# Patient Record
Sex: FEMALE | Race: WHITE | NOT HISPANIC OR LATINO | Employment: OTHER | URBAN - METROPOLITAN AREA
[De-identification: names, ages, dates, MRNs, and addresses within clinical notes are randomized per-mention and may not be internally consistent; named-entity substitution may affect disease eponyms.]

---

## 2017-05-09 ENCOUNTER — APPOINTMENT (OUTPATIENT)
Dept: LAB | Facility: CLINIC | Age: 62
End: 2017-05-09
Payer: MEDICARE

## 2017-05-09 ENCOUNTER — TRANSCRIBE ORDERS (OUTPATIENT)
Dept: LAB | Facility: CLINIC | Age: 62
End: 2017-05-09

## 2017-05-09 DIAGNOSIS — Z51.81 ENCOUNTER FOR THERAPEUTIC DRUG MONITORING: ICD-10-CM

## 2017-05-09 DIAGNOSIS — G35 MULTIPLE SCLEROSIS (HCC): ICD-10-CM

## 2017-05-09 DIAGNOSIS — G35 MULTIPLE SCLEROSIS (HCC): Primary | ICD-10-CM

## 2017-05-09 LAB
ALBUMIN SERPL BCP-MCNC: 4 G/DL (ref 3.5–5)
ALP SERPL-CCNC: 111 U/L (ref 46–116)
ALT SERPL W P-5'-P-CCNC: 29 U/L (ref 12–78)
ANION GAP SERPL CALCULATED.3IONS-SCNC: 7 MMOL/L (ref 4–13)
AST SERPL W P-5'-P-CCNC: 14 U/L (ref 5–45)
BASOPHILS # BLD AUTO: 0.02 THOUSANDS/ΜL (ref 0–0.1)
BASOPHILS NFR BLD AUTO: 1 % (ref 0–1)
BILIRUB SERPL-MCNC: 0.56 MG/DL (ref 0.2–1)
BUN SERPL-MCNC: 21 MG/DL (ref 5–25)
CALCIUM SERPL-MCNC: 9.1 MG/DL (ref 8.3–10.1)
CHLORIDE SERPL-SCNC: 107 MMOL/L (ref 100–108)
CO2 SERPL-SCNC: 29 MMOL/L (ref 21–32)
CREAT SERPL-MCNC: 0.62 MG/DL (ref 0.6–1.3)
EOSINOPHIL # BLD AUTO: 0.06 THOUSAND/ΜL (ref 0–0.61)
EOSINOPHIL NFR BLD AUTO: 2 % (ref 0–6)
ERYTHROCYTE [DISTWIDTH] IN BLOOD BY AUTOMATED COUNT: 14.6 % (ref 11.6–15.1)
GFR SERPL CREATININE-BSD FRML MDRD: >60 ML/MIN/1.73SQ M
GLUCOSE P FAST SERPL-MCNC: 82 MG/DL (ref 65–99)
HCT VFR BLD AUTO: 40.9 % (ref 34.8–46.1)
HGB BLD-MCNC: 13.4 G/DL (ref 11.5–15.4)
LYMPHOCYTES # BLD AUTO: 0.36 THOUSANDS/ΜL (ref 0.6–4.47)
LYMPHOCYTES NFR BLD AUTO: 11 % (ref 14–44)
MCH RBC QN AUTO: 31.5 PG (ref 26.8–34.3)
MCHC RBC AUTO-ENTMCNC: 32.8 G/DL (ref 31.4–37.4)
MCV RBC AUTO: 96 FL (ref 82–98)
MONOCYTES # BLD AUTO: 0.41 THOUSAND/ΜL (ref 0.17–1.22)
MONOCYTES NFR BLD AUTO: 12 % (ref 4–12)
NEUTROPHILS # BLD AUTO: 2.46 THOUSANDS/ΜL (ref 1.85–7.62)
NEUTS SEG NFR BLD AUTO: 74 % (ref 43–75)
NRBC BLD AUTO-RTO: 0 /100 WBCS
PLATELET # BLD AUTO: 332 THOUSANDS/UL (ref 149–390)
PMV BLD AUTO: 10.8 FL (ref 8.9–12.7)
POTASSIUM SERPL-SCNC: 4.7 MMOL/L (ref 3.5–5.3)
PROT SERPL-MCNC: 7.4 G/DL (ref 6.4–8.2)
RBC # BLD AUTO: 4.26 MILLION/UL (ref 3.81–5.12)
SODIUM SERPL-SCNC: 143 MMOL/L (ref 136–145)
WBC # BLD AUTO: 3.32 THOUSAND/UL (ref 4.31–10.16)

## 2017-05-09 PROCEDURE — 85025 COMPLETE CBC W/AUTO DIFF WBC: CPT

## 2017-05-09 PROCEDURE — 36415 COLL VENOUS BLD VENIPUNCTURE: CPT

## 2017-05-09 PROCEDURE — 80053 COMPREHEN METABOLIC PANEL: CPT

## 2017-07-05 ENCOUNTER — APPOINTMENT (OUTPATIENT)
Dept: LAB | Facility: CLINIC | Age: 62
End: 2017-07-05
Payer: MEDICARE

## 2017-07-05 ENCOUNTER — TRANSCRIBE ORDERS (OUTPATIENT)
Dept: LAB | Facility: CLINIC | Age: 62
End: 2017-07-05

## 2017-07-05 DIAGNOSIS — G35 MULTIPLE SCLEROSIS (HCC): ICD-10-CM

## 2017-07-05 DIAGNOSIS — Z79.899 ENCOUNTER FOR LONG-TERM (CURRENT) USE OF OTHER MEDICATIONS: ICD-10-CM

## 2017-07-05 DIAGNOSIS — Z79.899 ENCOUNTER FOR LONG-TERM (CURRENT) USE OF OTHER MEDICATIONS: Primary | ICD-10-CM

## 2017-07-05 LAB
ALBUMIN SERPL BCP-MCNC: 3.7 G/DL (ref 3.5–5)
ALP SERPL-CCNC: 115 U/L (ref 46–116)
ALT SERPL W P-5'-P-CCNC: 47 U/L (ref 12–78)
ANION GAP SERPL CALCULATED.3IONS-SCNC: 5 MMOL/L (ref 4–13)
AST SERPL W P-5'-P-CCNC: 25 U/L (ref 5–45)
BASOPHILS # BLD AUTO: 0.02 THOUSANDS/ΜL (ref 0–0.1)
BASOPHILS NFR BLD AUTO: 1 % (ref 0–1)
BILIRUB SERPL-MCNC: 0.42 MG/DL (ref 0.2–1)
BUN SERPL-MCNC: 26 MG/DL (ref 5–25)
CALCIUM SERPL-MCNC: 9 MG/DL (ref 8.3–10.1)
CHLORIDE SERPL-SCNC: 107 MMOL/L (ref 100–108)
CO2 SERPL-SCNC: 30 MMOL/L (ref 21–32)
CREAT SERPL-MCNC: 0.63 MG/DL (ref 0.6–1.3)
EOSINOPHIL # BLD AUTO: 0.07 THOUSAND/ΜL (ref 0–0.61)
EOSINOPHIL NFR BLD AUTO: 3 % (ref 0–6)
ERYTHROCYTE [DISTWIDTH] IN BLOOD BY AUTOMATED COUNT: 14.4 % (ref 11.6–15.1)
GFR SERPL CREATININE-BSD FRML MDRD: >60 ML/MIN/1.73SQ M
GLUCOSE P FAST SERPL-MCNC: 79 MG/DL (ref 65–99)
HCT VFR BLD AUTO: 40.7 % (ref 34.8–46.1)
HGB BLD-MCNC: 13.1 G/DL (ref 11.5–15.4)
LYMPHOCYTES # BLD AUTO: 0.25 THOUSANDS/ΜL (ref 0.6–4.47)
LYMPHOCYTES NFR BLD AUTO: 9 % (ref 14–44)
MCH RBC QN AUTO: 31.3 PG (ref 26.8–34.3)
MCHC RBC AUTO-ENTMCNC: 32.2 G/DL (ref 31.4–37.4)
MCV RBC AUTO: 97 FL (ref 82–98)
MONOCYTES # BLD AUTO: 0.46 THOUSAND/ΜL (ref 0.17–1.22)
MONOCYTES NFR BLD AUTO: 17 % (ref 4–12)
NEUTROPHILS # BLD AUTO: 1.96 THOUSANDS/ΜL (ref 1.85–7.62)
NEUTS SEG NFR BLD AUTO: 70 % (ref 43–75)
NRBC BLD AUTO-RTO: 0 /100 WBCS
PLATELET # BLD AUTO: 359 THOUSANDS/UL (ref 149–390)
PMV BLD AUTO: 10.6 FL (ref 8.9–12.7)
POTASSIUM SERPL-SCNC: 4.8 MMOL/L (ref 3.5–5.3)
PROT SERPL-MCNC: 7.3 G/DL (ref 6.4–8.2)
RBC # BLD AUTO: 4.18 MILLION/UL (ref 3.81–5.12)
SODIUM SERPL-SCNC: 142 MMOL/L (ref 136–145)
WBC # BLD AUTO: 2.76 THOUSAND/UL (ref 4.31–10.16)

## 2017-07-05 PROCEDURE — 36415 COLL VENOUS BLD VENIPUNCTURE: CPT

## 2017-07-05 PROCEDURE — 80053 COMPREHEN METABOLIC PANEL: CPT

## 2017-07-05 PROCEDURE — 85025 COMPLETE CBC W/AUTO DIFF WBC: CPT

## 2018-01-05 ENCOUNTER — TRANSCRIBE ORDERS (OUTPATIENT)
Dept: LAB | Facility: CLINIC | Age: 63
End: 2018-01-05

## 2018-01-05 ENCOUNTER — APPOINTMENT (OUTPATIENT)
Dept: LAB | Facility: CLINIC | Age: 63
End: 2018-01-05
Payer: MEDICARE

## 2018-01-05 DIAGNOSIS — T83.511A URINARY TRACT INFECTION ASSOCIATED WITH CATHETERIZATION OF URINARY TRACT, UNSPECIFIED INDWELLING URINARY CATHETER TYPE, INITIAL ENCOUNTER (HCC): Primary | ICD-10-CM

## 2018-01-05 DIAGNOSIS — N39.0 URINARY TRACT INFECTION ASSOCIATED WITH CATHETERIZATION OF URINARY TRACT, UNSPECIFIED INDWELLING URINARY CATHETER TYPE, INITIAL ENCOUNTER (HCC): Primary | ICD-10-CM

## 2018-01-05 LAB
BACTERIA UR QL AUTO: ABNORMAL /HPF
BILIRUB UR QL STRIP: NEGATIVE
CLARITY UR: ABNORMAL
COLOR UR: YELLOW
GLUCOSE UR STRIP-MCNC: NEGATIVE MG/DL
HGB UR QL STRIP.AUTO: NEGATIVE
HYALINE CASTS #/AREA URNS LPF: ABNORMAL /LPF
KETONES UR STRIP-MCNC: NEGATIVE MG/DL
LEUKOCYTE ESTERASE UR QL STRIP: ABNORMAL
NITRITE UR QL STRIP: NEGATIVE
NON-SQ EPI CELLS URNS QL MICRO: ABNORMAL /HPF
PH UR STRIP.AUTO: 6 [PH] (ref 4.5–8)
PROT UR STRIP-MCNC: NEGATIVE MG/DL
RBC #/AREA URNS AUTO: ABNORMAL /HPF
SP GR UR STRIP.AUTO: 1.02 (ref 1–1.03)
UROBILINOGEN UR QL STRIP.AUTO: 0.2 E.U./DL
WBC #/AREA URNS AUTO: ABNORMAL /HPF

## 2018-01-05 PROCEDURE — 87086 URINE CULTURE/COLONY COUNT: CPT

## 2018-01-05 PROCEDURE — 81001 URINALYSIS AUTO W/SCOPE: CPT

## 2018-01-06 LAB — BACTERIA UR CULT: NORMAL

## 2018-01-19 ENCOUNTER — LAB (OUTPATIENT)
Dept: LAB | Facility: CLINIC | Age: 63
End: 2018-01-19
Payer: MEDICARE

## 2018-01-19 ENCOUNTER — TRANSCRIBE ORDERS (OUTPATIENT)
Dept: LAB | Facility: CLINIC | Age: 63
End: 2018-01-19

## 2018-01-19 DIAGNOSIS — G35 MULTIPLE SCLEROSIS (HCC): ICD-10-CM

## 2018-01-19 DIAGNOSIS — E78.00 PURE HYPERCHOLESTEROLEMIA: ICD-10-CM

## 2018-01-19 DIAGNOSIS — Z79.899 NEED FOR PROPHYLACTIC CHEMOTHERAPY: ICD-10-CM

## 2018-01-19 DIAGNOSIS — G35 MULTIPLE SCLEROSIS (HCC): Primary | ICD-10-CM

## 2018-01-19 DIAGNOSIS — I10 ESSENTIAL HYPERTENSION, MALIGNANT: ICD-10-CM

## 2018-01-19 LAB
ALBUMIN SERPL BCP-MCNC: 3.8 G/DL (ref 3.5–5)
ALP SERPL-CCNC: 150 U/L (ref 46–116)
ALT SERPL W P-5'-P-CCNC: 31 U/L (ref 12–78)
ANION GAP SERPL CALCULATED.3IONS-SCNC: 5 MMOL/L (ref 4–13)
AST SERPL W P-5'-P-CCNC: 18 U/L (ref 5–45)
BACTERIA UR QL AUTO: ABNORMAL /HPF
BASOPHILS # BLD AUTO: 0.04 THOUSANDS/ΜL (ref 0–0.1)
BASOPHILS NFR BLD AUTO: 1 % (ref 0–1)
BILIRUB SERPL-MCNC: 0.49 MG/DL (ref 0.2–1)
BILIRUB UR QL STRIP: NEGATIVE
BUN SERPL-MCNC: 18 MG/DL (ref 5–25)
CALCIUM SERPL-MCNC: 8.9 MG/DL (ref 8.3–10.1)
CHLORIDE SERPL-SCNC: 102 MMOL/L (ref 100–108)
CHOLEST SERPL-MCNC: 225 MG/DL (ref 50–200)
CLARITY UR: ABNORMAL
CO2 SERPL-SCNC: 32 MMOL/L (ref 21–32)
COLOR UR: YELLOW
CREAT SERPL-MCNC: 0.63 MG/DL (ref 0.6–1.3)
EOSINOPHIL # BLD AUTO: 0.13 THOUSAND/ΜL (ref 0–0.61)
EOSINOPHIL NFR BLD AUTO: 3 % (ref 0–6)
ERYTHROCYTE [DISTWIDTH] IN BLOOD BY AUTOMATED COUNT: 13.5 % (ref 11.6–15.1)
ERYTHROCYTE [SEDIMENTATION RATE] IN BLOOD: 16 MM/HOUR (ref 0–20)
GFR SERPL CREATININE-BSD FRML MDRD: 97 ML/MIN/1.73SQ M
GLUCOSE P FAST SERPL-MCNC: 85 MG/DL (ref 65–99)
GLUCOSE UR STRIP-MCNC: NEGATIVE MG/DL
HCT VFR BLD AUTO: 42.2 % (ref 34.8–46.1)
HDLC SERPL-MCNC: 77 MG/DL (ref 40–60)
HGB BLD-MCNC: 13.8 G/DL (ref 11.5–15.4)
HGB UR QL STRIP.AUTO: NEGATIVE
HYALINE CASTS #/AREA URNS LPF: ABNORMAL /LPF
IRON SERPL-MCNC: 91 UG/DL (ref 50–170)
KETONES UR STRIP-MCNC: NEGATIVE MG/DL
LDH SERPL-CCNC: 246 U/L (ref 81–234)
LDLC SERPL CALC-MCNC: 123 MG/DL (ref 0–100)
LEUKOCYTE ESTERASE UR QL STRIP: ABNORMAL
LYMPHOCYTES # BLD AUTO: 0.27 THOUSANDS/ΜL (ref 0.6–4.47)
LYMPHOCYTES NFR BLD AUTO: 7 % (ref 14–44)
MCH RBC QN AUTO: 30.7 PG (ref 26.8–34.3)
MCHC RBC AUTO-ENTMCNC: 32.7 G/DL (ref 31.4–37.4)
MCV RBC AUTO: 94 FL (ref 82–98)
MONOCYTES # BLD AUTO: 0.34 THOUSAND/ΜL (ref 0.17–1.22)
MONOCYTES NFR BLD AUTO: 9 % (ref 4–12)
NEUTROPHILS # BLD AUTO: 3.02 THOUSANDS/ΜL (ref 1.85–7.62)
NEUTS SEG NFR BLD AUTO: 80 % (ref 43–75)
NITRITE UR QL STRIP: POSITIVE
NON-SQ EPI CELLS URNS QL MICRO: ABNORMAL /HPF
NRBC BLD AUTO-RTO: 0 /100 WBCS
PH UR STRIP.AUTO: 7 [PH] (ref 4.5–8)
PLATELET # BLD AUTO: 359 THOUSANDS/UL (ref 149–390)
PMV BLD AUTO: 10.4 FL (ref 8.9–12.7)
POTASSIUM SERPL-SCNC: 4 MMOL/L (ref 3.5–5.3)
PROT SERPL-MCNC: 7.7 G/DL (ref 6.4–8.2)
PROT UR STRIP-MCNC: NEGATIVE MG/DL
RBC # BLD AUTO: 4.5 MILLION/UL (ref 3.81–5.12)
RBC #/AREA URNS AUTO: ABNORMAL /HPF
SODIUM SERPL-SCNC: 139 MMOL/L (ref 136–145)
SP GR UR STRIP.AUTO: 1.02 (ref 1–1.03)
TRIGL SERPL-MCNC: 124 MG/DL
TSH SERPL DL<=0.05 MIU/L-ACNC: 2.65 UIU/ML (ref 0.36–3.74)
UROBILINOGEN UR QL STRIP.AUTO: 0.2 E.U./DL
WBC # BLD AUTO: 3.81 THOUSAND/UL (ref 4.31–10.16)
WBC #/AREA URNS AUTO: ABNORMAL /HPF

## 2018-01-19 PROCEDURE — 84443 ASSAY THYROID STIM HORMONE: CPT

## 2018-01-19 PROCEDURE — 81001 URINALYSIS AUTO W/SCOPE: CPT

## 2018-01-19 PROCEDURE — 80061 LIPID PANEL: CPT

## 2018-01-19 PROCEDURE — 85025 COMPLETE CBC W/AUTO DIFF WBC: CPT

## 2018-01-19 PROCEDURE — 85652 RBC SED RATE AUTOMATED: CPT

## 2018-01-19 PROCEDURE — 83615 LACTATE (LD) (LDH) ENZYME: CPT

## 2018-01-19 PROCEDURE — 83540 ASSAY OF IRON: CPT

## 2018-01-19 PROCEDURE — 80053 COMPREHEN METABOLIC PANEL: CPT

## 2018-01-26 ENCOUNTER — TRANSCRIBE ORDERS (OUTPATIENT)
Dept: LAB | Facility: CLINIC | Age: 63
End: 2018-01-26

## 2018-01-26 ENCOUNTER — APPOINTMENT (OUTPATIENT)
Dept: LAB | Facility: CLINIC | Age: 63
End: 2018-01-26
Payer: MEDICARE

## 2018-01-26 DIAGNOSIS — K59.00 CONSTIPATION, UNSPECIFIED CONSTIPATION TYPE: ICD-10-CM

## 2018-01-26 DIAGNOSIS — G82.50 QUADRIPARESIS (HCC): ICD-10-CM

## 2018-01-26 DIAGNOSIS — R25.2 SPASTICITY: ICD-10-CM

## 2018-01-26 DIAGNOSIS — N31.9 NEUROGENIC BLADDER: ICD-10-CM

## 2018-01-26 DIAGNOSIS — M54.17 RADICULOPATHY OF LUMBOSACRAL REGION: ICD-10-CM

## 2018-01-26 DIAGNOSIS — M54.5 LOW BACK PAIN, UNSPECIFIED BACK PAIN LATERALITY, UNSPECIFIED CHRONICITY, WITH SCIATICA PRESENCE UNSPECIFIED: ICD-10-CM

## 2018-01-26 DIAGNOSIS — G35 MULTIPLE SCLEROSIS (HCC): Primary | ICD-10-CM

## 2018-01-26 LAB
BACTERIA UR QL AUTO: ABNORMAL /HPF
BILIRUB UR QL STRIP: NEGATIVE
CLARITY UR: ABNORMAL
COLOR UR: ABNORMAL
GLUCOSE UR STRIP-MCNC: NEGATIVE MG/DL
HGB UR QL STRIP.AUTO: NEGATIVE
HYALINE CASTS #/AREA URNS LPF: ABNORMAL /LPF
KETONES UR STRIP-MCNC: NEGATIVE MG/DL
LEUKOCYTE ESTERASE UR QL STRIP: ABNORMAL
NITRITE UR QL STRIP: POSITIVE
NON-SQ EPI CELLS URNS QL MICRO: ABNORMAL /HPF
PH UR STRIP.AUTO: 6 [PH] (ref 4.5–8)
PROT UR STRIP-MCNC: NEGATIVE MG/DL
RBC #/AREA URNS AUTO: ABNORMAL /HPF
SP GR UR STRIP.AUTO: 1.03 (ref 1–1.03)
UROBILINOGEN UR QL STRIP.AUTO: 0.2 E.U./DL
WBC #/AREA URNS AUTO: ABNORMAL /HPF

## 2018-01-26 PROCEDURE — 87086 URINE CULTURE/COLONY COUNT: CPT

## 2018-01-26 PROCEDURE — 87077 CULTURE AEROBIC IDENTIFY: CPT

## 2018-01-26 PROCEDURE — 87186 SC STD MICRODIL/AGAR DIL: CPT

## 2018-01-26 PROCEDURE — 87147 CULTURE TYPE IMMUNOLOGIC: CPT

## 2018-01-26 PROCEDURE — 81001 URINALYSIS AUTO W/SCOPE: CPT

## 2018-01-31 LAB
BACTERIA UR CULT: ABNORMAL
BACTERIA UR CULT: ABNORMAL

## 2018-04-27 ENCOUNTER — APPOINTMENT (OUTPATIENT)
Dept: LAB | Facility: CLINIC | Age: 63
End: 2018-04-27
Payer: MEDICARE

## 2018-04-27 ENCOUNTER — TRANSCRIBE ORDERS (OUTPATIENT)
Dept: LAB | Facility: CLINIC | Age: 63
End: 2018-04-27

## 2018-04-27 DIAGNOSIS — M54.17 LUMBOSACRAL NEURITIS: ICD-10-CM

## 2018-04-27 DIAGNOSIS — K59.00 CONSTIPATION, UNSPECIFIED CONSTIPATION TYPE: ICD-10-CM

## 2018-04-27 DIAGNOSIS — M54.5 LOW BACK PAIN, UNSPECIFIED BACK PAIN LATERALITY, UNSPECIFIED CHRONICITY, WITH SCIATICA PRESENCE UNSPECIFIED: ICD-10-CM

## 2018-04-27 DIAGNOSIS — G82.50 QUADRIPLEGIA, UNSPECIFIED (HCC): ICD-10-CM

## 2018-04-27 DIAGNOSIS — G35 MULTIPLE SCLEROSIS (HCC): Primary | ICD-10-CM

## 2018-04-27 DIAGNOSIS — G35 MULTIPLE SCLEROSIS (HCC): ICD-10-CM

## 2018-04-27 DIAGNOSIS — R25.2 SPASTICITY: ICD-10-CM

## 2018-04-27 DIAGNOSIS — N31.9 BLADDER DYSFUNCTION: ICD-10-CM

## 2018-04-27 LAB
25(OH)D3 SERPL-MCNC: 38.4 NG/ML (ref 30–100)
ALBUMIN SERPL BCP-MCNC: 3.7 G/DL (ref 3.5–5)
ALP SERPL-CCNC: 127 U/L (ref 46–116)
ALT SERPL W P-5'-P-CCNC: 49 U/L (ref 12–78)
ANION GAP SERPL CALCULATED.3IONS-SCNC: 7 MMOL/L (ref 4–13)
APTT PPP: 27 SECONDS (ref 23–35)
AST SERPL W P-5'-P-CCNC: 26 U/L (ref 5–45)
BASOPHILS # BLD AUTO: 0.01 THOUSANDS/ΜL (ref 0–0.1)
BASOPHILS NFR BLD AUTO: 1 % (ref 0–1)
BILIRUB SERPL-MCNC: 0.4 MG/DL (ref 0.2–1)
BUN SERPL-MCNC: 10 MG/DL (ref 5–25)
CALCIUM SERPL-MCNC: 8.9 MG/DL (ref 8.3–10.1)
CHLORIDE SERPL-SCNC: 105 MMOL/L (ref 100–108)
CO2 SERPL-SCNC: 32 MMOL/L (ref 21–32)
CREAT SERPL-MCNC: 0.69 MG/DL (ref 0.6–1.3)
CRP SERPL QL: 7.1 MG/L
EOSINOPHIL # BLD AUTO: 0.03 THOUSAND/ΜL (ref 0–0.61)
EOSINOPHIL NFR BLD AUTO: 1 % (ref 0–6)
ERYTHROCYTE [DISTWIDTH] IN BLOOD BY AUTOMATED COUNT: 14.5 % (ref 11.6–15.1)
ERYTHROCYTE [SEDIMENTATION RATE] IN BLOOD: 9 MM/HOUR (ref 0–20)
FOLATE SERPL-MCNC: >20 NG/ML (ref 3.1–17.5)
GFR SERPL CREATININE-BSD FRML MDRD: 94 ML/MIN/1.73SQ M
GLUCOSE P FAST SERPL-MCNC: 82 MG/DL (ref 65–99)
HAV IGM SER QL: NORMAL
HBV CORE IGM SER QL: NORMAL
HBV SURFACE AB SER-ACNC: <3.1 MIU/ML
HBV SURFACE AG SER QL: NORMAL
HCT VFR BLD AUTO: 42.8 % (ref 34.8–46.1)
HCV AB SER QL: NORMAL
HGB BLD-MCNC: 14.1 G/DL (ref 11.5–15.4)
INR PPP: 0.9 (ref 0.86–1.16)
LYMPHOCYTES # BLD AUTO: 0.27 THOUSANDS/ΜL (ref 0.6–4.47)
LYMPHOCYTES NFR BLD AUTO: 13 % (ref 14–44)
MCH RBC QN AUTO: 30.3 PG (ref 26.8–34.3)
MCHC RBC AUTO-ENTMCNC: 32.9 G/DL (ref 31.4–37.4)
MCV RBC AUTO: 92 FL (ref 82–98)
MONOCYTES # BLD AUTO: 0.2 THOUSAND/ΜL (ref 0.17–1.22)
MONOCYTES NFR BLD AUTO: 10 % (ref 4–12)
NEUTROPHILS # BLD AUTO: 1.59 THOUSANDS/ΜL (ref 1.85–7.62)
NEUTS SEG NFR BLD AUTO: 75 % (ref 43–75)
PLATELET # BLD AUTO: 307 THOUSANDS/UL (ref 149–390)
PMV BLD AUTO: 10.2 FL (ref 8.9–12.7)
POTASSIUM SERPL-SCNC: 4.1 MMOL/L (ref 3.5–5.3)
PROT SERPL-MCNC: 7.4 G/DL (ref 6.4–8.2)
PROTHROMBIN TIME: 12.4 SECONDS (ref 12.1–14.4)
RBC # BLD AUTO: 4.65 MILLION/UL (ref 3.81–5.12)
SODIUM SERPL-SCNC: 144 MMOL/L (ref 136–145)
T4 FREE SERPL-MCNC: 1.45 NG/DL (ref 0.76–1.46)
TSH SERPL DL<=0.05 MIU/L-ACNC: 3.52 UIU/ML (ref 0.36–3.74)
VIT B12 SERPL-MCNC: 1708 PG/ML (ref 100–900)
WBC # BLD AUTO: 2.1 THOUSAND/UL (ref 4.31–10.16)

## 2018-04-27 PROCEDURE — 85730 THROMBOPLASTIN TIME PARTIAL: CPT

## 2018-04-27 PROCEDURE — 86355 B CELLS TOTAL COUNT: CPT

## 2018-04-27 PROCEDURE — 82306 VITAMIN D 25 HYDROXY: CPT

## 2018-04-27 PROCEDURE — 80053 COMPREHEN METABOLIC PANEL: CPT

## 2018-04-27 PROCEDURE — 86359 T CELLS TOTAL COUNT: CPT

## 2018-04-27 PROCEDURE — 80074 ACUTE HEPATITIS PANEL: CPT

## 2018-04-27 PROCEDURE — 86360 T CELL ABSOLUTE COUNT/RATIO: CPT

## 2018-04-27 PROCEDURE — 85610 PROTHROMBIN TIME: CPT

## 2018-04-27 PROCEDURE — 85652 RBC SED RATE AUTOMATED: CPT

## 2018-04-27 PROCEDURE — 84443 ASSAY THYROID STIM HORMONE: CPT

## 2018-04-27 PROCEDURE — 86706 HEP B SURFACE ANTIBODY: CPT

## 2018-04-27 PROCEDURE — 85025 COMPLETE CBC W/AUTO DIFF WBC: CPT

## 2018-04-27 PROCEDURE — 36415 COLL VENOUS BLD VENIPUNCTURE: CPT

## 2018-04-27 PROCEDURE — 82746 ASSAY OF FOLIC ACID SERUM: CPT

## 2018-04-27 PROCEDURE — 82607 VITAMIN B-12: CPT

## 2018-04-27 PROCEDURE — 84439 ASSAY OF FREE THYROXINE: CPT

## 2018-04-27 PROCEDURE — 86140 C-REACTIVE PROTEIN: CPT

## 2018-04-30 ENCOUNTER — EVALUATION (OUTPATIENT)
Dept: PHYSICAL THERAPY | Facility: CLINIC | Age: 63
End: 2018-04-30
Payer: MEDICARE

## 2018-04-30 DIAGNOSIS — M54.5 LOW BACK PAIN, UNSPECIFIED BACK PAIN LATERALITY, UNSPECIFIED CHRONICITY, WITH SCIATICA PRESENCE UNSPECIFIED: ICD-10-CM

## 2018-04-30 DIAGNOSIS — G35 GENERALIZED MULTIPLE SCLEROSIS (HCC): Primary | ICD-10-CM

## 2018-04-30 DIAGNOSIS — R25.2 SPASTICITY: ICD-10-CM

## 2018-04-30 DIAGNOSIS — G82.50 QUADRIPARESIS (HCC): ICD-10-CM

## 2018-04-30 DIAGNOSIS — M54.17 LUMBOSACRAL RADICULOPATHY: ICD-10-CM

## 2018-04-30 PROCEDURE — G8979 MOBILITY GOAL STATUS: HCPCS | Performed by: PHYSICAL THERAPIST

## 2018-04-30 PROCEDURE — 97162 PT EVAL MOD COMPLEX 30 MIN: CPT | Performed by: PHYSICAL THERAPIST

## 2018-04-30 PROCEDURE — G8978 MOBILITY CURRENT STATUS: HCPCS | Performed by: PHYSICAL THERAPIST

## 2018-04-30 PROCEDURE — 97110 THERAPEUTIC EXERCISES: CPT | Performed by: PHYSICAL THERAPIST

## 2018-04-30 RX ORDER — VITAMIN E 268 MG
400 CAPSULE ORAL DAILY
Status: ON HOLD | COMMUNITY
End: 2022-05-25 | Stop reason: CLARIF

## 2018-04-30 RX ORDER — MELATONIN
2000 DAILY
Status: ON HOLD | COMMUNITY
End: 2022-05-25 | Stop reason: CLARIF

## 2018-04-30 RX ORDER — BACLOFEN 20 MG/1
20 TABLET ORAL 3 TIMES DAILY
Status: ON HOLD | COMMUNITY
End: 2022-05-25 | Stop reason: CLARIF

## 2018-04-30 RX ORDER — MULTIVIT WITH MINERALS/LUTEIN
1000 TABLET ORAL DAILY
Status: ON HOLD | COMMUNITY
End: 2022-05-25 | Stop reason: CLARIF

## 2018-04-30 NOTE — LETTER
2018    Fahad Ni MD  [facility]  [address] [facility]    Patient: Danae Rees   YOB: 1955   Date of Visit: 2018     Encounter Diagnosis     ICD-10-CM    1  Generalized multiple sclerosis ([address] ) G35    2  Low back pain, unspecified back pain laterality, unspecified chronicity, with sciatica presence unspecified M54 5    3  Quadriparesis ([address] ) G82 50    4  Lumbosacral radiculopathy M54 17    5  Spasticity R25 2        Dear Dr Bellamy Filter:    Please review the attached Plan of Care from Rima Mora's recent visit  Please verify that you agree therapy should continue by signing the attached document and sending it back to our office  If you have any questions or concerns, please don't hesitate to call  Sincerely,    Lionel Gracia, PT      Referring Provider:      I certify that I have read the below Plan of Care and certify the need for these services furnished under this plan of treatment while under my care  Fahad Ni MD  [address] [place] [address] [place] [address]: [phone]          PT Evaluation     Today's date: 2018  Patient name: Danae Rees  : 1955  MRN: [id number]  Referring provider: Vesna Hale MD  Dx:   Encounter Diagnosis     ICD-10-CM    1  Generalized multiple sclerosis ([address] ) G35    2  Low back pain, unspecified back pain laterality, unspecified chronicity, with sciatica presence unspecified M54 5    3  Quadriparesis ([address] ) G82 50    4  Lumbosacral radiculopathy M54 17    5  Spasticity R25 2                   Assessment    Assessment details: Rima Larson is a pleasant 65 y o  presenting to physical therapy with MD referral for Generalized multiple sclerosis ([address] )  (primary encounter diagnosis), Low back pain, unspecified back pain laterality, unspecified chronicity, with sciatica presence unspecified, Quadriparesis ([address] ), Lumbosacral radiculopathy, and Spasticity      Problem list:  Limited lumbar AROM, decreased hip/core strength, limited lower extremity flexibility, abnormal gait, and increased neutral tension  Treatment to include: Manual therapy techniques, lower extremity/core strengthening, neuromuscular control exercises, balance/proprioception training, gait training, instruction in a comprehensive HEP, and modalities as needed  This pt would benefit from skilled PT services to address their impairments and functional limitations to maximize functional outcome  Understanding of Dx/Px/POC: good   Prognosis: good    Goals  ST  Pt will improve hamstring flexibility to no more than mild restriction in 3 weeks  2  Pt will improve lumbar SB ROM to 20 degrees in 3 weeks  LT  Pt will be able to walk > 15 minutes with no more than mild pain in 6 weeks  2  Pt will be independent in a comprehensive HEP in 6 weeks  Plan  Patient would benefit from: skilled PT  Frequency: 2x week  Treatment plan discussed with: patient        Subjective Evaluation    History of Present Illness  Mechanism of injury: Patient was diagnosed with MS at age 37 which is progressive in nature  Pt reports MS has most effected her ability to walk, bladder and colon control, and perform intricate tasks with hands  Pt reports onset of lower back pain in   Patient reports over the past ten years the pain has been increasing  Pt state she has had home PT to treat her lower back with some relief 2 years ago  Patient onset of numbness and tingling into right leg since onset of pain in   Pt underwent lumbar fusion in 2016 and afterward reports numbness and tingling into bilateral lower extremities  Pt reports she has recently began taking medical marijuana (less than one month) and has noticed a decrease in the tingling sensation bilaterally  Initially pt was taking pill form for 20 days and was then switched to oil which she feels is less effective       Premorbid status:  - ADLs: Indepenent with mild difficulty  - Work: Full time, Full duty- bank  - Recreation: none    Current status:   - ADLs/Functional activities:    - Stairs Step to pattern with Pain Levels: moderate pain (premorbid no pain)   - Sit to stand with mild pain   - Walking 5 minutes with moderate pain (premorbid 30 minutes)   - Standing 5 minutes with moderate pain (premorbid >30 minutes)   - Sitting minimal issues    - Sleeping with 0 nightly sleep disturbances due to pain   - Bending forward to don/doff socks and shoes with no pain   - Lifting no issues  - Work: Not a working individual  - Recreation: none  Pain  Current pain ratin  At best pain ratin  At worst pain ratin  Location: Right lumbar spine  Quality: sharp, dull ache and tight  Aggravating factors: walking and standing    Patient Goals  Patient goals for therapy: decreased pain and increased strength  Patient goal: Walk and stand longer with less pain        Objective     Neurological Testing     Sensation     Lumbar   Left   Intact: light touch    Right   Diminished: light touch    Comments   Right light touch: lateral aspect of right foot    Reflexes   Left   Patellar (L4): brisk (3+)  Achilles (S1): brisk (3+)  Clonus sign: negative    Right   Patellar (L4): brisk (3+)  Achilles (S1): brisk (3+)  Clonus sign: negative    Additional Neurological Details  Negative for spasticity (taking medication)    Active Range of Motion     Lumbar   Flexion: 70 degrees   Extension: 12 degrees   Left lateral flexion: 15 degrees   Right lateral flexion: 15 degrees     Strength/Myotome Testing     Left Hip   Planes of Motion   Flexion: 3+  External rotation: 4    Right Hip   Planes of Motion   External rotation: 4    Left Knee   Flexion: 4+  Extension: 4+    Right Knee   Flexion: 4  Extension: 4    Left Ankle/Foot   Dorsiflexion: 4+  Plantar flexion: 4+  Great toe flexion: 4+    Right Ankle/Foot   Dorsiflexion: 4  Plantar flexion: 4  Great toe flexion: 4+    Additional Strength Details  Flexibility:  HS: Mod restriction on R, mild on L  IR: Mod restriction on R, mild on L    Ambulation: Pt ambulates over level surface with rolator with increased toe out on RLE and decreased toe clearance bilaterally  Balance: Unable to perform SLS on either leg  Tests     Lumbar     Left   Positive passive SLR  Negative crossed SLR  Right   Positive passive SLR  Negative crossed SLR  Additional Tests Details  Pt unable to get into a prone position for further testing          Precautions: MS, incontinence, spasticity, max A to transfer supine to sit, Mod A of legs sit to supine    Daily Treatment Diary     Manual  4-30 (IE)                                                                                 Exercise Diary  4 -30 (IE)            Recumbent bike 5 mins NV            Gastroc SB str 4 x 30" NV                         Seated:             - HS str 4 x 30" ea NV            - pball QL str 4 x 30" ea NV                         Standing:             - hip abd 10 x ea NV            - hip ext 10 x ea NV            - HS curls 10 x ea NV            - mini squat 10 x NV                         Supine:             - sciatic n glides 10 x ea            - TA  10 x 10" NV            - TA with ball squeeze 10 x 6" hold NV            - TA with clams 2 x 10 Red NV                                          Modalities  4-30 (IE)            Cryo as needed                                         * On initial evaluation, educated pt on anatomy, pathology, and exercise rationale  Provided pt with basic HEP and ensured proper exercise performance  Access Code: [email or web address]   URL: [email or web address]   Date: 04/30/2018   Prepared by: Eleuterio Friday      Exercises  0 Supine Posterior Pelvic Tilt - 10 reps - 10 hold - 2x daily  0 Supine Lower Trunk Rotation - 10 reps - 2 hold - 2x daily  0 Supine Single Knee to Chest - 10 reps - 10 hold - 2x daily

## 2018-04-30 NOTE — PROGRESS NOTES
PT Evaluation     Today's date: 2018  Patient name: Kim Osorio  : 1955  MRN: [id number]  Referring provider: Michael Augustin MD  Dx:   Encounter Diagnosis     ICD-10-CM    1  Generalized multiple sclerosis ([facility] ) G35    2  Low back pain, unspecified back pain laterality, unspecified chronicity, with sciatica presence unspecified M54 5    3  Quadriparesis ([facility] [address] ) G82 50    4  Lumbosacral radiculopathy M54 17    5  Spasticity R25 2                   Assessment    Assessment details: Rima Larson is a pleasant 65 y o  presenting to physical therapy with MD referral for Generalized multiple sclerosis ([facility] )  (primary encounter diagnosis), Low back pain, unspecified back pain laterality, unspecified chronicity, with sciatica presence unspecified, Quadriparesis ([facility] ), Lumbosacral radiculopathy, and Spasticity  Problem list:  Limited lumbar AROM, decreased hip/core strength, limited lower extremity flexibility, abnormal gait, and increased neutral tension  Treatment to include: Manual therapy techniques, lower extremity/core strengthening, neuromuscular control exercises, balance/proprioception training, gait training, instruction in a comprehensive HEP, and modalities as needed  This pt would benefit from skilled PT services to address their impairments and functional limitations to maximize functional outcome  Understanding of Dx/Px/POC: good   Prognosis: good    Goals  ST  Pt will improve hamstring flexibility to no more than mild restriction in 3 weeks  2  Pt will improve lumbar SB ROM to 20 degrees in 3 weeks  LT  Pt will be able to walk > 15 minutes with no more than mild pain in 6 weeks  2  Pt will be independent in a comprehensive HEP in 6 weeks      Plan  Patient would benefit from: skilled PT  Frequency: 2x week  Treatment plan discussed with: patient        Subjective Evaluation    History of Present Illness  Mechanism of injury: Patient was diagnosed with MS at age 37 which is progressive in nature  Pt reports MS has most effected her ability to walk, bladder and colon control, and perform intricate tasks with hands  Pt reports onset of lower back pain in   Patient reports over the past ten years the pain has been increasing  Pt state she has had home PT to treat her lower back with some relief 2 years ago  Patient onset of numbness and tingling into right leg since onset of pain in   Pt underwent lumbar fusion in 2016 and afterward reports numbness and tingling into bilateral lower extremities  Pt reports she has recently began taking medical marijuana (less than one month) and has noticed a decrease in the tingling sensation bilaterally  Initially pt was taking pill form for 20 days and was then switched to oil which she feels is less effective       Premorbid status:  - ADLs: Indepenent with mild difficulty  - Work: Full time, Full duty- bank  - Recreation: none    Current status:   - ADLs/Functional activities:    - Stairs Step to pattern with Pain Levels: moderate pain (premorbid no pain)   - Sit to stand with mild pain   - Walking 5 minutes with moderate pain (premorbid 30 minutes)   - Standing 5 minutes with moderate pain (premorbid >30 minutes)   - Sitting minimal issues    - Sleeping with 0 nightly sleep disturbances due to pain   - Bending forward to don/doff socks and shoes with no pain   - Lifting no issues  - Work: Not a working individual  - Recreation: none  Pain  Current pain ratin  At best pain ratin  At worst pain ratin  Location: Right lumbar spine  Quality: sharp, dull ache and tight  Aggravating factors: walking and standing    Patient Goals  Patient goals for therapy: decreased pain and increased strength  Patient goal: Walk and stand longer with less pain        Objective     Neurological Testing     Sensation     Lumbar   Left   Intact: light touch    Right   Diminished: light touch    Comments   Right light touch: lateral aspect of right foot    Reflexes   Left   Patellar (L4): brisk (3+)  Achilles (S1): brisk (3+)  Clonus sign: negative    Right   Patellar (L4): brisk (3+)  Achilles (S1): brisk (3+)  Clonus sign: negative    Additional Neurological Details  Negative for spasticity (taking medication)    Active Range of Motion     Lumbar   Flexion: 70 degrees   Extension: 12 degrees   Left lateral flexion: 15 degrees   Right lateral flexion: 15 degrees     Strength/Myotome Testing     Left Hip   Planes of Motion   Flexion: 3+  External rotation: 4    Right Hip   Planes of Motion   External rotation: 4    Left Knee   Flexion: 4+  Extension: 4+    Right Knee   Flexion: 4  Extension: 4    Left Ankle/Foot   Dorsiflexion: 4+  Plantar flexion: 4+  Great toe flexion: 4+    Right Ankle/Foot   Dorsiflexion: 4  Plantar flexion: 4  Great toe flexion: 4+    Additional Strength Details  Flexibility:  HS: Mod restriction on R, mild on L  IR: Mod restriction on R, mild on L    Ambulation: Pt ambulates over level surface with rolator with increased toe out on RLE and decreased toe clearance bilaterally  Balance: Unable to perform SLS on either leg  Tests     Lumbar     Left   Positive passive SLR  Negative crossed SLR  Right   Positive passive SLR  Negative crossed SLR       Additional Tests Details  Pt unable to get into a prone position for further testing          Precautions: MS, incontinence, spasticity, max A to transfer supine to sit, Mod A of legs sit to supine    Daily Treatment Diary     Manual  4-30 (IE)                                                                                 Exercise Diary  4 -30 (IE)            Recumbent bike 5 mins NV            Gastroc SB str 4 x 30" NV                         Seated:             - HS str 4 x 30" ea NV            - pball QL str 4 x 30" ea NV                         Standing:             - hip abd 10 x ea NV            - hip ext 10 x ea NV            - HS curls 10 x ea NV            - mini squat 10 x NV                         Supine:             - sciatic n glides 10 x ea            - TA  10 x 10" NV            - TA with ball squeeze 10 x 6" hold NV            - TA with clams 2 x 10 Red NV                                          Modalities  4-30 (IE)            Cryo as needed                                         * On initial evaluation, educated pt on anatomy, pathology, and exercise rationale  Provided pt with basic HEP and ensured proper exercise performance  Access Code: [email or web address]   URL: [email or web address]   Date: 04/30/2018   Prepared by: Tarsha Chilel      Exercises  0 Supine Posterior Pelvic Tilt - 10 reps - 10 hold - 2x daily  0 Supine Lower Trunk Rotation - 10 reps - 2 hold - 2x daily  0 Supine Single Knee to Chest - 10 reps - 10 hold - 2x daily

## 2018-05-01 LAB — SCAN RESULT: NORMAL

## 2018-05-03 ENCOUNTER — APPOINTMENT (OUTPATIENT)
Dept: PHYSICAL THERAPY | Facility: CLINIC | Age: 63
End: 2018-05-03
Payer: MEDICARE

## 2018-05-04 ENCOUNTER — OFFICE VISIT (OUTPATIENT)
Dept: PHYSICAL THERAPY | Facility: CLINIC | Age: 63
End: 2018-05-04
Payer: MEDICARE

## 2018-05-04 DIAGNOSIS — M54.17 LUMBOSACRAL RADICULOPATHY: ICD-10-CM

## 2018-05-04 DIAGNOSIS — M54.5 LOW BACK PAIN, UNSPECIFIED BACK PAIN LATERALITY, UNSPECIFIED CHRONICITY, WITH SCIATICA PRESENCE UNSPECIFIED: ICD-10-CM

## 2018-05-04 DIAGNOSIS — G82.50 QUADRIPARESIS (HCC): ICD-10-CM

## 2018-05-04 DIAGNOSIS — R25.2 SPASTICITY: ICD-10-CM

## 2018-05-04 DIAGNOSIS — G35 GENERALIZED MULTIPLE SCLEROSIS (HCC): Primary | ICD-10-CM

## 2018-05-04 PROCEDURE — 97110 THERAPEUTIC EXERCISES: CPT | Performed by: PHYSICAL THERAPIST

## 2018-05-04 RX ORDER — NITROFURANTOIN MACROCRYSTALS 100 MG/1
100 CAPSULE ORAL DAILY
COMMUNITY
End: 2021-06-20

## 2018-05-04 RX ORDER — ASPIRIN 81 MG/1
81 TABLET ORAL DAILY
Status: ON HOLD | COMMUNITY
End: 2022-05-25 | Stop reason: CLARIF

## 2018-05-04 RX ORDER — DIPHENOXYLATE HYDROCHLORIDE AND ATROPINE SULFATE 2.5; .025 MG/1; MG/1
1 TABLET ORAL DAILY
Status: ON HOLD | COMMUNITY
End: 2022-05-25 | Stop reason: CLARIF

## 2018-05-04 RX ORDER — NABUMETONE 500 MG/1
1000 TABLET, FILM COATED ORAL 2 TIMES DAILY
Status: ON HOLD | COMMUNITY
End: 2022-05-25 | Stop reason: CLARIF

## 2018-05-04 RX ORDER — ALFALFA 650 MG
1300 TABLET ORAL 3 TIMES DAILY
COMMUNITY
End: 2021-06-20

## 2018-05-04 NOTE — PROGRESS NOTES
Daily Note     Today's date: 2018  Patient name: Kermit Sue  : 1955  MRN: [id number]  Referring provider: iY Encinas MD  Dx:   Encounter Diagnosis     ICD-10-CM    1  Generalized multiple sclerosis ([facility] ) G35    2  Low back pain, unspecified back pain laterality, unspecified chronicity, with sciatica presence unspecified M54 5    3  Quadriparesis ([facility] ) G82 50    4  Lumbosacral radiculopathy M54 17    5  Spasticity R25 2                   Subjective: Patient reports over the past two days her pain has significant increased in her lower back  Objective: See treatment diary below      Assessment: Tolerated treatment fair  Patient demonstrated fatigue post treatment and would benefit from continued PT  Pt required min A to transfer sit to stand from clinic chair, but was able to transfer from rollator without assistance  Pt demonstrated significant difficulty with all standing exercises this date  Pt required seated rest breaks between exercises this session  Plan: Progress treatment as tolerated        Daily Treatment Diary      Manual  4-30 (IE)  5-4                    STM in sitting to L QL   NV                                                                                                                         Exercise Diary  4 -30 (IE)  5-4                   Recumbent bike 5 mins NV  2 mins                   Gastroc SB str 4 x 30" NV  4 x 30" ea                                           Seated:                       - HS str 4 x 30" ea NV  4 x 30" ea                   - pball QL str 4 x 30" ea NV  4 x 30" ea                                           Standing:                       - hip abd 10 x ea NV  5 x ea                   - hip ext 10 x ea NV  5 x ea                   - HS curls 10 x ea NV  unable                   - mini squat 10 x NV  NP                                           Supine:                       - sciatic n glides 10 x ea  NV                   - TA  10 x 10" NV  NV                   - TA with ball squeeze 10 x 6" hold NV  NV                   - TA with clams 2 x 10 Red NV  NV                    - LTR                                                     Modalities  4-30 (IE)  5-4                   Cryo as needed

## 2018-05-07 ENCOUNTER — OFFICE VISIT (OUTPATIENT)
Dept: PHYSICAL THERAPY | Facility: CLINIC | Age: 63
End: 2018-05-07
Payer: MEDICARE

## 2018-05-07 ENCOUNTER — TRANSCRIBE ORDERS (OUTPATIENT)
Dept: ADMINISTRATIVE | Facility: HOSPITAL | Age: 63
End: 2018-05-07

## 2018-05-07 DIAGNOSIS — G82.50 QUADRIPARESIS (HCC): ICD-10-CM

## 2018-05-07 DIAGNOSIS — M54.17 LUMBOSACRAL NEURITIS: Primary | ICD-10-CM

## 2018-05-07 DIAGNOSIS — G35 GENERALIZED MULTIPLE SCLEROSIS (HCC): Primary | ICD-10-CM

## 2018-05-07 DIAGNOSIS — M54.5 LOW BACK PAIN, UNSPECIFIED BACK PAIN LATERALITY, UNSPECIFIED CHRONICITY, WITH SCIATICA PRESENCE UNSPECIFIED: ICD-10-CM

## 2018-05-07 DIAGNOSIS — R25.2 SPASTICITY: ICD-10-CM

## 2018-05-07 DIAGNOSIS — M54.17 LUMBOSACRAL RADICULOPATHY: ICD-10-CM

## 2018-05-07 PROCEDURE — 97112 NEUROMUSCULAR REEDUCATION: CPT | Performed by: PHYSICAL THERAPIST

## 2018-05-07 PROCEDURE — 97110 THERAPEUTIC EXERCISES: CPT | Performed by: PHYSICAL THERAPIST

## 2018-05-07 NOTE — PROGRESS NOTES
Daily Note     Today's date: 2018  Patient name: Maicol Saucedo  : 1955  MRN: [id number]  Referring provider: Tacho Fagan MD  Dx:   Encounter Diagnosis     ICD-10-CM    1  Generalized multiple sclerosis ([facility] ) G35    2  Low back pain, unspecified back pain laterality, unspecified chronicity, with sciatica presence unspecified M54 5    3  Quadriparesis ([facility] [address] ) G82 50    4  Lumbosacral radiculopathy M54 17    5  Spasticity R25 2                   Subjective: Patient reports improvement in symptoms over the weekend  Objective: See treatment diary below      Assessment: Tolerated treatment well  Patient would benefit from continued PT  Pt presents to session with pitting edema in LLE  Per pt, this is typical since her lumbar surgery in          Plan: Progress treatment as tolerated            Daily Treatment Diary      Manual  4-30 (IE)  5-4  5-7                  STM in sitting to L QL   NV                                                                                                                         Exercise Diary  4 -30 (IE)  5-4  5-7                 Recumbent bike 5 mins NV  2 mins  Held                 Gastroc SB str 4 x 30" NV  4 x 30" ea  Held                                         Seated:                       - HS str 4 x 30" ea NV  4 x 30" ea  4 x 30" ea                 - pball QL str 4 x 30" ea NV  4 x 30" ea  4 x 30" ea                  - LAQ     2 x 10 ea 2#                 Standing:                       - hip abd 10 x ea NV  5 x ea  Held                 - hip ext 10 x ea NV  5 x ea  Held                 - HS curls 10 x ea NV  unable  Held                 - mini squat 10 x NV  NP  Held                                         Supine:                       - sciatic n glides 10 x ea  NV  10 x ea                 - TA  10 x 10" NV  NV  NV                 - TA with ball squeeze 10 x 6" hold NV  NV  10 x 6" hold                 - TA with clams 2 x 10 Red NV  NV  2 x 10 Red                  - LTR      10 x                  - TA with abd belt     10 x 6" hold                       Modalities  4-30 (IE)  5-4  5-7                 Cryo as needed                        Estim as needed

## 2018-05-10 ENCOUNTER — OFFICE VISIT (OUTPATIENT)
Dept: PHYSICAL THERAPY | Facility: CLINIC | Age: 63
End: 2018-05-10
Payer: MEDICARE

## 2018-05-10 ENCOUNTER — TRANSCRIBE ORDERS (OUTPATIENT)
Dept: LAB | Facility: CLINIC | Age: 63
End: 2018-05-10

## 2018-05-10 ENCOUNTER — APPOINTMENT (OUTPATIENT)
Dept: LAB | Facility: CLINIC | Age: 63
End: 2018-05-10
Payer: MEDICARE

## 2018-05-10 DIAGNOSIS — G82.50 QUADRIPARESIS (HCC): ICD-10-CM

## 2018-05-10 DIAGNOSIS — M54.5 LOW BACK PAIN, UNSPECIFIED BACK PAIN LATERALITY, UNSPECIFIED CHRONICITY, WITH SCIATICA PRESENCE UNSPECIFIED: Primary | ICD-10-CM

## 2018-05-10 DIAGNOSIS — R25.2 SPASTICITY: ICD-10-CM

## 2018-05-10 DIAGNOSIS — G35 MULTIPLE SCLEROSIS (HCC): Primary | ICD-10-CM

## 2018-05-10 DIAGNOSIS — G35 GENERALIZED MULTIPLE SCLEROSIS (HCC): ICD-10-CM

## 2018-05-10 DIAGNOSIS — M54.17 LUMBOSACRAL RADICULOPATHY: ICD-10-CM

## 2018-05-10 LAB
BASOPHILS # BLD AUTO: 0.03 THOUSANDS/ΜL (ref 0–0.1)
BASOPHILS NFR BLD AUTO: 1 % (ref 0–1)
EOSINOPHIL # BLD AUTO: 0.08 THOUSAND/ΜL (ref 0–0.61)
EOSINOPHIL NFR BLD AUTO: 2 % (ref 0–6)
ERYTHROCYTE [DISTWIDTH] IN BLOOD BY AUTOMATED COUNT: 14.5 % (ref 11.6–15.1)
HCT VFR BLD AUTO: 41.8 % (ref 34.8–46.1)
HGB BLD-MCNC: 13.6 G/DL (ref 11.5–15.4)
LYMPHOCYTES # BLD AUTO: 0.23 THOUSANDS/ΜL (ref 0.6–4.47)
LYMPHOCYTES NFR BLD AUTO: 7 % (ref 14–44)
MCH RBC QN AUTO: 30.2 PG (ref 26.8–34.3)
MCHC RBC AUTO-ENTMCNC: 32.5 G/DL (ref 31.4–37.4)
MCV RBC AUTO: 93 FL (ref 82–98)
MONOCYTES # BLD AUTO: 0.41 THOUSAND/ΜL (ref 0.17–1.22)
MONOCYTES NFR BLD AUTO: 12 % (ref 4–12)
NEUTROPHILS # BLD AUTO: 2.7 THOUSANDS/ΜL (ref 1.85–7.62)
NEUTS SEG NFR BLD AUTO: 78 % (ref 43–75)
NRBC BLD AUTO-RTO: 0 /100 WBCS
PLATELET # BLD AUTO: 327 THOUSANDS/UL (ref 149–390)
PMV BLD AUTO: 10.6 FL (ref 8.9–12.7)
RBC # BLD AUTO: 4.5 MILLION/UL (ref 3.81–5.12)
WBC # BLD AUTO: 3.45 THOUSAND/UL (ref 4.31–10.16)

## 2018-05-10 PROCEDURE — 97110 THERAPEUTIC EXERCISES: CPT | Performed by: PHYSICAL THERAPIST

## 2018-05-10 PROCEDURE — 36415 COLL VENOUS BLD VENIPUNCTURE: CPT

## 2018-05-10 PROCEDURE — 85025 COMPLETE CBC W/AUTO DIFF WBC: CPT

## 2018-05-10 NOTE — PROGRESS NOTES
Daily Note     Today's date: 5/10/2018  Patient name: Daron Briscoe  : 1955  MRN: [id number]  Referring provider: Christine Reyes MD  Dx:   Encounter Diagnosis     ICD-10-CM    1  Low back pain, unspecified back pain laterality, unspecified chronicity, with sciatica presence unspecified M54 5    2  Quadriparesis ([facility] [address] ) G82 50    3  Lumbosacral radiculopathy M54 17    4  Spasticity R25 2    5  Generalized multiple sclerosis ([facility] [address] ) G35                   Subjective: Patient reports no increase in pain following last session  Objective: See treatment diary below      Assessment: Progressed exercise this session as noted to focus on improving lower extremity strength  Tolerated treatment well  Patient demonstrated fatigue post treatment, exhibited good technique with therapeutic exercises and would benefit from continued PT      Plan: Progress treatment as tolerated          Daily Treatment Diary      Manual  4-30 (IE)  5-4  5-7  5-10                STM in sitting to L QL   NV                                                                                                                         Exercise Diary  4 -30 (IE)  5-4  5-7  5-10               Recumbent bike 5 mins NV  2 mins  Held  Trial NV               Gastroc SB str 4 x 30" NV  4 x 30" ea  Held  Held                                        Seated:                       - HS str 4 x 30" ea NV  4 x 30" ea  4 x 30" ea  4 x 30" ea               - pball QL str 4 x 30" ea NV  4 x 30" ea  4 x 30" ea  4 x 30" ea                - LAQ     2 x 10 ea 2#  3 x 10 ea 2#               - HS curls    2 x 10 ea Red TB                      - alt marches             Standing:                       - hip abd 10 x ea NV  5 x ea  Held  Held               - hip ext 10 x ea NV  5 x ea  Held [facility]               - HS curls 10 x ea NV  unable  Held  Held               - mini squat 10 x NV  NP  Held  Held                -                       Supine:                        - sciatic n glides 10 x ea  NV  10 x ea   10 x ea (bolster under knees)                - TA  10 x 10" NV  NV  NV NP            - TA with ball squeeze 10 x 6" hold NV  NV  10 x 6" hold  20 x 6" hold (Seated)                - TA with clams 2 x 10 Red NV  NV  2 x 10 Red  2 x 10 Red                - LTR      10 x  10 x                - TA with abd belt     10 x 6" hold  20 x 6" hold               - supine hip abduction    10 x ea         - quad sets    10 x 10"                            Modalities  4-30 (IE)  5-4  5-7  5-10               Cryo as needed                        Estim as needed

## 2018-05-14 ENCOUNTER — OFFICE VISIT (OUTPATIENT)
Dept: PHYSICAL THERAPY | Facility: CLINIC | Age: 63
End: 2018-05-14
Payer: MEDICARE

## 2018-05-14 ENCOUNTER — HOSPITAL ENCOUNTER (OUTPATIENT)
Dept: RADIOLOGY | Facility: CLINIC | Age: 63
Discharge: HOME | End: 2018-05-14
Payer: MEDICARE

## 2018-05-14 DIAGNOSIS — G35 GENERALIZED MULTIPLE SCLEROSIS (HCC): ICD-10-CM

## 2018-05-14 DIAGNOSIS — M54.5 LOW BACK PAIN, UNSPECIFIED BACK PAIN LATERALITY, UNSPECIFIED CHRONICITY, WITH SCIATICA PRESENCE UNSPECIFIED: ICD-10-CM

## 2018-05-14 DIAGNOSIS — G82.50 QUADRIPARESIS (HCC): ICD-10-CM

## 2018-05-14 DIAGNOSIS — M54.17 LUMBOSACRAL RADICULOPATHY: ICD-10-CM

## 2018-05-14 DIAGNOSIS — M54.5 LOW BACK PAIN, UNSPECIFIED BACK PAIN LATERALITY, UNSPECIFIED CHRONICITY, WITH SCIATICA PRESENCE UNSPECIFIED: Primary | ICD-10-CM

## 2018-05-14 DIAGNOSIS — M54.17 LUMBOSACRAL NEURITIS: ICD-10-CM

## 2018-05-14 DIAGNOSIS — R25.2 SPASTICITY: ICD-10-CM

## 2018-05-14 PROCEDURE — 97110 THERAPEUTIC EXERCISES: CPT

## 2018-05-14 PROCEDURE — 95909 NRV CNDJ TST 5-6 STUDIES: CPT | Performed by: PHYSICAL MEDICINE & REHABILITATION

## 2018-05-14 PROCEDURE — 95886 MUSC TEST DONE W/N TEST COMP: CPT | Performed by: PHYSICAL MEDICINE & REHABILITATION

## 2018-05-14 PROCEDURE — 95886 MUSC TEST DONE W/N TEST COMP: CPT

## 2018-05-14 PROCEDURE — 97112 NEUROMUSCULAR REEDUCATION: CPT

## 2018-05-14 NOTE — PROGRESS NOTES
Daily Note     Today's date: 2018  Patient name: Garrett Jason  : 1955  MRN: [id number]  Referring provider: Ana Maria Rust MD  Dx:   Encounter Diagnosis     ICD-10-CM    1  Low back pain, unspecified back pain laterality, unspecified chronicity, with sciatica presence unspecified M54 5    2  Quadriparesis ([facility] [address] ) G82 50    3  Lumbosacral radiculopathy M54 17    4  Spasticity R25 2    5  Generalized multiple sclerosis ([facility] [address] ) G35                   Subjective: Pt states she notices more pain in her back w/ colder weather         Objective: See treatment diary below  Daily Treatment Diary      Manual  4-30 (IE)  5-4  5-7  5-10  14              STM in sitting to L QL   NV                                                                                                                         Exercise Diary  4 -30 (IE)  5-4  5-7  5-10  /14             Recumbent bike 5 mins NV  2 mins  Held  Trial NV  NV?             Gastroc SB str 4 x 30" NV  4 x 30" ea  Held  Held   held                                     Seated:                       - HS str 4 x 30" ea NV  4 x 30" ea  4 x 30" ea  4 x 30" ea  4x30"             - pball QL str 4 x 30" ea NV  4 x 30" ea  4 x 30" ea  4 x 30" ea  4x30" ea              - LAQ     2 x 10 ea 2#  3 x 10 ea 2#  2# 3x10 ea             - HS curls       2 x 10 ea Red TB 15 pn R , 20 L                                     - alt marches                       Standing:                       - hip abd 10 x ea NV  5 x ea  Held  Held  held             - hip ext 10 x ea NV  5 x ea  Held  Held  held             - HS curls 10 x ea NV  unable  Held  Held  held             - mini squat 10 x NV  NP  Held  Held  held              -                       Supine:                        - sciatic n glides 10 x ea  NV  10 x ea   10 x ea (bolster under knees)  2x10 ea              - TA  10 x 10" NV  NV  NV NP 10x10"              - TA with ball squeeze 10 x 6" hold NV  NV  10 x 6" hold  20 x 6" hold (Seated)  20x6" seated              - TA with clams 2 x 10 Red NV  NV  2 x 10 Red  2 x 10 Red  red 2x10              - LTR      10 x  10 x  10x              - TA with abd belt     10 x 6" hold  20 x 6" hold  20x6"             - supine hip abduction       10 x ea 2x10 ea             - quad sets       10 x 10" 10x10"                                           Modalities  4-30 (IE)  5-4  5-7  5-10               Cryo as needed                        Estim as needed                                                     Assessment: Tolerated treatment well  Patient would benefit from continued PT  Able to increase reps w/ supine hip abduction and sciatic nerve glides w/ min difficulty  Note increased difficulty w/ R LAQ and seated HS curls  Pt notes decreased sx following tx session  Plan: Progress treatment as tolerated

## 2018-05-14 NOTE — PROCEDURES
Procedures      Electromyogram and Nerve Conduction Velocity Procedure Note    HX:  41-year-old female with known chronic multiple sclerosis referred by her neurologist for electrodiagnostic study of the lower extremities  She has a history of spondylo listhesis and underwent fusion surgery by Dr Mely Cabello of [facility] in 2015  Preoperatively she was having back pain and sensory disturbance  She continues with back pain is not complaining of intermittent swelling of the whole left leg with numbness of the entire left leg and some weakness as well  She has chronic bowel bladder control issues but no change since the surgery  She has not had any postoperative spinal imaging and she has not had a follow-up visit regarding her current status with her treating spine surgeon  PMH:   Multiple sclerosis, she has no known history of diabetes cancer or thyroid disorder she is on multiple medications  Exam:   She has spasticity in both lower extremities but no maci clonus at the ankles  She is independent in ambulation and transfers to and from the examining table and using her rolling walker with a seat  She required assistance for rolling dura ring the examination on the exam table  On inspection she does have some peroneal atrophy bilaterally trace pretibial edema left greater than right she has heel cord tightening bilaterally but no focal weakness and no root tension signs  Procedure:  Verbal informed consent was obtained as with all electrodiagnostic medicine patients  As with all patients this patient was informed that they may terminate the  examine at any time  Patient tolerated the procedure well with no adverse effects reported or observed  Findings:  Please see the [place] data printout, as needed for details  The bilateral tibial H reflexes were symmetric easily obtained within the normal range      Bilateral sural sensory responses were of normal amplitude  The bilateral peroneal motor responses were reduced amplitude at all levels of stimulation with preserved nerve conduction velocities  Electromyography:  Monopolar needle exploration failed to reveal any abnormal spontaneous potentials the following muscles bilaterally:  Gluteus medius, gluteus robert, vastus lateralis, biceps femoris-long head, tibialis anterior, and gastrocnemii  Motor units were normal for amplitude duration and configuration and recruitment was normal at minimal contraction and in all muscles of maximal effort was reduced likely due to supra segmental influence  Conclusion:   1  There is no electrophysiologic data in the nerves and muscles tested today to indicate or suggest a lumbosacral radiculopathy or plexopathy  2   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

## 2018-05-17 ENCOUNTER — OFFICE VISIT (OUTPATIENT)
Dept: PHYSICAL THERAPY | Facility: CLINIC | Age: 63
End: 2018-05-17
Payer: MEDICARE

## 2018-05-17 DIAGNOSIS — G35 GENERALIZED MULTIPLE SCLEROSIS (HCC): ICD-10-CM

## 2018-05-17 DIAGNOSIS — M54.17 LUMBOSACRAL RADICULOPATHY: ICD-10-CM

## 2018-05-17 DIAGNOSIS — M54.5 LOW BACK PAIN, UNSPECIFIED BACK PAIN LATERALITY, UNSPECIFIED CHRONICITY, WITH SCIATICA PRESENCE UNSPECIFIED: Primary | ICD-10-CM

## 2018-05-17 DIAGNOSIS — R25.2 SPASTICITY: ICD-10-CM

## 2018-05-17 DIAGNOSIS — G82.50 QUADRIPARESIS (HCC): ICD-10-CM

## 2018-05-17 PROCEDURE — G8979 MOBILITY GOAL STATUS: HCPCS

## 2018-05-17 PROCEDURE — 97110 THERAPEUTIC EXERCISES: CPT

## 2018-05-17 PROCEDURE — G8978 MOBILITY CURRENT STATUS: HCPCS

## 2018-05-17 PROCEDURE — 97112 NEUROMUSCULAR REEDUCATION: CPT

## 2018-05-17 NOTE — PROGRESS NOTES
Daily Note     Today's date: 2018  Patient name: Betzaida Perez  : 1955  MRN: [id number]  Referring provider: Starla Segundo MD  Dx:   Encounter Diagnosis     ICD-10-CM    1  Low back pain, unspecified back pain laterality, unspecified chronicity, with sciatica presence unspecified M54 5                   Subjective: Pt states she was "sore" after last visit; however, soreness "did not last long"/      Objective: See treatment diary below  Daily Treatment Diary      Manual  -30 (IE)  5-4  5-7  5-10              STM in sitting to L QL   NV                                                                                                                         Exercise Diary   -30 (IE)  5-4  5-7  5-10             Recumbent bike 5 mins NV  2 mins  Held  Trial NV  NV?  ?           Gastroc SB str 4 x 30" NV  4 x 30" ea  Held  Held   held  held                                   Seated:                       - HS str 4 x 30" ea NV  4 x 30" ea  4 x 30" ea  4 x 30" ea  4x30"  4x30" ea           - pball QL str 4 x 30" ea NV  4 x 30" ea  4 x 30" ea  4 x 30" ea  4x30" ea  4x30" ea            - LAQ     2 x 10 ea 2#  3 x 10 ea 2#  2# 3x10 ea 2# 3x10 ea           - HS curls       2 x 10 ea Red TB 15 pn R , 20 L  np, resume NV                                   - alt march                       Standing:                       - hip abd 10 x ea NV  5 x ea  Held  Held  held  held           - hip ext 10 x ea NV  5 x ea  Held  Held  held  held           - HS curls 10 x ea NV  unable  Held  Held  held  held           - mini squat 10 x NV  NP  Held  Held  held  held            -                       Supine:                        - sciatic n glides 10 x ea  NV  10 x ea   10 x ea (bolster under knees)  2x10 ea  2x10 ea            - TA  10 x 10" NV  NV  NV NP 10x10"  10x10"            - TA with ball squeeze 10 x 6" hold NV  NV  10 x 6" hold  20 x 6" hold (Seated)  20x6" seated  20x6" seated          - TA with clams 2 x 10 Red NV  NV  2 x 10 Red  2 x 10 Red  red 2x10  red 3x10            - LTR      10 x  10 x  10x  10x            - TA with abd belt     10 x 6" hold  20 x 6" hold  20x6"  20x6"           - supine hip abduction       10 x ea 2x10 ea  2x10 ea           - quad sets       10 x 10" 10x10" 10x10"                                         Modalities  4-30 (IE)  5-4  5-7  5-10               Cryo as needed                        Estim as needed                                                    Assessment: Tolerated treatment well  Patient demonstrated fatigue post treatment  Pt needs cues w/ TE for proper technique  Group setting       Plan: Progress treatment as tolerated

## 2018-05-21 ENCOUNTER — OFFICE VISIT (OUTPATIENT)
Dept: PHYSICAL THERAPY | Facility: CLINIC | Age: 63
End: 2018-05-21
Payer: MEDICARE

## 2018-05-21 DIAGNOSIS — M54.5 LOW BACK PAIN, UNSPECIFIED BACK PAIN LATERALITY, UNSPECIFIED CHRONICITY, WITH SCIATICA PRESENCE UNSPECIFIED: Primary | ICD-10-CM

## 2018-05-21 DIAGNOSIS — G82.50 QUADRIPARESIS (HCC): ICD-10-CM

## 2018-05-21 DIAGNOSIS — R25.2 SPASTICITY: ICD-10-CM

## 2018-05-21 DIAGNOSIS — G35 GENERALIZED MULTIPLE SCLEROSIS (HCC): ICD-10-CM

## 2018-05-21 DIAGNOSIS — M54.17 LUMBOSACRAL RADICULOPATHY: ICD-10-CM

## 2018-05-21 PROCEDURE — 97110 THERAPEUTIC EXERCISES: CPT | Performed by: PHYSICAL THERAPIST

## 2018-05-21 NOTE — PROGRESS NOTES
Daily Note     Today's date: 2018  Patient name: Betzaida Perez  : 1955  MRN: [id number]  Referring provider: Starla Segundo MD  Dx:   Encounter Diagnosis     ICD-10-CM    1  Low back pain, unspecified back pain laterality, unspecified chronicity, with sciatica presence unspecified M54 5    2  Quadriparesis ([facility] [address] ) G82 50    3  Lumbosacral radiculopathy M54 17    4  Generalized multiple sclerosis ([facility] [address] ) G35    5  Spasticity R25 2                   Subjective: Patient reports overall, she is feeling pretty good  Pt states her daughter is getting  next week and she would like to be able to walk down the aisle with a straight cane and her son's arm  Objective: See treatment diary below      Assessment: PT educated pt that it would be more safe for her to utilize the rollator to walk down the aisle  Also suggested pt invest in a small battery powered fan for during the ceremony due to her MS being sensitive to heat  Progressed exercises this session as noted  Pt was able to tolerate with fatigue, but no reports of increase in pain  Plan: Progress treatment as tolerated        Daily Treatment Diary      Manual  4-30 (IE)  5-4  5-7  5-10    5-21          STM in sitting to L QL   NV                                                                                                                         Exercise Diary  4 -30 (IE)  5-4  5-7  5-10    5-21         Recumbent bike 5 mins NV  2 mins  Held  Trial NV  NV?  ? Try NV         Gastroc SB str 4 x 30" NV  4 x 30" ea  Held  Held   held  held  held                                 Seated:                       - HS str 4 x 30" ea NV  4 x 30" ea  4 x 30" ea  4 x 30" ea  4x30"  4x30" ea   4x30" ea         - pball QL str 4 x 30" ea NV  4 x 30" ea  4 x 30" ea  4 x 30" ea  4x30" ea  4x30" ea   4x30" ea          - LAQ     2 x 10 ea 2#  3 x 10 ea 2#  2# 3x10 ea 2# 3x10 ea  3# 3x10 ea         - HS curls       2 x 10 ea Red TB 15 pn R , 20 L  np, resume NV  NV         - Marches       NV                                           - alt marches                       Standing:                       - hip abd 10 x ea NV  5 x ea  Held  Held  held  held  held         - hip ext 10 x ea NV  5 x ea  Held  Held  held  held  held         - HS curls 10 x ea NV  unable  Held  Held  held  held  held         - mini squat 10 x NV  NP  Held  Held  held  held  held          -                       Supine:                        - sciatic n glides 10 x ea  NV  10 x ea   10 x ea (bolster under knees)  2x10 ea  2x10 ea    10 x ea (bolster under knees)          - TA  10 x 10" NV  NV  NV NP 10x10"  10x10" with alt marches 10 x ea          - TA with ball squeeze 10 x 6" hold NV  NV  10 x 6" hold  20 x 6" hold (Seated)  20x6" seated  20x6" seated  20x6" seated          - TA with clams 2 x 10 Red NV  NV  2 x 10 Red  2 x 10 Red  red 2x10  red 3x10  red 3x10          - LTR      10 x  10 x  10x  10x  20x          - TA with abd belt     10 x 6" hold  20 x 6" hold  20x6"  20x6"  20 x 6"         - supine hip abduction       10 x ea 2x10 ea  2x10 ea  3 x 10 ea         - quad sets       10 x 10" 10x10" 10x10"  10x10"                                       Modalities  4-30 (IE)  5-4  5-7  5-10               Cryo as needed

## 2018-05-24 ENCOUNTER — OFFICE VISIT (OUTPATIENT)
Dept: PHYSICAL THERAPY | Facility: CLINIC | Age: 63
End: 2018-05-24
Payer: MEDICARE

## 2018-05-24 DIAGNOSIS — R25.2 SPASTICITY: ICD-10-CM

## 2018-05-24 DIAGNOSIS — M54.17 LUMBOSACRAL RADICULOPATHY: ICD-10-CM

## 2018-05-24 DIAGNOSIS — G35 GENERALIZED MULTIPLE SCLEROSIS (HCC): ICD-10-CM

## 2018-05-24 DIAGNOSIS — G82.50 QUADRIPARESIS (HCC): ICD-10-CM

## 2018-05-24 DIAGNOSIS — M54.5 LOW BACK PAIN, UNSPECIFIED BACK PAIN LATERALITY, UNSPECIFIED CHRONICITY, WITH SCIATICA PRESENCE UNSPECIFIED: Primary | ICD-10-CM

## 2018-05-24 PROCEDURE — 97112 NEUROMUSCULAR REEDUCATION: CPT

## 2018-05-24 PROCEDURE — 97110 THERAPEUTIC EXERCISES: CPT

## 2018-05-24 NOTE — PROGRESS NOTES
Daily Note     Today's date: 2018  Patient name: Robert Kahn  : 1955  MRN: [id number]  Referring provider: Bunny Ganser, MD  Dx:   Encounter Diagnosis     ICD-10-CM    1  Low back pain, unspecified back pain laterality, unspecified chronicity, with sciatica presence unspecified M54 5    2  Quadriparesis ([facility] [address] ) G82 50    3  Lumbosacral radiculopathy M54 17    4  Generalized multiple sclerosis ([facility] [address] ) G35    5  Spasticity R25 2                   Subjective: Pt states no new complaints        Objective: See treatment diary below  Daily Treatment Diary      Manual  -30 (IE)  5-4  5-7  5-10    5-21  5        STM in sitting to L QL   NV                                                                                                                         Exercise Diary   -30 (IE)  5-4  5-7  5-10    5-  524       Recumbent bike 5 mins NV  2 mins  Held  Trial NV  NV?  ? Try NV  NV       Gastroc SB str 4 x 30" NV  4 x 30" ea  Held  Held   held  held  held  held                               Seated:                       - HS str 4 x 30" ea NV  4 x 30" ea  4 x 30" ea  4 x 30" ea  4x30"  4x30" ea   4x30" ea  4x30" ea       - pball QL str 4 x 30" ea NV  4 x 30" ea  4 x 30" ea  4 x 30" ea  4x30" ea  4x30" ea   4x30" ea  4x30"ea        - LAQ     2 x 10 ea 2#  3 x 10 ea 2#  2# 3x10 ea 2# 3x10 ea  3# 3x10 ea  3# 3x10 ea       - HS curls       2 x 10 ea Red TB 15 pn R , 20 L  np, resume NV  NV Red 2x10 ea       - Marches             NV 10x ea                                                       - alt marches                       Standing:                       - hip abd 10 x ea NV  5 x ea  Held  Held  held  held  held  held       - hip ext 10 x ea NV  5 x ea  Held  Held  held  held  held  held       - HS curls 10 x ea NV  unable  Held  Held  held  held  held  held       - mini squat 10 x NV  NP  Held  Held  held  held  held  held        -                       Supine:                      - sciatic n glides 10 x ea  NV  10 x ea   10 x ea (bolster under knees)  2x10 ea  2x10 ea    10 x ea (bolster under knees)  2x10 ea        - TA  10 x 10" NV  NV  NV NP 10x10"  10x10" with alt marches 10 x ea  2x5 ea march        - TA with ball squeeze 10 x 6" hold NV  NV  10 x 6" hold  20 x 6" hold (Seated)  20x6" seated  20x6" seated  20x6" seated  20x6"        - TA with clams 2 x 10 Red NV  NV  2 x 10 Red  2 x 10 Red  red 2x10  red 3x10  red 3x10  red 2x20        - LTR      10 x  10 x  10x  10x  20x  10x ea        - TA with abd belt     10 x 6" hold  20 x 6" hold  20x6"  20x6"  20 x 6"  20x6"       - supine hip abduction       10 x ea 2x10 ea  2x10 ea  3 x 10 ea  2x10 ea       - quad sets       10 x 10" 10x10" 10x10"  10x10" 10x10"                                     Modalities  4-30 (IE)  5-4  5-7  5-10               Cryo as needed                            Assessment: Tolerated treatment well  Patient would benefit from continued PT  Pt challenged by addition of seated marches; also challenged by supine march  Cueing needed throughout TE program for proper technique  Plan: Progress treatment as tolerated

## 2018-05-29 ENCOUNTER — APPOINTMENT (OUTPATIENT)
Dept: PHYSICAL THERAPY | Facility: CLINIC | Age: 63
End: 2018-05-29
Payer: MEDICARE

## 2018-05-31 ENCOUNTER — TRANSCRIBE ORDERS (OUTPATIENT)
Dept: PHYSICAL THERAPY | Facility: CLINIC | Age: 63
End: 2018-05-31

## 2018-05-31 ENCOUNTER — EVALUATION (OUTPATIENT)
Dept: PHYSICAL THERAPY | Facility: CLINIC | Age: 63
End: 2018-05-31
Payer: MEDICARE

## 2018-05-31 DIAGNOSIS — M54.50 PAIN, LUMBAR REGION: ICD-10-CM

## 2018-05-31 DIAGNOSIS — M54.17 LUMBOSACRAL RADICULOPATHY: ICD-10-CM

## 2018-05-31 DIAGNOSIS — G35 GENERALIZED MULTIPLE SCLEROSIS (HCC): ICD-10-CM

## 2018-05-31 DIAGNOSIS — R25.2 SPASTICITY: ICD-10-CM

## 2018-05-31 DIAGNOSIS — R25.2 CRAMP OF LIMB: ICD-10-CM

## 2018-05-31 DIAGNOSIS — M54.5 LOW BACK PAIN, UNSPECIFIED BACK PAIN LATERALITY, UNSPECIFIED CHRONICITY, WITH SCIATICA PRESENCE UNSPECIFIED: ICD-10-CM

## 2018-05-31 DIAGNOSIS — G82.50 QUADRIPLEGIA, UNSPECIFIED (HCC): Primary | ICD-10-CM

## 2018-05-31 DIAGNOSIS — G82.50 QUADRIPARESIS (HCC): Primary | ICD-10-CM

## 2018-05-31 PROCEDURE — G8978 MOBILITY CURRENT STATUS: HCPCS | Performed by: PHYSICAL THERAPIST

## 2018-05-31 PROCEDURE — 97112 NEUROMUSCULAR REEDUCATION: CPT | Performed by: PHYSICAL THERAPIST

## 2018-05-31 PROCEDURE — 97110 THERAPEUTIC EXERCISES: CPT | Performed by: PHYSICAL THERAPIST

## 2018-05-31 PROCEDURE — G8979 MOBILITY GOAL STATUS: HCPCS | Performed by: PHYSICAL THERAPIST

## 2018-05-31 NOTE — LETTER
May 31, 2018    Lisbet Painting MD  [place] [id number] [facility] [id number]    Patient: Isaak Galvan   YOB: 1955   Date of Visit: 2018     Encounter Diagnosis     ICD-10-CM    1  Quadriparesis ([address] ) G82 50    2  Lumbosacral radiculopathy M54 17    3  Generalized multiple sclerosis ([address] ) G35    4  Spasticity R25 2    5  Low back pain, unspecified back pain laterality, unspecified chronicity, with sciatica presence unspecified M54 5        Dear Dr Israel Portillo:    Please review the attached Plan of Care from Rima Mora's recent visit  Please verify that you agree therapy should continue by signing the attached document and sending it back to our office  If you have any questions or concerns, please don't hesitate to call  Sincerely,    Ronald Moser PT      Referring Provider:      I certify that I have read the below Plan of Care and certify the need for these services furnished under this plan of treatment while under my care  Lisbet Painting MD  [address] [place] [address] [place] [address]: [phone]          PT RE-EVALUATION    Today's date: 2018  Patient name: Isaak Galvan  : 1955  MRN: [id number]  Referring provider: Wilbert Nash MD  Dx:   Encounter Diagnosis     ICD-10-CM    1  Quadriparesis ([address] ) G82 50    2  Lumbosacral radiculopathy M54 17    3  Generalized multiple sclerosis ([address] ) G35    4  Spasticity R25 2    5  Low back pain, unspecified back pain laterality, unspecified chronicity, with sciatica presence unspecified M54 5                   Assessment    Assessment details: Rima Nix is a pleasant 65 y o  presenting to physical therapy with MD referral for Generalized multiple sclerosis ([address] )  (primary encounter diagnosis), Low back pain, unspecified back pain laterality, unspecified chronicity, with sciatica presence unspecified, Quadriparesis ([address] ), Lumbosacral radiculopathy, and Spasticity   Since time of initial evaluation, pt has made some improvements in lower extremity strength and good improvements in flexibility  Although improvements have been made, this pt would continue to benefit from skilled PT services to maximize functional outcomes  Problem list:  Limited lumbar AROM, decreased hip/core strength, limited lower extremity flexibility, abnormal gait, and increased neural tension  Treatment to include: Manual therapy techniques, lower extremity/core strengthening, neuromuscular control exercises, balance/proprioception training, gait training, instruction in a comprehensive HEP, and modalities as needed  Understanding of Dx/Px/POC: good   Prognosis: good    Goals  ST  Pt will improve hamstring flexibility to no more than mild restriction in 3 weeks  MET  2  Pt will improve lumbar SB ROM to 20 degrees in 2 weeks  NOT MET  3  Pt will improve hip ER strength to at least 4/5 in 2 weeks  LT  Pt will be able to walk > 15 minutes with no more than mild pain in 4 weeks  PARTIALLY MET  2  Pt will be independent in a comprehensive HEP in 4 weeks   PARTIALLY MET    Plan  Patient would benefit from: skilled PT  Frequency: 2x week  Duration in weeks: 4  Treatment plan discussed with: patient        Subjective Evaluation    History of Present Illness  Mechanism of injury: Current status:   - ADLs/Functional activities:    - Stairs Step to pattern with Pain Levels: mild pain- improved (premorbid no pain)   - Sit to stand with mild pain (same)   - Walking 10 minutes with mild pain (premorbid 30 minutes)- occasional moderate pain, typically mild pain   - Standing 10 minutes with mild pain (premorbid >30 minutes)-occasional moderate pain, typically mild pain   - Sitting minimal issues    - Sleeping with 0 nightly sleep disturbances due to pain   - Bending forward to don/doff socks and shoes with no pain   - Lifting no issues  - Work: Not a working individual  - Recreation: none    Since time of initial evaluation, functionally, pt reports she now experiences less pain negotiating stairs, and can now walk and stand for longer periods of time with less pain  Pt states overall, numbness and tingling is less consistent; however, over the past two days her right leg has felt more numb  Although improvements have been made, this pt would continue to benefit from skilled PT services to maximize functional outcome   Pt reports she is continuing to Harmeet Brothmartha the marijuana oil 2 x per day orally and also supplements with a marijuana cream   Pain  Current pain ratin  At best pain ratin  At worst pain ratin  Location: Right lumbar spine  Quality: sharp and tight  Aggravating factors: walking and standing    Patient Goals  Patient goals for therapy: decreased pain and increased strength  Patient goal: Walk and stand longer with less pain        Objective     Neurological Testing     Sensation     Lumbar   Left   Intact: light touch    Right   Diminished: light touch    Comments   Right light touch: lateral aspect of right foot    Reflexes   Left   Patellar (L4): brisk (3+)  Achilles (S1): brisk (3+)  Clonus sign: negative    Right   Patellar (L4): brisk (3+)  Achilles (S1): brisk (3+)  Clonus sign: negative    Additional Neurological Details  Negative for spasticity (taking medication)    Active Range of Motion     Lumbar   Flexion: 70 degrees   Extension: 12 degrees   Left lateral flexion: 15 degrees   Right lateral flexion: 15 degrees     Strength/Myotome Testing     Left Hip   Planes of Motion   Flexion: 3  External rotation: 4    Right Hip   Planes of Motion   Flexion: 3+  External rotation: 4    Left Knee   Flexion: 4+  Extension: 4+    Right Knee   Flexion: 4+  Extension: 4+    Left Ankle/Foot   Dorsiflexion: 4+  Plantar flexion: 4+  Great toe flexion: 4+    Right Ankle/Foot   Dorsiflexion: 4+  Plantar flexion: 4+  Great toe flexion: 4+    Additional Strength Details  Flexibility:  HS: Mild restriction on R, mild on L  IR: Mild restriction on R, mild on L    Ambulation: Pt ambulates over level surface with rolator with increased toe out on RLE and decreased toe clearance bilaterally  Balance: Unable to perform SLS on either leg  Tests     Lumbar     Left   Positive passive SLR  Negative crossed SLR  Right   Positive passive SLR  Negative crossed SLR       Additional Tests Details  Pt unable to get into a prone position for further testing          Precautions: MS, incontinence, spasticity, max A to transfer supine to sit, Mod A of legs sit to supine    Daily Treatment Diary      Manual  4-30 (IE)  5-4  5-7  5-10  5/14 5/17 5-21 5/24  5-31 (RE)      STM in sitting to L QL   NV                                                                                                                         Exercise Diary  4 -30 (IE)  5-4  5-7  5-10  5/14 5/17 5-21 5/24 5-31 (RE)     Recumbent bike 5 mins NV  2 mins  Held  Trial NV  NV?  ? Try NV  NV  NV     Gastroc SB str 4 x 30" NV  4 x 30" ea  Held  Held   held  held  held  held  held                             Seated:                       - HS str 4 x 30" ea NV  4 x 30" ea  4 x 30" ea  4 x 30" ea  4x30"  4x30" ea   4x30" ea  4x30" ea  4 x30" ea     - pball QL str 4 x 30" ea NV  4 x 30" ea  4 x 30" ea  4 x 30" ea  4x30" ea  4x30" ea   4x30" ea  4x30"ea  4x30" ea      - LAQ     2 x 10 ea 2#  3 x 10 ea 2#  2# 3x10 ea 2# 3x10 ea  3# 3x10 ea  3# 3x10 ea  4# 3x10 ea     - HS curls (PT holds band)       2 x 10 ea Red TB 15 pn R , 20 L  np, resume NV  NV Red 2x10 ea  2 x 15 ea Red TB     - Marches             NV 10x ea  10 x ea      - Hip ER (PT holds band)                 2 x 10 ea Yellow TB      - ankle PF                  2 x 10 ea Green TB     - ankle DF         2 x 10 ea Green TB    - alt marches                       Standing:                       - hip abd 10 x ea NV  5 x ea  Held  Held  held  held  held  held  held     - hip ext 10 x ea NV  5 x ea  Held  Held  held  held  held  held  held     - HS curls 10 x ea NV  unable  Held  Held  held  held  held  held  held     - mini squat 10 x NV  NP  Held  Held  held  held  held  held  held      -                       Supine:                        - sciatic n glides 10 x ea  NV  10 x ea   10 x ea (bolster under knees)  2x10 ea  2x10 ea    10 x ea (bolster under knees)  2x10 ea   20 x ea (bolster under knees)      - TA  10 x 10" NV  NV  NV NP 10x10"  10x10" with alt marches 10 x ea  2x5 ea march   2x5 ea march      - TA with ball squeeze 10 x 6" hold NV  NV  10 x 6" hold  20 x 6" hold (Seated)  20x6" seated  20x6" seated  20x6" seated  20x6"  20x6"      - TA with clams 2 x 10 Red NV  NV  2 x 10 Red  2 x 10 Red  red 2x10  red 3x10  red 3x10  red 2x20  NV      - LTR      10 x  10 x  10x  10x  20x  10x ea  10x ea      - TA with abd belt     10 x 6" hold  20 x 6" hold  20x6"  20x6"  20 x 6"  20x6"  20x6"     - supine hip abduction       10 x ea 2x10 ea  2x10 ea  3 x 10 ea  2x10 ea  3 x 10 ea     - quad sets       10 x 10" 10x10" 10x10"  10x10" 10x10"  10 x 10"                                     * Provided pt with updated HEP this date  Pt was very fatigued with exercise progressions this date causing her to be unable to complete some exercises due to breakdown in form

## 2018-05-31 NOTE — PROGRESS NOTES
PT RE-EVALUATION    Today's date: 2018  Patient name: Argenis Chen  : 1955  MRN: [id number]  Referring provider: Candi Bullock MD  Dx:   Encounter Diagnosis     ICD-10-CM    1  Quadriparesis ([facility] ) G82 50    2  Lumbosacral radiculopathy M54 17    3  Generalized multiple sclerosis ([facility] [facility] ) G35    4  Spasticity R25 2    5  Low back pain, unspecified back pain laterality, unspecified chronicity, with sciatica presence unspecified M54 5                   Assessment    Assessment details: Melody Severo Redbird is a pleasant 65 y o  presenting to physical therapy with MD referral for Generalized multiple sclerosis ([facility] [facility] )  (primary encounter diagnosis), Low back pain, unspecified back pain laterality, unspecified chronicity, with sciatica presence unspecified, Quadriparesis ([facility] [address] [facility] ), Lumbosacral radiculopathy, and Spasticity  Since time of initial evaluation, pt has made some improvements in lower extremity strength and good improvements in flexibility  Although improvements have been made, this pt would continue to benefit from skilled PT services to maximize functional outcomes  Problem list:  Limited lumbar AROM, decreased hip/core strength, limited lower extremity flexibility, abnormal gait, and increased neural tension  Treatment to include: Manual therapy techniques, lower extremity/core strengthening, neuromuscular control exercises, balance/proprioception training, gait training, instruction in a comprehensive HEP, and modalities as needed  Understanding of Dx/Px/POC: good   Prognosis: good    Goals  ST  Pt will improve hamstring flexibility to no more than mild restriction in 3 weeks  MET  2  Pt will improve lumbar SB ROM to 20 degrees in 2 weeks  NOT MET  3  Pt will improve hip ER strength to at least 4/5 in 2 weeks  LT  Pt will be able to walk > 15 minutes with no more than mild pain in 4 weeks  PARTIALLY MET  2  Pt will be independent in a comprehensive HEP in 4 weeks   PARTIALLY MET    Plan  Patient would benefit from: skilled PT  Frequency: 2x week  Duration in weeks: 4  Treatment plan discussed with: patient        Subjective Evaluation    History of Present Illness  Mechanism of injury: Current status:   - ADLs/Functional activities:    - Stairs Step to pattern with Pain Levels: mild pain- improved (premorbid no pain)   - Sit to stand with mild pain (same)   - Walking 10 minutes with mild pain (premorbid 30 minutes)- occasional moderate pain, typically mild pain   - Standing 10 minutes with mild pain (premorbid >30 minutes)-occasional moderate pain, typically mild pain   - Sitting minimal issues    - Sleeping with 0 nightly sleep disturbances due to pain   - Bending forward to don/doff socks and shoes with no pain   - Lifting no issues  - Work: Not a working individual  - Recreation: none    Since time of initial evaluation, functionally, pt reports she now experiences less pain negotiating stairs, and can now walk and stand for longer periods of time with less pain  Pt states overall, numbness and tingling is less consistent; however, over the past two days her right leg has felt more numb  Although improvements have been made, this pt would continue to benefit from skilled PT services to maximize functional outcome   Pt reports she is continuing to Harmeet Pierre the marijuana oil 2 x per day orally and also supplements with a marijuana cream   Pain  Current pain ratin  At best pain ratin  At worst pain ratin  Location: Right lumbar spine  Quality: sharp and tight  Aggravating factors: walking and standing    Patient Goals  Patient goals for therapy: decreased pain and increased strength  Patient goal: Walk and stand longer with less pain        Objective     Neurological Testing     Sensation     Lumbar   Left   Intact: light touch    Right   Diminished: light touch    Comments   Right light touch: lateral aspect of right foot    Reflexes   Left   Patellar (L4): brisk (3+)  Achilles (S1): brisk (3+)  Clonus sign: negative    Right   Patellar (L4): brisk (3+)  Achilles (S1): brisk (3+)  Clonus sign: negative    Additional Neurological Details  Negative for spasticity (taking medication)    Active Range of Motion     Lumbar   Flexion: 70 degrees   Extension: 12 degrees   Left lateral flexion: 15 degrees   Right lateral flexion: 15 degrees     Strength/Myotome Testing     Left Hip   Planes of Motion   Flexion: 3  External rotation: 4    Right Hip   Planes of Motion   Flexion: 3+  External rotation: 4    Left Knee   Flexion: 4+  Extension: 4+    Right Knee   Flexion: 4+  Extension: 4+    Left Ankle/Foot   Dorsiflexion: 4+  Plantar flexion: 4+  Great toe flexion: 4+    Right Ankle/Foot   Dorsiflexion: 4+  Plantar flexion: 4+  Great toe flexion: 4+    Additional Strength Details  Flexibility:  HS: Mild restriction on R, mild on L  IR: Mild restriction on R, mild on L    Ambulation: Pt ambulates over level surface with rolator with increased toe out on RLE and decreased toe clearance bilaterally  Balance: Unable to perform SLS on either leg  Tests     Lumbar     Left   Positive passive SLR  Negative crossed SLR  Right   Positive passive SLR  Negative crossed SLR       Additional Tests Details  Pt unable to get into a prone position for further testing          Precautions: MS, incontinence, spasticity, max A to transfer supine to sit, Mod A of legs sit to supine    Daily Treatment Diary      Manual  4-30 (IE)  5-4  5-7  5-10  5/14 5/17  5-21 5/24  5-31 (RE)      STM in sitting to L QL   NV                                                                                                                         Exercise Diary  4 -30 (IE)  5-4  5-7  5-10  5/14 5/17  5-21 5/24  5-31 (RE)     Recumbent bike 5 mins NV  2 mins  Held  Trial NV  NV?  ? Try NV  NV  NV     Gastroc SB str 4 x 30" NV  4 x 30" ea  Held  Held   held  held  held  held  held                           Seated:                       - HS str 4 x 30" ea NV  4 x 30" ea  4 x 30" ea  4 x 30" ea  4x30"  4x30" ea   4x30" ea  4x30" ea  4 x30" ea     - pball QL str 4 x 30" ea NV  4 x 30" ea  4 x 30" ea  4 x 30" ea  4x30" ea  4x30" ea   4x30" ea  4x30"ea  4x30" ea      - LAQ     2 x 10 ea 2#  3 x 10 ea 2#  2# 3x10 ea 2# 3x10 ea  3# 3x10 ea  3# 3x10 ea  4# 3x10 ea     - HS curls (PT holds band)       2 x 10 ea Red TB 15 pn R , 20 L  np, resume NV  NV Red 2x10 ea  2 x 15 ea Red TB     - Marches             NV 10x ea  10 x ea      - Hip ER (PT holds band)                 2 x 10 ea Yellow TB      - ankle PF                  2 x 10 ea Green TB     - ankle DF         2 x 10 ea Green TB    - alt marches                       Standing:                       - hip abd 10 x ea NV  5 x ea  Held  Held  held  held  held  held  held     - hip ext 10 x ea NV  5 x ea  Held  Held  held  held  held  held  held     - HS curls 10 x ea NV  unable  Held  Held  held  held  held  held  held     - mini squat 10 x NV  NP  Held  Held  held  held  held  held  held      -                       Supine:                        - sciatic n glides 10 x ea  NV  10 x ea   10 x ea (bolster under knees)  2x10 ea  2x10 ea    10 x ea (bolster under knees)  2x10 ea   20 x ea (bolster under knees)      - TA  10 x 10" NV  NV  NV NP 10x10"  10x10" with alt marches 10 x ea  2x5 ea march   2x5 ea march      - TA with ball squeeze 10 x 6" hold NV  NV  10 x 6" hold  20 x 6" hold (Seated)  20x6" seated  20x6" seated  20x6" seated  20x6"  20x6"      - TA with clams 2 x 10 Red NV  NV  2 x 10 Red  2 x 10 Red  red 2x10  red 3x10  red 3x10  red 2x20  NV      - LTR      10 x  10 x  10x  10x  20x  10x ea  10x ea      - TA with abd belt     10 x 6" hold  20 x 6" hold  20x6"  20x6"  20 x 6"  20x6"  20x6"     - supine hip abduction       10 x ea 2x10 ea  2x10 ea  3 x 10 ea  2x10 ea  3 x 10 ea     - quad sets       10 x 10" 10x10" 10x10"  10x10" 10x10"  10 x 10"                                     * Provided pt with updated HEP this date  Pt was very fatigued with exercise progressions this date causing her to be unable to complete some exercises due to breakdown in form

## 2018-06-04 ENCOUNTER — OFFICE VISIT (OUTPATIENT)
Dept: PHYSICAL THERAPY | Facility: CLINIC | Age: 63
End: 2018-06-04
Payer: MEDICARE

## 2018-06-04 DIAGNOSIS — R25.2 SPASTICITY: ICD-10-CM

## 2018-06-04 DIAGNOSIS — G35 GENERALIZED MULTIPLE SCLEROSIS (HCC): ICD-10-CM

## 2018-06-04 DIAGNOSIS — M54.17 LUMBOSACRAL RADICULOPATHY: ICD-10-CM

## 2018-06-04 DIAGNOSIS — G82.50 QUADRIPARESIS (HCC): ICD-10-CM

## 2018-06-04 DIAGNOSIS — M54.5 LOW BACK PAIN, UNSPECIFIED BACK PAIN LATERALITY, UNSPECIFIED CHRONICITY, WITH SCIATICA PRESENCE UNSPECIFIED: Primary | ICD-10-CM

## 2018-06-04 PROCEDURE — 97112 NEUROMUSCULAR REEDUCATION: CPT

## 2018-06-04 PROCEDURE — 97110 THERAPEUTIC EXERCISES: CPT

## 2018-06-04 PROCEDURE — G8979 MOBILITY GOAL STATUS: HCPCS

## 2018-06-04 PROCEDURE — G8978 MOBILITY CURRENT STATUS: HCPCS

## 2018-06-04 NOTE — PROGRESS NOTES
Daily Note     Today's date: 2018  Patient name: Sindhu Hernandez  : 1955  MRN: [id number]  Referring provider: Idalmis Kirkland MD  Dx:   Encounter Diagnosis     ICD-10-CM    1  Low back pain, unspecified back pain laterality, unspecified chronicity, with sciatica presence unspecified M54 5    2  Lumbosacral radiculopathy M54 17    3  Spasticity R25 2    4  Generalized multiple sclerosis ([address] ) G35    5  Quadriparesis ([address] ) G82 50                   Subjective: Pt states she "doesn't do so well early in the morning" (regarding lumbar spine)         Objective: See treatment diary below  Precautions: MS, incontinence, spasticity, max A to transfer supine to sit, Mod A of legs sit to supine     Daily Treatment Diary      Manual  4-30 (IE)  5-4  5-7  5-10  5/14    5-21  5  5-31 (RE)  6/4    STM in sitting to L QL   NV                                                                                                                         Exercise Diary   -30 (IE)  5-4  5-7  5-10  5/14  /17  5-21  5  5-31 (RE)  6/4   Recumbent bike 5 mins NV  2 mins  Held  Trial NV  NV?  ? Try NV  NV  NV     Gastroc SB str 4 x 30" NV  4 x 30" ea  Held  Held   held  held  held  held  held  held                           Seated:                       - HS str 4 x 30" ea NV  4 x 30" ea  4 x 30" ea  4 x 30" ea  4x30"  4x30" ea   4x30" ea  4x30" ea  4 x30" ea  4x30" ea   - pball QL str 4 x 30" ea NV  4 x 30" ea  4 x 30" ea  4 x 30" ea  4x30" ea  4x30" ea   4x30" ea  4x30"ea  4x30" ea  4x30" ea    - LAQ     2 x 10 ea 2#  3 x 10 ea 2#  2# 3x10 ea 2# 3x10 ea  3# 3x10 ea  3# 3x10 ea  4# 3x10 ea  4# 3x10 ea   - HS curls (PT holds band)       2 x 10 ea Red TB 15 pn R , 20 L  np, resume NV  NV Red 2x10 ea  2 x 15 ea Red TB Red TB 20 ea   - Marches             NV 10x ea  10 x ea  25 ea    - Hip ER (PT holds band)                  2 x 10 ea Yellow TB  YTB 20 ea    - ankle PF                  2 x 10 ea Green TB  green 20 ea   - ankle DF                 2 x 10 ea Green TB  green 20 ea   - alt marches                       Standing:                       - hip abd 10 x ea NV  5 x ea  Held  Held  held  held  held  held  held  held   - hip ext 10 x ea NV  5 x ea  Held  Held  held  held  held  held  held  held   - HS curls 10 x ea NV  unable  Held  Held  held  held  held  held  held  held   - mini squat 10 x NV  NP  Held  Held  held  held  held  held  held  held    -                       Supine:                        - sciatic n glides 10 x ea  NV  10 x ea   10 x ea (bolster under knees)  2x10 ea  2x10 ea    10 x ea (bolster under knees)  2x10 ea   20 x ea (bolster under knees)  20x ea    - TA  10 x 10" NV  NV  NV NP 10x10"  10x10" with alt marches 10 x ea  2x5 ea march   2x5 ea march  np    - TA with ball squeeze 10 x 6" hold NV  NV  10 x 6" hold  20 x 6" hold (Seated)  20x6" seated  20x6" seated  20x6" seated  20x6"  20x6"  20x6"    - TA with clams 2 x 10 Red NV  NV  2 x 10 Red  2 x 10 Red  red 2x10  red 3x10  red 3x10  red 2x20  NV  red 3x10    - LTR      10 x  10 x  10x  10x  20x  10x ea  10x ea  10x ea    - TA with abd belt     10 x 6" hold  20 x 6" hold  20x6"  20x6"  20 x 6"  20x6"  20x6"  20x6"   - supine hip abduction       10 x ea 2x10 ea  2x10 ea  3 x 10 ea  2x10 ea  3 x 10 ea  3x10 ea   - quad sets       10 x 10" 10x10" 10x10"  10x10" 10x10"  10 x 10"  10x10"                                  Assessment: Tolerated treatment well  Patient would benefit from continued PT  Pt most challenged by seated hip xena DAN  Plan: Progress treatment as tolerated

## 2018-06-07 ENCOUNTER — OFFICE VISIT (OUTPATIENT)
Dept: PHYSICAL THERAPY | Facility: CLINIC | Age: 63
End: 2018-06-07
Payer: MEDICARE

## 2018-06-07 DIAGNOSIS — M54.17 LUMBOSACRAL RADICULOPATHY: ICD-10-CM

## 2018-06-07 DIAGNOSIS — G35 GENERALIZED MULTIPLE SCLEROSIS (HCC): ICD-10-CM

## 2018-06-07 DIAGNOSIS — R25.2 SPASTICITY: ICD-10-CM

## 2018-06-07 DIAGNOSIS — M54.5 LOW BACK PAIN, UNSPECIFIED BACK PAIN LATERALITY, UNSPECIFIED CHRONICITY, WITH SCIATICA PRESENCE UNSPECIFIED: Primary | ICD-10-CM

## 2018-06-07 DIAGNOSIS — G82.50 QUADRIPARESIS (HCC): ICD-10-CM

## 2018-06-07 PROCEDURE — 97112 NEUROMUSCULAR REEDUCATION: CPT

## 2018-06-07 PROCEDURE — 97110 THERAPEUTIC EXERCISES: CPT

## 2018-06-07 NOTE — PROGRESS NOTES
Daily Note     Today's date: 2018  Patient name: Danae Rees  : 1955  MRN: [id number]  Referring provider: Vesna Hale MD  Dx:   Encounter Diagnosis     ICD-10-CM    1  Low back pain, unspecified back pain laterality, unspecified chronicity, with sciatica presence unspecified M54 5    2  Lumbosacral radiculopathy M54 17    3  Spasticity R25 2    4  Generalized multiple sclerosis ([address] ) G35    5  Quadriparesis ([address] ) G82 50                   Subjective: Pt complains of increased back discomfort yesterday possibly due to constipation (per pt)  Objective: See treatment diary below  Precautions: MS, incontinence, spasticity, max A to transfer supine to sit, Mod A of legs sit to supine     Daily Treatment Diary      Manual               STM in sitting to L QL  np                                                                                                                          Exercise Diary              Recumbent bike np                                                 Seated:                       - HS str 4 x 30" ea             - pball QL str 4 x 30" ea              - LAQ 4# 3x10 ea             - HS curls (PT holds band) Red 2x10 ea              - alt Marches  2x10 ea                  - Hip ER (PT holds band)  YTB 2x10 ea                    - ankle PF Green x20 ea                   - ankle DF Green x15 ea                   Supine:                        - sciatic n glides 20 x ea(bolster under knees)             - TA  w/ march-np             - TA with ball squeeze Seated 20x6"             - TA with clams Red 2x10             - LTR  10x ea              - TA with abd belt  20x6"             - supine hip abduction  3x10 ea             - quad sets  10x10"                                           Assessment: Tolerated treatment well  Patient exhibited good technique with therapeutic exercises  Pt most challenged by TB ankle DF, hip ER and seated marches    Pt notes LE fatigue following session  Plan: Continue per plan of care  Pt transferring to [facility] location due to closer to home

## 2018-06-11 ENCOUNTER — EVALUATION (OUTPATIENT)
Dept: PHYSICAL THERAPY | Facility: CLINIC | Age: 63
End: 2018-06-11
Payer: MEDICARE

## 2018-06-11 DIAGNOSIS — G35 GENERALIZED MULTIPLE SCLEROSIS (HCC): ICD-10-CM

## 2018-06-11 DIAGNOSIS — M54.17 LUMBOSACRAL RADICULOPATHY: ICD-10-CM

## 2018-06-11 DIAGNOSIS — R25.2 SPASTICITY: ICD-10-CM

## 2018-06-11 DIAGNOSIS — G82.50 QUADRIPARESIS (HCC): ICD-10-CM

## 2018-06-11 DIAGNOSIS — M54.5 LOW BACK PAIN, UNSPECIFIED BACK PAIN LATERALITY, UNSPECIFIED CHRONICITY, WITH SCIATICA PRESENCE UNSPECIFIED: Primary | ICD-10-CM

## 2018-06-11 PROCEDURE — G8979 MOBILITY GOAL STATUS: HCPCS | Performed by: PHYSICAL THERAPIST

## 2018-06-11 PROCEDURE — G8978 MOBILITY CURRENT STATUS: HCPCS | Performed by: PHYSICAL THERAPIST

## 2018-06-11 PROCEDURE — 97112 NEUROMUSCULAR REEDUCATION: CPT | Performed by: PHYSICAL THERAPIST

## 2018-06-11 NOTE — LETTER
2018    Taran Cristobal MD  [place] [id number] [place] [id number]    Patient: Reid Crsepo   YOB: 1955   Date of Visit: 2018     Encounter Diagnosis     ICD-10-CM    1  Low back pain, unspecified back pain laterality, unspecified chronicity, with sciatica presence unspecified M54 5    2  Lumbosacral radiculopathy M54 17    3  Spasticity R25 2    4  Generalized multiple sclerosis ([facility] [address] ) G35    5  Quadriparesis [facility]) G82 50        Dear Dr Jessika Blandon:    Please review the attached Plan of Care from Rima Mora's recent visit  Please verify that you agree therapy should continue by signing the attached document and sending it back to our office  If you have any questions or concerns, please don't hesitate to call  Sincerely,    Debbie Atkinson PT      Referring Provider:      I certify that I have read the below Plan of Care and certify the need for these services furnished under this plan of treatment while under my care  Taran Cristobal MD  [address] [place] [address] [place] [address]: [phone]          PT RE-EVALUATION    Today's date: 2018  Patient name: Reid Crespo  : 1955  MRN: [id number]  Referring provider: Carolyn Hunter MD  Dx:   Encounter Diagnosis     ICD-10-CM    1  Low back pain, unspecified back pain laterality, unspecified chronicity, with sciatica presence unspecified M54 5    2  Lumbosacral radiculopathy M54 17    3  Spasticity R25 2    4  Generalized multiple sclerosis ([facility] [address] ) G35    5   Quadriparesis ([facility] [address] ) G82 50        Start Time: 1410  Stop Time: 1500  Total time in clinic (min): 50 minutes    Assessment  Impairments: abnormal gait, activity intolerance, impaired balance, impaired physical strength and poor posture     Assessment details: Patient is a 59 y o  female transferred to [address] [place] [address] PT at [facility] closer to home, after beginning treatment for generalized weakness and MS at a different therapy clinic  Rima presents with pain with movement of lumbar spine, decreased function, decreased range of motion, decreased strength and decreased activity tolerance  She has a PMH significant for MS and multiple falls at home, and would benefit from generalized strengthening and balance training, as well as flexion based exercises and neuromuscular re-education to address muscular impairments  Patient would benefit from skilled PT services to address these impairments, achieve goals, and to maximize function  Understanding of Dx/Px/POC: good   Prognosis: good    Goals  ST  Patient will improve strength in bilateral LE to 4/5, evidenced by the ability to transfer independently with rollator walker with minimal compensations in 2 weeks  2  Pt will improve painfree AROM of lumbar spine by 25% in 2 weeks  3  Pt will demonstrate improved TA activation during functional mobility without compensations or cues to stabilize the spine and decrease pain in 2 weeks  LT  Pt will be able to walk > 15 minutes with <3/10 pain in 4 weeks to return to evening walks  2  Pt will be independent in a comprehensive HEP in 4 weeks to maintain strength and function       Plan  Patient would benefit from: skilled PT  Planned modality interventions: cryotherapy, high voltage pulsed current: pain management, high voltage pulsed current: spasm management, H-Wave, TENS, ultrasound and unattended electrical stimulation  Planned therapy interventions: therapeutic exercise, therapeutic activities, transfer training, neuromuscular re-education, manual therapy, joint mobilization, balance, gait training, home exercise program, abdominal trunk stabilization, balance/weight bearing training, body mechanics training, breathing training, flexibility, functional ROM exercises, graded exercise, massage, motor coordination training, muscle pump exercises, patient education, postural training, strengthening, stretching and therapeutic training  Frequency: 2x week  Duration in weeks: 8  Treatment plan discussed with: patient  Plan details: Patient to be seen 2 times a week for 8 weeks to address functional impairments, improve activity tolerance, and return to PLOF in order to maximize quality of life  Subjective Evaluation    Pain  No pain reported  Current pain ratin  At best pain ratin  At worst pain ratin  Location: Right lumbar spine  Quality: dull ache  Relieving factors: change in position and medications  Aggravating factors: standing and walking  Progression: improved    Social Support  Steps to enter house: no  Stairs in house: yes   Lives in: multiple-level home  Lives with: spouse    Employment status: not working  Treatments  Previous treatment: physical therapy  Current treatment: physical therapy  Patient Goals  Patient goals for therapy: decreased pain, increased strength, independence with ADLs/IADLs, decreased edema and increased motion  Patient goal: Walk and stand longer with less pain    WORK:Patient does not work; out on disability  HOME LIFE: Lives with  in 1 story home, ramp to enter, first floor set-up  HOBBIES/EXERCISE: Patient enjoys going on walks in cool weather in the evening  PLOF: HISTORY OF CURRENT INJURY:   Patient had a lumbar fusion 3 years ago to address spondylolisthesis, and her pain has been significantly increased since the surgery  She has complaints of LLE edema and decreased function since then  PAIN LOCATION/DESCRIPTORS: Pain is on R side of lower back, but she also complains of LLE edema    AGGRAVATING FACTORS:  Prolonged standing, walking, upright activities  EASES: Mariajuana medications, sitting, leaning forward  DAY PATTERN: Better in the evening, with most pain in the mornings "from laying in one spot all night"  IMAGING: MRI for low back - no new findings, MS lesions on spinal cord and brain  SPECIAL QUESTIONS:  Patient reports issues of incontinence of bowel and bladder  PATIENT GOALS: Patient would like to get back to using a cane to ambulate, and increase BLE strength  Objective     Neurological Testing     Sensation     Lumbar   Left   Intact: light touch    Right   Diminished: light touch    Comments   Right light touch: lateral aspect of right foot    Reflexes   Left   Patellar (L4): brisk (3+)  Achilles (S1): brisk (3+)  Clonus sign: negative    Right   Patellar (L4): brisk (3+)  Achilles (S1): brisk (3+)  Clonus sign: negative    Additional Neurological Details  Negative for spasticity (taking medication)    Active Range of Motion     Additional Active Range of Motion Details  LS AROM:   Flexion: 75% pain relieved  Extension: 25% pain in R lower back increased, motion coming from hinging in lower thoracic spine with minimal extension at lumbar spine due to fusion  SideBending right: 50% pain in R lower back increased  SideBending left: 50%  Rotation right: 50%  Rotation left: 50%      Strength/Myotome Testing     Left Hip   Planes of Motion   Flexion: 3-  Abduction: 3+  Adduction: 3+  External rotation: 4    Right Hip   Planes of Motion   Flexion: 3-  Abduction: 3+  Adduction: 3+  External rotation: 4    Left Knee   Flexion: 4  Extension: 4    Right Knee   Flexion: 4-  Extension: 4    Left Ankle/Foot   Dorsiflexion: 4  Plantar flexion: 4    Right Ankle/Foot   Dorsiflexion: 4-  Plantar flexion: 4    Additional Strength Details  Ambulation: Pt ambulates over level surface with rollator with increased toe out on RLE and decreased toe clearance bilaterally  Slow gait pattern noted  Muscle Activation   Patient unable to activate left transverse abdominals and right transverse abdominals  Additional Muscle Activation Details  Patient unable to activate TA without diaphragm compensations    Tests     Lumbar     Left   Negative crossed SLR and passive SLR  Right   Negative crossed SLR and passive SLR       Additional Tests Details  Pt unable to get into a prone position for testing    (-) slump test bilaterally  Palpation: tenderness on R lower lumbar spine, pelvic landmarks equal in sitting, bilateral incisions along lumbar spine healed well and not sensitive to touch  Posture: bilateral rounded shoulders, increased thoracic curvature, minimal mobility of lumbar spine during movement testing            Precautions: MS, incontinence, spasticity, max A to transfer supine to sit, Mod A of legs sit to supine    Daily Treatment Diary      Manual  4-30 (IE)  5-4  5-7  5-10  5/14 5/17 5-21 5/24  5-31 (RE)  6/11    STM in sitting to L QL   NV                NP due to re-eval                                                                                                         Exercise Diary  4 -30 (IE)  5-4  5-7  5-10  5/14 5/17 5-21 5/24  5-31 (RE)  6/11   Recumbent bike 5 mins NV  2 mins  Held  Trial NV  NV?  ? Try NV  NV  NV     Gastroc SB str 4 x 30" NV  4 x 30" ea  Held  Held   held  held  held  held  held                             Seated:                       - HS str 4 x 30" ea NV  4 x 30" ea  4 x 30" ea  4 x 30" ea  4x30"  4x30" ea   4x30" ea  4x30" ea  4 x30" ea     - pball QL str 4 x 30" ea NV  4 x 30" ea  4 x 30" ea  4 x 30" ea  4x30" ea  4x30" ea   4x30" ea  4x30"ea  4x30" ea      - LAQ     2 x 10 ea 2#  3 x 10 ea 2#  2# 3x10 ea 2# 3x10 ea  3# 3x10 ea  3# 3x10 ea  4# 3x10 ea     - HS curls (PT holds band)       2 x 10 ea Red TB 15 pn R , 20 L  np, resume NV  NV Red 2x10 ea  2 x 15 ea Red TB     - Marches             NV 10x ea  10 x ea      - Hip ER (PT holds band)                 2 x 10 ea Yellow TB      - ankle PF                  2 x 10 ea Green TB     - ankle DF         2 x 10 ea Green TB    - alt marches                       Standing:                       - hip abd 10 x ea NV  5 x ea  Held  Held  held  held  held  held  held     - hip ext 10 x ea NV  5 x ea  Held  Held  held  held  held  held  held     - HS curls 10 x ea NV  unable  Held  Held  held  held  held  held  held     - mini squat 10 x NV  NP  Held  Held  held  held  held  held  held      -                       Supine:                        - sciatic n glides 10 x ea  NV  10 x ea   10 x ea (bolster under knees)  2x10 ea  2x10 ea    10 x ea (bolster under knees)  2x10 ea   20 x ea (bolster under knees)      - TA  10 x 10" NV  NV  NV NP 10x10"  10x10" with alt marches 10 x ea  2x5 ea march   2x5 ea march  5 sec holds with deep breathing x 10    - TA with ball squeeze 10 x 6" hold NV  NV  10 x 6" hold  20 x 6" hold (Seated)  20x6" seated  20x6" seated  20x6" seated  20x6"  20x6"      - TA with clams 2 x 10 Red NV  NV  2 x 10 Red  2 x 10 Red  red 2x10  red 3x10  red 3x10  red 2x20  NV      - LTR      10 x  10 x  10x  10x  20x  10x ea  10x ea      - TA with abd belt     10 x 6" hold  20 x 6" hold  20x6"  20x6"  20 x 6"  20x6"  20x6"     - supine hip abduction       10 x ea 2x10 ea  2x10 ea  3 x 10 ea  2x10 ea  3 x 10 ea     - quad sets       10 x 10" 10x10" 10x10"  10x10" 10x10"  10 x 10"

## 2018-06-11 NOTE — PROGRESS NOTES
PT RE-EVALUATION    Today's date: 2018  Patient name: Rudy Pelaez  : 1955  MRN: [id number]  Referring provider: Lynne Arellano MD  Dx:   Encounter Diagnosis     ICD-10-CM    1  Low back pain, unspecified back pain laterality, unspecified chronicity, with sciatica presence unspecified M54 5    2  Lumbosacral radiculopathy M54 17    3  Spasticity R25 2    4  Generalized multiple sclerosis ([facility] [address] ) G35    5  Quadriparesis ([facility] [address] ) G82 50        Start Time: 1410  Stop Time: 1500  Total time in clinic (min): 50 minutes    Assessment  Impairments: abnormal gait, activity intolerance, impaired balance, impaired physical strength and poor posture     Assessment details: Patient is a 59 y o  female transferred to [facility] PT at [place] closer to home, after beginning treatment for generalized weakness and MS at a different therapy clinic  Rima presents with pain with movement of lumbar spine, decreased function, decreased range of motion, decreased strength and decreased activity tolerance  She has a PMH significant for MS and multiple falls at home, and would benefit from generalized strengthening and balance training, as well as flexion based exercises and neuromuscular re-education to address muscular impairments  Patient would benefit from skilled PT services to address these impairments, achieve goals, and to maximize function  Understanding of Dx/Px/POC: good   Prognosis: good    Goals  ST  Patient will improve strength in bilateral LE to 4/5, evidenced by the ability to transfer independently with rollator walker with minimal compensations in 2 weeks  2  Pt will improve painfree AROM of lumbar spine by 25% in 2 weeks  3  Pt will demonstrate improved TA activation during functional mobility without compensations or cues to stabilize the spine and decrease pain in 2 weeks  LT  Pt will be able to walk > 15 minutes with <3/10 pain in 4 weeks to return to evening walks    2  Pt will be independent in a comprehensive HEP in 4 weeks to maintain strength and function  Plan  Patient would benefit from: skilled PT  Planned modality interventions: cryotherapy, high voltage pulsed current: pain management, high voltage pulsed current: spasm management, H-Wave, TENS, ultrasound and unattended electrical stimulation  Planned therapy interventions: therapeutic exercise, therapeutic activities, transfer training, neuromuscular re-education, manual therapy, joint mobilization, balance, gait training, home exercise program, abdominal trunk stabilization, balance/weight bearing training, body mechanics training, breathing training, flexibility, functional ROM exercises, graded exercise, massage, motor coordination training, muscle pump exercises, patient education, postural training, strengthening, stretching and therapeutic training  Frequency: 2x week  Duration in weeks: 8  Treatment plan discussed with: patient  Plan details: Patient to be seen 2 times a week for 8 weeks to address functional impairments, improve activity tolerance, and return to PLOF in order to maximize quality of life          Subjective Evaluation    Pain  No pain reported  Current pain ratin  At best pain ratin  At worst pain ratin  Location: Right lumbar spine  Quality: dull ache  Relieving factors: change in position and medications  Aggravating factors: standing and walking  Progression: improved    Social Support  Steps to enter house: no  Stairs in house: yes   Lives in: multiple-level home  Lives with: spouse    Employment status: not working  Treatments  Previous treatment: physical therapy  Current treatment: physical therapy  Patient Goals  Patient goals for therapy: decreased pain, increased strength, independence with ADLs/IADLs, decreased edema and increased motion  Patient goal: Walk and stand longer with less pain    WORK:Patient does not work; out on disability  HOME LIFE: Lives with  in 1 story home, ramp to enter, first floor set-up  HOBBIES/EXERCISE: Patient enjoys going on walks in cool weather in the evening  PLOF: HISTORY OF CURRENT INJURY:   Patient had a lumbar fusion 3 years ago to address spondylolisthesis, and her pain has been significantly increased since the surgery  She has complaints of LLE edema and decreased function since then  PAIN LOCATION/DESCRIPTORS: Pain is on R side of lower back, but she also complains of LLE edema  AGGRAVATING FACTORS:  Prolonged standing, walking, upright activities  EASES: Mariajuana medications, sitting, leaning forward  DAY PATTERN: Better in the evening, with most pain in the mornings "from laying in one spot all night"  IMAGING: MRI for low back - no new findings, MS lesions on spinal cord and brain  SPECIAL QUESTIONS:  Patient reports issues of incontinence of bowel and bladder  PATIENT GOALS: Patient would like to get back to using a cane to ambulate, and increase BLE strength  Objective     Neurological Testing     Sensation     Lumbar   Left   Intact: light touch    Right   Diminished: light touch    Comments   Right light touch: lateral aspect of right foot    Reflexes   Left   Patellar (L4): brisk (3+)  Achilles (S1): brisk (3+)  Clonus sign: negative    Right   Patellar (L4): brisk (3+)  Achilles (S1): brisk (3+)  Clonus sign: negative    Additional Neurological Details  Negative for spasticity (taking medication)    Active Range of Motion     Additional Active Range of Motion Details  LS AROM:   Flexion: 75% pain relieved  Extension: 25% pain in R lower back increased, motion coming from hinging in lower thoracic spine with minimal extension at lumbar spine due to fusion    SideBending right: 50% pain in R lower back increased  SideBending left: 50%  Rotation right: 50%  Rotation left: 50%      Strength/Myotome Testing     Left Hip   Planes of Motion   Flexion: 3-  Abduction: 3+  Adduction: 3+  External rotation: 4    Right Hip   Planes of Motion   Flexion: 3-  Abduction: 3+  Adduction: 3+  External rotation: 4    Left Knee   Flexion: 4  Extension: 4    Right Knee   Flexion: 4-  Extension: 4    Left Ankle/Foot   Dorsiflexion: 4  Plantar flexion: 4    Right Ankle/Foot   Dorsiflexion: 4-  Plantar flexion: 4    Additional Strength Details  Ambulation: Pt ambulates over level surface with rollator with increased toe out on RLE and decreased toe clearance bilaterally  Slow gait pattern noted  Muscle Activation   Patient unable to activate left transverse abdominals and right transverse abdominals  Additional Muscle Activation Details  Patient unable to activate TA without diaphragm compensations    Tests     Lumbar     Left   Negative crossed SLR and passive SLR  Right   Negative crossed SLR and passive SLR  Additional Tests Details  Pt unable to get into a prone position for testing    (-) slump test bilaterally  Palpation: tenderness on R lower lumbar spine, pelvic landmarks equal in sitting, bilateral incisions along lumbar spine healed well and not sensitive to touch  Posture: bilateral rounded shoulders, increased thoracic curvature, minimal mobility of lumbar spine during movement testing            Precautions: MS, incontinence, spasticity, max A to transfer supine to sit, Mod A of legs sit to supine    Daily Treatment Diary      Manual  4-30 (IE)  5-4  5-7  5-10  5/14 5/17 5-21 5/24  5-31 (RE)  6/11    STM in sitting to L QL   NV                NP due to re-eval                                                                                                         Exercise Diary  4 -30 (IE)  5-4  5-7  5-10  5/14 5/17  5-21 5/24  5-31 (RE)  6/11   Recumbent bike 5 mins NV  2 mins  Held  Trial NV  NV?  ? Try NV  NV  NV     Gastroc SB str 4 x 30" NV  4 x 30" ea  Held  Held   held  held  held  held  held                             Seated:                       - HS str 4 x 30" ea NV  4 x 30" ea  4 x 30" ea  4 x 30" ea  4x30"  4x30" ea   4x30" ea  4x30" ea  4 x30" ea     - pball QL str 4 x 30" ea NV  4 x 30" ea  4 x 30" ea  4 x 30" ea  4x30" ea  4x30" ea   4x30" ea  4x30"ea  4x30" ea      - LAQ     2 x 10 ea 2#  3 x 10 ea 2#  2# 3x10 ea 2# 3x10 ea  3# 3x10 ea  3# 3x10 ea  4# 3x10 ea     - HS curls (PT holds band)       2 x 10 ea Red TB 15 pn R , 20 L  np, resume NV  NV Red 2x10 ea  2 x 15 ea Red TB     - Marches             NV 10x ea  10 x ea      - Hip ER (PT holds band)                 2 x 10 ea Yellow TB      - ankle PF                  2 x 10 ea Green TB     - ankle DF         2 x 10 ea Green TB    - alt marches                       Standing:                       - hip abd 10 x ea NV  5 x ea  Held  Held  held  held  held  held  held     - hip ext 10 x ea NV  5 x ea  Held  Held  held  held  held  held  held     - HS curls 10 x ea NV  unable  Held  Held  held  held  held  held  held     - mini squat 10 x NV  NP  Held  Held  held  held  held  held  held      -                       Supine:                        - sciatic n glides 10 x ea  NV  10 x ea   10 x ea (bolster under knees)  2x10 ea  2x10 ea    10 x ea (bolster under knees)  2x10 ea   20 x ea (bolster under knees)      - TA  10 x 10" NV  NV  NV NP 10x10"  10x10" with alt marches 10 x ea  2x5 ea march   2x5 ea march  5 sec holds with deep breathing x 10    - TA with ball squeeze 10 x 6" hold NV  NV  10 x 6" hold  20 x 6" hold (Seated)  20x6" seated  20x6" seated  20x6" seated  20x6"  20x6"      - TA with clams 2 x 10 Red NV  NV  2 x 10 Red  2 x 10 Red  red 2x10  red 3x10  red 3x10  red 2x20  NV      - LTR      10 x  10 x  10x  10x  20x  10x ea  10x ea      - TA with abd belt     10 x 6" hold  20 x 6" hold  20x6"  20x6"  20 x 6"  20x6"  20x6"     - supine hip abduction       10 x ea 2x10 ea  2x10 ea  3 x 10 ea  2x10 ea  3 x 10 ea     - quad sets       10 x 10" 10x10" 10x10"  10x10" 10x10"  10 x 10"

## 2018-06-13 ENCOUNTER — TRANSCRIBE ORDERS (OUTPATIENT)
Dept: PHYSICAL THERAPY | Facility: CLINIC | Age: 63
End: 2018-06-13

## 2018-06-13 DIAGNOSIS — M54.5 LOW BACK PAIN, UNSPECIFIED BACK PAIN LATERALITY, UNSPECIFIED CHRONICITY, WITH SCIATICA PRESENCE UNSPECIFIED: Primary | ICD-10-CM

## 2018-06-14 ENCOUNTER — OFFICE VISIT (OUTPATIENT)
Dept: PHYSICAL THERAPY | Facility: CLINIC | Age: 63
End: 2018-06-14
Payer: MEDICARE

## 2018-06-14 DIAGNOSIS — M54.17 LUMBOSACRAL RADICULOPATHY: ICD-10-CM

## 2018-06-14 DIAGNOSIS — M54.5 LOW BACK PAIN, UNSPECIFIED BACK PAIN LATERALITY, UNSPECIFIED CHRONICITY, WITH SCIATICA PRESENCE UNSPECIFIED: Primary | ICD-10-CM

## 2018-06-14 DIAGNOSIS — G35 GENERALIZED MULTIPLE SCLEROSIS (HCC): ICD-10-CM

## 2018-06-14 DIAGNOSIS — R25.2 SPASTICITY: ICD-10-CM

## 2018-06-14 DIAGNOSIS — G82.50 QUADRIPARESIS (HCC): ICD-10-CM

## 2018-06-14 PROCEDURE — 97140 MANUAL THERAPY 1/> REGIONS: CPT | Performed by: PHYSICAL THERAPIST

## 2018-06-14 PROCEDURE — 97110 THERAPEUTIC EXERCISES: CPT | Performed by: PHYSICAL THERAPIST

## 2018-06-14 PROCEDURE — 97112 NEUROMUSCULAR REEDUCATION: CPT | Performed by: PHYSICAL THERAPIST

## 2018-06-14 NOTE — PROGRESS NOTES
Daily Note     Today's date: 2018  Patient name: Reid Crespo  : 1955  MRN: [id number]  Referring provider: Carolyn Hunter MD  Dx:   Encounter Diagnosis     ICD-10-CM    1  Low back pain, unspecified back pain laterality, unspecified chronicity, with sciatica presence unspecified M54 5    2  Lumbosacral radiculopathy M54 17    3  Spasticity R25 2    4  Generalized multiple sclerosis ([facility] [address] ) G35    5  Quadriparesis ([facility] [address] ) G82 50        Start Time: 1100  Stop Time: 1150  Total time in clinic (min): 50 minutes    Subjective: Patient notes she is able to walk more, and has "walked a lot these past few days", and noted the pain in her lower back is "not bad" today  Objective: See treatment diary below  Diagnosis: LBP, flexion preference   Precautions: MS, mod assist for sit <> supine, incontinence   Manual Therapy       Hamstring stretch, single knee to chest NP 8 min      STM R lower back NP 2 min                              Exercise Diary         Supine TA activation 5 sec holds x 15 with cues for breathing 5 sec holds x 15 with cues for breathing      Supine TA with ball press NV X15, cues for breathing and core activation      Supine TA with belt abd NV 2x10      Supine TA with Stabilizer NV NV      TA with marches NV NV      LTR NV 10x ea      Supine hip abd with band NV x20 ea green TB      Supine calf stretch NV 3x30 ea      Sitting 3 way ball roll  2 min      LAQ  3# 3x10      HS curls  Green band 2x10                                                                                      Modalities                                    Assessment: Tolerated treatment well  She had limited amounts of pain prior to session, and had 0/10 in low back after knee to chest exercises  Patient exhibited good technique with therapeutic exercises  with frequent tactile and verbal cuing  Plan: Continue per plan of care    Patient displays flexion preference, with pain decreased to 0 during performance of flexion based activities  Plan to assess segmental mobility of lumbar spine and soft tissue

## 2018-06-18 ENCOUNTER — OFFICE VISIT (OUTPATIENT)
Dept: PHYSICAL THERAPY | Facility: CLINIC | Age: 63
End: 2018-06-18
Payer: MEDICARE

## 2018-06-18 DIAGNOSIS — G35 GENERALIZED MULTIPLE SCLEROSIS (HCC): ICD-10-CM

## 2018-06-18 DIAGNOSIS — M54.17 LUMBOSACRAL RADICULOPATHY: ICD-10-CM

## 2018-06-18 DIAGNOSIS — G82.50 QUADRIPARESIS (HCC): ICD-10-CM

## 2018-06-18 DIAGNOSIS — R25.2 SPASTICITY: ICD-10-CM

## 2018-06-18 DIAGNOSIS — M54.5 LOW BACK PAIN, UNSPECIFIED BACK PAIN LATERALITY, UNSPECIFIED CHRONICITY, WITH SCIATICA PRESENCE UNSPECIFIED: Primary | ICD-10-CM

## 2018-06-18 PROCEDURE — 97140 MANUAL THERAPY 1/> REGIONS: CPT | Performed by: PHYSICAL THERAPIST

## 2018-06-18 PROCEDURE — 97112 NEUROMUSCULAR REEDUCATION: CPT | Performed by: PHYSICAL THERAPIST

## 2018-06-18 PROCEDURE — 97110 THERAPEUTIC EXERCISES: CPT | Performed by: PHYSICAL THERAPIST

## 2018-06-18 NOTE — PROGRESS NOTES
Daily Note     Today's date: 2018  Patient name: Yamel Fuller  : 1955  MRN: [id number]  Referring provider: Seven Lyon MD  Dx:   Encounter Diagnosis     ICD-10-CM    1  Low back pain, unspecified back pain laterality, unspecified chronicity, with sciatica presence unspecified M54 5    2  Lumbosacral radiculopathy M54 17    3  Spasticity R25 2    4  Quadriparesis ([facility] [address] ) G82 50    5  Generalized multiple sclerosis ([facility] [address] ) G35        Start Time: 1040  Stop Time: 1120  Total time in clinic (min): 40 minutes    Subjective: Patient notes she felt good after last session, and was even able to go on a walk after therapy  She continues to have some low back pain but notes it is not as frequent  Objective: See treatment diary below  Diagnosis: LBP, flexion preference   Precautions: MS, mod assist for sit <> supine, incontinence   Manual Therapy      Hamstring stretch, single knee to chest NP 8 min      STM R lower back NP 2 min 15 min piriformis R, and manual piriformis stretch, PAs lumbar spine                             Exercise Diary         Supine TA activation 5 sec holds x 15 with cues for breathing 5 sec holds x 15 with cues for breathing With stabilizer 5 sec holds with breathing x20     Supine TA with ball press NV X15, cues for breathing and core activation      Supine TA with belt abd NV 2x10      Supine TA with Stabilizer NV NV      TA with marches NV NV      LTR NV 10x ea 20x ea side     Supine hip abd with band NV x20 ea green TB      Supine calf stretch NV 3x30 ea      Sitting 3 way ball roll  2 min 3 min     LAQ  3# 3x10 4# 3x10     HS curls  Green band 2x10 Green TB 3x10     Standing marches   2x10 at rollator     Piriformis stretch supine   2x 10 seconds                                                                     Modalities                                  Assessment: Tolerated treatment well   Low back pain continues to respond well to flexion, with some improvements in activation of TA  Patient would benefit from continued PT  Plan: Progress treatment as tolerated  Plan to include STM to piriformis and piriformis stretch during sessions, as palpation of this muscle recreated symptoms down her R leg

## 2018-06-21 ENCOUNTER — OFFICE VISIT (OUTPATIENT)
Dept: PHYSICAL THERAPY | Facility: CLINIC | Age: 63
End: 2018-06-21
Payer: MEDICARE

## 2018-06-21 DIAGNOSIS — G35 GENERALIZED MULTIPLE SCLEROSIS (HCC): ICD-10-CM

## 2018-06-21 DIAGNOSIS — G82.50 QUADRIPARESIS (HCC): ICD-10-CM

## 2018-06-21 DIAGNOSIS — M54.5 LOW BACK PAIN, UNSPECIFIED BACK PAIN LATERALITY, UNSPECIFIED CHRONICITY, WITH SCIATICA PRESENCE UNSPECIFIED: Primary | ICD-10-CM

## 2018-06-21 DIAGNOSIS — R25.2 SPASTICITY: ICD-10-CM

## 2018-06-21 DIAGNOSIS — M54.17 LUMBOSACRAL RADICULOPATHY: ICD-10-CM

## 2018-06-21 PROCEDURE — 97110 THERAPEUTIC EXERCISES: CPT | Performed by: PHYSICAL THERAPIST

## 2018-06-21 PROCEDURE — 97112 NEUROMUSCULAR REEDUCATION: CPT | Performed by: PHYSICAL THERAPIST

## 2018-06-21 NOTE — PROGRESS NOTES
Daily Note     Today's date: 2018  Patient name: Danae Rees  : 1955  MRN: [id number]  Referring provider: Vesna Hale MD  Dx:   Encounter Diagnosis     ICD-10-CM    1  Low back pain, unspecified back pain laterality, unspecified chronicity, with sciatica presence unspecified M54 5    2  Lumbosacral radiculopathy M54 17    3  Spasticity R25 2    4  Quadriparesis ([facility] [address] ) G82 50    5  Generalized multiple sclerosis ([facility] [address] ) G35        Start Time: 1100  Stop Time: 1145  Total time in clinic (min): 45 minutes    Subjective: Patient notes "my back is starting to feel better!" She reports her exercises are going well, and she agrees with a progression of HEP        Objective: See treatment diary below  Diagnosis: LBP, flexion preference   Precautions: MS, mod assist for sit <> supine, incontinence   Manual Therapy     Hamstring stretch, single knee to chest NP 8 min      STM R lower back NP 2 min 15 min piriformis R, and manual piriformis stretch, PAs lumbar spine                             Exercise Diary         Supine TA activation 5 sec holds x 15 with cues for breathing 5 sec holds x 15 with cues for breathing With stabilizer 5 sec holds with breathing x20 With stabilizer 5 sec holds with breathing x20    Supine TA with ball press NV X15, cues for breathing and core activation      Supine TA with belt abd NV 2x10  X 10 with PT stabilizing legs            TA with marches NV NV      LTR NV 10x ea 20x ea side 20 x ea side    Supine hip abd with band NV x20 ea green TB      Supine calf stretch NV 3x30 ea  2x30 ea    Sitting 3 way ball roll  2 min 3 min 4 min     LAQ to 30 degrees knee flexion  3# 3x10 4# 3x10 5# 4x10 - decrease NV for improved form    HS curls  Green band 2x10 Green TB 3x10 Green TB 3x10    Standing marches   2x10 at rollator 2x10 at rollator    Piriformis stretch supine   2x 10 seconds 3x30 sec    Glut sets supine    X 20 3 sec hold Modalities                                    Assessment: Tolerated treatment well  Patient demonstrated fatigue post treatment, exhibited good technique with therapeutic exercises and would benefit from continued PT  Patient demonstrates improved TA activation with use of stabilizer  Added piriformis stretch to HEP  She required cuing for breathing throughout exercise performance  Patient fatigues with therapy but tolerated the session well  Plan: Continue per plan of care  Progress treatment as tolerated

## 2018-06-25 ENCOUNTER — APPOINTMENT (OUTPATIENT)
Dept: PHYSICAL THERAPY | Facility: CLINIC | Age: 63
End: 2018-06-25
Payer: MEDICARE

## 2018-06-26 ENCOUNTER — APPOINTMENT (OUTPATIENT)
Dept: PHYSICAL THERAPY | Facility: CLINIC | Age: 63
End: 2018-06-26
Payer: MEDICARE

## 2018-06-28 ENCOUNTER — OFFICE VISIT (OUTPATIENT)
Dept: PHYSICAL THERAPY | Facility: CLINIC | Age: 63
End: 2018-06-28
Payer: MEDICARE

## 2018-06-28 DIAGNOSIS — M54.17 LUMBOSACRAL RADICULOPATHY: ICD-10-CM

## 2018-06-28 DIAGNOSIS — G35 GENERALIZED MULTIPLE SCLEROSIS (HCC): ICD-10-CM

## 2018-06-28 DIAGNOSIS — M54.5 LOW BACK PAIN, UNSPECIFIED BACK PAIN LATERALITY, UNSPECIFIED CHRONICITY, WITH SCIATICA PRESENCE UNSPECIFIED: Primary | ICD-10-CM

## 2018-06-28 DIAGNOSIS — G82.50 QUADRIPARESIS (HCC): ICD-10-CM

## 2018-06-28 DIAGNOSIS — R25.2 SPASTICITY: ICD-10-CM

## 2018-06-28 PROCEDURE — 97112 NEUROMUSCULAR REEDUCATION: CPT | Performed by: PHYSICAL THERAPIST

## 2018-06-28 PROCEDURE — G8978 MOBILITY CURRENT STATUS: HCPCS | Performed by: PHYSICAL THERAPIST

## 2018-06-28 PROCEDURE — 97110 THERAPEUTIC EXERCISES: CPT | Performed by: PHYSICAL THERAPIST

## 2018-06-28 PROCEDURE — G8979 MOBILITY GOAL STATUS: HCPCS | Performed by: PHYSICAL THERAPIST

## 2018-06-28 NOTE — PROGRESS NOTES
Daily Note     Today's date: 2018  Patient name: Carolyn Farrell  : 1955  MRN: [id number]  Referring provider: Cinthia Lee MD  Dx:   Encounter Diagnosis     ICD-10-CM    1  Low back pain, unspecified back pain laterality, unspecified chronicity, with sciatica presence unspecified M54 5    2  Lumbosacral radiculopathy M54 17    3  Spasticity R25 2    4  Quadriparesis ([facility] [address] ) G82 50    5  Generalized multiple sclerosis ([facility] [address] ) G35        Start Time: 1100  Stop Time: 1150  Total time in clinic (min): 50 minutes    Subjective: Patient states she feels worse today because it is so hot outside, and due to her MS she is very intolerant to heat and feels weak and achy in the warmer weather  When questioned about her back, patient indicates it is feeling a bit better with skilled PT        Objective: See treatment diary below  Diagnosis: LBP, flexion preference   Precautions: MS, mod assist for sit <> supine, incontinence   Manual Therapy    Hamstring stretch, single knee to chest NP 8 min      STM R lower back NP 2 min 15 min piriformis R, and manual piriformis stretch, PAs lumbar spine                             Exercise Diary         Supine TA activation 5 sec holds x 15 with cues for breathing 5 sec holds x 15 with cues for breathing With stabilizer 5 sec holds with breathing x20 With stabilizer 5 sec holds with breathing x20 With stabilizer 5 sec holds with breathing x25   Supine TA with ball press NV X15, cues for breathing and core activation      Supine TA with belt abd NV 2x10  X 10 with PT stabilizing legs x30 2 sec holds           TA with marches NV NV      LTR NV 10x ea 20x ea side 20 x ea side 20 ea side   Supine hip abd with band NV x20 ea green TB      Supine calf stretch NV 3x30 ea  2x30 ea    Sitting 3 way ball roll  2 min 3 min 4 min  4 min   LAQ to 30 degrees knee flexion  3# 3x10 4# 3x10 5# 4x10 - decrease NV for improved form 4# R, 5# L 3x10   HS curls  Green band 2x10 Green TB 3x10 Green TB 3x10    Standing marches   2x10 at rollator 2x10 at rollator    Piriformis stretch supine   2x 10 seconds 3x30 sec 3x30 sec ea   Glut sets supine    X 20 3 sec hold 2 sec holds x 30                                                           Modalities                                      Assessment: Tolerated treatment well  Patient demonstrated fatigue post treatment and would benefit from continued PT  Due to increased fatigue and soreness from her MS today, patient required increased time to rest and increased assistance to transfer on and off of the table  FOTO reassessed today, with an improvement to 52  Plan: Continue per plan of care  Progress treatment as tolerated

## 2018-07-02 ENCOUNTER — OFFICE VISIT (OUTPATIENT)
Dept: PHYSICAL THERAPY | Facility: CLINIC | Age: 63
End: 2018-07-02
Payer: MEDICARE

## 2018-07-02 DIAGNOSIS — G35 GENERALIZED MULTIPLE SCLEROSIS (HCC): ICD-10-CM

## 2018-07-02 DIAGNOSIS — R25.2 SPASTICITY: ICD-10-CM

## 2018-07-02 DIAGNOSIS — M54.5 LOW BACK PAIN, UNSPECIFIED BACK PAIN LATERALITY, UNSPECIFIED CHRONICITY, WITH SCIATICA PRESENCE UNSPECIFIED: Primary | ICD-10-CM

## 2018-07-02 DIAGNOSIS — G82.50 QUADRIPARESIS (HCC): ICD-10-CM

## 2018-07-02 DIAGNOSIS — M54.17 LUMBOSACRAL RADICULOPATHY: ICD-10-CM

## 2018-07-02 PROCEDURE — 97110 THERAPEUTIC EXERCISES: CPT | Performed by: PHYSICAL THERAPIST

## 2018-07-02 PROCEDURE — 97112 NEUROMUSCULAR REEDUCATION: CPT | Performed by: PHYSICAL THERAPIST

## 2018-07-02 PROCEDURE — 97140 MANUAL THERAPY 1/> REGIONS: CPT | Performed by: PHYSICAL THERAPIST

## 2018-07-02 NOTE — PROGRESS NOTES
Daily Note     Today's date: 2018  Patient name: Eric Glass  : 1955  MRN: [id number]  Referring provider: Mariela Nails MD  Dx:   Encounter Diagnosis     ICD-10-CM    1  Low back pain, unspecified back pain laterality, unspecified chronicity, with sciatica presence unspecified M54 5    2  Lumbosacral radiculopathy M54 17    3  Spasticity R25 2    4  Quadriparesis ([facility] [address] ) G82 50    5  Generalized multiple sclerosis ([facility] [address] ) G35        Start Time: 316  Stop Time: 104  Total time in clinic (min): 53 minutes    Subjective: Patient states her lower back is sore today, as she was baking yesterday and was up and down often to complete the baking        Objective: See treatment diary below  Diagnosis: LBP, flexion preference   Precautions: MS, mod assist for sit <> supine, incontinence   Manual Therapy    Hamstring stretch, single knee to chest  8 min      STM R lower back 15 min R paraspinals lumbar and R piriformis 2 min 15 min piriformis R, and manual piriformis stretch, PAs lumbar spine                             Exercise Diary         Supine TA activation With stabilizer 5 sec holds with breathing x25 5 sec holds x 15 with cues for breathing With stabilizer 5 sec holds with breathing x20 With stabilizer 5 sec holds with breathing x20 With stabilizer 5 sec holds with breathing x25   Supine TA with ball press  X15, cues for breathing and core activation      Supine TA with belt abd X 30 2 sec holds  2x10  X 10 with PT stabilizing legs x30 2 sec holds           TA with marches  NV      LTR 20 ea side  10x ea 20x ea side 20 x ea side 20 ea side   Supine hip abd with band X 30 red TB x20 ea green TB      Supine calf stretch 3x30 sec ea 3x30 ea  2x30 ea    Sitting 3 way ball roll 4 min 2 min 3 min 4 min  4 min   LAQ to 30 degrees knee flexion  3# 3x10 4# 3x10 5# 4x10 - decrease NV for improved form 4# R, 5# L 3x10   HS curls  Green band 2x10 Green TB 3x10 Green TB 3x10    Standing marches 2x10  2x10 at rollator 2x10 at rollator    Piriformis stretch supine 3x 30 sec ea  2x 10 seconds 3x30 sec 3x30 sec ea   Glut sets supine 5 sec holds x 20   X 20 3 sec hold 2 sec holds x 30                                                           Modalities                                    Assessment: Tolerated treatment well  Patient demonstrated fatigue post treatment, would benefit from continued PT and demonstrates relief of symptoms with manual massage of paraspinals and R piriformis  Patient demonstrates improved TA activation with use of stabilizer, but continues to hold her breath on occasion and requires cues to correct  Of note, patient uses mariajuana oil for pain relief and gloves should be worn for all manual techniques  Plan: Continue per plan of care  Progress treatment as tolerated

## 2018-07-05 ENCOUNTER — OFFICE VISIT (OUTPATIENT)
Dept: PHYSICAL THERAPY | Facility: CLINIC | Age: 63
End: 2018-07-05
Payer: MEDICARE

## 2018-07-05 DIAGNOSIS — R25.2 SPASTICITY: ICD-10-CM

## 2018-07-05 DIAGNOSIS — G35 GENERALIZED MULTIPLE SCLEROSIS (HCC): ICD-10-CM

## 2018-07-05 DIAGNOSIS — M54.17 LUMBOSACRAL RADICULOPATHY: ICD-10-CM

## 2018-07-05 DIAGNOSIS — M54.5 LOW BACK PAIN, UNSPECIFIED BACK PAIN LATERALITY, UNSPECIFIED CHRONICITY, WITH SCIATICA PRESENCE UNSPECIFIED: Primary | ICD-10-CM

## 2018-07-05 DIAGNOSIS — G82.50 QUADRIPARESIS (HCC): ICD-10-CM

## 2018-07-05 PROCEDURE — 97112 NEUROMUSCULAR REEDUCATION: CPT | Performed by: PHYSICAL THERAPIST

## 2018-07-05 PROCEDURE — 97110 THERAPEUTIC EXERCISES: CPT | Performed by: PHYSICAL THERAPIST

## 2018-07-05 PROCEDURE — 97140 MANUAL THERAPY 1/> REGIONS: CPT | Performed by: PHYSICAL THERAPIST

## 2018-07-05 NOTE — PROGRESS NOTES
Daily Note     Today's date: 2018  Patient name: Alma Jang  : 1955  MRN: [id number]  Referring provider: Debbi Monae MD  Dx:   Encounter Diagnosis     ICD-10-CM    1  Low back pain, unspecified back pain laterality, unspecified chronicity, with sciatica presence unspecified M54 5    2  Lumbosacral radiculopathy M54 17    3  Spasticity R25 2    4  Quadriparesis ([facility] [address] ) G82 50    5  Generalized multiple sclerosis ([facility] [address] ) G35        Start Time: 1100  Stop Time: 1145  Total time in clinic (min): 45 minutes    Subjective: Patient was outside most of the day on the , sitting in her rollator  She feels her back is sore today, on the R side where she usually gets the pain  Patient rated pain in back 5/10 prior to session, 0/10 during STM of paraspinals, and 3/10 when leaving the clinic        Objective: See treatment diary below    Diagnosis: LBP, flexion preference   Precautions: MS, mod assist for sit <> supine, incontinence   Manual Therapy    Hamstring stretch, single knee to chest        STM R lower back 15 min R paraspinals lumbar and R piriformis 10 min piriformis R, paraspinals, PAs lumbar spine 15 min R paraspinals lumbar and R piriformis                             Exercise Diary         Supine TA activation With stabilizer 5 sec holds with breathing x25 With stabilizer 5 sec holds with breathing x30 With stabilizer 5 sec holds with breathing x20 With stabilizer 5 sec holds with breathing x20 With stabilizer 5 sec holds with breathing x25   Supine TA with ball press        Supine TA with belt abd X 30 2 sec holds  2 min 2 sec holds with deep breathing  X 10 with PT stabilizing legs x30 2 sec holds           TA with marches        LTR 20 ea side  20 ea side 20x ea side 20 x ea side 20 ea side   Supine hip abd with band X 30 red TB X 30 green TB      Supine calf stretch 3x30 sec ea   2x30 ea    Sitting 3 way ball roll 4 min 4 min 3 min 4 min  4 min   LAQ to 30 degrees knee flexion   4# 3x10 5# 4x10 - decrease NV for improved form 4# R, 5# L 3x10   HS curls   Green TB 3x10 Green TB 3x10    Standing marches 2x10  2x10 at rollator 2x10 at rollator    Piriformis stretch supine 3x 30 sec ea 3x 30 sec ea 2x 10 seconds 3x30 sec 3x30 sec ea   Glut sets supine 5 sec holds x 20 HEP  X 20 3 sec hold 2 sec holds x 30   Sit to stands  1 x 5, 1 x 3      Gait   1 lap 30 feet                                              Modalities                                    Assessment: Tolerated treatment well  Patient demonstrated fatigue post treatment, exhibited good technique with therapeutic exercises and would benefit from continued PT  Patient continues to require tactile cuing for TA activation as she tends to hold her breath  Patient's low back soreness improves with STM of paraspinals and piriformis  She continues to display hip and knee weakness, but tolerated sit to stand exercise well with rest       Plan: Continue per plan of care

## 2018-07-09 ENCOUNTER — TRANSCRIBE ORDERS (OUTPATIENT)
Dept: PHYSICAL THERAPY | Facility: CLINIC | Age: 63
End: 2018-07-09

## 2018-07-09 ENCOUNTER — EVALUATION (OUTPATIENT)
Dept: PHYSICAL THERAPY | Facility: CLINIC | Age: 63
End: 2018-07-09
Payer: MEDICARE

## 2018-07-09 DIAGNOSIS — M54.42 ACUTE BILATERAL LOW BACK PAIN WITH BILATERAL SCIATICA: Primary | ICD-10-CM

## 2018-07-09 DIAGNOSIS — M54.5 LOW BACK PAIN, UNSPECIFIED BACK PAIN LATERALITY, UNSPECIFIED CHRONICITY, WITH SCIATICA PRESENCE UNSPECIFIED: Primary | ICD-10-CM

## 2018-07-09 DIAGNOSIS — G35 GENERALIZED MULTIPLE SCLEROSIS (HCC): ICD-10-CM

## 2018-07-09 DIAGNOSIS — G82.50 QUADRIPARESIS (HCC): ICD-10-CM

## 2018-07-09 DIAGNOSIS — M54.41 ACUTE BILATERAL LOW BACK PAIN WITH BILATERAL SCIATICA: Primary | ICD-10-CM

## 2018-07-09 DIAGNOSIS — M54.17 LUMBOSACRAL RADICULOPATHY: ICD-10-CM

## 2018-07-09 DIAGNOSIS — R25.2 SPASTICITY: ICD-10-CM

## 2018-07-09 PROCEDURE — 97110 THERAPEUTIC EXERCISES: CPT | Performed by: PHYSICAL THERAPIST

## 2018-07-09 PROCEDURE — 97112 NEUROMUSCULAR REEDUCATION: CPT | Performed by: PHYSICAL THERAPIST

## 2018-07-09 PROCEDURE — G8981 BODY POS CURRENT STATUS: HCPCS | Performed by: PHYSICAL THERAPIST

## 2018-07-09 PROCEDURE — G8982 BODY POS GOAL STATUS: HCPCS | Performed by: PHYSICAL THERAPIST

## 2018-07-09 NOTE — PROGRESS NOTES
PT RE-EVALUATION    Today's date: 2018  Patient name: Maciel Su  : 1955  MRN: [id number]  Referring provider: Maria E Briggs MD  Dx:   Encounter Diagnosis     ICD-10-CM    1  Low back pain, unspecified back pain laterality, unspecified chronicity, with sciatica presence unspecified M54 5    2  Lumbosacral radiculopathy M54 17    3  Spasticity R25 2    4  Quadriparesis ([facility] [address] ) G82 50    5  Generalized multiple sclerosis ([facility] [address] ) G35        Start Time: 300  Stop Time: 345  Total time in clinic (min): 45 minutes    Assessment  Impairments: abnormal gait, activity intolerance, impaired balance, impaired physical strength and poor posture     Assessment details: Patient is a 59 y o  Female seen for a re-evaluation for lumbar pain  Patient is making improvements towards her functional goals, including standing longer, walking further, and improving her hip strength  She continues to have pain with extension of lumbar spine, though this pain is rated as less than at initial evaluation  She continues to have decreased strength and decreased activity tolerance, though MS is also contributing to these impairments  Patient would benefit from continued skilled PT services 2 times a week for 4 more weeks to address remaining impairments, achieve goals, and to maximize function  Understanding of Dx/Px/POC: good   Prognosis: good    Goals  ST  Patient will improve strength in bilateral LE to 4/5, evidenced by the ability to transfer independently with rollator walker with minimal compensations in 2 weeks  - partially met  2  Pt will improve painfree AROM of lumbar spine by 25% in 2 weeks  - partially met  3  Pt will demonstrate improved TA activation during functional mobility without compensations or cues to stabilize the spine and decrease pain in 2 weeks  - met    LT  Pt will be able to walk > 10 minutes with <3/10 pain in 4 weeks to return to evening walks  - partially met  2   Pt will be independent in a comprehensive HEP in 4 weeks to maintain strength and function  - partially met    Plan  Patient would benefit from: skilled PT  Planned modality interventions: cryotherapy, high voltage pulsed current: pain management, high voltage pulsed current: spasm management, H-Wave, TENS, ultrasound and unattended electrical stimulation  Planned therapy interventions: therapeutic exercise, therapeutic activities, transfer training, neuromuscular re-education, manual therapy, joint mobilization, balance, gait training, home exercise program, abdominal trunk stabilization, balance/weight bearing training, body mechanics training, breathing training, flexibility, functional ROM exercises, graded exercise, massage, motor coordination training, muscle pump exercises, patient education, postural training, strengthening, stretching and therapeutic training  Frequency: 2x week  Duration in weeks: 4  Treatment plan discussed with: patient  Plan details: Patient to be seen 2 times a week for 4 more weeks to address remaining functional impairments, improve activity tolerance, and return to PLOF in order to maximize quality of life          Subjective Evaluation    Pain  No pain reported  Current pain ratin  At best pain ratin  At worst pain ratin  Location: Right lumbar spine  Quality: dull ache and tight  Relieving factors: change in position and medications  Aggravating factors: standing and walking  Progression: improved    Social Support  Steps to enter house: no  Stairs in house: yes   Lives in: multiple-level home  Lives with: spouse    Employment status: not working  Treatments  Previous treatment: physical therapy  Current treatment: physical therapy  Patient Goals  Patient goals for therapy: decreased pain, increased strength, independence with ADLs/IADLs, decreased edema and increased motion  Patient goal: Walk and stand longer with less pain    WORK:Patient does not work; out on [address]: Lives with  in 1 story home, ramp to enter, first floor set-up  HOBBIES/EXERCISE: Patient enjoys going on walks in cool weather in the evening  Update:  HISTORY OF CURRENT INJURY: Patient reports that lately her back has been stiff, which may be related to the heat and humidity  Since the beginning of therapy, patient reports "overall it has been better " Patient feels she is about 50% better, and feels the therapy is helping but she needs more to address the pain and limitation  Patient notes she can now stand 15-20 minutes, where at first she could only stand 5 minutes  She can also walk further as well  PAIN LOCATION/DESCRIPTORS: Pain is still on R side of lower back  AGGRAVATING FACTORS: No specific aggrivating factors, just good and bad days  EASES: Mariajuana medications, sitting, leaning forward  DAY PATTERN: None  IMAGING: MRI for low back - no new findings, MS lesions on spinal cord and brain  SPECIAL QUESTIONS:  Patient reports issues of incontinence of bowel and bladder  PATIENT GOALS: Patient would like to get back to using a cane to ambulate, and increase BLE strength  Objective     Palpation     Additional Palpation Details  No remaining tenderness to R piriformis, continues with trigger points and pain in R erector spinae and quadratus lumborum that respond well to manual therapy      Neurological Testing     Sensation     Lumbar   Left   Intact: light touch    Right   Diminished: light touch    Comments   Right light touch: lateral aspect of right foot    Reflexes   Left   Patellar (L4): brisk (3+)  Achilles (S1): brisk (3+)  Clonus sign: negative    Right   Patellar (L4): brisk (3+)  Achilles (S1): brisk (3+)  Clonus sign: negative    Additional Neurological Details  Negative for spasticity (taking medication)    Active Range of Motion     Additional Active Range of Motion Details  LS AROM:   Flexion: 90% pain relieved  Extension: 25% pain in R lower back increased  SideBending right: 50% pain in R lower back increased  SideBending left: 50%  Rotation right: 50%  Rotation left: 50%      Strength/Myotome Testing     Left Hip   Planes of Motion   Flexion: 3-  Abduction: 4-  Adduction: 4-  External rotation: 4    Right Hip   Planes of Motion   Flexion: 3-  Abduction: 4-  Adduction: 4-  External rotation: 4    Left Knee   Flexion: 4  Extension: 4    Right Knee   Flexion: 4-  Extension: 4    Left Ankle/Foot   Dorsiflexion: 4  Plantar flexion: 4    Right Ankle/Foot   Dorsiflexion: 4  Plantar flexion: 4    Additional Strength Details  Ambulation: Pt ambulates over level surface with rollator with increased toe out on RLE and decreased toe clearance bilaterally  Slow gait pattern  Improved upright posture during ambulation with rollator  Muscle Activation   Patient unable to activate left transverse abdominals and right transverse abdominals  Additional Muscle Activation Details  Patient demonstrates improved TA activation with the ability to breathe deeply and maintain contraction    Tests     Lumbar     Left   Negative crossed SLR and passive SLR  Right   Negative crossed SLR and passive SLR  Additional Tests Details  (-) slump test bilaterally  Palpation: tenderness on R lower lumbar spine, pelvic landmarks equal in sitting, bilateral incisions along lumbar spine healed well and not sensitive to touch    Posture: bilateral rounded shoulders, increased thoracic curvature,    Diagnosis: LBP, flexion preference   Precautions: MS, mod assist for sit <> supine, incontinence   Manual Therapy 7/2 7/5 7/9 6/21 6/28   Hamstring stretch, single knee to chest        STM R lower back 15 min R paraspinals lumbar and R piriformis 10 min piriformis R, paraspinals, PAs lumbar spine 10 min piriformis R, paraspinals, PAs lumbar spine                             Exercise Diary         Supine TA activation With stabilizer 5 sec holds with breathing x25 With stabilizer 5 sec holds with breathing x30 With deep breathing x 30 With stabilizer 5 sec holds with breathing x20 With stabilizer 5 sec holds with breathing x25   Supine TA with ball press        Supine TA with belt abd X 30 2 sec holds  2 min 2 sec holds with deep breathing  X 10 with PT stabilizing legs x30 2 sec holds           TA with marches        LTR 20 ea side  20 ea side 30 ea side 20 x ea side 20 ea side   Supine hip abd with band X 30 red TB X 30 green TB      Supine calf stretch 3x30 sec ea   2x30 ea    Sitting 3 way ball roll 4 min 4 min  4 min  4 min   LAQ to 30 degrees knee flexion    5# 4x10 - decrease NV for improved form 4# R, 5# L 3x10   HS curls    Green TB 3x10    Standing marches 2x10   2x10 at rollator    Piriformis stretch supine 3x 30 sec ea 3x 30 sec ea 3 x 30 sec 3x30 sec 3x30 sec ea   Glut sets supine 5 sec holds x 20 HEP  X 20 3 sec hold 2 sec holds x 30   Sit to stands  1 x 5, 1 x 3 2 x 5     Gait   1 lap 30 feet                                              Modalities

## 2018-07-09 NOTE — LETTER
2018    Taran Cristobal MD  [place] [id number] [facility] [id number]    Patient: Reid Crespo   YOB: 1955   Date of Visit: 2018     Encounter Diagnosis     ICD-10-CM    1  Low back pain, unspecified back pain laterality, unspecified chronicity, with sciatica presence unspecified M54 5    2  Lumbosacral radiculopathy M54 17    3  Spasticity R25 2    4  Quadriparesis ([facility] [address] ) G82 50    5  Generalized multiple sclerosis [facility]) G35        Dear Dr Jessika Blandon:    Please review the attached Plan of Care from Rima Mora's recent visit  Please verify that you agree therapy should continue by signing the attached document and sending it back to our office  If you have any questions or concerns, please don't hesitate to call  Sincerely,    Debbie Atkinson, PT      Referring Provider:      I certify that I have read the below Plan of Care and certify the need for these services furnished under this plan of treatment while under my care  Taran Cristobal MD  [address] [place] [address] [place] [address]: [phone]          PT RE-EVALUATION    Today's date: 2018  Patient name: Reid Crespo  : 1955  MRN: [id number]  Referring provider: Carolyn Hunter MD  Dx:   Encounter Diagnosis     ICD-10-CM    1  Low back pain, unspecified back pain laterality, unspecified chronicity, with sciatica presence unspecified M54 5    2  Lumbosacral radiculopathy M54 17    3  Spasticity R25 2    4  Quadriparesis ([facility] [address] ) G82 50    5  Generalized multiple sclerosis ([facility] [address] ) G35        Start Time: 0300  Stop Time: 0345  Total time in clinic (min): 45 minutes    Assessment  Impairments: abnormal gait, activity intolerance, impaired balance, impaired physical strength and poor posture     Assessment details: Patient is a 59 y o  Female seen for a re-evaluation for lumbar pain   Patient is making improvements towards her functional goals, including standing longer, walking further, and improving her hip strength  She continues to have pain with extension of lumbar spine, though this pain is rated as less than at initial evaluation  She continues to have decreased strength and decreased activity tolerance, though MS is also contributing to these impairments  Patient would benefit from continued skilled PT services 2 times a week for 4 more weeks to address remaining impairments, achieve goals, and to maximize function  Understanding of Dx/Px/POC: good   Prognosis: good    Goals  ST  Patient will improve strength in bilateral LE to 4/5, evidenced by the ability to transfer independently with rollator walker with minimal compensations in 2 weeks  - partially met  2  Pt will improve painfree AROM of lumbar spine by 25% in 2 weeks  - partially met  3  Pt will demonstrate improved TA activation during functional mobility without compensations or cues to stabilize the spine and decrease pain in 2 weeks  - met    LT  Pt will be able to walk > 10 minutes with <3/10 pain in 4 weeks to return to evening walks  - partially met  2  Pt will be independent in a comprehensive HEP in 4 weeks to maintain strength and function   - partially met    Plan  Patient would benefit from: skilled PT  Planned modality interventions: cryotherapy, high voltage pulsed current: pain management, high voltage pulsed current: spasm management, H-Wave, TENS, ultrasound and unattended electrical stimulation  Planned therapy interventions: therapeutic exercise, therapeutic activities, transfer training, neuromuscular re-education, manual therapy, joint mobilization, balance, gait training, home exercise program, abdominal trunk stabilization, balance/weight bearing training, body mechanics training, breathing training, flexibility, functional ROM exercises, graded exercise, massage, motor coordination training, muscle pump exercises, patient education, postural training, strengthening, stretching and therapeutic training  Frequency: 2x week  Duration in weeks: 4  Treatment plan discussed with: patient  Plan details: Patient to be seen 2 times a week for 4 more weeks to address remaining functional impairments, improve activity tolerance, and return to PLOF in order to maximize quality of life  Subjective Evaluation    Pain  No pain reported  Current pain ratin  At best pain ratin  At worst pain ratin  Location: Right lumbar spine  Quality: dull ache and tight  Relieving factors: change in position and medications  Aggravating factors: standing and walking  Progression: improved    Social Support  Steps to enter house: no  Stairs in house: yes   Lives in: multiple-level home  Lives with: spouse    Employment status: not working  Treatments  Previous treatment: physical therapy  Current treatment: physical therapy  Patient Goals  Patient goals for therapy: decreased pain, increased strength, independence with ADLs/IADLs, decreased edema and increased motion  Patient goal: Walk and stand longer with less pain    WORK:Patient does not work; out on disability  HOME LIFE: Lives with  in 1 story home, ramp to enter, first floor set-up  HOBBIES/EXERCISE: Patient enjoys going on walks in cool weather in the evening  Update:  HISTORY OF CURRENT INJURY: Patient reports that lately her back has been stiff, which may be related to the heat and humidity  Since the beginning of therapy, patient reports "overall it has been better " Patient feels she is about 50% better, and feels the therapy is helping but she needs more to address the pain and limitation  Patient notes she can now stand 15-20 minutes, where at first she could only stand 5 minutes  She can also walk further as well  PAIN LOCATION/DESCRIPTORS: Pain is still on R side of lower back    AGGRAVATING FACTORS: No specific aggrivating factors, just good and bad days  EASES: Mariajuana medications, sitting, leaning forward  DAY PATTERN: None  IMAGING: MRI for low back - no new findings, MS lesions on spinal cord and brain  SPECIAL QUESTIONS:  Patient reports issues of incontinence of bowel and bladder  PATIENT GOALS: Patient would like to get back to using a cane to ambulate, and increase BLE strength  Objective     Palpation     Additional Palpation Details  No remaining tenderness to R piriformis, continues with trigger points and pain in R erector spinae and quadratus lumborum that respond well to manual therapy  Neurological Testing     Sensation     Lumbar   Left   Intact: light touch    Right   Diminished: light touch    Comments   Right light touch: lateral aspect of right foot    Reflexes   Left   Patellar (L4): brisk (3+)  Achilles (S1): brisk (3+)  Clonus sign: negative    Right   Patellar (L4): brisk (3+)  Achilles (S1): brisk (3+)  Clonus sign: negative    Additional Neurological Details  Negative for spasticity (taking medication)    Active Range of Motion     Additional Active Range of Motion Details  LS AROM:   Flexion: 90% pain relieved  Extension: 25% pain in R lower back increased  SideBending right: 50% pain in R lower back increased  SideBending left: 50%  Rotation right: 50%  Rotation left: 50%      Strength/Myotome Testing     Left Hip   Planes of Motion   Flexion: 3-  Abduction: 4-  Adduction: 4-  External rotation: 4    Right Hip   Planes of Motion   Flexion: 3-  Abduction: 4-  Adduction: 4-  External rotation: 4    Left Knee   Flexion: 4  Extension: 4    Right Knee   Flexion: 4-  Extension: 4    Left Ankle/Foot   Dorsiflexion: 4  Plantar flexion: 4    Right Ankle/Foot   Dorsiflexion: 4  Plantar flexion: 4    Additional Strength Details  Ambulation: Pt ambulates over level surface with rollator with increased toe out on RLE and decreased toe clearance bilaterally  Slow gait pattern  Improved upright posture during ambulation with rollator      Muscle Activation   Patient unable to activate left transverse abdominals and right transverse abdominals  Additional Muscle Activation Details  Patient demonstrates improved TA activation with the ability to breathe deeply and maintain contraction    Tests     Lumbar     Left   Negative crossed SLR and passive SLR  Right   Negative crossed SLR and passive SLR  Additional Tests Details  (-) slump test bilaterally  Palpation: tenderness on R lower lumbar spine, pelvic landmarks equal in sitting, bilateral incisions along lumbar spine healed well and not sensitive to touch    Posture: bilateral rounded shoulders, increased thoracic curvature,    Diagnosis: LBP, flexion preference   Precautions: MS, mod assist for sit <> supine, incontinence   Manual Therapy 7/2 7/5 7/9 6/21 6/28   Hamstring stretch, single knee to chest        STM R lower back 15 min R paraspinals lumbar and R piriformis 10 min piriformis R, paraspinals, PAs lumbar spine 10 min piriformis R, paraspinals, PAs lumbar spine                             Exercise Diary         Supine TA activation With stabilizer 5 sec holds with breathing x25 With stabilizer 5 sec holds with breathing x30 With deep breathing x 30 With stabilizer 5 sec holds with breathing x20 With stabilizer 5 sec holds with breathing x25   Supine TA with ball press        Supine TA with belt abd X 30 2 sec holds  2 min 2 sec holds with deep breathing  X 10 with PT stabilizing legs x30 2 sec holds           TA with marches        LTR 20 ea side  20 ea side 30 ea side 20 x ea side 20 ea side   Supine hip abd with band X 30 red TB X 30 green TB      Supine calf stretch 3x30 sec ea   2x30 ea    Sitting 3 way ball roll 4 min 4 min  4 min  4 min   LAQ to 30 degrees knee flexion    5# 4x10 - decrease NV for improved form 4# R, 5# L 3x10   HS curls    Green TB 3x10    Standing marches 2x10   2x10 at rollator    Piriformis stretch supine 3x 30 sec ea 3x 30 sec ea 3 x 30 sec 3x30 sec 3x30 sec ea   Glut sets supine 5 sec holds x 20 HEP  X 20 3 sec hold 2 sec holds x 30 Sit to stands  1 x 5, 1 x 3 2 x 5     Gait   1 lap 30 feet                                              Modalities

## 2018-07-12 ENCOUNTER — OFFICE VISIT (OUTPATIENT)
Dept: PHYSICAL THERAPY | Facility: CLINIC | Age: 63
End: 2018-07-12
Payer: MEDICARE

## 2018-07-12 DIAGNOSIS — M54.17 LUMBOSACRAL RADICULOPATHY: ICD-10-CM

## 2018-07-12 DIAGNOSIS — R25.2 SPASTICITY: ICD-10-CM

## 2018-07-12 DIAGNOSIS — G35 GENERALIZED MULTIPLE SCLEROSIS (HCC): ICD-10-CM

## 2018-07-12 DIAGNOSIS — M54.5 LOW BACK PAIN, UNSPECIFIED BACK PAIN LATERALITY, UNSPECIFIED CHRONICITY, WITH SCIATICA PRESENCE UNSPECIFIED: Primary | ICD-10-CM

## 2018-07-12 DIAGNOSIS — G82.50 QUADRIPARESIS (HCC): ICD-10-CM

## 2018-07-12 PROCEDURE — 97112 NEUROMUSCULAR REEDUCATION: CPT | Performed by: PHYSICAL THERAPIST

## 2018-07-12 PROCEDURE — 97140 MANUAL THERAPY 1/> REGIONS: CPT | Performed by: PHYSICAL THERAPIST

## 2018-07-12 PROCEDURE — 97110 THERAPEUTIC EXERCISES: CPT | Performed by: PHYSICAL THERAPIST

## 2018-07-12 NOTE — PROGRESS NOTES
Daily Note     Today's date: 2018  Patient name: Camelia Bustillos  : 1955  MRN: [id number]  Referring provider: Nati Miller MD  Dx:   Encounter Diagnosis     ICD-10-CM    1  Low back pain, unspecified back pain laterality, unspecified chronicity, with sciatica presence unspecified M54 5    2  Lumbosacral radiculopathy M54 17    3  Spasticity R25 2    4  Quadriparesis ([facility] [address] ) G82 50    5  Generalized multiple sclerosis ([facility] [address] ) G35        Start Time: 1100  Stop Time: 1155  Total time in clinic (min): 55 minutes    Subjective: Patient reports her back has "not been too bad" but she slept on her L shoulder wrong and it hurts today         Objective: See treatment diary below    Diagnosis: LBP, flexion preference   Precautions: MS, mod assist for sit <> supine, incontinence   Manual Therapy    Hamstring stretch, single knee to chest        STM R lower back 15 min R paraspinals lumbar and R piriformis 10 min piriformis R, paraspinals, PAs lumbar spine 10 min piriformis R, paraspinals, PAs lumbar spine 10 min piriformis R, paraspinals, PAs lumbar spine                            Exercise Diary         Supine TA activation With stabilizer 5 sec holds with breathing x25 With stabilizer 5 sec holds with breathing x30 With deep breathing x 30 X 30 with manual cues With stabilizer 5 sec holds with breathing x25   Supine TA with ball press        Supine TA with belt abd X 30 2 sec holds  2 min 2 sec holds with deep breathing  X 30 2 sec holds x30 2 sec holds                   LTR 20 ea side  20 ea side  20 ea side 20 ea side   Supine hip abd with band X 30 red TB X 30 green TB  X 30 blue TB    Supine calf stretch 3x30 sec ea       Sitting 3 way ball roll 4 min 4 min  4 min 4 min   LAQ to 30 degrees knee flexion     4# R, 5# L 3x10   HS curls        Standing marches 2x10       Piriformis stretch supine 3x 30 sec ea 3x 30 sec ea 3 x 30 sec 3x30 sec ea 3x30 sec ea   Glut sets supine 5 sec holds x 20 HEP   2 sec holds x 30   Sit to stands  1 x 5, 1 x 3 2 x 5 2x5 - knee buckling    Gait   1 lap 30 feet  2 laps 50 feet                                            Modalities        Ice    10 min                        Assessment: Tolerated treatment well  Patient demonstrated fatigue post treatment, exhibited good technique with therapeutic exercises and would benefit from continued PT  Patient's low back pain is improving with therapy, and she tolerates STM to erector spinae and paraspinals and piriformis well  She continues to require manual cues for TA activation  Trailed ice after session with improved pain levels  TUG = 34 seconds    Plan: Continue per plan of care

## 2018-07-16 ENCOUNTER — OFFICE VISIT (OUTPATIENT)
Dept: PHYSICAL THERAPY | Facility: CLINIC | Age: 63
End: 2018-07-16
Payer: MEDICARE

## 2018-07-16 DIAGNOSIS — G35 GENERALIZED MULTIPLE SCLEROSIS (HCC): ICD-10-CM

## 2018-07-16 DIAGNOSIS — R25.2 SPASTICITY: ICD-10-CM

## 2018-07-16 DIAGNOSIS — M54.17 LUMBOSACRAL RADICULOPATHY: ICD-10-CM

## 2018-07-16 DIAGNOSIS — M54.5 LOW BACK PAIN, UNSPECIFIED BACK PAIN LATERALITY, UNSPECIFIED CHRONICITY, WITH SCIATICA PRESENCE UNSPECIFIED: Primary | ICD-10-CM

## 2018-07-16 DIAGNOSIS — G82.50 QUADRIPARESIS (HCC): ICD-10-CM

## 2018-07-16 PROCEDURE — 97112 NEUROMUSCULAR REEDUCATION: CPT | Performed by: PHYSICAL THERAPIST

## 2018-07-16 PROCEDURE — 97110 THERAPEUTIC EXERCISES: CPT | Performed by: PHYSICAL THERAPIST

## 2018-07-16 NOTE — PROGRESS NOTES
Daily Note     Today's date: 2018  Patient name: Reina Sahni  : 1955  MRN: [id number]  Referring provider: Riley Crane MD  Dx:   Encounter Diagnosis     ICD-10-CM    1  Low back pain, unspecified back pain laterality, unspecified chronicity, with sciatica presence unspecified M54 5    2  Lumbosacral radiculopathy M54 17    3  Spasticity R25 2    4  Quadriparesis ([facility] [address] ) G82 50    5  Generalized multiple sclerosis ([facility] [address] ) G35        Start Time: 1010  Stop Time: 1110  Total time in clinic (min): 60 minutes    Subjective: Patient states her back as "not been too bad!" And she does the stomach muscle exercises very often, and feels that her bladder control is also improving   Patient enjoys her exercises at home      Objective: See treatment diary below    Diagnosis: LBP, flexion preference   Precautions: MS, mod assist for sit <> supine, incontinence   Manual Therapy    Hamstring stretch, single knee to chest        STM R lower back 15 min R paraspinals lumbar and R piriformis 10 min piriformis R, paraspinals, PAs lumbar spine 10 min piriformis R, paraspinals, PAs lumbar spine 10 min piriformis R, paraspinals, PAs lumbar spine                            Exercise Diary         Supine TA activation With stabilizer 5 sec holds with breathing x25 With stabilizer 5 sec holds with breathing x30 With deep breathing x 30 X 30 with manual cues    Supine TA with ball press        Supine TA with belt abd X 30 2 sec holds  2 min 2 sec holds with deep breathing  X 30 2 sec holds                    LTR 20 ea side  20 ea side  20 ea side 20 ea side   Supine hip abd with band X 30 red TB X 30 green TB  X 30 blue TB X 30 blue TB   Supine calf stretch 3x30 sec ea       Sitting 3 way ball roll 4 min 4 min  4 min 4 min   LAQ to 30 degrees knee flexion     4# 2x30 ea   HS curls        Standing marches 2x10       Piriformis stretch supine 3x 30 sec ea 3x 30 sec ea 3 x 30 sec 3x30 sec ea    Glut sets supine 5 sec holds x 20 HEP      Sit to stands  1 x 5, 1 x 3 2 x 5 2x5 - knee buckling 2x4 - R knee buckle   Gait   1 lap 30 feet  2 laps 50 feet 1 lap 50 feet, cuing for foot clearance                           TUG    34 sec            Modalities        Ice    10 min 10 min                       Assessment: Tolerated treatment fair  Patient exhibited good technique with therapeutic exercises and would benefit from continued PT  PT reviewed insurance with patient, as [facility] covers only 20 visits and today is her 20th visit  Provided a calculation of cost per visit with Medicare covering 80%, and patient requested to drop down to once a week due to cost  PT agreeable, with education about the importance of HEP to maintain progress  Plan: Continue per plan of care  Patient's secondary insurance is stopping after today, and due to cost of care patient wishes to drop down to once a week for a few weeks

## 2018-07-19 ENCOUNTER — APPOINTMENT (OUTPATIENT)
Dept: PHYSICAL THERAPY | Facility: CLINIC | Age: 63
End: 2018-07-19
Payer: MEDICARE

## 2018-07-23 ENCOUNTER — APPOINTMENT (OUTPATIENT)
Dept: PHYSICAL THERAPY | Facility: CLINIC | Age: 63
End: 2018-07-23
Payer: MEDICARE

## 2018-07-25 ENCOUNTER — APPOINTMENT (OUTPATIENT)
Dept: PHYSICAL THERAPY | Facility: CLINIC | Age: 63
End: 2018-07-25
Payer: MEDICARE

## 2018-07-25 ENCOUNTER — OFFICE VISIT (OUTPATIENT)
Dept: PHYSICAL THERAPY | Facility: CLINIC | Age: 63
End: 2018-07-25
Payer: MEDICARE

## 2018-07-25 DIAGNOSIS — G35 GENERALIZED MULTIPLE SCLEROSIS (HCC): ICD-10-CM

## 2018-07-25 DIAGNOSIS — M54.17 LUMBOSACRAL RADICULOPATHY: ICD-10-CM

## 2018-07-25 DIAGNOSIS — M54.5 LOW BACK PAIN, UNSPECIFIED BACK PAIN LATERALITY, UNSPECIFIED CHRONICITY, WITH SCIATICA PRESENCE UNSPECIFIED: Primary | ICD-10-CM

## 2018-07-25 DIAGNOSIS — G82.50 QUADRIPARESIS (HCC): ICD-10-CM

## 2018-07-25 DIAGNOSIS — R25.2 SPASTICITY: ICD-10-CM

## 2018-07-25 PROCEDURE — 97140 MANUAL THERAPY 1/> REGIONS: CPT | Performed by: PHYSICAL THERAPIST

## 2018-07-25 PROCEDURE — 97110 THERAPEUTIC EXERCISES: CPT | Performed by: PHYSICAL THERAPIST

## 2018-07-25 PROCEDURE — 97112 NEUROMUSCULAR REEDUCATION: CPT | Performed by: PHYSICAL THERAPIST

## 2018-07-25 NOTE — PROGRESS NOTES
Daily Note     Today's date: 2018  Patient name: Argenis Chen  : 1955  MRN: [id number]  Referring provider: Candi Bullock MD  Dx:   Encounter Diagnosis     ICD-10-CM    1  Low back pain, unspecified back pain laterality, unspecified chronicity, with sciatica presence unspecified M54 5    2  Lumbosacral radiculopathy M54 17    3  Spasticity R25 2    4  Quadriparesis ([facility] [address] ) G82 50    5  Generalized multiple sclerosis ([facility] [address] ) G35        Start Time: 09  Stop Time: 3414  Total time in clinic (min): 55 minutes    Subjective: Patient is very sore today, and said "mornings are not good for me " Patient states she has had no change in the last week or so, with the same soreness         Objective: See treatment diary below  Diagnosis: LBP, flexion preference   Precautions: MS, mod assist for sit <> supine, incontinence   Manual Therapy    Hamstring stretch, single knee to chest        STM R lower back 15 min piriformis R, paraspinals, PAs lumbar spine 10 min piriformis R, paraspinals, PAs lumbar spine 10 min piriformis R, paraspinals, PAs lumbar spine 10 min piriformis R, paraspinals, PAs lumbar spine                            Exercise Diary         Supine TA activation  With stabilizer 5 sec holds with breathing x30 With deep breathing x 30 X 30 with manual cues    Supine TA with ball press        Supine TA with belt abd  2 min 2 sec holds with deep breathing  X 30 2 sec holds    Brdiges X 20       Seated marches X 20 AROM       LTR 20 ea side 20 ea side  20 ea side 20 ea side   Supine hip abd with band  X 30 green TB  X 30 blue TB X 30 blue TB   Supine calf stretch        Sitting 3 way ball roll 4 min 4 min  4 min 4 min   LAQ to 30 degrees knee flexion     4# 2x30 ea   HS curls        Standing marches        Piriformis stretch supine 3x30 sec 3x 30 sec ea 3 x 30 sec 3x30 sec ea    Glut sets supine  HEP      Sit to stands 2x5 with manual facilitation of quad 1 x 5, 1 x 3 2 x 5 2x5 - knee buckling 2x4 - R knee buckle   Gait  1 lap 50 feet cuing for R foot clearance 1 lap 30 feet  2 laps 50 feet 1 lap 50 feet, cuing for foot clearance                           TUG    34 sec            Modalities        Ice 10 min   10 min 10 min                       Assessment: Tolerated treatment fair and continues to require mod assist for transfers from sit to supine and supine to sit    Patient demonstrated fatigue post treatment and would benefit from continued PT  Patient has two treatment sessions left, and has not noted much improvement thus far with therapy once a week as she is not as diligent performing the HEP alone at home  Plan: Continue per plan of care  If progress is not made in two weeks at re-evaluation, plan to DC with HEP

## 2018-07-26 ENCOUNTER — APPOINTMENT (OUTPATIENT)
Dept: PHYSICAL THERAPY | Facility: CLINIC | Age: 63
End: 2018-07-26
Payer: MEDICARE

## 2018-07-30 ENCOUNTER — APPOINTMENT (OUTPATIENT)
Dept: PHYSICAL THERAPY | Facility: CLINIC | Age: 63
End: 2018-07-30
Payer: MEDICARE

## 2018-07-31 ENCOUNTER — OFFICE VISIT (OUTPATIENT)
Dept: PHYSICAL THERAPY | Facility: CLINIC | Age: 63
End: 2018-07-31
Payer: MEDICARE

## 2018-07-31 DIAGNOSIS — M54.17 LUMBOSACRAL RADICULOPATHY: ICD-10-CM

## 2018-07-31 DIAGNOSIS — R25.2 SPASTICITY: ICD-10-CM

## 2018-07-31 DIAGNOSIS — G35 GENERALIZED MULTIPLE SCLEROSIS (HCC): ICD-10-CM

## 2018-07-31 DIAGNOSIS — M54.5 LOW BACK PAIN, UNSPECIFIED BACK PAIN LATERALITY, UNSPECIFIED CHRONICITY, WITH SCIATICA PRESENCE UNSPECIFIED: Primary | ICD-10-CM

## 2018-07-31 DIAGNOSIS — G82.50 QUADRIPARESIS (HCC): ICD-10-CM

## 2018-07-31 PROCEDURE — 97112 NEUROMUSCULAR REEDUCATION: CPT | Performed by: PHYSICAL THERAPIST

## 2018-07-31 PROCEDURE — 97140 MANUAL THERAPY 1/> REGIONS: CPT | Performed by: PHYSICAL THERAPIST

## 2018-07-31 PROCEDURE — 97110 THERAPEUTIC EXERCISES: CPT | Performed by: PHYSICAL THERAPIST

## 2018-07-31 NOTE — PROGRESS NOTES
Daily Note     Today's date: 2018  Patient name: Yamel Fuller  : 1955  MRN: [id number]  Referring provider: Seven Lyon MD  Dx:   Encounter Diagnosis     ICD-10-CM    1  Low back pain, unspecified back pain laterality, unspecified chronicity, with sciatica presence unspecified M54 5    2  Lumbosacral radiculopathy M54 17    3  Spasticity R25 2    4  Quadriparesis ([facility] [address] ) G82 50    5  Generalized multiple sclerosis ([facility] [address] ) G35        Start Time: 1530  Stop Time: 161  Total time in clinic (min): 45 minutes    Subjective: Patient's low back pain has moved more caudal, as patient points to the mid R side as the location of discomfort today  She reports exercises are going well at home  Pain rated 6/10 when she stands, 0/10 in sitting  Pain at end of session 4/10         Objective: See treatment diary below    Diagnosis: LBP, flexion preference   Precautions: MS, mod assist for sit <> supine, incontinence   Manual Therapy    Hamstring stretch, single knee to chest        STM R lower back 15 min piriformis R, paraspinals, PAs lumbar spine 10 min STM mid and lower R paraspinals and erector spinae 10 min piriformis R, paraspinals, PAs lumbar spine 10 min piriformis R, paraspinals, PAs lumbar spine                            Exercise Diary         Supine TA activation  X 30 with deep breathing With deep breathing x 30 X 30 with manual cues    Supine TA with ball press        Supine TA with belt abd    X 30 2 sec holds    Brdiges X 20 X 20       Seated marches X 20 AROM X 20 AROM      LTR 20 ea side 20 ea side  20 ea side 20 ea side   Supine hip abd with band  X 30 blue  X 30 blue TB X 30 blue TB   Supine calf stretch        Sitting 3 way ball roll 4 min 4 min  4 min 4 min   LAQ to 30 degrees knee flexion     4# 2x30 ea   HS curls  Green TB 3x10 ea      Standing marches        Piriformis stretch supine 3x30 sec 3x30 sec 3 x 30 sec 3x30 sec ea    Glut sets supine        Sit to stands 2x5 with manual facilitation of quad X 10  2 x 5 2x5 - knee buckling 2x4 - R knee buckle   Gait  1 lap 50 feet cuing for R foot clearance 1 lap 50 feet improved foot clearance  2 laps 50 feet 1 lap 50 feet, cuing for foot clearance                           TUG    34 sec            Modalities        Ice 10 min   10 min 10 min                       Assessment: Tolerated treatment well  Patient exhibited good technique with therapeutic exercises, would benefit from continued PT and responded well to use of flexion exercises and STM of musculature in lower back  She continues to require assistance on and off the table, and has significant weakness in hip flexors most likely due to MS  She tolerated session well with decreased pain after session  Plan: Continue per plan of care  Progress note during next visit  Potential discharge next visit

## 2018-08-07 ENCOUNTER — EVALUATION (OUTPATIENT)
Dept: PHYSICAL THERAPY | Facility: CLINIC | Age: 63
End: 2018-08-07
Payer: MEDICARE

## 2018-08-07 DIAGNOSIS — R25.2 SPASTICITY: ICD-10-CM

## 2018-08-07 DIAGNOSIS — G82.50 QUADRIPARESIS (HCC): ICD-10-CM

## 2018-08-07 DIAGNOSIS — M54.5 LOW BACK PAIN, UNSPECIFIED BACK PAIN LATERALITY, UNSPECIFIED CHRONICITY, WITH SCIATICA PRESENCE UNSPECIFIED: Primary | ICD-10-CM

## 2018-08-07 DIAGNOSIS — M54.17 LUMBOSACRAL RADICULOPATHY: ICD-10-CM

## 2018-08-07 DIAGNOSIS — G35 GENERALIZED MULTIPLE SCLEROSIS (HCC): ICD-10-CM

## 2018-08-07 DIAGNOSIS — G62.9 ACQUIRED POLYNEUROPATHY: ICD-10-CM

## 2018-08-07 PROCEDURE — G8991 OTHER PT/OT GOAL STATUS: HCPCS | Performed by: PHYSICAL THERAPIST

## 2018-08-07 PROCEDURE — 97110 THERAPEUTIC EXERCISES: CPT | Performed by: PHYSICAL THERAPIST

## 2018-08-07 PROCEDURE — G8990 OTHER PT/OT CURRENT STATUS: HCPCS | Performed by: PHYSICAL THERAPIST

## 2018-08-07 PROCEDURE — G8992 OTHER PT/OT  D/C STATUS: HCPCS | Performed by: PHYSICAL THERAPIST

## 2018-08-07 PROCEDURE — 97112 NEUROMUSCULAR REEDUCATION: CPT | Performed by: PHYSICAL THERAPIST

## 2018-08-07 NOTE — PROGRESS NOTES
PT Discharge    Today's date: 2018  Patient name: Teresa Tellez  : 1955  MRN: [id number]  Referring provider: Rodrigo Mullen MD  Dx:   Encounter Diagnosis     ICD-10-CM    1  Low back pain, unspecified back pain laterality, unspecified chronicity, with sciatica presence unspecified M54 5    2  Lumbosacral radiculopathy M54 17    3  Spasticity R25 2    4  Quadriparesis ([facility] [address] ) G82 50    5  Generalized multiple sclerosis ([facility] [address] ) G35    6  Acquired polyneuropathy G62 9        Start Time:   Stop Time: 1130  Total time in clinic (min): 45 minutes    Assessment  Impairments: abnormal gait, activity intolerance, impaired balance, impaired physical strength and poor posture     Assessment details: Rima Norwood has been compliant with attending physical therapy and completing home exercise program since initial evaluation  Rima  has made improvements in objective data since initial evalulation and has made progress towards her goals  Patient has reached a functional plateau, with remainder of progress limited by MS and patient's poor response to the weather in the summer months  Patient provided with updated Home Exercise Program, all questions answered, and verbalized understanding, agreeing to plan of care  Thus it was mutually decided to discontinue this episode of care and transition to Home Exercise Program      Understanding of Dx/Px/POC: good   Prognosis: good    Goals  ST  Patient will improve strength in bilateral LE to 4/5, evidenced by the ability to transfer independently with rollator walker with minimal compensations in 2 weeks  - partially met  2  Pt will improve painfree AROM of lumbar spine by 25% in 2 weeks  - partially met  3  Pt will demonstrate improved TA activation during functional mobility without compensations or cues to stabilize the spine and decrease pain in 2 weeks  - met    LT   Pt will be able to walk > 10 minutes with <3/10 pain in 4 weeks to return to evening walks  - partially met  2  Pt will be independent in a comprehensive HEP in 4 weeks to maintain strength and function  - met    Plan  Planned therapy interventions: home exercise program  Treatment plan discussed with: patient        Subjective Evaluation    Pain  No pain reported  Current pain ratin  At best pain ratin  At worst pain ratin  Location: Right lumbar spine  Quality: dull ache and tight  Relieving factors: change in position and medications  Aggravating factors: standing and walking  Progression: improved    Social Support  Steps to enter house: no  Stairs in house: yes   Lives in: multiple-level home  Lives with: spouse    Employment status: not working  Treatments  Previous treatment: physical therapy  Current treatment: physical therapy  Patient Goals  Patient goals for therapy: decreased pain, increased strength, independence with ADLs/IADLs, decreased edema and increased motion  Patient goal: Walk and stand longer with less pain    WORK:Patient does not work; out on disability  HOME LIFE: Lives with  in 1 story home, ramp to enter, first floor set-up  HOBBIES/EXERCISE: Patient enjoys going on walks in cool weather in the evening    Update:  HISTORY OF CURRENT INJURY: Patient reports that overall her low back improved since beginning of therapy  She continues to have some pain and stiffness that is related to the weather  Patient reports that lately her back has been stiff, which may be related to the heat and humidity  Since the beginning of therapy, patient reports "overall it has been better " Patient feels she is about 50% better since evaluation, and feels the therapy has helped but the last 50% will not be achieved during the summer months  PAIN LOCATION/DESCRIPTORS: Pain is still on R side of lower back    AGGRAVATING FACTORS: No specific aggrivating factors, just good and bad days, and the weather  EASES: Mariajuana medications, sitting, leaning forward  DAY PATTERN: None  IMAGING: MRI for low back - no new findings, MS lesions on spinal cord and brain  SPECIAL QUESTIONS:  Patient reports issues of incontinence of bowel and bladder  PATIENT GOALS: Patient would like to get back to using a cane to ambulate, and increase BLE strength  - 50% improved  Objective     Palpation     Additional Palpation Details  No remaining tenderness to R piriformis, continues with trigger points and pain in R erector spinae and quadratus lumborum that respond well to manual therapy  Neurological Testing     Sensation     Lumbar   Left   Intact: light touch    Right   Diminished: light touch    Comments   Right light touch: lateral aspect of right foot    Reflexes   Left   Patellar (L4): brisk (3+)  Achilles (S1): brisk (3+)  Clonus sign: negative    Right   Patellar (L4): brisk (3+)  Achilles (S1): brisk (3+)  Clonus sign: negative    Additional Neurological Details  Negative for spasticity (taking medication)    Active Range of Motion     Additional Active Range of Motion Details  LS AROM:   Flexion: 90% pain relieved  Extension: 25% pain in R lower back increased  SideBending right: 50%  SideBending left: 50%  Rotation right: 50%  Rotation left: 50%      Strength/Myotome Testing     Left Hip   Planes of Motion   Flexion: 3-  Abduction: 4  Adduction: 4  External rotation: 4  Internal rotation: 4    Right Hip   Planes of Motion   Flexion: 3-  Abduction: 4  Adduction: 4  External rotation: 3+  Internal rotation: 3+    Left Knee   Flexion: 4  Extension: 4    Right Knee   Flexion: 4-  Extension: 4-    Left Ankle/Foot   Dorsiflexion: 4  Plantar flexion: 4    Right Ankle/Foot   Dorsiflexion: 4  Plantar flexion: 4    Additional Strength Details  Ambulation: Pt ambulates over level surface with rollator with increased toe out on RLE and decreased toe clearance bilaterally  Slow gait pattern   Improved upright posture during ambulation with rollator and improved toe clearance on R foot since evaluation  Muscle Activation   Patient unable to activate left transverse abdominals and right transverse abdominals  Additional Muscle Activation Details  Patient demonstrates improved TA activation with the ability to breathe deeply and maintain contraction    Tests     Lumbar     Left   Negative crossed SLR and passive SLR  Right   Negative crossed SLR and passive SLR  Additional Tests Details  (-) slump test bilaterally  Palpation: tenderness on R lower lumbar spine, pelvic landmarks equal in sitting, bilateral incisions along lumbar spine healed well and not sensitive to touch      Posture: bilateral rounded shoulders, increased thoracic curvature    Diagnosis: LBP, flexion preference   Precautions: MS, mod assist for sit <> supine, incontinence   Manual Therapy 7/25 7/31 8/7 7/12 7/16   Hamstring stretch, single knee to chest        STM R lower back 15 min piriformis R, paraspinals, PAs lumbar spine 10 min STM mid and lower R paraspinals and erector spinae  10 min piriformis R, paraspinals, PAs lumbar spine                            Exercise Diary         Supine TA activation  X 30 with deep breathing  X 30 with manual cues    Supine TA with ball press        Supine TA with belt abd    X 30 2 sec holds    Brdiges X 20 X 20       Seated marches X 20 AROM X 20 AROM      LTR 20 ea side 20 ea side  20 ea side 20 ea side   Supine hip abd with band  X 30 blue  X 30 blue TB X 30 blue TB   Supine calf stretch        Sitting 3 way ball roll 4 min 4 min 4 min 4 min 4 min   LAQ to 30 degrees knee flexion     4# 2x30 ea   HS curls  Green TB 3x10 ea      Standing marches        Piriformis stretch supine 3x30 sec 3x30 sec  3x30 sec ea    Glut sets supine        Sit to stands 2x5 with manual facilitation of quad X 10   2x5 - knee buckling 2x4 - R knee buckle   Gait  1 lap 50 feet cuing for R foot clearance 1 lap 50 feet improved foot clearance  2 laps 50 feet 1 lap 50 feet, cuing for foot clearance Reassessment, discharge, HEP   Perofrmed     TUG   Unable to get out of chair independently 34 sec            Modalities        Ice 10 min   10 min 10 min

## 2018-08-10 ENCOUNTER — APPOINTMENT (OUTPATIENT)
Dept: PHYSICAL THERAPY | Facility: CLINIC | Age: 63
End: 2018-08-10
Payer: MEDICARE

## 2018-08-29 ENCOUNTER — TRANSCRIBE ORDERS (OUTPATIENT)
Dept: PHYSICAL THERAPY | Facility: CLINIC | Age: 63
End: 2018-08-29

## 2018-12-24 ENCOUNTER — APPOINTMENT (OUTPATIENT)
Dept: LAB | Facility: CLINIC | Age: 63
End: 2018-12-24
Payer: MEDICARE

## 2018-12-24 ENCOUNTER — TRANSCRIBE ORDERS (OUTPATIENT)
Dept: LAB | Facility: CLINIC | Age: 63
End: 2018-12-24

## 2018-12-24 DIAGNOSIS — E55.9 AVITAMINOSIS D: ICD-10-CM

## 2018-12-24 DIAGNOSIS — E53.8 BIOTIN-(PROPIONYL-COA-CARBOXYLASE) LIGASE DEFICIENCY: ICD-10-CM

## 2018-12-24 DIAGNOSIS — Z15.89 DEFICIENCY OF DNA REPAIR: ICD-10-CM

## 2018-12-24 DIAGNOSIS — Z79.899 NEED FOR PROPHYLACTIC CHEMOTHERAPY: ICD-10-CM

## 2018-12-24 DIAGNOSIS — Z15.89 DEFICIENCY OF DNA REPAIR: Primary | ICD-10-CM

## 2018-12-24 LAB
25(OH)D3 SERPL-MCNC: 42.3 NG/ML (ref 30–100)
ALBUMIN SERPL BCP-MCNC: 3.8 G/DL (ref 3.5–5)
ALP SERPL-CCNC: 123 U/L (ref 46–116)
ALT SERPL W P-5'-P-CCNC: 27 U/L (ref 12–78)
ANION GAP SERPL CALCULATED.3IONS-SCNC: 4 MMOL/L (ref 4–13)
APTT PPP: 27 SECONDS (ref 26–38)
AST SERPL W P-5'-P-CCNC: 18 U/L (ref 5–45)
BILIRUB SERPL-MCNC: 0.41 MG/DL (ref 0.2–1)
BUN SERPL-MCNC: 15 MG/DL (ref 5–25)
CALCIUM SERPL-MCNC: 9.1 MG/DL (ref 8.3–10.1)
CHLORIDE SERPL-SCNC: 104 MMOL/L (ref 100–108)
CO2 SERPL-SCNC: 31 MMOL/L (ref 21–32)
CREAT SERPL-MCNC: 0.71 MG/DL (ref 0.6–1.3)
CRP SERPL QL: 5.3 MG/L
ERYTHROCYTE [SEDIMENTATION RATE] IN BLOOD: 17 MM/HOUR (ref 0–20)
GFR SERPL CREATININE-BSD FRML MDRD: 92 ML/MIN/1.73SQ M
GLUCOSE P FAST SERPL-MCNC: 65 MG/DL (ref 65–99)
INR PPP: 0.95 (ref 0.86–1.17)
POTASSIUM SERPL-SCNC: 4 MMOL/L (ref 3.5–5.3)
PROT SERPL-MCNC: 7.5 G/DL (ref 6.4–8.2)
PROTHROMBIN TIME: 12.8 SECONDS (ref 11.8–14.2)
SODIUM SERPL-SCNC: 139 MMOL/L (ref 136–145)
VIT B12 SERPL-MCNC: 1606 PG/ML (ref 100–900)

## 2018-12-24 PROCEDURE — 82607 VITAMIN B-12: CPT

## 2018-12-24 PROCEDURE — 86480 TB TEST CELL IMMUN MEASURE: CPT

## 2018-12-24 PROCEDURE — 80074 ACUTE HEPATITIS PANEL: CPT

## 2018-12-24 PROCEDURE — 86706 HEP B SURFACE ANTIBODY: CPT

## 2018-12-24 PROCEDURE — 85610 PROTHROMBIN TIME: CPT

## 2018-12-24 PROCEDURE — 82306 VITAMIN D 25 HYDROXY: CPT

## 2018-12-24 PROCEDURE — 86355 B CELLS TOTAL COUNT: CPT

## 2018-12-24 PROCEDURE — 80053 COMPREHEN METABOLIC PANEL: CPT

## 2018-12-24 PROCEDURE — 86359 T CELLS TOTAL COUNT: CPT

## 2018-12-24 PROCEDURE — 85652 RBC SED RATE AUTOMATED: CPT

## 2018-12-24 PROCEDURE — 86140 C-REACTIVE PROTEIN: CPT

## 2018-12-24 PROCEDURE — 36415 COLL VENOUS BLD VENIPUNCTURE: CPT

## 2018-12-24 PROCEDURE — 86360 T CELL ABSOLUTE COUNT/RATIO: CPT

## 2018-12-24 PROCEDURE — 85730 THROMBOPLASTIN TIME PARTIAL: CPT

## 2018-12-25 LAB
HAV IGM SER QL: NORMAL
HBV CORE IGM SER QL: NORMAL
HBV SURFACE AB SER-ACNC: <3.1 MIU/ML
HBV SURFACE AG SER QL: NORMAL
HCV AB SER QL: NORMAL

## 2018-12-26 LAB
GAMMA INTERFERON BACKGROUND BLD IA-ACNC: 0.03 IU/ML
M TB IFN-G BLD-IMP: NEGATIVE
M TB IFN-G CD4+ BCKGRND COR BLD-ACNC: -0.01 IU/ML
M TB IFN-G CD4+ BCKGRND COR BLD-ACNC: -0.01 IU/ML
MITOGEN IGNF BCKGRD COR BLD-ACNC: 4.96 IU/ML

## 2018-12-27 LAB
BASOPHILS # BLD AUTO: 0 X10E3/UL (ref 0–0.2)
BASOPHILS NFR BLD AUTO: 1 %
CD19 CELLS # BLD: 136 /UL (ref 12–645)
CD19 CELLS NFR BLD: 22.7 % (ref 3.3–25.4)
CD3 CELLS # BLD: 378 /UL (ref 622–2402)
CD3 CELLS NFR BLD: 63 % (ref 57.5–86.2)
CD3+CD4+ CELLS # BLD: 244 /UL (ref 359–1519)
CD3+CD4+ CELLS NFR BLD: 40.7 % (ref 30.8–58.5)
CD3+CD4+ CELLS/CD3+CD8+ CLL BLD: 1.36 % (ref 0.92–3.72)
CD3+CD8+ CELLS # BLD: 180 /UL (ref 109–897)
CD3+CD8+ CELLS NFR BLD: 30 % (ref 12–35.5)
EOSINOPHIL # BLD AUTO: 0.1 X10E3/UL (ref 0–0.4)
EOSINOPHIL NFR BLD AUTO: 2 %
ERYTHROCYTE [DISTWIDTH] IN BLOOD BY AUTOMATED COUNT: 13.4 % (ref 12.3–15.4)
HCT VFR BLD AUTO: 43.8 % (ref 34–46.6)
HGB BLD-MCNC: 14 G/DL (ref 11.1–15.9)
IMM GRANULOCYTES # BLD: 0 X10E3/UL (ref 0–0.1)
IMM GRANULOCYTES NFR BLD: 1 %
LYMPHOCYTES # BLD AUTO: 0.6 X10E3/UL (ref 0.7–3.1)
LYMPHOCYTES NFR BLD AUTO: 18 %
MCH RBC QN AUTO: 30.7 PG (ref 26.6–33)
MCHC RBC AUTO-ENTMCNC: 32 G/DL (ref 31.5–35.7)
MCV RBC AUTO: 96 FL (ref 79–97)
MONOCYTES # BLD AUTO: 0.6 X10E3/UL (ref 0.1–0.9)
MONOCYTES NFR BLD AUTO: 16 %
NEUTROPHILS # BLD AUTO: 2.3 X10E3/UL (ref 1.4–7)
NEUTROPHILS NFR BLD AUTO: 62 %
PLATELET # BLD AUTO: 420 X10E3/UL (ref 150–379)
RBC # BLD AUTO: 4.56 X10E6/UL (ref 3.77–5.28)
WBC # BLD AUTO: 3.6 X10E3/UL (ref 3.4–10.8)

## 2019-07-24 ENCOUNTER — TRANSCRIBE ORDERS (OUTPATIENT)
Dept: LAB | Facility: CLINIC | Age: 64
End: 2019-07-24

## 2019-07-24 ENCOUNTER — APPOINTMENT (OUTPATIENT)
Dept: LAB | Facility: CLINIC | Age: 64
End: 2019-07-24
Payer: MEDICARE

## 2019-07-24 DIAGNOSIS — E07.9 DISEASE OF THYROID GLAND: ICD-10-CM

## 2019-07-24 DIAGNOSIS — E53.8 BIOTIN-(PROPIONYL-COA-CARBOXYLASE) LIGASE DEFICIENCY: ICD-10-CM

## 2019-07-24 DIAGNOSIS — D68.9 BLOOD CLOTTING DISORDER (HCC): ICD-10-CM

## 2019-07-24 DIAGNOSIS — E55.9 AVITAMINOSIS D: ICD-10-CM

## 2019-07-24 DIAGNOSIS — Z15.89 DEFICIENCY OF DNA REPAIR: Primary | ICD-10-CM

## 2019-07-24 DIAGNOSIS — Z15.89 DEFICIENCY OF DNA REPAIR: ICD-10-CM

## 2019-07-24 LAB
25(OH)D3 SERPL-MCNC: 38 NG/ML (ref 30–100)
ALBUMIN SERPL BCP-MCNC: 3.9 G/DL (ref 3.5–5)
ALP SERPL-CCNC: 136 U/L (ref 46–116)
ALT SERPL W P-5'-P-CCNC: 26 U/L (ref 12–78)
ANION GAP SERPL CALCULATED.3IONS-SCNC: 6 MMOL/L (ref 4–13)
APTT PPP: 26 SECONDS (ref 23–37)
AST SERPL W P-5'-P-CCNC: 18 U/L (ref 5–45)
BASOPHILS # BLD AUTO: 0.05 THOUSANDS/ΜL (ref 0–0.1)
BASOPHILS NFR BLD AUTO: 1 % (ref 0–1)
BILIRUB SERPL-MCNC: 0.4 MG/DL (ref 0.2–1)
BUN SERPL-MCNC: 17 MG/DL (ref 5–25)
CALCIUM SERPL-MCNC: 8.8 MG/DL (ref 8.3–10.1)
CHLORIDE SERPL-SCNC: 108 MMOL/L (ref 100–108)
CO2 SERPL-SCNC: 28 MMOL/L (ref 21–32)
CREAT SERPL-MCNC: 0.69 MG/DL (ref 0.6–1.3)
CRP SERPL QL: <3 MG/L
EOSINOPHIL # BLD AUTO: 0.07 THOUSAND/ΜL (ref 0–0.61)
EOSINOPHIL NFR BLD AUTO: 2 % (ref 0–6)
ERYTHROCYTE [DISTWIDTH] IN BLOOD BY AUTOMATED COUNT: 13.2 % (ref 11.6–15.1)
ERYTHROCYTE [SEDIMENTATION RATE] IN BLOOD: 22 MM/HOUR (ref 0–20)
GFR SERPL CREATININE-BSD FRML MDRD: 93 ML/MIN/1.73SQ M
GLUCOSE SERPL-MCNC: 162 MG/DL (ref 65–140)
HCT VFR BLD AUTO: 40.4 % (ref 34.8–46.1)
HGB BLD-MCNC: 13.3 G/DL (ref 11.5–15.4)
IMM GRANULOCYTES # BLD AUTO: 0.01 THOUSAND/UL (ref 0–0.2)
IMM GRANULOCYTES NFR BLD AUTO: 0 % (ref 0–2)
INR PPP: 0.97 (ref 0.84–1.19)
LYMPHOCYTES # BLD AUTO: 0.8 THOUSANDS/ΜL (ref 0.6–4.47)
LYMPHOCYTES NFR BLD AUTO: 17 % (ref 14–44)
MCH RBC QN AUTO: 31.4 PG (ref 26.8–34.3)
MCHC RBC AUTO-ENTMCNC: 32.9 G/DL (ref 31.4–37.4)
MCV RBC AUTO: 96 FL (ref 82–98)
MONOCYTES # BLD AUTO: 0.28 THOUSAND/ΜL (ref 0.17–1.22)
MONOCYTES NFR BLD AUTO: 6 % (ref 4–12)
NEUTROPHILS # BLD AUTO: 3.44 THOUSANDS/ΜL (ref 1.85–7.62)
NEUTS SEG NFR BLD AUTO: 74 % (ref 43–75)
NRBC BLD AUTO-RTO: 0 /100 WBCS
PLATELET # BLD AUTO: 339 THOUSANDS/UL (ref 149–390)
PMV BLD AUTO: 10.8 FL (ref 8.9–12.7)
POTASSIUM SERPL-SCNC: 3.8 MMOL/L (ref 3.5–5.3)
PROT SERPL-MCNC: 7.4 G/DL (ref 6.4–8.2)
PROTHROMBIN TIME: 12.5 SECONDS (ref 11.6–14.5)
RBC # BLD AUTO: 4.23 MILLION/UL (ref 3.81–5.12)
SODIUM SERPL-SCNC: 142 MMOL/L (ref 136–145)
TSH SERPL DL<=0.05 MIU/L-ACNC: 2.16 UIU/ML (ref 0.36–3.74)
VIT B12 SERPL-MCNC: 1117 PG/ML (ref 100–900)
WBC # BLD AUTO: 4.65 THOUSAND/UL (ref 4.31–10.16)

## 2019-07-24 PROCEDURE — 85025 COMPLETE CBC W/AUTO DIFF WBC: CPT

## 2019-07-24 PROCEDURE — 36415 COLL VENOUS BLD VENIPUNCTURE: CPT

## 2019-07-24 PROCEDURE — 85652 RBC SED RATE AUTOMATED: CPT

## 2019-07-24 PROCEDURE — 85610 PROTHROMBIN TIME: CPT

## 2019-07-24 PROCEDURE — 82607 VITAMIN B-12: CPT

## 2019-07-24 PROCEDURE — 86140 C-REACTIVE PROTEIN: CPT

## 2019-07-24 PROCEDURE — 84443 ASSAY THYROID STIM HORMONE: CPT

## 2019-07-24 PROCEDURE — 85730 THROMBOPLASTIN TIME PARTIAL: CPT

## 2019-07-24 PROCEDURE — 83516 IMMUNOASSAY NONANTIBODY: CPT

## 2019-07-24 PROCEDURE — 82306 VITAMIN D 25 HYDROXY: CPT

## 2019-07-24 PROCEDURE — 80053 COMPREHEN METABOLIC PANEL: CPT

## 2019-07-25 LAB — PCA AB SER-ACNC: 1.4 UNITS (ref 0–20)

## 2019-10-05 ENCOUNTER — OFFICE VISIT (OUTPATIENT)
Dept: URGENT CARE | Facility: CLINIC | Age: 64
End: 2019-10-05
Payer: MEDICARE

## 2019-10-05 ENCOUNTER — APPOINTMENT (OUTPATIENT)
Dept: RADIOLOGY | Facility: CLINIC | Age: 64
End: 2019-10-05
Payer: MEDICARE

## 2019-10-05 VITALS
HEART RATE: 80 BPM | DIASTOLIC BLOOD PRESSURE: 79 MMHG | BODY MASS INDEX: 37.03 KG/M2 | WEIGHT: 160 LBS | HEIGHT: 55 IN | TEMPERATURE: 97.7 F | SYSTOLIC BLOOD PRESSURE: 135 MMHG | OXYGEN SATURATION: 99 % | RESPIRATION RATE: 20 BRPM

## 2019-10-05 DIAGNOSIS — L03.116 CELLULITIS OF LEFT LOWER EXTREMITY: ICD-10-CM

## 2019-10-05 DIAGNOSIS — M25.572 ACUTE LEFT ANKLE PAIN: ICD-10-CM

## 2019-10-05 DIAGNOSIS — M25.572 ACUTE LEFT ANKLE PAIN: Primary | ICD-10-CM

## 2019-10-05 PROCEDURE — 73610 X-RAY EXAM OF ANKLE: CPT

## 2019-10-05 PROCEDURE — 99213 OFFICE O/P EST LOW 20 MIN: CPT | Performed by: NURSE PRACTITIONER

## 2019-10-05 PROCEDURE — G0463 HOSPITAL OUTPT CLINIC VISIT: HCPCS | Performed by: NURSE PRACTITIONER

## 2019-10-05 RX ORDER — SULFAMETHOXAZOLE AND TRIMETHOPRIM 800; 160 MG/1; MG/1
1 TABLET ORAL EVERY 12 HOURS SCHEDULED
Qty: 20 TABLET | Refills: 0 | Status: SHIPPED | OUTPATIENT
Start: 2019-10-05 | End: 2019-10-15

## 2019-10-05 RX ORDER — NABUMETONE 500 MG/1
500 TABLET, FILM COATED ORAL 2 TIMES DAILY
COMMUNITY
End: 2021-06-20

## 2019-10-05 NOTE — PATIENT INSTRUCTIONS
Bactrim twice a day for 10 days   Take a probiotic or eat yogurt while taking antibiotics  Keep area clean and dry with soap and water  Cover with band aid   Keep foot/leg elevated  If symptoms including swelling, redness, or fever occur go to the ER for further treatment   Follow up with your PCP in 2-3 days     Cellulitis   WHAT YOU NEED TO KNOW:   Cellulitis is a skin infection caused by bacteria  Cellulitis may go away on its own or you may need treatment  Your healthcare provider may draw a [place] around the outside edges of your cellulitis  If your cellulitis spreads, your healthcare provider will see it outside of the [place]  DISCHARGE INSTRUCTIONS:   Call 911 if:   · You have sudden trouble breathing or chest pain  Return to the emergency department if:   · Your wound gets larger and more painful  · You feel a crackling under your skin when you touch it  · You have purple dots or bumps on your skin, or you see bleeding under your skin  · You have new swelling and pain in your legs  · The red, warm, swollen area gets larger  · You see red streaks coming from the infected area  Contact your healthcare provider if:   · You have a fever  · Your fever or pain does not go away or gets worse  · The area does not get smaller after 2 days of antibiotics  · Your skin is flaking or peeling off  · You have questions or concerns about your condition or care  Medicines:   · Antibiotics  help treat the bacterial infection  · NSAIDs , such as ibuprofen, help decrease swelling, pain, and fever  NSAIDs can cause stomach bleeding or kidney problems in certain people  If you take blood thinner medicine, always ask if NSAIDs are safe for you  Always read the medicine label and follow directions  Do not give these medicines to children under 10months of age without direction from your child's healthcare provider  · Acetaminophen  decreases pain and fever   It is available without a doctor's order  Ask how much to take and how often to take it  Follow directions  Read the labels of all other medicines you are using to see if they also contain acetaminophen, or ask your doctor or pharmacist  Acetaminophen can cause liver damage if not taken correctly  Do not use more than 4 grams (4,000 milligrams) total of acetaminophen in one day  · Take your medicine as directed  Contact your healthcare provider if you think your medicine is not helping or if you have side effects  Tell him or her if you are allergic to any medicine  Keep a list of the medicines, vitamins, and herbs you take  Include the amounts, and when and why you take them  Bring the list or the pill bottles to follow-up visits  Carry your medicine list with you in case of an emergency  Self-care:   · Elevate the area above the level of your heart  as often as you can  This will help decrease swelling and pain  Prop the area on pillows or blankets to keep it elevated comfortably  · Clean the area daily until the wound scabs over  Gently wash the area with soap and water  Pat dry  Use dressings as directed  · Place cool or warm, wet cloths on the area as directed  Use clean cloths and clean water  Leave it on the area until the cloth is room temperature  Pat the area dry with a clean, dry cloth  The cloths may help decrease pain  Prevent cellulitis:   · Do not scratch bug bites or areas of injury  You increase your risk for cellulitis by scratching these areas  · Do not share personal items, such as towels, clothing, and razors  · Clean exercise equipment  with germ-killing  before and after you use it  · Wash your hands often  Use soap and water  Wash your hands after you use the bathroom, change a child's diapers, or sneeze  Wash your hands before you prepare or eat food  Use lotion to prevent dry, cracked skin  · Wear pressure stockings as directed    You may be told to wear the stockings if you have peripheral edema  The stockings improve blood flow and decrease swelling  · Treat athlete's foot  This can help prevent the spread of a bacterial skin infection  Follow up with your healthcare provider within 3 days, or as directed: Your healthcare provider will check if your cellulitis is getting better  You may need different medicine  Write down your questions so you remember to ask them during your visits  © 2017 [facility] Information is for End User's use only and may not be sold, redistributed or otherwise used for commercial purposes  All illustrations and images included in CareNotes® are the copyrighted property of [employer]  or [employer]ang  The above information is an  only  It is not intended as medical advice for individual conditions or treatments  Talk to your doctor, nurse or pharmacist before following any medical regimen to see if it is safe and effective for you

## 2019-10-06 NOTE — PROGRESS NOTES
[facility]        NAME: Tessie Cooper is a 62 y o  female  : 1955    MRN: [id number]  DATE: 2019  TIME: 9:25 AM    Assessment and Plan   Acute left ankle pain [M25 572]  1  Acute left ankle pain  XR ankle 3+ vw left   2  Cellulitis of left lower extremity  sulfamethoxazole-trimethoprim (BACTRIM DS) 800-160 mg per tablet     I called and spoke with the pharmacist at [facility]   He confirms that patient had Keflex 500 mg q i d  X 10 days filled on  and doxycycline 100 mg b i d  Times 10 days filled on   Patient Instructions   Bactrim twice a day for 10 days   Take a probiotic or eat yogurt while taking antibiotics  Keep area clean and dry with soap and water  Cover with band aid   Keep foot/leg elevated  If symptoms including swelling, redness, or fever occur go to the ER for further treatment   Follow up with your PCP in 2-3 days       Follow up with PCP in 3-5 days  Proceed to  ER if symptoms worsen  Chief Complaint     Chief Complaint   Patient presents with    Wound Infection     left ankle x 3 weeks          History of Present Illness       Patient is a 61-year-old female with a significant past medical history of MS  Approximately 5 weeks ago she fell causing an abrasion and wound to her left lateral ankle  She states she has been on 2 different antibiotics but is unsure of what they are  She completed both courses of antibiotics  The wound overall improved but worsened within the last several days  She complains of swelling, erythema, and pain to the ankle  Denies fever or chills  She has been applying Neosporin and a Band-Aid  She does receive methotrexate injections every other month  Review of Systems   Review of Systems   Constitutional: Negative for activity change, chills and fever  Respiratory: Negative for cough  Musculoskeletal: Positive for gait problem and joint swelling  Skin: Positive for color change and wound     Neurological: Negative for numbness           Current Medications       Current Outpatient Medications:     Alfalfa 650 MG TABS, Take 1,300 mg by mouth 3 (three) times a day, Disp: , Rfl:     ALOE PO, Take 500 mg by mouth, Disp: , Rfl:     ascorbic acid (VITAMIN C) 250 mg tablet, Take 1,000 mg by mouth daily  , Disp: , Rfl:     aspirin (ECOTRIN LOW STRENGTH) 81 mg EC tablet, Take 81 mg by mouth daily, Disp: , Rfl:     BACLOFEN PO, Take 30 mg by mouth 3 (three) times a day, Disp: , Rfl:     cholecalciferol (VITAMIN D3) 1,000 units tablet, Take 2,000 Units by mouth daily  , Disp: , Rfl:     GOLDENSEAL PO, Take 325 mg by mouth daily, Disp: , Rfl:     multivitamin (THERAGRAN) TABS, Take 1 tablet by mouth daily, Disp: , Rfl:     nabumetone (RELAFEN) 500 mg tablet, Take 1,000 mg by mouth 2 (two) times a day, Disp: , Rfl:     nitrofurantoin (MACRODANTIN) 100 mg capsule, Take 100 mg by mouth daily, Disp: , Rfl:     Omega-3 Fatty Acids (FISH OIL PO), Take 300 mg by mouth daily, Disp: , Rfl:     TAMSULOSIN HCL PO, Take 0 8 mg by mouth daily  , Disp: , Rfl:     vitamin B-12 (CYANOCOBALAMIN) 250 MCG tablet, Take 250 mcg by mouth daily, Disp: , Rfl:     vitamin E, tocopherol, 1,000 units capsule, Take 400 Units by mouth daily  , Disp: , Rfl:     AMLODIPINE BESYLATE PO, Take 2 5 mg by mouth 2 (two) times a day, Disp: , Rfl:     fingolimod (GILENYA) 0 5 MG capsule, Take 0 5 mg by mouth daily, Disp: , Rfl:     nabumetone (RELAFEN) 500 mg tablet, Take 500 mg by mouth 2 (two) times a day, Disp: , Rfl:     sertraline (ZOLOFT) 50 mg tablet, Take 50 mg by mouth daily, Disp: , Rfl:     sulfamethoxazole-trimethoprim (BACTRIM DS) 800-160 mg per tablet, Take 1 tablet by mouth every 12 (twelve) hours for 10 days, Disp: 20 tablet, Rfl: 0    Current Allergies     Allergies as of 10/05/2019    (No Known Allergies)            The following portions of the patient's history were reviewed and updated as appropriate: allergies, current medications, past family history, past medical history, past social history, past surgical history and problem list      Past Medical History:   Diagnosis Date    Back pain 2016    Hypertension     Incontinence     MS (multiple sclerosis) ([address] )     Age 37    Neurogenic bladder     Spasticity        Past Surgical History:   Procedure Laterality Date    BACK SURGERY  2016    Lumbar fusion    ELBOW SURGERY Right 2008    ORIF       Family History   Problem Relation Age of Onset    Cancer Mother     Heart disease Father     Cancer Sister          Medications have been verified  Objective   /79   Pulse 80   Temp 97 7 °F (36 5 °C)   Resp 20   Ht 4' 7" (1 397 m)   Wt 72 6 kg (160 lb)   SpO2 99%   BMI 37 19 kg/m²      Left ankle xray: There is no acute fracture or dislocation    No significant degenerative changes    No radiographic evidence of osteomyelitis    Soft tissue swelling with ulceration seen overlying the lateral malleolus  Physical Exam     Physical Exam   Constitutional: She is oriented to person, place, and time  Vital signs are normal  She appears well-developed and well-nourished  She is active  No distress  Cardiovascular: Normal rate, regular rhythm, S1 normal, S2 normal and normal heart sounds  Pulses:       Dorsalis pedis pulses are 2+ on the left side  Posterior tibial pulses are 2+ on the left side  Pulmonary/Chest: Effort normal  No respiratory distress  Musculoskeletal:        Left ankle: She exhibits swelling  She exhibits normal range of motion  Feet:    Feet:   Left Foot:   Skin Integrity: Positive for ulcer (left lateral malleolus), erythema and warmth  Neurological: She is alert and oriented to person, place, and time  She is not disoriented  Skin: Skin is warm and dry

## 2020-06-08 ENCOUNTER — TRANSCRIBE ORDERS (OUTPATIENT)
Dept: LAB | Facility: CLINIC | Age: 65
End: 2020-06-08

## 2020-06-08 ENCOUNTER — APPOINTMENT (OUTPATIENT)
Dept: LAB | Facility: CLINIC | Age: 65
End: 2020-06-08
Payer: MEDICARE

## 2020-06-08 DIAGNOSIS — E53.8 BIOTIN-(PROPIONYL-COA-CARBOXYLASE) LIGASE DEFICIENCY: ICD-10-CM

## 2020-06-08 DIAGNOSIS — E55.9 AVITAMINOSIS D: ICD-10-CM

## 2020-06-08 DIAGNOSIS — D89.9 DISEASE OF IMMUNE SYSTEM (HCC): ICD-10-CM

## 2020-06-08 DIAGNOSIS — E55.9 AVITAMINOSIS D: Primary | ICD-10-CM

## 2020-06-08 LAB
25(OH)D3 SERPL-MCNC: 45.2 NG/ML (ref 30–100)
ALBUMIN SERPL BCP-MCNC: 3.9 G/DL (ref 3.5–5)
ALP SERPL-CCNC: 158 U/L (ref 46–116)
ALT SERPL W P-5'-P-CCNC: 33 U/L (ref 12–78)
ANION GAP SERPL CALCULATED.3IONS-SCNC: 6 MMOL/L (ref 4–13)
APTT PPP: 27 SECONDS (ref 23–37)
AST SERPL W P-5'-P-CCNC: 17 U/L (ref 5–45)
BASOPHILS # BLD AUTO: 0.06 THOUSANDS/ΜL (ref 0–0.1)
BASOPHILS NFR BLD AUTO: 1 % (ref 0–1)
BILIRUB SERPL-MCNC: 0.48 MG/DL (ref 0.2–1)
BUN SERPL-MCNC: 18 MG/DL (ref 5–25)
CALCIUM SERPL-MCNC: 9.1 MG/DL (ref 8.3–10.1)
CHLORIDE SERPL-SCNC: 105 MMOL/L (ref 100–108)
CO2 SERPL-SCNC: 31 MMOL/L (ref 21–32)
CREAT SERPL-MCNC: 0.64 MG/DL (ref 0.6–1.3)
EOSINOPHIL # BLD AUTO: 0.09 THOUSAND/ΜL (ref 0–0.61)
EOSINOPHIL NFR BLD AUTO: 2 % (ref 0–6)
ERYTHROCYTE [DISTWIDTH] IN BLOOD BY AUTOMATED COUNT: 14.1 % (ref 11.6–15.1)
GFR SERPL CREATININE-BSD FRML MDRD: 95 ML/MIN/1.73SQ M
GLUCOSE P FAST SERPL-MCNC: 89 MG/DL (ref 65–99)
HCT VFR BLD AUTO: 45.4 % (ref 34.8–46.1)
HGB BLD-MCNC: 14.2 G/DL (ref 11.5–15.4)
IMM GRANULOCYTES # BLD AUTO: 0.01 THOUSAND/UL (ref 0–0.2)
IMM GRANULOCYTES NFR BLD AUTO: 0 % (ref 0–2)
INR PPP: 0.93 (ref 0.84–1.19)
LYMPHOCYTES # BLD AUTO: 0.98 THOUSANDS/ΜL (ref 0.6–4.47)
LYMPHOCYTES NFR BLD AUTO: 22 % (ref 14–44)
MCH RBC QN AUTO: 29.8 PG (ref 26.8–34.3)
MCHC RBC AUTO-ENTMCNC: 31.3 G/DL (ref 31.4–37.4)
MCV RBC AUTO: 95 FL (ref 82–98)
MONOCYTES # BLD AUTO: 0.33 THOUSAND/ΜL (ref 0.17–1.22)
MONOCYTES NFR BLD AUTO: 7 % (ref 4–12)
NEUTROPHILS # BLD AUTO: 3.05 THOUSANDS/ΜL (ref 1.85–7.62)
NEUTS SEG NFR BLD AUTO: 68 % (ref 43–75)
NRBC BLD AUTO-RTO: 0 /100 WBCS
PLATELET # BLD AUTO: 311 THOUSANDS/UL (ref 149–390)
PMV BLD AUTO: 11.6 FL (ref 8.9–12.7)
POTASSIUM SERPL-SCNC: 3.9 MMOL/L (ref 3.5–5.3)
PROT SERPL-MCNC: 7.9 G/DL (ref 6.4–8.2)
PROTHROMBIN TIME: 12.1 SECONDS (ref 11.6–14.5)
RBC # BLD AUTO: 4.77 MILLION/UL (ref 3.81–5.12)
SODIUM SERPL-SCNC: 142 MMOL/L (ref 136–145)
VIT B12 SERPL-MCNC: 1653 PG/ML (ref 100–900)
WBC # BLD AUTO: 4.52 THOUSAND/UL (ref 4.31–10.16)

## 2020-06-08 PROCEDURE — 85730 THROMBOPLASTIN TIME PARTIAL: CPT

## 2020-06-08 PROCEDURE — 82607 VITAMIN B-12: CPT

## 2020-06-08 PROCEDURE — 80053 COMPREHEN METABOLIC PANEL: CPT

## 2020-06-08 PROCEDURE — 85025 COMPLETE CBC W/AUTO DIFF WBC: CPT

## 2020-06-08 PROCEDURE — 36415 COLL VENOUS BLD VENIPUNCTURE: CPT

## 2020-06-08 PROCEDURE — 82306 VITAMIN D 25 HYDROXY: CPT

## 2020-06-08 PROCEDURE — 85610 PROTHROMBIN TIME: CPT

## 2020-10-06 ENCOUNTER — TRANSCRIBE ORDERS (OUTPATIENT)
Dept: ADMINISTRATIVE | Facility: HOSPITAL | Age: 65
End: 2020-10-06

## 2020-10-06 DIAGNOSIS — D68.9 COAGULOPATHY (HCC): ICD-10-CM

## 2020-10-06 DIAGNOSIS — R00.0 TACHYCARDIA, UNSPECIFIED: Primary | ICD-10-CM

## 2020-10-06 DIAGNOSIS — R60.0 LOCALIZED EDEMA: ICD-10-CM

## 2020-10-06 DIAGNOSIS — E55.9 VITAMIN D DEFICIENCY: ICD-10-CM

## 2020-10-06 DIAGNOSIS — D89.9 DISEASE OF IMMUNE SYSTEM (HCC): ICD-10-CM

## 2020-10-06 DIAGNOSIS — E53.8 VITAMIN B12 DEFICIENCY: ICD-10-CM

## 2020-10-06 DIAGNOSIS — E78.2 MIXED HYPERLIPIDEMIA: ICD-10-CM

## 2020-10-07 ENCOUNTER — HOSPITAL ENCOUNTER (OUTPATIENT)
Dept: NON INVASIVE DIAGNOSTICS | Facility: CLINIC | Age: 65
Discharge: HOME | End: 2020-10-07
Payer: MEDICARE

## 2020-10-07 DIAGNOSIS — R60.0 LOCALIZED EDEMA: ICD-10-CM

## 2020-10-07 PROCEDURE — 93306 TTE W/DOPPLER COMPLETE: CPT

## 2020-10-07 PROCEDURE — 93306 TTE W/DOPPLER COMPLETE: CPT | Performed by: INTERNAL MEDICINE

## 2020-10-09 ENCOUNTER — LAB (OUTPATIENT)
Dept: LAB | Facility: CLINIC | Age: 65
End: 2020-10-09
Payer: MEDICARE

## 2020-10-09 DIAGNOSIS — D68.9 COAGULOPATHY (HCC): ICD-10-CM

## 2020-10-09 DIAGNOSIS — D89.9 DISEASE OF IMMUNE SYSTEM (HCC): ICD-10-CM

## 2020-10-09 DIAGNOSIS — E55.9 VITAMIN D DEFICIENCY: ICD-10-CM

## 2020-10-09 DIAGNOSIS — E53.8 VITAMIN B12 DEFICIENCY: ICD-10-CM

## 2020-10-09 DIAGNOSIS — R00.0 TACHYCARDIA, UNSPECIFIED: ICD-10-CM

## 2020-10-09 LAB
25(OH)D3 SERPL-MCNC: 39.3 NG/ML (ref 30–100)
ALBUMIN SERPL BCP-MCNC: 3.8 G/DL (ref 3.5–5)
ALP SERPL-CCNC: 152 U/L (ref 46–116)
ALT SERPL W P-5'-P-CCNC: 25 U/L (ref 12–78)
ANION GAP SERPL CALCULATED.3IONS-SCNC: 2 MMOL/L (ref 4–13)
APTT PPP: 29 SECONDS (ref 23–37)
AST SERPL W P-5'-P-CCNC: 12 U/L (ref 5–45)
BACTERIA UR QL AUTO: ABNORMAL /HPF
BASOPHILS # BLD AUTO: 0.05 THOUSANDS/ΜL (ref 0–0.1)
BASOPHILS NFR BLD AUTO: 1 % (ref 0–1)
BILIRUB SERPL-MCNC: 0.59 MG/DL (ref 0.2–1)
BILIRUB UR QL STRIP: NEGATIVE
BUN SERPL-MCNC: 18 MG/DL (ref 5–25)
CALCIUM SERPL-MCNC: 9.2 MG/DL (ref 8.3–10.1)
CHLORIDE SERPL-SCNC: 107 MMOL/L (ref 100–108)
CLARITY UR: ABNORMAL
CO2 SERPL-SCNC: 32 MMOL/L (ref 21–32)
COLOR UR: YELLOW
CREAT SERPL-MCNC: 0.61 MG/DL (ref 0.6–1.3)
D DIMER PPP FEU-MCNC: 0.34 UG/ML FEU
EOSINOPHIL # BLD AUTO: 0.04 THOUSAND/ΜL (ref 0–0.61)
EOSINOPHIL NFR BLD AUTO: 1 % (ref 0–6)
ERYTHROCYTE [DISTWIDTH] IN BLOOD BY AUTOMATED COUNT: 14 % (ref 11.6–15.1)
GFR SERPL CREATININE-BSD FRML MDRD: 96 ML/MIN/1.73SQ M
GLUCOSE SERPL-MCNC: 79 MG/DL (ref 65–140)
GLUCOSE UR STRIP-MCNC: NEGATIVE MG/DL
HCT VFR BLD AUTO: 40.9 % (ref 34.8–46.1)
HGB BLD-MCNC: 13 G/DL (ref 11.5–15.4)
HGB UR QL STRIP.AUTO: NEGATIVE
HYALINE CASTS #/AREA URNS LPF: ABNORMAL /LPF
IMM GRANULOCYTES # BLD AUTO: 0.01 THOUSAND/UL (ref 0–0.2)
IMM GRANULOCYTES NFR BLD AUTO: 0 % (ref 0–2)
INR PPP: 0.95 (ref 0.84–1.19)
KETONES UR STRIP-MCNC: ABNORMAL MG/DL
LEUKOCYTE ESTERASE UR QL STRIP: ABNORMAL
LYMPHOCYTES # BLD AUTO: 0.64 THOUSANDS/ΜL (ref 0.6–4.47)
LYMPHOCYTES NFR BLD AUTO: 13 % (ref 14–44)
MCH RBC QN AUTO: 30.7 PG (ref 26.8–34.3)
MCHC RBC AUTO-ENTMCNC: 31.8 G/DL (ref 31.4–37.4)
MCV RBC AUTO: 97 FL (ref 82–98)
MONOCYTES # BLD AUTO: 0.34 THOUSAND/ΜL (ref 0.17–1.22)
MONOCYTES NFR BLD AUTO: 7 % (ref 4–12)
NEUTROPHILS # BLD AUTO: 3.82 THOUSANDS/ΜL (ref 1.85–7.62)
NEUTS SEG NFR BLD AUTO: 78 % (ref 43–75)
NITRITE UR QL STRIP: POSITIVE
NON-SQ EPI CELLS URNS QL MICRO: ABNORMAL /HPF
NRBC BLD AUTO-RTO: 0 /100 WBCS
NT-PROBNP SERPL-MCNC: 113 PG/ML
PH UR STRIP.AUTO: 6.5 [PH]
PLATELET # BLD AUTO: 331 THOUSANDS/UL (ref 149–390)
PMV BLD AUTO: 11.2 FL (ref 8.9–12.7)
POTASSIUM SERPL-SCNC: 4.1 MMOL/L (ref 3.5–5.3)
PROT SERPL-MCNC: 7.6 G/DL (ref 6.4–8.2)
PROT UR STRIP-MCNC: NEGATIVE MG/DL
PROTHROMBIN TIME: 12.7 SECONDS (ref 11.6–14.5)
RBC # BLD AUTO: 4.23 MILLION/UL (ref 3.81–5.12)
RBC #/AREA URNS AUTO: ABNORMAL /HPF
SODIUM SERPL-SCNC: 141 MMOL/L (ref 136–145)
SP GR UR STRIP.AUTO: 1.02 (ref 1–1.03)
TSH SERPL DL<=0.05 MIU/L-ACNC: 1.96 UIU/ML (ref 0.36–3.74)
UROBILINOGEN UR QL STRIP.AUTO: 0.2 E.U./DL
VIT B12 SERPL-MCNC: 1285 PG/ML (ref 100–900)
WBC # BLD AUTO: 4.9 THOUSAND/UL (ref 4.31–10.16)
WBC #/AREA URNS AUTO: ABNORMAL /HPF

## 2020-10-09 PROCEDURE — 81001 URINALYSIS AUTO W/SCOPE: CPT | Performed by: INTERNAL MEDICINE

## 2020-10-09 PROCEDURE — 36415 COLL VENOUS BLD VENIPUNCTURE: CPT

## 2020-10-09 PROCEDURE — 85610 PROTHROMBIN TIME: CPT

## 2020-10-09 PROCEDURE — 85379 FIBRIN DEGRADATION QUANT: CPT

## 2020-10-09 PROCEDURE — 80053 COMPREHEN METABOLIC PANEL: CPT

## 2020-10-09 PROCEDURE — 82607 VITAMIN B-12: CPT

## 2020-10-09 PROCEDURE — 85025 COMPLETE CBC W/AUTO DIFF WBC: CPT

## 2020-10-09 PROCEDURE — 84443 ASSAY THYROID STIM HORMONE: CPT

## 2020-10-09 PROCEDURE — 83880 ASSAY OF NATRIURETIC PEPTIDE: CPT

## 2020-10-09 PROCEDURE — 82306 VITAMIN D 25 HYDROXY: CPT

## 2020-10-09 PROCEDURE — 85730 THROMBOPLASTIN TIME PARTIAL: CPT

## 2020-10-22 ENCOUNTER — LAB (OUTPATIENT)
Dept: LAB | Facility: CLINIC | Age: 65
End: 2020-10-22
Payer: MEDICARE

## 2020-10-22 DIAGNOSIS — E78.2 MIXED HYPERLIPIDEMIA: ICD-10-CM

## 2020-10-22 LAB
CHOLEST SERPL-MCNC: 231 MG/DL (ref 50–200)
CK MB SERPL-MCNC: 2.4 % (ref 0–2.5)
CK MB SERPL-MCNC: 3.6 NG/ML (ref 0–5)
CK SERPL-CCNC: 153 U/L (ref 26–192)
HDLC SERPL-MCNC: 73 MG/DL
LDLC SERPL CALC-MCNC: 116 MG/DL (ref 0–100)
NONHDLC SERPL-MCNC: 158 MG/DL
TRIGL SERPL-MCNC: 209 MG/DL

## 2020-10-22 PROCEDURE — 82553 CREATINE MB FRACTION: CPT

## 2020-10-22 PROCEDURE — 82550 ASSAY OF CK (CPK): CPT

## 2020-10-22 PROCEDURE — 36415 COLL VENOUS BLD VENIPUNCTURE: CPT

## 2020-10-22 PROCEDURE — 80061 LIPID PANEL: CPT

## 2020-12-20 ENCOUNTER — NURSE TRIAGE (OUTPATIENT)
Dept: OTHER | Facility: OTHER | Age: 65
End: 2020-12-20

## 2020-12-20 DIAGNOSIS — Z11.59 SPECIAL SCREENING EXAMINATION FOR UNSPECIFIED VIRAL DISEASE: Primary | ICD-10-CM

## 2020-12-20 DIAGNOSIS — Z11.59 SPECIAL SCREENING EXAMINATION FOR UNSPECIFIED VIRAL DISEASE: ICD-10-CM

## 2020-12-20 PROCEDURE — U0003 INFECTIOUS AGENT DETECTION BY NUCLEIC ACID (DNA OR RNA); SEVERE ACUTE RESPIRATORY SYNDROME CORONAVIRUS 2 (SARS-COV-2) (CORONAVIRUS DISEASE [COVID-19]), AMPLIFIED PROBE TECHNIQUE, MAKING USE OF HIGH THROUGHPUT TECHNOLOGIES AS DESCRIBED BY CMS-2020-01-R: HCPCS | Performed by: FAMILY MEDICINE

## 2020-12-22 LAB — SARS-COV-2 RNA SPEC QL NAA+PROBE: NOT DETECTED

## 2021-02-09 ENCOUNTER — TRANSCRIBE ORDERS (OUTPATIENT)
Dept: LAB | Facility: CLINIC | Age: 66
End: 2021-02-09

## 2021-02-09 ENCOUNTER — LAB (OUTPATIENT)
Dept: LAB | Facility: CLINIC | Age: 66
End: 2021-02-09
Payer: MEDICARE

## 2021-02-09 DIAGNOSIS — D84.9 IMMUNOSUPPRESSION (HCC): ICD-10-CM

## 2021-02-09 DIAGNOSIS — R60.0 LEG EDEMA: ICD-10-CM

## 2021-02-09 DIAGNOSIS — I10 ESSENTIAL HYPERTENSION: ICD-10-CM

## 2021-02-09 DIAGNOSIS — D68.9 COAGULOPATHY (HCC): ICD-10-CM

## 2021-02-09 DIAGNOSIS — G35 MS (MULTIPLE SCLEROSIS) (HCC): Primary | ICD-10-CM

## 2021-02-09 DIAGNOSIS — G35 MS (MULTIPLE SCLEROSIS) (HCC): ICD-10-CM

## 2021-02-09 LAB
ALBUMIN SERPL BCP-MCNC: 3.8 G/DL (ref 3.5–5)
ALP SERPL-CCNC: 169 U/L (ref 46–116)
ALT SERPL W P-5'-P-CCNC: 27 U/L (ref 12–78)
ANION GAP SERPL CALCULATED.3IONS-SCNC: 2 MMOL/L (ref 4–13)
APTT PPP: 30 SECONDS (ref 23–37)
AST SERPL W P-5'-P-CCNC: 12 U/L (ref 5–45)
BASOPHILS # BLD AUTO: 0.06 THOUSANDS/ΜL (ref 0–0.1)
BASOPHILS NFR BLD AUTO: 2 % (ref 0–1)
BILIRUB SERPL-MCNC: 0.56 MG/DL (ref 0.2–1)
BUN SERPL-MCNC: 26 MG/DL (ref 5–25)
CALCIUM SERPL-MCNC: 9.5 MG/DL (ref 8.3–10.1)
CHLORIDE SERPL-SCNC: 108 MMOL/L (ref 100–108)
CO2 SERPL-SCNC: 34 MMOL/L (ref 21–32)
CREAT SERPL-MCNC: 0.75 MG/DL (ref 0.6–1.3)
EOSINOPHIL # BLD AUTO: 0.09 THOUSAND/ΜL (ref 0–0.61)
EOSINOPHIL NFR BLD AUTO: 2 % (ref 0–6)
ERYTHROCYTE [DISTWIDTH] IN BLOOD BY AUTOMATED COUNT: 13.5 % (ref 11.6–15.1)
GFR SERPL CREATININE-BSD FRML MDRD: 85 ML/MIN/1.73SQ M
GLUCOSE P FAST SERPL-MCNC: 102 MG/DL (ref 65–99)
HCT VFR BLD AUTO: 43.1 % (ref 34.8–46.1)
HGB BLD-MCNC: 13.6 G/DL (ref 11.5–15.4)
IMM GRANULOCYTES # BLD AUTO: 0.01 THOUSAND/UL (ref 0–0.2)
IMM GRANULOCYTES NFR BLD AUTO: 0 % (ref 0–2)
INR PPP: 0.97 (ref 0.84–1.19)
LYMPHOCYTES # BLD AUTO: 0.73 THOUSANDS/ΜL (ref 0.6–4.47)
LYMPHOCYTES NFR BLD AUTO: 18 % (ref 14–44)
MAGNESIUM SERPL-MCNC: 2 MG/DL (ref 1.6–2.6)
MCH RBC QN AUTO: 31.4 PG (ref 26.8–34.3)
MCHC RBC AUTO-ENTMCNC: 31.6 G/DL (ref 31.4–37.4)
MCV RBC AUTO: 100 FL (ref 82–98)
MONOCYTES # BLD AUTO: 0.29 THOUSAND/ΜL (ref 0.17–1.22)
MONOCYTES NFR BLD AUTO: 7 % (ref 4–12)
NEUTROPHILS # BLD AUTO: 2.92 THOUSANDS/ΜL (ref 1.85–7.62)
NEUTS SEG NFR BLD AUTO: 71 % (ref 43–75)
NRBC BLD AUTO-RTO: 0 /100 WBCS
PLATELET # BLD AUTO: 284 THOUSANDS/UL (ref 149–390)
PMV BLD AUTO: 11 FL (ref 8.9–12.7)
POTASSIUM SERPL-SCNC: 4 MMOL/L (ref 3.5–5.3)
PROT SERPL-MCNC: 7.7 G/DL (ref 6.4–8.2)
PROTHROMBIN TIME: 12.9 SECONDS (ref 11.6–14.5)
RBC # BLD AUTO: 4.33 MILLION/UL (ref 3.81–5.12)
SODIUM SERPL-SCNC: 144 MMOL/L (ref 136–145)
WBC # BLD AUTO: 4.1 THOUSAND/UL (ref 4.31–10.16)

## 2021-02-09 PROCEDURE — 80053 COMPREHEN METABOLIC PANEL: CPT

## 2021-02-09 PROCEDURE — 85610 PROTHROMBIN TIME: CPT

## 2021-02-09 PROCEDURE — 36415 COLL VENOUS BLD VENIPUNCTURE: CPT

## 2021-02-09 PROCEDURE — 85730 THROMBOPLASTIN TIME PARTIAL: CPT

## 2021-02-09 PROCEDURE — 83735 ASSAY OF MAGNESIUM: CPT

## 2021-02-09 PROCEDURE — 85025 COMPLETE CBC W/AUTO DIFF WBC: CPT

## 2021-03-10 DIAGNOSIS — Z23 ENCOUNTER FOR IMMUNIZATION: ICD-10-CM

## 2021-03-26 ENCOUNTER — APPOINTMENT (OUTPATIENT)
Dept: LAB | Facility: CLINIC | Age: 66
End: 2021-03-26
Payer: MEDICARE

## 2021-03-26 ENCOUNTER — TRANSCRIBE ORDERS (OUTPATIENT)
Dept: LAB | Facility: CLINIC | Age: 66
End: 2021-03-26

## 2021-03-26 DIAGNOSIS — R39.9 UTI SYMPTOMS: ICD-10-CM

## 2021-03-26 DIAGNOSIS — Z79.899 ENCOUNTER FOR LONG-TERM (CURRENT) USE OF HIGH-RISK MEDICATION: Primary | ICD-10-CM

## 2021-03-26 DIAGNOSIS — R26.89 IMBALANCE: ICD-10-CM

## 2021-03-26 DIAGNOSIS — N31.9 NEUROGENIC BLADDER: ICD-10-CM

## 2021-03-26 DIAGNOSIS — Z79.899 ENCOUNTER FOR LONG-TERM (CURRENT) USE OF HIGH-RISK MEDICATION: ICD-10-CM

## 2021-03-26 LAB
BACTERIA UR QL AUTO: ABNORMAL /HPF
BILIRUB UR QL STRIP: NEGATIVE
CLARITY UR: ABNORMAL
COLOR UR: YELLOW
GLUCOSE UR STRIP-MCNC: NEGATIVE MG/DL
HGB UR QL STRIP.AUTO: ABNORMAL
HYALINE CASTS #/AREA URNS LPF: ABNORMAL /LPF
KETONES UR STRIP-MCNC: NEGATIVE MG/DL
LEUKOCYTE ESTERASE UR QL STRIP: ABNORMAL
NITRITE UR QL STRIP: POSITIVE
NON-SQ EPI CELLS URNS QL MICRO: ABNORMAL /HPF
PH UR STRIP.AUTO: 6 [PH]
PROT UR STRIP-MCNC: ABNORMAL MG/DL
RBC #/AREA URNS AUTO: ABNORMAL /HPF
SP GR UR STRIP.AUTO: 1.02 (ref 1–1.03)
UROBILINOGEN UR QL STRIP.AUTO: 0.2 E.U./DL
WBC #/AREA URNS AUTO: ABNORMAL /HPF

## 2021-03-26 PROCEDURE — 87086 URINE CULTURE/COLONY COUNT: CPT

## 2021-03-26 PROCEDURE — 87186 SC STD MICRODIL/AGAR DIL: CPT

## 2021-03-26 PROCEDURE — 87077 CULTURE AEROBIC IDENTIFY: CPT

## 2021-03-26 PROCEDURE — 81001 URINALYSIS AUTO W/SCOPE: CPT

## 2021-03-28 LAB
BACTERIA UR CULT: ABNORMAL
BACTERIA UR CULT: ABNORMAL

## 2021-05-05 ENCOUNTER — TRANSCRIBE ORDERS (OUTPATIENT)
Dept: LAB | Facility: CLINIC | Age: 66
End: 2021-05-05

## 2021-05-05 DIAGNOSIS — N39.0 URINARY TRACT INFECTION WITHOUT HEMATURIA, SITE UNSPECIFIED: Primary | ICD-10-CM

## 2021-05-05 DIAGNOSIS — N31.9 NEUROGENIC BLADDER: ICD-10-CM

## 2021-05-05 DIAGNOSIS — Z79.899 HIGH RISK MEDICATIONS (NOT ANTICOAGULANTS) LONG-TERM USE: ICD-10-CM

## 2021-05-05 DIAGNOSIS — R26.9 GAIT DIFFICULTY: ICD-10-CM

## 2021-05-05 DIAGNOSIS — R53.1 WEAKNESS: ICD-10-CM

## 2021-05-05 DIAGNOSIS — R26.89 IMBALANCE: ICD-10-CM

## 2021-05-05 DIAGNOSIS — D84.9 IMMUNOSUPPRESSION (HCC): ICD-10-CM

## 2021-05-05 DIAGNOSIS — G35 MS (MULTIPLE SCLEROSIS) (HCC): ICD-10-CM

## 2021-05-06 ENCOUNTER — APPOINTMENT (OUTPATIENT)
Dept: LAB | Facility: CLINIC | Age: 66
End: 2021-05-06
Payer: MEDICARE

## 2021-05-06 DIAGNOSIS — N39.0 URINARY TRACT INFECTION WITHOUT HEMATURIA, SITE UNSPECIFIED: ICD-10-CM

## 2021-05-06 LAB
BACTERIA UR QL AUTO: ABNORMAL /HPF
BILIRUB UR QL STRIP: NEGATIVE
CLARITY UR: ABNORMAL
COLOR UR: ABNORMAL
GLUCOSE UR STRIP-MCNC: NEGATIVE MG/DL
HGB UR QL STRIP.AUTO: NEGATIVE
KETONES UR STRIP-MCNC: NEGATIVE MG/DL
LEUKOCYTE ESTERASE UR QL STRIP: ABNORMAL
NITRITE UR QL STRIP: POSITIVE
NON-SQ EPI CELLS URNS QL MICRO: ABNORMAL /HPF
PH UR STRIP.AUTO: 7 [PH]
PROT UR STRIP-MCNC: ABNORMAL MG/DL
RBC #/AREA URNS AUTO: ABNORMAL /HPF
SP GR UR STRIP.AUTO: 1.02 (ref 1–1.03)
UROBILINOGEN UR QL STRIP.AUTO: 0.2 E.U./DL
WBC #/AREA URNS AUTO: ABNORMAL /HPF

## 2021-05-06 PROCEDURE — 87186 SC STD MICRODIL/AGAR DIL: CPT

## 2021-05-06 PROCEDURE — 81001 URINALYSIS AUTO W/SCOPE: CPT

## 2021-05-06 PROCEDURE — 87086 URINE CULTURE/COLONY COUNT: CPT

## 2021-05-06 PROCEDURE — 87077 CULTURE AEROBIC IDENTIFY: CPT

## 2021-05-08 LAB — BACTERIA UR CULT: ABNORMAL

## 2021-05-25 ENCOUNTER — APPOINTMENT (OUTPATIENT)
Dept: LAB | Facility: CLINIC | Age: 66
End: 2021-05-25
Payer: MEDICARE

## 2021-05-25 DIAGNOSIS — G35 MS (MULTIPLE SCLEROSIS) (HCC): ICD-10-CM

## 2021-05-25 DIAGNOSIS — N31.9 NEUROGENIC BLADDER: ICD-10-CM

## 2021-05-25 DIAGNOSIS — R26.9 GAIT DIFFICULTY: ICD-10-CM

## 2021-05-25 DIAGNOSIS — D84.9 IMMUNOSUPPRESSION (HCC): ICD-10-CM

## 2021-05-25 DIAGNOSIS — Z79.899 HIGH RISK MEDICATIONS (NOT ANTICOAGULANTS) LONG-TERM USE: ICD-10-CM

## 2021-05-25 DIAGNOSIS — R53.1 WEAKNESS: ICD-10-CM

## 2021-05-25 DIAGNOSIS — R26.89 IMBALANCE: ICD-10-CM

## 2021-05-25 LAB
BACTERIA UR QL AUTO: ABNORMAL /HPF
BILIRUB UR QL STRIP: NEGATIVE
CLARITY UR: ABNORMAL
COLOR UR: YELLOW
GLUCOSE UR STRIP-MCNC: NEGATIVE MG/DL
HGB UR QL STRIP.AUTO: ABNORMAL
KETONES UR STRIP-MCNC: NEGATIVE MG/DL
LEUKOCYTE ESTERASE UR QL STRIP: ABNORMAL
NITRITE UR QL STRIP: POSITIVE
NON-SQ EPI CELLS URNS QL MICRO: ABNORMAL /HPF
PH UR STRIP.AUTO: 6 [PH]
PROT UR STRIP-MCNC: ABNORMAL MG/DL
RBC #/AREA URNS AUTO: ABNORMAL /HPF
SP GR UR STRIP.AUTO: 1.02 (ref 1–1.03)
UROBILINOGEN UR QL STRIP.AUTO: 0.2 E.U./DL
WBC #/AREA URNS AUTO: ABNORMAL /HPF

## 2021-05-25 PROCEDURE — 87186 SC STD MICRODIL/AGAR DIL: CPT

## 2021-05-25 PROCEDURE — 87077 CULTURE AEROBIC IDENTIFY: CPT

## 2021-05-25 PROCEDURE — 81001 URINALYSIS AUTO W/SCOPE: CPT

## 2021-05-25 PROCEDURE — 87086 URINE CULTURE/COLONY COUNT: CPT

## 2021-05-27 LAB
BACTERIA UR CULT: ABNORMAL
BACTERIA UR CULT: ABNORMAL

## 2021-06-20 ENCOUNTER — APPOINTMENT (EMERGENCY)
Dept: CT IMAGING | Facility: HOSPITAL | Age: 66
DRG: 690 | End: 2021-06-20
Payer: MEDICARE

## 2021-06-20 ENCOUNTER — HOSPITAL ENCOUNTER (INPATIENT)
Facility: HOSPITAL | Age: 66
LOS: 6 days | Discharge: NURSING, REHAB OR LONG-TERM CARE | DRG: 690 | End: 2021-06-26
Attending: EMERGENCY MEDICINE | Admitting: INTERNAL MEDICINE
Payer: MEDICARE

## 2021-06-20 ENCOUNTER — APPOINTMENT (EMERGENCY)
Dept: RADIOLOGY | Facility: HOSPITAL | Age: 66
DRG: 690 | End: 2021-06-20
Payer: MEDICARE

## 2021-06-20 DIAGNOSIS — G62.9 ACQUIRED POLYNEUROPATHY: ICD-10-CM

## 2021-06-20 DIAGNOSIS — R53.1 WEAKNESS: ICD-10-CM

## 2021-06-20 DIAGNOSIS — N39.0 UTI (URINARY TRACT INFECTION): Primary | ICD-10-CM

## 2021-06-20 DIAGNOSIS — G35 MS (MULTIPLE SCLEROSIS) (HCC): ICD-10-CM

## 2021-06-20 DIAGNOSIS — G35 MULTIPLE SCLEROSIS (HCC): ICD-10-CM

## 2021-06-20 PROBLEM — M79.89 LEG SWELLING: Status: ACTIVE | Noted: 2021-06-20

## 2021-06-20 PROBLEM — N31.9 NEUROGENIC BLADDER: Status: ACTIVE | Noted: 2021-06-20

## 2021-06-20 PROBLEM — N30.00 ACUTE CYSTITIS WITHOUT HEMATURIA: Status: ACTIVE | Noted: 2021-06-20

## 2021-06-20 PROBLEM — I10 HYPERTENSION: Status: ACTIVE | Noted: 2021-06-20

## 2021-06-20 PROBLEM — R26.2 AMBULATORY DYSFUNCTION: Status: ACTIVE | Noted: 2021-06-20

## 2021-06-20 LAB
ALBUMIN SERPL BCP-MCNC: 3.7 G/DL (ref 3.5–5)
ALP SERPL-CCNC: 160 U/L (ref 46–116)
ALT SERPL W P-5'-P-CCNC: 29 U/L (ref 12–78)
ANION GAP SERPL CALCULATED.3IONS-SCNC: 8 MMOL/L (ref 4–13)
AST SERPL W P-5'-P-CCNC: 13 U/L (ref 5–45)
BACTERIA UR QL AUTO: ABNORMAL /HPF
BASOPHILS # BLD AUTO: 0.05 THOUSANDS/ΜL (ref 0–0.1)
BASOPHILS NFR BLD AUTO: 1 % (ref 0–1)
BILIRUB SERPL-MCNC: 0.45 MG/DL (ref 0.2–1)
BILIRUB UR QL STRIP: NEGATIVE
BUN SERPL-MCNC: 21 MG/DL (ref 5–25)
CALCIUM SERPL-MCNC: 8.8 MG/DL (ref 8.3–10.1)
CHLORIDE SERPL-SCNC: 106 MMOL/L (ref 100–108)
CLARITY UR: CLEAR
CO2 SERPL-SCNC: 31 MMOL/L (ref 21–32)
COLOR UR: YELLOW
CREAT SERPL-MCNC: 0.66 MG/DL (ref 0.6–1.3)
EOSINOPHIL # BLD AUTO: 0.06 THOUSAND/ΜL (ref 0–0.61)
EOSINOPHIL NFR BLD AUTO: 1 % (ref 0–6)
ERYTHROCYTE [DISTWIDTH] IN BLOOD BY AUTOMATED COUNT: 14.5 % (ref 11.6–15.1)
GFR SERPL CREATININE-BSD FRML MDRD: 93 ML/MIN/1.73SQ M
GLUCOSE SERPL-MCNC: 89 MG/DL (ref 65–140)
GLUCOSE UR STRIP-MCNC: NEGATIVE MG/DL
HCT VFR BLD AUTO: 39.7 % (ref 34.8–46.1)
HGB BLD-MCNC: 13 G/DL (ref 11.5–15.4)
HGB UR QL STRIP.AUTO: ABNORMAL
IMM GRANULOCYTES # BLD AUTO: 0.01 THOUSAND/UL (ref 0–0.2)
IMM GRANULOCYTES NFR BLD AUTO: 0 % (ref 0–2)
KETONES UR STRIP-MCNC: NEGATIVE MG/DL
LEUKOCYTE ESTERASE UR QL STRIP: ABNORMAL
LYMPHOCYTES # BLD AUTO: 0.95 THOUSANDS/ΜL (ref 0.6–4.47)
LYMPHOCYTES NFR BLD AUTO: 20 % (ref 14–44)
MCH RBC QN AUTO: 31.6 PG (ref 26.8–34.3)
MCHC RBC AUTO-ENTMCNC: 32.7 G/DL (ref 31.4–37.4)
MCV RBC AUTO: 97 FL (ref 82–98)
MONOCYTES # BLD AUTO: 0.35 THOUSAND/ΜL (ref 0.17–1.22)
MONOCYTES NFR BLD AUTO: 7 % (ref 4–12)
NEUTROPHILS # BLD AUTO: 3.34 THOUSANDS/ΜL (ref 1.85–7.62)
NEUTS SEG NFR BLD AUTO: 71 % (ref 43–75)
NITRITE UR QL STRIP: NEGATIVE
NON-SQ EPI CELLS URNS QL MICRO: ABNORMAL /HPF
NRBC BLD AUTO-RTO: 0 /100 WBCS
NT-PROBNP SERPL-MCNC: 153 PG/ML
PH UR STRIP.AUTO: 7 [PH] (ref 4.5–8)
PLATELET # BLD AUTO: 300 THOUSANDS/UL (ref 149–390)
PMV BLD AUTO: 10.2 FL (ref 8.9–12.7)
POTASSIUM SERPL-SCNC: 4.4 MMOL/L (ref 3.5–5.3)
PROT SERPL-MCNC: 7.5 G/DL (ref 6.4–8.2)
PROT UR STRIP-MCNC: NEGATIVE MG/DL
RBC # BLD AUTO: 4.11 MILLION/UL (ref 3.81–5.12)
RBC #/AREA URNS AUTO: ABNORMAL /HPF
SODIUM SERPL-SCNC: 145 MMOL/L (ref 136–145)
SP GR UR STRIP.AUTO: 1.02 (ref 1–1.03)
TROPONIN I SERPL-MCNC: <0.02 NG/ML
UROBILINOGEN UR QL STRIP.AUTO: 0.2 E.U./DL
WBC # BLD AUTO: 4.76 THOUSAND/UL (ref 4.31–10.16)
WBC #/AREA URNS AUTO: ABNORMAL /HPF

## 2021-06-20 PROCEDURE — 74177 CT ABD & PELVIS W/CONTRAST: CPT

## 2021-06-20 PROCEDURE — 99223 1ST HOSP IP/OBS HIGH 75: CPT | Performed by: HOSPITALIST

## 2021-06-20 PROCEDURE — 99285 EMERGENCY DEPT VISIT HI MDM: CPT | Performed by: EMERGENCY MEDICINE

## 2021-06-20 PROCEDURE — 87077 CULTURE AEROBIC IDENTIFY: CPT

## 2021-06-20 PROCEDURE — 84484 ASSAY OF TROPONIN QUANT: CPT | Performed by: EMERGENCY MEDICINE

## 2021-06-20 PROCEDURE — 87186 SC STD MICRODIL/AGAR DIL: CPT

## 2021-06-20 PROCEDURE — G1004 CDSM NDSC: HCPCS

## 2021-06-20 PROCEDURE — 83880 ASSAY OF NATRIURETIC PEPTIDE: CPT | Performed by: EMERGENCY MEDICINE

## 2021-06-20 PROCEDURE — 87040 BLOOD CULTURE FOR BACTERIA: CPT | Performed by: EMERGENCY MEDICINE

## 2021-06-20 PROCEDURE — 87086 URINE CULTURE/COLONY COUNT: CPT

## 2021-06-20 PROCEDURE — 96374 THER/PROPH/DIAG INJ IV PUSH: CPT

## 2021-06-20 PROCEDURE — 36415 COLL VENOUS BLD VENIPUNCTURE: CPT | Performed by: EMERGENCY MEDICINE

## 2021-06-20 PROCEDURE — 70450 CT HEAD/BRAIN W/O DYE: CPT

## 2021-06-20 PROCEDURE — 99285 EMERGENCY DEPT VISIT HI MDM: CPT

## 2021-06-20 PROCEDURE — 93005 ELECTROCARDIOGRAM TRACING: CPT

## 2021-06-20 PROCEDURE — 81001 URINALYSIS AUTO W/SCOPE: CPT

## 2021-06-20 PROCEDURE — 71260 CT THORAX DX C+: CPT

## 2021-06-20 PROCEDURE — 80053 COMPREHEN METABOLIC PANEL: CPT | Performed by: EMERGENCY MEDICINE

## 2021-06-20 PROCEDURE — 1124F ACP DISCUSS-NO DSCNMKR DOCD: CPT | Performed by: EMERGENCY MEDICINE

## 2021-06-20 PROCEDURE — 72125 CT NECK SPINE W/O DYE: CPT

## 2021-06-20 PROCEDURE — 85025 COMPLETE CBC W/AUTO DIFF WBC: CPT | Performed by: EMERGENCY MEDICINE

## 2021-06-20 RX ORDER — DALFAMPRIDINE 10 MG/1
TABLET, FILM COATED, EXTENDED RELEASE ORAL
COMMUNITY
End: 2021-06-28

## 2021-06-20 RX ORDER — ASPIRIN 81 MG/1
81 TABLET ORAL DAILY
Status: DISCONTINUED | OUTPATIENT
Start: 2021-06-21 | End: 2021-06-26 | Stop reason: HOSPADM

## 2021-06-20 RX ORDER — AMLODIPINE BESYLATE 2.5 MG/1
2.5 TABLET ORAL 2 TIMES DAILY
Status: DISCONTINUED | OUTPATIENT
Start: 2021-06-21 | End: 2021-06-26 | Stop reason: HOSPADM

## 2021-06-20 RX ORDER — FUROSEMIDE 10 MG/ML
40 INJECTION INTRAMUSCULAR; INTRAVENOUS ONCE
Status: COMPLETED | OUTPATIENT
Start: 2021-06-20 | End: 2021-06-20

## 2021-06-20 RX ORDER — MELATONIN
2000 DAILY
Status: DISCONTINUED | OUTPATIENT
Start: 2021-06-21 | End: 2021-06-26 | Stop reason: HOSPADM

## 2021-06-20 RX ORDER — BACLOFEN 10 MG/1
30 TABLET ORAL 3 TIMES DAILY
Status: DISCONTINUED | OUTPATIENT
Start: 2021-06-20 | End: 2021-06-26 | Stop reason: HOSPADM

## 2021-06-20 RX ORDER — LIDOCAINE 50 MG/G
1 PATCH TOPICAL DAILY
Status: DISCONTINUED | OUTPATIENT
Start: 2021-06-21 | End: 2021-06-26 | Stop reason: HOSPADM

## 2021-06-20 RX ORDER — DALFAMPRIDINE 10 MG/1
10 TABLET, FILM COATED, EXTENDED RELEASE ORAL 2 TIMES DAILY
Status: DISCONTINUED | OUTPATIENT
Start: 2021-06-20 | End: 2021-06-21 | Stop reason: RX

## 2021-06-20 RX ADMIN — IOHEXOL 100 ML: 350 INJECTION, SOLUTION INTRAVENOUS at 20:42

## 2021-06-20 RX ADMIN — CEFTRIAXONE SODIUM 1000 MG: 10 INJECTION, POWDER, FOR SOLUTION INTRAVENOUS at 21:33

## 2021-06-20 RX ADMIN — BACLOFEN 30 MG: 10 TABLET ORAL at 23:54

## 2021-06-20 RX ADMIN — FUROSEMIDE 40 MG: 10 INJECTION, SOLUTION INTRAMUSCULAR; INTRAVENOUS at 23:54

## 2021-06-21 LAB
ATRIAL RATE: 86 BPM
P AXIS: 75 DEGREES
PR INTERVAL: 192 MS
QRS AXIS: 7 DEGREES
QRSD INTERVAL: 60 MS
QT INTERVAL: 344 MS
QTC INTERVAL: 405 MS
T WAVE AXIS: 18 DEGREES
VENTRICULAR RATE: 83 BPM

## 2021-06-21 PROCEDURE — 99223 1ST HOSP IP/OBS HIGH 75: CPT | Performed by: PSYCHIATRY & NEUROLOGY

## 2021-06-21 PROCEDURE — 93010 ELECTROCARDIOGRAM REPORT: CPT | Performed by: INTERNAL MEDICINE

## 2021-06-21 PROCEDURE — 97163 PT EVAL HIGH COMPLEX 45 MIN: CPT

## 2021-06-21 RX ORDER — TAMSULOSIN HYDROCHLORIDE 0.4 MG/1
0.8 CAPSULE ORAL DAILY
Status: DISCONTINUED | OUTPATIENT
Start: 2021-06-21 | End: 2021-06-26 | Stop reason: HOSPADM

## 2021-06-21 RX ORDER — LORAZEPAM 1 MG/1
1 TABLET ORAL ONCE
Status: COMPLETED | OUTPATIENT
Start: 2021-06-21 | End: 2021-06-21

## 2021-06-21 RX ORDER — ACETAMINOPHEN 325 MG/1
650 TABLET ORAL EVERY 6 HOURS PRN
Status: DISCONTINUED | OUTPATIENT
Start: 2021-06-21 | End: 2021-06-26 | Stop reason: HOSPADM

## 2021-06-21 RX ADMIN — TAMSULOSIN HYDROCHLORIDE 0.8 MG: 0.4 CAPSULE ORAL at 09:13

## 2021-06-21 RX ADMIN — AMLODIPINE BESYLATE 2.5 MG: 2.5 TABLET ORAL at 09:14

## 2021-06-21 RX ADMIN — LORAZEPAM 1 MG: 1 TABLET ORAL at 11:42

## 2021-06-21 RX ADMIN — BACLOFEN 30 MG: 10 TABLET ORAL at 09:13

## 2021-06-21 RX ADMIN — LIDOCAINE 5% 1 PATCH: 700 PATCH TOPICAL at 09:14

## 2021-06-21 RX ADMIN — BACLOFEN 30 MG: 10 TABLET ORAL at 16:13

## 2021-06-21 RX ADMIN — BACLOFEN 30 MG: 10 TABLET ORAL at 21:28

## 2021-06-21 RX ADMIN — AMLODIPINE BESYLATE 2.5 MG: 2.5 TABLET ORAL at 16:14

## 2021-06-21 RX ADMIN — Medication 2000 UNITS: at 09:13

## 2021-06-21 RX ADMIN — CEFEPIME HYDROCHLORIDE 2000 MG: 2 INJECTION, POWDER, FOR SOLUTION INTRAVENOUS at 09:24

## 2021-06-21 RX ADMIN — ENOXAPARIN SODIUM 40 MG: 40 INJECTION SUBCUTANEOUS at 09:14

## 2021-06-21 RX ADMIN — ASPIRIN 81 MG: 81 TABLET, COATED ORAL at 09:12

## 2021-06-21 NOTE — PLAN OF CARE
Problem: Potential for Falls  Goal: Patient will remain free of falls  Description: INTERVENTIONS:  - Educate patient/family on patient safety including physical limitations  - Instruct patient to call for assistance with activity   - Consult OT/PT to assist with strengthening/mobility   - Keep Call bell within reach  - Keep bed low and locked with side rails adjusted as appropriate  - Keep care items and personal belongings within reach  - Initiate and maintain comfort rounds  - Make Fall Risk Sign visible to staff    Problem: MOBILITY - ADULT  Goal: Maintain or return to baseline ADL function  Description: INTERVENTIONS:  -  Assess patient's ability to carry out ADLs; assess patient's baseline for ADL function and identify physical deficits which impact ability to perform ADLs (bathing, care of mouth/teeth, toileting, grooming, dressing, etc )  - Assess/evaluate cause of self-care deficits   - Assess range of motion  - Assess patient's mobility; develop plan if impaired  - Assess patient's need for assistive devices and provide as appropriate  - Encourage maximum independence but intervene and supervise when necessary  - Involve family in performance of ADLs  - Assess for home care needs following discharge   - Consider OT consult to assist with ADL evaluation and planning for discharge  - Provide patient education as appropriate  Outcome: Progressing  Goal: Maintains/Returns to pre admission functional level  Description: INTERVENTIONS:  - Perform BMAT or MOVE assessment daily    - Set and communicate daily mobility goal to care team and patient/family/caregiver     - Collaborate with rehabilitation services on mobility goals if consulted       Problem: Prexisting or High Potential for Compromised Skin Integrity  Goal: Skin integrity is maintained or improved  Description: INTERVENTIONS:  - Identify patients at risk for skin breakdown  - Assess and monitor skin integrity  - Assess and monitor nutrition and hydration status  - Monitor labs   - Assess for incontinence   - Turn and reposition patient  - Assist with mobility/ambulation  - Relieve pressure over bony prominences  - Avoid friction and shearing  - Provide appropriate hygiene as needed including keeping skin clean and dry  - Evaluate need for skin moisturizer/barrier cream  - Collaborate with interdisciplinary team   - Patient/family teaching  - Consider wound care consult   Outcome: Progressing     Problem: PAIN - ADULT  Goal: Verbalizes/displays adequate comfort level or baseline comfort level  Description: Interventions:  - Encourage patient to monitor pain and request assistance  - Assess pain using appropriate pain scale  - Administer analgesics based on type and severity of pain and evaluate response  - Implement non-pharmacological measures as appropriate and evaluate response  - Consider cultural and social influences on pain and pain management  - Notify physician/advanced practitioner if interventions unsuccessful or patient reports new pain  Outcome: Progressing     Problem: INFECTION - ADULT  Goal: Absence or prevention of progression during hospitalization  Description: INTERVENTIONS:  - Assess and monitor for signs and symptoms of infection  - Monitor lab/diagnostic results  - Monitor all insertion sites, i e  indwelling lines, tubes, and drains  - Monitor endotracheal if appropriate and nasal secretions for changes in amount and color  - [place] appropriate cooling/warming therapies per order  - Administer medications as ordered  - Instruct and encourage patient and family to use good hand hygiene technique  - Identify and instruct in appropriate isolation precautions for identified infection/condition  Outcome: Progressing     Problem: SAFETY ADULT  Goal: Patient will remain free of falls  Description: INTERVENTIONS:  - Educate patient/family on patient safety including physical limitations  - Instruct patient to call for assistance with activity   - Consult OT/PT to assist with strengthening/mobility   - Keep Call bell within reach  - Keep bed low and locked with side rails adjusted as appropriate  - Keep care items and personal belongings within reach  - Initiate and maintain comfort rounds  - Make Fall Risk Sign visible to staff    Goal: Maintain or return to baseline ADL function  Description: INTERVENTIONS:  -  Assess patient's ability to carry out ADLs; assess patient's baseline for ADL function and identify physical deficits which impact ability to perform ADLs (bathing, care of mouth/teeth, toileting, grooming, dressing, etc )  - Assess/evaluate cause of self-care deficits   - Assess range of motion  - Assess patient's mobility; develop plan if impaired  - Assess patient's need for assistive devices and provide as appropriate  - Encourage maximum independence but intervene and supervise when necessary  - Involve family in performance of ADLs  - Assess for home care needs following discharge   - Consider OT consult to assist with ADL evaluation and planning for discharge  - Provide patient education as appropriate  Outcome: Progressing  Goal: Maintains/Returns to pre admission functional level  Description: INTERVENTIONS:  - Perform BMAT or MOVE assessment daily    - Set and communicate daily mobility goal to care team and patient/family/caregiver     - Collaborate with rehabilitation services on mobility goals if consulted       Problem: DISCHARGE PLANNING  Goal: Discharge to home or other facility with appropriate resources  Description: INTERVENTIONS:  - Identify barriers to discharge w/patient and caregiver  - Arrange for needed discharge resources and transportation as appropriate  - Identify discharge learning needs (meds, wound care, etc )  - Arrange for interpretive services to assist at discharge as needed  - Refer to Case Management Department for coordinating discharge planning if the patient needs post-hospital services based on physician/advanced practitioner order or complex needs related to functional status, cognitive ability, or social support system  Outcome: Progressing     Problem: Knowledge Deficit  Goal: Patient/family/caregiver demonstrates understanding of disease process, treatment plan, medications, and discharge instructions  Description: Complete learning assessment and assess knowledge base    Interventions:  - Provide teaching at level of understanding  - Provide teaching via preferred learning methods  Outcome: Progressing     Problem: GENITOURINARY - ADULT  Goal: Maintains or returns to baseline urinary function  Description: INTERVENTIONS:  - Assess urinary function  - Encourage oral fluids to ensure adequate hydration if ordered  - Administer IV fluids as ordered to ensure adequate hydration  - Administer ordered medications as needed  - Offer frequent toileting  - Follow urinary retention protocol if ordered  Outcome: Progressing  Goal: Absence of urinary retention  Description: INTERVENTIONS:  - Assess patients ability to void and empty bladder  - Monitor I/O  - Bladder scan as needed  - Discuss with physician/AP medications to alleviate retention as needed  - Discuss catheterization for long term situations as appropriate  Outcome: Progressing

## 2021-06-21 NOTE — PLAN OF CARE
Problem: PHYSICAL THERAPY ADULT  Goal: Performs mobility at highest level of function for planned discharge setting  See evaluation for individualized goals  Description: Treatment/Interventions: Functional transfer training, LE strengthening/ROM, Therapeutic exercise, Endurance training, Patient/family training, Equipment eval/education, Bed mobility (PT to see for ambulation when appropriate)  Equipment Recommended:  (to be determined w/ OOB mobility)       See flowsheet documentation for full assessment, interventions and recommendations  Note: Prognosis: Guarded  Problem List: Decreased strength, Decreased range of motion, Decreased endurance, Impaired balance, Decreased mobility, Decreased coordination, Impaired tone, Obesity  Assessment: Briana Lassiter is a 73 y o  Female who [facility] on [facility]/20/2021 from home w/ c/o weakness, back pain, and s/p fall and diagnosis of ambulatory dysfunction  Orders for PT eval and treat received, w/ activity orders of up w/ A and fall risk precautions  Pt presents w/ comorbidities of HTN, incontinence, MS, neurogenic bladder, spasticity and personal factors including: mobilizing w/ assistive device, inability to navigate community distances, inability to navigate level surfaces w/o external assistance, positive fall history and inability to perform IADLs  At baseline, pt mobilizes independently w/ rollator walker, and reports 3 falls in the last 6 months  Upon evaluation, pt presents w/ the following deficits: weakness, decreased ROM, altered sensation, impaired coordination, impaired balance, inability to ambulate, and decreased endurance  Pt requires Max Ax1-2 for bed mobility, and was unable to clear bed x3 w/ Max Ax2  Gait not assessed at this time due to pt's fatigue, weakness, and inability to perform STS transfer   Pt's clinical presentation is unstable/unpredictable due to need for input for mobility technique/safety, recent drastic decline in mobility compared to baseline, ongoing medical monitoring/mangement, and recent history of falls  Pt is at an increased risk of falls due to impaired balance, decreased LE strength, impaired coordination, impaired sensation, and requirement of assistance w/ all functional mobility at this time  Given the above findings, discharge recommendation is for post acute rehab  During this admission, pt would benefit from skilled acute inpatient PT in order to address the abovementioned deficits to maximize function and mobility before DC from acute care  PT Discharge Recommendation: Post acute rehabilitation services          See flowsheet documentation for full assessment

## 2021-06-21 NOTE — ASSESSMENT & PLAN NOTE
· Secondary to Multiple Sclerosis   · Patient reports regularly ambulating with a rolling walker at home  · History of recurrent falls- most recently patient states her legs gave out and she fell from walker  · CT cervical spine shows no cervical spine fracture of traumatic malalignment  CT head shows no acute intracranial abnormality  CT chest abdomen pelvis shows no acute pathology     · Fall precautions in place   · PT/OT for further evaluation

## 2021-06-21 NOTE — ASSESSMENT & PLAN NOTE
· Patient on Lasix 40mg on Mondays and Fridays- will continue   · Reports worsening of leg swelling over the past 2 weeks  · Venous duplex of BLE on 6/16/21 showed no evidence of DVT   · Echo done on 10/7/2020 showed hyperdynamic systolic function with an EF of 80% and no regional wall motion abnormalities  There was moderate asymmetric hypertrophy with an increased relative contribution of atrial contraction to ventricular filling     · S/p x1 dose of IV lasix 40mg

## 2021-06-21 NOTE — ED PROVIDER NOTES
Emergency Department Trauma Note  Rima Mora 73 y o  female MRN: [id number]  Unit/Bed#: ED 05/ED 05 Encounter: [id number]      Trauma Alert:    Model of Arrival:   via    Trauma Team: Current Providers  Attending Provider: Joseph Hastings MD  Attending Provider: Irvin Zarate MD  Nurse Practitioner: COLIN Schulz  Resident: Miki Andrade MD  Consultants:       History of Present Illness     Chief Complaint:   Chief Complaint   Patient presents with    Fall     Pt comes to ED via EMS for fall this am  C/o back pain  States she got weak and fell and this am and hit he face on floor  -LOC  +ASA     HPI:  Abdirahman Smith is a 73 y o  female who presents with back pain, abdominal distension and weakness in legs  States she was weak and fell earlier today  Hit head  On aspirin  No headache  Has neck back pain  Abdominal distention  No fevers or chills  States she was antibiotics for UTI  Is concerned she has UTI again  She history of MS  No other anticoagulants except for aspirin  Mechanism:             History provided by:  Patient   used: No      Review of Systems   Constitutional: Negative for chills, diaphoresis and fever  HENT: Negative for congestion and sore throat  Respiratory: Negative for cough, shortness of breath, wheezing and stridor  Cardiovascular: Negative for chest pain, palpitations and leg swelling  Gastrointestinal: Negative for abdominal pain, blood in stool, diarrhea, nausea and vomiting  Genitourinary: Negative for dysuria, frequency and urgency  Musculoskeletal: Positive for back pain and neck pain  Negative for neck stiffness  Skin: Negative for pallor and rash  Neurological: Negative for dizziness, syncope, weakness, light-headedness and headaches  All other systems reviewed and are negative  Historical Information     Immunizations: There is no immunization history on file for this patient      Past Medical History:   Diagnosis Date    Back pain 2016    Hypertension     Incontinence     MS [facility] sclerosis) ([facility] [address] )     Age 37    Neurogenic bladder     Spasticity        Family History   Problem Relation Age of Onset    Cancer Mother     Heart disease Father     Cancer Sister      Past Surgical History:   Procedure Laterality Date    BACK SURGERY  2016    Lumbar fusion    ELBOW SURGERY Right 2008    ORIF     Social History     Tobacco Use    Smoking status: Never Smoker    Smokeless tobacco: Never Used   Substance Use Topics    Alcohol use: Not Currently    Drug use: Yes     Types: Marijuana     E-Cigarette/Vaping     E-Cigarette/Vaping Substances       Family History: non-contributory    Meds/Allergies   Prior to Admission Medications   Prescriptions Last Dose Informant Patient Reported? Taking?    ALOE PO Past Week at Unknown time  Yes Yes   Sig: Take 500 mg by mouth   AMLODIPINE BESYLATE PO 6/20/2021 at Unknown time  Yes Yes   Sig: Take 2 5 mg by mouth 2 (two) times a day   BACLOFEN PO 6/20/2021 at Unknown time  Yes Yes   Sig: Take 30 mg by mouth 3 (three) times a day   Dalfampridine ER 10 MG TB12 6/20/2021 at Unknown time  Yes Yes   Sig: Take by mouth   GOLDENSEAL PO Unknown at Unknown time  Yes No   Sig: Take 325 mg by mouth daily   TAMSULOSIN HCL PO 6/20/2021 at Unknown time  Yes Yes   Sig: Take 0 8 mg by mouth daily     ascorbic acid (VITAMIN C) 250 mg tablet 6/20/2021 at Unknown time  Yes Yes   Sig: Take 1,000 mg by mouth daily     aspirin (ECOTRIN LOW STRENGTH) 81 mg EC tablet 6/20/2021 at Unknown time  Yes Yes   Sig: Take 81 mg by mouth daily   cholecalciferol (VITAMIN D3) 1,000 units tablet 6/20/2021 at Unknown time  Yes Yes   Sig: Take 2,000 Units by mouth daily     multivitamin (THERAGRAN) TABS 6/20/2021 at Unknown time  Yes Yes   Sig: Take 1 tablet by mouth daily   nabumetone (RELAFEN) 500 mg tablet 6/20/2021 at Unknown time  Yes Yes   Sig: Take 1,000 mg by mouth 2 (two) times a day   vitamin B-12 (CYANOCOBALAMIN) 250 MCG tablet 6/20/2021 at Unknown time  Yes Yes   Sig: Take 250 mcg by mouth daily   vitamin E, tocopherol, 1,000 units capsule 6/20/2021 at Unknown time  Yes Yes   Sig: Take 400 Units by mouth daily        Facility-Administered Medications: None       No Known Allergies    PHYSICAL EXAM        Objective   Vitals:   First set: Temperature: 98 2 °F (36 8 °C) (06/20/21 2013)  Pulse: 98 (06/20/21 2005)  Respirations: 18 (06/20/21 2005)  Blood Pressure: 159/72 (06/20/21 2005)  SpO2: 97 % (06/20/21 2005)    Primary Survey:   (A) Airway:  Intact  (B) Breathing:  Equal bilaterally  (C) Circulation: Pulses:   normal  (D) Disabliity:  GCS Total:  15  (E) Expose:  Completed    Secondary Survey: (Click on Physical Exam tab above)  Physical Exam  Vitals reviewed  Constitutional:       Appearance: Normal appearance  She is well-developed  HENT:      Head: Normocephalic and atraumatic  Eyes:      Extraocular Movements: Extraocular movements intact  Pupils: Pupils are equal, round, and reactive to light  Cardiovascular:      Rate and Rhythm: Normal rate and regular rhythm  Heart sounds: Normal heart sounds  Pulmonary:      Effort: Pulmonary effort is normal  No respiratory distress  Breath sounds: Normal breath sounds  Abdominal:      General: Bowel sounds are normal  There is distension  Palpations: Abdomen is soft  Tenderness: There is no abdominal tenderness  Musculoskeletal:         General: No tenderness, deformity or signs of injury  Cervical back: Neck supple  Right lower leg: Edema present  Left lower leg: Edema present  Comments: C and T-spine tenderness  Skin:     General: Skin is warm and dry  Capillary Refill: Capillary refill takes less than 2 seconds  Neurological:      General: No focal deficit present  Mental Status: She is alert and oriented to person, place, and time  Cervical spine cleared by clinical criteria?  No (imaging required)      Invasive Devices     Peripheral Intravenous Line            Peripheral IV 06/20/21 Right Antecubital <1 day                Lab Results:   Results Reviewed     Procedure Component Value Units Date/Time    Blood culture #1 [[id number]] Collected: 06/20/21 2133    Lab Status: Preliminary result Specimen: Blood from Arm, Right Updated: 06/21/21 0001     Blood Culture Received in Microbiology Lab  Culture in Progress  Blood culture #2 [[id number]] Collected: 06/20/21 2133    Lab Status: Preliminary result Specimen: Blood from Hand, Right Updated: 06/21/21 0001     Blood Culture Received in Microbiology Lab  Culture in Progress  Urine Microscopic [[id number]]  (Abnormal) Collected: 06/20/21 2055    Lab Status: Final result Specimen: Urine, Clean Catch Updated: 06/20/21 2113     RBC, UA 2-4 /hpf      WBC, UA 20-30 /hpf      Epithelial Cells None Seen /hpf      Bacteria, UA Occasional /hpf     Urine culture [[id number]] Collected: 06/20/21 2055    Lab Status:  In process Specimen: Urine, Clean Catch Updated: 06/20/21 2113    NT-BNP PRO [[id number]]  (Abnormal) Collected: 06/20/21 2035    Lab Status: Final result Specimen: Blood from Arm, Right Updated: 06/20/21 2106     NT-proBNP 153 pg/mL     Troponin I [[id number]]  (Normal) Collected: 06/20/21 2035    Lab Status: Final result Specimen: Blood from Arm, Right Updated: 06/20/21 2101     Troponin I <0 02 ng/mL     Comprehensive metabolic panel [[id number]]  (Abnormal) Collected: 06/20/21 2035    Lab Status: Final result Specimen: Blood from Arm, Right Updated: 06/20/21 2058     Sodium 145 mmol/L      Potassium 4 4 mmol/L      Chloride 106 mmol/L      CO2 31 mmol/L      ANION GAP 8 mmol/L      BUN 21 mg/dL      Creatinine 0 66 mg/dL      Glucose 89 mg/dL      Calcium 8 8 mg/dL      AST 13 U/L      ALT 29 U/L      Alkaline Phosphatase 160 U/L      Total Protein 7 5 g/dL      Albumin 3 7 g/dL      Total Bilirubin 0 45 mg/dL      eGFR 93 ml/min/1 73sq m Narrative:      National Kidney Disease Foundation guidelines for Chronic Kidney Disease (CKD):     Stage 1 with normal or high GFR (GFR > 90 mL/min/1 73 square meters)    Stage 2 Mild CKD (GFR = 60-89 mL/min/1 73 square meters)    Stage 3A Moderate CKD (GFR = 45-59 mL/min/1 73 square meters)    Stage 3B Moderate CKD (GFR = 30-44 mL/min/1 73 square meters)    Stage 4 Severe CKD (GFR = 15-29 mL/min/1 73 square meters)    Stage 5 End Stage CKD (GFR <15 mL/min/1 73 square meters)  Note: GFR calculation is accurate only with a steady state creatinine    Urine Macroscopic, POC [[id number]]  (Abnormal) Collected: 06/20/21 2055    Lab Status: Final result Specimen: Urine Updated: 06/20/21 2057     Color, UA Yellow     Clarity, UA Clear     pH, UA 7 0     Leukocytes, UA Moderate     Nitrite, UA Negative     Protein, UA Negative mg/dl      Glucose, UA Negative mg/dl      Ketones, UA Negative mg/dl      Urobilinogen, UA 0 2 E U /dl      Bilirubin, UA Negative     Blood, UA Moderate     Specific [place], UA 1 020    Narrative:      CLINITEK RESULT    CBC and differential [[id number]] Collected: 06/20/21 2035    Lab Status: Final result Specimen: Blood from Arm, Right Updated: 06/20/21 2042     WBC 4 76 Thousand/uL      RBC 4 11 Million/uL      Hemoglobin 13 0 g/dL      Hematocrit 39 7 %      MCV 97 fL      MCH 31 6 pg      MCHC 32 7 g/dL      RDW 14 5 %      MPV 10 2 fL      Platelets 890 Thousands/uL      nRBC 0 /100 WBCs      Neutrophils Relative 71 %      Immat GRANS % 0 %      Lymphocytes Relative 20 %      Monocytes Relative 7 %      Eosinophils Relative 1 %      Basophils Relative 1 %      Neutrophils Absolute 3 34 Thousands/µL      Immature Grans Absolute 0 01 Thousand/uL      Lymphocytes Absolute 0 95 Thousands/µL      Monocytes Absolute 0 35 Thousand/µL      Eosinophils Absolute 0 06 Thousand/µL      Basophils Absolute 0 05 Thousands/µL                  Imaging Studies:   Direct to CT: No  CT head without contrast Final Result by Claire Lomeli MD (06/20 2106)      No acute intracranial abnormality  The study was marked in [facility] for immediate notification as per trauma communication protocol  Workstation performed: [id number]         CT cervical spine without contrast   Final Result by Claire Lomeli MD (06/20 2108)      No cervical spine fracture or traumatic malalignment  The study was marked in [facility] for immediate notification as per trauma communication protocol  Workstation performed: [id number]         CT chest abdomen pelvis w contrast   Final Result by Claire Lomeli MD (06/20 2105)      No acute pathology  The study was marked in [facility] for immediate notification as per trauma communication protocol  Workstation performed: [id number]               Procedures  Procedures         ED Course  ED Course as of Jun 21 0011   Sun Jun 20, 2021 2019 ECG shows rate of 83, sinus, normal axis, normal QRS, no significant ST or T-wave changes, normal intervals, independently interpreted me              MDM    Will do trauma scans, cardiac evaluation, check urine for infection    Weakness likely due to UTI  Start IV antibiotics  Will admit to medicine team   No traumatic findings noted      Disposition  [facility] Transfer: No  Final diagnoses:   UTI (urinary tract infection)   Weakness     Time reflects when diagnosis was documented in both MDM as applicable and the Disposition within this note     Time User Action Codes Description Comment    6/20/2021  9:35 PM Kimberly Mohr Add [N39 0] UTI (urinary tract infection)     6/20/2021  9:35 PM Kimberly Mohr Add [R53 1] Weakness     6/20/2021 10:51 PM Shagufta Ricks Add [G35] MS (multiple sclerosis) [facility])       ED Disposition     ED Disposition Condition Date/Time Comment    Admit St[facility] [place] Jun 20, 2021  9:35 PM Case was discussed with medicine team and the patient's admission status was agreed to be Admission Status: inpatient status to the service of Dr Lofton Blind   Follow-up Information    None       Patient's Medications   Discharge Prescriptions    No medications on file     No discharge procedures on file      PDMP Review     None          ED Provider  Electronically Signed by         Wang Pereira MD  06/21/21 0011

## 2021-06-21 NOTE — ASSESSMENT & PLAN NOTE
· Patient states she finished Macrobid treatment for UTI almost 2 weeks ago and that her symptom of increased urinary frequency and suprapubic pain  · UA today- nitrite negative, moderate leukocytes  · Urine culture pending   · Patient has a history of MDRO   · Switched from ceftriaxone to cefepime based on previous urine cultures as above

## 2021-06-21 NOTE — CASE MANAGEMENT
LOS 1  Patient is not a bundle or a readmission  CM spoke with patient and  at the bedside  CM introduced self and role  Patient lives in OS with her  in a two story home  Patient's living arrangement is on one floor  There is a ramped entrance  Patient has a wheelchair, rollator, and hoyerlift at home  Patient's bathroom is currently being remodeled  Patient needs assistance with ADLS  Patient's  is primary caretaker  Patient has a history of VNA for Home PT with [facility]  Patient has an inpatient rehab history at [facility] [address] [facility] 8[facility]10 years ago  Patient uses [facility] in [place]  Patient has prescription insurance and is able to afford her medications  Patient has a living will  Patient's  is her Jodi Host  Patient is on disability   provides transportation to appointments  Patient's PCP is Dr Lilly Smith  CM discussed with patient and  at the bedside re:WILLI  Patient and  updated per PT, the recommendation at discharge is inpatient rehab  CM discussed [place] of Choice  CM discussed difference between acute and subacute inpatient rehab  CM answered all husbands questions about Medicare and insurance  CM provided SNF list and Acute rehab list to  at bedside to review  CM contact information provided to   CM will f/u with patient and  re:YOP  CM reviewed discharge planning process including the following: identifying caregivers at home, preference for d/c planning needs,   availability of [facility] program, availability of treatment team to discuss questions or concerns patient and/or family may have regarding diagnosis, plan of care, old or new medications and discharge planning   CM will continue to follow for care coordination and update assessment as appropriate

## 2021-06-21 NOTE — ASSESSMENT & PLAN NOTE
· Secondary to Multiple Sclerosis   · Patient reports self-catheterization twice a day at home   · Urinary retention protocol   · Continue Flomax

## 2021-06-21 NOTE — ASSESSMENT & PLAN NOTE
· Patient on Lasix 40mg on Mondays and Fridays- will continue   · Reports worsening of leg swelling over the past 2 weeks  · Venous duplex of BLE on 6/16/21 showed no evidence of DVT   · Echo done on 10/7/2020 showed hyperdynamic systolic function with an EF of 80% and no regional wall motion abnormalities  There was moderate asymmetric hypertrophy with an increased relative contribution of atrial contraction to ventricular filling     · On exam has 2+ pitting edema of B/L- will give one time dose of IV Lasix (40mg)

## 2021-06-21 NOTE — ASSESSMENT & PLAN NOTE
· Secondary to Multiple Sclerosis   · Patient reports regularly ambulating with a rolling walker at home  · History of recurrent falls- most recent was this morning when patient states her legs gave out and she fell from walker onto the tile floor, hitting the left side of her face on the floor- did not lose consciousness at this time  Minor bruise on eft cheek, but patient denies headache or facial pain  CT cervical spine shows no cervical spine fracture of traumatic malalignment  CT head shows no acute intracranial abnormality  CT chest abdomen pelvis shows no acute pathology     · Fall precautions in place   · PT/OT

## 2021-06-21 NOTE — PLAN OF CARE
Problem: Potential for Falls  Goal: Patient will remain free of falls  Description: INTERVENTIONS:  - Educate patient/family on patient safety including physical limitations  - Instruct patient to call for assistance with activity   - Consult OT/PT to assist with strengthening/mobility   - Keep Call bell within reach  - Keep bed low and locked with side rails adjusted as appropriate  - Keep care items and personal belongings within reach  - Initiate and maintain comfort rounds  - Make Fall Risk Sign visible to staff  - Offer Toileting every 2 Hours, in advance of need  - Initiate/Maintain bed alarm  - Obtain necessary fall risk management equipment: alarms  - Apply yellow socks and bracelet for high fall risk patients  - Consider moving patient to room near nurses station  Outcome: Progressing     Problem: MOBILITY - ADULT  Goal: Maintain or return to baseline ADL function  Description: INTERVENTIONS:  -  Assess patient's ability to carry out ADLs; assess patient's baseline for ADL function and identify physical deficits which impact ability to perform ADLs (bathing, care of mouth/teeth, toileting, grooming, dressing, etc )  - Assess/evaluate cause of self-care deficits   - Assess range of motion  - Assess patient's mobility; develop plan if impaired  - Assess patient's need for assistive devices and provide as appropriate  - Encourage maximum independence but intervene and supervise when necessary  - Involve family in performance of ADLs  - Assess for home care needs following discharge   - Consider OT consult to assist with ADL evaluation and planning for discharge  - Provide patient education as appropriate  Outcome: Progressing  Goal: Maintains/Returns to pre admission functional level  Description: INTERVENTIONS:  - Perform BMAT or MOVE assessment daily    - Set and communicate daily mobility goal to care team and patient/family/caregiver     - Collaborate with rehabilitation services on mobility goals if consulted  - Perform Range of Motion 4 times a day  - Reposition patient every 2 hours    - Dangle patient 4 times a day  - Stand patient 3 times a day  - Ambulate patient 4 times a day  - Out of bed to chair 2 times a day   - Out of bed for meals 3 times a day  - Out of bed for toileting  - Record patient progress and toleration of activity level   Outcome: Progressing     Problem: Prexisting or High Potential for Compromised Skin Integrity  Goal: Skin integrity is maintained or improved  Description: INTERVENTIONS:  - Identify patients at risk for skin breakdown  - Assess and monitor skin integrity  - Assess and monitor nutrition and hydration status  - Monitor labs   - Assess for incontinence   - Turn and reposition patient  - Assist with mobility/ambulation  - Relieve pressure over bony prominences  - Avoid friction and shearing  - Provide appropriate hygiene as needed including keeping skin clean and dry  - Evaluate need for skin moisturizer/barrier cream  - Collaborate with interdisciplinary team   - Patient/family teaching  - Consider wound care consult   Outcome: Progressing     Problem: PAIN - ADULT  Goal: Verbalizes/displays adequate comfort level or baseline comfort level  Description: Interventions:  - Encourage patient to monitor pain and request assistance  - Assess pain using appropriate pain scale  - Administer analgesics based on type and severity of pain and evaluate response  - Implement non-pharmacological measures as appropriate and evaluate response  - Consider cultural and social influences on pain and pain management  - Notify physician/advanced practitioner if interventions unsuccessful or patient reports new pain  Outcome: Progressing     Problem: INFECTION - ADULT  Goal: Absence or prevention of progression during hospitalization  Description: INTERVENTIONS:  - Assess and monitor for signs and symptoms of infection  - Monitor lab/diagnostic results  - Monitor all insertion sites, i e  indwelling lines, tubes, and drains  - Monitor endotracheal if appropriate and nasal secretions for changes in amount and color  - [place] appropriate cooling/warming therapies per order  - Administer medications as ordered  - Instruct and encourage patient and family to use good hand hygiene technique  - Identify and instruct in appropriate isolation precautions for identified infection/condition  Outcome: Progressing     Problem: SAFETY ADULT  Goal: Patient will remain free of falls  Description: INTERVENTIONS:  - Educate patient/family on patient safety including physical limitations  - Instruct patient to call for assistance with activity   - Consult OT/PT to assist with strengthening/mobility   - Keep Call bell within reach  - Keep bed low and locked with side rails adjusted as appropriate  - Keep care items and personal belongings within reach  - Initiate and maintain comfort rounds  - Make Fall Risk Sign visible to staff  - Offer Toileting every 2 Hours, in advance of need  - Initiate/Maintain bed alarm  - Obtain necessary fall risk management equipment: bed  - Apply yellow socks and bracelet for high fall risk patients  - Consider moving patient to room near nurses station  Outcome: Progressing  Goal: Maintain or return to baseline ADL function  Description: INTERVENTIONS:  -  Assess patient's ability to carry out ADLs; assess patient's baseline for ADL function and identify physical deficits which impact ability to perform ADLs (bathing, care of mouth/teeth, toileting, grooming, dressing, etc )  - Assess/evaluate cause of self-care deficits   - Assess range of motion  - Assess patient's mobility; develop plan if impaired  - Assess patient's need for assistive devices and provide as appropriate  - Encourage maximum independence but intervene and supervise when necessary  - Involve family in performance of ADLs  - Assess for home care needs following discharge   - Consider OT consult to assist with ADL evaluation and planning for discharge  - Provide patient education as appropriate  Outcome: Progressing     Problem: DISCHARGE PLANNING  Goal: Discharge to home or other facility with appropriate resources  Description: INTERVENTIONS:  - Identify barriers to discharge w/patient and caregiver  - Arrange for needed discharge resources and transportation as appropriate  - Identify discharge learning needs (meds, wound care, etc )  - Arrange for interpretive services to assist at discharge as needed  - Refer to Case Management Department for coordinating discharge planning if the patient needs post-hospital services based on physician/advanced practitioner order or complex needs related to functional status, cognitive ability, or social support system  Outcome: Progressing     Problem: Knowledge Deficit  Goal: Patient/family/caregiver demonstrates understanding of disease process, treatment plan, medications, and discharge instructions  Description: Complete learning assessment and assess knowledge base    Interventions:  - Provide teaching at level of understanding  - Provide teaching via preferred learning methods  Outcome: Progressing     Problem: GENITOURINARY - ADULT  Goal: Maintains or returns to baseline urinary function  Description: INTERVENTIONS:  - Assess urinary function  - Encourage oral fluids to ensure adequate hydration if ordered  - Administer IV fluids as ordered to ensure adequate hydration  - Administer ordered medications as needed  - Offer frequent toileting  - Follow urinary retention protocol if ordered  Outcome: Progressing  Goal: Absence of urinary retention  Description: INTERVENTIONS:  - Assess patients ability to void and empty bladder  - Monitor I/O  - Bladder scan as needed  - Discuss with physician/AP medications to alleviate retention as needed  - Discuss catheterization for long term situations as appropriate  Outcome: Progressing

## 2021-06-21 NOTE — ASSESSMENT & PLAN NOTE
· Patient follows with Neurologist in [place], [place] every 2 months   · She has Primary Progressive Multiple Sclerosis and therefore does not symptomatic flare ups   · Will hold off on steroids as role not clear at this time  · Immunosuppression with Methotrexate (injection every 2 months- last injection 2 weeks ago per patient)  · Patient on Dalfampridine (to improve muscle weakness) at home which is not on hospital formulary- family may bring in   · MRI Brain from June 16th, 2021 shows "1  No change     2  Too numerous to count lesions in cerebrum, cerebellum, consistent with patient's known diagnosis of demyelinating disease"   · Continue Baclofen for muscle spacticity   · Neurology consult placed- input appreciated

## 2021-06-21 NOTE — ASSESSMENT & PLAN NOTE
· Patient follows with Neurologist in [facility] every 2 months   · She has Primary Progressive Multiple Sclerosis and therefore does not symptomatic flare ups   · Will hold off on steroids as role not clear at this time  · Immunosuppression with Methotrexate (injection every 2 months- last injection 2 weeks ago per patient)  · Patient on Dalfampridine (to improve muscle weakness) at home which is not on hospital formulary- family may bring in   · MRI Brain from June 16th, 2021 shows "1  No change     2  Too numerous to count lesions in cerebrum, cerebellum, consistent with patient's known diagnosis of demyelinating disease"   · Continue Baclofen for muscle spacticity   · Neurology consult placed- input appreciated

## 2021-06-21 NOTE — CONSULTS
Consultation - Neurology   Rima Mora 73 y o  female MRN: [id number]  Unit/Bed#: W -91 Encounter: [id number]      Physician Requesting Consult: Colleen Gonsales MD        Inpatient consult to Neurology     Performed by  Eddie Cuellar DO     Authorized by David Stern MD          Reason for Consult / Principal Problem: primary progressive multiple sclerosis     Hx and PE limited by: N/A    Assessment:  1  Primary progressive multiple sclerosis  · Diagnosed around age 37  · Maintained on intrathecal methotrexate q 8 weeks for the past year and dalfampridine  · Presenting to the ED s/p fall at home  · MRI brain (6/16/21): Too numerous to count lesions in cerebrum, cerebellum, consistent with   patient's known diagnosis of demyelinating disease  · CT head w/o contrast (6/20): No acute intracranial abnormality  · CT cervical spine w/o contrast (6/20): No cervical spine fracture or traumatic malalignment  · CT Chest/Abdomen/Pelvis (6/20): No acute pathology  2  Ambulatory dysfunction  · Presents to the ED s/p fall  · PT/OT evaluation  3  Neurogenic bladder  · Maintained on tamsulosin  · Straight cath BID at home   · Recently treated for UTI with 7 days antibiotics     Plan:  · Consider repeat MRI for new lesions although patient recently had MRI brain without contrast on 06/16/2021 did not show acute changes  · Hold on steroids for now  · Treatment of UTI per primary team, possibly colonization in the setting of chronic urinary retention        HPI: Namrata Madden is a 73 y o  female with a past medical history of primary progressive multiple sclerosis, neurogenic bladder, and HTN who presents with fall  Patient was diagnosed multiple sclerosis when she was 37years old  She currently follows with [facility] in a neurologist in [place]  She is maintained on intrathecal methotrexate acute 8 weeks for the past year    She states she had previously been on [place] but developed adverse reactions including warts over her fingers  At baseline she ambulates with a rolling walker  She has neurogenic bladder requiring straight catheterization twice a day  She states she was recently treated with antibiotics for 7 days for UTI but does not remember which antibiotics  She presents to the ED with several days of lower extremity weakness and a fall at home in which she hit her face against the floor but did not lose consciousness  She notes lower back pain which she says is chronic for her  She notes numbness and tingling in her legs and fingers which is also chronic  Otherwise, no new fever/chills/chest pain/SOB/dysuria  ROS:  Review of Systems   Constitutional: Negative for chills and fever  Respiratory: Negative for cough and shortness of breath  Cardiovascular: Negative for chest pain  Gastrointestinal: Negative for abdominal pain  Genitourinary: Positive for difficulty urinating  Musculoskeletal: Positive for back pain  Negative for arthralgias  Skin: Negative for rash  Neurological: Negative for syncope  Psychiatric/Behavioral: Negative for agitation  Review of previous medical records via Chart Review completed       Historical Information   Past Medical History:   Diagnosis Date    Back pain 2016    Hypertension     Incontinence     MS [facility]multiple sclerosis) ([facility] [address] )     Age 37    Neurogenic bladder     Spasticity      Past Surgical History:   Procedure Laterality Date    BACK SURGERY  2016    Lumbar fusion    ELBOW SURGERY Right 2008    ORIF     Social History   Social History     Tobacco Use   Smoking Status Never Smoker   Smokeless Tobacco Never Used     Social History     Substance and Sexual Activity   Alcohol Use Not Currently     Social History     Substance and Sexual Activity   Drug Use Yes    Types: Marijuana       Family History: non-contributory      Meds/Allergies     No Known Allergies    all current active meds have been reviewed    Objective Vitals:Blood pressure 124/71, pulse 68, temperature 98 °F (36 7 °C), resp  rate 18, height 4' 7" (1 397 m), weight 74 8 kg (165 lb), SpO2 95 %  ,Body mass index is 38 35 kg/m²  Intake/Output Summary (Last 24 hours) at 6/21/2021 0843  Last data filed at 6/21/2021 0200  Gross per 24 hour   Intake --   Output 825 ml   Net -825 ml           Physical Exam:     Physical Exam    Physical Exam General appearance: alert, appears stated age and cooperative  Head: Normocephalic, without obvious abnormality, atraumatic  Throat: lips, mucosa, and tongue normal; teeth and gums normal  Lungs: clear to auscultation bilaterally  Heart: regular rate and rhythm, S1, S2 normal, no murmur, click, rub or gallop  Abdomen: normal findings:    Extremities: extremities normal, atraumatic, no cyanosis or edema    Neurologic:  Cognitive:  Mental status: Alert, oriented, thought content appropriate  CN: CNII-XII normal   Motor: 2/5 lower extremity strength, 4/5 upper extremity strength   Sensory: intact  X 4 limbs  Cerebellar: Fine motor wnl, Finger taps WNL   No past pointing  DTR's: WNL Plantars: downgoing   Eyes: horizontal nystagmus bilaterally      Lab Results:   CBC:   Results from last 7 days   Lab Units 06/20/21 2035   WBC Thousand/uL 4 76   RBC Million/uL 4 11   HEMOGLOBIN g/dL 13 0   HEMATOCRIT % 39 7   MCV fL 97   PLATELETS Thousands/uL 300   , BMP/CMP:   Results from last 7 days   Lab Units 06/20/21 2035   SODIUM mmol/L 145   POTASSIUM mmol/L 4 4   CHLORIDE mmol/L 106   CO2 mmol/L 31   BUN mg/dL 21   CREATININE mg/dL 0 66   CALCIUM mg/dL 8 8   AST U/L 13   ALT U/L 29   ALK PHOS U/L 160*   EGFR ml/min/1 73sq m 93   , Urinalysis:   Results from last 7 days   Lab Units 06/20/21 2055   COLOR UA  Yellow   CLARITY UA  Clear   SPEC GRAV UA  1 020   PH UA  7 0   LEUKOCYTES UA  Moderate*   NITRITE UA  Negative   GLUCOSE UA mg/dl Negative   KETONES UA mg/dl Negative   BILIRUBIN UA  Negative   BLOOD UA  Moderate*       Imaging Studies: I have personally reviewed pertinent reports  EKG, Pathology, and Other Studies: I have personally reviewed pertinent reports  Dictation voice to text software has been used in the creation of this document  Please consider this in light of any contextual or grammatical errors

## 2021-06-21 NOTE — PROGRESS NOTES
[facility]  Progress Note - Rima Antunez 1955, 73 y o  female MRN: [id number]  Unit/Bed#: W -01 Encounter: [id number]  Primary Care Provider: Ahmet Kwong MD   Date and time admitted to hospital: 6/20/2021  7:58 PM    * Ambulatory dysfunction  Assessment & Plan  · Secondary to Multiple Sclerosis   · Patient reports regularly ambulating with a rolling walker at home  · History of recurrent falls- most recently patient states her legs gave out and she fell from walker  · CT cervical spine shows no cervical spine fracture of traumatic malalignment  CT head shows no acute intracranial abnormality  CT chest abdomen pelvis shows no acute pathology  · Fall precautions in place   · PT/OT for further evaluation     Multiple sclerosis ([facility] [address] )  Assessment & Plan  · Patient follows with Neurologist in [place], [place] every 2 months   · She has Primary Progressive Multiple Sclerosis and therefore does not symptomatic flare ups   · Will hold off on steroids as role not clear at this time  · Immunosuppression with Methotrexate (injection every 2 months- last injection 2 weeks ago per patient)  · Patient on Dalfampridine (to improve muscle weakness) at home which is not on hospital formulary- family may bring in   · MRI Brain from June 16th, 2021 shows "1  No change  2  Too numerous to count lesions in cerebrum, cerebellum, consistent with patient's known diagnosis of demyelinating disease"   · Continue Baclofen for muscle spacticity   · Neurology consult placed- input appreciated     Acute cystitis without hematuria  Assessment & Plan  · Patient states she finished Macrobid treatment for UTI almost 2 weeks ago and that her symptom of increased urinary frequency and suprapubic pain  · UA today- nitrite negative, moderate leukocytes  · Urine culture pending   · Patient has a history of MDRO   · Switched from ceftriaxone to cefepime based on previous urine cultures as above  Hypertension  Assessment & Plan  · Blood Pressure: 124/71 acceptable- will continue to monitor   · Continue on Norvasc 2 5mg BID     Neurogenic bladder  Assessment & Plan  · Secondary to Multiple Sclerosis   · Patient reports self-catheterization twice a day at home   · Urinary retention protocol   · Continue Flomax     Leg swelling  Assessment & Plan  · Patient on Lasix 40mg on  and - will continue   · Reports worsening of leg swelling over the past 2 weeks  · Venous duplex of BLE on 21 showed no evidence of DVT   · Echo done on 10/7/2020 showed hyperdynamic systolic function with an EF of 80% and no regional wall motion abnormalities  There was moderate asymmetric hypertrophy with an increased relative contribution of atrial contraction to ventricular filling  · S/p x1 dose of IV lasix 40mg         VTE Pharmacologic Prophylaxis:     High Risk (Score >/= 5) - Pharmacological DVT Prophylaxis Ordered: Enoxaparin (Lovenox)  Sequential Compression Devices Ordered  Mechanical VTE Prophylaxis in Place: Yes    Patient Centered Rounds: I have performed bedside rounds with nursing staff today  Discussions with Specialists or Other Care Team Provider: Neurology + PT/OT    Education and Discussions with Family / Patient: Updated  () via phone  Current Length of Stay: 1 day(s)    Current Patient Status: Inpatient     Discharge Plan / Estimated Discharge Date: Anticipate discharge in 48 hrs to unknown    Code Status: Level 1 - Full Code      Subjective:   No new complaints  Objective:     Vitals:   Temp (24hrs), Av 1 °F (36 7 °C), Min:98 °F (36 7 °C), Max:98 2 °F (36 8 °C)    Temp:  [98 °F (36 7 °C)-98 2 °F (36 8 °C)] 98 °F (36 7 °C)  HR:  [68-98] 68  Resp:  [16-18] 18  BP: (113-189)/(55-86) 124/71  SpO2:  [95 %-97 %] 95 %  Body mass index is 38 35 kg/m²  Input and Output Summary (last 24 hours):        Intake/Output Summary (Last 24 hours) at 2021 [address] [place] [address] filed at 6/21/2021 0954  Gross per 24 hour   Intake 50 ml   Output 825 ml   Net -775 ml       Physical Exam:     Physical Exam  Constitutional:       General: She is not in acute distress  HENT:      Head: Normocephalic and atraumatic  Mouth/Throat:      Mouth: Mucous membranes are moist       Pharynx: Oropharynx is clear  Eyes:      Extraocular Movements: Extraocular movements intact  Pupils: Pupils are equal, round, and reactive to light  Cardiovascular:      Rate and Rhythm: Normal rate and regular rhythm  Heart sounds: No murmur heard  Pulmonary:      Effort: Pulmonary effort is normal       Breath sounds: No wheezing  Abdominal:      General: Abdomen is flat  Bowel sounds are normal       Palpations: Abdomen is soft  Musculoskeletal:      Cervical back: Normal range of motion and neck supple  Right lower leg: No edema  Left lower leg: Edema present  Comments: 3/5 strength in the right side compared to 4/5 strength testing on the left side for the upper extremity  Lower extremity 1/5 BL  Skin:     General: Skin is warm and dry  Neurological:      General: No focal deficit present  Mental Status: She is alert and oriented to person, place, and time  Mental status is at baseline     Psychiatric:         Mood and Affect: Mood normal           Additional Data:     Labs:  Results from last 7 days   Lab Units 06/20/21 2035   WBC Thousand/uL 4 76   HEMOGLOBIN g/dL 13 0   HEMATOCRIT % 39 7   PLATELETS Thousands/uL 300   NEUTROS PCT % 71   LYMPHS PCT % 20   MONOS PCT % 7   EOS PCT % 1     Results from last 7 days   Lab Units 06/20/21 2035   SODIUM mmol/L 145   POTASSIUM mmol/L 4 4   CHLORIDE mmol/L 106   CO2 mmol/L 31   BUN mg/dL 21   CREATININE mg/dL 0 66   ANION GAP mmol/L 8   CALCIUM mg/dL 8 8   ALBUMIN g/dL 3 7   TOTAL BILIRUBIN mg/dL 0 45   ALK PHOS U/L 160*   ALT U/L 29   AST U/L 13   GLUCOSE RANDOM mg/dL 89                       Imaging: Reviewed radiology reports from this admission including: CT head    Recent Cultures (last 7 days):     Results from last 7 days   Lab Units 06/20/21 2133   BLOOD CULTURE  Received in Microbiology Lab  Culture in Progress  Received in Microbiology Lab  Culture in Progress  Lines/Drains:  Invasive Devices     Peripheral Intravenous Line            Peripheral IV 06/20/21 Right Antecubital <1 day                Telemetry:        Last 24 Hours Medication List:   Current Facility-Administered Medications   Medication Dose Route Frequency Provider Last Rate    acetaminophen  650 mg Oral Q6H PRN Janeth Stevens MD      amLODIPine  2 5 mg Oral BID Janeth Stevens MD      aspirin  81 mg Oral Daily Janeth Stevens MD      baclofen  30 mg Oral TID Janeth Stevens MD      cholecalciferol  2,000 Units Oral Daily Janeth Stevens MD      Dalfampridine ER  10 mg Oral BID Janeth Stevens MD      enoxaparin  40 mg Subcutaneous Daily Janeth Stevens MD      lidocaine  1 patch Topical Daily Janeth Stevens MD      tamsulosin  0 8 mg Oral Daily Janeth Stevens MD          Today, Patient Was Seen By: Hallie Gomes DO    ** Please Note: This note has been constructed using a voice recognition system   **

## 2021-06-21 NOTE — H&P
[facility]  H&P- [address] 1955, 73 y o  female MRN: [id number]  Unit/Bed#: ED 05 Encounter: [id number]  Primary Care Provider: Nadeen Browning MD   Date and time admitted to hospital: 6/20/2021  7:58 PM    Ambulatory dysfunction  Assessment & Plan  · Secondary to Multiple Sclerosis   · Patient reports regularly ambulating with a rolling walker at home  · History of recurrent falls- most recent was this morning when patient states her legs gave out and she fell from walker onto the tile floor, hitting the left side of her face on the floor- did not lose consciousness at this time  Minor bruise on eft cheek, but patient denies headache or facial pain  CT cervical spine shows no cervical spine fracture of traumatic malalignment  CT head shows no acute intracranial abnormality  CT chest abdomen pelvis shows no acute pathology  · Fall precautions in place   · PT/OT     Multiple sclerosis ([facility] [address] )  Assessment & Plan  · [place]   · She has Primary Progressive Multiple Sclerosis and therefore does not symptomatic flare ups   · Will hold off on steroids as role not clear at this time  · Immunosuppression with Methotrexate (injection every 2 months- last injection 2 weeks ago per patient)  · Patient on Dalfampridine (to improve muscle weakness) at home which is not on hospital formulary- family may bring in   · MRI Brain from June 16th, 2021 shows "1  No change     2  Too numerous to count lesions in cerebrum, cerebellum, consistent with patient's known diagnosis of demyelinating disease"   · Continue Baclofen for muscle spacticity   · Neurology consult placed- input appreciated     Acute cystitis without hematuria  Assessment & Plan  · Patient states she finished Macrobid treatment for UTI almost 2 weeks ago and that her symptom of increased urinary frequency resolved- currently asymptomatic   · UA today- nitrite negative, moderate leukocytes  · Urine culture pending   · Patient has a history of MDRO   · Received 1 dose of Ceftriaxone in ED- will continue for now       Leg swelling  Assessment & Plan  · Patient on Lasix 40mg on Mondays and Fridays- will continue   · Reports worsening of leg swelling over the past 2 weeks  · Venous duplex of BLE on 6/16/21 showed no evidence of DVT   · Echo done on 10/7/2020 showed hyperdynamic systolic function with an EF of 80% and no regional wall motion abnormalities  There was moderate asymmetric hypertrophy with an increased relative contribution of atrial contraction to ventricular filling  · On exam has 2+ pitting edema of B/L- will give one time dose of IV Lasix (40mg)    Neurogenic bladder  Assessment & Plan  · Secondary to Multiple Sclerosis   · Patient reports self-catheterization twice a day at home   · Urinary retention protocol   · Continue Flomax     Hypertension  Assessment & Plan  · BP adequate at 113/55- will continue to monitor   · Continue on Norvasc 2 5mg BID     VTE Prophylaxis: Enoxaparin (Lovenox)  / sequential compression device   Code Status: Level 1- full code     Anticipated Length of Stay:  Patient will be admitted on an Inpatient basis with an anticipated length of stay of  > 2 midnights  Justification for Hospital Stay: weakness     Chief Complaint:   Fall    History of Present Illness:    Rima Estrada is a 73 y o  female with past medical history of primary progressive multiple sclerosis, neurogenic bladder, hypertension, chronic lower back pain and ambulatory dysfunction who reports fall this morning  Patient states that when she was ambulating with her rolling walker this morning, her legs gave out and she felt onto her side  She hit the left side of her face on tile floor  Patient denies loss of consciousness, facial pain or headache     She is presenting to the ED this evening after almost falling a second time-  was able to catch her before she fell again  Patient denies worsening of neuropathy in her hands and feet  She states she has been having more brain fog over the past few days  Reports she was recently treated for a UTI  Denies dysuria, hematuria, fever, chills, abdominal pain, nausea and vomiting  She does endorse some worsening of her chronic lower back pain  Review of Systems:    Review of Systems   Constitutional: Positive for fatigue  Negative for appetite change, chills and fever  Eyes: Negative for visual disturbance  Respiratory: Negative for cough, chest tightness and shortness of breath  Cardiovascular: Positive for leg swelling  Negative for chest pain and palpitations  Gastrointestinal: Negative for abdominal pain, diarrhea, nausea and vomiting  Genitourinary: Negative for decreased urine volume, dysuria and hematuria  Musculoskeletal: Positive for back pain and gait problem  Neurological: Positive for weakness  Negative for dizziness and headaches  Past Medical and Surgical History:     Past Medical History:   Diagnosis Date    Back pain 2016    Hypertension     Incontinence     MS (multiple sclerosis) ([facility] [address] )     Age 37    Neurogenic bladder     Spasticity        Past Surgical History:   Procedure Laterality Date    BACK SURGERY  2016    Lumbar fusion    ELBOW SURGERY Right 2008    ORIF       Meds/Allergies:    Prior to Admission medications    Medication Sig Start Date End Date Taking?  Authorizing Provider   ALOE PO Take 500 mg by mouth   Yes Historical Provider, MD   AMLODIPINE BESYLATE PO Take 2 5 mg by mouth 2 (two) times a day   Yes Historical Provider, MD   ascorbic acid (VITAMIN C) 250 mg tablet Take 1,000 mg by mouth daily     Yes Historical Provider, MD   aspirin (ECOTRIN LOW STRENGTH) 81 mg EC tablet Take 81 mg by mouth daily   Yes Historical Provider, MD   BACLOFEN PO Take 30 mg by mouth 3 (three) times a day   Yes Historical Provider, MD   cholecalciferol (VITAMIN D3) 1,000 units tablet Take 2,000 Units by mouth daily     Yes Historical Provider, MD   Dalfampridine ER 10 MG TB12 Take by mouth   Yes Historical Provider, MD   multivitamin (THERAGRAN) TABS Take 1 tablet by mouth daily   Yes Historical Provider, MD   nabumetone (RELAFEN) 500 mg tablet Take 1,000 mg by mouth 2 (two) times a day   Yes Historical Provider, MD   TAMSULOSIN HCL PO Take 0 8 mg by mouth daily     Yes Historical Provider, MD   vitamin B-12 (CYANOCOBALAMIN) 250 MCG tablet Take 250 mcg by mouth daily   Yes Historical Provider, MD   vitamin E, tocopherol, 1,000 units capsule Take 400 Units by mouth daily     Yes Historical Provider, MD   Preston 650 MG TABS Take 1,300 mg by mouth 3 (three) times a day  Patient not taking: Reported on 6/20/2021    Historical Provider, MD   GOLDENSEAL PO Take 325 mg by mouth daily    Historical Provider, MD   fingolimod (GILENYA) 0 5 MG capsule Take 0 5 mg by mouth daily  6/20/21  Historical Provider, MD   nabumetone (RELAFEN) 500 mg tablet Take 500 mg by mouth 2 (two) times a day  6/20/21  Historical Provider, MD   nitrofurantoin (MACRODANTIN) 100 mg capsule Take 100 mg by mouth daily  6/20/21  Historical Provider, MD   Omega-3 Fatty Acids (FISH OIL PO) Take 300 mg by mouth daily  6/20/21  Historical Provider, MD   sertraline (ZOLOFT) 50 mg tablet Take 50 mg by mouth daily  6/20/21  Historical Provider, MD     I have reviewed home medications with patient personally      Allergies: No Known Allergies    Social History:     Marital Status: /Civil Union   Patient Pre-hospital Living Situation: Lives at home with    Patient Pre-hospital Level of Mobility: Ambulates with rolling walker   Patient Pre-hospital Diet Restrictions: none  Substance Use History:   Social History     Substance and Sexual Activity   Alcohol Use Not Currently     Social History     Tobacco Use   Smoking Status Never Smoker   Smokeless Tobacco Never Used     Social History     Substance and Sexual Activity   Drug Use Yes  Types: Marijuana       Family History:    non-contributory    Physical Exam:     Vitals:   Blood Pressure: 116/56 (06/20/21 2300)  Pulse: 72 (06/20/21 2300)  Temperature: 98 2 °F (36 8 °C) (06/20/21 2013)  Temp Source: Oral (06/20/21 2013)  Respirations: 16 (06/20/21 2300)  Weight - Scale: 74 8 kg (165 lb) (06/20/21 2005)  SpO2: 96 % (06/20/21 2300)    Physical Exam  Vitals reviewed  Constitutional:       General: She is not in acute distress  Appearance: Normal appearance  She is not ill-appearing, toxic-appearing or diaphoretic  HENT:      Head: Normocephalic and atraumatic  Right Ear: External ear normal       Left Ear: External ear normal       Nose: Nose normal       Mouth/Throat:      Mouth: Mucous membranes are moist       Pharynx: Oropharynx is clear  No oropharyngeal exudate or posterior oropharyngeal erythema  Eyes:      General: No scleral icterus  Right eye: No discharge  Left eye: No discharge  Extraocular Movements: Extraocular movements intact  Conjunctiva/sclera: Conjunctivae normal    Cardiovascular:      Rate and Rhythm: Normal rate and regular rhythm  Pulses: Normal pulses  Heart sounds: Normal heart sounds  Pulmonary:      Effort: Pulmonary effort is normal       Breath sounds: Normal breath sounds  Abdominal:      General: Bowel sounds are normal  There is no distension  Palpations: Abdomen is soft  Tenderness: There is abdominal tenderness (suprapubic - mild )  Skin:     General: Skin is warm and dry  Capillary Refill: Capillary refill takes less than 2 seconds  Findings: Bruising (  left cheek ) present  Neurological:      Mental Status: She is alert and oriented to person, place, and time  Sensory: No sensory deficit  Motor: Weakness (4/5 strength in BL LE and in RUE  5/5 motor strength in LUE ) present     Psychiatric:         Mood and Affect: Mood normal          Behavior: Behavior normal  Additional Data:     Lab Results: I have personally reviewed pertinent reports  Results from last 7 days   Lab Units 06/20/21 2035   WBC Thousand/uL 4 76   HEMOGLOBIN g/dL 13 0   HEMATOCRIT % 39 7   PLATELETS Thousands/uL 300   NEUTROS PCT % 71   LYMPHS PCT % 20   MONOS PCT % 7   EOS PCT % 1     Results from last 7 days   Lab Units 06/20/21 2035   POTASSIUM mmol/L 4 4   CHLORIDE mmol/L 106   CO2 mmol/L 31   BUN mg/dL 21   CREATININE mg/dL 0 66   CALCIUM mg/dL 8 8   ALK PHOS U/L 160*   ALT U/L 29   AST U/L 13           Imaging: I have personally reviewed pertinent reports  CT head without contrast    Result Date: 6/20/2021  Narrative: CT BRAIN - WITHOUT CONTRAST INDICATION:   Head trauma, minor (Age => 66y) trauma, head injury, aspirin  COMPARISON:  None  TECHNIQUE:  CT examination of the brain was performed  In addition to axial images, sagittal and coronal 2D reformatted images were created and submitted for interpretation  Radiation dose length product (DLP) for this visit:  1050 mGy-cm   This examination, like all CT scans performed in the [facility], was performed utilizing techniques to minimize radiation dose exposure, including the use of iterative reconstruction and automated exposure control  IMAGE QUALITY:  Diagnostic  FINDINGS: PARENCHYMA: Decreased attenuation is noted in periventricular and subcortical white matter demonstrating an appearance that is statistically most likely to represent mild microangiopathic change  Old lacunar infarct versus dilated perivascular space in the left basal ganglia  No CT signs of acute infarction  No intracranial mass, mass effect or midline shift  No acute parenchymal hemorrhage  VENTRICLES AND EXTRA-AXIAL SPACES:  Normal for the patient's age  VISUALIZED ORBITS AND PARANASAL SINUSES:  Unremarkable  CALVARIUM AND EXTRACRANIAL SOFT TISSUES:  Normal      Impression: No acute intracranial abnormality   The study was marked in [facility] for immediate notification as per trauma communication protocol  Workstation performed: [id number]     CT cervical spine without contrast    Result Date: 6/20/2021  Narrative: CT CERVICAL SPINE - WITHOUT CONTRAST INDICATION:   Neck pain, recent trauma trauma, neck pain  COMPARISON:  None  TECHNIQUE:  CT examination of the cervical spine was performed without intravenous contrast   Contiguous axial images were obtained  Sagittal and coronal reconstructions were performed  Radiation dose length product (DLP) for this visit:  450 mGy-cm   This examination, like all CT scans performed in the [facility], was performed utilizing techniques to minimize radiation dose exposure, including the use of iterative reconstruction and automated exposure control  IMAGE QUALITY:  Diagnostic  FINDINGS: ALIGNMENT:  Normal alignment of the cervical spine  No subluxation  VERTEBRAL BODIES:  No fracture  DEGENERATIVE CHANGES:  Mild multilevel cervical degenerative changes are noted without critical central canal stenosis  PREVERTEBRAL AND PARASPINAL SOFT TISSUES:  Unremarkable  THORACIC INLET:  Normal      Impression: No cervical spine fracture or traumatic malalignment  The study was marked in [facility] for immediate notification as per trauma communication protocol  Workstation performed: [id number]     CT chest abdomen pelvis w contrast    Result Date: 6/20/2021  Narrative: CT CHEST, ABDOMEN AND PELVIS WITH IV CONTRAST INDICATION:   Chest-abdomen-pelvis trauma, blunt trauma, back pain, abd distension/pain  COMPARISON:  None  TECHNIQUE: CT examination of the chest, abdomen and pelvis was performed  Axial, sagittal, and coronal 2D reformatted images were created from the source data and submitted for interpretation  Radiation dose length product (DLP) for this visit:  1215 mGy-cm     This examination, like all CT scans performed in the [facility], was performed utilizing techniques to minimize radiation dose exposure, including the use of iterative reconstruction and automated exposure control  IV Contrast:  100 mL of iohexol (OMNIPAQUE) Enteric Contrast: Enteric contrast was not administered  FINDINGS: CHEST LUNGS:  Mild bibasilar dependent atelectasis  There is no tracheal or endobronchial lesion  PLEURA:  Unremarkable  HEART/GREAT VESSELS:  Unremarkable for patient's age  MEDIASTINUM AND YADIRA:  Unremarkable  CHEST WALL AND LOWER NECK:   Unremarkable  ABDOMEN LIVER/BILIARY TREE:  Unremarkable  GALLBLADDER:  No calcified gallstones  No pericholecystic inflammatory change  SPLEEN:  Unremarkable  PANCREAS:  Unremarkable  ADRENAL GLANDS:  Unremarkable  KIDNEYS/URETERS:  Unremarkable  No hydronephrosis  STOMACH AND BOWEL:  Unremarkable  APPENDIX:  No findings to suggest appendicitis  ABDOMINOPELVIC CAVITY:  No ascites  No pneumoperitoneum  No lymphadenopathy  VESSELS:  Unremarkable for patient's age  PELVIS REPRODUCTIVE ORGANS:  Unremarkable for patient's age  URINARY BLADDER:  Unremarkable  ABDOMINAL WALL/INGUINAL REGIONS:  Unremarkable  OSSEOUS STRUCTURES:  No acute fracture or destructive osseous lesion  Status post L4-5 posterior fusion  Impression: No acute pathology  The study was marked in [facility] for immediate notification as per trauma communication protocol  Workstation performed: [id number]       EKG, Pathology, and Other Studies Reviewed on Admission:   · EKG: N/A    Epic / Care Everywhere Records Reviewed: Yes     ** Please Note: This note has been constructed using a voice recognition system   **

## 2021-06-21 NOTE — ASSESSMENT & PLAN NOTE
· Patient states she finished Macrobid treatment for UTI almost 2 weeks ago and that her symptom of increased urinary frequency resolved- currently asymptomatic   · UA today- nitrite negative, moderate leukocytes  · Urine culture pending   · Patient has a history of MDRO   · Received 1 dose of Ceftriaxone in ED- will continue for now

## 2021-06-21 NOTE — PHYSICAL THERAPY NOTE
PHYSICAL THERAPY EVALUATION NOTE          Patient Name: Amber Clay  QNZJC'X Date: 2021     AGE:   73 y o  Mrn:   [id number]  ADMIT DX:  Back pain [M54 9]  UTI (urinary tract infection) [N39 0]  Weakness [R53 1]  MS (multiple sclerosis) ([facility]) [G35]    Past Medical History:   Diagnosis Date    Back pain     Hypertension     Incontinence     MS (multiple sclerosis) ([facility] [address] )     Age 37    Neurogenic bladder     Spasticity      Length Of Stay: 1  PHYSICAL THERAPY EVALUATION :   Patient's identity confirmed via 2 patient identifiers (full name and ) at start of session       21 1153   PT Last Visit   PT Visit Date 21   Note Type   Note type Evaluation   Pain Assessment   Pain Assessment Tool Pain Assessment not indicated - pt denies pain   Pain Score No Pain   Home Living   Type of [address] [place] [address] One level;Performs ADLs on one level; Able to live on main level with bedroom/bathroom; Ramped entrance   [facility] Grab bars in shower; Shower chair;Grab bars around toilet   [facility]  (rollator walker)   Prior Function   Level of [place] Needs assistance with IADLs  (Ind  w/ ambulation w/ rollator walker)   Lives With Spouse   Receives Help From Family   ADL Assistance Independent   IADLs Needs assistance   Falls in the last 6 months 1 to 4  (3; fall reason for current admission)   Vocational Retired   Comments Pt reports requiring assistance for bed mobility, but is able to ambulate independently w/ rollator walker for short distances at baseline; pt reports recently feeling weaker than her usual and reports multiple falls due to LE weakness/giving out  ( provides transportation)   Restrictions/Precautions   Weight Bearing Precautions Per Order No   Braces or Orthoses Other (Comment)  (none) Other Precautions Chair Alarm; Bed Alarm;Contact/isolation;Multiple lines; Fall Risk   General   Additional Pertinent History CT head: no acute intracranial abnormality; CT cervical spine: no cervical spine fx or traumatic malalignment; CT chest: no acute fx or destructive osseous lesion, no acute pathology   Family/Caregiver Present No   Cognition   Overall Cognitive Status WFL   Arousal/Participation Cooperative   Orientation Level Oriented X4   Memory Within functional limits   Following Commands Follows all commands and directions without difficulty   Comments pt ID via name and ; pt agreeable to PT eval   RLE Assessment   RLE Assessment X  (PROM limited due to spasticity)   Strength RLE   R Hip Flexion 2-/5   R Knee Extension 2-/5   R Ankle Dorsiflexion 2-/5   R Ankle Plantar Flexion 3-/5   LLE Assessment   LLE Assessment X  (PROM limited due to spasticity)   Strength LLE   L Hip Flexion 2-/5   L Knee Extension 2-/5   L Ankle Dorsiflexion 3-/5   L Ankle Plantar Flexion 3-/5   Coordination   Movements are Fluid and Coordinated 0   Coordination and Movement Description pt mvmts are limited due to wekaness/spacticity    Sensation X   Light Touch   RLE Light Touch Impaired   RLE Light Touch Comments Pt reports chronic, occasional numbness/tingling   LLE Light Touch Impaired   LLE Light Touch Comments Pt reports chronic, occasional numbness/tingling   Bed Mobility   Supine to Sit 2  Maximal assistance   Additional items Assist x 1;HOB elevated; Increased time required;Verbal cues;LE management   Sit to Supine 2  Maximal assistance   Additional items Assist x 2;HOB elevated; Increased time required;Verbal cues;LE management   Additional Comments Pt tolerated sitting at EOB for about 5 min fluctuating between Min Ax1/CGA and supervision to perform sit<>stand transfer trials   Transfers   Sit to Stand   (unable to clear bed x3 w/ Max Ax2 )   Stand to Sit   (unable to clear bed x3 w/ Max Ax2 )   Additional Comments Pt unable to clear bed x3 w/ Max Ax2, RW, and VC; pt bilateral LE/feet repositioned for proper alignment before each transfer trial   Ambulation/Elevation   Gait pattern Not appropriate; Not tested   Balance   Static Sitting Poor +   Dynamic Sitting Poor +   Static Standing   (unable to assess)   Ambulatory   (unable to assess)   Endurance Deficit   Endurance Deficit Yes   Endurance Deficit Description pt limited in functional mobility due to weakness/fatigue   Activity Tolerance   Activity Tolerance Patient limited by fatigue   Medical Staff Made Aware CM Gearold [facility], PCA assisted w/ standing trials and sit>supine mobility   Nurse Made Aware JOSÉ Montaño confirmed pt appropriate for PT eval; post session pt supine in bed w/ HOB elevated, bed alarm on, SCDs applied, in no apparent distress, all needs w/in reach; RN updated post   Assessment   Prognosis Guarded   Problem List Decreased strength;Decreased range of motion;Decreased endurance; Impaired balance;Decreased mobility; Decreased coordination; Impaired tone;Obesity   Assessment Rima Casanova is a 73 y o  Female who [facility] on [facility]20[facility]2021 from home w/ c/o weakness, back pain, and s/p fall and diagnosis of ambulatory dysfunction  Orders for PT eval and treat received, w/ activity orders of up w/ A and fall risk precautions  Pt presents w/ comorbidities of HTN, incontinence, MS, neurogenic bladder, spasticity and personal factors including: mobilizing w/ assistive device, inability to navigate community distances, inability to navigate level surfaces w/o external assistance, positive fall history and inability to perform IADLs  At baseline, pt mobilizes independently w/ rollator walker, and reports 3 falls in the last 6 months  Upon evaluation, pt presents w/ the following deficits: weakness, decreased ROM, altered sensation, impaired coordination, impaired balance, inability to ambulate, and decreased endurance   Pt requires Max Ax1-2 for bed mobility, and was unable to clear bed x3 w/ Max Ax2  Gait not assessed at this time due to pt's fatigue, weakness, and inability to perform STS transfer  Pt's clinical presentation is unstable/unpredictable due to need for input for mobility technique/safety, recent drastic decline in mobility compared to baseline, ongoing medical monitoring/mangement, and recent history of falls  Pt is at an increased risk of falls due to impaired balance, decreased LE strength, impaired coordination, impaired sensation, and requirement of assistance w/ all functional mobility at this time  Given the above findings, discharge recommendation is for post acute rehab  During this admission, pt would benefit from skilled acute inpatient PT in order to address the abovementioned deficits to maximize function and mobility before DC from acute care  Goals   Patient Goals "I want to be able to walk again"   STG Expiration Date 07/01/21   Short Term Goal #1 Pt will: perform bed mobility to EOB w/ consistent Min Ax1 to decrease caregiver burden; tolerate sitting at EOB for at least 10 min w/ supervision to increase pt's upright sitting tolerance; perform STS transfer trial w/ Min Ax1 to facilitate increased OOB mobility; increase LE strength by 1 grade to increase pt's tolerance to physical activity; increase all balance ratings by 1 grade to decrease pt's risk of falls; PT to see to assess and set goals for ambulation when appropriate  PT Treatment Day 0   Plan   Treatment/Interventions Functional transfer training;LE strengthening/ROM; Therapeutic exercise; Endurance training;Patient/family training;Equipment eval/education; Bed mobility  (PT to see for ambulation when appropriate)   PT Frequency Other (Comment)  (3-5x/wk)   Recommendation   PT Discharge Recommendation Post acute rehabilitation services   Equipment Recommended   (to be determined w/ OOB mobility)   AM-PAC Basic Mobility Inpatient   Turning in Bed Without Bedrails 2   Lying on Back to Sitting on Edge of Flat Bed 2   Moving Bed to Chair 1   Standing Up From Chair 1   Walk in Room 1   Climb 3-5 Stairs 1   Basic Mobility Inpatient Raw Score 8   Turning Head Towards Sound 3   Follow Simple Instructions 4   Low Function Basic Mobility Raw Score 15   Low Function Basic Mobility Standardized Score 23 9     The patient's AM-PAC Basic Mobility Inpatient Short Form Low Function Raw Score 15 , Standardized Score is 23 9  A standardized score less 42 9 suggests the patient may benefit from discharge to post-acute rehab services  Please also refer to the recommendation of the Physical Therapist for safe discharge planning      DC rec: post acute rehab      Pt would benefit from skilled inpatient PT during this admission in order to facilitate progress towards goals to maximize functional independence DPT      Karla Saenz, PT, DPT  06/21/21

## 2021-06-22 PROBLEM — E87.0 HYPERNATREMIA: Status: ACTIVE | Noted: 2021-06-22

## 2021-06-22 LAB
ANION GAP SERPL CALCULATED.3IONS-SCNC: 7 MMOL/L (ref 4–13)
BUN SERPL-MCNC: 25 MG/DL (ref 5–25)
CALCIUM SERPL-MCNC: 8.8 MG/DL (ref 8.3–10.1)
CHLORIDE SERPL-SCNC: 108 MMOL/L (ref 100–108)
CO2 SERPL-SCNC: 31 MMOL/L (ref 21–32)
CREAT SERPL-MCNC: 0.6 MG/DL (ref 0.6–1.3)
ERYTHROCYTE [DISTWIDTH] IN BLOOD BY AUTOMATED COUNT: 14.9 % (ref 11.6–15.1)
GFR SERPL CREATININE-BSD FRML MDRD: 96 ML/MIN/1.73SQ M
GLUCOSE SERPL-MCNC: 97 MG/DL (ref 65–140)
HCT VFR BLD AUTO: 38.2 % (ref 34.8–46.1)
HGB BLD-MCNC: 12.4 G/DL (ref 11.5–15.4)
MCH RBC QN AUTO: 31.5 PG (ref 26.8–34.3)
MCHC RBC AUTO-ENTMCNC: 32.5 G/DL (ref 31.4–37.4)
MCV RBC AUTO: 97 FL (ref 82–98)
PLATELET # BLD AUTO: 244 THOUSANDS/UL (ref 149–390)
PMV BLD AUTO: 10.4 FL (ref 8.9–12.7)
POTASSIUM SERPL-SCNC: 3.7 MMOL/L (ref 3.5–5.3)
RBC # BLD AUTO: 3.94 MILLION/UL (ref 3.81–5.12)
SODIUM SERPL-SCNC: 146 MMOL/L (ref 136–145)
WBC # BLD AUTO: 3.78 THOUSAND/UL (ref 4.31–10.16)

## 2021-06-22 PROCEDURE — 97530 THERAPEUTIC ACTIVITIES: CPT

## 2021-06-22 PROCEDURE — 97116 GAIT TRAINING THERAPY: CPT

## 2021-06-22 PROCEDURE — 99231 SBSQ HOSP IP/OBS SF/LOW 25: CPT | Performed by: PSYCHIATRY & NEUROLOGY

## 2021-06-22 PROCEDURE — C9113 INJ PANTOPRAZOLE SODIUM, VIA: HCPCS | Performed by: INTERNAL MEDICINE

## 2021-06-22 PROCEDURE — 85027 COMPLETE CBC AUTOMATED: CPT | Performed by: STUDENT IN AN ORGANIZED HEALTH CARE EDUCATION/TRAINING PROGRAM

## 2021-06-22 PROCEDURE — 80048 BASIC METABOLIC PNL TOTAL CA: CPT | Performed by: STUDENT IN AN ORGANIZED HEALTH CARE EDUCATION/TRAINING PROGRAM

## 2021-06-22 PROCEDURE — 99232 SBSQ HOSP IP/OBS MODERATE 35: CPT | Performed by: INTERNAL MEDICINE

## 2021-06-22 RX ORDER — PANTOPRAZOLE SODIUM 40 MG/1
40 INJECTION, POWDER, FOR SOLUTION INTRAVENOUS
Status: DISCONTINUED | OUTPATIENT
Start: 2021-06-22 | End: 2021-06-23

## 2021-06-22 RX ADMIN — LIDOCAINE 5% 1 PATCH: 700 PATCH TOPICAL at 08:46

## 2021-06-22 RX ADMIN — AMLODIPINE BESYLATE 2.5 MG: 2.5 TABLET ORAL at 08:47

## 2021-06-22 RX ADMIN — BACLOFEN 30 MG: 10 TABLET ORAL at 21:16

## 2021-06-22 RX ADMIN — ENOXAPARIN SODIUM 40 MG: 40 INJECTION SUBCUTANEOUS at 08:47

## 2021-06-22 RX ADMIN — PANTOPRAZOLE SODIUM 40 MG: 40 INJECTION, POWDER, FOR SOLUTION INTRAVENOUS at 14:59

## 2021-06-22 RX ADMIN — ASPIRIN 81 MG: 81 TABLET, COATED ORAL at 08:47

## 2021-06-22 RX ADMIN — AMLODIPINE BESYLATE 2.5 MG: 2.5 TABLET ORAL at 17:17

## 2021-06-22 RX ADMIN — TAMSULOSIN HYDROCHLORIDE 0.8 MG: 0.4 CAPSULE ORAL at 08:47

## 2021-06-22 RX ADMIN — Medication 2000 UNITS: at 08:47

## 2021-06-22 RX ADMIN — BACLOFEN 30 MG: 10 TABLET ORAL at 17:17

## 2021-06-22 RX ADMIN — SODIUM CHLORIDE 1000 MG: 0.9 INJECTION, SOLUTION INTRAVENOUS at 14:57

## 2021-06-22 RX ADMIN — BACLOFEN 30 MG: 10 TABLET ORAL at 08:47

## 2021-06-22 RX ADMIN — CEFEPIME HYDROCHLORIDE 2000 MG: 2 INJECTION, POWDER, FOR SOLUTION INTRAVENOUS at 17:17

## 2021-06-22 NOTE — ASSESSMENT & PLAN NOTE
Patient self-catheterizes due to neurogenic bladder from MS  Frequent UTI's vs possible colonization  Recently finished a course of Macrobid approximately 2 weeks ago for UTI  Since then symptoms have worsened including frequency and suprapubic pain  UA completed during this admission with moderate leukocytes, nitrite negative  Urine culture has grown 10,000 - 19,000 cfu of Enterococcus species  Full species not yet available and sensitivity still pending  Prior urine cultures have been positive for citrobacter freundii and E Coli which were susceptible to Cefepime  To note, the citrobacter species had significant resistance to multiple drugs      Plan:  · Continue IV cefepime - will deescalate once sensitivity results are available

## 2021-06-22 NOTE — PHYSICAL THERAPY NOTE
PHYSICAL THERAPY TREATMENT NOTE          Patient Name: Karissa Grullon  GBIJC'Y Date: 2021     Time   Total time: 24 min         21 1303   PT Last Visit   PT Visit Date 21   Note Type   Note Type Treatment   Pain Assessment   Pain Assessment Tool 0-10   Pain Score 5   Pain Location/Orientation Orientation: Upper; Location: Back   Pain Onset/Description Descriptor: Aching; Onset: Ongoing   Hospital Pain Intervention(s) Repositioned; Ambulation/increased activity; Emotional support  (RN applied lidocane patch)   Restrictions/Precautions   Weight Bearing Precautions Per Order No   Other Precautions Contact/isolation; Chair Alarm; Bed Alarm; Fall Risk;Pain   General   Chart Reviewed Yes   Response to Previous Treatment Patient with no complaints from previous session  Family/Caregiver Present No   Cognition   Overall Cognitive Status WFL   Arousal/Participation Cooperative   Attention Within functional limits   Orientation Level Oriented X4   Memory Within functional limits   Following Commands Follows all commands and directions without difficulty   Comments pt ID via name and ; pt agreeable to PT tx   Bed Mobility   Supine to Sit Unable to assess   Sit to Supine Unable to assess   Additional Comments pt OOB in recliner chair upon arrival to room   Transfers   Sit to Stand 2  Maximal assistance   Additional items Assist x 2;Armrests; Increased time required;Verbal cues   Stand to Sit 2  Maximal assistance   Additional items Assist x 2;Armrests; Increased time required;Verbal cues   Additional Comments Pt able to clear chair w/ LE repositioning and ambulate x1; pt unable to complete further transfer trials from chair x2 attempts due to LE weakness   Ambulation/Elevation   Gait pattern Improper Weight shift;L Foot drag;Decreased foot clearance;Shuffling; Short stride; Excessively slow;L Knee Jalen; Wide RAMON;R Foot drag   Gait Assistance 3 Moderate assist   Additional items Assist x 1  (w/ chair follow)   Assistive Device   (pt's rollator walker; pt declined RW trial)   Distance 8'   Curbs Pt continues to report increased LE weakness compared to baseline   Balance   Static Sitting Poor +   Dynamic Sitting Poor +   Static Standing Poor  (w/ walker)   Dynamic Standing Poor  (w/ walker)   Ambulatory Poor  (w/ walker)   Endurance Deficit   Endurance Deficit Yes   Endurance Deficit Description pt limited in functional mobility due to weakness/fatigue   Activity Tolerance   Activity Tolerance Patient limited by fatigue;Patient limited by pain   Medical Staff Made Aware TAMI Valero, [address] [place] [address] assisted w/ functional mobility   Nurse Made Aware JOSÉ Westbrook confirmed pt appropriate for PT tx; post session pt OOB in recliner chair w/ chair alarm on, LE elevated, all needs w/in reach, in no apparent distress; pt educated on use of call bell; RN updated post   Exercises   Balance training  pt declined further physical activity due to pain and fatigue   Assessment   Prognosis Guarded   Problem List Decreased strength;Decreased range of motion;Decreased endurance; Impaired balance;Decreased mobility; Decreased coordination; Impaired tone;Pain   Assessment Pt seen today for PT intervention w/ pt agreeable to participate  Pt w/ improvements in functional mobility compared to previous session w/ pt demonstrating ability to perform STS transfer and ambualte  Pt able to complete STS transfer in/out of chair w/ Max Ax2 for 1 attempt  Pt able to ambulate 8' w/ rollator walker and Mod Ax1 w/ chair follow  Pt willing to ambulate for 2nd trial  Further STS trials attempted w/ Max Ax2 w/ pt unable to clear chair despite LE positioning and assistance  Pt stated she continues to c/o increased LE weakness compared to her baseline levels  Pt remains limited in functional mobility tolerance/endurance due to LE weakness, fatigue, and pain levels   Pt remains at risk of falls due to decreased LE strength, impaired coordination, impaired balance, and requirement of assistance w/ all functional mobility  Pt would continue to benefit from skilled PT intervention to increase pt's independence w/ functional mobility, decrease pt's risk of falls, and to further promote progress towards set goals and pt's PLOF  DC rec: post acute rehab  Goals   Patient Goals to be able to walk more   STG Expiration Date 07/01/21   Short Term Goal #1 Pt will: perform bed mobility to EOB w/ consistent Min Ax1 to decrease caregiver burden; tolerate sitting at EOB for at least 10 min w/ supervision to increase pt's upright sitting tolerance; perform STS transfer trial w/ Min Ax1 to facilitate increased OOB mobility; ambulate 100' w/ least restrictive device and Min Ax1 to increase pt's ambulation endurance; increase LE strength by 1 grade to increase pt's tolerance to physical activity; increase all balance ratings by 1 grade to decrease pt's risk of falls  (have been updated)   PT Treatment Day 1   Plan   Treatment/Interventions Functional transfer training;LE strengthening/ROM; Therapeutic exercise; Endurance training;Patient/family training;Equipment eval/education; Bed mobility;Gait training   Progress Progressing toward goals   PT Frequency Other (Comment)  (3-5x/wk)   Recommendation   PT Discharge Recommendation Post acute rehabilitation services   Equipment Recommended [facility] Recommended Wheeled walker   Change/add to [employer]?  No   AM-PAC Basic Mobility Inpatient   Turning in Bed Without Bedrails 2   Lying on Back to Sitting on Edge of Flat Bed 2   Moving Bed to Chair 1   Standing Up From Chair 1   Walk in Room 2   Climb 3-5 Stairs 1   Basic Mobility Inpatient Raw Score 9   Turning Head Towards Sound 3   Follow Simple Instructions 4   Low Function Basic Mobility Raw Score 16   Low Function Basic Mobility Standardized Score 25 72       The patient's AM-PAC Basic Mobility Inpatient Short Form Low Function Raw Score 16 , Standardized Score is 25 72  A standardized score less 42 9 suggests the patient may benefit from discharge to post-acute rehab services  Please also refer to the recommendation of the Physical Therapist for safe discharge planning        Gagan Gusman, PT, DPT  06/22/21

## 2021-06-22 NOTE — PLAN OF CARE
Problem: Potential for Falls  Goal: Patient will remain free of falls  Description: INTERVENTIONS:  - Educate patient/family on patient safety including physical limitations  - Instruct patient to call for assistance with activity   - Consult OT/PT to assist with strengthening/mobility   - Keep Call bell within reach  - Keep bed low and locked with side rails adjusted as appropriate  - Keep care items and personal belongings within reach  - Initiate and maintain comfort rounds  - Make Fall Risk Sign visible to staff    Problem: MOBILITY - ADULT  Goal: Maintain or return to baseline ADL function  Description: INTERVENTIONS:  -  Assess patient's ability to carry out ADLs; assess patient's baseline for ADL function and identify physical deficits which impact ability to perform ADLs (bathing, care of mouth/teeth, toileting, grooming, dressing, etc )  - Assess/evaluate cause of self-care deficits   - Assess range of motion  - Assess patient's mobility; develop plan if impaired  - Assess patient's need for assistive devices and provide as appropriate  - Encourage maximum independence but intervene and supervise when necessary  - Involve family in performance of ADLs  - Assess for home care needs following discharge   - Consider OT consult to assist with ADL evaluation and planning for discharge  - Provide patient education as appropriate  Outcome: Progressing  Goal: Maintains/Returns to pre admission functional level  Description: INTERVENTIONS:  - Perform BMAT or MOVE assessment daily    - Set and communicate daily mobility goal to care team and patient/family/caregiver     - Collaborate with rehabilitation services on mobility goals if consulted       Problem: Prexisting or High Potential for Compromised Skin Integrity  Goal: Skin integrity is maintained or improved  Description: INTERVENTIONS:  - Identify patients at risk for skin breakdown  - Assess and monitor skin integrity  - Assess and monitor nutrition and hydration status  - Monitor labs   - Assess for incontinence   - Turn and reposition patient  - Assist with mobility/ambulation  - Relieve pressure over bony prominences  - Avoid friction and shearing  - Provide appropriate hygiene as needed including keeping skin clean and dry  - Evaluate need for skin moisturizer/barrier cream  - Collaborate with interdisciplinary team   - Patient/family teaching  - Consider wound care consult   Outcome: Progressing     Problem: PAIN - ADULT  Goal: Verbalizes/displays adequate comfort level or baseline comfort level  Description: Interventions:  - Encourage patient to monitor pain and request assistance  - Assess pain using appropriate pain scale  - Administer analgesics based on type and severity of pain and evaluate response  - Implement non-pharmacological measures as appropriate and evaluate response  - Consider cultural and social influences on pain and pain management  - Notify physician/advanced practitioner if interventions unsuccessful or patient reports new pain  Outcome: Progressing     Problem: INFECTION - ADULT  Goal: Absence or prevention of progression during hospitalization  Description: INTERVENTIONS:  - Assess and monitor for signs and symptoms of infection  - Monitor lab/diagnostic results  - Monitor all insertion sites, i e  indwelling lines, tubes, and drains  - Monitor endotracheal if appropriate and nasal secretions for changes in amount and color  - [place] appropriate cooling/warming therapies per order  - Administer medications as ordered  - Instruct and encourage patient and family to use good hand hygiene technique  - Identify and instruct in appropriate isolation precautions for identified infection/condition  Outcome: Progressing     Problem: SAFETY ADULT  Goal: Patient will remain free of falls  Description: INTERVENTIONS:  - Educate patient/family on patient safety including physical limitations  - Instruct patient to call for assistance with activity   - Consult OT/PT to assist with strengthening/mobility   - Keep Call bell within reach  - Keep bed low and locked with side rails adjusted as appropriate  - Keep care items and personal belongings within reach  - Initiate and maintain comfort rounds  - Make Fall Risk Sign visible to staff    Goal: Maintain or return to baseline ADL function  Description: INTERVENTIONS:  -  Assess patient's ability to carry out ADLs; assess patient's baseline for ADL function and identify physical deficits which impact ability to perform ADLs (bathing, care of mouth/teeth, toileting, grooming, dressing, etc )  - Assess/evaluate cause of self-care deficits   - Assess range of motion  - Assess patient's mobility; develop plan if impaired  - Assess patient's need for assistive devices and provide as appropriate  - Encourage maximum independence but intervene and supervise when necessary  - Involve family in performance of ADLs  - Assess for home care needs following discharge   - Consider OT consult to assist with ADL evaluation and planning for discharge  - Provide patient education as appropriate  Outcome: Progressing  Goal: Maintains/Returns to pre admission functional level  Description: INTERVENTIONS:  - Perform BMAT or MOVE assessment daily    - Set and communicate daily mobility goal to care team and patient/family/caregiver     - Collaborate with rehabilitation services on mobility goals if consulted    Problem: DISCHARGE PLANNING  Goal: Discharge to home or other facility with appropriate resources  Description: INTERVENTIONS:  - Identify barriers to discharge w/patient and caregiver  - Arrange for needed discharge resources and transportation as appropriate  - Identify discharge learning needs (meds, wound care, etc )  - Arrange for interpretive services to assist at discharge as needed  - Refer to Case Management Department for coordinating discharge planning if the patient needs post-hospital services based on physician/advanced practitioner order or complex needs related to functional status, cognitive ability, or social support system  Outcome: Progressing     Problem: Knowledge Deficit  Goal: Patient/family/caregiver demonstrates understanding of disease process, treatment plan, medications, and discharge instructions  Description: Complete learning assessment and assess knowledge base    Interventions:  - Provide teaching at level of understanding  - Provide teaching via preferred learning methods  Outcome: Progressing     Problem: GENITOURINARY - ADULT  Goal: Maintains or returns to baseline urinary function  Description: INTERVENTIONS:  - Assess urinary function  - Encourage oral fluids to ensure adequate hydration if ordered  - Administer IV fluids as ordered to ensure adequate hydration  - Administer ordered medications as needed  - Offer frequent toileting  - Follow urinary retention protocol if ordered  Outcome: Progressing  Goal: Absence of urinary retention  Description: INTERVENTIONS:  - Assess patients ability to void and empty bladder  - Monitor I/O  - Bladder scan as needed  - Discuss with physician/AP medications to alleviate retention as needed  - Discuss catheterization for long term situations as appropriate  Outcome: Progressing

## 2021-06-22 NOTE — ASSESSMENT & PLAN NOTE
History of Primary Progressive MS, follows with Neurology in [place], [place] every 2 months  Current treatment regimen include intrathecal MTX every 2 months (most recent injection approximately 2 weeks ago), Dalfampridine for muscle weakness), and Baclofen (due to spasticity)  Most recent imaging completed on 6/16/21 revealed innumerable lesions in cerebrum and cerebellum with no significant change from prior study  Suspect patient is having acute exacerbation vs pseudo-exacerbation in setting of recent UTI along with progressing weakness and debility  Plan:  · Neurology following  Appreciate recommendations  · Neurology starting IV steroids, 1 g Solumedrol daily for 3 days  · PT/OT following  Recommending post-acute rehab  · Home Dalfampridine is not on hospital formulary  Family may possibly bring this up to the hospital  If this occurs, will enter non-formulary orders for pharmacy to profile medication    · Continue medical management of UTI

## 2021-06-22 NOTE — PROGRESS NOTES
Neurology - Progress Note  Rima Mora 73 y o  female MRN: [id number]  Unit/Bed#: W -01 Encounter: [id number]    Assessment:  1  Primary progressive multiple sclerosis   ? Diagnosed around age 37  ? Maintained on intrathecal methotrexate q 8 weeks for the past year and dalfampridine  ? Presenting to the ED s/p fall at home  ? MRI brain (6/16/21): Too numerous to count lesions in cerebrum, cerebellum, consistent with   patient's known diagnosis of demyelinating disease  ? CT head w/o contrast (6/20): No acute intracranial abnormality  ? CT cervical spine w/o contrast (6/20): No cervical spine fracture or traumatic malalignment  ? CT Chest/Abdomen/Pelvis (6/20): No acute pathology  2  Ambulatory dysfunction  ? Presents to the ED s/p fall  ? PT/OT evaluation  3  Neurogenic bladder  ? Maintained on tamsulosin  ? Straight cath BID at home   ? Recently treated for UTI approx  2 weeks ago with 7 days antibiotics as an outpatient  ? Ceftriaxone x 1 (6/19)  ? Cefepime Day 2 per primary team  Plan:  · Continue antibiotic treatment for UTI per primary team  · Patient's reports condition is similar to yesterday  No current pain or discomfort  Will discuss possible steroid administration with attending     Chief Complaint:   Generalized weakness and ambulatory dysfunction    Subjective:   Patient states she worked with PT yesterday and that she still felt generally weak  However she denies any pain or discomfort today  ROS:  General ROS: positive for  - fatigue  Denies lightheadedness, dizziness, headache, new numbness or tingling (baseline neuropathy)     Vitals: Blood pressure 134/65, pulse 72, temperature (!) 97 4 °F (36 3 °C), resp  rate 18, height 4' 7" (1 397 m), weight 74 8 kg (165 lb), SpO2 96 %  ,Body mass index is 38 35 kg/m²      Physical Exam:  Physical Exam    General appearance: alert, appears stated age and cooperative  Head: Normocephalic, without obvious abnormality, atraumatic  Lungs: clear to auscultation bilaterally  Heart: regular rate and rhythm, S1, S2 normal, no murmur, click, rub or gallop  Abdomen: normal findings:    Extremities: extremities normal, atraumatic, no cyanosis or edema  Neurologic: Mental status: Alert, oriented, thought content appropriate  EOM intact with horizontal nystagmus present  Motor: Right upper extremity  strength > left (chronic), 2/5 lower extremity strength   Sensory: intact  X 4 limbs 4 mod inc vib and prop, not PP               Current Facility-Administered Medications   Medication Dose Route Frequency Provider Last Rate    acetaminophen  650 mg Oral Q6H PRN Tigist Alysha, MD      amLODIPine  2 5 mg Oral BID Tigist Alysha, MD      aspirin  81 mg Oral Daily Tigist Alysha, MD      baclofen  30 mg Oral TID Tigist Alysha, MD      cholecalciferol  2,000 Units Oral Daily Tigist Alysha, MD      enoxaparin  40 mg Subcutaneous Daily Tigist Alysha, MD      lidocaine  1 patch Topical Daily Tigist Alysha, MD      tamsulosin  0 8 mg Oral Daily Tigist Alysha, MD         Lab, Imaging and other studies:   CBC:   Results from last 7 days   Lab Units 06/22/21  0603 06/20/21 2035   WBC Thousand/uL 3 78* 4 76   RBC Million/uL 3 94 4 11   HEMOGLOBIN g/dL 12 4 13 0   HEMATOCRIT % 38 2 39 7   MCV fL 97 97   PLATELETS Thousands/uL 244 300   , BMP/CMP:   Results from last 7 days   Lab Units 06/22/21  0603 06/20/21 2035   SODIUM mmol/L 146* 145   POTASSIUM mmol/L 3 7 4 4   CHLORIDE mmol/L 108 106   CO2 mmol/L 31 31   BUN mg/dL 25 21   CREATININE mg/dL 0 60 0 66   CALCIUM mg/dL 8 8 8 8   AST U/L  --  13   ALT U/L  --  29   ALK PHOS U/L  --  160*   EGFR ml/min/1 73sq m 96 93

## 2021-06-22 NOTE — PLAN OF CARE
Problem: PHYSICAL THERAPY ADULT  Goal: Performs mobility at highest level of function for planned discharge setting  See evaluation for individualized goals  Description: Treatment/Interventions: Functional transfer training, LE strengthening/ROM, Therapeutic exercise, Endurance training, Patient/family training, Equipment eval/education, Bed mobility, Gait training  Equipment Recommended: July Nunez       See flowsheet documentation for full assessment, interventions and recommendations  Outcome: Progressing  Note: Prognosis: Guarded  Problem List: Decreased strength, Decreased range of motion, Decreased endurance, Impaired balance, Decreased mobility, Decreased coordination, Impaired tone, Pain  Assessment: Pt seen today for PT intervention w/ pt agreeable to participate  Pt w/ improvements in functional mobility compared to previous session w/ pt demonstrating ability to perform STS transfer and ambualte  Pt able to complete STS transfer in/out of chair w/ Max Ax2 for 1 attempt  Pt able to ambulate 8' w/ rollator walker and Mod Ax1 w/ chair follow  Pt willing to ambulate for 2nd trial  Further STS trials attempted w/ Max Ax2 w/ pt unable to clear chair despite LE positioning and assistance  Pt stated she continues to c/o increased LE weakness compared to her baseline levels  Pt remains limited in functional mobility tolerance/endurance due to LE weakness, fatigue, and pain levels  Pt remains at risk of falls due to decreased LE strength, impaired coordination, impaired balance, and requirement of assistance w/ all functional mobility  Pt would continue to benefit from skilled PT intervention to increase pt's independence w/ functional mobility, decrease pt's risk of falls, and to further promote progress towards set goals and pt's PLOF  DC rec: post acute rehab  PT Discharge Recommendation: Post acute rehabilitation services          See flowsheet documentation for full assessment

## 2021-06-22 NOTE — ASSESSMENT & PLAN NOTE
Recurrent falls secondary to MS  Currently ambulates with assistance of a rolling walker  Most recent fall secondary to legs giving out  CTH, C-spine, and CAP without acute pathology      Plan:  · IV steroids initiated by Neurology as symptoms are suspected to be related to MS flare  · Continue fall precautions - ambulate with assistance and using walker  · PT recommending post-acute rehab

## 2021-06-22 NOTE — PROGRESS NOTES
[facility]  Progress Note - Rima Fontenot 1955, 73 y o  female MRN: [id number]  Unit/Bed#: W -01 Encounter: [id number]  Primary Care Provider: Andrea Quintero MD   Date and time admitted to hospital: 6/20/2021  7:58 PM    Ambulatory dysfunction  Assessment & Plan  Recurrent falls secondary to MS  Currently ambulates with assistance of a rolling walker  Most recent fall secondary to legs giving out  CTH, C-spine, and CAP without acute pathology  Plan:  · IV steroids initiated by Neurology as symptoms are suspected to be related to MS flare  · Continue fall precautions - ambulate with assistance and using walker  · PT recommending post-acute rehab    * Multiple sclerosis [facility])  Assessment & Plan  History of Primary Progressive MS, follows with Neurology in [facility] every 2 months  Current treatment regimen include intrathecal MTX every 2 months (most recent injection approximately 2 weeks ago), Dalfampridine for muscle weakness), and Baclofen (due to spasticity)  Most recent imaging completed on 6/16/21 revealed innumerable lesions in cerebrum and cerebellum with no significant change from prior study  Suspect patient is having acute exacerbation vs pseudo-exacerbation in setting of recent UTI along with progressing weakness and debility  Plan:  · Neurology following  Appreciate recommendations  · Neurology starting IV steroids, 1 g Solumedrol daily for 3 days  · PT/OT following  Recommending post-acute rehab  · Home Dalfampridine is not on hospital formulary  Family may possibly bring this up to the hospital  If this occurs, will enter non-formulary orders for pharmacy to profile medication  · Continue medical management of UTI    Acute cystitis without hematuria  Assessment & Plan  Patient self-catheterizes due to neurogenic bladder from MS  Frequent UTI's vs possible colonization  Recently finished a course of Macrobid approximately 2 weeks ago for UTI   Since then symptoms have worsened including frequency and suprapubic pain  UA completed during this admission with moderate leukocytes, nitrite negative  Urine culture has grown 10,000 - 19,000 cfu of Enterococcus species  Full species not yet available and sensitivity still pending  Prior urine cultures have been positive for citrobacter freundii and E Coli which were susceptible to Cefepime  To note, the citrobacter species had significant resistance to multiple drugs  Plan:  · Continue IV cefepime - will deescalate once sensitivity results are available      Neurogenic bladder  Assessment & Plan  · Secondary to Multiple Sclerosis   · Patient reports self-catheterization twice a day at home   · Urinary retention protocol   · Continue Flomax     Hypernatremia  Assessment & Plan  Results from last 7 days   Lab Units 06/22/21  0603 06/20/21  2035   SODIUM mmol/L 146* 145     Slight hypernatremia noted today  Will continue to monitor  Leg swelling  Assessment & Plan  · Patient on Lasix 40mg on Mondays and Fridays- will continue   · Reports worsening of leg swelling over the past 2 weeks  · Venous duplex of BLE on 6/16/21 showed no evidence of DVT   · Echo done on 10/7/2020 showed hyperdynamic systolic function with an EF of 80% and no regional wall motion abnormalities  There was moderate asymmetric hypertrophy with an increased relative contribution of atrial contraction to ventricular filling  · S/p x1 dose of IV lasix 40mg     Hypertension  Assessment & Plan  · Blood Pressure: 137/61 acceptable- will continue to monitor   · Continue on Norvasc 2 5mg BID       VTE Pharmacologic Prophylaxis:   VTE Score: 4 Moderate Risk (Score 3-4) - Pharmacological DVT Prophylaxis Ordered: Enoxaparin (Lovenox)  Mechanical VTE Prophylaxis in Place: Yes    Patient Centered Rounds: I have performed bedside rounds with nursing staff today      Discussions with Specialists or Other Care Team Provider: Neurology    Education and Discussions with Family / Patient: Updated  () via phone  Current Length of Stay: 2 day(s)    Current Patient Status: Inpatient     Discharge Plan / Estimated Discharge Date: Anticipate discharge tomorrow to rehab facility  Code Status: Level 1 - Full Code      Subjective:   Patient was seen and examined at bedside  No acute events overnight  No significant changes to ambulation compared to yesterday  Continues to have generalized weakness and fatigue  Objective:     Vitals:   Temp (24hrs), Av 8 °F (36 6 °C), Min:97 4 °F (36 3 °C), Max:98 °F (36 7 °C)    Temp:  [97 4 °F (36 3 °C)-98 °F (36 7 °C)] 97 4 °F (36 3 °C)  HR:  [72-93] 72  Resp:  [16-18] 18  BP: (130-135)/(64-65) 134/65  SpO2:  [93 %-96 %] 96 %  Body mass index is 38 35 kg/m²  Input and Output Summary (last 24 hours): Intake/Output Summary (Last 24 hours) at 2021 1452  Last data filed at 2021 1300  Gross per 24 hour   Intake 840 ml   Output 775 ml   Net 65 ml       Physical Exam:     Physical Exam  Vitals and nursing note reviewed  Constitutional:       Appearance: Normal appearance  She is obese  HENT:      Head: Normocephalic and atraumatic  Right Ear: External ear normal       Left Ear: External ear normal       Nose: Nose normal       Mouth/Throat:      Mouth: Mucous membranes are moist       Pharynx: Oropharynx is clear  Eyes:      General: No scleral icterus  Extraocular Movements: Extraocular movements intact  Right eye: Nystagmus present  Left eye: Nystagmus present  Conjunctiva/sclera: Conjunctivae normal       Pupils: Pupils are equal, round, and reactive to light  Cardiovascular:      Rate and Rhythm: Normal rate and regular rhythm  Pulses: Normal pulses  Heart sounds: Normal heart sounds  Pulmonary:      Effort: Pulmonary effort is normal  No respiratory distress  Breath sounds: Normal breath sounds     Abdominal:      General: Bowel sounds are normal  There is no distension  Palpations: Abdomen is soft  Tenderness: There is no abdominal tenderness  Musculoskeletal:         General: No deformity  Cervical back: Normal range of motion and neck supple  Right lower leg: No edema  Left lower leg: Edema present  Skin:     General: Skin is warm and dry  Capillary Refill: Capillary refill takes less than 2 seconds  Neurological:      Mental Status: She is alert and oriented to person, place, and time  Mental status is at baseline  Cranial Nerves: No cranial nerve deficit  Motor: Weakness present  Gait: Gait abnormal       Comments: Strength 4/5 in upper extremities though  greater on right compared to left  Lower extremities strength 2/5   Psychiatric:         Mood and Affect: Mood normal          Behavior: Behavior normal          Additional Data:     Labs:  Results from last 7 days   Lab Units 06/22/21  0603 06/20/21 2035   WBC Thousand/uL 3 78* 4 76   HEMOGLOBIN g/dL 12 4 13 0   HEMATOCRIT % 38 2 39 7   PLATELETS Thousands/uL 244 300   NEUTROS PCT %  --  71   LYMPHS PCT %  --  20   MONOS PCT %  --  7   EOS PCT %  --  1     Results from last 7 days   Lab Units 06/22/21 0603 06/20/21 2035   SODIUM mmol/L 146* 145   POTASSIUM mmol/L 3 7 4 4   CHLORIDE mmol/L 108 106   CO2 mmol/L 31 31   BUN mg/dL 25 21   CREATININE mg/dL 0 60 0 66   ANION GAP mmol/L 7 8   CALCIUM mg/dL 8 8 8 8   ALBUMIN g/dL  --  3 7   TOTAL BILIRUBIN mg/dL  --  0 45   ALK PHOS U/L  --  160*   ALT U/L  --  29   AST U/L  --  13   GLUCOSE RANDOM mg/dL 97 89                       Imaging: Reviewed radiology reports from this admission including: abdominal/pelvic CT scan, chest CT scan and CT head    CT head without contrast  Result Date: 6/20/2021  Impression: No acute intracranial abnormality  The study was marked in [facility] for immediate notification as per trauma communication protocol   Workstation performed: [id number]     CT cervical spine without contrast  Result Date: 6/20/2021  Impression: No cervical spine fracture or traumatic malalignment  The study was marked in [facility] for immediate notification as per trauma communication protocol  Workstation performed: [id number]     CT chest abdomen pelvis w contrast  Result Date: 6/20/2021  Impression: No acute pathology  The study was marked in [facility] for immediate notification as per trauma communication protocol  Workstation performed: [id number]       Recent Cultures (last 7 days):     Results from last 7 days   Lab Units 06/20/21 2133 06/20/21 2055   BLOOD CULTURE  No Growth at 24 hrs  No Growth at 24 hrs   --    URINE CULTURE   --  10,000-19,000 cfu/ml Enterococcus species*       Lines/Drains:  Invasive Devices     Peripheral Intravenous Line            Peripheral IV 06/20/21 Right Antecubital 1 day                Telemetry:        Last 24 Hours Medication List:   Current Facility-Administered Medications   Medication Dose Route Frequency Provider Last Rate    acetaminophen  650 mg Oral Q6H PRN Sawyer Engel MD      amLODIPine  2 5 mg Oral BID Sawyer Engel MD      aspirin  81 mg Oral Daily Sawyer Engel MD      baclofen  30 mg Oral TID Sawyer Engel MD      cholecalciferol  2,000 Units Oral Daily Sawyer Engel MD      enoxaparin  40 mg Subcutaneous Daily Sawyer Engel MD      lidocaine  1 patch Topical Daily Sawyer Engel MD      methylPREDNISolone sodium succinate  1,000 mg Intravenous Daily Christophe Vera DO      pantoprazole  40 mg Intravenous Q24H [address] Christophe Grolauren, DO      tamsulosin  0 8 mg Oral Daily Sawyer Engel MD          Today, Patient Was Seen By: Tierney Montero DO    ** Please Note: This note has been constructed using a voice recognition system   **

## 2021-06-22 NOTE — ASSESSMENT & PLAN NOTE
Results from last 7 days   Lab Units 06/22/21  0603 06/20/21 2035   SODIUM mmol/L 146* 145     Slight hypernatremia noted today  Will continue to monitor

## 2021-06-23 ENCOUNTER — APPOINTMENT (INPATIENT)
Dept: MRI IMAGING | Facility: HOSPITAL | Age: 66
DRG: 690 | End: 2021-06-23
Payer: MEDICARE

## 2021-06-23 PROBLEM — M54.6 THORACIC BACK PAIN: Status: ACTIVE | Noted: 2021-06-23

## 2021-06-23 PROBLEM — K59.00 CONSTIPATION: Status: ACTIVE | Noted: 2021-06-23

## 2021-06-23 LAB
ANION GAP SERPL CALCULATED.3IONS-SCNC: 10 MMOL/L (ref 4–13)
BASOPHILS # BLD AUTO: 0 THOUSANDS/ΜL (ref 0–0.1)
BASOPHILS NFR BLD AUTO: 0 % (ref 0–1)
BUN SERPL-MCNC: 23 MG/DL (ref 5–25)
CALCIUM SERPL-MCNC: 8.5 MG/DL (ref 8.3–10.1)
CHLORIDE SERPL-SCNC: 107 MMOL/L (ref 100–108)
CO2 SERPL-SCNC: 26 MMOL/L (ref 21–32)
CREAT SERPL-MCNC: 0.64 MG/DL (ref 0.6–1.3)
EOSINOPHIL # BLD AUTO: 0 THOUSAND/ΜL (ref 0–0.61)
EOSINOPHIL NFR BLD AUTO: 0 % (ref 0–6)
ERYTHROCYTE [DISTWIDTH] IN BLOOD BY AUTOMATED COUNT: 14.4 % (ref 11.6–15.1)
GFR SERPL CREATININE-BSD FRML MDRD: 94 ML/MIN/1.73SQ M
GLUCOSE SERPL-MCNC: 141 MG/DL (ref 65–140)
HCT VFR BLD AUTO: 38.6 % (ref 34.8–46.1)
HGB BLD-MCNC: 12.7 G/DL (ref 11.5–15.4)
IMM GRANULOCYTES # BLD AUTO: 0.05 THOUSAND/UL (ref 0–0.2)
IMM GRANULOCYTES NFR BLD AUTO: 1 % (ref 0–2)
LYMPHOCYTES # BLD AUTO: 0.47 THOUSANDS/ΜL (ref 0.6–4.47)
LYMPHOCYTES NFR BLD AUTO: 9 % (ref 14–44)
MCH RBC QN AUTO: 31.5 PG (ref 26.8–34.3)
MCHC RBC AUTO-ENTMCNC: 32.9 G/DL (ref 31.4–37.4)
MCV RBC AUTO: 96 FL (ref 82–98)
MONOCYTES # BLD AUTO: 0.07 THOUSAND/ΜL (ref 0.17–1.22)
MONOCYTES NFR BLD AUTO: 1 % (ref 4–12)
NEUTROPHILS # BLD AUTO: 4.85 THOUSANDS/ΜL (ref 1.85–7.62)
NEUTS SEG NFR BLD AUTO: 89 % (ref 43–75)
NRBC BLD AUTO-RTO: 0 /100 WBCS
PLATELET # BLD AUTO: 286 THOUSANDS/UL (ref 149–390)
PMV BLD AUTO: 10.6 FL (ref 8.9–12.7)
POTASSIUM SERPL-SCNC: 4.4 MMOL/L (ref 3.5–5.3)
RBC # BLD AUTO: 4.03 MILLION/UL (ref 3.81–5.12)
SODIUM SERPL-SCNC: 143 MMOL/L (ref 136–145)
WBC # BLD AUTO: 5.44 THOUSAND/UL (ref 4.31–10.16)

## 2021-06-23 PROCEDURE — 80048 BASIC METABOLIC PNL TOTAL CA: CPT | Performed by: INTERNAL MEDICINE

## 2021-06-23 PROCEDURE — 97535 SELF CARE MNGMENT TRAINING: CPT

## 2021-06-23 PROCEDURE — G1004 CDSM NDSC: HCPCS

## 2021-06-23 PROCEDURE — 85025 COMPLETE CBC W/AUTO DIFF WBC: CPT | Performed by: INTERNAL MEDICINE

## 2021-06-23 PROCEDURE — 99231 SBSQ HOSP IP/OBS SF/LOW 25: CPT | Performed by: PSYCHIATRY & NEUROLOGY

## 2021-06-23 PROCEDURE — C9113 INJ PANTOPRAZOLE SODIUM, VIA: HCPCS | Performed by: INTERNAL MEDICINE

## 2021-06-23 PROCEDURE — 99232 SBSQ HOSP IP/OBS MODERATE 35: CPT | Performed by: INTERNAL MEDICINE

## 2021-06-23 PROCEDURE — 97530 THERAPEUTIC ACTIVITIES: CPT

## 2021-06-23 PROCEDURE — A9585 GADOBUTROL INJECTION: HCPCS | Performed by: NURSE PRACTITIONER

## 2021-06-23 PROCEDURE — 72157 MRI CHEST SPINE W/O & W/DYE: CPT

## 2021-06-23 PROCEDURE — 97167 OT EVAL HIGH COMPLEX 60 MIN: CPT

## 2021-06-23 RX ORDER — SENNOSIDES 8.6 MG
1 TABLET ORAL
Status: DISCONTINUED | OUTPATIENT
Start: 2021-06-23 | End: 2021-06-23

## 2021-06-23 RX ORDER — POLYETHYLENE GLYCOL 3350 17 G/17G
17 POWDER, FOR SOLUTION ORAL DAILY PRN
Status: DISCONTINUED | OUTPATIENT
Start: 2021-06-23 | End: 2021-06-26 | Stop reason: HOSPADM

## 2021-06-23 RX ORDER — DOCUSATE SODIUM 100 MG/1
100 CAPSULE, LIQUID FILLED ORAL 2 TIMES DAILY
Status: DISCONTINUED | OUTPATIENT
Start: 2021-06-23 | End: 2021-06-26 | Stop reason: HOSPADM

## 2021-06-23 RX ORDER — PANTOPRAZOLE SODIUM 40 MG/1
40 TABLET, DELAYED RELEASE ORAL
Status: DISCONTINUED | OUTPATIENT
Start: 2021-06-23 | End: 2021-06-26 | Stop reason: HOSPADM

## 2021-06-23 RX ORDER — SENNOSIDES 8.6 MG
2 TABLET ORAL 2 TIMES DAILY
Status: DISCONTINUED | OUTPATIENT
Start: 2021-06-23 | End: 2021-06-26 | Stop reason: HOSPADM

## 2021-06-23 RX ORDER — GABAPENTIN 100 MG/1
100 CAPSULE ORAL 3 TIMES DAILY
Status: DISCONTINUED | OUTPATIENT
Start: 2021-06-23 | End: 2021-06-26 | Stop reason: HOSPADM

## 2021-06-23 RX ORDER — POLYETHYLENE GLYCOL 3350 17 G/17G
17 POWDER, FOR SOLUTION ORAL DAILY PRN
Status: DISCONTINUED | OUTPATIENT
Start: 2021-06-23 | End: 2021-06-23

## 2021-06-23 RX ADMIN — SENNOSIDES 17.2 MG: 8.6 TABLET, FILM COATED ORAL at 17:58

## 2021-06-23 RX ADMIN — ACETAMINOPHEN 650 MG: 325 TABLET, FILM COATED ORAL at 05:28

## 2021-06-23 RX ADMIN — LIDOCAINE 5% 1 PATCH: 700 PATCH TOPICAL at 09:06

## 2021-06-23 RX ADMIN — AMLODIPINE BESYLATE 2.5 MG: 2.5 TABLET ORAL at 17:58

## 2021-06-23 RX ADMIN — CEFEPIME HYDROCHLORIDE 2000 MG: 2 INJECTION, POWDER, FOR SOLUTION INTRAVENOUS at 04:20

## 2021-06-23 RX ADMIN — GABAPENTIN 100 MG: 100 CAPSULE ORAL at 21:25

## 2021-06-23 RX ADMIN — TAMSULOSIN HYDROCHLORIDE 0.8 MG: 0.4 CAPSULE ORAL at 09:05

## 2021-06-23 RX ADMIN — SODIUM CHLORIDE 1000 MG: 0.9 INJECTION, SOLUTION INTRAVENOUS at 09:09

## 2021-06-23 RX ADMIN — DICLOFENAC SODIUM 2 G: 10 GEL TOPICAL at 21:25

## 2021-06-23 RX ADMIN — GADOBUTROL 7 ML: 604.72 INJECTION INTRAVENOUS at 21:15

## 2021-06-23 RX ADMIN — BACLOFEN 30 MG: 10 TABLET ORAL at 09:05

## 2021-06-23 RX ADMIN — BACLOFEN 30 MG: 10 TABLET ORAL at 16:30

## 2021-06-23 RX ADMIN — PANTOPRAZOLE SODIUM 40 MG: 40 INJECTION, POWDER, FOR SOLUTION INTRAVENOUS at 09:05

## 2021-06-23 RX ADMIN — ACETAMINOPHEN 650 MG: 325 TABLET, FILM COATED ORAL at 13:11

## 2021-06-23 RX ADMIN — DICLOFENAC SODIUM 2 G: 10 GEL TOPICAL at 13:11

## 2021-06-23 RX ADMIN — ENOXAPARIN SODIUM 40 MG: 40 INJECTION SUBCUTANEOUS at 09:05

## 2021-06-23 RX ADMIN — BACLOFEN 30 MG: 10 TABLET ORAL at 21:25

## 2021-06-23 RX ADMIN — CEFEPIME HYDROCHLORIDE 2000 MG: 2 INJECTION, POWDER, FOR SOLUTION INTRAVENOUS at 16:30

## 2021-06-23 RX ADMIN — Medication 2000 UNITS: at 09:05

## 2021-06-23 RX ADMIN — DOCUSATE SODIUM 100 MG: 100 CAPSULE, LIQUID FILLED ORAL at 13:11

## 2021-06-23 RX ADMIN — AMLODIPINE BESYLATE 2.5 MG: 2.5 TABLET ORAL at 09:05

## 2021-06-23 RX ADMIN — ASPIRIN 81 MG: 81 TABLET, COATED ORAL at 09:05

## 2021-06-23 RX ADMIN — GABAPENTIN 100 MG: 100 CAPSULE ORAL at 16:30

## 2021-06-23 RX ADMIN — PANTOPRAZOLE SODIUM 40 MG: 40 TABLET, DELAYED RELEASE ORAL at 16:30

## 2021-06-23 NOTE — PLAN OF CARE
Problem: PHYSICAL THERAPY ADULT  Goal: Performs mobility at highest level of function for planned discharge setting  See evaluation for individualized goals  Description: Treatment/Interventions: Functional transfer training, LE strengthening/ROM, Therapeutic exercise, Endurance training, Patient/family training, Equipment eval/education, Bed mobility, Gait training  Equipment Recommended: Vanessa Dorado       See flowsheet documentation for full assessment, interventions and recommendations  Outcome: Progressing  Note: Prognosis: Guarded  Problem List: Decreased strength, Decreased range of motion, Decreased endurance, Impaired balance, Decreased mobility, Decreased coordination, Impaired tone, Pain  Assessment: Pt seen for PT intervention w/ pt agreeable and motivated to participate  Pt demonstrated increased upright sitting endurance at EOB compared to previous session w/ pt able to tolerate 6-7min x2 w/ supervision level assist  Pt demonstrated decreased functional mobility compared to previous session w/ pt able to clear bed for 1 trial w/ Max Ax2, bilateral knee block, and linking arms technique  Pt demonstrated ability to tolerate static standing position for about 30 secs w/ Max Ax2 prior to requesting to sit due to LE weakness  Pt performed bilateral rolling w/ Mod Ax1 to allow for stacey-care  Pt unable to ambulate during this tx due to increased LE weakness, pain, fatigue, and decreased standing tolerance w/ increased level of assistance required  Pt would continue to benefit from skilled PT intervention to decreased pt risk of falls, increase pt's independence w/ functional mobility, and to progress pt towards set goals/PLOF  DC rec: post acute rehab  PT Discharge Recommendation: Post acute rehabilitation services          See flowsheet documentation for full assessment

## 2021-06-23 NOTE — CASE MANAGEMENT
CM spoke with patient and  at the bedside  CM reviewed with patient and  at the bedside, PT still recommending inpatient rehab at discharge  Patient and  aware of Freedom of Choice  CM discussed in length the difference between acute and subacute rehabs  CM also discussed VNA with patient and   Patient and  educated if patient discharges home with VNA and declines, they have 30 days from discharge date to contact inpatient rehab facility of their choice directly  Patient's  is open to inpatient rehab at discharge  Patient would prefer to discharge home   and patient expressed concern re:back pain and current infection  Patient also expressed concern that she hasn't had a BM  CM provided support at bedside  Neurology and OT currently at bedside  Patient and  requesting CM send referral to [facility] for review   expressed interest in [facility] also  CM will f/u with patient and  in the morning re:plan of care  CM sent referral to [facility]  Waiting for review  CM sent referral to [facility], [facility] and [facility]  Waiting for response  No other questions/concerns at this time

## 2021-06-23 NOTE — OCCUPATIONAL THERAPY NOTE
Occupational Therapy Evaluation + Treatment     Patient Name: Luis Eduardo Potter  XEIGG'B Date: 6/23/2021  Problem List  Principal Problem:    Multiple sclerosis ([facility] [address] )  Active Problems:    Ambulatory dysfunction    Acute cystitis without hematuria    Hypertension    Neurogenic bladder    Leg swelling    Hypernatremia    Constipation    Thoracic back pain    Past Medical History  Past Medical History:   Diagnosis Date    Back pain 2016    Hypertension     Incontinence     MS (multiple sclerosis) ([facility] [address] )     Age 37    Neurogenic bladder     Spasticity      Past Surgical History  Past Surgical History:   Procedure Laterality Date    BACK SURGERY  2016    Lumbar fusion    ELBOW SURGERY Right 2008    ORIF             06/23/21 1328   OT Last Visit   OT Visit Date 06/23/21   Note Type   Note type Evaluation + Treatment   Restrictions/Precautions   Weight Bearing Precautions Per Order No   Other Precautions Contact/isolation;Cognitive; Chair Alarm; Bed Alarm; Fall Risk   Pain Assessment   Pain Score 4   Pain Location/Orientation Location: Back   Home Living   Type of [address] [place] [address] One level;Ramped entrance; Able to live on main level with bedroom/bathroom; Performs ADLs on one level   Bathroom Shower/Tub Walk-in shower   Bathroom Toilet Standard   Bathroom Equipment Grab bars in shower; Shower chair;Grab bars around toilet   [facility]   Additional Comments rollator at baseline, sleeps in adjustable bed with railing   Prior Function   Lives With Spouse   Receives Help From Family   ADL Assistance Needs assistance  ( (A) with straight cathing, toileting some LB dress)   IADLs Needs assistance  ((-)drives)   Falls in the last 6 months 1 to 4  (3)   Vocational On disability   Comments Pt reports that  intermittently assists with sit >< stand transfers at homes, especially off of the toilet    assists with transfers by pulling up under pt's arms, and "slow dancing" to get pt's feet under her and to transfer to surfaces  Requires (A) for bed mobility, use of RW for short distances  Lifestyle   Autonomy PTA pt living with  in [facility] with ramp to enter, pt requires (A) with ADLs and IADLs at baseline, use of RW for short distances, pt's  assists with transfers intermittently, (+)falls, (-)drives   Reciprocal Relationships supportive    Service to Others on disability   Intrinsic Gratification enjoys puzzles, especially word puzzles    Psychosocial   Length of Time/Family Visitation   ( present throughout session)   Subjective   Subjective "I like my crossword puzzles, but sometimes they are too hard"   ADL   Eating Assistance 4  Minimal Assistance   Eating Deficit Setup;Supervision/safety; Increased time to complete  (edu on built up utensils (provided with red tubing and edu on potentially eating more finger food)   Grooming Assistance 4  Minimal Assistance   Grooming Deficit Increased time to complete;Verbal cueing;Supervision/safety;Brushing hair   UB Bathing Assistance 5  Supervision/Setup   LB Bathing Assistance 3  Moderate Assistance   LB Bathing Deficit Increased time to complete;Supervision/safety;Verbal cueing;Right lower leg including foot; Left lower leg including foot  (Pt able to wash B thighs and stacey area)   UB Dressing Assistance 4  Minimal Assistance   UB Dressing Deficit Increased time to complete;Supervision/safety;Verbal cueing  (A for location of sleeves )   LB Dressing Assistance 3  Moderate Assistance   LB Dressing Deficit Increased time to complete;Supervision/safety;Verbal cueing; Don/doff R sock; Don/doff L sock  (A for L sock, (S) for R sock)   Toileting Assistance  3  Moderate Assistance   Toileting Deficit Increased time to complete;Supervison/safety;Verbal cueing;Perineal hygiene;Clothing management up; Bedside commode;Clothing management down  (A with clothing management, able to complete hygiene)   Bed Mobility Sit to Supine 3  Moderate assistance   Additional items Assist x 1; Increased time required;Verbal cues;LE management   Transfers   Sit to Stand 2  Maximal assistance   Additional items Assist x 1; Increased time required;Verbal cues   Stand to Sit 2  Maximal assistance   Additional items Assist x 1; Increased time required;Verbal cues   Toilet transfer 2  Maximal assistance   Additional items Assist x 1; Increased time required;Verbal cues; Commode   Additional Comments Pt's  completing transfers with max A x1, and therapist as CGA  Pt fearful at this time to complete transfers with therapist, unable to come to complete stand with therapist   completing transfer by standing in front of pt and reaching under her arms to pull up and then "slow dancing" to regain balance in B feet and used same method to side-step to [facility] and bed   Functional Mobility   Functional Mobility 2  Maximal assistance   Additional Comments Ax1, with  providing max A x1, walking to end of bed   Additional items   (holding onto )   Balance   Static Sitting Fair -   Dynamic Sitting Poor +   Static Standing Poor -   Dynamic Standing Poor -   Ambulatory Poor -   Activity Tolerance   Activity Tolerance Patient limited by fatigue   Medical Staff Made Aware RN [facility] [place] Assessment   RUE Assessment X  (limited AROM in shldr ~80*)   RUE Strength   RUE Overall Strength Deficits  (grossly 3-/5)   LUE Assessment   LUE Assessment X  (limited AROM in shldr ~80*)   LUE Strength   LUE Overall Strength Deficits  (grossly 3-/5)   Hand Function   Gross Motor Coordination Impaired   Fine Motor Coordination Impaired  (difficulties holding utensils)   Sensation   Light Touch No apparent deficits   Cognition   Overall Cognitive Status Impaired   Arousal/Participation Alert; Cooperative   Attention Attends with cues to redirect   Orientation Level Oriented X4   Memory Decreased recall of precautions;Decreased recall of recent events  (asking  for PLOF information frequently )   Following Commands Follows one step commands with increased time or repetition   Comments Pleasant and cooperative   Assessment   Limitation Decreased ADL status; Decreased UE strength;Decreased Safe judgement during ADL;Decreased cognition;Decreased endurance;Decreased self-care trans;Decreased high-level ADLs   Prognosis Good   Assessment Patient is a 73 y o  female admitted to [facility] on 6/20/2021 due to Multiple sclerosis ([facility] [address] )  Comorbidities affecting pt's physical performance at time of assessment include acute cystitis, HTN, neurogenic bladder, leg swelling, constipation, thoracic back pain  Patient has active OT orders and activity orders for Up with assistance  PTA pt living with  in [facility] with ramp to enter, pt requires (A) with ADLs and IADLs at baseline, use of RW for short distances, pt's  assists with transfers intermittently, (+)falls, (-)drives  Personal factors affecting pt at time of IE include:difficulty performing ADLS and difficulty performing IADLS   At the time of evaluation patient currently requires (S)-min A for UB ADLs, mod A for LB ADLs, max A x1 for functional transfers, and max Ax1 for functional mobility  The following deficits affected patient's occupational performance weakness, decreased functional strength, decreased functional balance, decreased activity tolerance, decreased safety awareness, impaired memory, impaired sequencing, impaired problem solving, increased pain, impaired interpersonal skills and decreased coping skills  Patient would benefit from skilled OT services while in the hospital to address above deficits   Occupational performance areas to be addressed include ADL retraining, bed mobility, functional transfer training, endurance training, cognitive reorientation, patient/family training, equipment evaluation/education, compensatory technique education, energy conservation, activity engagement and activity tolerance in order to maximize patient's level of function  The patient's raw score on the AM-PAC Daily Activity inpatient short form is 15, standardized score is 34 69, less than 39 4  Patients at this level are likely to benefit from discharge to post-acute rehabilitation services  Recommend d/c to PAR when medically cleared  Will continue to follow 3-5x/wk to address goals listed below  Goals   Patient Goals "I want to get stronger and be able to move better"   LTG Time Frame 10-14   Long Term Goal see goals listed below   Plan   Treatment Interventions ADL retraining;Functional transfer training;UE strengthening/ROM; Endurance training;Cognitive reorientation;Patient/family training;Equipment evaluation/education; Compensatory technique education; Energy conservation; Activityengagement   Goal Expiration Date 07/07/21   OT Treatment Day 0   OT Frequency 3-5x/wk   Additional Treatment Session   Start Time 0934   End Time 1427   Treatment Assessment Pt agreeable to participate in skilled OT treatment session to address ADL retraining, functional transfer training, endurance training, activity engagement and activity tolerance  Pt seated in chair stating "I have to use the bathroom" pt completing sit >< stand with  with max A x1, and side stepping to commode, during transfer pt incontinent of urine  Pt sitting on commode with CGA for sitting balance and pt with decreased trunk strength  Pt requiring mod A for LB bathing, A for B lower legs and feet, pt able to wash B thighs and stacey area as well as buttocks  Pt requiring mod A for LB dressing, (A) with L sock, pt able to bring R leg up in tailor sit method to don R sock with increased time  Pt completing toileting tasks (S) for hygiene, and requiring A for clothing management upon standing   assisting in transfer to return to bed   Pt and  educated on techniques to assist with transfers and ease of transfer, pt reporting that decreased arm strength is affecting her ability to transfer, educated on BUE therapeutic exercises, asking for handout and theraband for next session  Patient continues to be performing below their functional baseline with an increased risk for falls and injury and decreased occupational performance, and would benefit from continued skilled OT treatment to address deficits   Cont to follow    Additional Treatment Day 1   Recommendation   OT Discharge Recommendation Post acute rehabilitation services   AM-PAC Daily Activity Inpatient   Lower Body Dressing 2   Bathing 2   Toileting 2   Upper Body Dressing 3   Grooming 3   Eating 3   Daily Activity Raw Score 15   Daily Activity Standardized Score (Calc for Raw Score >=11) 34 69   AM-PAC Applied Cognition Inpatient   Following a Speech/Presentation 3   Understanding Ordinary Conversation 4   Taking Medications 3   Remembering Where Things Are Placed or Put Away 3   Remembering List of 4-5 Errands 3   Taking Care of Complicated Tasks 2   Applied Cognition Raw Score 18   Applied Cognition Standardized Score 38 07            Goals    -Patient will complete UB ADLs w/ mod I using AE and AD as needed    -Patient will complete LB ADLs w/ min A using AE and AD as needed    -Patient will complete toileting w/ mod I w/ G hygiene/thoroughness    -Patient will complete bed mobility with Mod I without use of bed rails    -Patient will tolerate therapeutic activities for greater than 15 min, in order to increase tolerance for functional activities      -Patient will perform functional transfers with Mod I to/from all surfaces using DME as needed    -Patient will complete functional mobility during ADL/IADL/leisure tasks with Mod I     -Patient will follow multistep instructions with no cuing to increase cognitive function and independence with tasks     -Patient will demonstrate 100% carryover of energy conservation techniques t/o functional I/ADL/leisure tasks w/o cues s/p skilled education to increase endurance during functional tasks    -Patient will increase BUE strength to 3/5 via AROM/AAROM/PROM exercises to increase independence in ADLs and transfers    -Patient will be attentive 100% of the time during ongoing cognitive assessment w/ G participation to assist w/ safe d/c planning/recommendations              At the end of the session, all needs met and pt supine in bed, bed alarm activated, HOB elevated and call bell within reach    [facility], OTR/L

## 2021-06-23 NOTE — ASSESSMENT & PLAN NOTE
Mid-upper back pain, bilateral, ongoing for several weeks  Appears musculoskeletal in nature   Imaging completed in the ED negative for fracture or malalignment  · Lidoderm patch on for 12 hours off for 12 hours  · Voltaren gel while lidoderm patch is off  · Continue to monitor

## 2021-06-23 NOTE — PHYSICAL THERAPY NOTE
PHYSICAL THERAPY TREATMENT NOTE          Patient Name: Marlena Villa  NJUWV'K Date: 2021     Time: 9886-5003  Total time: 43 min       21 0942   PT Last Visit   PT Visit Date 21   Note Type   Note Type Treatment   Pain Assessment   Pain Assessment Tool 0-10   Pain Score 5   Pain Location/Orientation Orientation: Mid;Orientation: Left; Location: Back   Pain Onset/Description Descriptor: [address] Pain Intervention(s) Repositioned; Ambulation/increased activity   Restrictions/Precautions   Weight Bearing Precautions Per Order No   Other Precautions Contact/isolation; Chair Alarm; Bed Alarm; Fall Risk;Multiple lines;Pain  (IV)   General   Chart Reviewed Yes   Response to Previous Treatment Patient with no complaints from previous session  Family/Caregiver Present No   Cognition   Overall Cognitive Status WFL   Arousal/Participation Cooperative; Alert   Attention Within functional limits   Orientation Level Oriented X4   Memory Within functional limits   Following Commands Follows all commands and directions without difficulty   Comments pt ID via name and ; pt agreeable to PT tx   Subjective   Subjective "I felt better yesterday"   Bed Mobility   Rolling R 3  Moderate assistance   Additional items Assist x 1;Bedrails; Increased time required;Verbal cues;LE management   Rolling L 3  Moderate assistance   Additional items Assist x 1;Bedrails; Increased time required;Verbal cues;LE management   Supine to Sit Unable to assess   Sit to Supine 3  Moderate assistance   Additional items Assist x 2; Increased time required;Verbal cues;LE management   Additional Comments pt sitting at [facility] w/ Neurology Resident Christophe Vera present in room upon arrival; pt tolerated sitting at EOB for 6-7min x2 w/ supervision w/o LOB   Transfers   Sit to Stand 2  Maximal assistance   Additional items Assist x 2; Increased time required;Verbal cues  (3 trials, pt able to clear bed on 3rd)   Stand to Sit 2  Maximal assistance   Additional items Assist x 2; Increased time required;Verbal cues  (3 trials, pt able to clear bed on 3rd)   Additional Comments Pt unable to clear bed x2 w/ Max Ax2 and pt's rollator walker; pt able to clear bed on 3rd trial w/ bilateral knee block and linking arm technique; pt able to tolerate static standing for about 30 sec before requesting to sit due to LE weakness   Ambulation/Elevation   Gait pattern Not appropriate; Not tested   Curbs Pt unable to ambulate due to LE weakness, pain, limited standing tolerance   Balance   Static Sitting Fair -   Dynamic Sitting Fair -   Static Standing Zero  (Ax2)   Endurance Deficit   Endurance Deficit Yes   Endurance Deficit Description pt limited in functional mobility tolerance/endurance due to weakness, fatigue, and pain   Activity Tolerance   Activity Tolerance Patient limited by fatigue;Patient limited by pain   Medical Staff Made Aware [facility], Neurology Resident Randa Fernandez   Nurse Made Aware RN [address] confirmed pt appropriate for PT tx; post session pt supine in bed w/ HOB elevated, bed alarm on, all needs w/in reach, and in no apparent distress; pt educated on using call-bell for assistance to get OOB or ambulate later in day; RN updated post   Assessment   Prognosis Guarded   Problem List Decreased strength;Decreased range of motion;Decreased endurance; Impaired balance;Decreased mobility; Decreased coordination; Impaired tone;Pain   Assessment Pt seen for PT intervention w/ pt agreeable and motivated to participate  Pt demonstrated increased upright sitting endurance at EOB compared to previous session w/ pt able to tolerate 6-7min x2 w/ supervision level assist  Pt demonstrated decreased functional mobility compared to previous session w/ pt able to clear bed for 1 trial w/ Max Ax2, bilateral knee block, and linking arms technique   Pt demonstrated ability to tolerate static standing position for about 30 secs w/ Max Ax2 prior to requesting to sit due to LE weakness  Pt performed bilateral rolling w/ Mod Ax1 to allow for stacey-care  Pt unable to ambulate during this tx due to increased LE weakness, pain, fatigue, and decreased standing tolerance w/ increased level of assistance required  Pt would continue to benefit from skilled PT intervention to decreased pt risk of falls, increase pt's independence w/ functional mobility, and to progress pt towards set goals/PLOF  DC rec: post acute rehab  Goals   Patient Goals "to get out of here"   STG Expiration Date 07/01/21   Short Term Goal #1 Pt will: perform bed mobility to EOB w/ consistent Min Ax1 to decrease caregiver burden; tolerate sitting at EOB for at least 10 min w/ supervision to increase pt's upright sitting tolerance; perform STS transfer trial w/ Min Ax1 to facilitate increased OOB mobility; ambulate 100' w/ least restrictive device and Min Ax1 to increase pt's ambulation endurance; increase LE strength by 1 grade to increase pt's tolerance to physical activity; increase all balance ratings by 1 grade to decrease pt's risk of falls   PT Treatment Day 2   Plan   Treatment/Interventions Functional transfer training;LE strengthening/ROM; Therapeutic exercise; Endurance training;Patient/family training;Equipment eval/education; Bed mobility;Gait training   Progress Slow progress, decreased activity tolerance   PT Frequency Other (Comment)  (3-5x/wk)   Recommendation   PT Discharge Recommendation Post acute rehabilitation services   Equipment Recommended [facility] Recommended Wheeled walker   Change/add to [employer]?  No   AM-PAC Basic Mobility Inpatient   Turning in Bed Without Bedrails 2   Lying on Back to Sitting on Edge of Flat Bed 2   Moving Bed to Chair 1   Standing Up From Chair 1   Walk in Room 1   Climb 3-5 Stairs 1   Basic Mobility Inpatient Raw Score 8   Turning Head Towards Sound 4   Follow Simple Instructions 4   Low Function Basic Mobility Raw Score 16   Low Function Basic Mobility Standardized Score 25 72       The patient's AM-PAC Basic Mobility Inpatient Short Form Low Function Raw Score 16 , Standardized Score is 25 72  A standardized score less 42 9 suggests the patient may benefit from discharge to post-acute rehab services  Please also refer to the recommendation of the Physical Therapist for safe discharge planning      DC rec: post acute rehab    Jie Johnson, PT  06/23/21

## 2021-06-23 NOTE — PROGRESS NOTES
Neurology - Progress Note  Rima Mora 73 y o  female MRN: [id number]  Unit/Bed#: W -01 Encounter: [id number]    Assessment:  1  Primary progressive multiple sclerosis   ? Diagnosed around age 37  ? Maintained on intrathecal methotrexate q 8 weeks for the past year and dalfampridine  ? Presenting to the ED s/p fall at home  ? MRI brain (6/16/21): Too numerous to count lesions in cerebrum, cerebellum, consistent with   patient's known diagnosis of demyelinating disease  ? CT head w/o contrast (6/20): No acute intracranial abnormality  ? CT cervical spine w/o contrast (6/20): No cervical spine fracture or traumatic malalignment  ? CT Chest/Abdomen/Pelvis (6/20): No acute pathology  2  Ambulatory dysfunction  ? Presents to the ED s/p fall  ? PT/OT evaluation  3  Neurogenic bladder  ? Maintained on tamsulosin  ? Straight cath BID at home   ? Recently treated for UTI approx  2 weeks ago with 7 days antibiotics as an outpatient  ? Ceftriaxone x 1 (6/19)  ? Cefepime Day 3 per primary team, pending sensitivities; Hx of MDRO     Plan:  · Continue Solu-medrol x 3 days (Day 2/3) for now  Chief Complaint:   Generalized weakness, ambulatory dysfunction    Subjective:   Patient states she feels a bit more fatigued today compared to yesterday  She states she is not eating well as her utensils are too small  She is eager to go home  She does not feel that the steroids made her feel any better  ROS:  Occasional muscle spasms, back pain  Receiving Occupational Therapy, receiving Physical Therapy    Vitals: Blood pressure 141/67, pulse 95, temperature 97 7 °F (36 5 °C), resp  rate 17, height 4' 7" (1 397 m), weight 74 8 kg (165 lb), SpO2 95 %  ,Body mass index is 38 35 kg/m²      Physical Exam:  Physical Exam    General appearance: alert, appears stated age and cooperative  Head: Normocephalic, without obvious abnormality, atraumatic  Neck: no adenopathy, no carotid bruit and no JVD  Lungs: clear to auscultation bilaterally  Heart: regular rate and rhythm, S1, S2 normal, no murmur, click, rub or gallop  Abdomen: normal findings:    Extremities: extremities normal, atraumatic, no cyanosis or edema  Neurologic: Mental status: Alert, oriented, thought content appropriate  Neurologic Exam: Bilateral YULI  Reminder CNII-XII normal    Motor: upper extremities: 4/5 strength, lower extremities hip flexors 2/5, dorsi flexion and plantar flexion 4/5  Sensory: intact  X 4 limbs 4 mod inc vib and prop, not PP  Cerebellar: mild dysmetria   DTR's: WNL Plantars: downgoing               Current Facility-Administered Medications   Medication Dose Route Frequency Provider Last Rate    acetaminophen  650 mg Oral Q6H PRN Kevin Rojo MD      amLODIPine  2 5 mg Oral BID Kevin Rojo MD      aspirin  81 mg Oral Daily Kevin Rojo MD      baclofen  30 mg Oral TID Kevin Rojo MD      cefepime  2,000 mg Intravenous Q12H Abbi Robledo DO Stopped (06/23/21 0530)    cholecalciferol  2,000 Units Oral Daily Kevin Rojo MD      enoxaparin  40 mg Subcutaneous Daily Kevin Rojo MD      lidocaine  1 patch Topical Daily Kevin Rojo MD      methylPREDNISolone sodium succinate  1,000 mg Intravenous Daily Dariela Renee DO 1,000 mg (06/23/21 0909)    pantoprazole  40 mg Intravenous Q24H [address] Dariela Renee DO      tamsulosin  0 8 mg Oral Daily Kevin Rojo MD         Lab, Imaging and other studies:   CBC:   Results from last 7 days   Lab Units 06/23/21  0612 06/22/21  0603 06/20/21 2035   WBC Thousand/uL 5 44 3 78* 4 76   RBC Million/uL 4 03 3 94 4 11   HEMOGLOBIN g/dL 12 7 12 4 13 0   HEMATOCRIT % 38 6 38 2 39 7   MCV fL 96 97 97   PLATELETS Thousands/uL 286 244 300   , BMP/CMP:   Results from last 7 days   Lab Units 06/23/21  0540 06/22/21  0603 06/20/21 2035   SODIUM mmol/L 143 146* 145   POTASSIUM mmol/L 4 4 3 7 4 4   CHLORIDE mmol/L 107 108 106   CO2 mmol/L 26 31 31   BUN mg/dL 23 25 21   CREATININE mg/dL 0 64 0 60 0 66   CALCIUM mg/dL 8 5 8 8 8 8   AST U/L  --   --  13   ALT U/L  --   --  29   ALK PHOS U/L  --   --  160*   EGFR ml/min/1 73sq m 94 96 93

## 2021-06-23 NOTE — ASSESSMENT & PLAN NOTE
· Patient on Lasix 40mg on Mondays and Fridays- will continue   · Reports worsening of leg swelling over the past 2 weeks  · Venous duplex of BLE on 6/16/21 showed no evidence of DVT   · Echo done on 10/7/2020 showed hyperdynamic systolic function with an EF of 80% and no regional wall motion abnormalities  There was moderate asymmetric hypertrophy with an increased relative contribution of atrial contraction to ventricular filling     · S/p x1 dose of IV lasix 40mg - stable

## 2021-06-23 NOTE — ASSESSMENT & PLAN NOTE
Results from last 7 days   Lab Units 06/23/21  0540 06/22/21  0603 06/20/21  2035   SODIUM mmol/L 143 146* 145     Slight hypernatremia noted 6/22  Will continue to monitor

## 2021-06-23 NOTE — PLAN OF CARE
Problem: OCCUPATIONAL THERAPY ADULT  Goal: Performs self-care activities at highest level of function for planned discharge setting  See evaluation for individualized goals  Description: Treatment Interventions: ADL retraining, Functional transfer training, UE strengthening/ROM, Endurance training, Cognitive reorientation, Patient/family training, Equipment evaluation/education, Compensatory technique education, Energy conservation, Activityengagement          See flowsheet documentation for full assessment, interventions and recommendations  6/23/2021 1520 by JAIMEE Paulson  Note: Limitation: Decreased ADL status, Decreased UE strength, Decreased Safe judgement during ADL, Decreased cognition, Decreased endurance, Decreased self-care trans, Decreased high-level ADLs  Prognosis: Good  Assessment: Patient is a 73 y o  female admitted to [address] [place] [address] on 6/20/2021 due to Multiple sclerosis ([facility] [address] )  Comorbidities affecting pt's physical performance at time of assessment include acute cystitis, HTN, neurogenic bladder, leg swelling, constipation, thoracic back pain  Patient has active OT orders and activity orders for Up with assistance  PTA pt living with  in [facility] with ramp to enter, pt requires (A) with ADLs and IADLs at baseline, use of RW for short distances, pt's  assists with transfers intermittently, (+)falls, (-)drives  Personal factors affecting pt at time of IE include:difficulty performing ADLS and difficulty performing IADLS   At the time of evaluation patient currently requires (S)-min A for UB ADLs, mod A for LB ADLs, max A x1 for functional transfers, and max Ax1 for functional mobility   The following deficits affected patient's occupational performance weakness, decreased functional strength, decreased functional balance, decreased activity tolerance, decreased safety awareness, impaired memory, impaired sequencing, impaired problem solving, increased pain, impaired interpersonal skills and decreased coping skills  Patient would benefit from skilled OT services while in the hospital to address above deficits  Occupational performance areas to be addressed include ADL retraining, bed mobility, functional transfer training, endurance training, cognitive reorientation, patient/family training, equipment evaluation/education, compensatory technique education, energy conservation, activity engagement and activity tolerance in order to maximize patient's level of function  The patient's raw score on the AM-PAC Daily Activity inpatient short form is 15, standardized score is 34 69, less than 39 4  Patients at this level are likely to benefit from discharge to post-acute rehabilitation services  Recommend d/c to PAR when medically cleared  Will continue to follow 3-5x/wk to address goals listed below  OT Discharge Recommendation: Post acute rehabilitation services       6/23/2021 1520 by JAIMEE Choe  Note: Limitation: Decreased ADL status, Decreased UE strength, Decreased Safe judgement during ADL, Decreased cognition, Decreased endurance, Decreased self-care trans, Decreased high-level ADLs  Prognosis: Good  Assessment: Patient is a 73 y o  female admitted to [address] [place] [address] on 6/20/2021 due to Multiple sclerosis ([facility] [address] )  Comorbidities affecting pt's physical performance at time of assessment include acute cystitis, HTN, neurogenic bladder, leg swelling, constipation, thoracic back pain  Patient has active OT orders and activity orders for Up with assistance  PTA pt living with  in [facility] with ramp to enter, pt requires (A) with ADLs and IADLs at baseline, use of RW for short distances, pt's  assists with transfers intermittently, (+)falls, (-)drives  Personal factors affecting pt at time of IE include:difficulty performing ADLS and difficulty performing IADLS    At the time of evaluation patient currently requires (S)-min A for UB ADLs, mod A for LB ADLs, max A x1 for functional transfers, and max Ax1 for functional mobility  The following deficits affected patient's occupational performance weakness, decreased functional strength, decreased functional balance, decreased activity tolerance, decreased safety awareness, impaired memory, impaired sequencing, impaired problem solving, increased pain, impaired interpersonal skills and decreased coping skills  Patient would benefit from skilled OT services while in the hospital to address above deficits  Occupational performance areas to be addressed include ADL retraining, bed mobility, functional transfer training, endurance training, cognitive reorientation, patient/family training, equipment evaluation/education, compensatory technique education, energy conservation, activity engagement and activity tolerance in order to maximize patient's level of function  The patient's raw score on the AM-PAC Daily Activity inpatient short form is 15, standardized score is 34 69, less than 39 4  Patients at this level are likely to benefit from discharge to post-acute rehabilitation services  Recommend d/c to PAR when medically cleared  Will continue to follow 3-5x/wk to address goals listed below        OT Discharge Recommendation: Post acute rehabilitation services

## 2021-06-23 NOTE — ASSESSMENT & PLAN NOTE
Patient self-catheterizes due to neurogenic bladder from MS  Frequent UTI's vs possible colonization  Recently finished a course of Macrobid approximately 2 weeks ago for UTI  Since then symptoms have worsened including frequency and suprapubic pain  UA completed during this admission with moderate leukocytes, nitrite negative  Urine culture has grown 10,000 - 19,000 cfu of Enterococcus species  Full species not yet available and sensitivity still pending  Prior urine cultures have been positive for citrobacter freundii and E Coli which were susceptible to Cefepime  To note, the citrobacter species had significant resistance to multiple drugs      Plan:  · Continue IV cefepime - will deescalate once sensitivity results are available  · Called micro lab - unable to speciate, will complete sensitivity today

## 2021-06-23 NOTE — PROGRESS NOTES
[facility]  Progress Note - Rima Estrada 1955, 73 y o  female MRN: [id number]  Unit/Bed#: W -01 Encounter: [id number]  Primary Care Provider: Aisha Dawkins MD   Date and time admitted to hospital: 6/20/2021  7:58 PM    Ambulatory dysfunction  Assessment & Plan  Recurrent falls secondary to MS  Currently ambulates with assistance of a rolling walker  Most recent fall secondary to legs giving out  CTH, C-spine, and CAP without acute pathology  Plan:  · IV steroids initiated by Neurology as symptoms are suspected to be related to MS flare  · Continue fall precautions - ambulate with assistance and using walker  · PT recommending post-acute rehab    * Multiple sclerosis [facility])  Assessment & Plan  History of Primary Progressive MS, follows with Neurology in [facility] every 2 months  Current treatment regimen include intrathecal MTX every 2 months (most recent injection approximately 2 weeks ago), Dalfampridine for muscle weakness), and Baclofen (due to spasticity)  Most recent imaging completed on 6/16/21 revealed innumerable lesions in cerebrum and cerebellum with no significant change from prior study  Suspect patient is having acute exacerbation vs pseudo-exacerbation in setting of recent UTI along with progressing weakness and debility  Plan:  · Neurology following  Appreciate recommendations  · Neurology starting IV steroids, 1 g Solumedrol daily for 3 days (#2/3 today)  · PT/OT following  Recommending post-acute rehab  · Home Dalfampridine is not on hospital formulary  Family may possibly bring this up to the hospital  If this occurs, will enter non-formulary orders for pharmacy to profile medication  · Continue medical management of UTI    Acute cystitis without hematuria  Assessment & Plan  Patient self-catheterizes due to neurogenic bladder from MS  Frequent UTI's vs possible colonization  Recently finished a course of Macrobid approximately 2 weeks ago for UTI  Since then symptoms have worsened including frequency and suprapubic pain  UA completed during this admission with moderate leukocytes, nitrite negative  Urine culture has grown 10,000 - 19,000 cfu of Enterococcus species  Full species not yet available and sensitivity still pending  Prior urine cultures have been positive for citrobacter freundii and E Coli which were susceptible to Cefepime  To note, the citrobacter species had significant resistance to multiple drugs  Plan:  · Continue IV cefepime - will deescalate once sensitivity results are available  · Called micro lab - unable to speciate, will complete sensitivity today      Neurogenic bladder  Assessment & Plan  · Secondary to Multiple Sclerosis   · Patient reports self-catheterization twice a day at home   · Urinary retention protocol   · Continue Flomax     Thoracic back pain  Assessment & Plan  Mid-upper back pain, bilateral, ongoing for several weeks  Appears musculoskeletal in nature  Imaging completed in the ED negative for fracture or malalignment  · Lidoderm patch on for 12 hours off for 12 hours  · Voltaren gel while lidoderm patch is off  · Continue to monitor    Constipation  Assessment & Plan  Reports that she has not had a BM thus far during her hospitalization  Will order Colace and Senna  Hypernatremia  Assessment & Plan  Results from last 7 days   Lab Units 06/23/21  0540 06/22/21  0603 06/20/21  2035   SODIUM mmol/L 143 146* 145     Slight hypernatremia noted 6/22  Will continue to monitor  Leg swelling  Assessment & Plan  · Patient on Lasix 40mg on Mondays and Fridays- will continue   · Reports worsening of leg swelling over the past 2 weeks  · Venous duplex of BLE on 6/16/21 showed no evidence of DVT   · Echo done on 10/7/2020 showed hyperdynamic systolic function with an EF of 80% and no regional wall motion abnormalities   There was moderate asymmetric hypertrophy with an increased relative contribution of atrial contraction to ventricular filling  · S/p x1 dose of IV lasix 40mg - stable    Hypertension  Assessment & Plan  · Blood Pressure: 141/67 acceptable- will continue to monitor   · Continue on Norvasc 2 5mg BID       VTE Pharmacologic Prophylaxis:   VTE Score: 4 Moderate Risk (Score 3-4) - Pharmacological DVT Prophylaxis Ordered: Enoxaparin (Lovenox)  Mechanical VTE Prophylaxis in Place: Patient declining SCD as they cause warmth which worsens her weakness    Patient Centered Rounds: I have performed bedside rounds with nursing staff today  Discussions with Specialists or Other Care Team Provider: Neurology    Education and Discussions with Family / Patient: Updated  () at bedside  Current Length of Stay: 3 day(s)    Current Patient Status: Inpatient     Discharge Plan / Estimated Discharge Date: Anticipate discharge in 24-48 hours to rehab facility  Code Status: Level 1 - Full Code      Subjective:   Patient was seen and examined at bedside  No acute events overnight  Feels like her weakness may be a little better today  She is also noting some upper back pain and constipation  Back pain has been going on for several weeks (before her fall) and is worse when she is standing up  Regarding constipation, has not had a BM thus far during admission  She is also asking if the SCD's can be removed as they cause warmth in her legs and the warmth worsens her weakness  Objective:     Vitals:   Temp (24hrs), Av °F (36 7 °C), Min:97 9 °F (36 6 °C), Max:98 °F (36 7 °C)    Temp:  [97 9 °F (36 6 °C)-98 °F (36 7 °C)] 98 °F (36 7 °C)  HR:  [86-95] 95  Resp:  [16] 16  BP: (137-139)/(61-65) 139/65  SpO2:  [94 %-95 %] 94 %  Body mass index is 38 35 kg/m²  Input and Output Summary (last 24 hours):        Intake/Output Summary (Last 24 hours) at 2021 0700  Last data filed at 2021 0530  Gross per 24 hour   Intake 1370 ml   Output 1475 ml   Net -105 ml       Physical Exam:     Physical Exam  Vitals and nursing note reviewed  Constitutional:       Appearance: Normal appearance  She is obese  HENT:      Head: Normocephalic and atraumatic  Right Ear: External ear normal       Left Ear: External ear normal       Nose: Nose normal       Mouth/Throat:      Mouth: Mucous membranes are moist       Pharynx: Oropharynx is clear  Eyes:      General: No scleral icterus  Extraocular Movements: Extraocular movements intact  Right eye: Nystagmus present  Left eye: Nystagmus present  Conjunctiva/sclera: Conjunctivae normal       Pupils: Pupils are equal, round, and reactive to light  Cardiovascular:      Rate and Rhythm: Normal rate and regular rhythm  Pulses: Normal pulses  Heart sounds: Normal heart sounds  Pulmonary:      Effort: Pulmonary effort is normal  No respiratory distress  Breath sounds: Normal breath sounds  Abdominal:      General: Bowel sounds are normal  There is no distension  Palpations: Abdomen is soft  Tenderness: There is no abdominal tenderness  Musculoskeletal:         General: Tenderness present  No deformity  Cervical back: Normal range of motion and neck supple  Right lower leg: No edema  Left lower leg: No edema  Comments: Tenderness noted in the upper thoracic region with bilateral muscle tissue texture changes   Skin:     General: Skin is warm and dry  Capillary Refill: Capillary refill takes less than 2 seconds  Neurological:      Mental Status: She is alert and oriented to person, place, and time  Mental status is at baseline  Cranial Nerves: No cranial nerve deficit  Motor: Weakness present  Gait: Gait abnormal       Comments: Strength 4/5 in upper extremities though  greater on right compared to left   Lower extremities strength 2/5   Psychiatric:         Mood and Affect: Mood normal          Behavior: Behavior normal          Additional Data:     Labs:  Results from last 7 days   Lab Units 06/23/21  0612   WBC Thousand/uL 5 44   HEMOGLOBIN g/dL 12 7   HEMATOCRIT % 38 6   PLATELETS Thousands/uL 286   NEUTROS PCT % 89*   LYMPHS PCT % 9*   MONOS PCT % 1*   EOS PCT % 0     Results from last 7 days   Lab Units 06/22/21  0603 06/20/21 2035   SODIUM mmol/L 146* 145   POTASSIUM mmol/L 3 7 4 4   CHLORIDE mmol/L 108 106   CO2 mmol/L 31 31   BUN mg/dL 25 21   CREATININE mg/dL 0 60 0 66   ANION GAP mmol/L 7 8   CALCIUM mg/dL 8 8 8 8   ALBUMIN g/dL  --  3 7   TOTAL BILIRUBIN mg/dL  --  0 45   ALK PHOS U/L  --  160*   ALT U/L  --  29   AST U/L  --  13   GLUCOSE RANDOM mg/dL 97 89                       Imaging: Reviewed radiology reports from this admission including: CT chest, abd, and pelvis -- CT negative for acute osseous fractures    Recent Cultures (last 7 days):     Results from last 7 days   Lab Units 06/20/21 2133 06/20/21 2055   BLOOD CULTURE  No Growth at 48 hrs  No Growth at 48 hrs    --    URINE CULTURE   --  10,000-19,000 cfu/ml Enterococcus species*       Lines/Drains:  Invasive Devices     Peripheral Intravenous Line            Peripheral IV 06/20/21 Right Antecubital 2 days                Telemetry:        Last 24 Hours Medication List:   Current Facility-Administered Medications   Medication Dose Route Frequency Provider Last Rate    acetaminophen  650 mg Oral Q6H PRN Cody Edwards MD      amLODIPine  2 5 mg Oral BID Cody Edwards MD      aspirin  81 mg Oral Daily Cody Edwards MD      baclofen  30 mg Oral TID Cody Edwards MD      cefepime  2,000 mg Intravenous Q12H Abbidacia Robledo DO Stopped (06/23/21 0530)    cholecalciferol  2,000 Units Oral Daily Cody Edwards MD      enoxaparin  40 mg Subcutaneous Daily Cody Edwards MD      lidocaine  1 patch Topical Daily Cody Edwards MD      methylPREDNISolone sodium succinate  1,000 mg Intravenous Daily Shayna Simon DO 1,000 mg (06/22/21 1457)    pantoprazole  40 mg Intravenous Q24H [address] Yolanda Ghislaine Reid DO      tamsulosin  0 8 mg Oral Daily Lupe Bhatti MD          Today, Patient Was Seen By: Juan R Grider DO    ** Please Note: This note has been constructed using a voice recognition system   **

## 2021-06-23 NOTE — PLAN OF CARE
Problem: Potential for Falls  Goal: Patient will remain free of falls  Description: INTERVENTIONS:  - Educate patient/family on patient safety including physical limitations  - Instruct patient to call for assistance with activity   - Consult OT/PT to assist with strengthening/mobility   - Keep Call bell within reach  - Keep bed low and locked with side rails adjusted as appropriate  - Keep care items and personal belongings within reach  - Initiate and maintain comfort rounds       Problem: MOBILITY - ADULT  Goal: Maintain or return to baseline ADL function  Description: INTERVENTIONS:  -  Assess patient's ability to carry out ADLs; assess patient's baseline for ADL function and identify physical deficits which impact ability to perform ADLs (bathing, care of mouth/teeth, toileting, grooming, dressing, etc )  - Assess/evaluate cause of self-care deficits   - Assess range of motion  - Assess patient's mobility; develop plan if impaired  - Assess patient's need for assistive devices and provide as appropriate  - Encourage maximum independence but intervene and supervise when necessary  - Involve family in performance of ADLs  - Assess for home care needs following discharge   - Consider OT consult to assist with ADL evaluation and planning for discharge  - Provide patient education as appropriate  Outcome: Progressing  Goal: Maintains/Returns to pre admission functional level  Description: INTERVENTIONS:  - Perform BMAT or MOVE assessment daily    - Set and communicate daily mobility goal to care team and patient/family/caregiver          Problem: Prexisting or High Potential for Compromised Skin Integrity  Goal: Skin integrity is maintained or improved  Description: INTERVENTIONS:  - Identify patients at risk for skin breakdown  - Assess and monitor skin integrity  - Assess and monitor nutrition and hydration status  - Monitor labs   - Assess for incontinence   - Turn and reposition patient  - Assist with mobility/ambulation  - Relieve pressure over bony prominences  - Avoid friction and shearing  - Provide appropriate hygiene as needed including keeping skin clean and dry  - Evaluate need for skin moisturizer/barrier cream  - Collaborate with interdisciplinary team   - Patient/family teaching  - Consider wound care consult   Outcome: Progressing     Problem: PAIN - ADULT  Goal: Verbalizes/displays adequate comfort level or baseline comfort level  Description: Interventions:  - Encourage patient to monitor pain and request assistance  - Assess pain using appropriate pain scale  - Administer analgesics based on type and severity of pain and evaluate response  - Implement non-pharmacological measures as appropriate and evaluate response  - Consider cultural and social influences on pain and pain management  - Notify physician/advanced practitioner if interventions unsuccessful or patient reports new pain  Outcome: Progressing     Problem: INFECTION - ADULT  Goal: Absence or prevention of progression during hospitalization  Description: INTERVENTIONS:  - Assess and monitor for signs and symptoms of infection  - Monitor lab/diagnostic results  - Monitor all insertion sites, i e  indwelling lines, tubes, and drains  - Monitor endotracheal if appropriate and nasal secretions for changes in amount and color  - [place] appropriate cooling/warming therapies per order  - Administer medications as ordered  - Instruct and encourage patient and family to use good hand hygiene technique  - Identify and instruct in appropriate isolation precautions for identified infection/condition  Outcome: Progressing     Problem: SAFETY ADULT  Goal: Patient will remain free of falls  Description: INTERVENTIONS:  - Educate patient/family on patient safety including physical limitations  - Instruct patient to call for assistance with activity   - Consult OT/PT to assist with strengthening/mobility   - Keep Call bell within reach  - Keep bed low and locked with side rails adjusted as appropriate  - Keep care items and personal belongings within reach  - Initiate and maintain comfort rounds  - Make Fall Risk Sign visible to staff    Goal: Maintain or return to baseline ADL function  Description: INTERVENTIONS:  -  Assess patient's ability to carry out ADLs; assess patient's baseline for ADL function and identify physical deficits which impact ability to perform ADLs (bathing, care of mouth/teeth, toileting, grooming, dressing, etc )  - Assess/evaluate cause of self-care deficits   - Assess range of motion  - Assess patient's mobility; develop plan if impaired  - Assess patient's need for assistive devices and provide as appropriate  - Encourage maximum independence but intervene and supervise when necessary  - Involve family in performance of ADLs  - Assess for home care needs following discharge   - Consider OT consult to assist with ADL evaluation and planning for discharge  - Provide patient education as appropriate  Outcome: Progressing  Goal: Maintains/Returns to pre admission functional level  Description: INTERVENTIONS:  - Perform BMAT or MOVE assessment daily    - Set and communicate daily mobility goal to care team and patient/family/caregiver          Problem: DISCHARGE PLANNING  Goal: Discharge to home or other facility with appropriate resources  Description: INTERVENTIONS:  - Identify barriers to discharge w/patient and caregiver  - Arrange for needed discharge resources and transportation as appropriate  - Identify discharge learning needs (meds, wound care, etc )  - Arrange for interpretive services to assist at discharge as needed  - Refer to Case Management Department for coordinating discharge planning if the patient needs post-hospital services based on physician/advanced practitioner order or complex needs related to functional status, cognitive ability, or social support system  Outcome: Progressing     Problem: Knowledge Deficit  Goal: Patient/family/caregiver demonstrates understanding of disease process, treatment plan, medications, and discharge instructions  Description: Complete learning assessment and assess knowledge base    Interventions:  - Provide teaching at level of understanding  - Provide teaching via preferred learning methods  Outcome: Progressing     Problem: GENITOURINARY - ADULT  Goal: Maintains or returns to baseline urinary function  Description: INTERVENTIONS:  - Assess urinary function  - Encourage oral fluids to ensure adequate hydration if ordered  - Administer IV fluids as ordered to ensure adequate hydration  - Administer ordered medications as needed  - Offer frequent toileting  - Follow urinary retention protocol if ordered  Outcome: Progressing  Goal: Absence of urinary retention  Description: INTERVENTIONS:  - Assess patients ability to void and empty bladder  - Monitor I/O  - Bladder scan as needed  - Discuss with physician/AP medications to alleviate retention as needed  - Discuss catheterization for long term situations as appropriate  Outcome: Progressing

## 2021-06-23 NOTE — ASSESSMENT & PLAN NOTE
History of Primary Progressive MS, follows with Neurology in [place], [place] every 2 months  Current treatment regimen include intrathecal MTX every 2 months (most recent injection approximately 2 weeks ago), Dalfampridine for muscle weakness), and Baclofen (due to spasticity)  Most recent imaging completed on 6/16/21 revealed innumerable lesions in cerebrum and cerebellum with no significant change from prior study  Suspect patient is having acute exacerbation vs pseudo-exacerbation in setting of recent UTI along with progressing weakness and debility  Plan:  · Neurology following  Appreciate recommendations  · Neurology starting IV steroids, 1 g Solumedrol daily for 3 days (#2/3 today)  · PT/OT following  Recommending post-acute rehab  · Home Dalfampridine is not on hospital formulary  Family may possibly bring this up to the hospital  If this occurs, will enter non-formulary orders for pharmacy to profile medication    · Continue medical management of UTI

## 2021-06-23 NOTE — PLAN OF CARE
Problem: Potential for Falls  Goal: Patient will remain free of falls  Description: INTERVENTIONS:  - Educate patient/family on patient safety including physical limitations  - Instruct patient to call for assistance with activity   - Consult OT/PT to assist with strengthening/mobility   - Keep Call bell within reach  - Keep bed low and locked with side rails adjusted as appropriate  - Keep care items and personal belongings within reach  - Initiate and maintain comfort rounds  - Make Fall Risk Sign visible to staff  - Offer Toileting every 2 Hours, in advance of need  - Initiate/Maintain bed alarm  - Apply yellow socks and bracelet for high fall risk patients  - Consider moving patient to room near nurses station  6/22/2021 2001 by Miko Keene RN  Outcome: Progressing  6/22/2021 2001 by Miko Keene RN  Outcome: Progressing     Problem: MOBILITY - ADULT  Goal: Maintain or return to baseline ADL function  Description: INTERVENTIONS:  -  Assess patient's ability to carry out ADLs; assess patient's baseline for ADL function and identify physical deficits which impact ability to perform ADLs (bathing, care of mouth/teeth, toileting, grooming, dressing, etc )  - Assess/evaluate cause of self-care deficits   - Assess range of motion  - Assess patient's mobility; develop plan if impaired  - Assess patient's need for assistive devices and provide as appropriate  - Encourage maximum independence but intervene and supervise when necessary  - Involve family in performance of ADLs  - Assess for home care needs following discharge   - Consider OT consult to assist with ADL evaluation and planning for discharge  - Provide patient education as appropriate  6/22/2021 2001 by Miko Keene RN  Outcome: Progressing  6/22/2021 2001 by Miko Keene RN  Outcome: Progressing  Goal: Maintains/Returns to pre admission functional level  Description: INTERVENTIONS:  - Perform BMAT or MOVE assessment daily    - Set and communicate daily mobility goal to care team and patient/family/caregiver  - Collaborate with rehabilitation services on mobility goals if consulted  - Record patient progress and toleration of activity level   6/22/2021 2001 by Bri Colvin RN  Outcome: Progressing  6/22/2021 2001 by Bri Colvin RN  Outcome: Progressing     Problem: MOBILITY - ADULT  Goal: Maintains/Returns to pre admission functional level  Description: INTERVENTIONS:  - Perform BMAT or MOVE assessment daily    - Set and communicate daily mobility goal to care team and patient/family/caregiver     - Collaborate with rehabilitation services on mobility goals if consulted  - Out of bed for toileting  - Record patient progress and toleration of activity level   6/22/2021 2001 by Bri Colvin RN  Outcome: Progressing  6/22/2021 2001 by Bri oClvin RN  Outcome: Progressing     Problem: Prexisting or High Potential for Compromised Skin Integrity  Goal: Skin integrity is maintained or improved  Description: INTERVENTIONS:  - Identify patients at risk for skin breakdown  - Assess and monitor skin integrity  - Assess and monitor nutrition and hydration status  - Monitor labs   - Assess for incontinence   - Turn and reposition patient  - Assist with mobility/ambulation  - Relieve pressure over bony prominences  - Avoid friction and shearing  - Provide appropriate hygiene as needed including keeping skin clean and dry  - Evaluate need for skin moisturizer/barrier cream  - Collaborate with interdisciplinary team   - Patient/family teaching  - Consider wound care consult   6/22/2021 2001 by Bri Colvin RN  Outcome: Progressing  6/22/2021 2001 by Bri Colvin RN  Outcome: Progressing     Problem: PAIN - ADULT  Goal: Verbalizes/displays adequate comfort level or baseline comfort level  Description: Interventions:  - Encourage patient to monitor pain and request assistance  - Assess pain using appropriate pain scale  - Administer analgesics based on type and severity of pain and evaluate response  - Implement non-pharmacological measures as appropriate and evaluate response  - Consider cultural and social influences on pain and pain management  - Notify physician/advanced practitioner if interventions unsuccessful or patient reports new pain  6/22/2021 2001 by Miko Keene RN  Outcome: Progressing  6/22/2021 2001 by Miko Keene RN  Outcome: Progressing     Problem: INFECTION - ADULT  Goal: Absence or prevention of progression during hospitalization  Description: INTERVENTIONS:  - Assess and monitor for signs and symptoms of infection  - Monitor lab/diagnostic results  - Monitor all insertion sites, i e  indwelling lines, tubes, and drains  - Monitor endotracheal if appropriate and nasal secretions for changes in amount and color  - [place] appropriate cooling/warming therapies per order  - Administer medications as ordered  - Instruct and encourage patient and family to use good hand hygiene technique  - Identify and instruct in appropriate isolation precautions for identified infection/condition  6/22/2021 2001 by Miko Keene RN  Outcome: Progressing  6/22/2021 2001 by Miko Keene RN  Outcome: Progressing     Problem: SAFETY ADULT  Goal: Patient will remain free of falls  Description: INTERVENTIONS:  - Educate patient/family on patient safety including physical limitations  - Instruct patient to call for assistance with activity   - Consult OT/PT to assist with strengthening/mobility   - Keep Call bell within reach  - Keep bed low and locked with side rails adjusted as appropriate  - Keep care items and personal belongings within reach  - Initiate and maintain comfort rounds  - Make Fall Risk Sign visible to staff  - Apply yellow socks and bracelet for high fall risk patients  - Consider moving patient to room near nurses station  6/22/2021 2001 by Miko Keene RN  Outcome: Progressing  6/22/2021 2001 by Miko Keene RN  Outcome: Progressing  Goal: Maintain or return to baseline ADL function  Description: INTERVENTIONS:  -  Assess patient's ability to carry out ADLs; assess patient's baseline for ADL function and identify physical deficits which impact ability to perform ADLs (bathing, care of mouth/teeth, toileting, grooming, dressing, etc )  - Assess/evaluate cause of self-care deficits   - Assess range of motion  - Assess patient's mobility; develop plan if impaired  - Assess patient's need for assistive devices and provide as appropriate  - Encourage maximum independence but intervene and supervise when necessary  - Involve family in performance of ADLs  - Assess for home care needs following discharge   - Consider OT consult to assist with ADL evaluation and planning for discharge  - Provide patient education as appropriate  6/22/2021 2001 by Jt Gordillo RN  Outcome: Progressing  6/22/2021 2001 by Jt Gordillo RN  Outcome: Progressing  Goal: Maintains/Returns to pre admission functional level  Description: INTERVENTIONS:  - Perform BMAT or MOVE assessment daily    - Set and communicate daily mobility goal to care team and patient/family/caregiver     - Collaborate with rehabilitation services on mobility goals if consulted  - Out of bed for toileting  - Record patient progress and toleration of activity level   6/22/2021 2001 by Jt Gordillo RN  Outcome: Progressing  6/22/2021 2001 by Jt Gordillo RN  Outcome: Progressing     Problem: DISCHARGE PLANNING  Goal: Discharge to home or other facility with appropriate resources  Description: INTERVENTIONS:  - Identify barriers to discharge w/patient and caregiver  - Arrange for needed discharge resources and transportation as appropriate  - Identify discharge learning needs (meds, wound care, etc )  - Arrange for interpretive services to assist at discharge as needed  - Refer to Case Management Department for coordinating discharge planning if the patient needs post-hospital services based on physician/advanced practitioner order or complex needs related to functional status, cognitive ability, or social support system  6/22/2021 2001 by Sera Gong RN  Outcome: Progressing  6/22/2021 2001 by Sera Gong RN  Outcome: Progressing     Problem: Knowledge Deficit  Goal: Patient/family/caregiver demonstrates understanding of disease process, treatment plan, medications, and discharge instructions  Description: Complete learning assessment and assess knowledge base    Interventions:  - Provide teaching at level of understanding  - Provide teaching via preferred learning methods  6/22/2021 2001 by Sera Gong RN  Outcome: Progressing  6/22/2021 2001 by Sera Gong RN  Outcome: Progressing     Problem: GENITOURINARY - ADULT  Goal: Maintains or returns to baseline urinary function  Description: INTERVENTIONS:  - Assess urinary function  - Encourage oral fluids to ensure adequate hydration if ordered  - Administer IV fluids as ordered to ensure adequate hydration  - Administer ordered medications as needed  - Offer frequent toileting  - Follow urinary retention protocol if ordered  6/22/2021 2001 by Sera Gong RN  Outcome: Progressing  6/22/2021 2001 by Sera Gong RN  Outcome: Progressing  Goal: Absence of urinary retention  Description: INTERVENTIONS:  - Assess patients ability to void and empty bladder  - Monitor I/O  - Bladder scan as needed  - Discuss with physician/AP medications to alleviate retention as needed  - Discuss catheterization for long term situations as appropriate  6/22/2021 2001 by Sera Gong RN  Outcome: Progressing  6/22/2021 2001 by Sera Gong RN  Outcome: Progressing

## 2021-06-24 PROBLEM — E87.0 HYPERNATREMIA: Status: RESOLVED | Noted: 2021-06-22 | Resolved: 2021-06-24

## 2021-06-24 PROBLEM — K59.00 CONSTIPATION: Status: RESOLVED | Noted: 2021-06-23 | Resolved: 2021-06-24

## 2021-06-24 LAB
ANION GAP SERPL CALCULATED.3IONS-SCNC: 7 MMOL/L (ref 4–13)
BACTERIA UR CULT: ABNORMAL
BUN SERPL-MCNC: 31 MG/DL (ref 5–25)
CALCIUM SERPL-MCNC: 9.1 MG/DL (ref 8.3–10.1)
CHLORIDE SERPL-SCNC: 108 MMOL/L (ref 100–108)
CO2 SERPL-SCNC: 30 MMOL/L (ref 21–32)
CREAT SERPL-MCNC: 0.8 MG/DL (ref 0.6–1.3)
ERYTHROCYTE [DISTWIDTH] IN BLOOD BY AUTOMATED COUNT: 14.9 % (ref 11.6–15.1)
GFR SERPL CREATININE-BSD FRML MDRD: 78 ML/MIN/1.73SQ M
GLUCOSE SERPL-MCNC: 113 MG/DL (ref 65–140)
HCT VFR BLD AUTO: 36.4 % (ref 34.8–46.1)
HGB BLD-MCNC: 11.9 G/DL (ref 11.5–15.4)
MCH RBC QN AUTO: 31.5 PG (ref 26.8–34.3)
MCHC RBC AUTO-ENTMCNC: 32.7 G/DL (ref 31.4–37.4)
MCV RBC AUTO: 96 FL (ref 82–98)
PLATELET # BLD AUTO: 301 THOUSANDS/UL (ref 149–390)
PMV BLD AUTO: 10.6 FL (ref 8.9–12.7)
POTASSIUM SERPL-SCNC: 4.3 MMOL/L (ref 3.5–5.3)
RBC # BLD AUTO: 3.78 MILLION/UL (ref 3.81–5.12)
SODIUM SERPL-SCNC: 145 MMOL/L (ref 136–145)
WBC # BLD AUTO: 11.43 THOUSAND/UL (ref 4.31–10.16)

## 2021-06-24 PROCEDURE — 99233 SBSQ HOSP IP/OBS HIGH 50: CPT | Performed by: NURSE PRACTITIONER

## 2021-06-24 PROCEDURE — 85027 COMPLETE CBC AUTOMATED: CPT | Performed by: PSYCHIATRY & NEUROLOGY

## 2021-06-24 PROCEDURE — 99232 SBSQ HOSP IP/OBS MODERATE 35: CPT | Performed by: INTERNAL MEDICINE

## 2021-06-24 PROCEDURE — 80048 BASIC METABOLIC PNL TOTAL CA: CPT | Performed by: PSYCHIATRY & NEUROLOGY

## 2021-06-24 RX ADMIN — Medication 2000 UNITS: at 08:33

## 2021-06-24 RX ADMIN — TAMSULOSIN HYDROCHLORIDE 0.8 MG: 0.4 CAPSULE ORAL at 17:35

## 2021-06-24 RX ADMIN — GABAPENTIN 100 MG: 100 CAPSULE ORAL at 23:00

## 2021-06-24 RX ADMIN — AMPICILLIN SODIUM 2000 MG: 2 INJECTION, POWDER, FOR SOLUTION INTRAMUSCULAR; INTRAVENOUS at 14:15

## 2021-06-24 RX ADMIN — SENNOSIDES 17.2 MG: 8.6 TABLET, FILM COATED ORAL at 08:33

## 2021-06-24 RX ADMIN — ASPIRIN 81 MG: 81 TABLET, COATED ORAL at 08:34

## 2021-06-24 RX ADMIN — SODIUM CHLORIDE 1000 MG: 0.9 INJECTION, SOLUTION INTRAVENOUS at 08:40

## 2021-06-24 RX ADMIN — PANTOPRAZOLE SODIUM 40 MG: 40 TABLET, DELAYED RELEASE ORAL at 17:36

## 2021-06-24 RX ADMIN — DOCUSATE SODIUM 100 MG: 100 CAPSULE, LIQUID FILLED ORAL at 08:34

## 2021-06-24 RX ADMIN — PANTOPRAZOLE SODIUM 40 MG: 40 TABLET, DELAYED RELEASE ORAL at 06:18

## 2021-06-24 RX ADMIN — GABAPENTIN 100 MG: 100 CAPSULE ORAL at 08:35

## 2021-06-24 RX ADMIN — BACLOFEN 30 MG: 10 TABLET ORAL at 17:35

## 2021-06-24 RX ADMIN — CEFEPIME HYDROCHLORIDE 2000 MG: 2 INJECTION, POWDER, FOR SOLUTION INTRAVENOUS at 03:50

## 2021-06-24 RX ADMIN — BACLOFEN 30 MG: 10 TABLET ORAL at 08:34

## 2021-06-24 RX ADMIN — GABAPENTIN 100 MG: 100 CAPSULE ORAL at 17:35

## 2021-06-24 RX ADMIN — ENOXAPARIN SODIUM 40 MG: 40 INJECTION SUBCUTANEOUS at 08:33

## 2021-06-24 RX ADMIN — BACLOFEN 30 MG: 10 TABLET ORAL at 23:00

## 2021-06-24 RX ADMIN — AMLODIPINE BESYLATE 2.5 MG: 2.5 TABLET ORAL at 08:34

## 2021-06-24 RX ADMIN — SENNOSIDES 17.2 MG: 8.6 TABLET, FILM COATED ORAL at 17:36

## 2021-06-24 RX ADMIN — TAMSULOSIN HYDROCHLORIDE 0.8 MG: 0.4 CAPSULE ORAL at 08:33

## 2021-06-24 RX ADMIN — DOCUSATE SODIUM 100 MG: 100 CAPSULE, LIQUID FILLED ORAL at 17:36

## 2021-06-24 RX ADMIN — AMLODIPINE BESYLATE 2.5 MG: 2.5 TABLET ORAL at 17:36

## 2021-06-24 RX ADMIN — AMPICILLIN SODIUM 2000 MG: 2 INJECTION, POWDER, FOR SOLUTION INTRAMUSCULAR; INTRAVENOUS at 19:17

## 2021-06-24 NOTE — ASSESSMENT & PLAN NOTE
Jose Gómez is a 73 y o  female with primary progressive multiple sclerosis, neurogenic bladder, and HTN who presents with fall on 6/20/2021  Diagnosed around age 37  Maintained on intrathecal methotrexate q 8 weeks for the past year and dalfampridine  Plan:  · S/p Solu-medrol x 3 days (today day 3/3)  · Continue gabapentin 100 mg TID  · Continue Baclofen 30 mg TID  · Continue Voltaren gel  · Continue lidocaine patch  · Home Dalfampridine is not on hospital formulary; therefore held at this time  · Medical management and supportive care per primary team  · PT/OT following  Recommending post-acute rehab  · No further inpatient neurological recommendations at this time  · Patient will benefit from follow-up with her outpatient neurologist in 4-6 weeks  Work up:   ? MRI brain (6/16/21): Too numerous to count lesions in cerebrum, cerebellum, consistent with   patient's known diagnosis of demyelinating disease  ? CT head w/o contrast (6/20): No acute intracranial abnormality  ? CT cervical spine w/o contrast (6/20): No cervical spine fracture or traumatic malalignment  ? CT Chest/Abdomen/Pelvis (6/20): No acute pathology  ? MRI T spine (6/23): Scattered foci of T2 signal hyperintensity within the thoracic cord consistent with the patient's clinical history of demyelinating disease  No abnormal enhancement to suggest the presence of active demyelination   Flattening along the dorsal aspect of the cord at the level of T6 with ventral displacement consistent with the presence of an intradural arachnoid cyst

## 2021-06-24 NOTE — ASSESSMENT & PLAN NOTE
? Maintained on tamsulosin 0 8 mg daily  ? Straight cath BID at home d/t MS  ? Recently treated for UTI approx   2 weeks ago with 7 days antibiotics as an outpatient

## 2021-06-24 NOTE — ASSESSMENT & PLAN NOTE
Mid-upper back pain, bilateral, ongoing for several weeks  Appears musculoskeletal in nature  Imaging completed in the ED negative for fracture or malalignment  · Neurology ordered MRI of thoracic spine due to known MS lesions   This study was consistent with MS lesions, no active lesions noted  · Lidoderm patch on for 12 hours off for 12 hours  · Voltaren gel while lidoderm patch is off  · Continue to monitor

## 2021-06-24 NOTE — CASE MANAGEMENT
CM spoke with patient and  at the bedside  Patient and  requesting inpatient rehab at bedside  Patient and  updated per Acute rehab at this time recommending slower paced therapy/PMR consult  Patient and  updated [facility] unable to accept  Patient and  reviewed SNF list at bedside  Patient and  requesting referrals to [facility], [facility] and [facility]  CM sent referrals  Waiting for options  CM contacted [facility] and spoke with Ltio Weber reviewed with admissions team  No available bed at this time  [facility] unable to accept  [facility] reviewing

## 2021-06-24 NOTE — ASSESSMENT & PLAN NOTE
Patient self-catheterizes due to neurogenic bladder from MS  Frequent UTI's vs possible colonization  Recently finished a course of Macrobid approximately 2 weeks ago for UTI  Since then symptoms have worsened including frequency and suprapubic pain  UA completed during this admission with moderate leukocytes, nitrite negative  Urine culture has grown 10,000 - 19,000 cfu of Enterococcus species  Full species not yet available and sensitivity still pending  Prior urine cultures have been positive for citrobacter freundii and E Coli which were susceptible to Cefepime  To note, the citrobacter species had significant resistance to multiple drugs  · Urine C+S: Enterobacter faecalis  Sensitive to Ampicillin, Levofloxacin, Macrobid, and vancomycin   Resistant to Tetracycline    Plan:  · Will change IV Cefepime to IV ampicillin 2 g Q6H as PO is not available on hospital formulary  · Transition to PO Ampicillin at discharge

## 2021-06-24 NOTE — CASE MANAGEMENT
CM spoke with patient and  at the bedside  Patient's  provided non skilled [address] list and MS resources at bedside  Patient's  received call from [facility] clinical liaison  [facility] clinical liaison reviewing with administrators per   CM will f/u with patient and  re:[facility] decision  CM discussed transportation at discharge  Patient and  updated wheelchair Melissa Eleonora will have an out of pocket cost between $ that is billed at later time  Patient and  aware staff can assist to car with wheelchair but do not physically lift patient's in/out of cars  Patient's  stated he is able to transfer patient in/out of his car   stated he will transport at discharge  CM will continue to f/u with [facility] re:inpatient rehab at discharge  No other questions/concerns noted at this time

## 2021-06-24 NOTE — PLAN OF CARE
Problem: Potential for Falls  Goal: Patient will remain free of falls  Description: INTERVENTIONS:  - Educate patient/family on patient safety including physical limitations  - Instruct patient to call for assistance with activity   - Consult OT/PT to assist with strengthening/mobility   - Keep Call bell within reach  - Keep bed low and locked with side rails adjusted as appropriate  - Keep care items and personal belongings within reach  - Initiate and maintain comfort rounds  - Make Fall Risk Sign visible to staff  - Offer Toileting every 2 Hours, in advance of need  - Initiate/Maintain bed alarm  - Obtain necessary fall risk management equipment:   - Apply yellow socks and bracelet for high fall risk patients  - Consider moving patient to room near nurses station  Outcome: Progressing     Problem: MOBILITY - ADULT  Goal: Maintain or return to baseline ADL function  Description: INTERVENTIONS:  -  Assess patient's ability to carry out ADLs; assess patient's baseline for ADL function and identify physical deficits which impact ability to perform ADLs (bathing, care of mouth/teeth, toileting, grooming, dressing, etc )  - Assess/evaluate cause of self-care deficits   - Assess range of motion  - Assess patient's mobility; develop plan if impaired  - Assess patient's need for assistive devices and provide as appropriate  - Encourage maximum independence but intervene and supervise when necessary  - Involve family in performance of ADLs  - Assess for home care needs following discharge   - Consider OT consult to assist with ADL evaluation and planning for discharge  - Provide patient education as appropriate  Outcome: Progressing  Goal: Maintains/Returns to pre admission functional level  Description: INTERVENTIONS:  - Perform BMAT or MOVE assessment daily    - Set and communicate daily mobility goal to care team and patient/family/caregiver     - Collaborate with rehabilitation services on mobility goals if consulted  - Perform Range of Motion 3 times a day  - Reposition patient every 2 hours    - Out of bed for toileting  - Record patient progress and toleration of activity level   Outcome: Progressing     Problem: Prexisting or High Potential for Compromised Skin Integrity  Goal: Skin integrity is maintained or improved  Description: INTERVENTIONS:  - Identify patients at risk for skin breakdown  - Assess and monitor skin integrity  - Assess and monitor nutrition and hydration status  - Monitor labs   - Assess for incontinence   - Turn and reposition patient  - Assist with mobility/ambulation  - Relieve pressure over bony prominences  - Avoid friction and shearing  - Provide appropriate hygiene as needed including keeping skin clean and dry  - Evaluate need for skin moisturizer/barrier cream  - Collaborate with interdisciplinary team   - Patient/family teaching  - Consider wound care consult   Outcome: Progressing     Problem: PAIN - ADULT  Goal: Verbalizes/displays adequate comfort level or baseline comfort level  Description: Interventions:  - Encourage patient to monitor pain and request assistance  - Assess pain using appropriate pain scale  - Administer analgesics based on type and severity of pain and evaluate response  - Implement non-pharmacological measures as appropriate and evaluate response  - Consider cultural and social influences on pain and pain management  - Notify physician/advanced practitioner if interventions unsuccessful or patient reports new pain  Outcome: Progressing     Problem: INFECTION - ADULT  Goal: Absence or prevention of progression during hospitalization  Description: INTERVENTIONS:  - Assess and monitor for signs and symptoms of infection  - Monitor lab/diagnostic results  - Monitor all insertion sites, i e  indwelling lines, tubes, and drains  - Monitor endotracheal if appropriate and nasal secretions for changes in amount and color  - [place] appropriate cooling/warming therapies per order  - Administer medications as ordered  - Instruct and encourage patient and family to use good hand hygiene technique  - Identify and instruct in appropriate isolation precautions for identified infection/condition  Outcome: Progressing     Problem: SAFETY ADULT  Goal: Patient will remain free of falls  Description: INTERVENTIONS:  - Educate patient/family on patient safety including physical limitations  - Instruct patient to call for assistance with activity   - Consult OT/PT to assist with strengthening/mobility   - Keep Call bell within reach  - Keep bed low and locked with side rails adjusted as appropriate  - Keep care items and personal belongings within reach  - Initiate and maintain comfort rounds  - Make Fall Risk Sign visible to staff  - Obtain necessary fall risk management equipment:   - Apply yellow socks and bracelet for high fall risk patients  - Consider moving patient to room near nurses station  Outcome: Progressing  Goal: Maintain or return to baseline ADL function  Description: INTERVENTIONS:  -  Assess patient's ability to carry out ADLs; assess patient's baseline for ADL function and identify physical deficits which impact ability to perform ADLs (bathing, care of mouth/teeth, toileting, grooming, dressing, etc )  - Assess/evaluate cause of self-care deficits   - Assess range of motion  - Assess patient's mobility; develop plan if impaired  - Assess patient's need for assistive devices and provide as appropriate  - Encourage maximum independence but intervene and supervise when necessary  - Involve family in performance of ADLs  - Assess for home care needs following discharge   - Consider OT consult to assist with ADL evaluation and planning for discharge  - Provide patient education as appropriate  Outcome: Progressing  Goal: Maintains/Returns to pre admission functional level  Description: INTERVENTIONS:  - Perform BMAT or MOVE assessment daily    - Set and communicate daily mobility goal to care team and patient/family/caregiver  - Collaborate with rehabilitation services on mobility goals if consulted  - Reposition patient every 3 hours  - Ambulate patient 3 times a day  - Out of bed to chair 3 times a day   - Out of bed for meals 3 times a day  - Out of bed for toileting  - Record patient progress and toleration of activity level   Outcome: Progressing     Problem: DISCHARGE PLANNING  Goal: Discharge to home or other facility with appropriate resources  Description: INTERVENTIONS:  - Identify barriers to discharge w/patient and caregiver  - Arrange for needed discharge resources and transportation as appropriate  - Identify discharge learning needs (meds, wound care, etc )  - Arrange for interpretive services to assist at discharge as needed  - Refer to Case Management Department for coordinating discharge planning if the patient needs post-hospital services based on physician/advanced practitioner order or complex needs related to functional status, cognitive ability, or social support system  Outcome: Progressing     Problem: Knowledge Deficit  Goal: Patient/family/caregiver demonstrates understanding of disease process, treatment plan, medications, and discharge instructions  Description: Complete learning assessment and assess knowledge base    Interventions:  - Provide teaching at level of understanding  - Provide teaching via preferred learning methods  Outcome: Progressing     Problem: GENITOURINARY - ADULT  Goal: Maintains or returns to baseline urinary function  Description: INTERVENTIONS:  - Assess urinary function  - Encourage oral fluids to ensure adequate hydration if ordered  - Administer IV fluids as ordered to ensure adequate hydration  - Administer ordered medications as needed  - Offer frequent toileting  - Follow urinary retention protocol if ordered  Outcome: Progressing  Goal: Absence of urinary retention  Description: INTERVENTIONS:  - Assess patients ability to void and empty bladder  - Monitor I/O  - Bladder scan as needed  - Discuss with physician/AP medications to alleviate retention as needed  - Discuss catheterization for long term situations as appropriate  Outcome: Progressing

## 2021-06-24 NOTE — PROGRESS NOTES
Progress Note - Neurology   Rima Mora 73 y o  female [id number]  Unit/Bed#: W /W -01    Assessment/Plan:    * Multiple sclerosis [facility])  Assessment & Plan  Rima Estrada is a 73 y o  female with primary progressive multiple sclerosis, neurogenic bladder, and HTN who presents with fall on 6/20/2021  Diagnosed around age 37  Maintained on intrathecal methotrexate q 8 weeks for the past year and dalfampridine  Plan:  · S/p Solu-medrol x 3 days (today day 3/3)  · Continue gabapentin 100 mg TID  · Continue Baclofen 30 mg TID  · Continue Voltaren gel  · Continue lidocaine patch  · Home Dalfampridine is not on hospital formulary; therefore held at this time  · Medical management and supportive care per primary team  · PT/OT following  Recommending post-acute rehab  · No further inpatient neurological recommendations at this time  · Patient will benefit from follow-up with her outpatient neurologist in 4-6 weeks  Work up:   ? MRI brain (6/16/21): Too numerous to count lesions in cerebrum, cerebellum, consistent with   patient's known diagnosis of demyelinating disease  ? CT head w/o contrast (6/20): No acute intracranial abnormality  ? CT cervical spine w/o contrast (6/20): No cervical spine fracture or traumatic malalignment  ? CT Chest/Abdomen/Pelvis (6/20): No acute pathology  ? MRI T spine (6/23): Scattered foci of T2 signal hyperintensity within the thoracic cord consistent with the patient's clinical history of demyelinating disease  No abnormal enhancement to suggest the presence of active demyelination  Flattening along the dorsal aspect of the cord at the level of T6 with ventral displacement consistent with the presence of an intradural arachnoid cyst     Acute cystitis without hematuria  Assessment & Plan  Patient self-catheterizes due to neurogenic bladder from MS  Frequent UTI's vs possible colonization  UA completed during this admission with moderate leukocytes, nitrite negative  Urine culture has grown 10,000 - 19,000 cfu of Enterococcus species  Urine sensitivity shows resistance to tetracycline    Plan:  · Patient continues on IV Cefepime 2,000 mg BID, per primary team  · Medical management and supportive care per primary team    Ambulatory dysfunction  Assessment & Plan  · Ongoing fall precautions  · PT/OT as tolerated  · Patient may require STR    Neurogenic bladder  Assessment & Plan  ? Maintained on tamsulosin 0 8 mg daily  ? Straight cath BID at home d/t MS  ? Recently treated for UTI approx  2 weeks ago with 7 days antibiotics as an outpatient    Patient appears to follow with [facility]; recommend patient schedules a f/u in 4 - 6 weeks with her neurologist      Subjective:   Patient was seen and examined today in her room with her  present  Patient reports that she slept well over night  She denies pain currently, but states that she has yet to do any activity         Past Medical History:   Diagnosis Date    Back pain 2016    Hypertension     Incontinence     MS ([facility] sclerosis) ([facility])     Age 37    Neurogenic bladder     Spasticity      Past Surgical History:   Procedure Laterality Date    BACK SURGERY  2016    Lumbar fusion    ELBOW SURGERY Right 2008    ORIF     Family History   Problem Relation Age of Onset    Cancer Mother     Heart disease Father     Cancer Sister      Social History     Socioeconomic History    Marital status: /Civil Union     Spouse name: None    Number of children: None    Years of education: None    Highest education level: None   Occupational History    None   Tobacco Use    Smoking status: Never Smoker    Smokeless tobacco: Never Used   Substance and Sexual Activity    Alcohol use: Not Currently    Drug use: Yes     Types: Marijuana    Sexual activity: None   Other Topics Concern    None   Social History Narrative    None     Social Determinants of Health     Financial Resource Strain:     Difficulty of Paying Living Expenses:    Food Insecurity:     Worried About Running Out of Food in the Last Year:     [address] in the Last Year:    Transportation Needs:     Lack of Transportation (Medical):  Lack of Transportation (Non-Medical):    Physical Activity:     Days of Exercise per Week:     Minutes of Exercise per Session:    Stress:     Feeling of Stress :    Social Connections:     Frequency of Communication with Friends and Family:     Frequency of Social Gatherings with Friends and Family:     Attends Druze Services:     Active Member of Clubs or Organizations:     Attends Club or Organization Meetings:     Marital Status:    Intimate Partner Violence:     Fear of Current or Ex-Partner:     Emotionally Abused:     Physically Abused:     Sexually Abused:          Medications:   All current active meds have been reviewed and current meds:  Scheduled Meds:  Current Facility-Administered Medications   Medication Dose Route Frequency Provider Last Rate    acetaminophen  650 mg Oral Q6H PRN Marquis Mark MD      amLODIPine  2 5 mg Oral BID Marquis Mark MD      ampicillin  2,000 mg Intravenous Q6H Abbi Robledo DO 2,000 mg (06/24/21 1415)    aspirin  81 mg Oral Daily Marquis Mark MD      baclofen  30 mg Oral TID Marquis Mark MD      cholecalciferol  2,000 Units Oral Daily Marquis Makr MD      Diclofenac Sodium  2 g Topical 4x Daily Abbi Robledo DO      docusate sodium  100 mg Oral BID Abbi Robledo DO      enoxaparin  40 mg Subcutaneous Daily Marquis Mark MD      gabapentin  100 mg Oral TID Adline Proper, CRNP      lidocaine  1 patch Topical Daily Marquis Mark MD      pantoprazole  40 mg Oral BID AC Jazmin Acevedo MD      polyethylene glycol  17 g Oral Daily PRN Jazmin Acevedo MD      senna  2 tablet Oral BID Jazmin Acevedo MD      tamsulosin  0 8 mg Oral Daily Marquis Mark MD       Continuous Infusions:   PRN Meds:   acetaminophen    polyethylene glycol     A 12 system ROS was completed  Other than the above mentioned complaints in the HPI and those commented on below, all remaining systems were negative  Vitals:   /73   Pulse 76   Temp 98 1 °F (36 7 °C) (Oral)   Resp 18   Ht 4' 7" (1 397 m)   Wt 74 8 kg (165 lb)   SpO2 96%   BMI 38 35 kg/m²     Physical Exam:   Physical Exam  Vitals and nursing note reviewed  Constitutional:       Appearance: Normal appearance  She is obese  HENT:      Head: Normocephalic  Right Ear: External ear normal       Left Ear: External ear normal       Mouth/Throat:      Mouth: Mucous membranes are moist    Eyes:      General: No scleral icterus  Extraocular Movements: Extraocular movements intact and EOM normal       Pupils: Pupils are equal, round, and reactive to light  Pulmonary:      Effort: Pulmonary effort is normal  No respiratory distress  Abdominal:      Palpations: Abdomen is soft  Genitourinary:     Comments: incontinence  Musculoskeletal:      Cervical back: Normal range of motion  Skin:     General: Skin is warm  Capillary Refill: Capillary refill takes less than 2 seconds  Coloration: Skin is pale  Skin is not jaundiced  Findings: No bruising, erythema, lesion or rash  Neurological:      Mental Status: She is alert and oriented to person, place, and time  Deep Tendon Reflexes:      Reflex Scores:       Bicep reflexes are 1+ on the right side and 1+ on the left side  Brachioradialis reflexes are 1+ on the right side and 1+ on the left side  Patellar reflexes are 1+ on the right side and 1+ on the left side  Comments: Detailed below   Psychiatric:         Mood and Affect: Mood normal          Speech: Speech normal        Neurologic Exam     Mental Status   Oriented to person, place, and time  Follows 2 step commands     Speech: speech is normal   Level of consciousness: alert Cranial Nerves   Cranial nerves II through XII intact  CN III, IV, VI   Pupils are equal, round, and reactive to light  Extraocular motions are normal    Right pupil: Size: 3 mm  Left pupil: Size: 3 mm  Motor Exam Bilateral upper extremities: 4/5 strength, lower extremities hip flexors 2/5, dorsi flexion and plantar flexion 4/5    BLE distal strength > proximal         Sensory Exam   Light touch normal      Gait, Coordination, and Reflexes     Reflexes   Right brachioradialis: 1+  Left brachioradialis: 1+  Right biceps: 1+  Left biceps: 1+  Right patellar: 1+  Left patellar: 1+  Right plantar: normal  Left plantar: normal  Some evidence of ataxia with FTN testing  Labs: I have personally reviewed pertinent reports  Recent Results (from the past 24 hour(s))   CBC    Collection Time: 06/24/21  7:27 AM   Result Value Ref Range    WBC 11 43 (H) 4 31 - 10 16 Thousand/uL    RBC 3 78 (L) 3 81 - 5 12 Million/uL    Hemoglobin 11 9 11 5 - 15 4 g/dL    Hematocrit 36 4 34 8 - 46 1 %    MCV 96 82 - 98 fL    MCH 31 5 26 8 - 34 3 pg    MCHC 32 7 31 4 - 37 4 g/dL    RDW 14 9 11 6 - 15 1 %    Platelets 050 629 - 668 Thousands/uL    MPV 10 6 8 9 - 12 7 fL   Basic metabolic panel    Collection Time: 06/24/21  7:27 AM   Result Value Ref Range    Sodium 145 136 - 145 mmol/L    Potassium 4 3 3 5 - 5 3 mmol/L    Chloride 108 100 - 108 mmol/L    CO2 30 21 - 32 mmol/L    ANION GAP 7 4 - 13 mmol/L    BUN 31 (H) 5 - 25 mg/dL    Creatinine 0 80 0 60 - 1 30 mg/dL    Glucose 113 65 - 140 mg/dL    Calcium 9 1 8 3 - 10 1 mg/dL    eGFR 78 ml/min/1 73sq m       Imaging: I have personally reviewed pertinent imaging in PACS, including MRI t spine,  and I have personally reviewed PACS reports  EKG, Pathology, and Other Studies: I have personally reviewed pertinent reports  VTE Prophylaxis: Enoxaparin (Lovenox)    Counseling / Coordination of Care  Total time spent today 33 minutes    Greater than 50% of total time was spent with the patient and/or family counseling and/or coordination of care  A description of the counseling/coordination of care:  Patient was seen and evaluated  Discussed with attending  Chart reviewed thoroughly including laboratory and imaging studies  Plan of care discussed with patient and primary team      **Please Note: This note may have been constructed using a voice recognition system  **

## 2021-06-24 NOTE — ASSESSMENT & PLAN NOTE
Patient self-catheterizes due to neurogenic bladder from MS  Frequent UTI's vs possible colonization  UA completed during this admission with moderate leukocytes, nitrite negative  Urine culture has grown 10,000 - 19,000 cfu of Enterococcus species    Urine sensitivity shows resistance to tetracycline    Plan:  · Patient continues on IV Cefepime 2,000 mg BID, per primary team  · Medical management and supportive care per primary team

## 2021-06-24 NOTE — PROGRESS NOTES
[facility]  Progress Note - Rima Edwards 1955, 73 y o  female MRN: [id number]  Unit/Bed#: W -01 Encounter: [id number]  Primary Care Provider: Lilly Smith MD   Date and time admitted to hospital: 6/20/2021  7:58 PM    * Multiple sclerosis [facility])  Assessment & Plan  History of Primary Progressive MS, follows with Neurology in [place], [place] every 2 months  Current treatment regimen include intrathecal MTX every 2 months (most recent injection approximately 2 weeks ago), Dalfampridine for muscle weakness), and Baclofen (due to spasticity)  Most recent imaging completed on 6/16/21 revealed innumerable lesions in cerebrum and cerebellum with no significant change from prior study  Suspect patient is having acute exacerbation vs pseudo-exacerbation in setting of recent UTI along with progressing weakness and debility  Plan:  · Neurology following  Appreciate recommendations  · Neurology starting IV steroids, 1 g Solumedrol daily for 3 days (#3/3 today)  · PT/OT following  Recommending post-acute rehab  Patient is agreeable  · Home Dalfampridine is not on hospital formulary  Family may possibly bring this up to the hospital  If this occurs, will enter non-formulary orders for pharmacy to profile medication  · Continue medical management of UTI    Ambulatory dysfunction  Assessment & Plan  Recurrent falls secondary to MS  Currently ambulates with assistance of a rolling walker  Most recent fall secondary to legs giving out  CTH, C-spine, and CAP without acute pathology  Plan:  · IV steroids initiated by Neurology as symptoms are suspected to be related to MS flare  · Continue fall precautions - ambulate with assistance and using walker  · PT recommending post-acute rehab and patient is agreeable    Acute cystitis without hematuria  Assessment & Plan  Patient self-catheterizes due to neurogenic bladder from [address]  Frequent UTI's vs possible colonization   Recently finished a course of Macrobid approximately 2 weeks ago for UTI  Since then symptoms have worsened including frequency and suprapubic pain  UA completed during this admission with moderate leukocytes, nitrite negative  Urine culture has grown 10,000 - 19,000 cfu of Enterococcus species  Full species not yet available and sensitivity still pending  Prior urine cultures have been positive for citrobacter freundii and E Coli which were susceptible to Cefepime  To note, the citrobacter species had significant resistance to multiple drugs  · Urine C+S: Enterobacter faecalis  Sensitive to Ampicillin, Levofloxacin, Macrobid, and vancomycin  Resistant to Tetracycline    Plan:  · Will change IV Cefepime to IV ampicillin 2 g Q6H as PO is not available on hospital formulary  · Transition to PO Ampicillin at discharge      Neurogenic bladder  Assessment & Plan  · Secondary to Multiple Sclerosis   · Patient reports self-catheterization twice a day at home   · Urinary retention protocol   · Continue Flomax     Thoracic back pain  Assessment & Plan  Mid-upper back pain, bilateral, ongoing for several weeks  Appears musculoskeletal in nature  Imaging completed in the ED negative for fracture or malalignment  · Neurology ordered MRI of thoracic spine due to known MS lesions  This study was consistent with MS lesions, no active lesions noted  · Lidoderm patch on for 12 hours off for 12 hours  · Voltaren gel while lidoderm patch is off  · Continue to monitor    Leg swelling  Assessment & Plan  · Patient on Lasix 40mg on Mondays and Fridays- will continue   · Reports worsening of leg swelling over the past 2 weeks  · Venous duplex of BLE on 6/16/21 showed no evidence of DVT   · Echo done on 10/7/2020 showed hyperdynamic systolic function with an EF of 80% and no regional wall motion abnormalities  There was moderate asymmetric hypertrophy with an increased relative contribution of atrial contraction to ventricular filling     · S/p x1 dose of IV lasix 40mg - stable    Hypertension  Assessment & Plan  · Blood Pressure: 131/73 acceptable- will continue to monitor   · Continue on Norvasc 2 5mg BID     Constipation-resolved as of 2021  Assessment & Plan  Patient had BM on     Hypernatremia-resolved as of 2021  Assessment & Plan  Results from last 7 days   Lab Units 21  0727 21  0540 21  0603   SODIUM mmol/L 145 143 146*     Slight hypernatremia noted   Will continue to monitor  VTE Pharmacologic Prophylaxis:   VTE Score: 4 Moderate Risk (Score 3-4) - Pharmacological DVT Prophylaxis Ordered: Enoxaparin (Lovenox)  Mechanical VTE Prophylaxis in Place: Yes    Patient Centered Rounds: I have performed bedside rounds with nursing staff today  Discussions with Specialists or Other Care Team Provider: Neurology    Education and Discussions with Family / Patient: Updated  () at bedside  All questions and concerns addressed today  Current Length of Stay: 4 day(s)    Current Patient Status: Inpatient     Discharge Plan / Estimated Discharge Date: Patient is medically cleared for d/c today  Anticipate discharge tomorrow to rehab facility - pending acceptance and bed availability  Code Status: Level 1 - Full Code      Subjective:   Patient was seen and examined at bedside  No acute events overnight  States that she has noticed some improvement in her strength today compared to yesterday  Had a BM yesterday and no longer constipated  Back pain also improved with topical lidoderm and Voltaren  No other complaints today  Objective:     Vitals:   Temp (24hrs), Av °F (36 7 °C), Min:97 8 °F (36 6 °C), Max:98 1 °F (36 7 °C)    Temp:  [97 8 °F (36 6 °C)-98 1 °F (36 7 °C)] 98 1 °F (36 7 °C)  HR:  [76-91] 76  Resp:  [16-18] 18  BP: (122-147)/(65-84) 131/73  SpO2:  [92 %-96 %] 96 %  Body mass index is 38 35 kg/m²  Input and Output Summary (last 24 hours):        Intake/Output Summary (Last 24 hours) at 6/24/2021 1329  Last data filed at 6/24/2021 1300  Gross per 24 hour   Intake 780 ml   Output 1158 ml   Net -378 ml       Physical Exam:     Physical Exam  Vitals and nursing note reviewed  Constitutional:       Appearance: Normal appearance  She is obese  HENT:      Head: Normocephalic and atraumatic  Right Ear: External ear normal       Left Ear: External ear normal       Nose: Nose normal       Mouth/Throat:      Mouth: Mucous membranes are moist       Pharynx: Oropharynx is clear  Eyes:      General: No scleral icterus  Extraocular Movements: Extraocular movements intact  Right eye: Nystagmus present  Left eye: Nystagmus present  Conjunctiva/sclera: Conjunctivae normal       Pupils: Pupils are equal, round, and reactive to light  Cardiovascular:      Rate and Rhythm: Normal rate and regular rhythm  Pulses: Normal pulses  Heart sounds: Normal heart sounds  Pulmonary:      Effort: Pulmonary effort is normal  No respiratory distress  Breath sounds: Normal breath sounds  Abdominal:      General: Bowel sounds are normal  There is no distension  Palpations: Abdomen is soft  Tenderness: There is no abdominal tenderness  Musculoskeletal:         General: No tenderness or deformity  Cervical back: Normal range of motion and neck supple  Right lower leg: No edema  Left lower leg: No edema  Comments: No tenderness to mid-upper thoracic back today   Skin:     General: Skin is warm and dry  Capillary Refill: Capillary refill takes less than 2 seconds  Neurological:      Mental Status: She is alert and oriented to person, place, and time  Mental status is at baseline  Cranial Nerves: No cranial nerve deficit  Motor: Weakness present  Gait: Gait abnormal       Comments: Strength 4/5 in upper extremities though  greater on right compared to left  Lower extremities strength 4-/5 R>L   Able to move both arms and legs with more ease compared to yesterday  Psychiatric:         Mood and Affect: Mood normal          Behavior: Behavior normal           Additional Data:     Labs:  Results from last 7 days   Lab Units 06/24/21  0727 06/23/21  0612   WBC Thousand/uL 11 43* 5 44   HEMOGLOBIN g/dL 11 9 12 7   HEMATOCRIT % 36 4 38 6   PLATELETS Thousands/uL 301 286   NEUTROS PCT %  --  89*   LYMPHS PCT %  --  9*   MONOS PCT %  --  1*   EOS PCT %  --  0     Results from last 7 days   Lab Units 06/24/21  0727 06/20/21  2035   SODIUM mmol/L 145 145   POTASSIUM mmol/L 4 3 4 4   CHLORIDE mmol/L 108 106   CO2 mmol/L 30 31   BUN mg/dL 31* 21   CREATININE mg/dL 0 80 0 66   ANION GAP mmol/L 7 8   CALCIUM mg/dL 9 1 8 8   ALBUMIN g/dL  --  3 7   TOTAL BILIRUBIN mg/dL  --  0 45   ALK PHOS U/L  --  160*   ALT U/L  --  29   AST U/L  --  13   GLUCOSE RANDOM mg/dL 113 89                       Imaging: Reviewed radiology reports from this admission including: MRI T-spine  Personally reviewed the following imaging: MRI T-spine     MRI thoracic spine w wo contrast  Result Date: 6/24/2021  Impression: Scattered foci of T2 signal hyperintensity within the thoracic cord consistent with the patient's clinical history of demyelinating disease  No abnormal enhancement to suggest the presence of active demyelination  Flattening along the dorsal aspect of the cord at the level of T6 with ventral displacement consistent with the presence of an intradural arachnoid cyst  Workstation performed: [id number]     Recent Cultures (last 7 days):     Results from last 7 days   Lab Units 06/20/21 2133 06/20/21 2055   BLOOD CULTURE  No Growth at 72 hrs  No Growth at 72 hrs   --    URINE CULTURE   --  10,000-19,000 cfu/ml Enterococcus faecalis*       Lines/Drains:  Invasive Devices     Peripheral Intravenous Line            Peripheral IV 06/24/21 Dorsal (posterior); Left Forearm <1 day                Telemetry: N/A       Last 24 Hours Medication List:   Current Facility-Administered Medications   Medication Dose Route Frequency Provider Last Rate    acetaminophen  650 mg Oral Q6H PRN Keith Grewal MD      amLODIPine  2 5 mg Oral BID Keith rGewal MD      ampicillin  2,000 mg Intravenous Q6H Abbi Robledo DO      aspirin  81 mg Oral Daily Keith Grewal MD      baclofen  30 mg Oral TID Keith Grewal MD      cholecalciferol  2,000 Units Oral Daily Keith Grewal MD      Diclofenac Sodium  2 g Topical 4x Daily Abbi Robledo DO      docusate sodium  100 mg Oral BID Abbi Robledo DO      enoxaparin  40 mg Subcutaneous Daily Keith Grewal MD      gabapentin  100 mg Oral TID COLIN Rousseau      lidocaine  1 patch Topical Daily Keith Grewal MD      pantoprazole  40 mg Oral BID AC Jazmin Acevedo MD      polyethylene glycol  17 g Oral Daily PRN Jazmin Acevedo MD      senna  2 tablet Oral BID Jazmin Acevedo MD      tamsulosin  0 8 mg Oral Daily Keith Grewal MD          Today, Patient Was Seen By: Selin Erickson DO    ** Please Note: This note has been constructed using a voice recognition system   **

## 2021-06-24 NOTE — ASSESSMENT & PLAN NOTE
Results from last 7 days   Lab Units 06/24/21  0727 06/23/21  0540 06/22/21  0603   SODIUM mmol/L 145 143 146*     Slight hypernatremia noted 6/22  Will continue to monitor

## 2021-06-24 NOTE — ASSESSMENT & PLAN NOTE
Recurrent falls secondary to MS  Currently ambulates with assistance of a rolling walker  Most recent fall secondary to legs giving out  CTH, C-spine, and CAP without acute pathology      Plan:  · IV steroids initiated by Neurology as symptoms are suspected to be related to MS flare  · Continue fall precautions - ambulate with assistance and using walker  · PT recommending post-acute rehab and patient is agreeable

## 2021-06-24 NOTE — CASE MANAGEMENT
CM spoke with patient and  at the bedside  Patient and  updated CM spoke with [facility]  [facility] does not have available inpatient rehab beds at this time  [facility] also does not have bed availability at this time  CM reviewed with  and patient that [facility] is reviewing  CM discussed with  if he is okay receiving a call from [facility] admissions liaison to discuss plan of care   stated "that is fine "  stated he plans of taking wife back home after a few weeks of inpatient rehab   requested information on non skilled home care givers for future reference   and patient's plan is not long term placement  CM will f/u with patient and  re:non skilled home caregiver list      CM contacted Vyteris with [facility] admissions  Josey updated TAMI spoke with patient and    waiting for her call to discuss options/plan of care

## 2021-06-24 NOTE — ASSESSMENT & PLAN NOTE
History of Primary Progressive MS, follows with Neurology in [place], [place] every 2 months  Current treatment regimen include intrathecal MTX every 2 months (most recent injection approximately 2 weeks ago), Dalfampridine for muscle weakness), and Baclofen (due to spasticity)  Most recent imaging completed on 6/16/21 revealed innumerable lesions in cerebrum and cerebellum with no significant change from prior study  Suspect patient is having acute exacerbation vs pseudo-exacerbation in setting of recent UTI along with progressing weakness and debility  Plan:  · Neurology following  Appreciate recommendations  · Neurology starting IV steroids, 1 g Solumedrol daily for 3 days (#3/3 today)  · PT/OT following  Recommending post-acute rehab  Patient is agreeable  · Home Dalfampridine is not on hospital formulary  Family may possibly bring this up to the hospital  If this occurs, will enter non-formulary orders for pharmacy to profile medication    · Continue medical management of UTI

## 2021-06-25 LAB
BACTERIA BLD CULT: NORMAL
BACTERIA BLD CULT: NORMAL
SARS-COV-2 RNA RESP QL NAA+PROBE: NEGATIVE

## 2021-06-25 PROCEDURE — U0003 INFECTIOUS AGENT DETECTION BY NUCLEIC ACID (DNA OR RNA); SEVERE ACUTE RESPIRATORY SYNDROME CORONAVIRUS 2 (SARS-COV-2) (CORONAVIRUS DISEASE [COVID-19]), AMPLIFIED PROBE TECHNIQUE, MAKING USE OF HIGH THROUGHPUT TECHNOLOGIES AS DESCRIBED BY CMS-2020-01-R: HCPCS | Performed by: PSYCHIATRY & NEUROLOGY

## 2021-06-25 PROCEDURE — U0005 INFEC AGEN DETEC AMPLI PROBE: HCPCS | Performed by: PSYCHIATRY & NEUROLOGY

## 2021-06-25 PROCEDURE — 97530 THERAPEUTIC ACTIVITIES: CPT

## 2021-06-25 PROCEDURE — 99232 SBSQ HOSP IP/OBS MODERATE 35: CPT | Performed by: INTERNAL MEDICINE

## 2021-06-25 PROCEDURE — 97535 SELF CARE MNGMENT TRAINING: CPT

## 2021-06-25 PROCEDURE — 97116 GAIT TRAINING THERAPY: CPT

## 2021-06-25 RX ADMIN — AMPICILLIN SODIUM 2000 MG: 2 INJECTION, POWDER, FOR SOLUTION INTRAMUSCULAR; INTRAVENOUS at 19:55

## 2021-06-25 RX ADMIN — DOCUSATE SODIUM 100 MG: 100 CAPSULE, LIQUID FILLED ORAL at 08:14

## 2021-06-25 RX ADMIN — ACETAMINOPHEN 650 MG: 325 TABLET, FILM COATED ORAL at 20:49

## 2021-06-25 RX ADMIN — GABAPENTIN 100 MG: 100 CAPSULE ORAL at 20:44

## 2021-06-25 RX ADMIN — SENNOSIDES 17.2 MG: 8.6 TABLET, FILM COATED ORAL at 18:06

## 2021-06-25 RX ADMIN — ASPIRIN 81 MG: 81 TABLET, COATED ORAL at 08:14

## 2021-06-25 RX ADMIN — AMPICILLIN SODIUM 2000 MG: 2 INJECTION, POWDER, FOR SOLUTION INTRAMUSCULAR; INTRAVENOUS at 13:50

## 2021-06-25 RX ADMIN — AMPICILLIN SODIUM 2000 MG: 2 INJECTION, POWDER, FOR SOLUTION INTRAMUSCULAR; INTRAVENOUS at 01:02

## 2021-06-25 RX ADMIN — PANTOPRAZOLE SODIUM 40 MG: 40 TABLET, DELAYED RELEASE ORAL at 08:14

## 2021-06-25 RX ADMIN — GABAPENTIN 100 MG: 100 CAPSULE ORAL at 16:11

## 2021-06-25 RX ADMIN — SENNOSIDES 17.2 MG: 8.6 TABLET, FILM COATED ORAL at 08:14

## 2021-06-25 RX ADMIN — BACLOFEN 30 MG: 10 TABLET ORAL at 20:43

## 2021-06-25 RX ADMIN — BACLOFEN 30 MG: 10 TABLET ORAL at 08:14

## 2021-06-25 RX ADMIN — AMLODIPINE BESYLATE 2.5 MG: 2.5 TABLET ORAL at 18:06

## 2021-06-25 RX ADMIN — AMLODIPINE BESYLATE 2.5 MG: 2.5 TABLET ORAL at 08:14

## 2021-06-25 RX ADMIN — Medication 2000 UNITS: at 08:14

## 2021-06-25 RX ADMIN — BACLOFEN 30 MG: 10 TABLET ORAL at 16:11

## 2021-06-25 RX ADMIN — PANTOPRAZOLE SODIUM 40 MG: 40 TABLET, DELAYED RELEASE ORAL at 16:11

## 2021-06-25 RX ADMIN — ENOXAPARIN SODIUM 40 MG: 40 INJECTION SUBCUTANEOUS at 08:15

## 2021-06-25 RX ADMIN — TAMSULOSIN HYDROCHLORIDE 0.8 MG: 0.4 CAPSULE ORAL at 08:14

## 2021-06-25 RX ADMIN — DOCUSATE SODIUM 100 MG: 100 CAPSULE, LIQUID FILLED ORAL at 18:06

## 2021-06-25 RX ADMIN — GABAPENTIN 100 MG: 100 CAPSULE ORAL at 08:15

## 2021-06-25 RX ADMIN — AMPICILLIN SODIUM 2000 MG: 2 INJECTION, POWDER, FOR SOLUTION INTRAMUSCULAR; INTRAVENOUS at 07:21

## 2021-06-25 RX ADMIN — DICLOFENAC SODIUM 2 G: 10 GEL TOPICAL at 18:00

## 2021-06-25 NOTE — PLAN OF CARE
Problem: Potential for Falls  Goal: Patient will remain free of falls  Description: INTERVENTIONS:  - Educate patient/family on patient safety including physical limitations  - Instruct patient to call for assistance with activity   - Consult OT/PT to assist with strengthening/mobility   - Keep Call bell within reach  - Keep bed low and locked with side rails adjusted as appropriate  - Keep care items and personal belongings within reach  - Initiate and maintain comfort rounds  - Make Fall Risk Sign visible to staff  - Apply yellow socks and bracelet for high fall risk patients  - Consider moving patient to room near nurses station  Outcome: Progressing     Problem: MOBILITY - ADULT  Goal: Maintain or return to baseline ADL function  Description: INTERVENTIONS:  -  Assess patient's ability to carry out ADLs; assess patient's baseline for ADL function and identify physical deficits which impact ability to perform ADLs (bathing, care of mouth/teeth, toileting, grooming, dressing, etc )  - Assess/evaluate cause of self-care deficits   - Assess range of motion  - Assess patient's mobility; develop plan if impaired  - Assess patient's need for assistive devices and provide as appropriate  - Encourage maximum independence but intervene and supervise when necessary  - Involve family in performance of ADLs  - Assess for home care needs following discharge   - Consider OT consult to assist with ADL evaluation and planning for discharge  - Provide patient education as appropriate  Outcome: Progressing  Goal: Maintains/Returns to pre admission functional level  Description: INTERVENTIONS:  - Perform BMAT or MOVE assessment daily    - Set and communicate daily mobility goal to care team and patient/family/caregiver     - Collaborate with rehabilitation services on mobility goals if consulted  - Out of bed for toileting  - Record patient progress and toleration of activity level   Outcome: Progressing     Problem: Prexisting or High Potential for Compromised Skin Integrity  Goal: Skin integrity is maintained or improved  Description: INTERVENTIONS:  - Identify patients at risk for skin breakdown  - Assess and monitor skin integrity  - Assess and monitor nutrition and hydration status  - Monitor labs   - Assess for incontinence   - Turn and reposition patient  - Assist with mobility/ambulation  - Relieve pressure over bony prominences  - Avoid friction and shearing  - Provide appropriate hygiene as needed including keeping skin clean and dry  - Evaluate need for skin moisturizer/barrier cream  - Collaborate with interdisciplinary team   - Patient/family teaching  - Consider wound care consult   Outcome: Progressing     Problem: PAIN - ADULT  Goal: Verbalizes/displays adequate comfort level or baseline comfort level  Description: Interventions:  - Encourage patient to monitor pain and request assistance  - Assess pain using appropriate pain scale  - Administer analgesics based on type and severity of pain and evaluate response  - Implement non-pharmacological measures as appropriate and evaluate response  - Consider cultural and social influences on pain and pain management  - Notify physician/advanced practitioner if interventions unsuccessful or patient reports new pain  Outcome: Progressing     Problem: INFECTION - ADULT  Goal: Absence or prevention of progression during hospitalization  Description: INTERVENTIONS:  - Assess and monitor for signs and symptoms of infection  - Monitor lab/diagnostic results  - Monitor all insertion sites, i e  indwelling lines, tubes, and drains  - Monitor endotracheal if appropriate and nasal secretions for changes in amount and color  - [place] appropriate cooling/warming therapies per order  - Administer medications as ordered  - Instruct and encourage patient and family to use good hand hygiene technique  - Identify and instruct in appropriate isolation precautions for identified infection/condition  Outcome: Progressing     Problem: SAFETY ADULT  Goal: Patient will remain free of falls  Description: INTERVENTIONS:  - Educate patient/family on patient safety including physical limitations  - Instruct patient to call for assistance with activity   - Consult OT/PT to assist with strengthening/mobility   - Keep Call bell within reach  - Keep bed low and locked with side rails adjusted as appropriate  - Keep care items and personal belongings within reach  - Initiate and maintain comfort rounds  - Make Fall Risk Sign visible to staff  - Apply yellow socks and bracelet for high fall risk patients  - Consider moving patient to room near nurses station  Outcome: Progressing  Goal: Maintain or return to baseline ADL function  Description: INTERVENTIONS:  -  Assess patient's ability to carry out ADLs; assess patient's baseline for ADL function and identify physical deficits which impact ability to perform ADLs (bathing, care of mouth/teeth, toileting, grooming, dressing, etc )  - Assess/evaluate cause of self-care deficits   - Assess range of motion  - Assess patient's mobility; develop plan if impaired  - Assess patient's need for assistive devices and provide as appropriate  - Encourage maximum independence but intervene and supervise when necessary  - Involve family in performance of ADLs  - Assess for home care needs following discharge   - Consider OT consult to assist with ADL evaluation and planning for discharge  - Provide patient education as appropriate  Outcome: Progressing  Goal: Maintains/Returns to pre admission functional level  Description: INTERVENTIONS:  - Perform BMAT or MOVE assessment daily    - Set and communicate daily mobility goal to care team and patient/family/caregiver     - Collaborate with rehabilitation services on mobility goals if consulted  - Out of bed for toileting  - Record patient progress and toleration of activity level   Outcome: Progressing     Problem: DISCHARGE PLANNING  Goal: Discharge to home or other facility with appropriate resources  Description: INTERVENTIONS:  - Identify barriers to discharge w/patient and caregiver  - Arrange for needed discharge resources and transportation as appropriate  - Identify discharge learning needs (meds, wound care, etc )  - Arrange for interpretive services to assist at discharge as needed  - Refer to Case Management Department for coordinating discharge planning if the patient needs post-hospital services based on physician/advanced practitioner order or complex needs related to functional status, cognitive ability, or social support system  Outcome: Progressing     Problem: Knowledge Deficit  Goal: Patient/family/caregiver demonstrates understanding of disease process, treatment plan, medications, and discharge instructions  Description: Complete learning assessment and assess knowledge base    Interventions:  - Provide teaching at level of understanding  - Provide teaching via preferred learning methods  Outcome: Progressing     Problem: GENITOURINARY - ADULT  Goal: Maintains or returns to baseline urinary function  Description: INTERVENTIONS:  - Assess urinary function  - Encourage oral fluids to ensure adequate hydration if ordered  - Administer IV fluids as ordered to ensure adequate hydration  - Administer ordered medications as needed  - Offer frequent toileting  - Follow urinary retention protocol if ordered  Outcome: Progressing  Goal: Absence of urinary retention  Description: INTERVENTIONS:  - Assess patients ability to void and empty bladder  - Monitor I/O  - Bladder scan as needed  - Discuss with physician/AP medications to alleviate retention as needed  - Discuss catheterization for long term situations as appropriate  Outcome: Progressing

## 2021-06-25 NOTE — OCCUPATIONAL THERAPY NOTE
Occupational Therapy Progress Note     Patient Name: Amber CRAIG Date: 6/25/2021  Problem List  Principal Problem:    Multiple sclerosis ([address] )  Active Problems:    Ambulatory dysfunction    Acute cystitis without hematuria    Hypertension    Neurogenic bladder    Leg swelling    Thoracic back pain              06/25/21 1348   OT Last Visit   OT Visit Date 06/25/21   Note Type   Note Type Treatment   Restrictions/Precautions   Weight Bearing Precautions Per Order No   Other Precautions Contact/isolation   Pain Assessment   Pain Assessment Tool Pain Assessment not indicated - pt denies pain   Pain Score No Pain   ADL   Where Assessed Edge of bed   Eating Assistance 5  Supervision/Setup   Eating Deficit Setup   Eating Comments use of built up utensils   Grooming Assistance 5  Supervision/Setup   Grooming Deficit Supervision/safety;Verbal cueing; Increased time to complete   Grooming Comments increased time for tasks   UB Bathing Assistance 5  Supervision/Setup   UB Bathing Deficit Increased time to complete;Supervision/safety;Verbal cueing   UB Bathing Comments simulation of bathing tasks   LB Bathing Assistance 3  Moderate Assistance   LB Bathing Deficit Increased time to complete;Supervision/safety;Verbal cueing;Buttocks;Right lower leg including foot; Left lower leg including foot   LB Bathing Comments A with B feet and buttocks   UB Dressing Assistance 5  Supervision/Setup   UB Dressing Deficit Increased time to complete;Supervision/safety;Verbal cueing   UB Dressing Comments donning gown   LB Dressing Assistance 4  Minimal Assistance   LB Dressing Deficit Increased time to complete;Supervision/safety;Verbal cueing; Don/doff R sock; Don/doff L sock   LB Dressing Comments LLE is "heavier" for pt to lift, eu on use of leg  able to use towel as modified leg  with min A   Able to don/doff B socks with overall min A   Functional Standing Tolerance   Time 1 min 30 sec   Bed Mobility   Supine to Sit 3 Moderate assistance   Additional items Assist x 1; Increased time required;Verbal cues;LE management   Transfers   Sit to Stand 3  Moderate assistance   Additional items Assist x 2; Increased time required;Verbal cues   Stand to Sit 3  Moderate assistance   Additional items Assist x 2; Increased time required;Verbal cues   Additional Comments max of 1-2 for 1st trial, and then progressing to mod A x2  5 trials of sit >< stand, trial with RW and quickmove, improved performance with RW over quickmove  Edu on technique and improvemented noted with pushing B UE from chair   Functional Mobility   Functional Mobility 3  Moderate assistance   Additional Comments Ax1, with chair follow ~ 6 ft   Additional items Rolling walker   Cognition   Overall Cognitive Status WFL   Arousal/Participation Alert; Cooperative   Attention Attends with cues to redirect   Orientation Level Oriented X4   Memory Decreased recall of precautions;Decreased recall of recent events   Following Commands Follows one step commands with increased time or repetition   Comments Pleasant and cooperative, noted improved cognitive performance from previous session, pt continues with intermittent STM deficits   Activity Tolerance   Activity Tolerance Patient tolerated treatment well;Patient limited by fatigue   Medical Staff Made Aware RUDI Rothman, JOSÉ Andrews, CM [place]   Assessment   Assessment Pt agreeable to participate in skilled OT treatment session to address ADL retraining, functional transfer training, endurance training, patient/family training, equipment evaluation/education, compensatory technique education, activity engagement and activity tolerance  Pt supine in bed upon arrival  Pt motivated and agreeable to participate in OT treatment  Pt completing sit >< stand with mod A x2, a noted improvement from previous session   Pt willing to trial sit >< stand with RW and with quickmove, pt most successful with use of RW with pushing from chair to stand and B knees blocked  Pt able to maintain standing balance ~ 1 min 30 sec for LB bathing and functional reaching tasks  Pt completing bathing and dressing tasks sitting in chair  Use of towel as leg  on LLE due to feeling "heavy" in leg  Min A for use of "leg " and balance  Pt completing functional mobility with mod A x1 and use of RW, a notable improvement from previous sessions  Pt excited and motivated about participating in therapy and willing to meet therapist's challenges  Pt and  educated on safety in shower at home, and potential use of grab bars in new shower  Pt reporting that she sits in rolling office chair at kitchen table during the day  Pt and  educated on safe strategies such as exchanging rolling chair for kitchen chair with cushion and arm rests vs removing wheels from office chair and preventing chair from swiveling  Patient continues to be performing below their functional baseline with an increased risk for falls and injury and decreased occupational performance, and would benefit from continued skilled OT treatment to address deficits  The patient's raw score on the AM-PAC Daily Activity inpatient short form is 17, standardized score is 37 26, less than 39 4  Patients at this level are likely to benefit from discharge to post-acute rehabilitation services   Cont to recommend PAR on d/c     Plan   Goal Expiration Date 07/07/21   OT Treatment Day 1   OT Frequency 3-5x/wk   Recommendation   OT Discharge Recommendation Post acute rehabilitation services   AM-[facility] Daily Activity Inpatient   Lower Body Dressing 3   Bathing 2   Toileting 3   Upper Body Dressing 3   Grooming 3   Eating 3   Daily Activity Raw Score 17   Daily Activity Standardized Score (Calc for Raw Score >=11) 37 26   AM-PAC Applied Cognition Inpatient   Following a Speech/Presentation 3   Understanding Ordinary Conversation 4   Taking Medications 3   Remembering Where Things Are Placed or Put Away 3   Remembering List of 4-5 Errands 3   Taking Care of Complicated Tasks 2   Applied Cognition Raw Score 18   Applied Cognition Standardized Score 38 07            At the end of the session, all needs met and pt seated in bedside chair, chair alarm activated and call bell within reach    [facility], OTR/L

## 2021-06-25 NOTE — PHYSICAL THERAPY NOTE
PHYSICAL THERAPY NOTE    Patient Name: Namrata Madden  CIYUA'T Date: 21 1350   PT Last Visit   PT Visit Date 21   Note Type   Note Type Treatment   Pain Assessment   Pain Assessment Tool Pain Assessment not indicated - pt denies pain   Pain Score No Pain   Restrictions/Precautions   Weight Bearing Precautions Per Order No   Other Precautions Chair Alarm;Contact/isolation; Fall Risk;Bed Alarm   General   Family/Caregiver Present No   Subjective   Subjective Patient supine in bed and is agreeable to participate in therapy session  Pt identifers obtained from name &   Bed Mobility   Supine to Sit 3  Moderate assistance   Additional items Assist x 1;HOB elevated; Bedrails; Increased time required;Verbal cues;LE management   Sit to Supine Unable to assess   Additional Comments Patient seated OOB in recliner post session with chair alarm engaged, call bell and belongings in reach  Transfers   Sit to Stand 3  Moderate assistance  (inital max ax1-2 inital stand )   Additional items Assist x 2; Increased time required;Verbal cues   Stand to Sit 3  Moderate assistance   Additional items Assist x 2; Increased time required;Verbal cues   Additional Comments Standing tolerance at roller walker with B UE support x 90 seconds  Ambulation/Elevation   Gait pattern R Knee Jalen;L Knee Jalen; Improper Weight shift; Ataxia; Short stride; Step to;Excessively slow   Gait Assistance 3  Moderate assist   Additional items Assist x 1;Verbal cues   Assistive Device Rolling walker  (and close chair follow)   Distance 6' x1   Balance   Static Sitting Fair -   Dynamic Sitting Poor +   Static Standing Poor -   Dynamic Standing Poor -   Ambulatory Poor -   Endurance Deficit   Endurance Deficit Yes   Endurance Deficit Description limited standing tolerance and ambulation distance   Activity Tolerance   Activity Tolerance Patient limited by fatigue   Medical Staff Bryon coordination with Otf Johnson to [place] otto    Nurse Made Aware Spoke to JOSÉ WARREN    Assessment   Prognosis Guarded   Problem List Decreased strength;Decreased range of motion;Decreased endurance; Impaired balance;Decreased mobility; Decreased coordination; Impaired tone;Pain   Assessment Patient agreeable to participate in therapy session and seen co-treat with DEANN Nice secondary to degree of assistance required from mobility trials  Bed mobility with consistent mod ax1 and instruction for technique  Multiple sit<>stand trials from quick move and roller walker requiring max ax1-2 initally progressing to consistent mod ax2 and B knee block for additional, 5 total transfer trials  Standing tolerance x90 seconds with B UE support on roller walker  Pt requires verbal instruction and education of technique and safety with good understanding and follow through  Pt able to ambulate 6 feet fwd with mod ax1 and chair follow  Continue to focus on OOB mobility with progression of transfers and ambulation as appropriate and able  Goals   Patient Goals to get stronger   STG Expiration Date 07/01/21   PT Treatment Day 3   Plan   Treatment/Interventions Functional transfer training;LE strengthening/ROM; Therapeutic exercise; Endurance training;Patient/family training;Equipment eval/education; Bed mobility;Gait training;Spoke to nursing   Progress Progressing toward goals   PT Frequency Other (Comment)  (3-5x/week)   Recommendation   PT Discharge Recommendation Post acute rehabilitation services   Equipment Recommended [facility] Recommended Wheeled walker   AM-PAC Basic Mobility Inpatient   Turning in Bed Without Bedrails 3   Lying on Back to Sitting on Edge of Flat Bed 2   Moving Bed to Chair 2   Standing Up From Chair 2   Walk in Room 2   Climb 3-5 Stairs 1   Basic Mobility Inpatient Raw Score 12   Basic Mobility Standardized Score 32 23   Turning Head Towards Sound 4   Follow Simple Instructions 4   Low Function Basic Mobility Raw Score 20   Low Function Basic Mobility Standardized Score 32 8     The patient's AM-PAC Basic Mobility Inpatient Short Form Raw Score is 8, Standardized Score is 32 8  A standardized score less than 42 9 suggests the patient may benefit from discharge to post-acute rehabilitation services  Please also refer to the recommendation of the Physical Therapist for safe discharge planning      Dereck Esposito, PTA

## 2021-06-25 NOTE — PLAN OF CARE
Problem: PHYSICAL THERAPY ADULT  Goal: Performs mobility at highest level of function for planned discharge setting  See evaluation for individualized goals  Description: Treatment/Interventions: Functional transfer training, LE strengthening/ROM, Therapeutic exercise, Endurance training, Patient/family training, Equipment eval/education, Bed mobility, Gait training, Spoke to nursing  Equipment Recommended: Danay Bermudez       See flowsheet documentation for full assessment, interventions and recommendations  Outcome: Progressing  Note: Prognosis: Guarded  Problem List: Decreased strength, Decreased range of motion, Decreased endurance, Impaired balance, Decreased mobility, Decreased coordination, Impaired tone, Pain  Assessment: Patient agreeable to participate in therapy session and seen co-treat with DEANN Nice secondary to degree of assistance required from mobility trials  Bed mobility with consistent mod ax1 and instruction for technique  Multiple sit<>stand trials from quick move and roller walker requiring max ax1-2 initally progressing to consistent mod ax2 and B knee block for additional, 5 total transfer trials  Standing tolerance x90 seconds with B UE support on roller walker  Pt requires verbal instruction and education of technique and safety with good understanding and follow through  Pt able to ambulate 6 feet fwd with mod ax1 and chair follow  Continue to focus on OOB mobility with progression of transfers and ambulation as appropriate and able  PT Discharge Recommendation: Post acute rehabilitation services          See flowsheet documentation for full assessment

## 2021-06-25 NOTE — ASSESSMENT & PLAN NOTE
Patient self-catheterizes due to neurogenic bladder from MS  Frequent UTI's vs possible colonization  Recently finished a course of Macrobid approximately 2 weeks ago for UTI  Since then symptoms have worsened including frequency and suprapubic pain  UA completed during this admission with moderate leukocytes, nitrite negative  Urine culture has grown 10,000 - 19,000 cfu of Enterococcus species  Full species not yet available and sensitivity still pending  Prior urine cultures have been positive for citrobacter freundii and E Coli which were susceptible to Cefepime  To note, the citrobacter species had significant resistance to multiple drugs  · Urine C+S: Enterobacter faecalis  Sensitive to Ampicillin, Levofloxacin, Macrobid, and vancomycin   Resistant to Tetracycline    Plan:  · antibiotics day #5/7  · Continue IV ampicillin 2 g Q6H as PO is not available on hospital formulary  · Transition to PO Ampicillin at discharge

## 2021-06-25 NOTE — CASE MANAGEMENT
TAMI was in contact with [facility] who reported the Pt's clinical is reviewed, the facility will be reaching out to the Pt and spouse and will follow up with CM regarding a possible admission today

## 2021-06-25 NOTE — ASSESSMENT & PLAN NOTE
Recurrent falls secondary to MS  Currently ambulates with assistance of a rolling walker  Most recent fall secondary to legs giving out  CTH, C-spine, and CAP without acute pathology      Plan:  · IV steroids completed  · Continue fall precautions - ambulate with assistance and using walker  · Continue PT while awaiting for STR

## 2021-06-25 NOTE — CASE MANAGEMENT
CM received a call from [facility], reporting the Pt has been accepted to their facility for an admission tomorrow 6/26 with a request for an 11am  time  Janet Hawkins also requested an update COVID test and scripts for all control substances  CM made Dr Tanna Cooper aware of the above request     Pt for a tentative d/c tomorrow to [facility]  CM made a referral to [facility] for a wheelchair Malachy Boys and is currently awaiting an assigned time  TAMI reiterated with Pt and spouse, their financial responsibility for the wheelchair transport

## 2021-06-25 NOTE — PLAN OF CARE
Problem: OCCUPATIONAL THERAPY ADULT  Goal: Performs self-care activities at highest level of function for planned discharge setting  See evaluation for individualized goals  Description: Treatment Interventions: ADL retraining, Functional transfer training, UE strengthening/ROM, Endurance training, Cognitive reorientation, Patient/family training, Equipment evaluation/education, Compensatory technique education, Energy conservation, Activityengagement          See flowsheet documentation for full assessment, interventions and recommendations  Outcome: Progressing  Note: Limitation: Decreased ADL status, Decreased UE strength, Decreased Safe judgement during ADL, Decreased cognition, Decreased endurance, Decreased self-care trans, Decreased high-level ADLs  Prognosis: Good  Assessment: Pt agreeable to participate in skilled OT treatment session to address ADL retraining, functional transfer training, endurance training, patient/family training, equipment evaluation/education, compensatory technique education, activity engagement and activity tolerance  Pt supine in bed upon arrival  Pt motivated and agreeable to participate in OT treatment  Pt completing sit >< stand with mod A x2, a noted improvement from previous session  Pt willing to trial sit >< stand with RW and with quickmove, pt most successful with use of RW with pushing from chair to stand and B knees blocked  Pt able to maintain standing balance ~ 1 min 30 sec for LB bathing and functional reaching tasks  Pt completing bathing and dressing tasks sitting in chair  Use of towel as leg  on LLE due to feeling "heavy" in leg  Min A for use of "leg " and balance  Pt completing functional mobility with mod A x1 and use of RW, a notable improvement from previous sessions  Pt excited and motivated about participating in therapy and willing to meet therapist's challenges   Patient continues to be performing below their functional baseline with an increased risk for falls and injury and decreased occupational performance, and would benefit from continued skilled OT treatment to address deficits  The patient's raw score on the AM-PAC Daily Activity inpatient short form is 17, standardized score is 37 26, less than 39 4  Patients at this level are likely to benefit from discharge to post-acute rehabilitation services   Cont to recommend PAR on d/c       OT Discharge Recommendation: Post acute rehabilitation services

## 2021-06-25 NOTE — PROGRESS NOTES
[facility]  Progress Note - Rima Becker 1955, 73 y o  female MRN: [id number]  Unit/Bed#: W -01 Encounter: [id number]  Primary Care Provider: Danay Cheung MD   Date and time admitted to hospital: 6/20/2021  7:58 PM    * Multiple sclerosis [facility])  Assessment & Plan  History of Primary Progressive MS, follows with Neurology in [place], [place] every 2 months  Current treatment regimen include intrathecal MTX every 2 months (most recent injection approximately 2 weeks ago), Dalfampridine for muscle weakness), and Baclofen (due to spasticity)  Most recent imaging completed on 6/16/21 revealed innumerable lesions in cerebrum and cerebellum with no significant change from prior study  Suspect patient is having acute exacerbation vs pseudo-exacerbation in setting of recent UTI along with progressing weakness and debility  Plan:  · Completed 3-day course of IV steroids with some benefit  · PT/OT recommended STR  Patient is agreeable  Awaiting acceptance/bed availability    Ambulatory dysfunction  Assessment & Plan  Recurrent falls secondary to MS  Currently ambulates with assistance of a rolling walker  Most recent fall secondary to legs giving out  CTH, C-spine, and CAP without acute pathology  Plan:  · IV steroids completed  · Continue fall precautions - ambulate with assistance and using walker  · Continue PT while awaiting for STR    Acute cystitis without hematuria  Assessment & Plan  Patient self-catheterizes due to neurogenic bladder from [address]  Frequent UTI's vs possible colonization  Recently finished a course of Macrobid approximately 2 weeks ago for UTI  Since then symptoms have worsened including frequency and suprapubic pain  UA completed during this admission with moderate leukocytes, nitrite negative  Urine culture has grown 10,000 - 19,000 cfu of Enterococcus species  Full species not yet available and sensitivity still pending   Prior urine cultures have been positive for citrobacter freundii and E Coli which were susceptible to Cefepime  To note, the citrobacter species had significant resistance to multiple drugs  · Urine C+S: Enterobacter faecalis  Sensitive to Ampicillin, Levofloxacin, Macrobid, and vancomycin  Resistant to Tetracycline    Plan:  · antibiotics day #5/7  · Continue IV ampicillin 2 g Q6H as PO is not available on hospital formulary  · Transition to PO Ampicillin at discharge      Neurogenic bladder  Assessment & Plan  · Secondary to Multiple Sclerosis   · Patient reports self-catheterization twice a day at home   · Urinary retention protocol   · Continue Flomax     Thoracic back pain  Assessment & Plan  Mid-upper back pain, bilateral, ongoing for several weeks  Appears musculoskeletal in nature  Imaging completed in the ED negative for fracture or malalignment  · Neurology ordered MRI of thoracic spine due to known MS lesions  This study was consistent with MS lesions, no active lesions noted  · Lidoderm patch on for 12 hours off for 12 hours  · Voltaren gel while lidoderm patch is off  · Continue to monitor    Leg swelling  Assessment & Plan  · Patient on Lasix 40mg on Mondays and Fridays- will continue   · Reports worsening of leg swelling over the past 2 weeks  · Venous duplex of BLE on 6/16/21 showed no evidence of DVT   · Echo done on 10/7/2020 showed hyperdynamic systolic function with an EF of 80% and no regional wall motion abnormalities  There was moderate asymmetric hypertrophy with an increased relative contribution of atrial contraction to ventricular filling  · S/p x1 dose of IV lasix 40mg - stable    Hypertension  Assessment & Plan  · Blood Pressure: 129/65 acceptable- will continue to monitor   · Continue on Norvasc 2 5mg BID     Constipation-resolved as of 6/24/2021  Assessment & Plan  Patient had BM on 6/23   Continue Colace and Senna    Hypernatremia-resolved as of 6/24/2021  Assessment & Plan  Results from last 7 days   Lab Units 06/24/21  6584 21  0540 21  0603   SODIUM mmol/L 145 143 146*     Slight hypernatremia noted   Will continue to monitor  VTE Pharmacologic Prophylaxis:   VTE Score: 4 Moderate Risk (Score 3-4) - Pharmacological DVT Prophylaxis Ordered: Enoxaparin (Lovenox)  Mechanical VTE Prophylaxis in Place: Patient declines as the warmth caused by SCD's worsens her weakness    Patient Centered Rounds: I have performed bedside rounds with nursing staff today  Discussions with Specialists or Other Care Team Provider: Neurology, CM    Education and Discussions with Family / Patient: Updated  () at bedside  Current Length of Stay: 5 day(s)    Current Patient Status: Inpatient     Discharge Plan / Estimated Discharge Date: Patient medically stable for d/c  Awaiting STR acceptance/bed availability  Code Status: Level 1 - Full Code      Subjective:   Patient was seen and examined at bedside  No acute events overnight  Continues to feel stronger each day  Doing well since steroids have completed  Back pain stable  No new complaints today  Objective:     Vitals:   Temp (24hrs), Av 9 °F (36 6 °C), Min:97 9 °F (36 6 °C), Max:97 9 °F (36 6 °C)    Temp:  [97 9 °F (36 6 °C)] 97 9 °F (36 6 °C)  HR:  [80-97] 97  Resp:  [17-18] 18  BP: (129-130)/(64-65) 129/65  SpO2:  [92 %-97 %] 97 %  Body mass index is 38 35 kg/m²  Input and Output Summary (last 24 hours): Intake/Output Summary (Last 24 hours) at 2021 1535  Last data filed at 2021 1433  Gross per 24 hour   Intake 1823 33 ml   Output 1481 ml   Net 342 33 ml       Physical Exam:     Physical Exam  Vitals and nursing note reviewed  Constitutional:       Appearance: Normal appearance  She is obese  HENT:      Head: Normocephalic and atraumatic        Right Ear: External ear normal       Left Ear: External ear normal       Nose: Nose normal       Mouth/Throat:      Mouth: Mucous membranes are moist       Pharynx: Oropharynx is clear    Eyes:      General: No scleral icterus  Extraocular Movements: Extraocular movements intact  Right eye: Nystagmus present  Left eye: Nystagmus present  Conjunctiva/sclera: Conjunctivae normal       Pupils: Pupils are equal, round, and reactive to light  Cardiovascular:      Rate and Rhythm: Normal rate and regular rhythm  Pulses: Normal pulses  Heart sounds: Normal heart sounds  Pulmonary:      Effort: Pulmonary effort is normal  No respiratory distress  Breath sounds: Normal breath sounds  Abdominal:      General: Bowel sounds are normal  There is no distension  Palpations: Abdomen is soft  Tenderness: There is no abdominal tenderness  Musculoskeletal:         General: No tenderness  Cervical back: Normal range of motion and neck supple  Right lower leg: No edema  Left lower leg: No edema  Comments: No thoracic tenderness to palpation   Skin:     General: Skin is warm and dry  Capillary Refill: Capillary refill takes less than 2 seconds  Neurological:      Mental Status: She is alert and oriented to person, place, and time  Mental status is at baseline  Cranial Nerves: No cranial nerve deficit  Motor: Weakness present  Gait: Gait abnormal       Comments: Strength is gradually improving  4/5 strength in BUE and 4-/5 in BLE  Left > Right on both upper and lower extremities      Psychiatric:         Mood and Affect: Mood normal          Behavior: Behavior normal         Additional Data:     Labs:  Results from last 7 days   Lab Units 06/24/21  0727 06/23/21  0612   WBC Thousand/uL 11 43* 5 44   HEMOGLOBIN g/dL 11 9 12 7   HEMATOCRIT % 36 4 38 6   PLATELETS Thousands/uL 301 286   NEUTROS PCT %  --  89*   LYMPHS PCT %  --  9*   MONOS PCT %  --  1*   EOS PCT %  --  0     Results from last 7 days   Lab Units 06/24/21  0727 06/20/21  2035   SODIUM mmol/L 145 145   POTASSIUM mmol/L 4 3 4 4   CHLORIDE mmol/L 108 106   CO2 mmol/L 30 31   BUN mg/dL 31* 21   CREATININE mg/dL 0 80 0 66   ANION GAP mmol/L 7 8   CALCIUM mg/dL 9 1 8 8   ALBUMIN g/dL  --  3 7   TOTAL BILIRUBIN mg/dL  --  0 45   ALK PHOS U/L  --  160*   ALT U/L  --  29   AST U/L  --  13   GLUCOSE RANDOM mg/dL 113 89                       Imaging: No pertinent imaging reviewed  Recent Cultures (last 7 days):     Results from last 7 days   Lab Units 06/20/21 2133 06/20/21 2055   BLOOD CULTURE  No Growth After 4 Days  No Growth After 4 Days  --    URINE CULTURE   --  10,000-19,000 cfu/ml Enterococcus faecalis*       Lines/Drains:  Invasive Devices     Peripheral Intravenous Line            Peripheral IV 06/24/21 Dorsal (posterior); Left Forearm 1 day                Telemetry:        Last 24 Hours Medication List:   Current Facility-Administered Medications   Medication Dose Route Frequency Provider Last Rate    acetaminophen  650 mg Oral Q6H PRN Marquis Mark MD      amLODIPine  2 5 mg Oral BID Marquis Mark MD      ampicillin  2,000 mg Intravenous Q6H Abbi Robledo DO Stopped (06/25/21 1433)    aspirin  81 mg Oral Daily Marquis Mark MD      baclofen  30 mg Oral TID Marquis Mark MD      cholecalciferol  2,000 Units Oral Daily Marquis Mark MD      Diclofenac Sodium  2 g Topical 4x Daily Abbi Robledo DO      docusate sodium  100 mg Oral BID Abbi Robledo DO      enoxaparin  40 mg Subcutaneous Daily Marquis Mark MD      gabapentin  100 mg Oral TID Adline Proper, CRNP      lidocaine  1 patch Topical Daily Marquis Mark MD      pantoprazole  40 mg Oral BID AC Jazmin Acevedo MD      polyethylene glycol  17 g Oral Daily PRN Jazmin Acevedo MD      senna  2 tablet Oral BID Jazmin Acevedo MD      tamsulosin  0 8 mg Oral Daily Marquis Mark MD          Today, Patient Was Seen By: Souleymane Hernandez DO    ** Please Note: This note has been constructed using a voice recognition system   **

## 2021-06-25 NOTE — ASSESSMENT & PLAN NOTE
History of Primary Progressive MS, follows with Neurology in [place], [place] every 2 months  Current treatment regimen include intrathecal MTX every 2 months (most recent injection approximately 2 weeks ago), Dalfampridine for muscle weakness), and Baclofen (due to spasticity)  Most recent imaging completed on 6/16/21 revealed innumerable lesions in cerebrum and cerebellum with no significant change from prior study  Suspect patient is having acute exacerbation vs pseudo-exacerbation in setting of recent UTI along with progressing weakness and debility  Plan:  · Completed 3-day course of IV steroids with some benefit  · PT/OT recommended STR  Patient is agreeable   Awaiting acceptance/bed availability

## 2021-06-26 ENCOUNTER — TELEPHONE (OUTPATIENT)
Dept: OTHER | Facility: OTHER | Age: 66
End: 2021-06-26

## 2021-06-26 VITALS
RESPIRATION RATE: 16 BRPM | HEIGHT: 55 IN | WEIGHT: 165 LBS | DIASTOLIC BLOOD PRESSURE: 77 MMHG | TEMPERATURE: 97.4 F | SYSTOLIC BLOOD PRESSURE: 137 MMHG | OXYGEN SATURATION: 97 % | HEART RATE: 63 BPM | BODY MASS INDEX: 38.18 KG/M2

## 2021-06-26 PROCEDURE — 99239 HOSP IP/OBS DSCHRG MGMT >30: CPT | Performed by: INTERNAL MEDICINE

## 2021-06-26 RX ORDER — AMPICILLIN 500 MG/1
500 CAPSULE ORAL 4 TIMES DAILY
Qty: 7 CAPSULE | Refills: 0 | Status: SHIPPED
Start: 2021-06-26 | End: 2021-06-28

## 2021-06-26 RX ORDER — GABAPENTIN 100 MG/1
100 CAPSULE ORAL 3 TIMES DAILY
Qty: 90 CAPSULE | Refills: 0 | Status: ON HOLD
Start: 2021-06-26 | End: 2022-05-25 | Stop reason: CLARIF

## 2021-06-26 RX ADMIN — ENOXAPARIN SODIUM 40 MG: 40 INJECTION SUBCUTANEOUS at 08:09

## 2021-06-26 RX ADMIN — PANTOPRAZOLE SODIUM 40 MG: 40 TABLET, DELAYED RELEASE ORAL at 08:09

## 2021-06-26 RX ADMIN — Medication 2000 UNITS: at 08:09

## 2021-06-26 RX ADMIN — BACLOFEN 30 MG: 10 TABLET ORAL at 08:09

## 2021-06-26 RX ADMIN — GABAPENTIN 100 MG: 100 CAPSULE ORAL at 08:09

## 2021-06-26 RX ADMIN — AMPICILLIN SODIUM 2000 MG: 2 INJECTION, POWDER, FOR SOLUTION INTRAMUSCULAR; INTRAVENOUS at 07:16

## 2021-06-26 RX ADMIN — ASPIRIN 81 MG: 81 TABLET, COATED ORAL at 08:09

## 2021-06-26 RX ADMIN — AMPICILLIN SODIUM 2000 MG: 2 INJECTION, POWDER, FOR SOLUTION INTRAMUSCULAR; INTRAVENOUS at 01:09

## 2021-06-26 RX ADMIN — AMLODIPINE BESYLATE 2.5 MG: 2.5 TABLET ORAL at 08:10

## 2021-06-26 RX ADMIN — TAMSULOSIN HYDROCHLORIDE 0.8 MG: 0.4 CAPSULE ORAL at 08:10

## 2021-06-26 NOTE — CASE MANAGEMENT
CM informed by [facility] that patient is stable for discharge to SNF today  CM contacted Sanjuanita Elias at [facility] to follow up on transportation request initiated yesterday afternoon  She reports she's contacted 6 companies this morning and still does not have any [facility] availability  She will be looking at their schedule to see if they can rearrange any trips and will contact CM once the transport is confirmed  CM sent message to [facility] to update them re: pending transport as well

## 2021-06-26 NOTE — ASSESSMENT & PLAN NOTE
Mid-upper back pain, bilateral, ongoing for several weeks  Appears musculoskeletal in nature  Imaging completed in the ED negative for fracture or malalignment  · Neurology ordered MRI of thoracic spine due to known MS lesions   This study was consistent with MS lesions, no active lesions noted  · Can use OTC topical Lidoderm and/or Voltaren as needed

## 2021-06-26 NOTE — ASSESSMENT & PLAN NOTE
Patient self-catheterizes due to neurogenic bladder from MS  Frequent UTI's vs possible colonization  Recently finished a course of Macrobid approximately 2 weeks ago for UTI  Since then symptoms have worsened including frequency and suprapubic pain  UA completed during this admission with moderate leukocytes, nitrite negative  Urine culture has grown 10,000 - 19,000 cfu of Enterococcus species  Full species not yet available and sensitivity still pending  Prior urine cultures have been positive for citrobacter freundii and E Coli which were susceptible to Cefepime  To note, the citrobacter species had significant resistance to multiple drugs  · Urine C+S: Enterobacter faecalis  Sensitive to Ampicillin, Levofloxacin, Macrobid, and vancomycin   Resistant to Tetracycline    Plan:  · antibiotics day #6/7  · Continue Ampicillin 500 mg Q6H through evening of 6/27 to complete course

## 2021-06-26 NOTE — ASSESSMENT & PLAN NOTE
History of Primary Progressive MS, follows with Neurology in [place], [place] every 2 months  Current treatment regimen include intrathecal MTX every 2 months (most recent injection approximately 2 weeks ago), Dalfampridine for muscle weakness), and Baclofen (due to spasticity)  Most recent imaging completed on 6/16/21 revealed innumerable lesions in cerebrum and cerebellum with no significant change from prior study  Suspect patient is having acute exacerbation vs pseudo-exacerbation in setting of recent UTI along with progressing weakness and debility  Plan:  · Completed 3-day course of IV steroids with some benefit  Physical exam continues to show improvement daily  · PT/OT recommended STR  Patient is agreeable  Accepted at [facility]   Transport scheduled for 11 AM today

## 2021-06-26 NOTE — CASE MANAGEMENT
CM received message from Pat Ye at [facility]; they will send [facility] BLS at 11 am and will bill as JOSÉ SCHNEIDER updated via TT and CM sent message to [facility] via [facility] and also [facility] in the general admissions mailbox  CM spoke with patient and provided update re: transportation plans  She will let her spouse know  No other CM needs identified at this time

## 2021-06-26 NOTE — DISCHARGE SUMMARY
[facility]  Discharge- Addison Modest 1955, 73 y o  female MRN: [id number]  Unit/Bed#: W -01 Encounter: [id number]  Primary Care Provider: Treva Perez MD   Date and time admitted to hospital: 6/20/2021  7:58 PM    * Multiple sclerosis [facility])  Assessment & Plan  History of Primary Progressive MS, follows with Neurology in [place], [place] every 2 months  Current treatment regimen include intrathecal MTX every 2 months (most recent injection approximately 2 weeks ago), Dalfampridine for muscle weakness), and Baclofen (due to spasticity)  Most recent imaging completed on 6/16/21 revealed innumerable lesions in cerebrum and cerebellum with no significant change from prior study  Suspect patient is having acute exacerbation vs pseudo-exacerbation in setting of recent UTI along with progressing weakness and debility  Plan:  · Completed 3-day course of IV steroids with some benefit  Physical exam continues to show improvement daily  · PT/OT recommended STR  Patient is agreeable  Accepted at [facility]  Transport scheduled for 11 AM today    Ambulatory dysfunction  Assessment & Plan  Recurrent falls secondary to MS  Currently ambulates with assistance of a rolling walker  Most recent fall secondary to legs giving out  CTH, C-spine, and CAP without acute pathology  Plan:  · IV steroids completed  · Ambulate with walker and assistance  · Continue PT while awaiting for STR    Acute cystitis without hematuria  Assessment & Plan  Patient self-catheterizes due to neurogenic bladder from MS  Frequent UTI's vs possible colonization  Recently finished a course of Macrobid approximately 2 weeks ago for UTI  Since then symptoms have worsened including frequency and suprapubic pain  UA completed during this admission with moderate leukocytes, nitrite negative  Urine culture has grown 10,000 - 19,000 cfu of Enterococcus species  Full species not yet available and sensitivity still pending  Prior urine cultures have been positive for citrobacter freundii and E Coli which were susceptible to Cefepime  To note, the citrobacter species had significant resistance to multiple drugs  · Urine C+S: Enterobacter faecalis  Sensitive to Ampicillin, Levofloxacin, Macrobid, and vancomycin  Resistant to Tetracycline    Plan:  · antibiotics day #6/7  · Continue Ampicillin 500 mg Q6H through evening of 6/27 to complete course      Neurogenic bladder  Assessment & Plan  · Secondary to Multiple Sclerosis   · Patient reports self-catheterization twice a day at home   · Urinary retention protocol   · Continue Flomax     Thoracic back pain  Assessment & Plan  Mid-upper back pain, bilateral, ongoing for several weeks  Appears musculoskeletal in nature  Imaging completed in the ED negative for fracture or malalignment  · Neurology ordered MRI of thoracic spine due to known MS lesions  This study was consistent with MS lesions, no active lesions noted  · Can use OTC topical Lidoderm and/or Voltaren as needed    Leg swelling  Assessment & Plan  · Patient on Lasix 40mg on Mondays and Fridays- will continue   · Reports worsening of leg swelling over the past 2 weeks  · Venous duplex of BLE on 6/16/21 showed no evidence of DVT   · Echo done on 10/7/2020 showed hyperdynamic systolic function with an EF of 80% and no regional wall motion abnormalities  There was moderate asymmetric hypertrophy with an increased relative contribution of atrial contraction to ventricular filling  Hypertension  Assessment & Plan  · Blood Pressure: 137/77 - acceptable  · Continue on Norvasc 2 5mg BID     Constipation-resolved as of 6/24/2021  Assessment & Plan  Patient had BM on 6/23 and 6/26    Hypernatremia-resolved as of 6/24/2021  Assessment & Plan  Results from last 7 days   Lab Units 06/24/21  0727 06/23/21  0540 06/22/21  0603   SODIUM mmol/L 145 143 146*     Slight hypernatremia noted 6/22  Will continue to monitor        Discharging Resident: Tristin Baker DO  Attending: Kailyn Gomes MD  PCP: Catherine Tavarez MD  Admission Date: 6/20/2021  Discharge Date: 06/26/21    Disposition:      Short Term Rehab or SNF at [facility]      Reason for Admission: generalized weakness, ambulatory dysfunction    Consultations During Hospital Stay:  · Neurology    Procedures Performed:   · None    Medication Adjustments and Discharge Medications:  · Summary of Medication Adjustments made as a result of this hospitalization:  · Ampicillin 500 mg PO Q6H for additional 7 doses (complete evening of 6/27)  · Gabapentin 100 mg TID  · Medication Dosing Tapers - Please refer to Discharge Medication List for details on any medication dosing tapers (if applicable to patient)  · Medications being temporarily held (include recommended restart time): none  · Discharge Medication List: See after visit summary for reconciled discharge medications  Wound Care Recommendations:  When applicable, please see wound care section of After Visit Summary  Diet Recommendations at Discharge:  Diet -        Diet Orders   (From admission, onward)             Start     Ordered    06/21/21 0649  Room Service  Once     Question:  Type of Service  Answer:  Room Service - Appropriate with Assistance    06/21/21 0648    06/20/21 2300  Diet Regular; Regular House  Diet effective now     Question Answer Comment   Diet Type Regular    Regular Regular House    RD to adjust diet per protocol? Yes        06/20/21 2259              Fluid Restriction - No Fluid Restriction at Discharge      Instructions for any Catheters / Lines Present at Discharge (including removal date, if applicable): n/a    Significant Findings / Test Results:   · Mild hypernatremia  · Urine culture: Enterococcus faecalis -- susceptible to ampicillin, levofloxacin, nitrofurantoin, and vancomycin  · Blood cultures negative  · CTH: No acute intracranial abnormality  · CT C-spine: No cervical spine fracture or traumatic malalignment  · CT chest/abd/pelvis: No acute pathology  · MRI thoracic spine: Scattered foci of T2 signal hyperintensity within the thoracic cord consistent with the patient's clinical history of demyelinating disease  No abnormal enhancement to suggest the presence of active demyelination  Flattening along the dorsal aspect of the cord at the level of T6 with ventral displacement consistent with the presence of an intradural arachnoid cyst     Incidental Findings:   · none     Test Results Pending at Discharge (will require follow up):   · none     Outpatient Tests Requested:  · none    Complications:  none    Hospital Course:     Taylor Padilla is a 73 y o  female patient who originally presented to the hospital on 6/20/2021 due to ambulatory dysfunction  Morning of presentation, patient was ambulating at home using her rolling walker when her legs gave out causing her to fall onto her left side  She hit her face on tile floor  Had no LOC  Subsequently had another near-fall later in the day  On this occurrence, her  was able to catch her before she completely fell down to the ground  Prior to this admission, she was recently treated for a UTI  Patient was admitted for suspected MS exacerbation vs pseudo-exacerbation in the setting of UTI  UA at time of admission consistent with UTI  Cultures noted as above  Received one dose of IV Ceftriaxone in the ED  Patient was initially started on Cefepime 2 g Q12H  This was transitioned to ampicillin once sensitivity results were available  Per Neurology, patient received a 3-day course of IV solumedrol for MS exacerbation  Patient had mild gradual improvement in her overall strength with the steroids  PT/OT evaluated patient and recommended rehab  Patient is agreeable  Arrangements have been made for STR at [facility]  Condition at Discharge: fair     Discharge Day Visit / Exam:     Subjective:    Patient was seen and examined at bedside   No acute events overnight  Strength continues to improve every day  Patient is concerned about rehab  She states that she has a particular way that she has worked out with her  on what is the best way for her to transition from a seated position to standing  She is afraid that the PT's at [facility] will not work with her preferred means of transferring and force her to learn a new transfer system  She understands that she needs to work on her strength before she can return to home  She is just afraid of not knowing  Otherwise she has no complaints today  Vitals: Blood Pressure: 129/71 (06/25/21 2235)  Pulse: 74 (06/25/21 2235)  Temperature: 98 1 °F (36 7 °C) (06/25/21 2235)  Temp Source: Oral (06/23/21 2140)  Respirations: 17 (06/25/21 2235)  Height: 4' 7" (139 7 cm) (06/21/21 0120)  Weight - Scale: 74 8 kg (165 lb) (06/20/21 2005)  SpO2: 95 % (06/25/21 2235)    Exam:     Physical Exam  Vitals and nursing note reviewed  Constitutional:       General: She is not in acute distress  Appearance: Normal appearance  She is obese  She is ill-appearing (chronically)  HENT:      Head: Normocephalic and atraumatic  Right Ear: External ear normal       Left Ear: External ear normal       Nose: Nose normal       Mouth/Throat:      Mouth: Mucous membranes are moist       Pharynx: Oropharynx is clear  Eyes:      General: No scleral icterus  Extraocular Movements:      Right eye: Nystagmus present  Left eye: Nystagmus present  Conjunctiva/sclera: Conjunctivae normal    Cardiovascular:      Rate and Rhythm: Normal rate and regular rhythm  Pulses: Normal pulses  Heart sounds: Normal heart sounds  Pulmonary:      Effort: Pulmonary effort is normal  No respiratory distress  Breath sounds: Normal breath sounds  Abdominal:      General: Abdomen is flat  There is no distension  Palpations: Abdomen is soft     Musculoskeletal:      Cervical back: Normal range of motion and neck supple  Right lower leg: No edema  Left lower leg: No edema  Skin:     General: Skin is warm and dry  Capillary Refill: Capillary refill takes less than 2 seconds  Neurological:      Mental Status: She is alert and oriented to person, place, and time  Mental status is at baseline  Motor: Weakness present  Gait: Gait abnormal       Comments: Generalized weakness throughout  Upper extremities 4/5 and lower extremities 4-/5  Endurance is limited  Psychiatric:         Mood and Affect: Mood normal          Behavior: Behavior normal           Discussion with Patient/Family: Updated patient and  on plan to d/c today to [facility] for [employer]  Rx for Ampicillin 500 mg Q6H x 7 additional doses to complete course  Also Rx for Gabapentin  Advised patient to follow up with her PCP and Neurologist  Patient understands and agrees with plan of care  All questions and concerns addressed  Goals of Care Discussions:  · Code Status at Discharge: Level 1 - Full Code  · Were there any Goals of Care Discussions during Hospitalization?: No  · Results of any General Goals of Care Discussions: n/a   · POLST Completed: No   · If POLST Completed, Summary of POLST Agreement Provided Here: n/a   · OK to Rehospitalize if Needed? Yes    Discharge instructions/Information to patient and family:   See after visit summary section titled Discharge Instructions for information provided to patient and family        Planned Readmission: none      ** Please Note: This note has been constructed using a voice recognition system **

## 2021-06-26 NOTE — ASSESSMENT & PLAN NOTE
Recurrent falls secondary to MS  Currently ambulates with assistance of a rolling walker  Most recent fall secondary to legs giving out  CTH, C-spine, and CAP without acute pathology      Plan:  · IV steroids completed  · Ambulate with walker and assistance  · Continue PT while awaiting for STR

## 2021-06-26 NOTE — DISCHARGE INSTR - AVS FIRST PAGE
Dear Namrata Madden,     It was our pleasure to care for you here at [facility]  It is our hope that we were always able to exceed the expected standards for your care during your stay  You were hospitalized due to MS exacerbation  You were cared for on the fourth floor by Gulshan Acosta DO under the service of Luna Russo MD with the [facility] who covers for your primary care physician (PCP), Landry Loyd MD, while you were hospitalized  If you have any questions or concerns related to this hospitalization, you may contact us at [phone]  For follow up as well as any medication refills, we recommend that you follow up with your primary care physician  A registered nurse will reach out to you by phone within a few days after your discharge to answer any additional questions that you may have after going home  However, at this time we provide for you here, the most important instructions / recommendations at discharge:     · Notable Medication Adjustments -   · Ampicillin 2,000 mg every 6 hours for an additional 1 day  · START gabapentin 100 mg three times daily  · Continue your home medications as you usually take them  · Testing Required after Discharge -   · none  · Important follow up information -   · Please follow up with your PCP in the next 1-2 weeks  · Please also follow up with your Neurologist  · Other Instructions -   · Continue to use your walker  · Please review this entire after visit summary as additional general instructions including medication list, appointments, activity, diet, any pertinent wound care, and other additional recommendations from your care team that may be provided for you        Sincerely,     Gulshan Aocsta DO

## 2021-06-26 NOTE — ASSESSMENT & PLAN NOTE
· Patient on Lasix 40mg on Mondays and Fridays- will continue   · Reports worsening of leg swelling over the past 2 weeks  · Venous duplex of BLE on 6/16/21 showed no evidence of DVT   · Echo done on 10/7/2020 showed hyperdynamic systolic function with an EF of 80% and no regional wall motion abnormalities  There was moderate asymmetric hypertrophy with an increased relative contribution of atrial contraction to ventricular filling

## 2021-06-26 NOTE — PLAN OF CARE
Problem: Potential for Falls  Goal: Patient will remain free of falls  Description: INTERVENTIONS:  - Educate patient/family on patient safety including physical limitations  - Instruct patient to call for assistance with activity   - Consult OT/PT to assist with strengthening/mobility   - Keep Call bell within reach  - Keep bed low and locked with side rails adjusted as appropriate  - Keep care items and personal belongings within reach  - Initiate and maintain comfort rounds  - Make Fall Risk Sign visible to staff  - Apply yellow socks and bracelet for high fall risk patients  - Consider moving patient to room near nurses station  Outcome: Progressing     Problem: MOBILITY - ADULT  Goal: Maintain or return to baseline ADL function  Description: INTERVENTIONS:  -  Assess patient's ability to carry out ADLs; assess patient's baseline for ADL function and identify physical deficits which impact ability to perform ADLs (bathing, care of mouth/teeth, toileting, grooming, dressing, etc )  - Assess/evaluate cause of self-care deficits   - Assess range of motion  - Assess patient's mobility; develop plan if impaired  - Assess patient's need for assistive devices and provide as appropriate  - Encourage maximum independence but intervene and supervise when necessary  - Involve family in performance of ADLs  - Assess for home care needs following discharge   - Consider OT consult to assist with ADL evaluation and planning for discharge  - Provide patient education as appropriate  Outcome: Progressing  Goal: Maintains/Returns to pre admission functional level  Description: INTERVENTIONS:  - Perform BMAT or MOVE assessment daily    - Set and communicate daily mobility goal to care team and patient/family/caregiver     - Collaborate with rehabilitation services on mobility goals if consulted  - Out of bed for toileting  - Record patient progress and toleration of activity level   Outcome: Progressing     Problem: Prexisting or High Potential for Compromised Skin Integrity  Goal: Skin integrity is maintained or improved  Description: INTERVENTIONS:  - Identify patients at risk for skin breakdown  - Assess and monitor skin integrity  - Assess and monitor nutrition and hydration status  - Monitor labs   - Assess for incontinence   - Turn and reposition patient  - Assist with mobility/ambulation  - Relieve pressure over bony prominences  - Avoid friction and shearing  - Provide appropriate hygiene as needed including keeping skin clean and dry  - Evaluate need for skin moisturizer/barrier cream  - Collaborate with interdisciplinary team   - Patient/family teaching  - Consider wound care consult   Outcome: Progressing     Problem: PAIN - ADULT  Goal: Verbalizes/displays adequate comfort level or baseline comfort level  Description: Interventions:  - Encourage patient to monitor pain and request assistance  - Assess pain using appropriate pain scale  - Administer analgesics based on type and severity of pain and evaluate response  - Implement non-pharmacological measures as appropriate and evaluate response  - Consider cultural and social influences on pain and pain management  - Notify physician/advanced practitioner if interventions unsuccessful or patient reports new pain  Outcome: Progressing     Problem: INFECTION - ADULT  Goal: Absence or prevention of progression during hospitalization  Description: INTERVENTIONS:  - Assess and monitor for signs and symptoms of infection  - Monitor lab/diagnostic results  - Monitor all insertion sites, i e  indwelling lines, tubes, and drains  - Monitor endotracheal if appropriate and nasal secretions for changes in amount and color  - [place] appropriate cooling/warming therapies per order  - Administer medications as ordered  - Instruct and encourage patient and family to use good hand hygiene technique  - Identify and instruct in appropriate isolation precautions for identified infection/condition  Outcome: Progressing     Problem: SAFETY ADULT  Goal: Patient will remain free of falls  Description: INTERVENTIONS:  - Educate patient/family on patient safety including physical limitations  - Instruct patient to call for assistance with activity   - Consult OT/PT to assist with strengthening/mobility   - Keep Call bell within reach  - Keep bed low and locked with side rails adjusted as appropriate  - Keep care items and personal belongings within reach  - Initiate and maintain comfort rounds  - Make Fall Risk Sign visible to staff  - Apply yellow socks and bracelet for high fall risk patients  - Consider moving patient to room near nurses station  Outcome: Progressing  Goal: Maintain or return to baseline ADL function  Description: INTERVENTIONS:  -  Assess patient's ability to carry out ADLs; assess patient's baseline for ADL function and identify physical deficits which impact ability to perform ADLs (bathing, care of mouth/teeth, toileting, grooming, dressing, etc )  - Assess/evaluate cause of self-care deficits   - Assess range of motion  - Assess patient's mobility; develop plan if impaired  - Assess patient's need for assistive devices and provide as appropriate  - Encourage maximum independence but intervene and supervise when necessary  - Involve family in performance of ADLs  - Assess for home care needs following discharge   - Consider OT consult to assist with ADL evaluation and planning for discharge  - Provide patient education as appropriate  Outcome: Progressing  Goal: Maintains/Returns to pre admission functional level  Description: INTERVENTIONS:  - Perform BMAT or MOVE assessment daily    - Set and communicate daily mobility goal to care team and patient/family/caregiver     - Collaborate with rehabilitation services on mobility goals if consulted  - Out of bed for toileting  - Record patient progress and toleration of activity level   Outcome: Progressing     Problem: DISCHARGE PLANNING  Goal: Discharge to home or other facility with appropriate resources  Description: INTERVENTIONS:  - Identify barriers to discharge w/patient and caregiver  - Arrange for needed discharge resources and transportation as appropriate  - Identify discharge learning needs (meds, wound care, etc )  - Arrange for interpretive services to assist at discharge as needed  - Refer to Case Management Department for coordinating discharge planning if the patient needs post-hospital services based on physician/advanced practitioner order or complex needs related to functional status, cognitive ability, or social support system  Outcome: Progressing     Problem: Knowledge Deficit  Goal: Patient/family/caregiver demonstrates understanding of disease process, treatment plan, medications, and discharge instructions  Description: Complete learning assessment and assess knowledge base    Interventions:  - Provide teaching at level of understanding  - Provide teaching via preferred learning methods  Outcome: Progressing     Problem: GENITOURINARY - ADULT  Goal: Maintains or returns to baseline urinary function  Description: INTERVENTIONS:  - Assess urinary function  - Encourage oral fluids to ensure adequate hydration if ordered  - Administer IV fluids as ordered to ensure adequate hydration  - Administer ordered medications as needed  - Offer frequent toileting  - Follow urinary retention protocol if ordered  Outcome: Progressing  Goal: Absence of urinary retention  Description: INTERVENTIONS:  - Assess patients ability to void and empty bladder  - Monitor I/O  - Bladder scan as needed  - Discuss with physician/AP medications to alleviate retention as needed  - Discuss catheterization for long term situations as appropriate  Outcome: Progressing

## 2021-06-26 NOTE — PLAN OF CARE
Problem: Potential for Falls  Goal: Patient will remain free of falls  Description: INTERVENTIONS:  - Educate patient/family on patient safety including physical limitations  - Instruct patient to call for assistance with activity   - Consult OT/PT to assist with strengthening/mobility   - Keep Call bell within reach  - Keep bed low and locked with side rails adjusted as appropriate  - Keep care items and personal belongings within reach  - Initiate and maintain comfort rounds  - Make Fall Risk Sign visible to staff  - Apply yellow socks and bracelet for high fall risk patients  - Consider moving patient to room near nurses station  6/26/2021 1003 by Denton Gutiérrez RN  Outcome: Adequate for Discharge  6/26/2021 1003 by Denton Gutiérrez RN  Outcome: Progressing     Problem: MOBILITY - ADULT  Goal: Maintain or return to baseline ADL function  Description: INTERVENTIONS:  -  Assess patient's ability to carry out ADLs; assess patient's baseline for ADL function and identify physical deficits which impact ability to perform ADLs (bathing, care of mouth/teeth, toileting, grooming, dressing, etc )  - Assess/evaluate cause of self-care deficits   - Assess range of motion  - Assess patient's mobility; develop plan if impaired  - Assess patient's need for assistive devices and provide as appropriate  - Encourage maximum independence but intervene and supervise when necessary  - Involve family in performance of ADLs  - Assess for home care needs following discharge   - Consider OT consult to assist with ADL evaluation and planning for discharge  - Provide patient education as appropriate  6/26/2021 1003 by Denton Gutiérrez RN  Outcome: Adequate for Discharge  6/26/2021 1003 by Denton Gutiérrez RN  Outcome: Progressing  Goal: Maintains/Returns to pre admission functional level  Description: INTERVENTIONS:  - Perform BMAT or MOVE assessment daily    - Set and communicate daily mobility goal to care team and patient/family/caregiver     - Collaborate with rehabilitation services on mobility goals if consulted  - Out of bed for toileting  - Record patient progress and toleration of activity level   6/26/2021 1003 by Blair Rebolledo RN  Outcome: Adequate for Discharge  6/26/2021 1003 by Blair Rebolledo RN  Outcome: Progressing     Problem: Prexisting or High Potential for Compromised Skin Integrity  Goal: Skin integrity is maintained or improved  Description: INTERVENTIONS:  - Identify patients at risk for skin breakdown  - Assess and monitor skin integrity  - Assess and monitor nutrition and hydration status  - Monitor labs   - Assess for incontinence   - Turn and reposition patient  - Assist with mobility/ambulation  - Relieve pressure over bony prominences  - Avoid friction and shearing  - Provide appropriate hygiene as needed including keeping skin clean and dry  - Evaluate need for skin moisturizer/barrier cream  - Collaborate with interdisciplinary team   - Patient/family teaching  - Consider wound care consult   6/26/2021 1003 by Blair Rebolledo RN  Outcome: Adequate for Discharge  6/26/2021 1003 by Blair Rebolledo RN  Outcome: Progressing     Problem: PAIN - ADULT  Goal: Verbalizes/displays adequate comfort level or baseline comfort level  Description: Interventions:  - Encourage patient to monitor pain and request assistance  - Assess pain using appropriate pain scale  - Administer analgesics based on type and severity of pain and evaluate response  - Implement non-pharmacological measures as appropriate and evaluate response  - Consider cultural and social influences on pain and pain management  - Notify physician/advanced practitioner if interventions unsuccessful or patient reports new pain  6/26/2021 1003 by Blair Rebolledo RN  Outcome: Adequate for Discharge  6/26/2021 1003 by Blair Rebolledo RN  Outcome: Progressing     Problem: INFECTION - ADULT  Goal: Absence or prevention of progression during hospitalization  Description: INTERVENTIONS:  - Assess and monitor for signs and symptoms of infection  - Monitor lab/diagnostic results  - Monitor all insertion sites, i e  indwelling lines, tubes, and drains  - Monitor endotracheal if appropriate and nasal secretions for changes in amount and color  - [place] appropriate cooling/warming therapies per order  - Administer medications as ordered  - Instruct and encourage patient and family to use good hand hygiene technique  - Identify and instruct in appropriate isolation precautions for identified infection/condition  6/26/2021 1003 by Kirsten Vigil RN  Outcome: Adequate for Discharge  6/26/2021 1003 by Kirsten Vigil RN  Outcome: Progressing     Problem: SAFETY ADULT  Goal: Patient will remain free of falls  Description: INTERVENTIONS:  - Educate patient/family on patient safety including physical limitations  - Instruct patient to call for assistance with activity   - Consult OT/PT to assist with strengthening/mobility   - Keep Call bell within reach  - Keep bed low and locked with side rails adjusted as appropriate  - Keep care items and personal belongings within reach  - Initiate and maintain comfort rounds  - Make Fall Risk Sign visible to staff  - Apply yellow socks and bracelet for high fall risk patients  - Consider moving patient to room near nurses station  6/26/2021 1003 by Kirsten Vigil RN  Outcome: Adequate for Discharge  6/26/2021 1003 by Kirsten Vigil RN  Outcome: Progressing  Goal: Maintain or return to baseline ADL function  Description: INTERVENTIONS:  -  Assess patient's ability to carry out ADLs; assess patient's baseline for ADL function and identify physical deficits which impact ability to perform ADLs (bathing, care of mouth/teeth, toileting, grooming, dressing, etc )  - Assess/evaluate cause of self-care deficits   - Assess range of motion  - Assess patient's mobility; develop plan if impaired  - Assess patient's need for assistive devices and provide as appropriate  - Encourage maximum independence but intervene and supervise when necessary  - Involve family in performance of ADLs  - Assess for home care needs following discharge   - Consider OT consult to assist with ADL evaluation and planning for discharge  - Provide patient education as appropriate  6/26/2021 1003 by Abbi Recinos RN  Outcome: Adequate for Discharge  6/26/2021 1003 by Abbi Recinos RN  Outcome: Progressing  Goal: Maintains/Returns to pre admission functional level  Description: INTERVENTIONS:  - Perform BMAT or MOVE assessment daily    - Set and communicate daily mobility goal to care team and patient/family/caregiver     - Collaborate with rehabilitation services on mobility goals if consulted  - Out of bed for toileting  - Record patient progress and toleration of activity level   6/26/2021 1003 by Abbi Recinos RN  Outcome: Adequate for Discharge  6/26/2021 1003 by Abbi Recinos RN  Outcome: Progressing     Problem: DISCHARGE PLANNING  Goal: Discharge to home or other facility with appropriate resources  Description: INTERVENTIONS:  - Identify barriers to discharge w/patient and caregiver  - Arrange for needed discharge resources and transportation as appropriate  - Identify discharge learning needs (meds, wound care, etc )  - Arrange for interpretive services to assist at discharge as needed  - Refer to Case Management Department for coordinating discharge planning if the patient needs post-hospital services based on physician/advanced practitioner order or complex needs related to functional status, cognitive ability, or social support system  6/26/2021 1003 by Abbi Recinos RN  Outcome: Adequate for Discharge  6/26/2021 1003 by Abbi Recinos RN  Outcome: Progressing     Problem: Knowledge Deficit  Goal: Patient/family/caregiver demonstrates understanding of disease process, treatment plan, medications, and discharge instructions  Description: Complete learning assessment and assess knowledge base    Interventions:  - Provide teaching at level of understanding  - Provide teaching via preferred learning methods  6/26/2021 1003 by Bennie Arnold RN  Outcome: Adequate for Discharge  6/26/2021 1003 by Bennie Arnold RN  Outcome: Progressing     Problem: GENITOURINARY - ADULT  Goal: Maintains or returns to baseline urinary function  Description: INTERVENTIONS:  - Assess urinary function  - Encourage oral fluids to ensure adequate hydration if ordered  - Administer IV fluids as ordered to ensure adequate hydration  - Administer ordered medications as needed  - Offer frequent toileting  - Follow urinary retention protocol if ordered  6/26/2021 1003 by Bennie Arnold RN  Outcome: Adequate for Discharge  6/26/2021 1003 by Bennie Arnold RN  Outcome: Progressing  Goal: Absence of urinary retention  Description: INTERVENTIONS:  - Assess patients ability to void and empty bladder  - Monitor I/O  - Bladder scan as needed  - Discuss with physician/AP medications to alleviate retention as needed  - Discuss catheterization for long term situations as appropriate  6/26/2021 1003 by Bennie Arnold RN  Outcome: Adequate for Discharge  6/26/2021 1003 by Bennie Arnold RN  Outcome: Progressing

## 2021-06-28 ENCOUNTER — NURSING HOME VISIT (OUTPATIENT)
Dept: GERIATRICS | Facility: OTHER | Age: 66
End: 2021-06-28
Payer: MEDICARE

## 2021-06-28 DIAGNOSIS — N31.9 NEUROGENIC BLADDER: ICD-10-CM

## 2021-06-28 DIAGNOSIS — N30.00 ACUTE CYSTITIS WITHOUT HEMATURIA: Primary | ICD-10-CM

## 2021-06-28 DIAGNOSIS — M54.6 ACUTE BILATERAL THORACIC BACK PAIN: ICD-10-CM

## 2021-06-28 DIAGNOSIS — D72.829 LEUKOCYTOSIS, UNSPECIFIED TYPE: ICD-10-CM

## 2021-06-28 DIAGNOSIS — R53.81 DEBILITY: ICD-10-CM

## 2021-06-28 DIAGNOSIS — E87.0 HYPERNATREMIA: ICD-10-CM

## 2021-06-28 DIAGNOSIS — G35 MULTIPLE SCLEROSIS (HCC): ICD-10-CM

## 2021-06-28 DIAGNOSIS — I10 ESSENTIAL HYPERTENSION: ICD-10-CM

## 2021-06-28 PROCEDURE — 99306 1ST NF CARE HIGH MDM 50: CPT | Performed by: FAMILY MEDICINE

## 2021-06-28 RX ORDER — ACETAMINOPHEN 325 MG/1
650 TABLET ORAL 3 TIMES DAILY
Status: ON HOLD | COMMUNITY
End: 2022-05-25 | Stop reason: CLARIF

## 2021-06-28 RX ORDER — TAMSULOSIN HYDROCHLORIDE 0.4 MG/1
0.8 CAPSULE ORAL DAILY
Status: ON HOLD | COMMUNITY
End: 2022-05-25 | Stop reason: CLARIF

## 2021-06-28 NOTE — ASSESSMENT & PLAN NOTE
Urine culture 10-19,000 E faecalis  With recurrent UTI in setting of neurogenic bladder and chronic intermittent straight catheterization  PO hydration encouraged, patient drinking cranberry juice as well  Continue ampicillin as started inpatient to complete 7 day course

## 2021-06-28 NOTE — ASSESSMENT & PLAN NOTE
Primary progressive disease  Routine neurology follow up  Continue nabumetone  D/c dalfapridine as patient not taking at home  Continue baclofen for spasticity

## 2021-06-28 NOTE — ASSESSMENT & PLAN NOTE
Goal <140-150/90  Continue amlodipine 2 5mg BID  Was previously on lasix twice weekly at home for lower extremity edema, not on current medication regimen- monitor for edema and monitor volume status closely

## 2021-06-28 NOTE — ASSESSMENT & PLAN NOTE
In setting of multiple sclerosis  Continue tamsulosin  Continue straight cath QAM and QHS as per home routine

## 2021-06-28 NOTE — ASSESSMENT & PLAN NOTE
Imaging showing possible arachnoid cyst at T6 level  Patient also with degenerative disease  Will schedule acetaminophen 650mg TID  Continue gabapentin 100mg TID

## 2021-06-28 NOTE — ASSESSMENT & PLAN NOTE
Likely secondary to inpatient steroid use  Afebrile, hemodynamically stable  Recheck CBC w/diff on 7/1

## 2021-06-28 NOTE — PROGRESS NOTES
[address], [address]  History and Physical  POS: SNF[facility]    Records Reviewed include: [facility] [address]  records  Unable to obtain from patient due to: History obtained from patient along with speaking with nursing/ nursing note review and medical record review    Chief Complaint/ Reason for Admission: UTI, Neurogenic bladder, Multiple sclerosis, Deconditioning and debility    History of Present Illness:            73year old female admitted for SNF rehab following hospitalization at [facility]  Presented following a fall; imaging negative for acute traumatic injuries  Lab workup significant for +urine culture; was started on IV cefepime and transitioned to PO ampicillin prior to discharge based on sensitivities  With underlying primary progressive MS, was placed on IV steroids x3 days with concern for MS exacerbation in setting of UTI  Patient performs straight catheterization twice daily as an outpatient in setting of neurogenic bladder related to [address]; per patient, she does not urinate on her own in between  Patient notes continued generalized weakness and deconditioning  Continues with mid back pain; per patient topical agents have not been helpful; tylenol usually works well for her at home  Continues on amlodipine for hypertension; SBP trending 120s-150s since SNF admission; had previously been on lasix twice weekly for lower extremity edema        Allergies  No Known Allergies    Past Medical History  Past Medical History:   Diagnosis Date    Back pain 2016    Hypertension     Incontinence     [facility]) ([facility] [address] )     Age 37    Neurogenic bladder     Spasticity       CHF baseline EF:80% (10/2020)  CKD: Stage 2, baseline creatinine 0 6-0 8    Past Surgical History:   Procedure Laterality Date    BACK SURGERY  2016    Lumbar fusion    ELBOW SURGERY Right 2008    ORIF       Family History  Family History   Problem Relation Age of Onset    Cancer Mother     Heart disease Father     Cancer Sister        Social History  Social History     Tobacco Use   Smoking Status Never Smoker   Smokeless Tobacco Never Used      Social History     Substance and Sexual Activity   Alcohol Use Not Currently      Social History     Substance and Sexual Activity   Drug Use Yes    Types: Marijuana        Lives: Home, with spouse,  Social Support: Family  Education Level:  Occupation: Retired  Fall in the past 12 months: Yes  Use of assistance Device: Rolling Walker    Physical Exam    Weight: 171 6lb Temp:97 5F BP:126/72 Pulse:71 Resp:18 O2 Sat:96%RA  Constitutional: Well-nourished and Normocephalic  Orientation:Person, Place and Day, situation     Physical Exam  Vitals and nursing note reviewed  Constitutional:       General: She is awake  She is not in acute distress  Appearance: She is obese  She is not ill-appearing, toxic-appearing or diaphoretic  HENT:      Head: Normocephalic  Right Ear: External ear normal       Left Ear: External ear normal       Nose: No rhinorrhea  Mouth/Throat:      Mouth: Mucous membranes are moist       Pharynx: Oropharynx is clear  Eyes:      General: No scleral icterus  Right eye: No discharge  Left eye: No discharge  Conjunctiva/sclera: Conjunctivae normal    Pulmonary:      Effort: Pulmonary effort is normal  No respiratory distress  Abdominal:      General: There is no distension  Palpations: Abdomen is soft  Musculoskeletal:         General: No deformity  Cervical back: No rigidity  Right lower leg: No edema  Left lower leg: No edema  Skin:     General: Skin is warm and dry  Coloration: Skin is not jaundiced or pale  Findings: Bruising (Bruise left submandibular area noted) present  Neurological:      Mental Status: She is alert  Cranial Nerves: No dysarthria or facial asymmetry  Motor: Abnormal muscle tone present     Psychiatric:         Attention and Perception: Attention and perception normal          Mood and Affect: Mood and affect normal          Speech: Speech normal          Behavior: Behavior normal  Behavior is cooperative  Thought Content: Thought content normal          Review of Systems:  Review of Systems   Constitutional: Positive for activity change and fatigue  Negative for chills and fever  Respiratory: Negative for cough and shortness of breath  Cardiovascular: Negative for leg swelling  Genitourinary: Positive for difficulty urinating  Negative for dysuria  Musculoskeletal: Positive for back pain  Neurological: Positive for weakness  All other systems reviewed and are negative        List of Current Medications: Medication list reviewed and updated in Epic to reflect most current SNF orders    Allergies  No Known Allergies    Labs/Diagnostics (reviewed by this provider): Hospital Paperwork and Old Records  Lab Results   Component Value Date    WBC 11 43 (H) 06/24/2021    HGB 11 9 06/24/2021    HCT 36 4 06/24/2021    MCV 96 06/24/2021     06/24/2021     Lab Results   Component Value Date    SODIUM 145 06/24/2021    K 4 3 06/24/2021     06/24/2021    CO2 30 06/24/2021    AGAP 7 06/24/2021    BUN 31 (H) 06/24/2021    CREATININE 0 80 06/24/2021    GLUC 113 06/24/2021    GLUF 102 (H) 02/09/2021    CALCIUM 9 1 06/24/2021    AST 13 06/20/2021    ALT 29 06/20/2021    ALKPHOS 160 (H) 06/20/2021    TP 7 5 06/20/2021    TBILI 0 45 06/20/2021    EGFR 78 06/24/2021     Lab Results   Component Value Date    NTBNP 153 (H) 06/20/2021      Lab Results   Component Value Date    CKTOTAL 153 10/22/2020    CKMB 3 6 10/22/2020    CKMBINDEX 2 4 10/22/2020    TROPONINI <0 02 06/20/2021     Microbiology:  Urine Cx: (6/20/21)- 10-19,000 E faecalis  Blood Cx: (6/20/21)- no growth after 5 days x2    Imaging Reviewed:  EKG: (6/20/21)- NSR  CT Scan: Cervical spine (6/20/21)- no fracture  Head (6/20/21)- no acute intracranial abnormality  Other: MRI thoracic spine (6/23/21)- scattered focil of hyperintensity within thoracic cord consistent with demyelinating disease without evidence of active demyelinating lesions    Assessment/Plan:  73year old female with:    Acute cystitis without hematuria  Urine culture 10-19,000 E faecalis  With recurrent UTI in setting of neurogenic bladder and chronic intermittent straight catheterization  PO hydration encouraged, patient drinking cranberry juice as well  Continue ampicillin as started inpatient to complete 7 day course    Neurogenic bladder  In setting of multiple sclerosis  Continue tamsulosin  Continue straight cath QAM and QHS as per home routine    Multiple sclerosis ([facility] [address] )  Primary progressive disease  Routine neurology follow up  Continue nabumetone  D/c dalfapridine as patient not taking at home  Continue baclofen for spasticity     Hypernatremia  Mild; improved  Encourage PO intake/adequate PO free water intake  Recheck BMP 7/1    Leukocytosis  Likely secondary to inpatient steroid use  Afebrile, hemodynamically stable  Recheck CBC w/diff on 7/1    Hypertension  Goal <140-150/90  Continue amlodipine 2 5mg BID  Was previously on lasix twice weekly at home for lower extremity edema, not on current medication regimen- monitor for edema and monitor volume status closely    Acute bilateral thoracic back pain  Imaging showing possible arachnoid cyst at T6 level  Patient also with degenerative disease  Will schedule acetaminophen 650mg TID  Continue gabapentin 100mg TID    Debility  Multifactorial  Admit to SNF for rehab  PT/OT consult- evaluate and treat  Supportive care, nutritional support, ADL support  Fall precautions  Management of acute and chronic medical conditions as outlined    Pain: Present yes- mid back, see HPI  Rehab Potential:Fair  Patient Informed of Medical Condition: yes  Patient is Capable of Understanding Their Right: yes  Prognosis:Fair  Discharge Plan: STR-> Home with   Surrogate Decision Maker:   Advanced Directives: Yes  Code status:Full Code  PCP: Emmanuel Ponce MD    Immunization History   Administered Date(s) Administered    SARS-CoV-2 / COVID-19 mRNA IM (Tess Vargas) 03/22/2021, 04/19/2021    Tdap 07/16/2019     Chantal Stock, DO  6/28/21

## 2021-07-02 ENCOUNTER — TELEPHONE (OUTPATIENT)
Dept: NEUROLOGY | Facility: CLINIC | Age: 66
End: 2021-07-02

## 2021-07-02 NOTE — TELEPHONE ENCOUNTER
No need to contact patient  Patient already follows neurologist outside the network      SLRA/MS/Verify Ins    Notes from chart:  Patient appears to follow with [facility]; recommend patient schedules a f/u in 4 - 6 weeks with her neurologist

## 2021-07-06 ENCOUNTER — NURSING HOME VISIT (OUTPATIENT)
Dept: GERIATRICS | Facility: OTHER | Age: 66
End: 2021-07-06
Payer: MEDICARE

## 2021-07-06 DIAGNOSIS — D72.829 LEUKOCYTOSIS, UNSPECIFIED TYPE: ICD-10-CM

## 2021-07-06 DIAGNOSIS — N30.00 ACUTE CYSTITIS WITHOUT HEMATURIA: Primary | ICD-10-CM

## 2021-07-06 DIAGNOSIS — G35 MULTIPLE SCLEROSIS (HCC): ICD-10-CM

## 2021-07-06 DIAGNOSIS — R53.81 DEBILITY: ICD-10-CM

## 2021-07-06 DIAGNOSIS — M79.89 LEG SWELLING: ICD-10-CM

## 2021-07-06 DIAGNOSIS — I10 ESSENTIAL HYPERTENSION: ICD-10-CM

## 2021-07-06 DIAGNOSIS — N31.9 NEUROGENIC BLADDER: ICD-10-CM

## 2021-07-06 PROCEDURE — 99309 SBSQ NF CARE MODERATE MDM 30: CPT | Performed by: NURSE PRACTITIONER

## 2021-07-06 NOTE — ASSESSMENT & PLAN NOTE
-in setting of multiple sclerosis  -continue straight cath q a m  and q h s  as per home routine  -patient also able to void

## 2021-07-06 NOTE — ASSESSMENT & PLAN NOTE
-patient lost 4 lb from 06/26/2021  Previously on Lasix and currently not on any diuretics    Patient has only trace edema of bilateral lower extremities  -venous duplex of bilateral lower extremities on 06/16/2021 revealed no evidence of DVT  -no signs of fluid overload noted  -will continue to monitor

## 2021-07-06 NOTE — PROGRESS NOTES
[facility]   [address] [facility], [facility], [address]  POS: 31 (STR)  Progress Note    Chief Complaint/Reason for visit: STR follow up   History of Present Illness:  72-year-old female seen and examined for STR follow up  Received patient seated in wheelchair eating lunch and appeared in no distress  Reviewed recent urine culture and treatment plan with patient; urine culture revealed >10,000 Enterococcus faecalis  Patient recently completed a 7 day course of ampicillin for UTI  Patient is asymptomatic  Most recent CBC stable  Nursing reports that patient had requested an indwelling urinary catheter for her stay at [facility]  Explained to patient that short-term rehab is not considered a criteria for indwelling urinary catheter and she can develop a catheter associated urinary tract infection     Patient verbalized an understanding  Past Medical History: unchanged from history and physical  Past Medical History:   Diagnosis Date    Back pain 2016    Hypertension     Incontinence     MS (multiple sclerosis) ([facility] [address] )     Age 37    Neurogenic bladder     Spasticity      Family History: unchanged from history and physical  Social History: unchanged from history and physical  Resident Since:  06/26/2021  Review of systems: Review of Systems   Constitutional: Positive for activity change  Negative for appetite change, chills and diaphoresis  HENT: Negative  Eyes: Negative  Respiratory: Negative  Negative for cough and shortness of breath  Cardiovascular: Negative  Gastrointestinal: Negative  Endocrine: Negative  Genitourinary: Negative  Musculoskeletal: Positive for gait problem  Neurological: Negative for dizziness, syncope, speech difficulty, light-headedness, numbness and headaches  Psychiatric/Behavioral: Negative for agitation, behavioral problems and hallucinations  The patient is not nervous/anxious  All other systems reviewed and are negative  Medications:   All medication orders were reviewed  Allergies: NKDA  Consults reviewed: Other  Labs/Diagnostics (reviewed by this provider): Copy in Chart  CBC and BMP reviewed 06/26/2020  Creatinine:  0 48    GFR: 103  Imaging Reviewed:  None today    Physical Exam    Weight:  166 lb Temp:  97 5 BP:  114/70 Pulse:  67   Resp: 16 O2 Sat:  98% on room air  Constitutional: Normocephalic  Orientation:Person, Place, Day and Date     Physical Exam  Vitals and nursing note reviewed  Constitutional:       General: She is not in acute distress  Appearance: She is not toxic-appearing or diaphoretic  Comments: Zari Terrell elderly female who appears with chronic illness  HENT:      Head: Normocephalic  Nose: No congestion or rhinorrhea  Mouth/Throat:      Mouth: Mucous membranes are moist       Pharynx: No oropharyngeal exudate  Eyes:      General: No scleral icterus  Right eye: No discharge  Left eye: No discharge  Extraocular Movements: Extraocular movements intact  Conjunctiva/sclera: Conjunctivae normal       Pupils: Pupils are equal, round, and reactive to light  Cardiovascular:      Rate and Rhythm: Normal rate and regular rhythm  Pulses: Normal pulses  Comments: Trace pitting edema left lower extremity  Trace edema right lower extremity  Pulmonary:      Effort: Pulmonary effort is normal  No respiratory distress  Breath sounds: Normal breath sounds  No wheezing, rhonchi or rales  Abdominal:      General: Bowel sounds are normal  There is no distension  Palpations: Abdomen is soft  Tenderness: There is no abdominal tenderness  There is no guarding  Musculoskeletal:      Cervical back: Neck supple  No rigidity  Right lower leg: Edema present  Left lower leg: Edema present  Comments: Moves all 4 extremities with generalized weakness from MS  Lymphadenopathy:      Cervical: No cervical adenopathy  Skin:     General: Skin is warm and dry        Capillary Refill: Capillary refill takes less than 2 seconds  Neurological:      Mental Status: She is alert  Mental status is at baseline  Cranial Nerves: No cranial nerve deficit  Motor: Weakness present  Gait: Gait abnormal    Psychiatric:         Mood and Affect: Mood normal          Behavior: Behavior normal          Thought Content: Thought content normal        Assessment/Plan:  20-year-old female with:      Acute cystitis without hematuria  -recent UTI with Enterococcus faecalis, completed 7 day course of ampicillin  -continue cranberry juice  -most recent urine culture revealed >10,000 Enterococcus faecalis, asymptomatic at time of exam    Leukocytosis  -likely secondary to inpatient steroid use  -most recent WBC 4 4    Neurogenic bladder  -in setting of multiple sclerosis  -continue straight cath q a m  and q h s  as per home routine  -patient also able to void    Leg swelling  -patient lost 4 lb from 06/26/2021  Previously on Lasix and currently not on any diuretics  Patient has only trace edema of bilateral lower extremities  -venous duplex of bilateral lower extremities on 06/16/2021 revealed no evidence of DVT  -no signs of fluid overload noted  -will continue to monitor    Multiple sclerosis ([facility] [address] )  -with primary progressive disease  -continue supportive care  -continue nabumetone  -continue baclofen  -[facility] neurology is right pending that patient follow-up with her neurologist within 4-6 weeks    Hypertension  -goal BP <140-150/90  -BPs stable  -continue amlodipine 2 5 mg b i d  Debility  -multifactorial  -continue care and support at SNF for ADLs  -continue PT/OT  -continue fall precautions  -ensure adequate hydration and nutrition    **Please note: This follow-up note was constructed using a voice recognition system  [address] [place] [place]   6/8/75604:59 PM

## 2021-07-06 NOTE — ASSESSMENT & PLAN NOTE
-recent UTI with Enterococcus faecalis, completed 7 day course of ampicillin  -continue cranberry juice  -most recent urine culture revealed >10,000 Enterococcus faecalis, asymptomatic at time of exam

## 2021-07-06 NOTE — ASSESSMENT & PLAN NOTE
-with primary progressive disease  -continue supportive care  -continue nabumetone  -continue baclofen  -[facility] neurology is right pending that patient follow-up with her neurologist within 4-6 weeks

## 2021-07-13 ENCOUNTER — NURSING HOME VISIT (OUTPATIENT)
Dept: GERIATRICS | Facility: OTHER | Age: 66
End: 2021-07-13
Payer: MEDICARE

## 2021-07-13 DIAGNOSIS — E87.0 HYPERNATREMIA: ICD-10-CM

## 2021-07-13 DIAGNOSIS — I10 ESSENTIAL HYPERTENSION: ICD-10-CM

## 2021-07-13 DIAGNOSIS — G35 MULTIPLE SCLEROSIS (HCC): ICD-10-CM

## 2021-07-13 DIAGNOSIS — R53.81 DEBILITY: ICD-10-CM

## 2021-07-13 DIAGNOSIS — M54.6 ACUTE BILATERAL THORACIC BACK PAIN: ICD-10-CM

## 2021-07-13 DIAGNOSIS — N31.9 NEUROGENIC BLADDER: Primary | ICD-10-CM

## 2021-07-13 PROBLEM — D72.829 LEUKOCYTOSIS: Status: RESOLVED | Noted: 2021-06-28 | Resolved: 2021-07-13

## 2021-07-13 PROCEDURE — 99309 SBSQ NF CARE MODERATE MDM 30: CPT | Performed by: NURSE PRACTITIONER

## 2021-07-13 NOTE — ASSESSMENT & PLAN NOTE
-SBPs trending mostly 110s to 130s with isolated blood pressure reading of 150/83 today  -continue amlodipine 2 5 mg b i d

## 2021-07-13 NOTE — ASSESSMENT & PLAN NOTE
-multifactorial  -continue care and support at SNF for ADLs  -PT/OT  -continue fall precautions  -ensure adequate hydration and nutrition

## 2021-07-13 NOTE — ASSESSMENT & PLAN NOTE
-in setting of multiple sclerosis  -bladder scans were ordered by Dr Jerry Elaine q 6 hours and straight cath if residual >350 ML, but patient requested bladder scans frequency decreased to q 8 hours  -patient is at high risk for UTI due to straight cath procedures

## 2021-07-13 NOTE — ASSESSMENT & PLAN NOTE
-with primary progressive disease  -continue supportive care  -continue nabumetone  -continue baclofen  -follow-up with neurology

## 2021-07-13 NOTE — ASSESSMENT & PLAN NOTE
-possible arachnoid cyst at T6 level as per imaging  -also with degenerative disease  -pain controlled with scheduled acetaminophen 650 mg t i d   -continue gabapentin 100 mg t i d

## 2021-07-13 NOTE — PROGRESS NOTES
[facility]   [address] [facility], [facility], [address]  POS: 31 (STR)  Progress Note    Chief Complaint/Reason for visit: STR follow up   History of Present Illness:  81-year-old female seen and examined for STR follow up  Received patient sitting at side of bed and appeared in no distress  Denies having pain or discomfort  Nursing reports that PVRs are being done q 8 hours requiring straight cath if PVR >350 ML  She is also incontinent of urine  Patient states that she hopes to go home on Friday  She reports that her appetite is good  Ambulated yesterday with therapist in hallway from her room to the elevator and back  Patient ambulates up to 25 ft as per PT notes  Past Medical History: unchanged from history and physical  Past Medical History:   Diagnosis Date    Back pain 2016    Hypertension     Incontinence     MS (multiple sclerosis) ([facility] [address] )     Age 37    Neurogenic bladder     Spasticity      Family History: unchanged from history and physical  Social History: unchanged from history and physical  Resident Since:  06/26/2021  Review of systems: Review of Systems   Constitutional: Negative for appetite change, chills and diaphoresis  HENT: Negative  Eyes: Negative  Respiratory: Negative  Negative for cough and shortness of breath  Cardiovascular: Negative  Gastrointestinal: Negative  Endocrine: Negative  Genitourinary:        Urinary incontinence and retention   Musculoskeletal: Positive for gait problem  Muscle weakness BLE  Feel stiff in the morning  Neurological: Negative for dizziness, syncope, speech difficulty, light-headedness, numbness and headaches  Psychiatric/Behavioral: Negative for behavioral problems, confusion, dysphoric mood and hallucinations  The patient is not nervous/anxious  All other systems reviewed and are negative  Medications: All medication orders were reviewed  Allergies: NKDA  Consults reviewed: Other  Labs/Diagnostics (reviewed by this provider): Copy in Chart    Imaging Reviewed:  None today    Physical Exam  All vital signs were reviewed  Weight:  Temp:  97 4 BP:  150/83  Pulse: 68  Resp: 16 O2 Sat: 98% RA   Constitutional: Normocephalic  Orientation:Person, Place, Day and Date     Physical Exam  Vitals and nursing note reviewed  Constitutional:       General: She is not in acute distress  Appearance: She is not toxic-appearing or diaphoretic  HENT:      Head: Normocephalic  Nose: No congestion or rhinorrhea  Mouth/Throat:      Mouth: Mucous membranes are moist       Pharynx: No oropharyngeal exudate  Eyes:      General: No scleral icterus  Right eye: No discharge  Left eye: No discharge  Extraocular Movements: Extraocular movements intact  Conjunctiva/sclera: Conjunctivae normal       Pupils: Pupils are equal, round, and reactive to light  Cardiovascular:      Rate and Rhythm: Normal rate and regular rhythm  Pulses: Normal pulses  Pulmonary:      Effort: Pulmonary effort is normal  No respiratory distress  Breath sounds: Normal breath sounds  No wheezing, rhonchi or rales  Abdominal:      General: Bowel sounds are normal  There is no distension  Palpations: Abdomen is soft  Tenderness: There is no abdominal tenderness  There is no guarding  Musculoskeletal:      Cervical back: Neck supple  No rigidity  Right lower leg: Edema (Trace pitting edema) present  Left lower leg: Edema (Trace edema) present  Comments: Moves all 4 extremities with weakness bilateral lower extremities 2/5  Lymphadenopathy:      Cervical: No cervical adenopathy  Skin:     General: Skin is warm and dry  Capillary Refill: Capillary refill takes less than 2 seconds  Neurological:      Mental Status: She is alert  Mental status is at baseline  Cranial Nerves: No cranial nerve deficit  Motor: Weakness present        Gait: Gait abnormal    Psychiatric:         Mood and Affect: Mood normal          Behavior: Behavior normal          Thought Content: Thought content normal        Assessment/Plan:  71-year-old female with:    Neurogenic bladder  -in setting of multiple sclerosis  -bladder scans were ordered by Dr Silvia Gotti q 6 hours and straight cath if residual >350 ML, but patient requested bladder scans frequency decreased to q 8 hours  -patient is at high risk for UTI due to straight cath procedures    Hypertension  -SBPs trending mostly 110s to 130s with isolated blood pressure reading of 150/83 today  -continue amlodipine 2 5 mg b i d     Multiple sclerosis ([facility] [address] )  -with primary progressive disease  -continue supportive care  -continue nabumetone  -continue baclofen  -follow-up with neurology    Acute bilateral thoracic back pain  -possible arachnoid cyst at T6 level as per imaging  -also with degenerative disease  -pain controlled with scheduled acetaminophen 650 mg t i d   -continue gabapentin 100 mg t i d  Hypernatremia  -most recent sodium level 143 on 07/01/2021  -recheck BMP in 7/14/2021    Debility  -multifactorial  -continue care and support at SNF for ADLs  -PT/OT  -continue fall precautions  -ensure adequate hydration and nutrition    **Please note: This follow-up note was constructed using a voice recognition system  [address] [place] [facility]  6/90/449923:17 AM

## 2021-07-22 ENCOUNTER — NURSING HOME VISIT (OUTPATIENT)
Dept: GERIATRICS | Facility: OTHER | Age: 66
End: 2021-07-22
Payer: MEDICARE

## 2021-07-22 DIAGNOSIS — I10 ESSENTIAL HYPERTENSION: ICD-10-CM

## 2021-07-22 DIAGNOSIS — G35 MULTIPLE SCLEROSIS (HCC): Primary | ICD-10-CM

## 2021-07-22 DIAGNOSIS — R53.81 DEBILITY: ICD-10-CM

## 2021-07-22 DIAGNOSIS — N30.00 ACUTE CYSTITIS WITHOUT HEMATURIA: ICD-10-CM

## 2021-07-22 DIAGNOSIS — M54.6 ACUTE BILATERAL THORACIC BACK PAIN: ICD-10-CM

## 2021-07-22 DIAGNOSIS — N31.9 NEUROGENIC BLADDER: ICD-10-CM

## 2021-07-22 PROCEDURE — 99316 NF DSCHRG MGMT 30 MIN+: CPT | Performed by: FAMILY MEDICINE

## 2021-07-22 NOTE — PROGRESS NOTES
[facility]  [address]  [phone]  [address] [place] [address] St: [address]: [address]: SNF/Short Term Rehab    NAME: Rima Mora  AGE: 73 y o  SEX: female  DATE OF ADMISSION: 6/26/21 DATE OF DISCHARGE:7/23/21 DISCHARGE DISPOSITION: Home    Reason for admission: Patient was admitted from [address] [place] [address] for rehabilitation after hospitalization for UTI and multiple sclerosis with exacerbation      Admission Diagnoses:   Multiple sclerosis ([address] )  Primary progressive disease  Routine neurology follow up outpatient  Dalfapridine discontinued, as patient not taking at home  Continue baclofen for spasticity   Continue nabumetone for chronic pain    Acute cystitis without hematuria  Resolved  Urine culture 10-19,000 E faecalis as obtained inpatient  Follow up UA C&S obtained during SNF stay due to concern for recurrent UTI per patient/family again growing E faecali with low colony count of 10,000- patient was not treated  With recurrent UTI in setting of neurogenic bladder and chronic intermittent straight catheterization- continue nursing education for patient and family on proper technique for sterile straight catheterization  PO hydration encouraged, patient drinking cranberry juice as well  Continue Vitamin C    Neurogenic bladder  In setting of multiple sclerosis  Continue tamsulosin  Continue straight cath QAM and QHS as per home routine; see above    Acute bilateral thoracic back pain  Inpatient imaging showing possible arachnoid cyst at T6 level; patient also with degenerative disease  Improved with scheduled acetaminophen 650mg TID- continue  Continue gabapentin 100mg TID    Hypertension  Goal <140-150/90  Continue amlodipine 2 5mg BID  Was previously on lasix twice weekly at home for lower extremity edema, which was discontinued- patient without evidence of volume overload during SNF stay    Debility  Multifactorial  Completed SNF rehab  Continue PT/OT through home health at discharge  Supportive care, nutritional support, ADL support  Fall precautions  Management of acute and chronic medical conditions as outlined    Additional Problems:   -Hypernatremia: resolved  Discharge Diagnoses: Same as above    Course of stay: Patient was admitted to [facility] for rehabilitation due to above  Significant events during the stay include:  -Discordance of bladder scans with straight cath volumes; straight cath schedule changed back to home regimen of twice daily  -Left shoulder discomfort following use of hand bike at therapy; topical lidocaine cream added   The patient participated in PT/OT  Ambulating maximum of 70 feet with front wheeled walker with assistance prior to discharge  Labs and testing performed during stay:  [facility] (7/14/21)    New Medications:   -Acetaminophen 650mg TID  -Lidocaine cream TID to left shoulder  Medication Changes: None  Discontinued Medications:  -Dalfampridine ER (patient not taking at home)  -Lasix  -Complicated 7 day course of ampicillin for UTI  Discharge Medications: See discharge medication list which was reviewed and updated in Epic to reflect most current SNF orders      Current Outpatient Medications:     acetaminophen (TYLENOL) 325 mg tablet, Take 650 mg by mouth 3 (three) times a day, Disp: , Rfl:     AMLODIPINE BESYLATE PO, Take 2 5 mg by mouth 2 (two) times a day, Disp: , Rfl:     Ascorbic Acid (vitamin C) 1000 MG tablet, Take 1,000 mg by mouth daily , Disp: , Rfl:     aspirin (ECOTRIN LOW STRENGTH) 81 mg EC tablet, Take 81 mg by mouth daily, Disp: , Rfl:     baclofen 20 mg tablet, Take 30 mg by mouth 3 (three) times a day , Disp: , Rfl:     cholecalciferol (VITAMIN D3) 1,000 units tablet, Take 2,000 Units by mouth daily  , Disp: , Rfl:     gabapentin (NEURONTIN) 100 mg capsule, Take 1 capsule (100 mg total) by mouth 3 (three) times a day, Disp: 90 capsule, Rfl: 0    Lidocaine 5 % CREA, Apply 1 application topically 3 (three) times a day Left shoulder, Disp: , Rfl:     multivitamin (THERAGRAN) TABS, Take 1 tablet by mouth daily, Disp: , Rfl:     nabumetone (RELAFEN) 500 mg tablet, Take 1,000 mg by mouth 2 (two) times a day, Disp: , Rfl:     tamsulosin (FLOMAX) 0 4 mg, Take 0 8 mg by mouth daily, Disp: , Rfl:     vitamin B-12 (CYANOCOBALAMIN) 250 MCG tablet, Take 250 mcg by mouth daily, Disp: , Rfl:     vitamin E, tocopherol, 400 units capsule, Take 400 Units by mouth daily , Disp: , Rfl:     Status at time of discharge: Stable    Today's Visit: 7/22/21    Subjective:73year old female evaluated for STR follow up and discharge visit; scheduled to discharge home on Friday 7/23/21  Patient notes left shoulder discomfort following use of hand bike at therapy; no bruising, swelling or bony tenderness  Patient uses topical marijuana cream at home; agreeable to use of lidocaine cream for remainder of SNF stay  Spontaneously voiding in addition to twice daily straight catheterizations for neurogenic bladder; no dysuria or fever reported  Vitals:124/76 74 97 4F 18 96%RA 166lb    Exam: Physical Exam  Vitals and nursing note reviewed  Constitutional:       General: She is awake  She is not in acute distress  Appearance: She is well-developed and well-groomed  She is not ill-appearing, toxic-appearing or diaphoretic  HENT:      Head: Normocephalic and atraumatic  Right Ear: External ear normal       Left Ear: External ear normal       Nose: No rhinorrhea  Mouth/Throat:      Mouth: Mucous membranes are moist       Pharynx: Oropharynx is clear  Eyes:      General: No scleral icterus  Right eye: No discharge  Left eye: No discharge  Conjunctiva/sclera: Conjunctivae normal    Pulmonary:      Effort: Pulmonary effort is normal  No respiratory distress  Abdominal:      General: There is no distension  Musculoskeletal:         General: No tenderness or deformity        Left shoulder: No swelling, deformity or bony tenderness  Decreased range of motion  Cervical back: No rigidity  Right lower leg: No edema  Left lower leg: No edema  Skin:     General: Skin is warm and dry  Coloration: Skin is not jaundiced or pale  Neurological:      Mental Status: She is alert  Mental status is at baseline  Cranial Nerves: No dysarthria or facial asymmetry  Psychiatric:         Attention and Perception: Attention and perception normal          Mood and Affect: Mood and affect normal          Speech: Speech normal          Behavior: Behavior is cooperative  Discussion with patient/family and further instructions:  -Fall precautions  -Monitor for signs/symptoms of infection  -Medication list was reviewed and updated in Epic to reflect most current SNF orders  -Face to face form for home health was completed    Follow-up Recommendations: Please follow-up with your primary care physician within 7-10 days of discharge to review medication changes and current status  Discharge summary forwarded to PCP through Epic  Problem List Follow-up Recommendations:  -See above    I have spent 35 minutes with Patient  today in which greater than 50% of this time was spent in counseling/coordination of care regarding Diagnostic results, Risks and benefits of tx options, Intructions for management, Patient and family education, Importance of tx compliance, Risk factor reductions, Impressions and coordination of discharge care with social work and nursing      Disha Fontenot DO  7/22/21

## 2021-07-27 RX ORDER — LIDOCAINE 5% 5 G/100G
1 CREAM TOPICAL 3 TIMES DAILY
Status: ON HOLD | COMMUNITY
End: 2022-05-25 | Stop reason: CLARIF

## 2021-07-27 NOTE — ASSESSMENT & PLAN NOTE
Inpatient imaging showing possible arachnoid cyst at T6 level; patient also with degenerative disease  Improved with scheduled acetaminophen 650mg TID- continue  Continue gabapentin 100mg TID

## 2021-07-27 NOTE — ASSESSMENT & PLAN NOTE
Primary progressive disease  Routine neurology follow up outpatient  Dalfapridine discontinued, as patient not taking at home  Continue baclofen for spasticity   Continue nabumetone for chronic pain

## 2021-07-27 NOTE — ASSESSMENT & PLAN NOTE
Goal <140-150/90  Continue amlodipine 2 5mg BID  Was previously on lasix twice weekly at home for lower extremity edema, which was discontinued- patient without evidence of volume overload during SNF stay

## 2021-07-27 NOTE — ASSESSMENT & PLAN NOTE
Resolved  Urine culture 10-19,000 E faecalis as obtained inpatient  Follow up UA C&S obtained during SNF stay due to concern for recurrent UTI per patient/family again growing E faecali with low colony count of 10,000- patient was not treated  With recurrent UTI in setting of neurogenic bladder and chronic intermittent straight catheterization- continue nursing education for patient and family on proper technique for sterile straight catheterization  PO hydration encouraged, patient drinking cranberry juice as well  Continue Vitamin C

## 2021-07-27 NOTE — ASSESSMENT & PLAN NOTE
In setting of multiple sclerosis  Continue tamsulosin  Continue straight cath QAM and QHS as per home routine; see above

## 2021-11-16 RX ORDER — DOXYCYCLINE HYCLATE 100 MG
TABLET ORAL DAILY
Status: ON HOLD | COMMUNITY
Start: 2021-11-15 | End: 2022-05-25 | Stop reason: CLARIF

## 2021-11-16 RX ORDER — DALFAMPRIDINE 10 MG/1
10 TABLET, FILM COATED, EXTENDED RELEASE ORAL EVERY 12 HOURS
COMMUNITY

## 2021-11-16 RX ORDER — NITROFURANTOIN MACROCRYSTALS 100 MG/1
100 CAPSULE ORAL DAILY
Status: ON HOLD | COMMUNITY
End: 2022-05-25 | Stop reason: CLARIF

## 2021-11-16 RX ORDER — FUROSEMIDE 40 MG/1
40 TABLET ORAL
Status: ON HOLD | COMMUNITY
Start: 2021-10-05 | End: 2022-05-25 | Stop reason: CLARIF

## 2021-11-18 ENCOUNTER — ANESTHESIA EVENT (OUTPATIENT)
Dept: PERIOP | Facility: HOSPITAL | Age: 66
End: 2021-11-18
Payer: MEDICARE

## 2021-11-19 ENCOUNTER — ANESTHESIA (OUTPATIENT)
Dept: PERIOP | Facility: HOSPITAL | Age: 66
End: 2021-11-19
Payer: MEDICARE

## 2021-11-19 ENCOUNTER — HOSPITAL ENCOUNTER (OUTPATIENT)
Facility: HOSPITAL | Age: 66
Setting detail: OUTPATIENT SURGERY
Discharge: HOME | End: 2021-11-19
Attending: OBSTETRICS & GYNECOLOGY | Admitting: OBSTETRICS & GYNECOLOGY
Payer: MEDICARE

## 2021-11-19 ENCOUNTER — APPOINTMENT (OUTPATIENT)
Dept: RADIOLOGY | Facility: HOSPITAL | Age: 66
End: 2021-11-19
Payer: MEDICARE

## 2021-11-19 VITALS
HEART RATE: 66 BPM | TEMPERATURE: 97.8 F | BODY MASS INDEX: 38.18 KG/M2 | OXYGEN SATURATION: 98 % | HEIGHT: 55 IN | SYSTOLIC BLOOD PRESSURE: 100 MMHG | WEIGHT: 165 LBS | RESPIRATION RATE: 16 BRPM | DIASTOLIC BLOOD PRESSURE: 60 MMHG

## 2021-11-19 PROCEDURE — C1820 GENERATOR NEURO RECHG BAT SY: HCPCS | Performed by: OBSTETRICS & GYNECOLOGY

## 2021-11-19 PROCEDURE — C1778 LEAD, NEUROSTIMULATOR: HCPCS | Performed by: OBSTETRICS & GYNECOLOGY

## 2021-11-19 PROCEDURE — C1787 PATIENT PROGR, NEUROSTIM: HCPCS | Performed by: OBSTETRICS & GYNECOLOGY

## 2021-11-19 PROCEDURE — 72220 X-RAY EXAM SACRUM TAILBONE: CPT

## 2021-11-19 DEVICE — NEUROSTIMULATOR
Type: IMPLANTABLE DEVICE | Site: SACRUM | Status: FUNCTIONAL
Brand: AXONICS

## 2021-11-19 DEVICE — TINED LEAD KIT
Type: IMPLANTABLE DEVICE | Site: SACRUM | Status: FUNCTIONAL
Brand: AXONICS

## 2021-11-19 RX ORDER — VASOPRESSIN 20 U/ML
INJECTION PARENTERAL AS NEEDED
Status: DISCONTINUED | OUTPATIENT
Start: 2021-11-19 | End: 2021-11-19

## 2021-11-19 RX ORDER — DEXAMETHASONE SODIUM PHOSPHATE 10 MG/ML
INJECTION, SOLUTION INTRAMUSCULAR; INTRAVENOUS AS NEEDED
Status: DISCONTINUED | OUTPATIENT
Start: 2021-11-19 | End: 2021-11-19

## 2021-11-19 RX ORDER — SODIUM CHLORIDE, SODIUM LACTATE, POTASSIUM CHLORIDE, CALCIUM CHLORIDE 600; 310; 30; 20 MG/100ML; MG/100ML; MG/100ML; MG/100ML
125 INJECTION, SOLUTION INTRAVENOUS CONTINUOUS
Status: DISCONTINUED | OUTPATIENT
Start: 2021-11-19 | End: 2021-11-19 | Stop reason: HOSPADM

## 2021-11-19 RX ORDER — PROPOFOL 10 MG/ML
INJECTION, EMULSION INTRAVENOUS AS NEEDED
Status: DISCONTINUED | OUTPATIENT
Start: 2021-11-19 | End: 2021-11-19

## 2021-11-19 RX ORDER — BUPIVACAINE HYDROCHLORIDE AND EPINEPHRINE 2.5; 5 MG/ML; UG/ML
INJECTION, SOLUTION EPIDURAL; INFILTRATION; INTRACAUDAL; PERINEURAL AS NEEDED
Status: DISCONTINUED | OUTPATIENT
Start: 2021-11-19 | End: 2021-11-19 | Stop reason: HOSPADM

## 2021-11-19 RX ORDER — ONDANSETRON 2 MG/ML
INJECTION INTRAMUSCULAR; INTRAVENOUS AS NEEDED
Status: DISCONTINUED | OUTPATIENT
Start: 2021-11-19 | End: 2021-11-19

## 2021-11-19 RX ORDER — CEFAZOLIN SODIUM 1 G/50ML
1000 SOLUTION INTRAVENOUS ONCE
Status: COMPLETED | OUTPATIENT
Start: 2021-11-19 | End: 2021-11-19

## 2021-11-19 RX ORDER — OXYCODONE HYDROCHLORIDE AND ACETAMINOPHEN 5; 325 MG/1; MG/1
1 TABLET ORAL EVERY 4 HOURS PRN
Status: CANCELLED | OUTPATIENT
Start: 2021-11-19

## 2021-11-19 RX ORDER — FENTANYL CITRATE/PF 50 MCG/ML
25 SYRINGE (ML) INJECTION
Status: DISCONTINUED | OUTPATIENT
Start: 2021-11-19 | End: 2021-11-19 | Stop reason: HOSPADM

## 2021-11-19 RX ORDER — ROCURONIUM BROMIDE 10 MG/ML
INJECTION, SOLUTION INTRAVENOUS AS NEEDED
Status: DISCONTINUED | OUTPATIENT
Start: 2021-11-19 | End: 2021-11-19

## 2021-11-19 RX ORDER — LIDOCAINE HYDROCHLORIDE 10 MG/ML
INJECTION, SOLUTION EPIDURAL; INFILTRATION; INTRACAUDAL; PERINEURAL AS NEEDED
Status: DISCONTINUED | OUTPATIENT
Start: 2021-11-19 | End: 2021-11-19

## 2021-11-19 RX ORDER — EPHEDRINE SULFATE 50 MG/ML
INJECTION INTRAVENOUS AS NEEDED
Status: DISCONTINUED | OUTPATIENT
Start: 2021-11-19 | End: 2021-11-19

## 2021-11-19 RX ORDER — ONDANSETRON 2 MG/ML
4 INJECTION INTRAMUSCULAR; INTRAVENOUS ONCE AS NEEDED
Status: DISCONTINUED | OUTPATIENT
Start: 2021-11-19 | End: 2021-11-19 | Stop reason: HOSPADM

## 2021-11-19 RX ORDER — LIDOCAINE HYDROCHLORIDE 10 MG/ML
0.5 INJECTION, SOLUTION EPIDURAL; INFILTRATION; INTRACAUDAL; PERINEURAL ONCE AS NEEDED
Status: DISCONTINUED | OUTPATIENT
Start: 2021-11-19 | End: 2021-11-19 | Stop reason: HOSPADM

## 2021-11-19 RX ORDER — KETOROLAC TROMETHAMINE 30 MG/ML
INJECTION, SOLUTION INTRAMUSCULAR; INTRAVENOUS AS NEEDED
Status: DISCONTINUED | OUTPATIENT
Start: 2021-11-19 | End: 2021-11-19

## 2021-11-19 RX ORDER — MIDAZOLAM HYDROCHLORIDE 2 MG/2ML
INJECTION, SOLUTION INTRAMUSCULAR; INTRAVENOUS AS NEEDED
Status: DISCONTINUED | OUTPATIENT
Start: 2021-11-19 | End: 2021-11-19

## 2021-11-19 RX ORDER — BUPIVACAINE HYDROCHLORIDE AND EPINEPHRINE 5; 5 MG/ML; UG/ML
INJECTION, SOLUTION EPIDURAL; INTRACAUDAL; PERINEURAL AS NEEDED
Status: DISCONTINUED | OUTPATIENT
Start: 2021-11-19 | End: 2021-11-19 | Stop reason: HOSPADM

## 2021-11-19 RX ADMIN — MIDAZOLAM 2 MG: 1 INJECTION INTRAMUSCULAR; INTRAVENOUS at 09:16

## 2021-11-19 RX ADMIN — EPHEDRINE SULFATE 10 MG: 50 INJECTION, SOLUTION INTRAVENOUS at 10:00

## 2021-11-19 RX ADMIN — KETOROLAC TROMETHAMINE 15 MG: 30 INJECTION, SOLUTION INTRAMUSCULAR at 10:56

## 2021-11-19 RX ADMIN — EPHEDRINE SULFATE 15 MG: 50 INJECTION, SOLUTION INTRAVENOUS at 09:29

## 2021-11-19 RX ADMIN — CEFAZOLIN SODIUM 1000 MG: 1 SOLUTION INTRAVENOUS at 09:16

## 2021-11-19 RX ADMIN — ONDANSETRON 4 MG: 2 INJECTION INTRAMUSCULAR; INTRAVENOUS at 09:29

## 2021-11-19 RX ADMIN — DEXAMETHASONE SODIUM PHOSPHATE 10 MG: 10 INJECTION, SOLUTION INTRAMUSCULAR; INTRAVENOUS at 09:29

## 2021-11-19 RX ADMIN — SUGAMMADEX 150 MG: 100 INJECTION, SOLUTION INTRAVENOUS at 10:59

## 2021-11-19 RX ADMIN — EPHEDRINE SULFATE 10 MG: 50 INJECTION, SOLUTION INTRAVENOUS at 09:48

## 2021-11-19 RX ADMIN — ROCURONIUM BROMIDE 20 MG: 50 INJECTION, SOLUTION INTRAVENOUS at 09:29

## 2021-11-19 RX ADMIN — EPHEDRINE SULFATE 5 MG: 50 INJECTION, SOLUTION INTRAVENOUS at 10:20

## 2021-11-19 RX ADMIN — EPHEDRINE SULFATE 10 MG: 50 INJECTION, SOLUTION INTRAVENOUS at 10:07

## 2021-11-19 RX ADMIN — PHENYLEPHRINE HYDROCHLORIDE 30 MCG/MIN: 10 INJECTION INTRAVENOUS at 09:48

## 2021-11-19 RX ADMIN — PROPOFOL 100 MG: 10 INJECTION, EMULSION INTRAVENOUS at 09:29

## 2021-11-19 RX ADMIN — SODIUM CHLORIDE, SODIUM LACTATE, POTASSIUM CHLORIDE, AND CALCIUM CHLORIDE: .6; .31; .03; .02 INJECTION, SOLUTION INTRAVENOUS at 11:07

## 2021-11-19 RX ADMIN — VASOPRESSIN 1 UNITS: 20 INJECTION INTRAVENOUS at 10:36

## 2021-11-19 RX ADMIN — LIDOCAINE HYDROCHLORIDE 50 MG: 10 INJECTION, SOLUTION EPIDURAL; INFILTRATION; INTRACAUDAL; PERINEURAL at 09:29

## 2022-01-01 DIAGNOSIS — N39.0 RECURRENT UTI: Primary | ICD-10-CM

## 2022-01-01 DIAGNOSIS — N39.0 RECURRENT UTI: ICD-10-CM

## 2022-01-01 RX ORDER — METHENAMINE HIPPURATE 1000 MG/1
1 TABLET ORAL 2 TIMES DAILY
Qty: 180 TABLET | Refills: 3 | Status: SHIPPED | OUTPATIENT
Start: 2022-01-01 | End: 2022-01-01 | Stop reason: SDUPTHER

## 2022-01-01 RX ORDER — DOXYCYCLINE 100 MG/1
100 CAPSULE ORAL 2 TIMES DAILY
Qty: 10 CAPSULE | Refills: 0 | Status: SHIPPED | OUTPATIENT
Start: 2022-01-01 | End: 2022-01-01

## 2022-01-01 RX ORDER — METHENAMINE HIPPURATE 1000 MG/1
1 TABLET ORAL 2 TIMES DAILY
Qty: 10 TABLET | Refills: 0 | Status: SHIPPED | OUTPATIENT
Start: 2022-01-01 | End: 2022-01-01 | Stop reason: SDUPTHER

## 2022-01-01 RX ORDER — SULFAMETHOXAZOLE AND TRIMETHOPRIM 800; 160 MG/1; MG/1
1 TABLET ORAL 2 TIMES DAILY
Qty: 10 TABLET | Refills: 0 | Status: SHIPPED | OUTPATIENT
Start: 2022-01-01 | End: 2022-01-01

## 2022-01-01 RX ORDER — LEVOFLOXACIN 250 MG/1
250 TABLET ORAL DAILY
Qty: 7 TABLET | Refills: 0 | Status: SHIPPED | OUTPATIENT
Start: 2022-01-01 | End: 2022-01-01

## 2022-01-01 RX ORDER — SULFAMETHOXAZOLE AND TRIMETHOPRIM 800; 160 MG/1; MG/1
1 TABLET ORAL EVERY 12 HOURS SCHEDULED
Qty: 20 TABLET | Refills: 0 | Status: SHIPPED | OUTPATIENT
Start: 2022-01-01 | End: 2022-01-01

## 2022-02-01 ENCOUNTER — APPOINTMENT (OUTPATIENT)
Dept: LAB | Facility: CLINIC | Age: 67
End: 2022-02-01
Payer: MEDICARE

## 2022-02-01 ENCOUNTER — TRANSCRIBE ORDERS (OUTPATIENT)
Dept: LAB | Facility: CLINIC | Age: 67
End: 2022-02-01

## 2022-02-01 DIAGNOSIS — Z79.899 ENCOUNTER FOR LONG-TERM (CURRENT) USE OF OTHER MEDICATIONS: ICD-10-CM

## 2022-02-01 DIAGNOSIS — R53.1 ASTHENIA: ICD-10-CM

## 2022-02-01 DIAGNOSIS — N31.9 HIGH COMPLIANCE BLADDER: ICD-10-CM

## 2022-02-01 DIAGNOSIS — N39.0 URINARY TRACT INFECTION WITHOUT HEMATURIA, SITE UNSPECIFIED: ICD-10-CM

## 2022-02-01 DIAGNOSIS — R26.9 ABNORMALITY OF GAIT: ICD-10-CM

## 2022-02-01 DIAGNOSIS — R26.89 SCISSOR GAIT: ICD-10-CM

## 2022-02-01 DIAGNOSIS — D84.9 IMMUNOSUPPRESSION-RELATED INFECTIOUS DISEASE (HCC): ICD-10-CM

## 2022-02-01 DIAGNOSIS — R25.2 CRAMP OF LIMB: ICD-10-CM

## 2022-02-01 DIAGNOSIS — G35 MULTIPLE SCLEROSIS (HCC): ICD-10-CM

## 2022-02-01 DIAGNOSIS — B99.8 IMMUNOSUPPRESSION-RELATED INFECTIOUS DISEASE (HCC): ICD-10-CM

## 2022-02-01 DIAGNOSIS — G35 MULTIPLE SCLEROSIS (HCC): Primary | ICD-10-CM

## 2022-02-01 LAB
25(OH)D3 SERPL-MCNC: 34.9 NG/ML (ref 30–100)
ALBUMIN SERPL BCP-MCNC: 3.5 G/DL (ref 3.5–5)
ALP SERPL-CCNC: 152 U/L (ref 46–116)
ALT SERPL W P-5'-P-CCNC: 38 U/L (ref 12–78)
ANION GAP SERPL CALCULATED.3IONS-SCNC: 5 MMOL/L (ref 4–13)
APTT PPP: 30 SECONDS (ref 23–37)
AST SERPL W P-5'-P-CCNC: 17 U/L (ref 5–45)
BASOPHILS # BLD AUTO: 0.02 THOUSANDS/ΜL (ref 0–0.1)
BASOPHILS NFR BLD AUTO: 0 % (ref 0–1)
BILIRUB SERPL-MCNC: 0.69 MG/DL (ref 0.2–1)
BUN SERPL-MCNC: 19 MG/DL (ref 5–25)
CALCIUM SERPL-MCNC: 9.3 MG/DL (ref 8.3–10.1)
CHLORIDE SERPL-SCNC: 105 MMOL/L (ref 100–108)
CO2 SERPL-SCNC: 31 MMOL/L (ref 21–32)
CREAT SERPL-MCNC: 0.73 MG/DL (ref 0.6–1.3)
EOSINOPHIL # BLD AUTO: 0.04 THOUSAND/ΜL (ref 0–0.61)
EOSINOPHIL NFR BLD AUTO: 1 % (ref 0–6)
ERYTHROCYTE [DISTWIDTH] IN BLOOD BY AUTOMATED COUNT: 14.4 % (ref 11.6–15.1)
GFR SERPL CREATININE-BSD FRML MDRD: 86 ML/MIN/1.73SQ M
GLUCOSE P FAST SERPL-MCNC: 89 MG/DL (ref 65–99)
HBV CORE AB SER QL: NORMAL
HBV SURFACE AB SER-ACNC: <3.1 MIU/ML
HBV SURFACE AG SER QL: NORMAL
HCT VFR BLD AUTO: 39.9 % (ref 34.8–46.1)
HGB BLD-MCNC: 13 G/DL (ref 11.5–15.4)
IGA SERPL-MCNC: 172 MG/DL (ref 70–400)
IGG SERPL-MCNC: 693 MG/DL (ref 700–1600)
IGM SERPL-MCNC: 124 MG/DL (ref 40–230)
IMM GRANULOCYTES # BLD AUTO: 0.03 THOUSAND/UL (ref 0–0.2)
IMM GRANULOCYTES NFR BLD AUTO: 1 % (ref 0–2)
INR PPP: 0.93 (ref 0.84–1.19)
LYMPHOCYTES # BLD AUTO: 0.71 THOUSANDS/ΜL (ref 0.6–4.47)
LYMPHOCYTES NFR BLD AUTO: 13 % (ref 14–44)
MCH RBC QN AUTO: 32.7 PG (ref 26.8–34.3)
MCHC RBC AUTO-ENTMCNC: 32.6 G/DL (ref 31.4–37.4)
MCV RBC AUTO: 101 FL (ref 82–98)
MONOCYTES # BLD AUTO: 0.41 THOUSAND/ΜL (ref 0.17–1.22)
MONOCYTES NFR BLD AUTO: 8 % (ref 4–12)
NEUTROPHILS # BLD AUTO: 4.22 THOUSANDS/ΜL (ref 1.85–7.62)
NEUTS SEG NFR BLD AUTO: 77 % (ref 43–75)
NRBC BLD AUTO-RTO: 0 /100 WBCS
PLATELET # BLD AUTO: 313 THOUSANDS/UL (ref 149–390)
PMV BLD AUTO: 10.1 FL (ref 8.9–12.7)
POTASSIUM SERPL-SCNC: 3.7 MMOL/L (ref 3.5–5.3)
PROT SERPL-MCNC: 7.5 G/DL (ref 6.4–8.2)
PROTHROMBIN TIME: 12.2 SECONDS (ref 11.6–14.5)
RBC # BLD AUTO: 3.97 MILLION/UL (ref 3.81–5.12)
SARS-COV-2 IGG SERPL QL IA: REACTIVE
SARS-COV-2 IGG+IGM SERPL QL IA: REACTIVE
SODIUM SERPL-SCNC: 141 MMOL/L (ref 136–145)
VIT B12 SERPL-MCNC: 2937 PG/ML (ref 100–900)
WBC # BLD AUTO: 5.43 THOUSAND/UL (ref 4.31–10.16)

## 2022-02-01 PROCEDURE — 85610 PROTHROMBIN TIME: CPT

## 2022-02-01 PROCEDURE — 82607 VITAMIN B-12: CPT

## 2022-02-01 PROCEDURE — 86355 B CELLS TOTAL COUNT: CPT

## 2022-02-01 PROCEDURE — 86360 T CELL ABSOLUTE COUNT/RATIO: CPT

## 2022-02-01 PROCEDURE — 36415 COLL VENOUS BLD VENIPUNCTURE: CPT

## 2022-02-01 PROCEDURE — 86769 SARS-COV-2 COVID-19 ANTIBODY: CPT

## 2022-02-01 PROCEDURE — 87340 HEPATITIS B SURFACE AG IA: CPT

## 2022-02-01 PROCEDURE — 86706 HEP B SURFACE ANTIBODY: CPT

## 2022-02-01 PROCEDURE — 85730 THROMBOPLASTIN TIME PARTIAL: CPT

## 2022-02-01 PROCEDURE — 82306 VITAMIN D 25 HYDROXY: CPT

## 2022-02-01 PROCEDURE — 86787 VARICELLA-ZOSTER ANTIBODY: CPT

## 2022-02-01 PROCEDURE — 86359 T CELLS TOTAL COUNT: CPT

## 2022-02-01 PROCEDURE — 80053 COMPREHEN METABOLIC PANEL: CPT

## 2022-02-01 PROCEDURE — 86704 HEP B CORE ANTIBODY TOTAL: CPT

## 2022-02-01 PROCEDURE — 82784 ASSAY IGA/IGD/IGG/IGM EACH: CPT

## 2022-02-01 PROCEDURE — 85025 COMPLETE CBC W/AUTO DIFF WBC: CPT

## 2022-02-02 LAB
BASOPHILS # BLD AUTO: 0 X10E3/UL (ref 0–0.2)
BASOPHILS NFR BLD AUTO: 1 %
CD19 CELLS # BLD: 164 /UL (ref 12–645)
CD19 CELLS NFR BLD: 23.4 % (ref 3.3–25.4)
CD3 CELLS # BLD: 466 /UL (ref 622–2402)
CD3 CELLS NFR BLD: 66.6 % (ref 57.5–86.2)
CD3+CD4+ CELLS # BLD: 332 /UL (ref 359–1519)
CD3+CD4+ CELLS NFR BLD: 47.4 % (ref 30.8–58.5)
CD3+CD4+ CELLS/CD3+CD8+ CLL BLD: 1.99 % (ref 0.92–3.72)
CD3+CD8+ CELLS # BLD: 167 /UL (ref 109–897)
CD3+CD8+ CELLS NFR BLD: 23.8 % (ref 12–35.5)
EOSINOPHIL # BLD AUTO: 0 X10E3/UL (ref 0–0.4)
EOSINOPHIL NFR BLD AUTO: 1 %
ERYTHROCYTE [DISTWIDTH] IN BLOOD BY AUTOMATED COUNT: 13 % (ref 11.7–15.4)
HCT VFR BLD AUTO: 39.3 % (ref 34–46.6)
HGB BLD-MCNC: 12.9 G/DL (ref 11.1–15.9)
IMM GRANULOCYTES # BLD: 0 X10E3/UL (ref 0–0.1)
IMM GRANULOCYTES NFR BLD: 0 %
LYMPHOCYTES # BLD AUTO: 0.7 X10E3/UL (ref 0.7–3.1)
LYMPHOCYTES NFR BLD AUTO: 13 %
MCH RBC QN AUTO: 32.1 PG (ref 26.6–33)
MCHC RBC AUTO-ENTMCNC: 32.8 G/DL (ref 31.5–35.7)
MCV RBC AUTO: 98 FL (ref 79–97)
MONOCYTES # BLD AUTO: 0.4 X10E3/UL (ref 0.1–0.9)
MONOCYTES NFR BLD AUTO: 7 %
NEUTROPHILS # BLD AUTO: 4.3 X10E3/UL (ref 1.4–7)
NEUTROPHILS NFR BLD AUTO: 78 %
PLATELET # BLD AUTO: 327 X10E3/UL (ref 150–450)
RBC # BLD AUTO: 4.02 X10E6/UL (ref 3.77–5.28)
WBC # BLD AUTO: 5.5 X10E3/UL (ref 3.4–10.8)

## 2022-02-03 LAB
VZV IGG SER IA-ACNC: NORMAL
VZV IGM SER IA-ACNC: <0.91 INDEX (ref 0–0.9)

## 2022-03-30 ENCOUNTER — TELEPHONE (OUTPATIENT)
Dept: UROLOGY | Facility: CLINIC | Age: 67
End: 2022-03-30

## 2022-03-30 ENCOUNTER — OFFICE VISIT (OUTPATIENT)
Dept: UROLOGY | Facility: CLINIC | Age: 67
End: 2022-03-30
Payer: MEDICARE

## 2022-03-30 VITALS — HEART RATE: 69 BPM | OXYGEN SATURATION: 99 % | SYSTOLIC BLOOD PRESSURE: 120 MMHG | DIASTOLIC BLOOD PRESSURE: 70 MMHG

## 2022-03-30 DIAGNOSIS — N31.9 NEUROGENIC BLADDER: Primary | ICD-10-CM

## 2022-03-30 PROCEDURE — 99204 OFFICE O/P NEW MOD 45 MIN: CPT | Performed by: PHYSICIAN ASSISTANT

## 2022-03-30 NOTE — PROGRESS NOTES
1  Neurogenic bladder  Ambulatory Referral to Interventional Radiology         Assessment and plan:       1  Neurogenic bladder  2/ urinary colonization  -previously performed clean intermittent catheterization    -having recurrent infections with Bronson catheter  -we discussed returning to clean intermittent catheterization for versus suprapubic 2  They are interested in suprapubic tube insertion  We discussed typical care and maintenance of the tube they verbalized understanding  Patient's  would like to participate in suprapubic tube exchanges with the ultimate goal of changing this at home  They are aware that the 1st few changes will have to be performed in the office     -interventional radiology referral placed    3  Recurrent urinary infections  -we discussed proper hydration, probiotics, cranberry supplementation  If still having infections despite transitioning to suprapubic tube may require methenamine hippurate  Alfonso Corona PA-C      Chief Complaint     Neurogenic bladder    History of Present Illness     Rima Estrada is a 73 y o  female presenting today as a new patient for neurogenic bladder  Patient has a history multiple sclerosis and ultimately has neurogenic bladder as a result  This was managed by clean intermittent catheterization for by her  (whom is a patient of ours)  There had been barriers to clean intermittent catheterization as he was having increasing difficulty lifting the patient up to perform the procedure as well as increasing bladder spasms  Patient has been Bronson catheter dependent over the past few months  They transition to Bronson catheter due to concerns of recurrent infections  She did have 2 hospitalizations for urinary infection and report multiple antibiotics as well  Denies any previous history of pelvic surgery or radiation    Patient did previously have a sacral neuromodulator placed in November of 2021 with Dr Karson Torres however they feel that this was not beneficial for her urinary symptoms  It is MRI compatible however  Laboratory     Lab Results   Component Value Date    CREATININE 0 73 02/01/2022       Review of Systems     Review of Systems   Constitutional: Negative for activity change, appetite change, chills, diaphoresis, fatigue, fever and unexpected weight change  Respiratory: Negative for chest tightness and shortness of breath  Cardiovascular: Negative for chest pain, palpitations and leg swelling  Gastrointestinal: Negative for abdominal distention, abdominal pain, constipation, diarrhea, nausea and vomiting  Genitourinary: Negative for decreased urine volume, difficulty urinating, dysuria, enuresis, flank pain, frequency, genital sores, hematuria and urgency  Musculoskeletal: Negative for back pain, gait problem and myalgias  Skin: Negative for color change, pallor, rash and wound  Psychiatric/Behavioral: Negative for behavioral problems  The patient is not nervous/anxious  Allergies     No Known Allergies    Physical Exam     Physical Exam  Constitutional:       General: She is not in acute distress  Appearance: Normal appearance  She is not ill-appearing, toxic-appearing or diaphoretic  HENT:      Head: Normocephalic and atraumatic  Eyes:      General:         Right eye: No discharge  Left eye: No discharge  Conjunctiva/sclera: Conjunctivae normal    Genitourinary:     Comments: Bronson catheter in place draining yellow urine  Musculoskeletal:      Comments: Ambulates with wheelchair assistance   Skin:     General: Skin is warm and dry  Coloration: Skin is not jaundiced or pale  Neurological:      Mental Status: She is alert             Vital Signs     Vitals:    03/30/22 1256   BP: 120/70   BP Location: Right arm   Pulse: 69   SpO2: 99%         Current Medications       Current Outpatient Medications:     acetaminophen (TYLENOL) 325 mg tablet, Take 650 mg by mouth 3 (three) times a day, Disp: , Rfl:     AMLODIPINE BESYLATE PO, Take 2 5 mg by mouth 2 (two) times a day, Disp: , Rfl:     Ascorbic Acid (vitamin C) 1000 MG tablet, Take 1,000 mg by mouth daily , Disp: , Rfl:     aspirin (ECOTRIN LOW STRENGTH) 81 mg EC tablet, Take 81 mg by mouth daily, Disp: , Rfl:     baclofen 20 mg tablet, Take 20 mg by mouth 3 (three) times a day  , Disp: , Rfl:     cholecalciferol (VITAMIN D3) 1,000 units tablet, Take 2,000 Units by mouth daily  , Disp: , Rfl:     Dalfampridine ER 10 MG TB12, Take by mouth 2 (two) times a day, Disp: , Rfl:     doxycycline hyclate (VIBRA-TABS) 100 mg tablet, Take by mouth daily  , Disp: , Rfl:     furosemide (LASIX) 40 mg tablet, 40 mg Takes Monday and Friday , Disp: , Rfl:     gabapentin (NEURONTIN) 100 mg capsule, Take 1 capsule (100 mg total) by mouth 3 (three) times a day, Disp: 90 capsule, Rfl: 0    Lidocaine 5 % CREA, Apply 1 application topically 3 (three) times a day Left shoulder, Disp: , Rfl:     multivitamin (THERAGRAN) TABS, Take 1 tablet by mouth daily, Disp: , Rfl:     nabumetone (RELAFEN) 500 mg tablet, Take 1,000 mg by mouth 2 (two) times a day, Disp: , Rfl:     nitrofurantoin (MACRODANTIN) 100 mg capsule, Take 100 mg by mouth daily, Disp: , Rfl:     Omega-3 Fatty Acids (FISH OIL PO), Take by mouth daily, Disp: , Rfl:     tamsulosin (FLOMAX) 0 4 mg, Take 0 8 mg by mouth daily, Disp: , Rfl:     vitamin B-12 (CYANOCOBALAMIN) 250 MCG tablet, Take 250 mcg by mouth daily, Disp: , Rfl:     vitamin E, tocopherol, 400 units capsule, Take 400 Units by mouth daily , Disp: , Rfl:       Active Problems     Patient Active Problem List   Diagnosis    Acquired polyneuropathy    Multiple sclerosis ([facility] [address] )    Ambulatory dysfunction    Acute cystitis without hematuria    Hypertension    Neurogenic bladder    Leg swelling    Hypernatremia    Acute bilateral thoracic back pain    Debility         Past Medical History     Past Medical History:   Diagnosis Date    Back pain 2016    Chronic UTI     Colon polyp     Hypertension     Incontinence     MS (multiple sclerosis) ([address] )     Age 37    Neurogenic bladder     Spasticity          Surgical History     Past Surgical History:   Procedure Laterality Date    BACK SURGERY  2016    Lumbar fusion    COLONOSCOPY      ELBOW SURGERY Right 2008    ORIF    [place] IMPLANT PERIPH/GASTRIC NEUROSTIM/ N/A 11/19/2021    Procedure: leadwire placement and pulse generator  placement of SNM at S3 Bilateral S3  nerve testing, fluroscopy - c-arm guidance and complex programming;  Surgeon: Suresh Burnham MD;  Location: [facility];  Service: Gynecology         Family History     Family History   Problem Relation Age of Onset    Cancer Mother     Heart disease Father     Cancer Sister          Social History     Social History       Radiology

## 2022-03-30 NOTE — TELEPHONE ENCOUNTER
Per Priscilla Copeland, Return for IR referral for SPT placement  first SPT change 6 weeks thereafter with RN  Clerical will watch for IR apt to be scheduled

## 2022-03-31 ENCOUNTER — PREP FOR PROCEDURE (OUTPATIENT)
Dept: INTERVENTIONAL RADIOLOGY/VASCULAR | Facility: CLINIC | Age: 67
End: 2022-03-31

## 2022-03-31 DIAGNOSIS — G35 MULTIPLE SCLEROSIS (HCC): Primary | ICD-10-CM

## 2022-03-31 RX ORDER — SODIUM CHLORIDE 9 MG/ML
75 INJECTION, SOLUTION INTRAVENOUS CONTINUOUS
Status: CANCELLED | OUTPATIENT
Start: 2022-03-31

## 2022-03-31 RX ORDER — LEVOFLOXACIN 5 MG/ML
500 INJECTION, SOLUTION INTRAVENOUS ONCE
Status: CANCELLED | OUTPATIENT
Start: 2022-03-31 | End: 2022-03-31

## 2022-04-11 ENCOUNTER — TELEPHONE (OUTPATIENT)
Dept: RADIOLOGY | Facility: HOSPITAL | Age: 67
End: 2022-04-11

## 2022-04-12 ENCOUNTER — NURSE TRIAGE (OUTPATIENT)
Dept: OTHER | Facility: OTHER | Age: 67
End: 2022-04-12

## 2022-04-12 DIAGNOSIS — R82.90 FOUL SMELLING URINE: Primary | ICD-10-CM

## 2022-04-12 NOTE — TELEPHONE ENCOUNTER
Regarding: Bad Urine Smell  ----- Message from Raquel Sanchez sent at 4/12/2022 11:43 AM EDT -----  " My Wife has a Bronson Catheter, when she was finished with her Antibiotics I noticed her urine had a bad smell   This Morning it was much Worse " Something is Wrong "

## 2022-04-12 NOTE — TELEPHONE ENCOUNTER
Patient's only symptom is foul smelling urine  Bronson catheter is draining well  Orders placed for UA and C&S since patient did not have a C&S done previously when prescribed antibiotics  Please follow up with results  Reason for Disposition   Bad or foul-smelling urine    Answer Assessment - Initial Assessment Questions  1  SYMPTOM: "What's the main symptom you're concerned about?" (e g , frequency, incontinence)      Smelly urine, patient has catheter     2  ONSET: "When did the  Symptoms  start?"      Today     3  PAIN: "Is there any pain?" If Yes, ask: "How bad is it?" (Scale: 1-10; mild, moderate, severe)      No     4  CAUSE: "What do you think is causing the symptoms?"      Possible UTI     5  OTHER SYMPTOMS: "Do you have any other symptoms?" (e g , fever, flank pain, blood in urine, pain with urination)      None     Dr Roya Ricks had given patient antibiotic for "smelly urine", patient finished antibiotic  The urine smell did resolve for a couple days and now it is back again      Protocols used: URINARY SYMPTOMS-ADULT-[place]

## 2022-04-18 ENCOUNTER — TELEPHONE (OUTPATIENT)
Dept: RADIOLOGY | Facility: HOSPITAL | Age: 67
End: 2022-04-18

## 2022-04-18 NOTE — PRE-PROCEDURE INSTRUCTIONS
Pre-procedure Instructions for Interventional Radiology  [address] [place] [address] [address] [phone]    You are scheduled for a/an Suprapubic Catheter Placement   On Tuesday 4/26/22  Your tentative arrival time is AM   Short stay will notify you the day before your procedure with the exact arrival time and the location to arrive  To prepare for your procedure:  1  Please arrange for someone to drive you home after the procedure and stay with you until the next morning if you are instructed to do so  This is typically for patients receiving some type of sedative or anesthetic for the procedure  2  DO NOT EAT OR DRINK ANYTHING after midnight on the evening before your procedure including candy & gum   3  ONLY SIPS OF WATER with your medications are allowed on the morning of your procedure  4  TAKE ALL OF YOUR REGULAR MEDICATIONS THE MORNING OF YOUR PROCEDURE with sips of water! We may call you to stop some of your blood sugar, blood pressure and blood thinning medications depending on the procedure  Please take all of these medications unless we instruct you to stop them  5  If you have an allergy to x-ray dye, please contact Interventional Radiology for an x-ray dye preparation which usually consists of an oral steroid and Benadryl  The day of your procedure:  1  Bring a list of the medications you take at home  2  Bring medications you take for breathing problems (such as inhalers), medications for chest pain, or both  3  Bring a case for your glasses or contacts  4  Bring your insurance card and a form of photo ID   5  Please leave all valuables such as credit cards and jewelry at home  6  Report to the registration desk in the main lobby at the [facility]  Ask to be directed to [facility]    7  While your procedure is being performed, your family may wait in the Radiology Waiting Room on the 1st floor in Radiology  if they need to leave, they may provide a number to be called following the procedure  8  Be prepared to stay overnight just in case  Sometimes procedures will indicate the need for further observation or treatment  9  If you are scheduled for a follow-up visit with the Interventional Radiologist after your procedure, you will be called with a date and time  10  Covid vaccine and booster completed      Special Instructions (Medications to stop taking before your procedure etc ):  ASA LD 4/20/22

## 2022-04-26 ENCOUNTER — HOSPITAL ENCOUNTER (OUTPATIENT)
Dept: RADIOLOGY | Facility: HOSPITAL | Age: 67
Discharge: HOME | End: 2022-04-26
Attending: RADIOLOGY | Admitting: RADIOLOGY
Payer: MEDICARE

## 2022-04-26 ENCOUNTER — ANESTHESIA (OUTPATIENT)
Dept: RADIOLOGY | Facility: HOSPITAL | Age: 67
End: 2022-04-26

## 2022-04-26 ENCOUNTER — ANESTHESIA EVENT (OUTPATIENT)
Dept: RADIOLOGY | Facility: HOSPITAL | Age: 67
End: 2022-04-26

## 2022-04-26 VITALS
HEART RATE: 76 BPM | DIASTOLIC BLOOD PRESSURE: 60 MMHG | SYSTOLIC BLOOD PRESSURE: 111 MMHG | OXYGEN SATURATION: 94 % | RESPIRATION RATE: 17 BRPM | TEMPERATURE: 97.3 F

## 2022-04-26 DIAGNOSIS — G35 MULTIPLE SCLEROSIS (HCC): ICD-10-CM

## 2022-04-26 LAB
BASOPHILS # BLD AUTO: 0.04 THOUSANDS/ΜL (ref 0–0.1)
BASOPHILS NFR BLD AUTO: 1 % (ref 0–1)
EOSINOPHIL # BLD AUTO: 0.04 THOUSAND/ΜL (ref 0–0.61)
EOSINOPHIL NFR BLD AUTO: 1 % (ref 0–6)
ERYTHROCYTE [DISTWIDTH] IN BLOOD BY AUTOMATED COUNT: 14.7 % (ref 11.6–15.1)
HCT VFR BLD AUTO: 40.5 % (ref 34.8–46.1)
HGB BLD-MCNC: 13.4 G/DL (ref 11.5–15.4)
IMM GRANULOCYTES # BLD AUTO: 0.02 THOUSAND/UL (ref 0–0.2)
IMM GRANULOCYTES NFR BLD AUTO: 0 % (ref 0–2)
INR PPP: 0.98 (ref 0.84–1.19)
LYMPHOCYTES # BLD AUTO: 0.64 THOUSANDS/ΜL (ref 0.6–4.47)
LYMPHOCYTES NFR BLD AUTO: 9 % (ref 14–44)
MCH RBC QN AUTO: 32.1 PG (ref 26.8–34.3)
MCHC RBC AUTO-ENTMCNC: 33.1 G/DL (ref 31.4–37.4)
MCV RBC AUTO: 97 FL (ref 82–98)
MONOCYTES # BLD AUTO: 0.41 THOUSAND/ΜL (ref 0.17–1.22)
MONOCYTES NFR BLD AUTO: 6 % (ref 4–12)
NEUTROPHILS # BLD AUTO: 5.71 THOUSANDS/ΜL (ref 1.85–7.62)
NEUTS SEG NFR BLD AUTO: 83 % (ref 43–75)
NRBC BLD AUTO-RTO: 0 /100 WBCS
PLATELET # BLD AUTO: 254 THOUSANDS/UL (ref 149–390)
PMV BLD AUTO: 10.5 FL (ref 8.9–12.7)
PROTHROMBIN TIME: 12.7 SECONDS (ref 11.6–14.5)
RBC # BLD AUTO: 4.18 MILLION/UL (ref 3.81–5.12)
WBC # BLD AUTO: 6.86 THOUSAND/UL (ref 4.31–10.16)

## 2022-04-26 PROCEDURE — 51102 DRAIN BL W/CATH INSERTION: CPT | Performed by: NURSE PRACTITIONER

## 2022-04-26 PROCEDURE — C1769 GUIDE WIRE: HCPCS

## 2022-04-26 PROCEDURE — C1725 CATH, TRANSLUMIN NON-LASER: HCPCS

## 2022-04-26 PROCEDURE — 85025 COMPLETE CBC W/AUTO DIFF WBC: CPT | Performed by: RADIOLOGY

## 2022-04-26 PROCEDURE — 76942 ECHO GUIDE FOR BIOPSY: CPT | Performed by: NURSE PRACTITIONER

## 2022-04-26 PROCEDURE — 85610 PROTHROMBIN TIME: CPT | Performed by: RADIOLOGY

## 2022-04-26 PROCEDURE — 51102 DRAIN BL W/CATH INSERTION: CPT

## 2022-04-26 RX ORDER — PROPOFOL 10 MG/ML
INJECTION, EMULSION INTRAVENOUS AS NEEDED
Status: DISCONTINUED | OUTPATIENT
Start: 2022-04-26 | End: 2022-04-26

## 2022-04-26 RX ORDER — SODIUM CHLORIDE 9 MG/ML
75 INJECTION, SOLUTION INTRAVENOUS CONTINUOUS
Status: DISCONTINUED | OUTPATIENT
Start: 2022-04-26 | End: 2022-04-27 | Stop reason: HOSPADM

## 2022-04-26 RX ORDER — LIDOCAINE HYDROCHLORIDE 10 MG/ML
INJECTION, SOLUTION EPIDURAL; INFILTRATION; INTRACAUDAL; PERINEURAL AS NEEDED
Status: DISCONTINUED | OUTPATIENT
Start: 2022-04-26 | End: 2022-04-26

## 2022-04-26 RX ORDER — LIDOCAINE WITH 8.4% SOD BICARB 0.9%(10ML)
SYRINGE (ML) INJECTION CODE/TRAUMA/SEDATION MEDICATION
Status: COMPLETED | OUTPATIENT
Start: 2022-04-26 | End: 2022-04-26

## 2022-04-26 RX ORDER — FENTANYL CITRATE 50 UG/ML
INJECTION, SOLUTION INTRAMUSCULAR; INTRAVENOUS AS NEEDED
Status: DISCONTINUED | OUTPATIENT
Start: 2022-04-26 | End: 2022-04-26

## 2022-04-26 RX ORDER — ONDANSETRON 2 MG/ML
INJECTION INTRAMUSCULAR; INTRAVENOUS AS NEEDED
Status: DISCONTINUED | OUTPATIENT
Start: 2022-04-26 | End: 2022-04-26

## 2022-04-26 RX ORDER — PROPOFOL 10 MG/ML
INJECTION, EMULSION INTRAVENOUS CONTINUOUS PRN
Status: DISCONTINUED | OUTPATIENT
Start: 2022-04-26 | End: 2022-04-26

## 2022-04-26 RX ORDER — LEVOFLOXACIN 5 MG/ML
500 INJECTION, SOLUTION INTRAVENOUS ONCE
Status: COMPLETED | OUTPATIENT
Start: 2022-04-26 | End: 2022-04-26

## 2022-04-26 RX ADMIN — PROPOFOL 30 MG: 10 INJECTION, EMULSION INTRAVENOUS at 14:22

## 2022-04-26 RX ADMIN — LIDOCAINE HYDROCHLORIDE 50 MG: 10 INJECTION, SOLUTION EPIDURAL; INFILTRATION; INTRACAUDAL; PERINEURAL at 14:22

## 2022-04-26 RX ADMIN — SODIUM CHLORIDE 75 ML/HR: 0.9 INJECTION, SOLUTION INTRAVENOUS at 12:23

## 2022-04-26 RX ADMIN — LEVOFLOXACIN: 500 INJECTION, SOLUTION INTRAVENOUS at 14:17

## 2022-04-26 RX ADMIN — FENTANYL CITRATE 25 MCG: 50 INJECTION INTRAMUSCULAR; INTRAVENOUS at 14:22

## 2022-04-26 RX ADMIN — IOHEXOL 15 ML: 350 INJECTION, SOLUTION INTRAVENOUS at 15:00

## 2022-04-26 RX ADMIN — ONDANSETRON 4 MG: 2 INJECTION INTRAMUSCULAR; INTRAVENOUS at 14:22

## 2022-04-26 RX ADMIN — PHENYLEPHRINE HYDROCHLORIDE 20 MCG/MIN: 10 INJECTION INTRAVENOUS at 14:41

## 2022-04-26 RX ADMIN — Medication 10 ML: at 14:37

## 2022-04-26 RX ADMIN — PROPOFOL 50 MCG/KG/MIN: 10 INJECTION, EMULSION INTRAVENOUS at 14:22

## 2022-04-26 NOTE — ANESTHESIA PREPROCEDURE EVALUATION
Procedure:  IR SUPRAPUBIC CATHETER PLACEMENT    Relevant Problems   CARDIO   (+) Acute bilateral thoracic back pain   (+) Hypertension      MUSCULOSKELETAL   (+) Acute bilateral thoracic back pain      Other   (+) Multiple sclerosis (HCC)   (+) Neurogenic bladder        Physical Exam    Airway    Mallampati score: II  TM Distance: >3 FB  Neck ROM: full     Dental   No notable dental hx     Cardiovascular      Pulmonary      Other Findings        Anesthesia Plan  ASA Score- 3     Anesthesia Type- IV sedation with anesthesia with ASA Monitors  Additional Monitors:   Airway Plan:     Comment: I discussed the risks and benefits of IV sedation anesthesia including the possibility of the need to convert to general anesthesia and the potential risk of awareness  Plan Factors-Exercise comment: Limited by MS, but denies chest pain or shortness of breath when ambulating with walker  Chart reviewed  EKG reviewed  Existing labs reviewed  Patient summary reviewed  Patient is not a current smoker  Patient did not smoke on day of surgery  Induction- intravenous  Postoperative Plan-     Informed Consent- Anesthetic plan and risks discussed with patient

## 2022-04-26 NOTE — ANESTHESIA POSTPROCEDURE EVALUATION
Post-Op Assessment Note    CV Status:  Stable    Pain management: adequate     Mental Status:  Alert and awake   Hydration Status:  Euvolemic   PONV Controlled:  Controlled   Airway Patency:  Patent      Post Op Vitals Reviewed: Yes      Staff: CRNA   Comments: VSS report RN        No complications documented      BP   97/52   Temp     Pulse  66   Resp      SpO2   95 RA

## 2022-04-26 NOTE — SEDATION DOCUMENTATION
Suprapubic catheter placement performed by Dr Corinne Hoar and Reji Harper, [address]  Procedure tolerated well, anesthesia present throughout the case  Bronson catheter removed  IR Procedure Bedrest Start Time is 8010

## 2022-04-26 NOTE — PROGRESS NOTES
H&P reviewed  There have been no interval changes since the time the H&P was written  /58 (BP Location: Left arm)   Pulse 73   Temp (!) 97 3 °F (36 3 °C) (Oral)     Prior imaging was reviewed  78-year-old woman with MS now with chronic Bronson  Recurrent UTI  Presents today for suprapubic Bronson insertion  Procedure discussed and questions answered  Informed written consent was obtained      Toni Anglin MD

## 2022-04-26 NOTE — DISCHARGE INSTRUCTIONS
Suprapubic Cystostomy     WHAT YOU NEED TO KNOW:   Suprapubic cystostomy is surgery to create a stoma (opening) through your abdomen into your bladder  This opening is where a catheter is inserted to drain urine  You may need a cystostomy if your urine flow is blocked  DISCHARGE INSTRUCTIONS:   Resume your normal diet  Small sips of flat soda will help with mild nausea  Follow up with your healthcare provider as directed: You may need to return to have your suprapubic catheter changed or removed  Write down your questions so you remember to ask them during your visits  Empty your urine drainage bag: Empty your urine drainage bag when it is ½ to ? full, or every 8 hours  If you have a smaller leg bag, empty it every 3 to 4 hours  Do the following when you empty your urine drainage bag:  · Hold the urine bag over a toilet or large container  · Remove the drain spout from its sleeve at the bottom of the urine bag  Do not touch the tip of the drain spout  Open the slide valve on the spout  · Let the urine flow out of the urine bag into the toilet or container  Do not let the drainage tube touch anything  · Clean the end of the drain spout with alcohol when the bag is empty  Ask which cleaning solution is best to use  · Close the slide valve and put the drain spout into its sleeve at the bottom of the urine bag  Write down how much urine was in your bag if you were asked to keep a record  Prevent an infection:   · Clean the stoma and skin around it daily  Wash your hands before and after cystostomy care  Put on a new pair of clean medical gloves  · Change your urine bag or clean reusable bags  Ask how often you need to change or clean your urine drainage bag  You may need to change your reusable bag at least once a week  · Keep the bag below your waist  This will prevent urine from flowing back into your bladder and causing an infection or other problems   Also, keep the tube free of kinks so the urine will drain properly  Do not pull on the catheter  This can cause pain and bleeding and may cause the catheter to come out  Contact Interventional Radiology at [phone] [place] PATIENTS: Contact Interventional Radiology at [phone]) [place] PATIENTS: Contact Interventional Radiology at [phone]) if any of the following occur:  · You have a fever or chills  · Persistent nausea or vomiting  · You have severe pain  · You have a burning pain in your stoma  · Your stoma is red, swollen, or draining pus  · You have blood in your urine  · You have questions or concerns about your condition or care    Seek care immediately or call 911 if:   · No urine is draining into the urine bag

## 2022-04-26 NOTE — BRIEF OP NOTE (RAD/CATH)
IR SUPRAPUBIC CATHETER PLACEMENT Procedure Note    PATIENT NAME: Rima Montez  : 1955  MRN: [id number]    Pre-op Diagnosis:   1  Multiple sclerosis (HCC)      Post-op Diagnosis:   1   Multiple sclerosis [facility])        Provider:   Vincent Ramos  Assistants:     No qualified resident was available, Resident is only observing    Estimated Blood Loss: minimal    Findings: successful placement of 18f suprapubic catheter    Specimens: none    Complications:  None immediate    Anesthesia: local and MAC sedation    COLIN Wood     Date: 2022  Time: 3:08 PM

## 2022-05-04 ENCOUNTER — TELEPHONE (OUTPATIENT)
Dept: LAB | Facility: HOSPITAL | Age: 67
End: 2022-05-04

## 2022-05-05 ENCOUNTER — APPOINTMENT (EMERGENCY)
Dept: RADIOLOGY | Facility: HOSPITAL | Age: 67
DRG: 700 | End: 2022-05-05
Payer: MEDICARE

## 2022-05-05 ENCOUNTER — HOSPITAL ENCOUNTER (INPATIENT)
Facility: HOSPITAL | Age: 67
LOS: 4 days | DRG: 700 | End: 2022-05-09
Attending: EMERGENCY MEDICINE | Admitting: INTERNAL MEDICINE
Payer: MEDICARE

## 2022-05-05 DIAGNOSIS — G35 MULTIPLE SCLEROSIS (HCC): ICD-10-CM

## 2022-05-05 DIAGNOSIS — G35 MS (MULTIPLE SCLEROSIS) (HCC): ICD-10-CM

## 2022-05-05 DIAGNOSIS — R53.1 GENERALIZED WEAKNESS: Primary | ICD-10-CM

## 2022-05-05 DIAGNOSIS — N39.0 UTI (URINARY TRACT INFECTION): ICD-10-CM

## 2022-05-05 PROBLEM — T83.510A URINARY TRACT INFECTION ASSOCIATED WITH CYSTOSTOMY CATHETER (HCC): Status: ACTIVE | Noted: 2022-05-05

## 2022-05-05 LAB
2HR DELTA HS TROPONIN: -1 NG/L
ALBUMIN SERPL BCP-MCNC: 4.4 G/DL (ref 3.5–5)
ALP SERPL-CCNC: 156 U/L (ref 34–104)
ALT SERPL W P-5'-P-CCNC: 30 U/L (ref 7–52)
ANION GAP SERPL CALCULATED.3IONS-SCNC: 12 MMOL/L (ref 4–13)
APTT PPP: 30 SECONDS (ref 23–37)
AST SERPL W P-5'-P-CCNC: 25 U/L (ref 13–39)
BACTERIA UR QL AUTO: ABNORMAL /HPF
BASOPHILS # BLD AUTO: 0.03 THOUSANDS/ΜL (ref 0–0.1)
BASOPHILS NFR BLD AUTO: 1 % (ref 0–1)
BILIRUB SERPL-MCNC: 0.48 MG/DL (ref 0.2–1)
BILIRUB UR QL STRIP: NEGATIVE
BUN SERPL-MCNC: 23 MG/DL (ref 5–25)
CALCIUM SERPL-MCNC: 9.4 MG/DL (ref 8.4–10.2)
CARDIAC TROPONIN I PNL SERPL HS: 4 NG/L
CARDIAC TROPONIN I PNL SERPL HS: 5 NG/L
CHLORIDE SERPL-SCNC: 101 MMOL/L (ref 96–108)
CLARITY UR: ABNORMAL
CO2 SERPL-SCNC: 25 MMOL/L (ref 21–32)
COLOR UR: YELLOW
CREAT SERPL-MCNC: 1.14 MG/DL (ref 0.6–1.3)
EOSINOPHIL # BLD AUTO: 0.05 THOUSAND/ΜL (ref 0–0.61)
EOSINOPHIL NFR BLD AUTO: 1 % (ref 0–6)
ERYTHROCYTE [DISTWIDTH] IN BLOOD BY AUTOMATED COUNT: 15.1 % (ref 11.6–15.1)
GFR SERPL CREATININE-BSD FRML MDRD: 50 ML/MIN/1.73SQ M
GLUCOSE SERPL-MCNC: 76 MG/DL (ref 65–140)
GLUCOSE UR STRIP-MCNC: NEGATIVE MG/DL
HCT VFR BLD AUTO: 40.3 % (ref 34.8–46.1)
HGB BLD-MCNC: 13.2 G/DL (ref 11.5–15.4)
HGB UR QL STRIP.AUTO: ABNORMAL
IMM GRANULOCYTES # BLD AUTO: 0.03 THOUSAND/UL (ref 0–0.2)
IMM GRANULOCYTES NFR BLD AUTO: 1 % (ref 0–2)
INR PPP: 0.98 (ref 0.84–1.19)
KETONES UR STRIP-MCNC: ABNORMAL MG/DL
LACTATE SERPL-SCNC: 0.7 MMOL/L (ref 0.5–2)
LEUKOCYTE ESTERASE UR QL STRIP: ABNORMAL
LYMPHOCYTES # BLD AUTO: 0.56 THOUSANDS/ΜL (ref 0.6–4.47)
LYMPHOCYTES NFR BLD AUTO: 9 % (ref 14–44)
MCH RBC QN AUTO: 31.7 PG (ref 26.8–34.3)
MCHC RBC AUTO-ENTMCNC: 32.8 G/DL (ref 31.4–37.4)
MCV RBC AUTO: 97 FL (ref 82–98)
MONOCYTES # BLD AUTO: 0.41 THOUSAND/ΜL (ref 0.17–1.22)
MONOCYTES NFR BLD AUTO: 7 % (ref 4–12)
NEUTROPHILS # BLD AUTO: 5.27 THOUSANDS/ΜL (ref 1.85–7.62)
NEUTS SEG NFR BLD AUTO: 81 % (ref 43–75)
NITRITE UR QL STRIP: POSITIVE
NON-SQ EPI CELLS URNS QL MICRO: ABNORMAL /HPF
NRBC BLD AUTO-RTO: 0 /100 WBCS
OTHER STN SPEC: ABNORMAL
PH UR STRIP.AUTO: 6 [PH]
PLATELET # BLD AUTO: 392 THOUSANDS/UL (ref 149–390)
PMV BLD AUTO: 10 FL (ref 8.9–12.7)
POTASSIUM SERPL-SCNC: 4.9 MMOL/L (ref 3.5–5.3)
PROCALCITONIN SERPL-MCNC: <0.05 NG/ML
PROT SERPL-MCNC: 8 G/DL (ref 6.4–8.4)
PROT UR STRIP-MCNC: ABNORMAL MG/DL
PROTHROMBIN TIME: 13 SECONDS (ref 11.6–14.5)
RBC # BLD AUTO: 4.16 MILLION/UL (ref 3.81–5.12)
RBC #/AREA URNS AUTO: ABNORMAL /HPF
SODIUM SERPL-SCNC: 138 MMOL/L (ref 135–147)
SP GR UR STRIP.AUTO: 1.02 (ref 1–1.03)
UROBILINOGEN UR QL STRIP.AUTO: 0.2 E.U./DL
WBC # BLD AUTO: 6.35 THOUSAND/UL (ref 4.31–10.16)
WBC #/AREA URNS AUTO: ABNORMAL /HPF

## 2022-05-05 PROCEDURE — 83605 ASSAY OF LACTIC ACID: CPT | Performed by: EMERGENCY MEDICINE

## 2022-05-05 PROCEDURE — 96365 THER/PROPH/DIAG IV INF INIT: CPT

## 2022-05-05 PROCEDURE — 96361 HYDRATE IV INFUSION ADD-ON: CPT

## 2022-05-05 PROCEDURE — 99285 EMERGENCY DEPT VISIT HI MDM: CPT | Performed by: EMERGENCY MEDICINE

## 2022-05-05 PROCEDURE — 87106 FUNGI IDENTIFICATION YEAST: CPT | Performed by: EMERGENCY MEDICINE

## 2022-05-05 PROCEDURE — 99285 EMERGENCY DEPT VISIT HI MDM: CPT

## 2022-05-05 PROCEDURE — 84484 ASSAY OF TROPONIN QUANT: CPT | Performed by: EMERGENCY MEDICINE

## 2022-05-05 PROCEDURE — 99223 1ST HOSP IP/OBS HIGH 75: CPT | Performed by: PHYSICIAN ASSISTANT

## 2022-05-05 PROCEDURE — 87077 CULTURE AEROBIC IDENTIFY: CPT | Performed by: EMERGENCY MEDICINE

## 2022-05-05 PROCEDURE — 93005 ELECTROCARDIOGRAM TRACING: CPT

## 2022-05-05 PROCEDURE — 87040 BLOOD CULTURE FOR BACTERIA: CPT | Performed by: EMERGENCY MEDICINE

## 2022-05-05 PROCEDURE — 85610 PROTHROMBIN TIME: CPT | Performed by: EMERGENCY MEDICINE

## 2022-05-05 PROCEDURE — 81001 URINALYSIS AUTO W/SCOPE: CPT | Performed by: EMERGENCY MEDICINE

## 2022-05-05 PROCEDURE — 87086 URINE CULTURE/COLONY COUNT: CPT | Performed by: EMERGENCY MEDICINE

## 2022-05-05 PROCEDURE — 36415 COLL VENOUS BLD VENIPUNCTURE: CPT | Performed by: EMERGENCY MEDICINE

## 2022-05-05 PROCEDURE — 85730 THROMBOPLASTIN TIME PARTIAL: CPT | Performed by: EMERGENCY MEDICINE

## 2022-05-05 PROCEDURE — 71045 X-RAY EXAM CHEST 1 VIEW: CPT

## 2022-05-05 PROCEDURE — 85025 COMPLETE CBC W/AUTO DIFF WBC: CPT | Performed by: EMERGENCY MEDICINE

## 2022-05-05 PROCEDURE — 84145 PROCALCITONIN (PCT): CPT | Performed by: EMERGENCY MEDICINE

## 2022-05-05 PROCEDURE — 80053 COMPREHEN METABOLIC PANEL: CPT | Performed by: EMERGENCY MEDICINE

## 2022-05-05 PROCEDURE — 87186 SC STD MICRODIL/AGAR DIL: CPT | Performed by: EMERGENCY MEDICINE

## 2022-05-05 RX ORDER — ASCORBIC ACID 500 MG
1000 TABLET ORAL DAILY
Status: DISCONTINUED | OUTPATIENT
Start: 2022-05-06 | End: 2022-05-09 | Stop reason: HOSPADM

## 2022-05-05 RX ORDER — ACETAMINOPHEN 325 MG/1
650 TABLET ORAL EVERY 8 HOURS SCHEDULED
Status: DISCONTINUED | OUTPATIENT
Start: 2022-05-05 | End: 2022-05-09 | Stop reason: HOSPADM

## 2022-05-05 RX ORDER — ENOXAPARIN SODIUM 100 MG/ML
40 INJECTION SUBCUTANEOUS DAILY
Status: DISCONTINUED | OUTPATIENT
Start: 2022-05-06 | End: 2022-05-09 | Stop reason: HOSPADM

## 2022-05-05 RX ORDER — TAMSULOSIN HYDROCHLORIDE 0.4 MG/1
0.8 CAPSULE ORAL
Status: DISCONTINUED | OUTPATIENT
Start: 2022-05-06 | End: 2022-05-09 | Stop reason: HOSPADM

## 2022-05-05 RX ORDER — AMLODIPINE BESYLATE 2.5 MG/1
2.5 TABLET ORAL 2 TIMES DAILY
Status: DISCONTINUED | OUTPATIENT
Start: 2022-05-06 | End: 2022-05-09 | Stop reason: HOSPADM

## 2022-05-05 RX ORDER — MELATONIN
2000 DAILY
Status: DISCONTINUED | OUTPATIENT
Start: 2022-05-06 | End: 2022-05-09 | Stop reason: HOSPADM

## 2022-05-05 RX ORDER — GABAPENTIN 100 MG/1
100 CAPSULE ORAL 3 TIMES DAILY
Status: DISCONTINUED | OUTPATIENT
Start: 2022-05-05 | End: 2022-05-09 | Stop reason: HOSPADM

## 2022-05-05 RX ORDER — ONDANSETRON 2 MG/ML
4 INJECTION INTRAMUSCULAR; INTRAVENOUS ONCE
Status: DISCONTINUED | OUTPATIENT
Start: 2022-05-05 | End: 2022-05-05

## 2022-05-05 RX ORDER — SODIUM CHLORIDE 9 MG/ML
3 INJECTION INTRAVENOUS
Status: DISCONTINUED | OUTPATIENT
Start: 2022-05-05 | End: 2022-05-09 | Stop reason: HOSPADM

## 2022-05-05 RX ORDER — ONDANSETRON 2 MG/ML
4 INJECTION INTRAMUSCULAR; INTRAVENOUS EVERY 6 HOURS PRN
Status: DISCONTINUED | OUTPATIENT
Start: 2022-05-05 | End: 2022-05-09 | Stop reason: HOSPADM

## 2022-05-05 RX ORDER — DALFAMPRIDINE 10 MG/1
10 TABLET, FILM COATED, EXTENDED RELEASE ORAL 2 TIMES DAILY
Status: DISCONTINUED | OUTPATIENT
Start: 2022-05-05 | End: 2022-05-09 | Stop reason: HOSPADM

## 2022-05-05 RX ORDER — FUROSEMIDE 40 MG/1
40 TABLET ORAL 2 TIMES WEEKLY
Status: DISCONTINUED | OUTPATIENT
Start: 2022-05-06 | End: 2022-05-09 | Stop reason: HOSPADM

## 2022-05-05 RX ORDER — SODIUM CHLORIDE, SODIUM LACTATE, POTASSIUM CHLORIDE, CALCIUM CHLORIDE 600; 310; 30; 20 MG/100ML; MG/100ML; MG/100ML; MG/100ML
100 INJECTION, SOLUTION INTRAVENOUS CONTINUOUS
Status: DISCONTINUED | OUTPATIENT
Start: 2022-05-05 | End: 2022-05-06

## 2022-05-05 RX ORDER — ASPIRIN 81 MG/1
81 TABLET ORAL DAILY
Status: DISCONTINUED | OUTPATIENT
Start: 2022-05-06 | End: 2022-05-09 | Stop reason: HOSPADM

## 2022-05-05 RX ORDER — BACLOFEN 10 MG/1
20 TABLET ORAL 3 TIMES DAILY
Status: DISCONTINUED | OUTPATIENT
Start: 2022-05-05 | End: 2022-05-09 | Stop reason: HOSPADM

## 2022-05-05 RX ADMIN — SODIUM CHLORIDE, SODIUM LACTATE, POTASSIUM CHLORIDE, AND CALCIUM CHLORIDE 100 ML/HR: .6; .31; .03; .02 INJECTION, SOLUTION INTRAVENOUS at 23:57

## 2022-05-05 RX ADMIN — SODIUM CHLORIDE 1000 ML: 0.9 INJECTION, SOLUTION INTRAVENOUS at 19:03

## 2022-05-05 RX ADMIN — Medication 1000 MG: at 19:40

## 2022-05-05 NOTE — TELEPHONE ENCOUNTER
5/5 - spoke to pt - she has an ammonia level test that cannot be preformed through the mobile lab - so she is going to go to the lab to get all of the blood drawn

## 2022-05-06 PROBLEM — D64.9 ANEMIA: Status: ACTIVE | Noted: 2022-05-06

## 2022-05-06 LAB
4HR DELTA HS TROPONIN: 0 NG/L
ANION GAP SERPL CALCULATED.3IONS-SCNC: 7 MMOL/L (ref 4–13)
BUN SERPL-MCNC: 18 MG/DL (ref 5–25)
CALCIUM SERPL-MCNC: 8.1 MG/DL (ref 8.4–10.2)
CARDIAC TROPONIN I PNL SERPL HS: 5 NG/L
CHLORIDE SERPL-SCNC: 108 MMOL/L (ref 96–108)
CO2 SERPL-SCNC: 25 MMOL/L (ref 21–32)
CREAT SERPL-MCNC: 0.92 MG/DL (ref 0.6–1.3)
ERYTHROCYTE [DISTWIDTH] IN BLOOD BY AUTOMATED COUNT: 15.1 % (ref 11.6–15.1)
GFR SERPL CREATININE-BSD FRML MDRD: 65 ML/MIN/1.73SQ M
GLUCOSE SERPL-MCNC: 55 MG/DL (ref 65–140)
HCT VFR BLD AUTO: 31.9 % (ref 34.8–46.1)
HGB BLD-MCNC: 10.3 G/DL (ref 11.5–15.4)
MCH RBC QN AUTO: 31.9 PG (ref 26.8–34.3)
MCHC RBC AUTO-ENTMCNC: 32.3 G/DL (ref 31.4–37.4)
MCV RBC AUTO: 99 FL (ref 82–98)
PLATELET # BLD AUTO: 315 THOUSANDS/UL (ref 149–390)
PMV BLD AUTO: 10.2 FL (ref 8.9–12.7)
POTASSIUM SERPL-SCNC: 4.3 MMOL/L (ref 3.5–5.3)
RBC # BLD AUTO: 3.23 MILLION/UL (ref 3.81–5.12)
SODIUM SERPL-SCNC: 140 MMOL/L (ref 135–147)
WBC # BLD AUTO: 3.97 THOUSAND/UL (ref 4.31–10.16)

## 2022-05-06 PROCEDURE — 84484 ASSAY OF TROPONIN QUANT: CPT | Performed by: PHYSICIAN ASSISTANT

## 2022-05-06 PROCEDURE — 99232 SBSQ HOSP IP/OBS MODERATE 35: CPT | Performed by: GENERAL PRACTICE

## 2022-05-06 PROCEDURE — 97530 THERAPEUTIC ACTIVITIES: CPT

## 2022-05-06 PROCEDURE — 85027 COMPLETE CBC AUTOMATED: CPT | Performed by: PHYSICIAN ASSISTANT

## 2022-05-06 PROCEDURE — 97163 PT EVAL HIGH COMPLEX 45 MIN: CPT

## 2022-05-06 PROCEDURE — 97535 SELF CARE MNGMENT TRAINING: CPT

## 2022-05-06 PROCEDURE — 97167 OT EVAL HIGH COMPLEX 60 MIN: CPT

## 2022-05-06 PROCEDURE — 80048 BASIC METABOLIC PNL TOTAL CA: CPT | Performed by: PHYSICIAN ASSISTANT

## 2022-05-06 RX ADMIN — AMLODIPINE BESYLATE 2.5 MG: 2.5 TABLET ORAL at 08:49

## 2022-05-06 RX ADMIN — ACETAMINOPHEN 650 MG: 325 TABLET, FILM COATED ORAL at 05:37

## 2022-05-06 RX ADMIN — BACLOFEN 20 MG: 10 TABLET ORAL at 00:30

## 2022-05-06 RX ADMIN — BACLOFEN 20 MG: 10 TABLET ORAL at 21:22

## 2022-05-06 RX ADMIN — CEFEPIME HYDROCHLORIDE 1000 MG: 2 INJECTION, POWDER, FOR SOLUTION INTRAVENOUS at 11:51

## 2022-05-06 RX ADMIN — ACETAMINOPHEN 650 MG: 325 TABLET, FILM COATED ORAL at 21:22

## 2022-05-06 RX ADMIN — ACETAMINOPHEN 650 MG: 325 TABLET, FILM COATED ORAL at 00:10

## 2022-05-06 RX ADMIN — Medication 2000 UNITS: at 08:49

## 2022-05-06 RX ADMIN — GABAPENTIN 100 MG: 100 CAPSULE ORAL at 21:23

## 2022-05-06 RX ADMIN — TAMSULOSIN HYDROCHLORIDE 0.8 MG: 0.4 CAPSULE ORAL at 17:12

## 2022-05-06 RX ADMIN — CEFEPIME HYDROCHLORIDE 1000 MG: 2 INJECTION, POWDER, FOR SOLUTION INTRAVENOUS at 23:30

## 2022-05-06 RX ADMIN — GABAPENTIN 100 MG: 100 CAPSULE ORAL at 08:49

## 2022-05-06 RX ADMIN — GABAPENTIN 100 MG: 100 CAPSULE ORAL at 17:12

## 2022-05-06 RX ADMIN — OXYCODONE HYDROCHLORIDE AND ACETAMINOPHEN 1000 MG: 500 TABLET ORAL at 08:49

## 2022-05-06 RX ADMIN — Medication 400 UNITS: at 08:50

## 2022-05-06 RX ADMIN — ENOXAPARIN SODIUM 40 MG: 40 INJECTION SUBCUTANEOUS at 08:50

## 2022-05-06 RX ADMIN — ASPIRIN 81 MG: 81 TABLET, COATED ORAL at 08:50

## 2022-05-06 RX ADMIN — BACLOFEN 20 MG: 10 TABLET ORAL at 08:50

## 2022-05-06 RX ADMIN — GABAPENTIN 100 MG: 100 CAPSULE ORAL at 00:10

## 2022-05-06 RX ADMIN — BACLOFEN 20 MG: 10 TABLET ORAL at 17:12

## 2022-05-06 RX ADMIN — AMLODIPINE BESYLATE 2.5 MG: 2.5 TABLET ORAL at 17:12

## 2022-05-06 RX ADMIN — CEFEPIME HYDROCHLORIDE 1000 MG: 2 INJECTION, POWDER, FOR SOLUTION INTRAVENOUS at 00:47

## 2022-05-06 RX ADMIN — FUROSEMIDE 40 MG: 40 TABLET ORAL at 08:49

## 2022-05-06 RX ADMIN — CYANOCOBALAMIN TAB 500 MCG 250 MCG: 500 TAB at 08:49

## 2022-05-06 NOTE — ASSESSMENT & PLAN NOTE
· Patient w/ SPT placement on 4/26/22 after having chronic indwelling nixon for neurogenic bladder  · tx w/ 7-day course of bactrim  · Continued w/ dx, new rigors  · UA still grossly abnormal  · Not meeting sepsis criteria, can stop IVF  · Received Rocephin in the ED, broadened abx to Cefepime given hx of MDRO   · Follow urine culture and tailor abx appropriately   · Follow up blood culture give sx of rigors

## 2022-05-06 NOTE — PLAN OF CARE
Problem: PHYSICAL THERAPY ADULT  Goal: Performs mobility at highest level of function for planned discharge setting  See evaluation for individualized goals  Description: Treatment/Interventions: ADL retraining,Functional transfer training,LE strengthening/ROM,Elevations,Therapeutic exercise,Endurance training,Cognitive reorientation,Patient/family training,Equipment eval/education,Bed mobility,Gait training,Compensatory technique education,Continued evaluation,Spoke to nursing,OT,Spoke to case management          See flowsheet documentation for full assessment, interventions and recommendations  Note: Prognosis: Fair  Problem List: Decreased range of motion,Decreased endurance,Impaired balance,Decreased mobility,Decreased coordination,Decreased cognition,Impaired judgement,Decreased safety awareness,Obesity,Decreased skin integrity  Assessment: Pt is a 73 y o  female who presented to ED 5/29/22 with c/o weakness, fatigue, nausea, vomiting  Dx:  UTI with cystostomy catheter (placed 4/26/22), neurogenic bladder  Comorbidities affecting pt's physical performance at time of assessment include: MS, neurogenic bladder, chronic nixon, UTI  Personal factors affecting pt at time of IE include: MS, stiffness  PLOF and home set up listed above;  concerns for return home include but are not limited to physical assist, home alone at times while  works  Upon evaluation: Pt requires mod Ax 2 for bed mobility, max A x 1 for stand pivot transfer via bear hug technique  Full objective findings from PT assessment regarding body systems outlined above  Current limitations include impaired balance, decreased endurance/activity tolerance, gait deviations, fall risk and coordination  Pt's clinical presentation is currently unstable/unpredictable seen in pt's presentation of continuous monitoring in hospital, fall risk, and MS stiffness/fluctuating performance     Pt would benefit from continued PT while in hospital and follow up with inpt rehab at D/C to increase strength, balance, endurance, independence with funcitonal mobility to return to PLOF, maximize independence, decrease caregiver burden and improve quality of life  PT/OT co-evaluation for pt's current medical status, mobility/balance deficits, and cognitive deficits requiring 2 skilled therapists  The patient's AM-PAC Basic Mobility Inpatient Short Form Raw Score is 10  A Raw score of less than or equal to 17 suggests the patient may benefit from discharge to post-acute rehabilitation services  Please also refer to the recommendation of the Physical Therapist for safe discharge planning  Barriers to Discharge: Decreased caregiver support  Barriers to Discharge Comments:  works     PT Discharge Recommendation: Post acute rehabilitation services          See flowsheet documentation for full assessment

## 2022-05-06 NOTE — OCCUPATIONAL THERAPY NOTE
Occupational Therapy Evaluation ([id number]) & Treat ([id number])     Patient Name: Tia Yin  SQHPI'R Date: 5/6/2022  Problem List  Principal Problem:    Urinary tract infection associated with cystostomy catheter [facility])  Active Problems:    [facility] ([facility] )    Neurogenic bladder    Past Medical History  Past Medical History:   Diagnosis Date    Back pain 2016    Chronic UTI     Colon polyp     Hypertension     Incontinence     MS (multiple sclerosis) ([facility] )     Age 37    Neurogenic bladder     Spasticity      Past Surgical History  Past Surgical History:   Procedure Laterality Date    BACK SURGERY  2016    Lumbar fusion    COLONOSCOPY      ELBOW SURGERY Right 2008    ORIF    IR SUPRAPUBIC CATHETER PLACEMENT  4/26/2022    NM IMPLANT PERIPH/GASTRIC NEUROSTIM/ N/A 11/19/2021    Procedure: leadwire placement and pulse generator  placement of SNM at S3 Bilateral S3  nerve testing, fluroscopy - c-arm guidance and complex programming;  Surgeon: Lev Mccloud MD;  Location: [facility];  Service: Gynecology             05/06/22 0938   OT Last Visit   OT Visit Date 05/06/22   Note Type   Note type Evaluation   Restrictions/Precautions   Weight Bearing Precautions Per Order No   Other Precautions Contact/isolation;Cognitive; Chair Alarm; Fall Risk;Multiple lines   Pain Assessment   Pain Assessment Tool 0-10   Pain Score No Pain   Home Living   Type of [address] [place] [address] One level;Ramped entrance;Performs ADLs on one level   Bathroom Shower/Tub Walk-in shower   Bathroom Toilet Raised   Bathroom Equipment Grab bars in shower;Grab bars around toilet; Shower chair   Home Equipment Walker, Leg   (Rollator at baseline)   Additional Comments Pt sleeps in an adjustable/electric bed   Prior Function   Level of [place] Needs assistance with ADLs and functional mobility; Needs assistance with IADLs   Lives With Spouse   Receives Help From Family   ADL Assistance Needs assistance   IADLs Needs assistance   Falls in the last 6 months 1 to 4   Vocational On disability   Comments  assists with stand pivot transfers   with "Bear hug" pt to pivot her to recliner   assists with some bathing and LB dressing   Lifestyle   Autonomy Pt requires A for ADL/IADLs at baseline  Pt feel she could get stronger to perform ADLs with more independence   Reciprocal Relationships  lives wih & assists pt   Service to Others Pt is retired on disability   Intrinsic Gratification Pt enjoys watching "This is us" and "A million little things"   Psychosocial   Psychosocial (WDL) WDL   Subjective   Subjective "Moving in the morning is hard for me"   ADL   Eating Assistance 7  [address] 3  Moderate Assistance   [address] over head;Pull around back   [address] 3  Moderate Assistance   LB Dressing Deficit Don/doff L sock; Don/doff R sock; Increased time to complete;Supervision/safety   Toileting Assistance  3  Moderate Assistance   Toileting Deficit Perineal hygiene;Clothing management down;Clothing management up   Bed Mobility   Rolling L 3  Moderate assistance   Additional items Assist x 2;LE management;Verbal cues; Increased time required;HOB elevated   Supine to Sit 3  Moderate assistance   Additional items Assist x 2;LE management;Verbal cues; Increased time required;HOB elevated   Additional Comments Ended session OOB to recliner   Transfers   Sit to Stand 2  Maximal assistance   Additional items Assist x 1; Increased time required;Verbal cues   Stand to Sit 2  Maximal assistance   Additional items Assist x 1; Increased time required;Verbal cues   Stand pivot 2  Maximal assistance   Additional items Assist x 1; Increased time required;Verbal cues   Balance   Static Sitting Fair   Dynamic Sitting Fair -   Static Standing Poor Dynamic Standing Poor   Activity Tolerance   Activity Tolerance Patient tolerated treatment well   Medical Staff Made Aware PT Una   Nurse Made Aware JOSÉ Nazario   RUE Assessment   RUE Assessment X  (Shoulder flexion 0-80*)   LUE Assessment   LUE Assessment X  (Shoulder flexion 0-80*)   Vision-Basic Assessment   Current Vision Does not wear glasses   Vision - Complex Assessment   Ocular Range of Motion WFL   Head Position WDL   Convergence   (No deficits noted)   Additional Comments B/L eyes not to lag in accommodation  Pt report this is baseline and it does not affect her vision  Cognition   Overall Cognitive Status WFL   Arousal/Participation Alert   Attention Within functional limits   Orientation Level Oriented X4   Memory Decreased recall of recent events   Following Commands Follows one step commands without difficulty   Assessment   Limitation Decreased ADL status; Decreased UE ROM; Decreased UE strength;Decreased endurance;Decreased self-care trans;Decreased high-level ADLs   Prognosis Good   Assessment Pt is a 73 y o  female seen for OT evaluation at [facility], admitted 5/5/2022 w/ Urinary tract infection associated with cystostomy catheter ([facility] )  OT completed extensive review of pt's medical and social history  Comorbidities affecting pt's functional performance at time of assessment include: MS, neurogenic bladder, ambulatory dysfunction, debility, HTN, acquired neuropathy  Personal factors affecting pt at time of IE include:difficulty performing ADLS and difficulty performing IADLS   Pt with active OT orders and OOB/ambulate orders  Prior to admission, pt was living in a [facility] with a ramped entrance with her   Pt was A w/  ADLS and IADLS, (-) drove, & required use of a rollator PTA   Upon evaluation: Pt requires Mod Ax2 for bed mobility, Max Ax1 for functional mobility/transfers, Min A for UB ADLs and Mod A for LB ADLS 2* the following deficits impacting occupational performance: weakness, decreased ROM, decreased strength, decreased balance and decreased tolerance  Pt to benefit from continued skilled OT tx while in the hospital to address deficits as defined above and maximize level of functional independence w ADL's and functional mobility  Occupational Performance areas to address include: grooming, bathing/shower, toilet hygiene, dressing, health maintenance, functional mobility, community mobility and clothing management  Based on findings, pt is of high complexity  The patient's raw score on the AM-PAC Daily Activity inpatient short form is 16, standardized score is 35 96, less than 39 4  Patients at this level are likely to benefit from DC to post-acute rehabilitation services, which DOES coincide with CURRENT above OT recommendations  However please refer to therapist recommendation for discharge planning given other factors that may influence destination  At this time, OT recommendations at time of discharge are STR  Goals   Patient Goals Pt wishes to get stronger to perform ADL's more independently   Plan   Treatment Interventions ADL retraining;Functional transfer training;UE strengthening/ROM; Patient/family training;Equipment evaluation/education; Compensatory technique education;Continued evaluation; Energy conservation; Endurance training   Goal Expiration Date 05/16/22   OT Treatment Day 0   OT Frequency 3-5x/wk   Additional Treatment Session   Start Time 0930   End Time [id number]   Treatment Assessment S/P stand pivot to recliner, educated pt on OT D/C recommendations and role of rehab  Pt then completed LB dressing to don/doff L sock with Mod A  Pt required Mod A to bring & maintain LLE in figure 4 position and to don sock (pt able to doff)  Pt required Min A to sit forward to place pillow behind back  Pt left seated in recliner with chair alarm sounded, tray table & call bell in reach, needs met, RN informed  Please refer to OT recommendations for D/C planning   Recommendations at this time are D/C to  STR  Recommendation   OT Discharge Recommendation Post acute rehabilitation services   AM-PAC Daily Activity Inpatient   Lower Body Dressing 2   Bathing 2   Toileting 2   Upper Body Dressing 3   Grooming 3   Eating 4   Daily Activity Raw Score 16   Daily Activity Standardized Score (Calc for Raw Score >=11) 35 96   AM-PAC Applied Cognition Inpatient   Following a Speech/Presentation 4   Understanding Ordinary Conversation 4   Taking Medications 4   Remembering Where Things Are Placed or Put Away 4   Remembering List of 4-5 Errands 3   Taking Care of Complicated Tasks 3   Applied Cognition Raw Score 22   Applied Cognition Standardized Score 47 83     Pt will achieve the following goals within 10 days  *Pt will complete UB bathing and dressing with Mod I     *Pt will complete LB bathing and dressing with Min A and DME PRN  * Pt will complete toileting w/ Min A w/ G hygiene/thoroughness using DME PRN    *Pt will complete bed mobility with Min Ax1, with bed flat and no side rail to prep for purposeful tasks    *Pt will perform functional transfers with on/off all surfaces with Mod Ax1 using DME as needed w/ G balance/safety  *Pt will participate in UE therapeutic exercise in order to maximize strength for ADL transfers  *Pt will identify 3-5 fall risks during ADL routine to ensure home safety upon discharge      JAIMEE Sharp/L

## 2022-05-06 NOTE — ASSESSMENT & PLAN NOTE
Phone will not ring though to pateint.  (the reason hx of smoking is not working is because the patient advised them he has Never smoked) · Significant drop in hemoglobin of nearly 3 g overnight however this is more likely to be hemodilution with IV fluids as she is not having any bleeding  Will recheck CBC in a m

## 2022-05-06 NOTE — ED PROVIDER NOTES
History  Chief Complaint   Patient presents with    Weakness - Generalized     Generalized weakness and fatigue, progressively worsening x 1 week  Pt also reports nausea, decreased appetite, chills  On abx for bladder infection  Had a suprapubic catheter placed 1 week ago  Was sent to ED by PCP  72-year-old female with a history of multiple sclerosis and neurogenic bladder presents emergency department for evaluation of generalized weakness and fatigue  Patient's symptoms have been progressing over the past 1 week  She has had poor appetite and chills  Patient had a suprapubic catheter placed approximately 1 week ago  Patient has a history of neurogenic bladder requiring multiple daily straight caths  Patient was placed on Bactrim for suspected UTI as she noted the urine to be cloudy and foul smelling     Patient's  states that she has a history of chronic recurrent UTIs  Patient initially had some improvement in the appearance of her urine however cloudiness and foul smell returned  Patient has been having bilateral back pain  She denies fever but has felt chills  She has been nauseated with poor appetite    Patient's  notes that she has become extremely weak and difficult to care for at home      History provided by:  Patient, medical records and spouse   used: No    Fatigue  Severity:  Severe  Onset quality:  Gradual  Timing:  Constant  Progression:  Worsening  Chronicity:  New  Context: recent infection and urinary tract infection    Context: not alcohol use and not increased activity    Relieved by:  Nothing  Worsened by:  Nothing  Ineffective treatments:  Medication  Associated symptoms: foul-smelling urine, lethargy and nausea    Associated symptoms: no abdominal pain, no anorexia, no arthralgias, no cough, no diarrhea, no seizures, no sensory-motor deficit, no shortness of breath and no stroke symptoms    Risk factors: neurologic disease    Risk factors: no coronary artery disease, no diabetes and no new medications        Prior to Admission Medications   Prescriptions Last Dose Informant Patient Reported? Taking?    AMLODIPINE BESYLATE PO  Self Yes No   Sig: Take 2 5 mg by mouth 2 (two) times a day   Ascorbic Acid (vitamin C) 1000 MG tablet  Self Yes No   Sig: Take 1,000 mg by mouth daily    Dalfampridine ER 10 MG TB12  Self Yes No   Sig: Take by mouth 2 (two) times a day   Lidocaine 5 % CREA  Self Yes No   Sig: Apply 1 application topically 3 (three) times a day Left shoulder   Omega-3 Fatty Acids (FISH OIL PO)  Self Yes No   Sig: Take by mouth daily   acetaminophen (TYLENOL) 325 mg tablet  Self Yes No   Sig: Take 650 mg by mouth 3 (three) times a day   aspirin (ECOTRIN LOW STRENGTH) 81 mg EC tablet  Self Yes No   Sig: Take 81 mg by mouth daily   baclofen 20 mg tablet  Self Yes No   Sig: Take 20 mg by mouth 3 (three) times a day     cholecalciferol (VITAMIN D3) 1,000 units tablet  Self Yes No   Sig: Take 2,000 Units by mouth daily     doxycycline hyclate (VIBRA-TABS) 100 mg tablet  Self Yes No   Sig: Take by mouth daily     furosemide (LASIX) 40 mg tablet  Self Yes No   Si mg Takes Monday and Friday    gabapentin (NEURONTIN) 100 mg capsule  Self No No   Sig: Take 1 capsule (100 mg total) by mouth 3 (three) times a day   multivitamin (THERAGRAN) TABS  Self Yes No   Sig: Take 1 tablet by mouth daily   nabumetone (RELAFEN) 500 mg tablet  Self Yes No   Sig: Take 1,000 mg by mouth 2 (two) times a day   nitrofurantoin (MACRODANTIN) 100 mg capsule  Self Yes No   Sig: Take 100 mg by mouth daily   tamsulosin (FLOMAX) 0 4 mg  Self Yes No   Sig: Take 0 8 mg by mouth daily   vitamin B-12 (CYANOCOBALAMIN) 250 MCG tablet  Self Yes No   Sig: Take 250 mcg by mouth daily   vitamin E, tocopherol, 400 units capsule  Self Yes No   Sig: Take 400 Units by mouth daily       Facility-Administered Medications: None       Past Medical History:   Diagnosis Date    Back pain     Chronic UTI     Colon polyp     Hypertension     Incontinence     MS (multiple sclerosis) ([facility] [address] )     Age 37    Neurogenic bladder     Spasticity        Past Surgical History:   Procedure Laterality Date    BACK SURGERY  2016    Lumbar fusion    COLONOSCOPY      ELBOW SURGERY Right 2008    ORIF    IR SUPRAPUBIC CATHETER PLACEMENT  4/26/2022    SC IMPLANT PERIPH/GASTRIC NEUROSTIM/ N/A 11/19/2021    Procedure: leadwire placement and pulse generator  placement of SNM at S3 Bilateral S3  nerve testing, fluroscopy - c-arm guidance and complex programming;  Surgeon: Armaan Turner MD;  Location: [facility];  Service: Gynecology       Family History   Problem Relation Age of Onset    Cancer Mother     Heart disease Father     Cancer Sister      I have reviewed and agree with the history as documented  E-Cigarette/Vaping    E-Cigarette Use Never User      E-Cigarette/Vaping Substances     Social History     Tobacco Use    Smoking status: Never Smoker    Smokeless tobacco: Never Used   Vaping Use    Vaping Use: Never used   Substance Use Topics    Alcohol use: Not Currently    Drug use: Yes     Types: Marijuana     Comment: medical       Review of Systems   Constitutional: Positive for appetite change and fatigue  Respiratory: Negative for cough and shortness of breath  Gastrointestinal: Positive for nausea  Negative for abdominal pain, anorexia and diarrhea  Genitourinary: Negative for flank pain  Difficulty urinating: Recent suprapubic tube placement  Musculoskeletal: Negative for arthralgias  Neurological: Positive for weakness  Negative for seizures  All other systems reviewed and are negative  Physical Exam  Physical Exam  Vitals and nursing note reviewed  Constitutional:       Appearance: She is well-developed  She is ill-appearing  HENT:      Head: Normocephalic        Right Ear: External ear normal       Left Ear: External ear normal       Nose: Nose normal  Mouth/Throat:      Mouth: Mucous membranes are dry  Pharynx: No oropharyngeal exudate  Eyes:      General: No scleral icterus  Conjunctiva/sclera: Conjunctivae normal       Pupils: Pupils are equal, round, and reactive to light  Cardiovascular:      Rate and Rhythm: Normal rate and regular rhythm  Heart sounds: Normal heart sounds  No murmur heard  Pulmonary:      Effort: Pulmonary effort is normal  No respiratory distress  Breath sounds: Normal breath sounds  No wheezing or rhonchi  Abdominal:      General: Bowel sounds are normal  There is no distension  Palpations: Abdomen is soft  Tenderness: There is no abdominal tenderness  There is no guarding or rebound  Hernia: No hernia is present  Musculoskeletal:         General: No tenderness or deformity  Normal range of motion  Cervical back: Normal range of motion and neck supple  Lymphadenopathy:      Cervical: No cervical adenopathy  Skin:     General: Skin is warm and dry  Findings: No rash  Neurological:      General: No focal deficit present  Mental Status: She is alert and oriented to person, place, and time  Cranial Nerves: No cranial nerve deficit  Sensory: No sensory deficit  Motor: No abnormal muscle tone  Coordination: Coordination normal       Deep Tendon Reflexes: Reflexes are normal and symmetric  Psychiatric:         Mood and Affect: Mood normal          Behavior: Behavior normal          Thought Content:  Thought content normal          Judgment: Judgment normal          Vital Signs  ED Triage Vitals   Temperature Pulse Respirations Blood Pressure SpO2   05/05/22 1738 05/05/22 1738 05/05/22 1738 05/05/22 1738 05/05/22 1738   98 3 °F (36 8 °C) 91 18 128/64 94 %      Temp Source Heart Rate Source Patient Position - Orthostatic VS BP Location FiO2 (%)   05/05/22 1738 05/05/22 1738 05/06/22 2255 05/06/22 2255 --   Oral Monitor Lying Left arm       Pain Score 05/05/22 1738       No Pain           Vitals:    05/08/22 0721 05/08/22 1532 05/08/22 2137 05/09/22 0724   BP: 121/67 136/68 135/72 124/64   Pulse:  82 71    Patient Position - Orthostatic VS:             Visual Acuity  Visual Acuity      Most Recent Value   L Pupil Size (mm) 2   R Pupil Size (mm) 2   L Pupil Shape Round   R Pupil Shape Round          ED Medications  Medications   sodium chloride 0 9 % bolus 1,000 mL (0 mL Intravenous Stopped 5/5/22 1940)   ceftriaxone (ROCEPHIN) 1 g/50 mL in dextrose IVPB (0 mg Intravenous Stopped 5/5/22 2021)       Diagnostic Studies  Results Reviewed     Procedure Component Value Units Date/Time    Blood culture #1 [[id number]] Collected: 05/05/22 1926    Lab Status: Preliminary result Specimen: Blood Updated: 05/09/22 0002     Blood Culture No Growth at 72 hrs  Blood culture #2 [[id number]] Collected: 05/05/22 1849    Lab Status: Preliminary result Specimen: Blood from Arm, Left Updated: 05/09/22 0002     Blood Culture No Growth at 72 hrs      Urine culture [[id number]]  (Abnormal)  (Susceptibility) Collected: 05/05/22 1926    Lab Status: Final result Specimen: Urine Updated: 05/08/22 1529     Urine Culture >100,000 cfu/ml Klebsiella pneumoniae      >100,000 cfu/ml Candida parapsilosis    Susceptibility     Klebsiella pneumoniae (2)     Antibiotic Interpretation Microscan   Method Status    ZID Performed  Yes  DIANELYS Final    Amoxicillin + Clavulanate Susceptible <=8/4 ug/ml DIANELYS Final    Ampicillin ($$) Resistant >16 00 ug/ml DIANELYS Final    Ampicillin + Sulbactam ($) Resistant >16/8 ug/ml DIANELYS Final    Aztreonam ($$$)  Susceptible <=4 ug/ml DIANELYS Final    Cefazolin ($) Susceptible 4 00 ug/ml DIANELYS Final    Ciprofloxacin ($)  Resistant >2 00 ug/ml DIANELYS Final    Ertapenem ($$$) Susceptible <=0 5 ug/ml DIANELYS Final    Gentamicin ($$) Susceptible <=2 ug/ml DIANELYS Final    Levofloxacin ($) Resistant >4 00 ug/ml DIANELYS Final    Minocycline Resistant >8 ug/ml DIANELYS Final    Nitrofurantoin Resistant >64 ug/ml DIANELYS Final    Piperacillin + Tazobactam ($$$) Susceptible <=8 ug/ml DIANELYS Final    Tetracycline Resistant >8 ug/ml DIANELYS Final    Tobramycin ($) Susceptible <=2 ug/ml DIANELYS Final    Trimethoprim + Sulfamethoxazole ($$$) Resistant >2/38 ug/ml DIANELYS Final          Candida parapsilosis (4)     Antibiotic Interpretation Microscan   Method Status    ZID Performed  Yes  DIANELYS Final            Condensed View                   HS Troponin I 4hr [[id number]]  (Normal) Collected: 05/06/22 0600    Lab Status: Final result Specimen: Blood from Hand, Left Updated: 05/06/22 0718     hs TnI 4hr 5 ng/L      Delta 4hr hsTnI 0 ng/L     HS Troponin I 2hr [[id number]]  (Normal) Collected: 05/05/22 2101    Lab Status: Final result Specimen: Blood Updated: 05/05/22 2153     hs TnI 2hr 4 ng/L      Delta 2hr hsTnI -1 ng/L     Urine Microscopic [[id number]]  (Abnormal) Collected: 05/05/22 1926    Lab Status: Final result Specimen: Urine Updated: 05/05/22 2000     RBC, UA 20-30 /hpf      WBC, UA Innumerable /hpf      Epithelial Cells Occasional /hpf      Bacteria, UA Moderate /hpf      OTHER OBSERVATIONS Yeast Cells Present    Procalcitonin [[id number]]  (Normal) Collected: 05/05/22 1849    Lab Status: Final result Specimen: Blood from Arm, Left Updated: 05/05/22 1952     Procalcitonin <0 05 ng/ml     HS Troponin 0hr (reflex protocol) [[id number]]  (Normal) Collected: 05/05/22 1849    Lab Status: Final result Specimen: Blood from Arm, Left Updated: 05/05/22 1941     hs TnI 0hr 5 ng/L     Comprehensive metabolic panel [[id number]]  (Abnormal) Collected: 05/05/22 1849    Lab Status: Final result Specimen: Blood from Arm, Left Updated: 05/05/22 1936     Sodium 138 mmol/L      Potassium 4 9 mmol/L      Chloride 101 mmol/L      CO2 25 mmol/L      ANION GAP 12 mmol/L      BUN 23 mg/dL      Creatinine 1 14 mg/dL      Glucose 76 mg/dL      Calcium 9 4 mg/dL      AST 25 U/L      ALT 30 U/L      Alkaline Phosphatase 156 U/L      Total Protein 8 0 g/dL Albumin 4 4 g/dL      Total Bilirubin 0 48 mg/dL      eGFR 50 ml/min/1 73sq m     Narrative:      [place] guidelines for Chronic Kidney Disease (CKD):     Stage 1 with normal or high GFR (GFR > 90 mL/min/1 73 square meters)    Stage 2 Mild CKD (GFR = 60-89 mL/min/1 73 square meters)    Stage 3A Moderate CKD (GFR = 45-59 mL/min/1 73 square meters)    Stage 3B Moderate CKD (GFR = 30-44 mL/min/1 73 square meters)    Stage 4 Severe CKD (GFR = 15-29 mL/min/1 73 square meters)    Stage 5 End Stage CKD (GFR <15 mL/min/1 73 square meters)  Note: GFR calculation is accurate only with a steady state creatinine    Lactic Acid [[id number]]  (Normal) Collected: 05/05/22 1849    Lab Status: Final result Specimen: Blood from Arm, Left Updated: 05/05/22 1934     LACTIC ACID 0 7 mmol/L     Narrative:      Result may be elevated if tourniquet was used during collection      UA w Reflex to Microscopic w Reflex to Culture [[id number]]  (Abnormal) Collected: 05/05/22 1926    Lab Status: Final result Specimen: Urine Updated: 05/05/22 1932     Color, UA Yellow     Clarity, UA Slightly Cloudy     Specific [place], UA 1 020     pH, UA 6 0     Leukocytes, UA Large     Nitrite, UA Positive     Protein, UA Trace mg/dl      Glucose, UA Negative mg/dl      Ketones, UA 40 (2+) mg/dl      Urobilinogen, UA 0 2 E U /dl      Bilirubin, UA Negative     Blood, UA Large    Protime-INR [[id number]]  (Normal) Collected: 05/05/22 1849    Lab Status: Final result Specimen: Blood from Arm, Left Updated: 05/05/22 1930     Protime 13 0 seconds      INR 0 98    APTT [[id number]]  (Normal) Collected: 05/05/22 1849    Lab Status: Final result Specimen: Blood from Arm, Left Updated: 05/05/22 1930     PTT 30 seconds     CBC and differential [[id number]]  (Abnormal) Collected: 05/05/22 1849    Lab Status: Final result Specimen: Blood from Arm, Left Updated: 05/05/22 1915     WBC 6 35 Thousand/uL      RBC 4 16 Million/uL      Hemoglobin 13 2 g/dL      Hematocrit 40 3 %      MCV 97 fL      MCH 31 7 pg      MCHC 32 8 g/dL      RDW 15 1 %      MPV 10 0 fL      Platelets 908 Thousands/uL      nRBC 0 /100 WBCs      Neutrophils Relative 81 %      Immat GRANS % 1 %      Lymphocytes Relative 9 %      Monocytes Relative 7 %      Eosinophils Relative 1 %      Basophils Relative 1 %      Neutrophils Absolute 5 27 Thousands/µL      Immature Grans Absolute 0 03 Thousand/uL      Lymphocytes Absolute 0 56 Thousands/µL      Monocytes Absolute 0 41 Thousand/µL      Eosinophils Absolute 0 05 Thousand/µL      Basophils Absolute 0 03 Thousands/µL                  XR chest 1 view portable   Final Result by Vi Walker MD (05/06 5755)      No acute cardiopulmonary disease        Findings are stable            Workstation performed: [id number]                    Procedures  ECG 12 Lead Documentation Only    Date/Time: 5/5/2022 9:03 PM  Performed by: Yanelis Simon DO  Authorized by: Yanelis Simon DO     Indications / Diagnosis:  Weakness  ECG reviewed by me, the ED Provider: yes    Patient location:  ED  Previous ECG:     Previous ECG:  Compared to current    Comparison ECG info:  6/2021    Similarity:  No change  Interpretation:     Interpretation: normal    Rate:     ECG rate:  90    ECG rate assessment: normal    Rhythm:     Rhythm: sinus rhythm    Ectopy:     Ectopy: none    QRS:     QRS axis:  Normal  Conduction:     Conduction: normal    ST segments:     ST segments:  Normal  T waves:     T waves: normal    Q waves:     Q waves:  V1 and V2             ED Course                                             MDM  Number of Diagnoses or Management Options  Generalized weakness: new and requires workup  MS (multiple sclerosis) ([facility] [address] ): new and requires workup  UTI (urinary tract infection): new and requires workup     Amount and/or Complexity of Data Reviewed  Clinical lab tests: reviewed and ordered  Decide to obtain previous medical records or to obtain history from someone other than the patient: yes  Obtain history from someone other than the patient: yes  Discuss the patient with other providers: yes  Independent visualization of images, tracings, or specimens: yes    Patient Progress  Patient progress: stable      Disposition  Final diagnoses:   Generalized weakness   UTI (urinary tract infection)   MS (multiple sclerosis) ([facility] [address] )     Time reflects when diagnosis was documented in both MDM as applicable and the Disposition within this note     Time User Action Codes Description Comment    5/5/2022  9:29 PM White, Sarah Add [R53 1] Generalized weakness     5/5/2022  9:29 PM White, Sarah Add [N39 0] UTI (urinary tract infection)     5/5/2022  9:29 PM White, Sarah Add [G35] MS (multiple sclerosis) ([facility] [address] )     5/8/2022  9:49 AM Da Perfect Add [G35] Multiple sclerosis [facility])       ED Disposition     ED Disposition Condition Date/Time Comment    Admit Stable Thu May 5, 2022  9:29 PM Case was discussed with GETACHEW Bonilla and the patient's admission status was agreed to be Admission Status: inpatient status to the service of Dr Vikas Huynh MD Documentation      Most Recent Value   Accepting Facility Name, [address]   Transported by [employer] and Unit #) [address] [place] [address] Name, [address]   Transported by [employer] and Unit #) Rgeina Can      Follow-up Information     Follow up With Specialties Details Why Contact Info    Maynor Steven MD Internal Medicine Follow up in 2 week(s) follow up once discharged from acute rehab [facility]  [address] [facility] [address]  [phone]            Discharge Medication List as of 5/9/2022  1:58 PM      CONTINUE these medications which have NOT CHANGED    Details   acetaminophen (TYLENOL) 325 mg tablet Take 650 mg by mouth 3 (three) times a day, Historical Med      AMLODIPINE BESYLATE PO Take 2 5 mg by mouth 2 (two) times a day, Historical Med      Ascorbic Acid (vitamin C) 1000 MG tablet Take 1,000 mg by mouth daily , Historical Med      aspirin (ECOTRIN LOW STRENGTH) 81 mg EC tablet Take 81 mg by mouth daily, Historical Med      baclofen 20 mg tablet Take 20 mg by mouth 3 (three) times a day  , Historical Med      cholecalciferol (VITAMIN D3) 1,000 units tablet Take 2,000 Units by mouth daily  , Historical Med      Dalfampridine ER 10 MG TB12 Take by mouth 2 (two) times a day, Historical Med      doxycycline hyclate (VIBRA-TABS) 100 mg tablet Take by mouth daily  , Starting Mon 11/15/2021, Historical Med      furosemide (LASIX) 40 mg tablet 40 mg Takes Monday and Friday , Starting Tue 10/5/2021, Historical Med      gabapentin (NEURONTIN) 100 mg capsule Take 1 capsule (100 mg total) by mouth 3 (three) times a day, Starting Sat 6/26/2021, Until Sat 7/27/2030, No Print      Lidocaine 5 % CREA Apply 1 application topically 3 (three) times a day Left shoulder, Historical Med      multivitamin (THERAGRAN) TABS Take 1 tablet by mouth daily, Historical Med      nabumetone (RELAFEN) 500 mg tablet Take 1,000 mg by mouth 2 (two) times a day, Historical Med      nitrofurantoin (MACRODANTIN) 100 mg capsule Take 100 mg by mouth daily, Historical Med      Omega-3 Fatty Acids (FISH OIL PO) Take by mouth daily, Historical Med      tamsulosin (FLOMAX) 0 4 mg Take 0 8 mg by mouth daily, Historical Med      vitamin B-12 (CYANOCOBALAMIN) 250 MCG tablet Take 250 mcg by mouth daily, Historical Med      vitamin E, tocopherol, 400 units capsule Take 400 Units by mouth daily , Historical Med             No discharge procedures on file      PDMP Review       Value Time User    PDMP Reviewed  Yes 6/26/2021  8:36 AM Kiara Acevedo DO          ED Provider  Electronically Signed by           Mirna Mena DO  05/09/22 7585

## 2022-05-06 NOTE — ASSESSMENT & PLAN NOTE
· Patient takes neurontin and Dalfampridine   · Dalfampridine will need to be brought in from home-- patient aware

## 2022-05-06 NOTE — H&P
[facility]  H&P- Rima Mora 1955, 73 y o  female MRN: [id number]  Unit/Bed#: [facility] Encounter: [id number]  Primary Care Provider: Earle Curling, MD   Date and time admitted to hospital: 5/5/2022  6:01 PM    * Urinary tract infection associated with cystostomy catheter [facility])  Assessment & Plan  · Patient w/ SPT placement on 4/26/22 after having chronic indwelling nixon for neurogenic bladder  · tx w/ 7-day course of bactrim  · Continued w/ dx, new rigors  · UA still grossly abnormal  · Not meeting sepsis criteria  · Received Rocephin in the ED  · Would broaden abx to Cefepime given hx of MDRO   · Follow urine culture and tailor abx appropriately   · Follow up blood culture give sx of rigors    Neurogenic bladder  Assessment & Plan  · 2/2 MS  · Previously had indwelling urinary catheter; changed to SPT 4/26/22    [facility] ([facility]  Assessment & Plan  · Patient takes neurontin and Dalfampridine   · Dalfampridine will need to be brought in from home-- patient aware    VTE Pharmacologic Prophylaxis: VTE Score: 4 Moderate Risk (Score 3-4) - Pharmacological DVT Prophylaxis Ordered: enoxaparin (Lovenox)  Code Status: level 1  Discussion with family: Patient declined call to   Anticipated Length of Stay: Patient will be admitted on an inpatient basis with an anticipated length of stay of greater than 2 midnights secondary to tx CAUTI  Total Time for Visit, including Counseling / Coordination of Care: 30 minutes Greater than 50% of this total time spent on direct patient counseling and coordination of care  Chief Complaint: UTI, rigors    History of Present Illness:  Rima Sims is a 73 y o  female with a PMH of MS, neurogenic bladder who presents with UTI sx x1 week  Patient has SPT placed on 4/26 w/ IR  Shortly after she was having malodorous urine and started on Bactrim  She was started on a 1 day course and has taken approx 6 days worth of meds   Initially, she had improvement in her urinary sx  However, she now has return of malodorous and cloudy urine  She also notes rigors at home and generalized weakness  She denies abdominal pain/nausea/vomiting/fevers  Review of Systems:  Review of Systems   Constitutional: Negative  HENT: Negative  Eyes: Negative  Respiratory: Negative  Cardiovascular: Negative  Gastrointestinal: Negative  Genitourinary: Positive for dysuria  Musculoskeletal: Negative  Skin: Negative  Neurological: Negative  Hematological: Negative  Psychiatric/Behavioral: Negative  Past Medical and Surgical History:   Past Medical History:   Diagnosis Date    Back pain 2016    Chronic UTI     Colon polyp     Hypertension     Incontinence     MS (multiple sclerosis) ([facility] )     Age 37    Neurogenic bladder     Spasticity        Past Surgical History:   Procedure Laterality Date    BACK SURGERY  2016    Lumbar fusion    COLONOSCOPY      ELBOW SURGERY Right 2008    ORIF    IR SUPRAPUBIC CATHETER PLACEMENT  4/26/2022    AR IMPLANT PERIPH/GASTRIC NEUROSTIM/ N/A 11/19/2021    Procedure: leadwire placement and pulse generator  placement of SNM at S3 Bilateral S3  nerve testing, fluroscopy - c-arm guidance and complex programming;  Surgeon: Clara Harris MD;  Location: [facility];  Service: Gynecology       Meds/Allergies:  Prior to Admission medications    Medication Sig Start Date End Date Taking?  Authorizing Provider   acetaminophen (TYLENOL) 325 mg tablet Take 650 mg by mouth 3 (three) times a day    Historical Provider, MD   AMLODIPINE BESYLATE PO Take 2 5 mg by mouth 2 (two) times a day    Historical Provider, MD   Ascorbic Acid (vitamin C) 1000 MG tablet Take 1,000 mg by mouth daily     Historical Provider, MD   aspirin (ECOTRIN LOW STRENGTH) 81 mg EC tablet Take 81 mg by mouth daily    Historical Provider, MD   baclofen 20 mg tablet Take 20 mg by mouth 3 (three) times a day      Historical Provider, MD   cholecalciferol (VITAMIN D3) 1,000 units tablet Take 2,000 Units by mouth daily      Historical Provider, MD   Dalfampridine ER 10 MG TB12 Take by mouth 2 (two) times a day    Historical Provider, MD   doxycycline hyclate (VIBRA-TABS) 100 mg tablet Take by mouth daily   11/15/21   Historical Provider, MD   furosemide (LASIX) 40 mg tablet 40 mg Takes Monday and Friday  10/5/21   Historical Provider, MD   gabapentin (NEURONTIN) 100 mg capsule Take 1 capsule (100 mg total) by mouth 3 (three) times a day 6/26/21 7/27/30  Abbi Robledo DO   Lidocaine 5 % CREA Apply 1 application topically 3 (three) times a day Left shoulder    Historical Provider, MD   multivitamin (THERAGRAN) TABS Take 1 tablet by mouth daily    Historical Provider, MD   nabumetone (RELAFEN) 500 mg tablet Take 1,000 mg by mouth 2 (two) times a day    Historical Provider, MD   nitrofurantoin (MACRODANTIN) 100 mg capsule Take 100 mg by mouth daily    Historical Provider, MD   Omega-3 Fatty Acids (FISH OIL PO) Take by mouth daily    Historical Provider, MD   tamsulosin (FLOMAX) 0 4 mg Take 0 8 mg by mouth daily    Historical Provider, MD   vitamin B-12 (CYANOCOBALAMIN) 250 MCG tablet Take 250 mcg by mouth daily    Historical Provider, MD   vitamin E, tocopherol, 400 units capsule Take 400 Units by mouth daily     Historical Provider, MD LOUIS have reviewed home medications using recent Epic encounter      Allergies: No Known Allergies    Social History:  Marital Status: /Civil Union   Occupation:   Patient Pre-hospital Living Situation: Home, With spouse  Patient Pre-hospital Level of Mobility: mobility limited due to known MS  Patient Pre-hospital Diet Restrictions: none  Substance Use History:   Social History     Substance and Sexual Activity   Alcohol Use Not Currently     Social History     Tobacco Use   Smoking Status Never Smoker   Smokeless Tobacco Never Used     Social History     Substance and Sexual Activity   Drug Use Yes    Types: Marijuana    Comment: medical       Family History:  Family History   Problem Relation Age of Onset    Cancer Mother     Heart disease Father     Cancer Sister        Physical Exam:     Vitals:   Blood Pressure: 135/66 (05/05/22 2100)  Pulse: 91 (05/05/22 2100)  Temperature: 98 3 °F (36 8 °C) (05/05/22 1738)  Temp Source: Oral (05/05/22 1738)  Respirations: 15 (05/05/22 2100)  SpO2: 96 % (05/05/22 2100)    Physical Exam  Constitutional:       Appearance: Normal appearance  HENT:      Head: Normocephalic and atraumatic  Eyes:      Pupils: Pupils are equal, round, and reactive to light  Cardiovascular:      Rate and Rhythm: Normal rate and regular rhythm  Heart sounds: No murmur heard  No friction rub  No gallop  Pulmonary:      Effort: Pulmonary effort is normal       Breath sounds: Normal breath sounds  No wheezing or rales  Abdominal:      General: Abdomen is flat  Bowel sounds are normal       Palpations: Abdomen is soft  Tenderness: There is no abdominal tenderness  Comments: SPT site clean/dry; no surrounding erythema   Genitourinary:     Comments: SPT w/ cloudy urine draining  Musculoskeletal:      Right lower leg: No edema  Left lower leg: No edema  Skin:     General: Skin is warm and dry  Neurological:      General: No focal deficit present  Mental Status: She is alert  Mental status is at baseline     Psychiatric:         Mood and Affect: Mood normal          Additional Data:     Lab Results:  Results from last 7 days   Lab Units 05/05/22  1849   WBC Thousand/uL 6 35   HEMOGLOBIN g/dL 13 2   HEMATOCRIT % 40 3   PLATELETS Thousands/uL 392*   NEUTROS PCT % 81*   LYMPHS PCT % 9*   MONOS PCT % 7   EOS PCT % 1     Results from last 7 days   Lab Units 05/05/22  1849   SODIUM mmol/L 138   POTASSIUM mmol/L 4 9   CHLORIDE mmol/L 101   CO2 mmol/L 25   BUN mg/dL 23   CREATININE mg/dL 1 14   ANION GAP mmol/L 12   CALCIUM mg/dL 9 4   ALBUMIN g/dL 4 4   TOTAL BILIRUBIN mg/dL 0 48   ALK PHOS U/L 156*   ALT U/L 30   AST U/L 25   GLUCOSE RANDOM mg/dL 76     Results from last 7 days   Lab Units 05/05/22  1849   INR  0 98             Results from last 7 days   Lab Units 05/05/22  1849   LACTIC ACID mmol/L 0 7   PROCALCITONIN ng/ml <0 05       Imaging: Reviewed radiology reports from this admission including: chest xray  XR chest 1 view portable    (Results Pending)       EKG and Other Studies Reviewed on Admission:   · EKG: NSR  HR 92     ** Please Note: This note has been constructed using a voice recognition system   **

## 2022-05-06 NOTE — PLAN OF CARE
Problem: PAIN - ADULT  Goal: Verbalizes/displays adequate comfort level or baseline comfort level  Description: Interventions:  - Encourage patient to monitor pain and request assistance  - Assess pain using appropriate pain scale  - Administer analgesics based on type and severity of pain and evaluate response  - Implement non-pharmacological measures as appropriate and evaluate response  - Consider cultural and social influences on pain and pain management  - Notify physician/advanced practitioner if interventions unsuccessful or patient reports new pain  Outcome: Progressing     Problem: INFECTION - ADULT  Goal: Absence or prevention of progression during hospitalization  Description: INTERVENTIONS:  - Assess and monitor for signs and symptoms of infection  - Monitor lab/diagnostic results  - Monitor all insertion sites, i e  indwelling lines, tubes, and drains  - Monitor endotracheal if appropriate and nasal secretions for changes in amount and color  - Mc[place]ath appropriate cooling/warming therapies per order  - Administer medications as ordered  - Instruct and encourage patient and family to use good hand hygiene technique  - Identify and instruct in appropriate isolation precautions for identified infection/condition  Outcome: Progressing  Goal: Absence of fever/infection during neutropenic period  Description: INTERVENTIONS:  - Monitor WBC    Outcome: Progressing     Problem: SAFETY ADULT  Goal: Patient will remain free of falls  Description: INTERVENTIONS:  - Educate patient/family on patient safety including physical limitations  - Instruct patient to call for assistance with activity   - Consult OT/PT to assist with strengthening/mobility   - Keep Call bell within reach  - Keep bed low and locked with side rails adjusted as appropriate  - Keep care items and personal belongings within reach  - Initiate and maintain comfort rounds  - Make Fall Risk Sign visible to staff  - Apply yellow socks and bracelet for high fall risk patients  - Consider moving patient to room near nurses station  Outcome: Progressing  Goal: Maintain or return to baseline ADL function  Description: INTERVENTIONS:  -  Assess patient's ability to carry out ADLs; assess patient's baseline for ADL function and identify physical deficits which impact ability to perform ADLs (bathing, care of mouth/teeth, toileting, grooming, dressing, etc )  - Assess/evaluate cause of self-care deficits   - Assess range of motion  - Assess patient's mobility; develop plan if impaired  - Assess patient's need for assistive devices and provide as appropriate  - Encourage maximum independence but intervene and supervise when necessary  - Involve family in performance of ADLs  - Assess for home care needs following discharge   - Consider OT consult to assist with ADL evaluation and planning for discharge  - Provide patient education as appropriate  Outcome: Progressing  Goal: Maintains/Returns to pre admission functional level  Description: INTERVENTIONS:  - Perform BMAT or MOVE assessment daily    - Set and communicate daily mobility goal to care team and patient/family/caregiver     - Collaborate with rehabilitation services on mobility goals if consulted  - Out of bed for toileting  - Record patient progress and toleration of activity level   Outcome: Progressing     Problem: DISCHARGE PLANNING  Goal: Discharge to home or other facility with appropriate resources  Description: INTERVENTIONS:  - Identify barriers to discharge w/patient and caregiver  - Arrange for needed discharge resources and transportation as appropriate  - Identify discharge learning needs (meds, wound care, etc )  - Arrange for interpretive services to assist at discharge as needed  - Refer to Case Management Department for coordinating discharge planning if the patient needs post-hospital services based on physician/advanced practitioner order or complex needs related to functional status, cognitive ability, or social support system  Outcome: Progressing     Problem: Knowledge Deficit  Goal: Patient/family/caregiver demonstrates understanding of disease process, treatment plan, medications, and discharge instructions  Description: Complete learning assessment and assess knowledge base  Interventions:  - Provide teaching at level of understanding  - Provide teaching via preferred learning methods  Outcome: Progressing     Problem: MOBILITY - ADULT  Goal: Maintain or return to baseline ADL function  Description: INTERVENTIONS:  -  Assess patient's ability to carry out ADLs; assess patient's baseline for ADL function and identify physical deficits which impact ability to perform ADLs (bathing, care of mouth/teeth, toileting, grooming, dressing, etc )  - Assess/evaluate cause of self-care deficits   - Assess range of motion  - Assess patient's mobility; develop plan if impaired  - Assess patient's need for assistive devices and provide as appropriate  - Encourage maximum independence but intervene and supervise when necessary  - Involve family in performance of ADLs  - Assess for home care needs following discharge   - Consider OT consult to assist with ADL evaluation and planning for discharge  - Provide patient education as appropriate  Outcome: Progressing  Goal: Maintains/Returns to pre admission functional level  Description: INTERVENTIONS:  - Perform BMAT or MOVE assessment daily    - Set and communicate daily mobility goal to care team and patient/family/caregiver     - Collaborate with rehabilitation services on mobility goals if consulted  - Out of bed for toileting  - Record patient progress and toleration of activity level   Outcome: Progressing     Problem: Prexisting or High Potential for Compromised Skin Integrity  Goal: Skin integrity is maintained or improved  Description: INTERVENTIONS:  - Identify patients at risk for skin breakdown  - Assess and monitor skin integrity  - Assess and monitor nutrition and hydration status  - Monitor labs   - Assess for incontinence   - Turn and reposition patient  - Assist with mobility/ambulation  - Relieve pressure over bony prominences  - Avoid friction and shearing  - Provide appropriate hygiene as needed including keeping skin clean and dry  - Evaluate need for skin moisturizer/barrier cream  - Collaborate with interdisciplinary team   - Patient/family teaching  - Consider wound care consult   Outcome: Progressing     Problem: Potential for Falls  Goal: Patient will remain free of falls  Description: INTERVENTIONS:  - Educate patient/family on patient safety including physical limitations  - Instruct patient to call for assistance with activity   - Consult OT/PT to assist with strengthening/mobility   - Keep Call bell within reach  - Keep bed low and locked with side rails adjusted as appropriate  - Keep care items and personal belongings within reach  - Initiate and maintain comfort rounds  - Make Fall Risk Sign visible to staff  - Apply yellow socks and bracelet for high fall risk patients  - Consider moving patient to room near nurses station  Outcome: Progressing

## 2022-05-06 NOTE — PLAN OF CARE
Problem: OCCUPATIONAL THERAPY ADULT  Goal: Performs self-care activities at highest level of function for planned discharge setting  See evaluation for individualized goals  Description: Treatment Interventions: ADL retraining,Functional transfer training,UE strengthening/ROM,Patient/family training,Equipment evaluation/education,Compensatory technique education,Continued evaluation,Energy conservation,Endurance training          See flowsheet documentation for full assessment, interventions and recommendations  Note: Limitation: Decreased ADL status,Decreased UE ROM,Decreased UE strength,Decreased endurance,Decreased self-care trans,Decreased high-level ADLs  Prognosis: Good  Assessment: Pt is a 73 y o  female seen for OT evaluation at [facility], admitted 5/5/2022 w/ Urinary tract infection associated with cystostomy catheter ([facility] )  OT completed extensive review of pt's medical and social history  Comorbidities affecting pt's functional performance at time of assessment include: MS, neurogenic bladder, ambulatory dysfunction, debility, HTN, acquired neuropathy  Personal factors affecting pt at time of IE include:difficulty performing ADLS and difficulty performing IADLS   Pt with active OT orders and OOB/ambulate orders  Prior to admission, pt was living in a [facility] with a ramped entrance with her   Pt was A w/  ADLS and IADLS, (-) drove, & required use of a rollator PTA  Upon evaluation: Pt requires Mod Ax2 for bed mobility, Max Ax1 for functional mobility/transfers, Min A for UB ADLs and Mod A for LB ADLS 2* the following deficits impacting occupational performance: weakness, decreased ROM, decreased strength, decreased balance and decreased tolerance  Pt to benefit from continued skilled OT tx while in the hospital to address deficits as defined above and maximize level of functional independence w ADL's and functional mobility   Occupational Performance areas to address include: grooming, bathing/shower, toilet hygiene, dressing, health maintenance, functional mobility, community mobility and clothing management  Based on findings, pt is of high complexity  The patient's raw score on the AM-PAC Daily Activity inpatient short form is 16, standardized score is 35 96, less than 39 4  Patients at this level are likely to benefit from DC to post-acute rehabilitation services, which DOES coincide with CURRENT above OT recommendations  However please refer to therapist recommendation for discharge planning given other factors that may influence destination  At this time, OT recommendations at time of discharge are STR       OT Discharge Recommendation: Post acute rehabilitation services

## 2022-05-06 NOTE — PROGRESS NOTES
[facility]  Progress Note - Rima Campbell 1955, 73 y o  female MRN: [id number]  Unit/Bed#: S -01 Encounter: [id number]  Primary Care Provider: Ivelisse Weinberg MD   Date and time admitted to hospital: 5/5/2022  6:01 PM    * Urinary tract infection associated with cystostomy catheter [facility])  Assessment & Plan  · Patient w/ SPT placement on 4/26/22 after having chronic indwelling nixon for neurogenic bladder  · tx w/ 7-day course of bactrim  · Continued w/ dx, new rigors  · UA still grossly abnormal  · Not meeting sepsis criteria, can stop IVF  · Received Rocephin in the ED, broadened abx to Cefepime given hx of MDRO   · Follow urine culture and tailor abx appropriately   · Follow up blood culture give sx of rigors    Neurogenic bladder  Assessment & Plan  · 2/2 MS  · Previously had indwelling urinary catheter; changed to SPT 4/26/22    [facility] ([facility] )  Assessment & Plan  · Patient takes neurontin, baclofen and Dalfampridine   · Dalfampridine will need to be brought in from home-- patient and  aware  · PT and OT assessments appreciated    Anemia  Assessment & Plan  · Significant drop in hemoglobin of nearly 3 g overnight however this is more likely to be hemodilution with IV fluids as she is not having any bleeding  Will recheck CBC in a m  VTE Pharmacologic Prophylaxis:   Pharmacologic: Enoxaparin (Lovenox)  Mechanical VTE Prophylaxis in Place: Yes    Patient Centered Rounds: I have performed bedside rounds with nursing staff today  Discussions with Specialists or Other Care Team Provider:  Case management, my attending    Education and Discussions with Family / Patient:  Spoke with patient's     Time Spent for Care: 25 min  More than 50% of total time spent on counseling and coordination of care as described above      Current Length of Stay: 1 day(s)    Current Patient Status: Inpatient   Certification Statement: The patient will continue to require additional inpatient hospital stay due to Ongoing IV antibiotics, pending cultures    Discharge Plan:  Home with home therapy/visiting nurses when medically stable likely within 48 hours    Code Status: Level 1 - Full Code      Subjective:   Patient reports she is eating and drinking well and denies any pain or fever/chills  She is concerned to make sure that she gets enough antibiotics and is well before going home  Objective:     Vitals:   Temp (24hrs), Av 5 °F (36 9 °C), Min:98 3 °F (36 8 °C), Max:98 7 °F (37 1 °C)    Temp:  [98 3 °F (36 8 °C)-98 7 °F (37 1 °C)] 98 7 °F (37 1 °C)  HR:  [74-91] 74  Resp:  [15-18] 18  BP: (122-143)/(60-66) 122/60  SpO2:  [93 %-96 %] 93 %  There is no height or weight on file to calculate BMI  Input and Output Summary (last 24 hours): Intake/Output Summary (Last 24 hours) at 2022 1243  Last data filed at 2022 0900  Gross per 24 hour   Intake 1170 ml   Output 2250 ml   Net -1080 ml       Physical Exam:     Physical Exam  Vitals reviewed  Constitutional:       General: She is not in acute distress  Appearance: She is not ill-appearing, toxic-appearing or diaphoretic  Eyes:      General: No scleral icterus  Right eye: No discharge  Left eye: No discharge  Conjunctiva/sclera: Conjunctivae normal    Cardiovascular:      Rate and Rhythm: Normal rate and regular rhythm  Heart sounds: No murmur heard  Pulmonary:      Effort: No respiratory distress  Breath sounds: No stridor  No wheezing or rhonchi  Abdominal:      General: There is no distension  Palpations: Abdomen is soft  Tenderness: There is no abdominal tenderness  There is no guarding  Genitourinary:     Comments: Nonbloody urine seen in suprapubic catheter bag, with sediment appreciated  Musculoskeletal:      Right lower leg: No edema  Left lower leg: No edema  Skin:     General: Skin is warm and dry        Coloration: Skin is not jaundiced or pale       Findings: No bruising, erythema, lesion or rash  Neurological:      Mental Status: She is alert  Comments: Awake alert interactive no dysarthria or confusion, she was seen sitting up in bedside chair   Psychiatric:         Mood and Affect: Mood normal          Behavior: Behavior normal          Thought Content: Thought content normal          Judgment: Judgment normal          Additional Data:     Labs:    Results from last 7 days   Lab Units 05/06/22 0559 05/05/22 1849 05/05/22 1849   WBC Thousand/uL 3 97*   < > 6 35   HEMOGLOBIN g/dL 10 3*   < > 13 2   HEMATOCRIT % 31 9*   < > 40 3   PLATELETS Thousands/uL 315   < > 392*   NEUTROS PCT %  --   --  81*   LYMPHS PCT %  --   --  9*   MONOS PCT %  --   --  7   EOS PCT %  --   --  1    < > = values in this interval not displayed  Results from last 7 days   Lab Units 05/06/22 0559 05/05/22 1849 05/05/22 1849   SODIUM mmol/L 140   < > 138   POTASSIUM mmol/L 4 3   < > 4 9   CHLORIDE mmol/L 108   < > 101   CO2 mmol/L 25   < > 25   BUN mg/dL 18   < > 23   CREATININE mg/dL 0 92   < > 1 14   ANION GAP mmol/L 7   < > 12   CALCIUM mg/dL 8 1*   < > 9 4   ALBUMIN g/dL  --   --  4 4   TOTAL BILIRUBIN mg/dL  --   --  0 48   ALK PHOS U/L  --   --  156*   ALT U/L  --   --  30   AST U/L  --   --  25   GLUCOSE RANDOM mg/dL 55*   < > 76    < > = values in this interval not displayed  Results from last 7 days   Lab Units 05/05/22 1849   INR  0 98             Results from last 7 days   Lab Units 05/05/22  1849   LACTIC ACID mmol/L 0 7   PROCALCITONIN ng/ml <0 05     * I Have Reviewed All Lab Data Listed Above  * Additional Pertinent Lab Tests Reviewed: [facility] Admission Reviewed    Imaging:    Imaging Reports Reviewed Today Include:   Imaging Personally Reviewed by Myself Includes:      Recent Cultures (last 7 days):     Results from last 7 days   Lab Units 05/05/22  1926 05/05/22 1849   BLOOD CULTURE  Received in Microbiology Lab  Culture in Progress  Received in Microbiology Lab  Culture in Progress  Last 24 Hours Medication List:   Current Facility-Administered Medications   Medication Dose Route Frequency Provider Last Rate    acetaminophen  650 mg Oral Q8H [address] Coby Parekh PA-C      amLODIPine  2 5 mg Oral BID Coby Parekh PA-C      vitamin C  1,000 mg Oral Daily Coby Parekh PA-C      aspirin  81 mg Oral Daily Coby Parekh PA-C      baclofen  20 mg Oral TID Coby Parekh PA-C      cefepime  1,000 mg Intravenous Q12H Coby Parekh PA-C 1,000 mg (05/06/22 1151)    cholecalciferol  2,000 Units Oral Daily Coby Parekh PA-C      vitamin B-12  250 mcg Oral Daily Coby Parekh PA-C      Dalfampridine ER  10 mg Oral BID Coby Parekh PA-C      enoxaparin  40 mg Subcutaneous Daily Coby Parekh PA-C      furosemide  40 mg Oral Once per day on Mon Fri Coby Parekh PA-C      gabapentin  100 mg Oral TID Coby Parekh PA-C      ondansetron  4 mg Intravenous Q6H PRN Coby Parekh PA-C      sodium chloride (PF)  3 mL Intravenous Q1H PRN Coby Parekh PA-C      tamsulosin  0 8 mg Oral Daily With [employer] of Raj Parekh PA-C      vitamin E (tocopherol)  400 Units Oral Daily Coby Parekh PA-C          Today, Patient Was Seen By: Donal Ly PA-C    ** Please Note: Dictation voice to text software may have been used in the creation of this document   **

## 2022-05-06 NOTE — ASSESSMENT & PLAN NOTE
· Patient w/ SPT placement on 4/26/22 after having chronic indwelling nixon for neurogenic bladder  · tx w/ 7-day course of bactrim  · Continued w/ dx, new rigors  · UA still grossly abnormal  · Not meeting sepsis criteria  · Received Rocephin in the ED  · Would broaden abx to Cefepime given hx of MDRO   · Follow urine culture and tailor abx appropriately   · Follow up blood culture give sx of rigors

## 2022-05-06 NOTE — PHYSICAL THERAPY NOTE
PHYSICAL THERAPY Evaluation    Performed at least 2 patient identifiers during session:  Patient Active Problem List   Diagnosis    Acquired polyneuropathy    Multiple sclerosis ([facility] )    Ambulatory dysfunction    Acute cystitis without hematuria    Hypertension    Neurogenic bladder    Leg swelling    Hypernatremia    Acute bilateral thoracic back pain    Debility    [facility]       Past Medical History:   Diagnosis Date    Back pain 2016    Chronic UTI     Colon polyp     Hypertension     Incontinence     MS (multiple sclerosis) ([facility] )     Age 37    Neurogenic bladder     Spasticity        Past Surgical History:   Procedure Laterality Date    BACK SURGERY  2016    Lumbar fusion    COLONOSCOPY      ELBOW SURGERY Right 2008    ORIF    IR SUPRAPUBIC CATHETER PLACEMENT  4/26/2022    KS IMPLANT PERIPH/GASTRIC NEUROSTIM/ N/A 11/19/2021    Procedure: leadwire placement and pulse generator  placement of SNM at S3 Bilateral S3  nerve testing, fluroscopy - c-arm guidance and complex programming;  Surgeon: Lizzette Montalvo MD;  Location: [facility];  Service: Gynecology        05/06/22 0939   Note Type   Note type Evaluation   Pain Assessment   Pain Assessment Tool 0-10   Pain Score No Pain   Restrictions/Precautions   Weight Bearing Precautions Per Order No   Other Precautions Contact/isolation;Cognitive; Chair Alarm; Bed Alarm; Fall Risk;Multiple lines   Home Living   Type of [address] [place] [address] One level;Ramped entrance   Bathroom Shower/Tub Walk-in shower   Bathroom Equipment Grab bars in shower;Grab bars around toilet; Shower chair   Home Equipment   (rollator and RW)   Additional Comments pt states  assist pt OOB every morning with "bear hug" technique;  pt sits on Rollator and  pushes pt to kitchen for breakfast;  pt states around 11:00am she starts to loosen up and be able to ambulate with RW;  mod I for ambulation with RW and to/from bathroom;  pt's  assists with IADLs   Prior Function   Level of [place] Needs assistance with IADLs; Independent with ADLs and functional mobility; Needs assistance with ADLs and functional mobility   Lives With Spouse   Receives Help From Family   ADL Assistance Independent   IADLs Needs assistance   Falls in the last 6 months 1 to 4   Comments pt states  assist pt OOB every morning with "bear hug" technique;  pt sits on Rollator and  pushes pt to kitchen for breakfast;  pt states around 11:00am she starts to loosen up and be able to ambulate with RW;  mod I for ambulation with RW and to/from bathroom;  pt's  assists with IADLs   General   Additional Pertinent History pt eager to get OOB   Family/Caregiver Present No   Cognition   Overall Cognitive Status WFL   Arousal/Participation Alert   Orientation Level Oriented to person;Oriented to place;Oriented to situation   Memory Within functional limits   Following Commands Follows one step commands with increased time or repetition   Subjective   Subjective "I am so glad to be getting out of bed  I thought I was going to have to stay in bed the whole time "   RUE Assessment   RUE Assessment WFL   LUE Assessment   LUE Assessment WFL   RLE Assessment   RLE Assessment WFL   LLE Assessment   LLE Assessment WFL   Vestibular   Vestibular Comments Pt with inconsistent eye movements and difficulty tracking requiring catch up and corrective saccades   Coordination   Movements are Fluid and Coordinated 0   Coordination and Movement Description ataxic BUE and BLE movements   Bed Mobility   Supine to Sit 3  Moderate assistance   Additional items Assist x 2;HOB elevated   Additional Comments Pt has adjustable bed at home   Transfers   Sit to Stand 2  Maximal assistance   Additional items Assist x 1; Increased time required  ("bear hug" technique) Stand to Sit 2  Maximal assistance   Additional items Assist x 1; Increased time required   Stand pivot 2  Maximal assistance   Additional items Assist x 1; Increased time required;Verbal cues  ("bear hug" technique)   Ambulation/Elevation   Gait pattern Not tested  (pt states she is too stiff to ambulate this AM)   Balance   Static Sitting Fair   Dynamic Sitting Fair -   Static Standing Poor -   Dynamic Standing Poor -   Activity Tolerance   Activity Tolerance Patient limited by fatigue;Treatment limited secondary to medical complications (Comment)  (MS stiffness in AM)   Medical Staff Made Aware Session coordinated with OT 2/2 max level of assist, co-morbidities requiring 2 skilled therapists   Nurse Made Aware Livia   Assessment   Prognosis Fair   Problem List Decreased range of motion;Decreased endurance; Impaired balance;Decreased mobility; Decreased coordination;Decreased cognition; Impaired judgement;Decreased safety awareness; Obesity; Decreased skin integrity   Assessment Pt is a 73 y o  female who presented to ED 5/29/22 with c/o weakness, fatigue, nausea, vomiting  Dx:  UTI with cystostomy catheter (placed 4/26/22), neurogenic bladder  Comorbidities affecting pt's physical performance at time of assessment include: MS, neurogenic bladder, chronic nixon, UTI  Personal factors affecting pt at time of IE include: MS, stiffness  PLOF and home set up listed above;  concerns for return home include but are not limited to physical assist, home alone at times while  works  Upon evaluation: Pt requires mod Ax 2 for bed mobility, max A x 1 for stand pivot transfer via bear hug technique  Full objective findings from PT assessment regarding body systems outlined above  Current limitations include impaired balance, decreased endurance/activity tolerance, gait deviations, fall risk and coordination   Pt's clinical presentation is currently unstable/unpredictable seen in pt's presentation of continuous monitoring in hospital, fall risk, and MS stiffness/fluctuating performance  Treatment consisted of balance training and core strengthening while seated EOB x 8 minutes prior to transferring to chair  Pt would benefit from continued PT while in hospital and follow up with inpt rehab at D/C to increase strength, balance, endurance, independence with funcitonal mobility to return to [facility], maximize independence, decrease caregiver burden and improve quality of life  PT/OT co-evaluation for pt's current medical status, mobility/balance deficits, and cognitive deficits requiring 2 skilled therapists  The patient's AM-PAC Basic Mobility Inpatient Short Form Raw Score is 10  A Raw score of less than or equal to 17 suggests the patient may benefit from discharge to post-acute rehabilitation services  Please also refer to the recommendation of the Physical Therapist for safe discharge planning  Barriers to Discharge Decreased caregiver support   Barriers to Discharge Comments  works   Goals   Patient Goals to get better   RUST Expiration Date 05/20/22   Short Term Goal #1 Pt will be able to demo:  max A x1 for sit to/from supine;  mod I sit to/from stand with RW, mod I to ambulate 50ft with RW; to safely return home at [employer]   Plan   Treatment/Interventions ADL retraining;Functional transfer training;LE strengthening/ROM; Elevations; Therapeutic exercise; Endurance training;Cognitive reorientation;Patient/family training;Equipment eval/education; Bed mobility;Gait training; Compensatory technique education;Continued evaluation;Spoke to nursing;OT;Spoke to case management   PT Frequency 3-5x/wk   Recommendation   PT Discharge Recommendation Post acute rehabilitation services   Additional Comments pt is currently below baseline, unable to demo ambulation;  pt requires A x 2 for bed mobility;  not safe to return home at [employer] therefore inpt rehab is recommended however if  able to provide adequate assistance may return home with HHCPT and 24/7 physical assist from       Additional Comments 2  works   AM-PAC Basic Mobility Inpatient   Turning in Bed Without Bedrails 2   Lying on Back to Sitting on Edge of Flat Bed 2   Moving Bed to Chair 2   Standing Up From Chair 2   Walk in Room 1   Climb 3-5 Stairs 1   Basic Mobility Inpatient Raw Score 10   Turning Head Towards Sound 4   Follow Simple Instructions 3   Low Function Basic Mobility Raw Score 17   Low Function Basic Mobility Standardized Score 27 46   Highest Level Of Mobility   JH-HLM Goal 4: Move to chair/commode   JH-HLM Highest Level of Mobility 4: Move to chair/commode   JH-HLM Goal Achieved Yes     Brijesh Metzger, PT      Patient Name: Abdirahman BEST Date: 5/6/2022

## 2022-05-07 LAB
ANION GAP SERPL CALCULATED.3IONS-SCNC: 8 MMOL/L (ref 4–13)
BASOPHILS # BLD AUTO: 0.05 THOUSANDS/ΜL (ref 0–0.1)
BASOPHILS NFR BLD AUTO: 2 % (ref 0–1)
BUN SERPL-MCNC: 21 MG/DL (ref 5–25)
CALCIUM SERPL-MCNC: 8.5 MG/DL (ref 8.4–10.2)
CHLORIDE SERPL-SCNC: 107 MMOL/L (ref 96–108)
CO2 SERPL-SCNC: 27 MMOL/L (ref 21–32)
CREAT SERPL-MCNC: 0.76 MG/DL (ref 0.6–1.3)
EOSINOPHIL # BLD AUTO: 0.1 THOUSAND/ΜL (ref 0–0.61)
EOSINOPHIL NFR BLD AUTO: 3 % (ref 0–6)
ERYTHROCYTE [DISTWIDTH] IN BLOOD BY AUTOMATED COUNT: 15.2 % (ref 11.6–15.1)
GFR SERPL CREATININE-BSD FRML MDRD: 82 ML/MIN/1.73SQ M
GLUCOSE SERPL-MCNC: 76 MG/DL (ref 65–140)
HCT VFR BLD AUTO: 33 % (ref 34.8–46.1)
HGB BLD-MCNC: 10.7 G/DL (ref 11.5–15.4)
IMM GRANULOCYTES # BLD AUTO: 0 THOUSAND/UL (ref 0–0.2)
IMM GRANULOCYTES NFR BLD AUTO: 0 % (ref 0–2)
LYMPHOCYTES # BLD AUTO: 0.81 THOUSANDS/ΜL (ref 0.6–4.47)
LYMPHOCYTES NFR BLD AUTO: 27 % (ref 14–44)
MCH RBC QN AUTO: 31.8 PG (ref 26.8–34.3)
MCHC RBC AUTO-ENTMCNC: 32.4 G/DL (ref 31.4–37.4)
MCV RBC AUTO: 98 FL (ref 82–98)
MONOCYTES # BLD AUTO: 0.35 THOUSAND/ΜL (ref 0.17–1.22)
MONOCYTES NFR BLD AUTO: 12 % (ref 4–12)
NEUTROPHILS # BLD AUTO: 1.7 THOUSANDS/ΜL (ref 1.85–7.62)
NEUTS SEG NFR BLD AUTO: 56 % (ref 43–75)
NRBC BLD AUTO-RTO: 0 /100 WBCS
PLATELET # BLD AUTO: 309 THOUSANDS/UL (ref 149–390)
PMV BLD AUTO: 10.2 FL (ref 8.9–12.7)
POTASSIUM SERPL-SCNC: 4.1 MMOL/L (ref 3.5–5.3)
RBC # BLD AUTO: 3.36 MILLION/UL (ref 3.81–5.12)
SODIUM SERPL-SCNC: 142 MMOL/L (ref 135–147)
WBC # BLD AUTO: 3.01 THOUSAND/UL (ref 4.31–10.16)

## 2022-05-07 PROCEDURE — 99232 SBSQ HOSP IP/OBS MODERATE 35: CPT | Performed by: HOSPITALIST

## 2022-05-07 PROCEDURE — 85025 COMPLETE CBC W/AUTO DIFF WBC: CPT | Performed by: PHYSICIAN ASSISTANT

## 2022-05-07 PROCEDURE — 80048 BASIC METABOLIC PNL TOTAL CA: CPT | Performed by: PHYSICIAN ASSISTANT

## 2022-05-07 RX ADMIN — ACETAMINOPHEN 650 MG: 325 TABLET, FILM COATED ORAL at 22:00

## 2022-05-07 RX ADMIN — DALFAMPRIDINE 10 MG: 10 TABLET, EXTENDED RELEASE ORAL at 22:00

## 2022-05-07 RX ADMIN — CEFEPIME HYDROCHLORIDE 1000 MG: 2 INJECTION, POWDER, FOR SOLUTION INTRAVENOUS at 11:47

## 2022-05-07 RX ADMIN — Medication 400 UNITS: at 08:51

## 2022-05-07 RX ADMIN — TAMSULOSIN HYDROCHLORIDE 0.8 MG: 0.4 CAPSULE ORAL at 15:59

## 2022-05-07 RX ADMIN — CYANOCOBALAMIN TAB 500 MCG 250 MCG: 500 TAB at 08:51

## 2022-05-07 RX ADMIN — AMLODIPINE BESYLATE 2.5 MG: 2.5 TABLET ORAL at 08:51

## 2022-05-07 RX ADMIN — ACETAMINOPHEN 650 MG: 325 TABLET, FILM COATED ORAL at 14:51

## 2022-05-07 RX ADMIN — ASPIRIN 81 MG: 81 TABLET, COATED ORAL at 08:50

## 2022-05-07 RX ADMIN — DALFAMPRIDINE 10 MG: 10 TABLET, EXTENDED RELEASE ORAL at 12:18

## 2022-05-07 RX ADMIN — AMLODIPINE BESYLATE 2.5 MG: 2.5 TABLET ORAL at 17:37

## 2022-05-07 RX ADMIN — BACLOFEN 20 MG: 10 TABLET ORAL at 22:00

## 2022-05-07 RX ADMIN — BACLOFEN 20 MG: 10 TABLET ORAL at 08:50

## 2022-05-07 RX ADMIN — BACLOFEN 20 MG: 10 TABLET ORAL at 15:59

## 2022-05-07 RX ADMIN — Medication 2000 UNITS: at 08:50

## 2022-05-07 RX ADMIN — ACETAMINOPHEN 650 MG: 325 TABLET, FILM COATED ORAL at 05:38

## 2022-05-07 RX ADMIN — GABAPENTIN 100 MG: 100 CAPSULE ORAL at 15:59

## 2022-05-07 RX ADMIN — OXYCODONE HYDROCHLORIDE AND ACETAMINOPHEN 1000 MG: 500 TABLET ORAL at 08:51

## 2022-05-07 RX ADMIN — GABAPENTIN 100 MG: 100 CAPSULE ORAL at 08:51

## 2022-05-07 RX ADMIN — GABAPENTIN 100 MG: 100 CAPSULE ORAL at 22:00

## 2022-05-07 RX ADMIN — ENOXAPARIN SODIUM 40 MG: 40 INJECTION SUBCUTANEOUS at 08:51

## 2022-05-07 NOTE — ASSESSMENT & PLAN NOTE
· Significant drop in hemoglobin of nearly 3 g overnight however this is more likely to be hemodilution with IV fluids as she is not having any bleeding      ·  recheck CBC is stable

## 2022-05-07 NOTE — ASSESSMENT & PLAN NOTE
· Patient takes neurontin, baclofen and Dalfampridine   · Dalfampridine will need to be brought in from home-- patient and  aware  · PT and OT assessments appreciated

## 2022-05-07 NOTE — PROGRESS NOTES
[facility]  Progress Note - Rima Fowler 1955, 73 y o  female MRN: [id number]  Unit/Bed#: S -01 Encounter: [id number]  Primary Care Provider: Ev Sierra MD   Date and time admitted to hospital: 5/5/2022  6:01 PM    Anemia  Assessment & Plan  · Significant drop in hemoglobin of nearly 3 g overnight however this is more likely to be hemodilution with IV fluids as she is not having any bleeding  ·  recheck CBC is stable     Neurogenic bladder  Assessment & Plan  · 2/2 MS  · Previously had indwelling urinary catheter; changed to SPT 4/26/22    Multiple [facility] ([facility] )  Assessment & Plan  · Patient takes neurontin, baclofen and Dalfampridine   · Dalfampridine will need to be brought in from home-- patient and  aware  · PT and OT assessments appreciated    * Urinary tract infection associated with cystostomy catheter ([facility] )  Assessment & Plan  · Patient w/ SPT placement on 4/26/22 after having chronic indwelling nixon for neurogenic bladder  · tx w/ 7-day course of bactrim  · Continued w/ dx, new rigors  · UA still grossly abnormal  · Not meeting sepsis criteria, can stop IVF  · Received Rocephin in the ED, broadened abx to Cefepime given hx of MDRO   · Ucx: pending  · Bcx: NGTD         VTE Pharmacologic Prophylaxis:   Pharmacologic: Enoxaparin (Lovenox)  Mechanical VTE Prophylaxis in Place: Yes    Patient Centered Rounds: I have performed bedside rounds with nursing staff today  Discussions with Specialists or Other Care Team Provider:     Education and Discussions with Family / Patient: patient and spouse by phone    Time Spent for Care: 30 minutes  More than 50% of total time spent on counseling and coordination of care as described above  Current Length of Stay: 2 day(s)    Current Patient Status: Inpatient   Certification Statement: The patient will continue to require additional inpatient hospital stay due to catheter associated UTI       Discharge Plan / Estimated Discharge Date: TBD based on clinical course; likely 24-48hours pending culture results     Code Status: Level 1 - Full Code      Subjective:   Feels tired, but no abd pain or nausea  No f/c     Objective:     Vitals:   Temp (24hrs), Av 2 °F (36 8 °C), Min:97 9 °F (36 6 °C), Max:98 4 °F (36 9 °C)    Temp:  [97 9 °F (36 6 °C)-98 4 °F (36 9 °C)] 97 9 °F (36 6 °C)  HR:  [67-82] 67  Resp:  [16-18] 18  BP: (126-144)/(62-67) 144/67  SpO2:  [94 %-98 %] 94 %  There is no height or weight on file to calculate BMI  Input and Output Summary (last 24 hours): Intake/Output Summary (Last 24 hours) at 2022 1031  Last data filed at 2022 0501  Gross per 24 hour   Intake --   Output 1200 ml   Net -1200 ml       Physical Exam:     Physical Exam  Vitals and nursing note reviewed  Constitutional:       General: She is not in acute distress  Comments: Lethargic, but wakes easily  Cardiovascular:      Rate and Rhythm: Normal rate and regular rhythm  Pulses: Normal pulses  Heart sounds: No murmur heard  Pulmonary:      Effort: Pulmonary effort is normal  No respiratory distress  Breath sounds: Normal breath sounds  No wheezing  Neurological:      General: No focal deficit present  Mental Status: She is alert and oriented to person, place, and time             Additional Data:     Labs:    Results from last 7 days   Lab Units 22  0545   WBC Thousand/uL 3 01*   HEMOGLOBIN g/dL 10 7*   HEMATOCRIT % 33 0*   PLATELETS Thousands/uL 309   NEUTROS PCT % 56   LYMPHS PCT % 27   MONOS PCT % 12   EOS PCT % 3     Results from last 7 days   Lab Units 22  0545 22  0559 22  1849   POTASSIUM mmol/L 4 1   < > 4 9   CHLORIDE mmol/L 107   < > 101   CO2 mmol/L 27   < > 25   BUN mg/dL 21   < > 23   CREATININE mg/dL 0 76   < > 1 14   CALCIUM mg/dL 8 5   < > 9 4   ALK PHOS U/L  --   --  156*   ALT U/L  --   --  30   AST U/L  --   --  25    < > = values in this interval not displayed  Results from last 7 days   Lab Units 05/05/22  1849   INR  0 98       * I Have Reviewed All Lab Data Listed Above  * Additional Pertinent Lab Tests Reviewed: All Labs Within Last 24 Hours Reviewed    Imaging:    Imaging Reports Reviewed Today Include:   Imaging Personally Reviewed by Myself Includes:      Recent Cultures (last 7 days):     Results from last 7 days   Lab Units 05/05/22  1926 05/05/22  1849   BLOOD CULTURE  No Growth at 24 hrs  No Growth at 24 hrs  URINE CULTURE  Culture results to follow  --        Last 24 Hours Medication List:   Current Facility-Administered Medications   Medication Dose Route Frequency Provider Last Rate    acetaminophen  650 mg Oral Q8H [facility] Coby Parekh PA-C      amLODIPine  2 5 mg Oral BID Coby Parekh PA-C      vitamin C  1,000 mg Oral Daily Coby Parekh PA-C      aspirin  81 mg Oral Daily Coby Parekh PA-C      baclofen  20 mg Oral TID Coby Parekh PA-C      cefepime  1,000 mg Intravenous Q12H MIGNON DyerC 1,000 mg (05/06/22 2330)    cholecalciferol  2,000 Units Oral Daily Coby Parekh PA-C      vitamin B-12  250 mcg Oral Daily Coby Parekh PA-C      Dalfampridine ER  10 mg Oral BID Coby Parekh PA-C      enoxaparin  40 mg Subcutaneous Daily Coby Parekh PA-C      furosemide  40 mg Oral Once per day on Mon Fri Coby Parekh PA-C      gabapentin  100 mg Oral TID Coby Parekh PA-C      ondansetron  4 mg Intravenous Q6H PRN Coby Parekh PA-C      sodium chloride (PF)  3 mL Intravenous Q1H PRN Coby Parekh PA-C      tamsulosin  0 8 mg Oral Daily With [employer] Raj Parekh PA-C      vitamin E (tocopherol)  400 Units Oral Daily Coby Parekh PA-C          Today, Patient Was Seen By: Jamie Garnett MD    ** Please Note: This note has been constructed using a voice recognition system   **

## 2022-05-07 NOTE — ASSESSMENT & PLAN NOTE
· Patient w/ SPT placement on 4/26/22 after having chronic indwelling nixon for neurogenic bladder  · tx w/ 7-day course of bactrim  · Continued w/ dx, new rigors  · UA still grossly abnormal  · Not meeting sepsis criteria, can stop IVF  · Received Rocephin in the ED, broadened abx to Cefepime given hx of MDRO   · Ucx: pending  · Bcx: NGTD

## 2022-05-08 LAB
BACTERIA UR CULT: ABNORMAL
BACTERIA UR CULT: ABNORMAL

## 2022-05-08 PROCEDURE — 99232 SBSQ HOSP IP/OBS MODERATE 35: CPT | Performed by: HOSPITALIST

## 2022-05-08 RX ORDER — CEFAZOLIN SODIUM 2 G/50ML
2000 SOLUTION INTRAVENOUS EVERY 8 HOURS
Status: DISCONTINUED | OUTPATIENT
Start: 2022-05-08 | End: 2022-05-09

## 2022-05-08 RX ADMIN — ACETAMINOPHEN 650 MG: 325 TABLET, FILM COATED ORAL at 06:00

## 2022-05-08 RX ADMIN — GABAPENTIN 100 MG: 100 CAPSULE ORAL at 09:45

## 2022-05-08 RX ADMIN — ENOXAPARIN SODIUM 40 MG: 40 INJECTION SUBCUTANEOUS at 09:45

## 2022-05-08 RX ADMIN — BACLOFEN 20 MG: 10 TABLET ORAL at 09:45

## 2022-05-08 RX ADMIN — Medication 2000 UNITS: at 09:45

## 2022-05-08 RX ADMIN — Medication 400 UNITS: at 09:45

## 2022-05-08 RX ADMIN — CYANOCOBALAMIN TAB 500 MCG 250 MCG: 500 TAB at 09:44

## 2022-05-08 RX ADMIN — BACLOFEN 20 MG: 10 TABLET ORAL at 21:30

## 2022-05-08 RX ADMIN — DALFAMPRIDINE 10 MG: 10 TABLET, EXTENDED RELEASE ORAL at 22:00

## 2022-05-08 RX ADMIN — DALFAMPRIDINE 10 MG: 10 TABLET, EXTENDED RELEASE ORAL at 09:46

## 2022-05-08 RX ADMIN — CEFEPIME HYDROCHLORIDE 1000 MG: 2 INJECTION, POWDER, FOR SOLUTION INTRAVENOUS at 00:35

## 2022-05-08 RX ADMIN — ACETAMINOPHEN 650 MG: 325 TABLET, FILM COATED ORAL at 21:30

## 2022-05-08 RX ADMIN — ASPIRIN 81 MG: 81 TABLET, COATED ORAL at 09:45

## 2022-05-08 RX ADMIN — AMLODIPINE BESYLATE 2.5 MG: 2.5 TABLET ORAL at 09:44

## 2022-05-08 RX ADMIN — BACLOFEN 20 MG: 10 TABLET ORAL at 17:30

## 2022-05-08 RX ADMIN — TAMSULOSIN HYDROCHLORIDE 0.8 MG: 0.4 CAPSULE ORAL at 17:30

## 2022-05-08 RX ADMIN — CEFAZOLIN SODIUM 2000 MG: 2 SOLUTION INTRAVENOUS at 20:36

## 2022-05-08 RX ADMIN — GABAPENTIN 100 MG: 100 CAPSULE ORAL at 21:30

## 2022-05-08 RX ADMIN — GABAPENTIN 100 MG: 100 CAPSULE ORAL at 17:32

## 2022-05-08 RX ADMIN — ACETAMINOPHEN 650 MG: 325 TABLET, FILM COATED ORAL at 13:45

## 2022-05-08 RX ADMIN — CEFAZOLIN SODIUM 2000 MG: 2 SOLUTION INTRAVENOUS at 13:44

## 2022-05-08 RX ADMIN — AMLODIPINE BESYLATE 2.5 MG: 2.5 TABLET ORAL at 17:30

## 2022-05-08 RX ADMIN — OXYCODONE HYDROCHLORIDE AND ACETAMINOPHEN 1000 MG: 500 TABLET ORAL at 09:45

## 2022-05-08 NOTE — ASSESSMENT & PLAN NOTE
· Patient w/ SPT placement on 4/26/22 after having chronic indwelling nixon for neurogenic bladder  · tx w/ 7-day course of bactrim  · Continued w/ dx, new rigors  · UA still grossly abnormal  · Not meeting sepsis criteria, can stop IVF  · Received Rocephin in the ED, broadened abx to Cefepime given hx of MDRO  · Ucx:  Sensitive to ancef    Will switch for now   · Bcx: NGTD

## 2022-05-08 NOTE — CASE MANAGEMENT
Case Management Discharge Planning Note    Patient name Valerie MONSON /S -01 MRN [id number]  : 1955 Date 2022       Current Admission Date: 2022  Current Admission Diagnosis:Urinary tract infection associated with cystostomy catheter [facility])   Patient Active Problem List    Diagnosis Date Noted    Anemia 2022    Urinary tract infection associated with cystostomy catheter ([facility] [address] ) 2022    Debility 2021    Acute bilateral thoracic back pain 2021    Hypernatremia 2021    Multiple sclerosis ([facility] [address] ) 2021    Ambulatory dysfunction 2021    Acute cystitis without hematuria 2021    Hypertension 2021    Neurogenic bladder 2021    Leg swelling 2021    Acquired polyneuropathy       LOS (days): 3  Geometric Mean LOS (GMLOS) (days): 2 40  Days to GMLOS:-0 2     OBJECTIVE:  Risk of Unplanned Readmission Score: 10         Current admission status: Inpatient   [facility]erred Pharmacy:   [id number] [address] [address]  Phone: [phone] Fax: [address] [place] [address]  Phone: [phone] Fax: [phone]    Primary Care Provider: Nickie Dennis MD    Primary Insurance: MEDICARE  Secondary Insurance: BLUE CROSS    DISCHARGE DETAILS:        CM met with pt and family to review DCP and PM&R consult  CM explained pt would be evaluated by them in order to determine the best level of rehab for pt  They are in agreement with all plans and referrals

## 2022-05-08 NOTE — PROGRESS NOTES
[facility]  Progress Note - Rima Messer Seats 1955, 73 y o  female MRN: [id number]  Unit/Bed#: S -01 Encounter: [id number]  Primary Care Provider: Katalina Umaña MD   Date and time admitted to hospital: 5/5/2022  6:01 PM    Anemia  Assessment & Plan  · Significant drop in hemoglobin of nearly 3 g overnight however this is more likely to be hemodilution with IV fluids as she is not having any bleeding  ·  recheck CBC is stable     Neurogenic bladder  Assessment & Plan  · 2/2 MS  · Previously had indwelling urinary catheter; changed to SPT 4/26/22    Multiple sclerosis ([facility] [address] )  Assessment & Plan  · Patient takes neurontin, baclofen and Dalfampridine   · Dalfampridine will need to be brought in from home-- patient and  aware  · PT and OT assessments appreciated    * Urinary tract infection associated with cystostomy catheter ([facility] [address] )  Assessment & Plan  · Patient w/ SPT placement on 4/26/22 after having chronic indwelling nixon for neurogenic bladder  · tx w/ 7-day course of bactrim  · Continued w/ dx, new rigors  · UA still grossly abnormal  · Not meeting sepsis criteria, can stop IVF  · Received Rocephin in the ED, broadened abx to Cefepime given hx of MDRO  · Ucx:  Sensitive to ancef  Will switch for now   · Bcx: NGTD         VTE Pharmacologic Prophylaxis:   Pharmacologic: Enoxaparin (Lovenox)  Mechanical VTE Prophylaxis in Place: Yes    Patient Centered Rounds: I have performed bedside rounds with nursing staff today  Discussions with Specialists or Other Care Team Provider:     Education and Discussions with Family / Patient: patient and      Time Spent for Care: 30 minutes  More than 50% of total time spent on counseling and coordination of care as described above      Current Length of Stay: 3 day(s)    Current Patient Status: Inpatient   Certification Statement: The patient will continue to require additional inpatient hospital stay due to catheter associate infection on IV abx  Discharge Plan / Estimated Discharge Date: pending rehab placement     Code Status: Level 1 - Full Code    Subjective:   Patient feels well  No new complaints  Denies abdominal pain or nausea  Objective:     Vitals:   Temp (24hrs), Av 3 °F (36 3 °C), Min:97 3 °F (36 3 °C), Max:97 3 °F (36 3 °C)    Temp:  [97 3 °F (36 3 °C)] 97 3 °F (36 3 °C)  HR:  [77-78] 78  Resp:  [18] 18  BP: (121-131)/(57-74) 121/67  SpO2:  [91 %-94 %] 94 %  There is no height or weight on file to calculate BMI  Input and Output Summary (last 24 hours): Intake/Output Summary (Last 24 hours) at 2022 1201  Last data filed at 2022 8807  Gross per 24 hour   Intake --   Output 1250 ml   Net -1250 ml       Physical Exam:     Physical Exam  Vitals and nursing note reviewed  Constitutional:       General: She is not in acute distress  Appearance: Normal appearance  She is not ill-appearing or toxic-appearing  HENT:      Head: Normocephalic and atraumatic  Cardiovascular:      Rate and Rhythm: Normal rate and regular rhythm  Pulses: Normal pulses  Pulmonary:      Effort: Pulmonary effort is normal  No respiratory distress  Breath sounds: No wheezing  Abdominal:      General: Abdomen is flat  Palpations: Abdomen is soft  Neurological:      Mental Status: She is alert and oriented to person, place, and time             Additional Data:     Labs:    Results from last 7 days   Lab Units 22  0545   WBC Thousand/uL 3 01*   HEMOGLOBIN g/dL 10 7*   HEMATOCRIT % 33 0*   PLATELETS Thousands/uL 309   NEUTROS PCT % 56   LYMPHS PCT % 27   MONOS PCT % 12   EOS PCT % 3     Results from last 7 days   Lab Units 22  0545 22  0559 22  1849   POTASSIUM mmol/L 4 1   < > 4 9   CHLORIDE mmol/L 107   < > 101   CO2 mmol/L 27   < > 25   BUN mg/dL 21   < > 23   CREATININE mg/dL 0 76   < > 1 14   CALCIUM mg/dL 8 5   < > 9 4   ALK PHOS U/L  --   --  156*   ALT U/L  --   --  30 AST U/L  --   --  25    < > = values in this interval not displayed  Results from last 7 days   Lab Units 05/05/22  1849   INR  0 98       * I Have Reviewed All Lab Data Listed Above  * Additional Pertinent Lab Tests Reviewed: All Labs Within Last 24 Hours Reviewed    Imaging:    Imaging Reports Reviewed Today Include:   Imaging Personally Reviewed by Myself Includes:      Recent Cultures (last 7 days):     Results from last 7 days   Lab Units 05/05/22  1926 05/05/22  1849   BLOOD CULTURE  No Growth at 48 hrs  No Growth at 48 hrs     URINE CULTURE  >100,000 cfu/ml Klebsiella pneumoniae*  --        Last 24 Hours Medication List:   Current Facility-Administered Medications   Medication Dose Route Frequency Provider Last Rate    acetaminophen  650 mg Oral Q8H [facility] Coby Parekh PA-C      amLODIPine  2 5 mg Oral BID Coby Parekh PA-C      vitamin C  1,000 mg Oral Daily Coby Parekh PA-C      aspirin  81 mg Oral Daily Coby Parekh PA-C      baclofen  20 mg Oral TID Coby Parekh PA-C      cefepime  1,000 mg Intravenous Q12H MIGNON DyerC 1,000 mg (05/08/22 0035)    cholecalciferol  2,000 Units Oral Daily Coby Parekh PA-C      vitamin B-12  250 mcg Oral Daily Coby Parekh PA-C      Dalfampridine ER  10 mg Oral BID Coby Parekh PA-C      enoxaparin  40 mg Subcutaneous Daily Coby Parekh PA-C      furosemide  40 mg Oral Once per day on Mon Fri Coby Parekh PA-C      gabapentin  100 mg Oral TID Coby Parekh PA-C      ondansetron  4 mg Intravenous Q6H PRN Coby Parekh PA-C      sodium chloride (PF)  3 mL Intravenous Q1H PRN Coby Parekh PA-C      tamsulosin  0 8 mg Oral Daily With [employer] Parekh PA-C      vitamin E (tocopherol)  400 Units Oral Daily Coby Parekh PA-C          Today, Patient Was Seen By: Pavel Brock MD    ** Please Note: This note has been constructed using a voice recognition system   **

## 2022-05-08 NOTE — CASE MANAGEMENT
Case Management Assessment & Discharge Planning Note    Patient name Hennie Severs /S -01 MRN [id number]  : 1955 Date 2022       Current Admission Date: 2022  Current Admission Diagnosis:Urinary tract infection associated with cystostomy catheter [facility])   Patient Active Problem List    Diagnosis Date Noted    Anemia 2022    Urinary tract infection associated with cystostomy catheter ([facility] [address] ) 2022    Debility 2021    Acute bilateral thoracic back pain 2021    Hypernatremia 2021    Multiple sclerosis ([facility] [address] ) 2021    Ambulatory dysfunction 2021    Acute cystitis without hematuria 2021    Hypertension 2021    Neurogenic bladder 2021    Leg swelling 2021    Acquired polyneuropathy       LOS (days): 3  Geometric Mean LOS (GMLOS) (days): 2 40  Days to GMLOS:-0 1     OBJECTIVE:    Risk of Unplanned Readmission Score: 10         Current admission status: Inpatient  Referral Reason: Acute Rehab    [facility]acy:   [id number] [address] [address]  Phone: [phone] Fax: [address]  Phone: [phone] Fax: [phone]    Primary Care Provider: Peterson Alvarado MD    Primary Insurance: MEDICARE  Secondary Insurance: BLUE CROSS    ASSESSMENT:  [id number] [facility] Representative - Spouse   Primary Phone: [phone] (Mobile)  Home Phone: [phone]               Advance Directives  Does patient have a [address]?: No  Was patient offered paperwork?: Yes  Does patient currently have a Health Care decision maker?: Yes, please see Health Care Proxy section  Does patient have Advance Directives?: No  Was patient offered paperwork?: Yes  Primary Contact:          Readmission Root Cause  30 Day Readmission: No    Patient Information  Admitted from[de-identified] Home  Mental Status: Alert  During Assessment patient was accompanied by: Not accompanied during assessment  Assessment information provided by[de-identified] Patient  Primary Caregiver: Spouse  Caregiver's Name[de-identified] Judson Carlson -   Support Systems: Spouse/significant other  [place] of Residence: [address] [place] [address] do you live in?: [place] entry access options   Select all that apply : Ramp  Type of Current Residence: [place]  In the last 12 months, was there a time when you were not able to pay the mortgage or rent on time?: No  In the last 12 months, was there a time when you did not have a steady place to sleep or slept in a shelter (including now)?: No  Living Arrangements: Lives w/ Spouse/significant other  Is patient a ?: No    Activities of Daily Living Prior to Admission  Functional Status: Independent (needs assistance from her  in the morning to get OOB)  Completes ADLs independently?: Yes  Ambulates independently?: Yes  Does patient use assisted devices?: Yes  Assisted Devices (DME) used: Andres Tobias  Does patient currently own DME?: Yes  What DME does the patient currently own?: Dilcia Duncanx  Does patient have a history of Outpatient Therapy (PT/OT)?: No  Does the patient have a history of Short-Term Rehab?: Yes ([facility])  Does patient have a history of HHC?: Yes ([facility])  Does patient currently have [facility]?: No         Patient Information Continued  Income Source: SSI/SSD  Does patient have prescription coverage?: Yes  Within the past 12 months, you worried that your food would run out before you got the money to buy more : Never true  Within the past 12 months, the food you bought just didnt last and you didnt have money to get more : Never true  Does patient receive dialysis treatments?: No  Does patient have a history of substance abuse?: No  Does patient have a history of Mental Health Diagnosis?: No Means of Transportation  Means of Transport to Appts[de-identified] Family transport  In the past 12 months, has lack of transportation kept you from medical appointments or from getting medications?: No  In the past 12 months, has lack of transportation kept you from meetings, work, or from getting things needed for daily living?: No        DISCHARGE DETAILS:    Discharge planning discussed with[de-identified] pt  Freedom of Choice: Yes  Comments - Freedom of Choice: CM reviewed the recommendations of the care team with pt for rehab  CM suggested looking into acute rehab due to her hx of MS  CM explained PM&R has been consulted in order to obtain recommendations for the appropriate level of rehab  Pt is in agreement with this  She states she would prefer to go to acute  CM contacted family/caregiver?: No- see comments  Were Treatment Team discharge recommendations reviewed with patient/caregiver?: Yes  Did patient/caregiver verbalize understanding of patient care needs?: Yes  Were patient/caregiver advised of the risks associated with not following Treatment Team discharge recommendations?: Yes         Requested [address]         Is the patient interested in [place] [address] at discharge?: No    DME Referral Provided  Referral made for DME?: No    Other Referral/Resources/Interventions Provided:  Interventions: Acute Rehab  Referral Comments: Pt is requesting referrals be made to [facility]  She is aware of PM&R consult  referrals have been made  CM to follow  Pt is stable for DC           Treatment Team Recommendation: Acute Rehab,Short Term Rehab  Discharge Destination Plan[de-identified] Acute Rehab,Short Term Rehab

## 2022-05-08 NOTE — PLAN OF CARE
Problem: PAIN - ADULT  Goal: Verbalizes/displays adequate comfort level or baseline comfort level  Description: Interventions:  - Encourage patient to monitor pain and request assistance  - Assess pain using appropriate pain scale  - Administer analgesics based on type and severity of pain and evaluate response  - Implement non-pharmacological measures as appropriate and evaluate response  - Consider cultural and social influences on pain and pain management  - Notify physician/advanced practitioner if interventions unsuccessful or patient reports new pain  Outcome: Progressing     Problem: INFECTION - ADULT  Goal: Absence or prevention of progression during hospitalization  Description: INTERVENTIONS:  - Assess and monitor for signs and symptoms of infection  - Monitor lab/diagnostic results  - Monitor all insertion sites, i e  indwelling lines, tubes, and drains  - Monitor endotracheal if appropriate and nasal secretions for changes in amount and color  - [place] appropriate cooling/warming therapies per order  - Administer medications as ordered  - Instruct and encourage patient and family to use good hand hygiene technique  - Identify and instruct in appropriate isolation precautions for identified infection/condition  Outcome: Progressing  Goal: Absence of fever/infection during neutropenic period  Description: INTERVENTIONS:  - Monitor WBC    Outcome: Progressing     Problem: SAFETY ADULT  Goal: Patient will remain free of falls  Description: INTERVENTIONS:  - Educate patient/family on patient safety including physical limitations  - Instruct patient to call for assistance with activity   - Consult OT/PT to assist with strengthening/mobility   - Keep Call bell within reach  - Keep bed low and locked with side rails adjusted as appropriate  - Keep care items and personal belongings within reach  - Initiate and maintain comfort rounds  - Make Fall Risk Sign visible to staff  - Initiate/Maintain bed alarm  - Apply yellow socks and bracelet for high fall risk patients  - Consider moving patient to room near nurses station  Outcome: Progressing  Goal: Maintain or return to baseline ADL function  Description: INTERVENTIONS:  -  Assess patient's ability to carry out ADLs; assess patient's baseline for ADL function and identify physical deficits which impact ability to perform ADLs (bathing, care of mouth/teeth, toileting, grooming, dressing, etc )  - Assess/evaluate cause of self-care deficits   - Assess range of motion  - Assess patient's mobility; develop plan if impaired  - Assess patient's need for assistive devices and provide as appropriate  - Encourage maximum independence but intervene and supervise when necessary  - Involve family in performance of ADLs  - Assess for home care needs following discharge   - Consider OT consult to assist with ADL evaluation and planning for discharge  - Provide patient education as appropriate  Outcome: Progressing  Goal: Maintains/Returns to pre admission functional level  Description: INTERVENTIONS:  - Perform BMAT or MOVE assessment daily    - Set and communicate daily mobility goal to care team and patient/family/caregiver  - Collaborate with rehabilitation services on mobility goals if consulted  - Perform Range of Motion 3 times a day  - Reposition patient every 2 hours    - Dangle patient 3 times a day  - Stand patient 3 times a day  - Ambulate patient 3 times a day  - Out of bed to chair 3 times a day   - Out of bed for meals 3 times a day  - Out of bed for toileting  - Record patient progress and toleration of activity level   Outcome: Progressing     Problem: DISCHARGE PLANNING  Goal: Discharge to home or other facility with appropriate resources  Description: INTERVENTIONS:  - Identify barriers to discharge w/patient and caregiver  - Arrange for needed discharge resources and transportation as appropriate  - Identify discharge learning needs (meds, wound care, etc )  - Arrange for interpretive services to assist at discharge as needed  - Refer to Case Management Department for coordinating discharge planning if the patient needs post-hospital services based on physician/advanced practitioner order or complex needs related to functional status, cognitive ability, or social support system  Outcome: Progressing     Problem: Knowledge Deficit  Goal: Patient/family/caregiver demonstrates understanding of disease process, treatment plan, medications, and discharge instructions  Description: Complete learning assessment and assess knowledge base  Interventions:  - Provide teaching at level of understanding  - Provide teaching via preferred learning methods  Outcome: Progressing     Problem: MOBILITY - ADULT  Goal: Maintain or return to baseline ADL function  Description: INTERVENTIONS:  -  Assess patient's ability to carry out ADLs; assess patient's baseline for ADL function and identify physical deficits which impact ability to perform ADLs (bathing, care of mouth/teeth, toileting, grooming, dressing, etc )  - Assess/evaluate cause of self-care deficits   - Assess range of motion  - Assess patient's mobility; develop plan if impaired  - Assess patient's need for assistive devices and provide as appropriate  - Encourage maximum independence but intervene and supervise when necessary  - Involve family in performance of ADLs  - Assess for home care needs following discharge   - Consider OT consult to assist with ADL evaluation and planning for discharge  - Provide patient education as appropriate  Outcome: Progressing  Goal: Maintains/Returns to pre admission functional level  Description: INTERVENTIONS:  - Perform BMAT or MOVE assessment daily    - Set and communicate daily mobility goal to care team and patient/family/caregiver  - Collaborate with rehabilitation services on mobility goals if consulted  - Perform Range of Motion 3 times a day  - Reposition patient every 2 hours    - Dangle patient 3 times a day  - Out of bed to chair 3 times a day   - Out of bed for meals 3times a day  - Out of bed for toileting  - Record patient progress and toleration of activity level   Outcome: Progressing     Problem: Prexisting or High Potential for Compromised Skin Integrity  Goal: Skin integrity is maintained or improved  Description: INTERVENTIONS:  - Identify patients at risk for skin breakdown  - Assess and monitor skin integrity  - Assess and monitor nutrition and hydration status  - Monitor labs   - Assess for incontinence   - Turn and reposition patient  - Assist with mobility/ambulation  - Relieve pressure over bony prominences  - Avoid friction and shearing  - Provide appropriate hygiene as needed including keeping skin clean and dry  - Evaluate need for skin moisturizer/barrier cream  - Collaborate with interdisciplinary team   - Patient/family teaching  - Consider wound care consult   Outcome: Progressing     Problem: Potential for Falls  Goal: Patient will remain free of falls  Description: INTERVENTIONS:  - Educate patient/family on patient safety including physical limitations  - Instruct patient to call for assistance with activity   - Consult OT/PT to assist with strengthening/mobility   - Keep Call bell within reach  - Keep bed low and locked with side rails adjusted as appropriate  - Keep care items and personal belongings within reach  - Initiate and maintain comfort rounds  - Make Fall Risk Sign visible to staff  - Initiate/Maintain bed alarm  - Apply yellow socks and bracelet for high fall risk patients  - Consider moving patient to room near nurses station  Outcome: Progressing

## 2022-05-09 ENCOUNTER — HOSPITAL ENCOUNTER (INPATIENT)
Facility: HOSPITAL | Age: 67
LOS: 17 days | Discharge: HOME HEALTH | DRG: 059 | End: 2022-05-26
Attending: PHYSICAL MEDICINE & REHABILITATION | Admitting: PHYSICAL MEDICINE & REHABILITATION
Payer: MEDICARE

## 2022-05-09 VITALS
DIASTOLIC BLOOD PRESSURE: 64 MMHG | SYSTOLIC BLOOD PRESSURE: 124 MMHG | HEART RATE: 71 BPM | TEMPERATURE: 97.2 F | RESPIRATION RATE: 18 BRPM | OXYGEN SATURATION: 95 %

## 2022-05-09 DIAGNOSIS — K92.1 BLOOD IN STOOL: ICD-10-CM

## 2022-05-09 DIAGNOSIS — G35 MULTIPLE SCLEROSIS (HCC): ICD-10-CM

## 2022-05-09 DIAGNOSIS — N39.0 URINARY TRACT INFECTION ASSOCIATED WITH CYSTOSTOMY CATHETER (HCC): Primary | ICD-10-CM

## 2022-05-09 DIAGNOSIS — N39.0 RECURRENT UTI: ICD-10-CM

## 2022-05-09 DIAGNOSIS — R53.81 DEBILITY: ICD-10-CM

## 2022-05-09 DIAGNOSIS — T83.510A URINARY TRACT INFECTION ASSOCIATED WITH CYSTOSTOMY CATHETER (HCC): Primary | ICD-10-CM

## 2022-05-09 LAB
ATRIAL RATE: 90 BPM
FLUAV RNA RESP QL NAA+PROBE: NEGATIVE
FLUBV RNA RESP QL NAA+PROBE: NEGATIVE
P AXIS: 83 DEGREES
PR INTERVAL: 182 MS
QRS AXIS: 51 DEGREES
QRSD INTERVAL: 68 MS
QT INTERVAL: 358 MS
QTC INTERVAL: 437 MS
RSV RNA RESP QL NAA+PROBE: NEGATIVE
SARS-COV-2 RNA RESP QL NAA+PROBE: NEGATIVE
T WAVE AXIS: 62 DEGREES
VENTRICULAR RATE: 90 BPM

## 2022-05-09 PROCEDURE — 99222 1ST HOSP IP/OBS MODERATE 55: CPT | Performed by: PHYSICAL MEDICINE & REHABILITATION

## 2022-05-09 PROCEDURE — 0241U HB NFCT DS VIR RESP RNA 4 TRGT: CPT | Performed by: INTERNAL MEDICINE

## 2022-05-09 PROCEDURE — 97530 THERAPEUTIC ACTIVITIES: CPT

## 2022-05-09 PROCEDURE — 97535 SELF CARE MNGMENT TRAINING: CPT

## 2022-05-09 PROCEDURE — 99239 HOSP IP/OBS DSCHRG MGMT >30: CPT | Performed by: INTERNAL MEDICINE

## 2022-05-09 PROCEDURE — NC001 PR NO CHARGE

## 2022-05-09 PROCEDURE — 99221 1ST HOSP IP/OBS SF/LOW 40: CPT | Performed by: INTERNAL MEDICINE

## 2022-05-09 PROCEDURE — 93010 ELECTROCARDIOGRAM REPORT: CPT | Performed by: INTERNAL MEDICINE

## 2022-05-09 PROCEDURE — 97116 GAIT TRAINING THERAPY: CPT

## 2022-05-09 RX ORDER — GABAPENTIN 100 MG/1
100 CAPSULE ORAL 3 TIMES DAILY
Status: DISCONTINUED | OUTPATIENT
Start: 2022-05-09 | End: 2022-05-10

## 2022-05-09 RX ORDER — ACETAMINOPHEN 325 MG/1
975 TABLET ORAL EVERY 8 HOURS SCHEDULED
Status: DISCONTINUED | OUTPATIENT
Start: 2022-05-09 | End: 2022-05-09

## 2022-05-09 RX ORDER — TAMSULOSIN HYDROCHLORIDE 0.4 MG/1
0.8 CAPSULE ORAL
Status: CANCELLED | OUTPATIENT
Start: 2022-05-09

## 2022-05-09 RX ORDER — MELATONIN
2000 DAILY
Status: CANCELLED | OUTPATIENT
Start: 2022-05-10

## 2022-05-09 RX ORDER — DALFAMPRIDINE 10 MG/1
10 TABLET, FILM COATED, EXTENDED RELEASE ORAL EVERY 12 HOURS
Status: DISCONTINUED | OUTPATIENT
Start: 2022-05-09 | End: 2022-05-09

## 2022-05-09 RX ORDER — FUROSEMIDE 40 MG/1
40 TABLET ORAL 2 TIMES WEEKLY
Status: DISCONTINUED | OUTPATIENT
Start: 2022-05-13 | End: 2022-05-26 | Stop reason: HOSPADM

## 2022-05-09 RX ORDER — POLYETHYLENE GLYCOL 3350 17 G/17G
17 POWDER, FOR SOLUTION ORAL DAILY PRN
Status: DISCONTINUED | OUTPATIENT
Start: 2022-05-09 | End: 2022-05-11

## 2022-05-09 RX ORDER — GABAPENTIN 100 MG/1
100 CAPSULE ORAL 3 TIMES DAILY
Status: CANCELLED | OUTPATIENT
Start: 2022-05-09

## 2022-05-09 RX ORDER — SODIUM CHLORIDE 9 MG/ML
3 INJECTION INTRAVENOUS
Status: CANCELLED | OUTPATIENT
Start: 2022-05-09

## 2022-05-09 RX ORDER — CEPHALEXIN 500 MG/1
500 CAPSULE ORAL EVERY 6 HOURS SCHEDULED
Status: CANCELLED | OUTPATIENT
Start: 2022-05-09 | End: 2022-05-12

## 2022-05-09 RX ORDER — ENOXAPARIN SODIUM 100 MG/ML
40 INJECTION SUBCUTANEOUS DAILY
Status: CANCELLED | OUTPATIENT
Start: 2022-05-10

## 2022-05-09 RX ORDER — BACLOFEN 20 MG/1
20 TABLET ORAL 3 TIMES DAILY
Status: DISCONTINUED | OUTPATIENT
Start: 2022-05-09 | End: 2022-05-10

## 2022-05-09 RX ORDER — FUROSEMIDE 40 MG/1
40 TABLET ORAL 2 TIMES WEEKLY
Status: CANCELLED | OUTPATIENT
Start: 2022-05-13

## 2022-05-09 RX ORDER — ASPIRIN 81 MG/1
81 TABLET ORAL DAILY
Status: CANCELLED | OUTPATIENT
Start: 2022-05-10

## 2022-05-09 RX ORDER — TAMSULOSIN HYDROCHLORIDE 0.4 MG/1
0.8 CAPSULE ORAL
Status: DISCONTINUED | OUTPATIENT
Start: 2022-05-09 | End: 2022-05-10

## 2022-05-09 RX ORDER — DALFAMPRIDINE 10 MG/1
10 TABLET, FILM COATED, EXTENDED RELEASE ORAL 2 TIMES DAILY
Status: CANCELLED | OUTPATIENT
Start: 2022-05-09

## 2022-05-09 RX ORDER — ACETAMINOPHEN 325 MG/1
650 TABLET ORAL EVERY 8 HOURS SCHEDULED
Status: CANCELLED | OUTPATIENT
Start: 2022-05-09

## 2022-05-09 RX ORDER — ONDANSETRON 2 MG/ML
4 INJECTION INTRAMUSCULAR; INTRAVENOUS EVERY 6 HOURS PRN
Status: CANCELLED | OUTPATIENT
Start: 2022-05-09

## 2022-05-09 RX ORDER — CEPHALEXIN 500 MG/1
500 CAPSULE ORAL EVERY 6 HOURS SCHEDULED
Status: DISCONTINUED | OUTPATIENT
Start: 2022-05-09 | End: 2022-05-09 | Stop reason: HOSPADM

## 2022-05-09 RX ORDER — DALFAMPRIDINE 10 MG/1
10 TABLET, FILM COATED, EXTENDED RELEASE ORAL EVERY 12 HOURS
Status: DISCONTINUED | OUTPATIENT
Start: 2022-05-09 | End: 2022-05-26 | Stop reason: HOSPADM

## 2022-05-09 RX ORDER — CEPHALEXIN 500 MG/1
500 CAPSULE ORAL EVERY 6 HOURS SCHEDULED
Status: COMPLETED | OUTPATIENT
Start: 2022-05-09 | End: 2022-05-12

## 2022-05-09 RX ORDER — MELATONIN
2000 DAILY
Status: DISCONTINUED | OUTPATIENT
Start: 2022-05-10 | End: 2022-05-26 | Stop reason: HOSPADM

## 2022-05-09 RX ORDER — ENOXAPARIN SODIUM 100 MG/ML
40 INJECTION SUBCUTANEOUS DAILY
Status: DISCONTINUED | OUTPATIENT
Start: 2022-05-10 | End: 2022-05-16

## 2022-05-09 RX ORDER — ASPIRIN 81 MG/1
81 TABLET ORAL DAILY
Status: DISCONTINUED | OUTPATIENT
Start: 2022-05-10 | End: 2022-05-10

## 2022-05-09 RX ORDER — AMLODIPINE BESYLATE 2.5 MG/1
2.5 TABLET ORAL 2 TIMES DAILY
Status: CANCELLED | OUTPATIENT
Start: 2022-05-09

## 2022-05-09 RX ORDER — ASCORBIC ACID 500 MG
1000 TABLET ORAL DAILY
Status: CANCELLED | OUTPATIENT
Start: 2022-05-10

## 2022-05-09 RX ORDER — BACLOFEN 10 MG/1
20 TABLET ORAL 3 TIMES DAILY
Status: CANCELLED | OUTPATIENT
Start: 2022-05-09

## 2022-05-09 RX ORDER — AMLODIPINE BESYLATE 2.5 MG/1
2.5 TABLET ORAL EVERY 12 HOURS
Status: DISCONTINUED | OUTPATIENT
Start: 2022-05-09 | End: 2022-05-26 | Stop reason: HOSPADM

## 2022-05-09 RX ADMIN — GABAPENTIN 100 MG: 100 CAPSULE ORAL at 09:33

## 2022-05-09 RX ADMIN — ACETAMINOPHEN 650 MG: 325 TABLET, FILM COATED ORAL at 13:51

## 2022-05-09 RX ADMIN — GABAPENTIN 100 MG: 100 CAPSULE ORAL at 17:23

## 2022-05-09 RX ADMIN — CEPHALEXIN 500 MG: 500 CAPSULE ORAL at 13:40

## 2022-05-09 RX ADMIN — FUROSEMIDE 40 MG: 40 TABLET ORAL at 09:37

## 2022-05-09 RX ADMIN — TAMSULOSIN HYDROCHLORIDE 0.8 MG: 0.4 CAPSULE ORAL at 17:23

## 2022-05-09 RX ADMIN — CEPHALEXIN 500 MG: 500 CAPSULE ORAL at 23:10

## 2022-05-09 RX ADMIN — ENOXAPARIN SODIUM 40 MG: 40 INJECTION SUBCUTANEOUS at 09:35

## 2022-05-09 RX ADMIN — BACLOFEN 20 MG: 20 TABLET ORAL at 17:23

## 2022-05-09 RX ADMIN — AMLODIPINE BESYLATE 2.5 MG: 2.5 TABLET ORAL at 20:43

## 2022-05-09 RX ADMIN — Medication 2000 UNITS: at 09:37

## 2022-05-09 RX ADMIN — GABAPENTIN 100 MG: 100 CAPSULE ORAL at 20:43

## 2022-05-09 RX ADMIN — CEPHALEXIN 500 MG: 500 CAPSULE ORAL at 17:23

## 2022-05-09 RX ADMIN — BACLOFEN 20 MG: 10 TABLET ORAL at 09:37

## 2022-05-09 RX ADMIN — ASPIRIN 81 MG: 81 TABLET, COATED ORAL at 09:37

## 2022-05-09 RX ADMIN — CEFAZOLIN SODIUM 2000 MG: 2 SOLUTION INTRAVENOUS at 05:27

## 2022-05-09 RX ADMIN — DALFAMPRIDINE 10 MG: 10 TABLET, EXTENDED RELEASE ORAL at 23:10

## 2022-05-09 RX ADMIN — CYANOCOBALAMIN TAB 500 MCG 250 MCG: 500 TAB at 09:33

## 2022-05-09 RX ADMIN — OXYCODONE HYDROCHLORIDE AND ACETAMINOPHEN 1000 MG: 500 TABLET ORAL at 09:33

## 2022-05-09 RX ADMIN — Medication 400 UNITS: at 09:37

## 2022-05-09 RX ADMIN — ACETAMINOPHEN 650 MG: 325 TABLET, FILM COATED ORAL at 05:26

## 2022-05-09 RX ADMIN — BACLOFEN 20 MG: 20 TABLET ORAL at 20:43

## 2022-05-09 RX ADMIN — DALFAMPRIDINE 10 MG: 10 TABLET, EXTENDED RELEASE ORAL at 09:37

## 2022-05-09 RX ADMIN — AMLODIPINE BESYLATE 2.5 MG: 2.5 TABLET ORAL at 09:37

## 2022-05-09 NOTE — ASSESSMENT & PLAN NOTE
· Patient w/ SPT placement on 4/26/22 after having chronic indwelling nixon for neurogenic bladder  · tx w/ 7-day course of bactrim  · Continued w/ dx, new rigors  · UA still grossly abnormal  · Not meeting sepsis criteria, can stop IVF  · Received Rocephin in the ED, broadened abx to Cefepime given hx of MDRO  · Ucx:  Sensitive to ancef  Transitioned to Ancef   · Bcx: NGTD   · Will discharge on p o   Keflex complete a total of 7 days of antibiotics

## 2022-05-09 NOTE — PLAN OF CARE
Problem: OCCUPATIONAL THERAPY ADULT  Goal: Performs self-care activities at highest level of function for planned discharge setting  See evaluation for individualized goals  Description: Treatment Interventions: ADL retraining,Functional transfer training,UE strengthening/ROM,Endurance training,Patient/family training,Equipment evaluation/education,Compensatory technique education,Continued evaluation          See flowsheet documentation for full assessment, interventions and recommendations  Outcome: Progressing  Note: Limitation: Decreased ADL status,Decreased UE ROM,Decreased UE strength,Decreased endurance,Decreased self-care trans,Decreased high-level ADLs  Prognosis: Good  Assessment: Pt seen for treatment session this AM for full ADL and mobility assessment  Demonstrates poor trunk control, leg strength, sitting balance and standing balance this session, with varying levels of assistance required throughout the session, increasing as the session progresses  Initially able to stand with max A x 1 and take steps toward bathroom with mod A x 1 and a chair follow for safety, but requires max A x 2 to stand and is unable to take steps by end of session due to fatiguing quickly  Pt demonstrates upper body self care with set up only this session, however takes additional time due to UE weakness  Remains max A to dependent for lower body self care due to poor standing balance and tolerance, and unaware of incontinence  Pt will benefit from continued OT to maximize independence in mobility and self care in order to return home with reduced caregiver burden  Recommending post acute rehab        OT Discharge Recommendation: Post acute rehabilitation services

## 2022-05-09 NOTE — DISCHARGE SUMMARY
After Visit Summary   11/21/2017    Abimael Martinez    MRN: [id number]           Patient Information     Date Of Birth          1990        Visit Information        Provider Department      11/21/2017 2:45 PM Jamison Lora MD [facility]        Today's Diagnoses     Anxiety           Follow-ups after your visit        Follow-up notes from your care team     Return in about 4 weeks (around 12/19/2017) for recheck on anxiety.      Who to contact     If you have questions or need follow up information about today's clinic visit or your schedule please contact [facility] directly at [phone].  Normal or non-critical lab and imaging results will be communicated to you by MyChart, letter or phone within 4 business days after the clinic has received the results. If you do not hear from us within 7 days, please contact the clinic through [employer] or phone. If you have a critical or abnormal lab result, we will notify you by phone as soon as possible.  Submit refill requests through [employer] or call your pharmacy and they will forward the refill request to us. Please allow 3 business days for your refill to be completed.          Additional Information About Your Visit        MyChart Information     [employer] gives you secure access to your electronic health record. If you see a primary care provider, you can also send messages to your care team and make appointments. If you have questions, please call your primary care clinic.  If you do not have a primary care provider, please call [phone] and they will assist you.        Care EveryWhere ID     This is your Care EveryWhere ID. This could be used by other organizations to access your [place] medical records  [id number]        Your Vitals Were     Pulse Temperature Pulse Oximetry BMI (Body Mass Index)          91 97.4  F (36.3  C) (Oral) 98% 33.51 kg/m2         Blood Pressure from Last 3 Encounters:   11/21/17 (!)  [facility]  Discharge- Laz Lyons 1955, 73 y o  female MRN: [id number]  Unit/Bed#: S -01 Encounter: [id number]  Primary Care Provider: Bert Mane MD   Date and time admitted to hospital: 5/5/2022  6:01 PM    * Urinary tract infection associated with cystostomy catheter [facility])  Assessment & Plan  · Patient w/ SPT placement on 4/26/22 after having chronic indwelling nixon for neurogenic bladder  · tx w/ 7-day course of bactrim  · Continued w/ dx, new rigors  · UA still grossly abnormal  · Not meeting sepsis criteria, can stop IVF  · Received Rocephin in the ED, broadened abx to Cefepime given hx of MDRO  · Ucx:  Sensitive to ancef  Transitioned to Ancef   · Bcx: NGTD   · Will discharge on p o  Keflex complete a total of 7 days of antibiotics    Anemia  Assessment & Plan  · Significant drop in hemoglobin of nearly 3 g overnight however this is more likely to be hemodilution with IV fluids as she is not having any bleeding      · recheck CBC is stable    Neurogenic bladder  Assessment & Plan  · 2/2 MS  · Previously had indwelling urinary catheter; changed to SPT 4/26/22  · Follow-up with urology    Multiple sclerosis [facility])  Assessment & Plan  · Patient takes neurontin, baclofen and Dalfampridine   · Dalfampridine will need to be brought in from home-- patient and  aware  · PT and OT assessments appreciated, recommend post-acute Rehab      Medical Problems             Resolved Problems  Date Reviewed: 5/6/2022    None              Discharging Resident: Quique Staley MD  Discharging Attending: No att  providers found  PCP: Bert Mane MD  Admission Date:   Admission Orders (From admission, onward)     Ordered        05/05/22 2129  Inpatient Admission  Once                      Discharge Date: 05/09/22    Consultations During Hospital Stay:  · PMR    Procedures Performed:   · None    Significant Findings / Test Results:   · Urine cultures positive for Klebsiella pneumoniae, 132/93   07/31/17 131/86   11/28/16 132/82    Weight from Last 3 Encounters:   11/21/17 254 lb (115.2 kg)   07/31/17 263 lb (119.3 kg)   11/28/16 253 lb (114.8 kg)              Today, you had the following     No orders found for display         Today's Medication Changes          These changes are accurate as of: 11/21/17  3:45 PM.  If you have any questions, ask your nurse or doctor.               Start taking these medicines.        Dose/Directions    sertraline 50 MG tablet   Commonly known as:  ZOLOFT   Used for:  Anxiety   Started by:  Jamison Lora MD        Take one half of a tablet/capsule daily in the in the morning  for 7 days and then a whole  tablets/capsules daily in the morning  there after.   Quantity:  30 tablet   Refills:  1         These medicines have changed or have updated prescriptions.        Dose/Directions    * venlafaxine 75 MG Tb24 24 hr tablet   Commonly known as:  EFFEXOR-ER   This may have changed:  Another medication with the same name was added. Make sure you understand how and when to take each.   Used for:  Anxiety   Changed by:  Jamison Lora MD        Dose:  75 mg   Take 1 tablet (75 mg) by mouth daily   Quantity:  90 tablet   Refills:  1       * venlafaxine 37.5 MG 24 hr capsule   Commonly known as:  EFFEXOR-XR   This may have changed:  You were already taking a medication with the same name, and this prescription was added. Make sure you understand how and when to take each.   Used for:  Anxiety   Changed by:  Jamison Lora MD        Take 1 capsule daily for 7 days, then discontinue   Quantity:  7 capsule   Refills:  0       * Notice:  This list has 2 medication(s) that are the same as other medications prescribed for you. Read the directions carefully, and ask your doctor or other care provider to review them with you.         Where to get your medicines      [facility] [address] [place] [address] [place], [place] [address] AT [place]  Candida parapsilosis  · Chest x-ray:  No acute cardiopulmonary disease    Incidental Findings:   · None     Test Results Pending at Discharge (will require follow up): · None     Outpatient Tests Requested:  · None    Complications:  None    Reason for Admission:  Urinary tract infection    Hospital Course:   Dionisio Jimenez is a 73 y o  female patient who originally presented to the hospital on 5/5/2022 due to urinary tract infection  She has a history of multiple sclerosis with recent placement of suprapubic catheter  On admission she did not sepsis criteria, UA was positive my urine cultures grew Klebsiella and Kriss  Given her prior history of MDRO, she was initially on IV cefepime which was deescalated to IV Ancef following urine culture sensitivities  Blood cultures were negative  Patient was discharged on p o  Keflex to complete a total of 7 days of antibiotics  He was evaluated by PT and OT, and recommended post acute rehab  Please see above list of diagnoses and related plan for additional information  Condition at Discharge: stable    Discharge Day Visit / Exam:   Subjective:  No ongoing complaints  She did complain of bladder incontinence  No fevers or chills  Suprapubic pain  Good p o  Intake  No other complaints  Vitals: Blood Pressure: 124/64 (05/09/22 0724)  Pulse: 71 (05/08/22 2137)  Temperature: (!) 97 2 °F (36 2 °C) (05/09/22 0724)  Temp Source: Oral (05/06/22 2255)  Respirations: 18 (05/08/22 2137)  SpO2: 95 % (05/08/22 2137)     Exam:   Physical Exam  Vitals and nursing note reviewed  Constitutional:       General: She is not in acute distress  Appearance: She is not ill-appearing or toxic-appearing  HENT:      Head: Normocephalic  Nose: Nose normal       Mouth/Throat:      Mouth: Mucous membranes are moist    Eyes:      Pupils: Pupils are equal, round, and reactive to light  Cardiovascular:      Rate and Rhythm: Normal rate and regular rhythm        Pulses: Normal pulses  Heart sounds: Normal heart sounds  No murmur heard  Pulmonary:      Effort: Pulmonary effort is normal  No respiratory distress  Breath sounds: Normal breath sounds  No wheezing or rales  Abdominal:      General: There is no distension  Palpations: Abdomen is soft  There is no mass  Tenderness: There is no abdominal tenderness  Musculoskeletal:      Right lower leg: Edema (Trace) present  Left lower leg: Edema ( trace) present  Skin:     General: Skin is warm  Neurological:      Mental Status: She is alert  Mental status is at baseline  Comments: Muscle power 4/5 upper extremities, 3/5 bilateral lower extremity          Discussion with Family: Updated  () at bedside  Discharge instructions/Information to patient and family:   See after visit summary for information provided to patient and family  Provisions for Follow-Up Care:  See after visit summary for information related to follow-up care and any pertinent home health orders  Disposition:   Acute Rehab at [facility]    Planned Readmission: none     Discharge Medications:  See after visit summary for reconciled discharge medications provided to patient and/or family        **Please Note: This note may have been constructed using a voice recognition system** [facility]  [address], [facility] [place] [address]     Phone:  [phone]     venlafaxine 37.5 MG 24 hr capsule         Some of these will need a paper prescription and others can be bought over the counter.  Ask your nurse if you have questions.     Bring a paper prescription for each of these medications     sertraline 50 MG tablet                Primary Care Provider Office Phone # Fax #    Jamison Lora [phone] [address] [facility] [address]        Equal Access to Services     [facility]: Hadii aad ku hadasho Soomaali, waaxda luqadaha, qaybta kaalmada adeegyada, waxay idiin hayaan adedakotah luna . So [facility] [phone].    ATENCIÓN: Si habla español, tiene a salazar disposición servicios gratuitos de asistencia lingüística. [facility] [phone].    We comply with applicable federal civil rights laws and [place] laws. We do not discriminate on the basis of race, color, national origin, age, disability, sex, sexual orientation, or gender identity.            Thank you!     Thank you for choosing [facility]  for your care. Our goal is always to provide you with excellent care. Hearing back from our patients is one way we can continue to improve our services. Please take a few minutes to complete the written survey that you may receive in the mail after your visit with us. Thank you!             Your Updated Medication List - Protect others around you: Learn how to safely use, store and throw away your medicines at www.disposemymeds.org.          This list is accurate as of: 11/21/17  3:45 PM.  Always use your most recent med list.                   Brand Name Dispense Instructions for use Diagnosis    albuterol 108 (90 BASE) MCG/ACT Inhaler    PROAIR HFA/PROVENTIL HFA/VENTOLIN HFA    1 Inhaler    Inhale 2 puffs into the lungs every 4 hours as needed    Intermittent asthma, uncomplicated       ALPRAZolam 2 MG tablet    XANAX    30 tablet     Take 1 tablet (2 mg) by mouth 3 times daily as needed for anxiety    Anxiety       amphetamine-dextroamphetamine 20 MG per tablet    ADDERALL    90 tablet    Take 1 tablet (20 mg) by mouth 3 times daily    Attention deficit hyperactivity disorder (ADHD), combined type       famotidine 40 MG tablet    PEPCID    90 tablet    Take 1 tablet (40 mg) by mouth At Bedtime    Upper abdominal pain       fluticasone 110 MCG/ACT Inhaler    FLOVENT HFA    1 Inhaler    Inhale 2 puffs into the lungs 2 times daily    Mild persistent asthma without complication       Hyoscyamine Sulfate 0.375 MG Tbcr     90 tablet    Take 1 capful by mouth 3 times daily as needed    Upper abdominal pain       sertraline 50 MG tablet    ZOLOFT    30 tablet    Take one half of a tablet/capsule daily in the in the morning  for 7 days and then a whole  tablets/capsules daily in the morning  there after.    Anxiety       sildenafil 20 MG tablet    REVATIO    30 tablet    Take 1 tablet (20 mg) by mouth daily as needed As needed for prevention of erectile dysfunction    Drug-induced erectile dysfunction       * venlafaxine 75 MG Tb24 24 hr tablet    EFFEXOR-ER    90 tablet    Take 1 tablet (75 mg) by mouth daily    Anxiety       * venlafaxine 37.5 MG 24 hr capsule    EFFEXOR-XR    7 capsule    Take 1 capsule daily for 7 days, then discontinue    Anxiety       * Notice:  This list has 2 medication(s) that are the same as other medications prescribed for you. Read the directions carefully, and ask your doctor or other care provider to review them with you.

## 2022-05-09 NOTE — DISCHARGE INSTR - AVS FIRST PAGE
Dear Valerie Baltazar,     It was our pleasure to care for you here at [facility]  It is our hope that we were always able to exceed the expected standards for your care during your stay  You were hospitalized due to a urinary tract infection  You were cared for on the [facility] 4th floor by Uday Mack MD under the service of Blanca Martinez MD with the [facility] who covers for your primary care physician (PCP), Nickie Dennis MD, while you were hospitalized  If you have any questions or concerns related to this hospitalization, you may contact us at [phone]  For follow up as well as any medication refills, we recommend that you follow up with your primary care physician  A registered nurse will reach out to you by phone within a few days after your discharge to answer any additional questions that you may have after going home  However, at this time we provide for you here, the most important instructions / recommendations at discharge:     Notable Medication Adjustments -   Continue keflex 500 mg every 6 hours for another 4 days  Testing Required after Discharge -   none  Important follow up information -   Follow up with your PCP once discharged form the acute rehab  Other Instructions -   None   Please review this entire after visit summary as additional general instructions including medication list, appointments, activity, diet, any pertinent wound care, and other additional recommendations from your care team that may be provided for you        Sincerely,     Uday Mack MD

## 2022-05-09 NOTE — CASE MANAGEMENT
Case Management Discharge Planning Note    Patient name Tessie Cooper  Location S /S -01 MRN [id number]  : 1955 Date 2022       Current Admission Date: 2022  Current Admission Diagnosis:Urinary tract infection associated with cystostomy catheter [facility])   Patient Active Problem List    Diagnosis Date Noted    Anemia 2022    Urinary tract infection associated with cystostomy catheter ([address] ) 2022    Debility 2021    Acute bilateral thoracic back pain 2021    Hypernatremia 2021    Multiple sclerosis ([address] ) 2021    Ambulatory dysfunction 2021    Acute cystitis without hematuria 2021    Hypertension 2021    Neurogenic bladder 2021    Leg swelling 2021    Acquired polyneuropathy       LOS (days): 4  Geometric Mean LOS (GMLOS) (days): 2 40  Days to GMLOS:-1 2     OBJECTIVE:  Risk of Unplanned Readmission Score: 11         Current admission status: Inpatient   Preferred Pharmacy:   [id number] [place] [facility] [address] [address]  Phone: [phone] Fax: Juice Medel [address] [place] [address]  Phone: [phone] Fax: [phone]    Primary Care Provider: Zach Bailon MD    Primary Insurance: MEDICARE  Secondary Insurance: BLUE CROSS    DISCHARGE DETAILS:    Discharge planning [facility]de-identified[facility]     Comments - [place] of Choice: CM notified all the above mentioned individuals of the Pt's d/c to [facility] today  Contacts  Patient Contacts: [facility]  Relationship to Patient[de-identified] Family  Contact Method: In Person  Reason/Outcome: Discharge Planning        Other Referral/Resources/Interventions Provided:  Referral Comments: Pt accepted to [facility] for an admission today         Transport at Discharge : [facility] Contacted: Yes  Number/Name of Dispatcher: Evelio Tee [id number]  Transported by [employer] and Unit #): [place] Nena Shermanw of Transport (Date): 05/09/22  ETA of Transport (Time): Dawit Calhoun 5 Name, [address] : [address] [facility]  Receiving Facility/Agency Phone Number: [phone]   ROOM#  [phone]

## 2022-05-09 NOTE — ASSESSMENT & PLAN NOTE
· 2/2 MS  · Previously had indwelling urinary catheter; changed to SPT 4/26/22  · Follow-up with urology

## 2022-05-09 NOTE — ASSESSMENT & PLAN NOTE
· Patient takes neurontin, baclofen and Dalfampridine   · Dalfampridine will need to be brought in from home-- patient and  aware  · PT and OT assessments appreciated, recommend post-acute Rehab

## 2022-05-09 NOTE — PROGRESS NOTES
PHYSICAL MEDICINE AND REHABILITATION   PREADMISSION ASSESSMENT     Projected [facility] Diagnoses:  Impairment of mobility, safety and Activities of Daily Living (ADLs) due to Neurologic Conditions:  03 1  Multiple Sclerosis  Etiologic: Multiple Sclerosis with functional incomplete tetraplegia ; Recurrent UTI  Date of Onset: 5/5/22   Date of surgery: n/a     PATIENT INFORMATION  Name: Valerie Baltazar Phone #: [phone] [facility])   Address: [address] [place] [address]  YOB: 1955 Age: 73 y o  SS#   Marital Status: /Civil Union  Ethnicity: White ; not  or   Employment Status: on disability  Extended Emergency Contact Information  Primary Emergency Contact: Christopher Mora  Address: [place] [address] [address] [place] [address] Phone: [phone]  Mobile Phone: [phone]  Relation: Spouse  Advance Directive: Level 1 - Full code ( no advance directive on file )    INSURANCE/COVERAGE:     Primary Payor: MEDICARE / Plan: MEDICARE A AND B / Product Type: Medicare A & B Fee for Service /   Secondary Payer:[employer] cross horizon   Payer Contact: n/a Payer Contact:n/a   Contact Phone: n/a Contact Phone:/a     Authorization #: n/a  Coverage Dates:n/a  LCD: n/a  MEDICARE #: [id number]  Medicare Days: 60/30/60  Medical Record #: [id number]    REFERRAL SOURCE:   Referring provider: Blanca Martinez MD  Referring facility: [address] [place] [address]  Room: S /S -01  PCP: Nickie Dennis MD PCP phone number: [phone]    MEDICAL INFORMATION  HPI: Rima is a 73 y o  female with medical history of but not limited to MS,HTN,chronic back pain,recurrent UTI's, Neurogenic bladder (with SPT placed 4/26) with colonization, and Spasticity who presented to [address] [place] [address] ED on 5/5 with symptoms of recurrent UTI while she was being treated for one   Symptoms included generalized weakness and rigors at home  She was started on Rocephin in the ED, and transitioned to Cefepime given history of MDRO( originally Dx 5/27/21 )  She did not meet sepsis criteria, so IVF was initially discontinued  Blood cultures showed no growth to date  Urine culture grea >100k cfu Klebsiella and Candida, and was started on Ancef  Her hospital stay has been unremarkable and she is deemed hemodynamically stable at this time  PT/OT therapies were consulted and continue to follow patient at this time  PM&R was consulted and are recommending inpatient acute rehab when medically stable  All involved medical disciplines feel/agree patient is medically ready for discharge at this time  Inpatient acute rehabilitation physician was consulted  Upon review of patients case and correspondence with PT/OT therapies and PM&R services, [facility] physician feels Rima will benefit and is a good candidate / appropriate for inpatient acute rehab at this time  She has demonstrated the willingness / desire and tolerance to participate in the required 3 hours or more of therapies per day  COVID result pending on 5/9/22 ; Vaccinated - (Nhan Barakat) 3/22/21 & 4/6/21 - no Booster   Past Medical History:   Past Surgical History:    Allergies:     Past Medical History:   Diagnosis Date    Back pain 2016    Chronic UTI     Colon polyp     Hypertension     Incontinence     MS (multiple sclerosis) ([facility] [address] )     Age 37    Neurogenic bladder     Spasticity     Past Surgical History:   Procedure Laterality Date    BACK SURGERY  2016    Lumbar fusion    COLONOSCOPY      ELBOW SURGERY Right 2008    ORIF    IR SUPRAPUBIC CATHETER PLACEMENT  4/26/2022    CO IMPLANT PERIPH/GASTRIC NEUROSTIM/ N/A 11/19/2021    Procedure: leadwire placement and pulse generator  placement of SNM at S3 Bilateral S3  nerve testing, fluroscopy - c-arm guidance and complex programming;  Surgeon: Dominick Aguirre MD;  [facility];  Service: Gynecology     No Known Allergies Medical/functional conditions requiring inpatient rehabilitation: impaired mobility/self care ; impaired balance/ambulation    Risk for medical/clinical complications: risk for falls ; risk for infection ( recurrent UTI's ) ; risk for pain ; risk for skin breakdown     Comorbidities/Surgeries in the last 100 days:   MS  Recurrent UTI's  HTN  Chronic back pain  Neurogenic bladder     CURRENT VITAL SIGNS:   Temp:  [97 2 °F (36 2 °C)] 97 2 °F (36 2 °C)  HR:  [71-82] 71  Resp:  [18] 18  BP: (124-136)/(64-72) 124/64   Intake/Output Summary (Last 24 hours) at 5/9/2022 1300  Last data filed at 5/9/2022 0536  Gross per 24 hour   Intake --   Output 1475 ml   Net -1475 ml        LABORATORY RESULTS:      Lab Results   Component Value Date    HGB 10 7 (L) 05/07/2022    HCT 33 0 (L) 05/07/2022    WBC 3 01 (L) 05/07/2022     Lab Results   Component Value Date    BUN 21 05/07/2022    K 4 1 05/07/2022     05/07/2022    CREATININE 0 76 05/07/2022     Lab Results   Component Value Date    PROTIME 13 0 05/05/2022    INR 0 98 05/05/2022        DIAGNOSTIC STUDIES:  XR chest 1 view portable    Result Date: 5/6/2022  Impression: No acute cardiopulmonary disease   Findings are stable Workstation performed: [id number]       PRECAUTIONS/SPECIAL NEEDS:  Tobacco:   Social History     Tobacco Use   Smoking Status Never Smoker   Smokeless Tobacco Never Used   , Alcohol:    Social History     Substance and Sexual Activity   Alcohol Use Not Currently   , Anticoagulation:  aspirin 81 mg orally every day and Lovenox as ordered by MD, Edema Management, Safety Concerns, Pain Management, Dietary Restrictions: Regular diet, Language Preference: English  and Fall precautions    MEDICATIONS:     Current Facility-Administered Medications:     acetaminophen (TYLENOL) tablet 650 mg, 650 mg, Oral, Q8H [address], Coby Parekh PA-C, 650 mg at 05/09/22 0526    amLODIPine (NORVASC) tablet 2 5 mg, 2 5 mg, Oral, BID, Coby Parekh PA-C, 2 5 mg at 05/09/22 0937    ascorbic acid (VITAMIN C) tablet 1,000 mg, 1,000 mg, Oral, Daily, GETACHEW Dyer-C, 1,000 mg at 05/09/22 0933    aspirin (ECOTRIN LOW STRENGTH) EC tablet 81 mg, 81 mg, Oral, Daily, GETACHEW Dyer-C, 81 mg at 05/09/22 5264    baclofen tablet 20 mg, 20 mg, Oral, TID, GETACHEW Dyer-C, 20 mg at 05/09/22 3580    cephalexin (KEFLEX) capsule 500 mg, 500 mg, Oral, Q6H [address], Marie Hernandez MD    cholecalciferol (VITAMIN D3) tablet 2,000 Units, 2,000 Units, Oral, Daily, GETACHEW Dyer-C, 2,000 Units at 05/09/22 3852    cyanocobalamin (VITAMIN B-12) tablet 250 mcg, 250 mcg, Oral, Daily, Coby Parekh PA-C, 250 mcg at 05/09/22 0933    Dalfampridine ER TB12 10 mg, 10 mg, Oral, BID, GETACHEW Dyer-C, 10 mg at 05/09/22 0304    enoxaparin (LOVENOX) subcutaneous injection 40 mg, 40 mg, Subcutaneous, Daily, GETACHEW Dyer-C, 40 mg at 05/09/22 0935    furosemide (LASIX) tablet 40 mg, 40 mg, Oral, Once per day on Mon Fri, MIGNON DyerC, 40 mg at 05/09/22 4408    gabapentin (NEURONTIN) capsule 100 mg, 100 mg, Oral, TID, Coby Parekh PA-C, 100 mg at 05/09/22 0933    ondansetron (ZOFRAN) injection 4 mg, 4 mg, Intravenous, Q6H PRN, Coby Parekh PA-C    sodium chloride (PF) 0 9 % injection 3 mL, 3 mL, Intravenous, Q1H PRN, Coby Parekh PA-C    tamsulosin (FLOMAX) capsule 0 8 mg, 0 8 mg, Oral, Daily With Dinner, Coby Parekh PA-C, 0 8 mg at 05/08/22 1730    vitamin E (TOCOPHEROL) capsule 400 Units, 400 Units, Oral, Daily, Coby Parekh PA-C, 400 Units at 05/09/22 8225    SKIN INTEGRITY:   skin is intact     PRIOR LEVEL OF FUNCTION:  She lives in a(n) single family home  Jessie Reza is  and lives with their spouse    Self Care: Needed some help, Indoor Mobility: Needed some help, Stairs (in/outdoor): Needed some help and Cognition: Independent    FALLS IN THE LAST 6 MONTHS: 1 to 4    HOME ENVIRONMENT:  The living area: ramped and can live on one level  There are Ramp to enter the home  The patient will not have 24 hour supervision/physical assistance available upon discharge  PREVIOUS DME:  Equipment in home (previous DME): Grab Bars, Rolling Walker and adjustable bed ; raised toliet; walk in shower ; leg      FUNCTIONAL STATUS:  Physical Therapy Occupational Therapy Speech Therapy   As per PT:     PT Visit Date 22   Note Type   Note Type Treatment   Pain Assessment   Pain Assessment Tool 0-10   Pain Score No Pain   Restrictions/Precautions   Weight Bearing Precautions Per Order No   Other Precautions Contact/isolation; Chair Alarm; Bed Alarm; Fall Risk;Multiple lines   General   Chart Reviewed Yes   Response to Previous Treatment Patient with no complaints from previous session  Family/Caregiver Present No   Cognition   Overall Cognitive Status WFL   Arousal/Participation Alert   Attention Within functional limits   Comments Pt identified by name and   Subjective   Subjective Agrees to PT treatment and is pleasant and cooperative throughout session  ("I just don't want to disappoint you ")   Bed Mobility   Supine to Sit Unable to assess  (OOB in chair pre/post session with alarm intact)   Transfers   Sit to Stand 2  Maximal assistance   Additional items Increased time required;Verbal cues; Assist x 2  (initially max Ax1; regressed to max A x2 2nd half of session)   Stand to Sit 2  Maximal assistance   Additional items Assist x 1; Increased time required;Verbal cues;Armrests  (hand placement/foot placement)   Additional Comments standing tolerance ~2`; limited due to RLE weakness/knee buckling  (x4 sit to stand trials from recliner chair)   Ambulation/Elevation   Gait pattern Improper Weight shift;Decreased foot clearance;Shuffling; Short stride; Excessively slow;Decreased heel strike;Decreased toe off   Gait Assistance 3  Moderate assist   Additional items Assist x 1;Verbal cues  (and close chair follow)   Assistive Device Rolling walker   Distance 4` x1 with RW   Balance   Static Sitting Fair -  (with multiple posterior LOB back into chair)   Dynamic Sitting Poor +   Static Standing    (variable from Poor to Zero with Ax2)   Ambulatory    (variable from Poor to zero )   Endurance Deficit   Endurance Deficit Yes   Endurance Deficit Description limited standing tolerance, fatigue, RLE weakness/knee buckling   Activity Tolerance   Activity Tolerance Patient limited by fatigue   Nurse Made Aware Per RN, pt appropriate to treat   Assessment   Prognosis Fair   Problem List Decreased range of motion;Decreased endurance; Impaired balance;Decreased mobility; Decreased coordination;Decreased cognition; Impaired judgement;Decreased safety awareness; Obesity; Decreased skin integrity   Assessment Pt agrees to PT treatment and is pleasant and cooperative throughout session  Progress noted as pt is able to ambulate increased distance this session  Pt fatigues very quickly and requires 2nd assist for transfers during 2nd half of session due to RLE weakness and knee buckling  Requires mod A for trunk control at times to pull from reclined position into upright sitting on edge of chair  Pt is very motivated and cooperative throughout session and will make a great rehab candidate  Will continue to benefit from ongoing skilled PT to maximize her functional mobility and increase her level of independence  As per OT:    OT Visit Date 05/09/22   Note Type   Note Type Treatment for insurance authorization   Restrictions/Precautions   Weight Bearing Precautions Per Order No   Other Precautions Contact/isolation;Cognitive; Chair Alarm; Bed Alarm; Fall Risk;Multiple lines   Lifestyle   Autonomy Pt requires A for ADL/IADLs at baseline   Pt feel she could get stronger to perform ADLs with more independence   Reciprocal Relationships  lives wih & assists pt   Service to Others Pt is retired on disability   Intrinsic Gratification Pt enjoys watching "This is us" and "A million little things"   Pain Assessment   Pain Assessment Tool 0-10   Pain Score No Pain   ADL   Where Assessed Chair   Eating Assistance 7  Independent   Eating Comments Pt able to bring drink to mouth during session  Grooming Assistance 5  Supervision/Setup   Grooming Deficit Wash/dry face;Brushing hair   Grooming Comments Pt set up with hair brush and wash cloth and completes grooming with supervision  UB Bathing Assistance 5  Supervision/Setup   UB Bathing Deficit Chest;Right arm;Left arm   UB Bathing Comments Pt washes under armpits and chest with set up of washcloth while seated in B/S chair  LB Bathing Assistance 2  Maximal Assistance   LB Bathing Deficit Right upper leg;Left upper leg;Perineal area; Buttocks   LB Bathing Comments Pt able to wash upper legs while seated but is max A to maintain static standing and max A for stacey hygiene while in standing  UB Dressing Assistance 3  Moderate Assistance   UB Dressing Deficit Thread RUE; Thread LUE;Pull over head;Pull around back;Pull down in back   LB Dressing Assistance 2  Maximal Assistance   LB Dressing Deficit Thread RLE into underwear; Thread LLE into underwear;Pull up over hips   LB Dressing Comments Max A to thread legs into clean absorbent pull up, soiled pullup ripped off prior to standing  Toileting Assistance  1  Total Assistance   Toileting Comments Suprapubic nixon in place, pt unaware of fecal incontinence, max to dependent for hygiene after BM   Functional Standing Tolerance   Time 2 minutes average   Activity Pt stands multiple trials throughout session with varying stability due to R knee buckling  Able to tolerate standing at end of session with max A x 2 for stacey hygiene and brief change  Bed Mobility   Additional Comments Pt recieved OOB to chair      Transfers   Sit to Stand 2  Maximal assistance   Additional items Assist x 1   Stand to Sit 2  Maximal assistance   Additional items Assist x 1   Stand pivot 2  Maximal assistance   Additional items Assist x 1   Additional Comments Pt participates in multiple standing trials during session, initially able to stand with max A x 1, graduating to max A x 2  Able to take steps, attempt to walk to bathroom deferred due to safety concerns  Recliner chair pulled up to bathroom door in attempt to ambulate from doorway to commode over toilet, however on second attempt to walk pt's knee immediately dafne upon standing  Chair returned to bed side for subsequent standing trials  Functional Mobility   Functional Mobility 3  Moderate assistance   Additional Comments x 1 for walking toward bathroom at beginning of session (approximately 4 feet) with chair follow for safety  Cognition   Overall Cognitive Status WFL   Arousal/Participation Alert   Attention Within functional limits   Orientation Level Oriented X4   Memory Within functional limits   Following Commands Follows one step commands with increased time or repetition   Activity Tolerance   Activity Tolerance Patient limited by fatigue   Medical Staff [address] to see per RN, care coordinated with PT for safety  Assessment   Assessment Pt seen for treatment session this AM for full ADL and mobility assessment  Demonstrates poor trunk control, leg strength, sitting balance and standing balance this session, with varying levels of assistance required throughout the session, increasing as the session progresses  Initially able to stand with max A x 1 and take steps toward bathroom with mod A x 1 and a chair follow for safety, but requires max A x 2 to stand and is unable to take steps by end of session due to fatiguing quickly  Pt demonstrates upper body self care with set up only this session, however takes additional time due to UE weakness   Remains max A to dependent for lower body self care due to poor standing balance and tolerance, and unaware of incontinence  Pt will benefit from continued OT to maximize independence in mobility and self care in order to return home with reduced caregiver burden  Recommending post acute rehab        n/a     CARE SCORES:  Self Care:  Eatin: Setup or clean-up assistance  Oral hygiene: 04: Supervision or touching  assistance  Toilet hygiene: 04: Supervision or touching  assistance  Shower/bathing self: 02: Substantial/maximal assistance  Upper body dressin: Partial/moderate assistance  Lower body dressin: Substantial/maximal assistance  Putting on/taking off footwear: 02: Substantial/maximal assistance  Transfers:  Roll left and right: 04: Supervision or touching  assistance  Sit to lyin: Supervision or touching  assistance  Lying to sitting on side of bed: 04: Supervision or touching  assistance  Sit to stand: 03: Partial/moderate assistance  Chair/bed to chair transfer: 02: Substantial/maximal assistance  Toilet transfer: 02: Substantial/maximal assistance  Mobility:  Walk 10 ft: 03: Partial/moderate assistance  Walk 50 ft with two turns: 09: Not applicable  Walk 783HI: 09: Not applicable    CURRENT GAP IN FUNCTION  Prior to Admission: Functional Status: Patient was not independent with mobility/ambulation, transfers, ADL's, IADL's  Estimated length of stay: 10 to 14 days    Anticipated Post-Discharge Disposition/Treatment  Disposition: Return to previous home/apartment  Outpatient Services: Physical Therapy (PT) and Occupational Therapy (OT)    BARRIERS TO DISCHARGE  Lovenox, Weakness, Pain, Balance Difficulty, Fatigue and Equipment Needs    INTERVENTIONS FOR DISCHARGE  Patient/Family/Caregiver Education, Arrange DME needs, Medication Changes per MD, Therapy exercises, Center of balance support  and Energy conservation education     REQUIRED THERAPY:  Patient will require PT and OT 90 minutes each per day, five days per week to achieve rehab goals       REQUIRED FUNCTIONAL AND MEDICAL MANAGEMENT FOR INPATIENT REHABILITATION:  Pain Management: Overall pain is well controlled, Deep Vein Thrombosis (DVT) Prophylaxis:  low molecular weight heparin, SCD's while in bed and Nursing education and bowel/bladder management, internal medicine to manage/monitor medical conditions, PM&R to maximize function and provide medical oversight, PT/OT intervention, patient/family education/training, and any consults as needed     RECOMMENDED LEVEL OF CARE:   Rima is a 73 y o  female with medical history of but not limited to MS,HTN,chronic back pain,recurrent UTI's, Neurogenic bladder (with SPT placed 4/26) with colonization, and Spasticity who presented to [address] [place] [address] ED on 5/5 with symptoms of recurrent UTI while she was being treated for one  Symptoms included generalized weakness and rigors at home  She was started on Rocephin in the ED, and transitioned to Cefepime given history of MDRO( originally Dx 5/27/21 )  She did not meet sepsis criteria, so IVF was initially discontinued  Blood cultures showed no growth to date  Urine culture grea >100k cfu Klebsiella and Candida, and was started on Ancef  Her hospital stay has been unremarkable and she is deemed hemodynamically stable at this time  PT/OT therapies were consulted and continue to follow patient at this time  PM&R was consulted and are recommending inpatient acute rehab when medically stable  All involved medical disciplines feel/agree patient is medically ready for discharge at this time  Inpatient acute rehabilitation physician was consulted  Upon review of patients case and correspondence with PT/OT therapies and PM&R services, [facility] physician feels Rima will benefit and is a good candidate / appropriate for inpatient acute rehab at this time  She has demonstrated the willingness / desire and tolerance to participate in the required 3 hours or more of therapies per day   Prior to admission / hospital stay Rima was needed assist with all ADLs /functional mobility / iADLs  Currently with PT /  OT therapies :Physical Therapy: modA x2 bed mobility, maxA transfers Occupational Therapy: Ind eating, Sup grooming, Laura UB bathing/dressing, modA LB bathing/dressing, modA toileting   Nursing is being recommended for medication distribution / management , bowel / bladder management and educational purposes , internal medicine to continue to monitor and manage medical conditions ,PM&R to maximize  function and provide medical oversight, and inpatient rehab to maximize self care ,mobility ,strength , and endurance upon discharge with probable wheel chair level of care to home

## 2022-05-09 NOTE — PLAN OF CARE
Problem: Potential for Falls  Goal: Patient will remain free of falls  Description: INTERVENTIONS:  - Educate patient/family on patient safety including physical limitations  - Instruct patient to call for assistance with activity   - Consult OT/PT to assist with strengthening/mobility   - Keep Call bell within reach  - Keep bed low and locked with side rails adjusted as appropriate  - Keep care items and personal belongings within reach  - Initiate and maintain comfort rounds  - Make Fall Risk Sign visible to staff  - Apply yellow socks and bracelet for high fall risk patients  - Consider moving patient to room near nurses station  5/9/2022 1944 by Daniel Mccarty, RN  Outcome: Progressing  5/9/2022 1534 by Daniel Mccarty, RN  Outcome: Progressing

## 2022-05-09 NOTE — CONSULTS
PHYSICAL MEDICINE AND REHABILITATION CONSULT NOTE  Rima Moar 73 y o  female MRN: [id number]  Unit/Bed#: S -01 Encounter: [id number]    Requested by (Physician/Service): Yandy Luz MD  Reason for Consultation:  Assessment of rehabilitation needs  Chief Complaint: Weakness  Assessment:  Rehabilitation Diagnosis:    Multiple Sclerosis with functional incomplete tetraplegia   Impaired mobility and self care    Recommendations:  Rehabilitation Plan:   Continue PT/OT while on acute care   Has recently required increased care at home   o Typically needs help to transfer in the mornings, but the rest of the day able to transfer herself with her rollator (stand pivot)  She reports being able to ambulate household distances when she doesn't have a UTI  She is able to perform shower transfers and uses a shower chair, and independently bathes herself (her  helps her into the shower to be safe)  She reports being independent/set-up assist with dressing, toileting hygiene (occasionally needs 's help), eating, and grooming  o Coming up to hospitalization, required more assistance with all these activities   At this time does not have functional strength in her L hip, and is 3/5 in her R hip and both knees  But this may improve (normally R leg is weaker than L)   She is weak in her ankle as well (DF/PF), with tightness in her heel cords  o Ordered multipodus to be worn in bed   We had a discussion on having flexible goals, and being open to assistive devices   Has never been taught how to have a bowel regimen, and has never used suppositories   While I could see the benefit of a specialty unit ([facility]) for her, as she examines like functional incomplete tetraplegia, I do think her needs could be managed at a general rehab, and her preference is for [facility]   Discussed with liaison     DISPOSITION:  ARC - patient is good candidate for transfer to [facility] pending bed availability and medical clearance from primary team      RATIONALE:   I have reviewed this patient's medical and functional history, hospital course, skilled therapy notes, and have evaluated the patient in person  In my professional judgment and rehabilitation experience, this patient MEETS the medical necessity criteria for an inpatient ARU stay:   Condition requires 24-hour supervision by a physician specialized in rehabilitation and 24-hour rehabilitation nursing  Requires an interdisciplinary team approach for intensive rehabilitation which will require skilled therapy in at least 2 domains one of which being PT  Expected to adequately participate in an intensive therapy program (~3 hours a day for 5 days a week)  Expectation that patient will make significant practical improvement in a typical ARU timeframe for that specific diagnosis and current functional status  Realistic goal that the patient will reach a functional level to allow the individual to safely return home (alone, with family or friends, or with hired caregivers) or other community setting (such as an assisted living facility or [facility]) rather than to an institution  Specific management will also include management and oversight of:  Initiation of bowel regimen, and education as she has never had this to improve continence and skin integrity  Evaluation for appropriate DME for the next 5 years to allow for more independent function in her home  Monitoring for urinary tract infection symptoms - with recurrent infection    - Team to clarify treatment of 100k cfu candida prior to discharge to [facility]  Monitoring and management of her spasticity (in extremities minimal, but has spasms and stiffness in her back)  And emotional adjustment to changing function     - Expressed frustration and sadness, betrayed by her body  - Strongly recommend rehab psychology follow with her during rehab       Medical Co-morbidities Pending Issues:  Urinary Tract Infection  - Previously was on daily macrobid for UTI suppression    - This was discontinued at her last inpatient rehab stay appropriately  - She followed with Urology at [facility] ([facility]) who have discussed hydration, probiotics, cranberry supplements  Ultimately was transitioned to SPT  - Grew candida parapsilosis as well - team to clarify if this is to be treated as well with antifungal     Multiple Sclerosis - Primary Progressive    - On Ampyra BID  - Neuro in [place] 60 a very old manual wheelchair in their [place] home  - Has a scooter which she got when she was still working paid for by her work insurance many years ago (unclear when she had this, but it is noted in 2015 she had it, and she now is on Medicare, so is likely entitled to a new mobility device)  - Counseled to hold off on having insurance pay for a manual wheelchair, walker, or any other mobility device in case she needs power wheelchair mobility in the future  - Uses a rollator  Neurogenic Bowel  - has been incontinent   - Not on a bowel regimen  - May benefit from a daily or EOD suppository or docusate mini enema to help maintain her continence  - Reviewed typical bowel regimen with her  Can start with Docusate 100mg BID, Senna 17 2 QHS and see if that is enough to time bowel movement in the morning     - She may need a suppository to assist with evacuation in the mornings  - Hand dexterity may require tools/ assistive devices to allow for independent administration  Neurogenic Bladder   -SPT placed 4/26    - Has history of bladder stimulator ([employer]) by [facility] in 12/21    -   Spasticity  - On Baclofen 20mg TID  - has been on Tizanidine previously, but stopped in December 2021 as she did not require it  Mood/Adjustment  - Previously trialed on remeron, but had side effects  Hx of 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  - Received cortisone injecion with improvement  #Vitals: Stable, afebrile     #Labs: BMP unremarkable, CBC shows mild leukopenia, but otherwise stable  #Pain: Tylenol,   #Skin/Pressure Injury Prevention: Turn Q2hr in bed, with weight shifts [id number] in wheelchair  #DVT Prophylaxis: Lovenox and SCDs   #GI Prophylaxis: Reg/Thins PO diet  Not required  Thank you for allowing the PM&R service to participate in the care of this patient  We will continue to follow Rima Mora's progress with you  Please do not hesitate to call with questions or concerns    History of Present Illness:  Rima Ramos is a 73 y o  female with medical history of MS, Neurogenic bladder (with SPT placed 4/26) with colonization, and Spasticity who presented to [facility] on 5/5 with symptoms of recurrent UTI while she was being treated for one  Symptoms included generalized weakness and rigors at home  She was started on Rocephin in the ER, and transitioned to Cefepime given history of MDRO  She did not meet sepsis criteria, so IVF was discontinued  Blood cultures showed no growth to date  Urine culture grea >100k cfu Klebsiella and Candida, and was started on Ancef  The Klebsiella is susceptible to Amox/Clav, Aztreonam, Ancef, Ertapenem, gentamicin, Pip/Tazo, and Tobramycin but resistant to Bactrim, fluoroquinolones, macrobid  She has not been placed on an anti-fungal      Of note, patient was admitted to [facility] in December 2021, where she was discharged required assistance for showering, assistance for nixon (at the time) management, transfers assistance, maxA with bed mobility and sit to stand transfers (requiring 2 people at the time)  She states she did improve, but every time she improved, she'd get knocked down by another UTI  She is frustrated by her lack of progress and continued weakness  She denies any bladder spasms or urethral leakage since insertion of her SPT   She continues with intermittent incontinence of bowel, and reports never having tried a suppository or mini enema to help time her bowels/do a bowel regimen  She denies fevers, chills rigors, but still with general weakness  At baseline she needs more assistance in the morning  Review of Systems: 10 point ROS negative except for what is noted in HPI    CURRENT GAP IN FUNCTION     Prior to Admission:   At baseline, she requires assistance with transfers (Stand pivot), functional mobility, and ADL/IADLs  She is ambulatory short distances with RW, mod Ind (to/from bathroom for instance)   will push her in her rollator for longer distances  She states she has a scooter since she's had at least 2015, which she uses outdoors  She was able to walk household distances up until more recently - now her  pushes her in her rollator  She has an old manual wheelchair at their home in [place]  She can do short distance ambulation at times  She states she's mostly independent-set-up/sup assist with her ADLs   helps her transfer into the shower for safety  FUNCTIONAL STATUS:  Physical Therapy: modA x2 bed mobility, maxA transfers    Occupational Therapy: Ind eating, Sup grooming, Laura [facility] bathing/dressing, modA LB bathing/dressing, modA toileting     Social History:    Rima Mora Lives with: lives with their spouse  She lives in [facility]) single family home  The living area: can live on one level  Equipment in home: Shower Chair, [address] [facility] and leg lift, adjustable electric bed  There Ramp to enter the home  Patient/family's goals: Return to previous home/apartment       Social History     Socioeconomic History    Marital status: /Civil Union     Spouse name: None    Number of children: None    Years of education: None    Highest education level: None   Occupational History    None   Tobacco Use    Smoking status: Never Smoker    Smokeless tobacco: Never Used   Vaping Use    Vaping Use: Never used   Substance and Sexual Activity    Alcohol use: Not Currently    Drug use: Yes     Types: Marijuana Comment: medical    Sexual activity: None   Other Topics Concern    None   Social History Narrative    None     Social Determinants of Health     Financial Resource Strain: Not on file   Food Insecurity: No Food Insecurity    Worried About Running Out of Food in the Last Year: Never true    Federico of Food in the Last Year: Never true   Transportation Needs: No Transportation Needs    Lack of Transportation (Medical): No    Lack of Transportation (Non-Medical):  No   Physical Activity: Not on file   Stress: Not on file   Social Connections: Not on file   Intimate Partner Violence: Not on file   Housing Stability: Unknown    Unable to Pay for Housing in the Last Year: No    Number of Places Lived in the Last Year: Not on file    Unstable Housing in the Last Year: No        Family History:    Family History   Problem Relation Age of Onset    Cancer Mother     Heart disease Father     Cancer Sister          MEDICATIONS:     Current Facility-Administered Medications:     acetaminophen (TYLENOL) tablet 650 mg, 650 mg, Oral, Q8H [facility], Coby Parekh PA-C, 650 mg at 05/09/22 0526    amLODIPine (NORVASC) tablet 2 5 mg, 2 5 mg, Oral, BID, Coby Parekh PA-C, 2 5 mg at 05/08/22 1730    ascorbic acid (VITAMIN C) tablet 1,000 mg, 1,000 mg, Oral, Daily, Coby Parekh PA-C, 1,000 mg at 05/08/22 0945    aspirin (ECOTRIN LOW STRENGTH) EC tablet 81 mg, 81 mg, Oral, Daily, Coby Parekh PA-C, 81 mg at 05/08/22 0945    baclofen tablet 20 mg, 20 mg, Oral, TID, Coby Parekh PA-C, 20 mg at 05/08/22 2130    ceFAZolin (ANCEF) IVPB (premix in dextrose) 2,000 mg 50 mL, 2,000 mg, Intravenous, Q8H, Whitney Peña MD, Last Rate: 100 mL/hr at 05/09/22 0527, 2,000 mg at 05/09/22 9416    cholecalciferol (VITAMIN D3) tablet 2,000 Units, 2,000 Units, Oral, Daily, Coby Parekh PA-C, 2,000 Units at 05/08/22 0945    cyanocobalamin (VITAMIN B-12) tablet 250 mcg, 250 mcg, Oral, Daily, Asha MURPHY KAT Parekh, 250 mcg at 05/08/22 0944    Dalfampridine ER TB12 10 mg, 10 mg, Oral, BID, Coby Parekh PA-C, 10 mg at 05/08/22 2200    enoxaparin (LOVENOX) subcutaneous injection 40 mg, 40 mg, Subcutaneous, Daily, Coby Parekh PA-C, 40 mg at 05/08/22 0945    furosemide (LASIX) tablet 40 mg, 40 mg, Oral, Once per day on Mon Fri, Coby Parekh PA-C, 40 mg at 05/06/22 4370    gabapentin (NEURONTIN) capsule 100 mg, 100 mg, Oral, TID, Coby Parekh PA-C, 100 mg at 05/08/22 2130    ondansetron (ZOFRAN) injection 4 mg, 4 mg, Intravenous, Q6H PRN, Coby Parekh PA-C    sodium chloride (PF) 0 9 % injection 3 mL, 3 mL, Intravenous, Q1H PRN, Coby Parekh PA-C    tamsulosin (FLOMAX) capsule 0 8 mg, 0 8 mg, Oral, Daily With Dinner, Coby Parekh PA-C, 0 8 mg at 05/08/22 1730    vitamin E (TOCOPHEROL) capsule 400 Units, 400 Units, Oral, Daily, Coby Parekh PA-C, 400 Units at 05/08/22 0945    Past Medical History:     Past Medical History:   Diagnosis Date    Back pain 2016    Chronic UTI     Colon polyp     Hypertension     Incontinence     [facility]     Age 37    Neurogenic bladder     Spasticity         Past Surgical History:     Past Surgical History:   Procedure Laterality Date    BACK SURGERY  2016    Lumbar fusion    COLONOSCOPY      ELBOW SURGERY Right 2008    ORIF    IR SUPRAPUBIC CATHETER PLACEMENT  4/26/2022    FL IMPLANT PERIPH/GASTRIC NEUROSTIM/ N/A 11/19/2021    Procedure: leadwire placement and pulse generator  placement of SNM at S3 Bilateral S3  nerve testing, fluroscopy - c-arm guidance and complex programming;  Surgeon: Silvestre Jackson MD;  Location: [facility];  Service: Gynecology         Allergies:     No Known Allergies        Physical Exam:  /64   Pulse 71   Temp (!) 97 2 °F (36 2 °C)   Resp 18   SpO2 95%        Intake/Output Summary (Last 24 hours) at 5/9/2022 7588  Last data filed at 5/9/2022 0536  Gross per 24 hour   Intake --   Output 1475 ml   Net -1475 ml       There is no height or weight on file to calculate BMI  Gen: No acute distress  HEENT: Moist mucus membranes, Normocephalic/Atraumatic  Cardiovascular: Regular rate, rhythm, S1/S2  Distal pulses palpable  Heme/Extr: No edema  Pulmonary: Non-labored breathing  Lungs CTAB  : No nixon, but SPT is in place and functioning  GI: Soft, non-tender, non-distended  No suprapubic tenderness  MSK: Very tight heel cords, able to get to about 5 degrees of plantarflexion in both sides  See MMT below  Integumentary: Skin is warm, dry  Neuro: AAOx3, Speech is intact  Appropriate to questioning  Maybe mild MAS 1 in plantarflexors and hamstrings range  Overall minimal tone  No spasms noted on my exam  No clonus  Babinski absent  MMT:   Strength:   Right  Left  Site  Right  Left  Site    4 4  S Ab: Shoulder Abductors  3  1  HF: Hip Flexors    4 4  EF: Elbow Flexors  NT  NT KF: Knee Flexors    4  4  EE: Elbow Extensors  3  3  KE: Knee Extensors    3+  3+  WE: Wrist Extensors  3+ 1  DR: Dorsi Flexors    3+  3+  FF: Finger Flexors  3+ 3  PF: Plantar Flexors    2 2  HI: Hand Intrinsics  5 4 EHL: Extensor Hallucis Longus   Psych: Normal mood and affect  LABORATORY RESULTS:      Lab Results   Component Value Date    HGB 10 7 (L) 05/07/2022    HCT 33 0 (L) 05/07/2022    WBC 3 01 (L) 05/07/2022     Lab Results   Component Value Date    BUN 21 05/07/2022    K 4 1 05/07/2022     05/07/2022    CREATININE 0 76 05/07/2022     Lab Results   Component Value Date    PROTIME 13 0 05/05/2022    INR 0 98 05/05/2022        DIAGNOSTIC STUDIES: Reviewed  XR chest 1 view portable    Result Date: 5/6/2022  Impression: No acute cardiopulmonary disease   Findings are stable Workstation performed: [id number]     MRI L and T[facility]Spine from [facility] 1/2020  - Only mild degenerative changes in lower lumbar spine   - Patchy increase in T2 signal in dorsal cord at T11-T12  MRI C/-Spine 12/21  - Diffusely increased T2 signal intensity in the cervical cord from C2-C7 levels and in upper thoracic from T1-T6, with mild-mod central stenosis (most notable at C56, C45 and C34) which was new  Anterior displacement of cord at T6 suspicious for a cord herniation or a dorsal arachnoid cyst incompletely visualized  MRI T-Spine 1/23/2022  - Focal flattening of the dorsal aspect of the cord at T6 and T7 levels 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

## 2022-05-09 NOTE — PLAN OF CARE
Problem: PAIN - ADULT  Goal: Verbalizes/displays adequate comfort level or baseline comfort level  Description: Interventions:  - Encourage patient to monitor pain and request assistance  - Assess pain using appropriate pain scale  - Administer analgesics based on type and severity of pain and evaluate response  - Implement non-pharmacological measures as appropriate and evaluate response  - Consider cultural and social influences on pain and pain management  - Notify physician/advanced practitioner if interventions unsuccessful or patient reports new pain  Outcome: Progressing     Problem: INFECTION - ADULT  Goal: Absence or prevention of progression during hospitalization  Description: INTERVENTIONS:  - Assess and monitor for signs and symptoms of infection  - Monitor lab/diagnostic results  - Monitor all insertion sites, i e  indwelling lines, tubes, and drains  - Monitor endotracheal if appropriate and nasal secretions for changes in amount and color  - Paradox appropriate cooling/warming therapies per order  - Administer medications as ordered  - Instruct and encourage patient and family to use good hand hygiene technique  - Identify and instruct in appropriate isolation precautions for identified infection/condition  Outcome: Progressing  Goal: Absence of fever/infection during neutropenic period  Description: INTERVENTIONS:  - Monitor WBC    Outcome: Progressing     Problem: SAFETY ADULT  Goal: Patient will remain free of falls  Description: INTERVENTIONS:  - Educate patient/family on patient safety including physical limitations  - Instruct patient to call for assistance with activity   - Consult OT/PT to assist with strengthening/mobility   - Keep Call bell within reach  - Keep bed low and locked with side rails adjusted as appropriate  - Keep care items and personal belongings within reach  - Initiate and maintain comfort rounds  - Make Fall Risk Sign visible to staff  - Offer Toileting every 2 Hours, in advance of need  - Initiate/Maintain bed alarm  - Obtain necessary fall risk management equipment  - Apply yellow socks and bracelet for high fall risk patients  - Consider moving patient to room near nurses station  Outcome: Progressing  Goal: Maintain or return to baseline ADL function  Description: INTERVENTIONS:  -  Assess patient's ability to carry out ADLs; assess patient's baseline for ADL function and identify physical deficits which impact ability to perform ADLs (bathing, care of mouth/teeth, toileting, grooming, dressing, etc )  - Assess/evaluate cause of self-care deficits   - Assess range of motion  - Assess patient's mobility; develop plan if impaired  - Assess patient's need for assistive devices and provide as appropriate  - Encourage maximum independence but intervene and supervise when necessary  - Involve family in performance of ADLs  - Assess for home care needs following discharge   - Consider OT consult to assist with ADL evaluation and planning for discharge  - Provide patient education as appropriate  Outcome: Progressing  Goal: Maintains/Returns to pre admission functional level  Description: INTERVENTIONS:  - Perform BMAT or MOVE assessment daily    - Set and communicate daily mobility goal to care team and patient/family/caregiver  - Collaborate with rehabilitation services on mobility goals if consulted  - Perform Range of Motion 3 times a day  - Reposition patient every 2 hours    - Dangle patient 3 times a day  - Stand patient 3 times a day  - Ambulate patient 3 times a day  - Out of bed to chair 3 times a day   - Out of bed for meals 3 times a day  - Out of bed for toileting  - Record patient progress and toleration of activity level   Outcome: Progressing     Problem: DISCHARGE PLANNING  Goal: Discharge to home or other facility with appropriate resources  Description: INTERVENTIONS:  - Identify barriers to discharge w/patient and caregiver  - Arrange for needed discharge resources and transportation as appropriate  - Identify discharge learning needs (meds, wound care, etc )  - Arrange for interpretive services to assist at discharge as needed  - Refer to Case Management Department for coordinating discharge planning if the patient needs post-hospital services based on physician/advanced practitioner order or complex needs related to functional status, cognitive ability, or social support system  Outcome: Progressing     Problem: Knowledge Deficit  Goal: Patient/family/caregiver demonstrates understanding of disease process, treatment plan, medications, and discharge instructions  Description: Complete learning assessment and assess knowledge base  Interventions:  - Provide teaching at level of understanding  - Provide teaching via preferred learning methods  Outcome: Progressing     Problem: MOBILITY - ADULT  Goal: Maintain or return to baseline ADL function  Description: INTERVENTIONS:  -  Assess patient's ability to carry out ADLs; assess patient's baseline for ADL function and identify physical deficits which impact ability to perform ADLs (bathing, care of mouth/teeth, toileting, grooming, dressing, etc )  - Assess/evaluate cause of self-care deficits   - Assess range of motion  - Assess patient's mobility; develop plan if impaired  - Assess patient's need for assistive devices and provide as appropriate  - Encourage maximum independence but intervene and supervise when necessary  - Involve family in performance of ADLs  - Assess for home care needs following discharge   - Consider OT consult to assist with ADL evaluation and planning for discharge  - Provide patient education as appropriate  Outcome: Progressing  Goal: Maintains/Returns to pre admission functional level  Description: INTERVENTIONS:  - Perform BMAT or MOVE assessment daily    - Set and communicate daily mobility goal to care team and patient/family/caregiver     - Collaborate with rehabilitation services on mobility goals if consulted  - Perform Range of Motion 3 times a day  - Reposition patient every 2 hours    - Dangle patient 3 times a day  - Stand patient 3 times a day  - Ambulate patient 3 times a day  - Out of bed to chair 3 times a day   - Out of bed for meals 3 times a day  - Out of bed for toileting  - Record patient progress and toleration of activity level   Outcome: Progressing     Problem: Prexisting or High Potential for Compromised Skin Integrity  Goal: Skin integrity is maintained or improved  Description: INTERVENTIONS:  - Identify patients at risk for skin breakdown  - Assess and monitor skin integrity  - Assess and monitor nutrition and hydration status  - Monitor labs   - Assess for incontinence   - Turn and reposition patient  - Assist with mobility/ambulation  - Relieve pressure over bony prominences  - Avoid friction and shearing  - Provide appropriate hygiene as needed including keeping skin clean and dry  - Evaluate need for skin moisturizer/barrier cream  - Collaborate with interdisciplinary team   - Patient/family teaching  - Consider wound care consult   Outcome: Progressing     Problem: Potential for Falls  Goal: Patient will remain free of falls  Description: INTERVENTIONS:  - Educate patient/family on patient safety including physical limitations  - Instruct patient to call for assistance with activity   - Consult OT/PT to assist with strengthening/mobility   - Keep Call bell within reach  - Keep bed low and locked with side rails adjusted as appropriate  - Keep care items and personal belongings within reach  - Initiate and maintain comfort rounds  - Make Fall Risk Sign visible to staff  - Offer Toileting every 2 Hours, in advance of need  - Initiate/Maintain bed alarm  - Obtain necessary fall risk management equipment  - Apply yellow socks and bracelet for high fall risk patients  - Consider moving patient to room near nurses station  Outcome: Progressing

## 2022-05-09 NOTE — OCCUPATIONAL THERAPY NOTE
Occupational Therapy Progress Note     Patient Name: Tanya Phillips  HKWDN'D Date: 5/9/2022  Problem List  Principal Problem:    Urinary tract infection associated with cystostomy catheter [facility])  Active Problems:    Multiple sclerosis ([address] )    Neurogenic bladder    Anemia            05/09/22 1140   OT Last Visit   OT Visit Date 05/09/22   Note Type   Note Type Treatment for insurance authorization   Restrictions/Precautions   Weight Bearing Precautions Per Order No   Other Precautions Contact/isolation;Cognitive; Chair Alarm; Bed Alarm; Fall Risk;Multiple lines   Lifestyle   Autonomy Pt requires A for ADL/IADLs at baseline  Pt feel she could get stronger to perform ADLs with more independence   Reciprocal Relationships  lives wih & assists pt   Service to Others Pt is retired on disability   Intrinsic Gratification Pt enjoys watching "This is us" and "A million little things"   Pain Assessment   Pain Assessment Tool 0-10   Pain Score No Pain   ADL   Where Assessed Chair   Eating Assistance 7  Independent   Eating Comments Pt able to bring drink to mouth during session  Grooming Assistance 5  Supervision/Setup   Grooming Deficit Wash/dry face;Brushing hair   Grooming Comments Pt set up with hair brush and wash cloth and completes grooming with supervision  UB Bathing Assistance 5  Supervision/Setup   UB Bathing Deficit Chest;Right arm;Left arm   UB Bathing Comments Pt washes under armpits and chest with set up of washcloth while seated in B/S chair  LB Bathing Assistance 2  Maximal Assistance   LB Bathing Deficit Right upper leg;Left upper leg;Perineal area; Buttocks   LB Bathing Comments Pt able to wash upper legs while seated but is max A to maintain static standing and max A for stacey hygiene while in standing  UB Dressing Assistance 3  Moderate Assistance   UB Dressing Deficit Thread RUE; Thread LUE;Pull over head;Pull around back;Pull down in back   LB Dressing Assistance 2  Maximal Assistance LB Dressing Deficit Thread RLE into underwear; Thread LLE into underwear;Pull up over hips   LB Dressing Comments Max A to thread legs into clean absorbent pull up, soiled pullup ripped off prior to standing  Toileting Assistance  1  Total Assistance   Toileting Comments Suprapubic nixon in place, pt unaware of fecal incontinence, max to dependent for hygiene after BM   Functional Standing Tolerance   Time 2 minutes average   Activity Pt stands multiple trials throughout session with varying stability due to R knee buckling  Able to tolerate standing at end of session with max A x 2 for stacey hygiene and brief change  Bed Mobility   Additional Comments Pt recieved OOB to chair  Transfers   Sit to Stand 2  Maximal assistance   Additional items Assist x 1   Stand to Sit 2  Maximal assistance   Additional items Assist x 1   Stand pivot 2  Maximal assistance   Additional items Assist x 1   Additional Comments Pt participates in multiple standing trials during session, initially able to stand with max A x 1, graduating to max A x 2  Able to take steps, attempt to walk to bathroom deferred due to safety concerns  Recliner chair pulled up to bathroom door in attempt to ambulate from doorway to commode over toilet, however on second attempt to walk pt's knee immediately dafne upon standing  Chair returned to bed side for subsequent standing trials  Functional Mobility   Functional Mobility 3  Moderate assistance   Additional Comments x 1 for walking toward bathroom at beginning of session (approximately 4 feet) with chair follow for safety      Cognition   Overall Cognitive Status WFL   Arousal/Participation Alert   Attention Within functional limits   Orientation Level Oriented X4   Memory Within functional limits   Following Commands Follows one step commands with increased time or repetition   Activity Tolerance   Activity Tolerance Patient limited by fatigue   Medical Staff [facility] to see per RN, care coordinated with PT for safety  Assessment   Assessment Pt seen for treatment session this AM for full ADL and mobility assessment  Demonstrates poor trunk control, leg strength, sitting balance and standing balance this session, with varying levels of assistance required throughout the session, increasing as the session progresses  Initially able to stand with max A x 1 and take steps toward bathroom with mod A x 1 and a chair follow for safety, but requires max A x 2 to stand and is unable to take steps by end of session due to fatiguing quickly  Pt demonstrates upper body self care with set up only this session, however takes additional time due to UE weakness  Remains max A to dependent for lower body self care due to poor standing balance and tolerance, and unaware of incontinence  Pt will benefit from continued OT to maximize independence in mobility and self care in order to return home with reduced caregiver burden  Recommending post acute rehab  Plan   Treatment Interventions ADL retraining;Functional transfer training;UE strengthening/ROM; Endurance training;Patient/family training;Equipment evaluation/education; Compensatory technique education;Continued evaluation   Goal Expiration Date 05/16/22   OT Treatment Day 1   OT Frequency 3-5x/wk   Recommendation   OT Discharge Recommendation Post acute rehabilitation services   AM-PAC Daily Activity Inpatient   Lower Body Dressing 2   Bathing 2   Toileting 1   Upper Body Dressing 3   Grooming 4   Eating 4   Daily Activity Raw Score 16   Daily Activity Standardized Score (Calc for Raw Score >=11) 35 96         RICK Lewis, OTR/L

## 2022-05-09 NOTE — CONSULTS
Internal Medicine Consultation Note    Patient: Uday Stewart  Age/sex: 73 y o  female  Medical Record #: [id number]      ASSESSMENT PLAN    Multiple Sclerosis  · She has had a functional decline due to UTI  · Therapy per PMR  She examines like functional incomplete tetraplegia  · Continue Baclofen and gabapentin to control spasticity and neuropathic pain  · She also takes dalfampridine ER 10mg 2x daily to improve walking speed  HTN  · Continue amlodipine 2 5mg 2x daily  · She also takes Lasix 40mg twice weekly for LE edema  · Continue to monitor BP  Neurogenic bowel and bladder  · SPC placed 4/26/22  · Continue Flomax  · PMR to initiate neurogenic bowel management teaching  Anemia  · Hgb 13 upon presentation  · Dropped to 10-11 after IV fluids and has remained stable  Review of previous labs revealed Hgb in the 12-13 range  · Will continue to monitor  Leukopenia  · Seen on recent labs  · Will continue to monitor  Subjective/ HPI: Patient seen and examined  She is a 73yo female, with secondary progressive MS, HTN and neurogenic bladder with suprapubic catheter since 4/26/22, who presented to [facility] with malodorous urine, rigors and generalized weakness  She had been treated for a UTI with Bactrim and was feeling better until she stopped the antibiotic  IV abx and fluids were started in the ED  Antibiotics were adjusted based on urine culture results  She was determined to be stable for transfer to the [facility] 5/9/22  IM consulted for medical management  ROS:   A 10 point ROS was performed; negative except as noted above       Social History:    Substance Use History:   Social History     Substance and Sexual Activity   Alcohol Use Not Currently     Social History     Tobacco Use   Smoking Status Never Smoker   Smokeless Tobacco Never Used     Social History     Substance and Sexual Activity   Drug Use Yes    Types: Marijuana    Comment: medical       Family History:    Family History Problem Relation Age of Onset    Cancer Mother     Heart disease Father     Cancer Sister          Review of Scheduled Meds:      Labs:     Results from last 7 days   Lab Units 22  0545 22  0559   WBC Thousand/uL 3 01* 3 97*   HEMOGLOBIN g/dL 10 7* 10 3*   HEMATOCRIT % 33 0* 31 9*   PLATELETS Thousands/uL 309 315     Results from last 7 days   Lab Units 22  0545 22  0559   SODIUM mmol/L 142 140   POTASSIUM mmol/L 4 1 4 3   CHLORIDE mmol/L 107 108   CO2 mmol/L 27 25   BUN mg/dL 21 18   CREATININE mg/dL 0 76 0 92   CALCIUM mg/dL 8 5 8 1*         Results from last 7 days   Lab Units 22  1849   INR  0 98              Lab Results   Component Value Date    BLOODCX No Growth at 72 hrs  2022    BLOODCX No Growth at 72 hrs  2022    BLOODCX No Growth After 5 Days  2021    BLOODCX No Growth After 5 Days  2021    URINECX >100,000 cfu/ml Klebsiella pneumoniae (A) 2022    URINECX >100,000 cfu/ml Candida parapsilosis (A) 2022    URINECX 10,000-19,000 cfu/ml Enterococcus faecalis (A) 2021       Input and Output Summary (last 24 hours):     No intake or output data in the 24 hours ending 22 1434    Imaging:     No orders to display       *Labs /Radiology studiesLabs reviewed  *Medications reviewed and reconciled as needed  *Please refer to order section for additional ordered labs studies  *Case discussed with primary attending during morning huddle case rounds    Vitals:   Temp (24hrs), Av 6 °F (36 4 °C), Min:97 2 °F (36 2 °C), Max:97 9 °F (36 6 °C)    Temp:  [97 2 °F (36 2 °C)-97 9 °F (36 6 °C)] 97 9 °F (36 6 °C)  HR:  [71-85] 85  Resp:  [18] 18  BP: (124-136)/(61-72) 126/61  SpO2:  [95 %] 95 %  There is no height or weight on file to calculate BMI  Physical Exam:   HEENT:  Head: Normocephalic, no lesions, without obvious abnormality    CARDIAC:  regular rate and rhythm, S1, S2 normal, no murmur, click, rub or gallop  LUNGS:  normal air entry, lungs clear to auscultation  ABDOMEN:  soft, non-tender  Bowel sounds normal  No masses, no organomegaly  EXTREMITIES:  extremities normal, warm and well-perfused; no cyanosis, clubbing, or edema  NEURO:   mental status, speech normal, alert and oriented x3 and Lt > Rt sided weakness  PSYCH:  Alert and oriented, appropriate affect  Invasive Devices  Report    Drain            Suprapubic Catheter 18 Fr  12 days                 VTE Pharmacologic Prophylaxis: Enoxaparin (Lovenox)  Code Status: Prior  Current Length of Stay: 0 day(s)    Total floor / unit time spent today 1 hour with more than 50% spent counseling/coordinating care  Counseling includes discussion with patient re: progress  and discussion with patient of his/her current medical state/information  Coordination of patient's care was performed in conjunction with primary service  Time invested included review of patient's labs, vitals, and management of their comorbidities with continued monitoring  In addition, this patient was discussed with medical team including physician and advanced extenders  The care of the patient was extensively discussed and appropriate treatment plan was formulated unique for this patient  ** Please Note: Fluency Direct voice to text software may have been used in the creation of this document   Audio transcription errors may occur**

## 2022-05-09 NOTE — PLAN OF CARE
Problem: PHYSICAL THERAPY ADULT  Goal: Performs mobility at highest level of function for planned discharge setting  See evaluation for individualized goals  Description: Treatment/Interventions: ADL retraining,Functional transfer training,LE strengthening/ROM,Elevations,Therapeutic exercise,Endurance training,Cognitive reorientation,Patient/family training,Equipment eval/education,Bed mobility,Gait training,Compensatory technique education,Continued evaluation,Spoke to nursing,OT,Spoke to case management          See flowsheet documentation for full assessment, interventions and recommendations  Outcome: Progressing  Note: Prognosis: Fair  Problem List: Decreased range of motion,Decreased endurance,Impaired balance,Decreased mobility,Decreased coordination,Decreased cognition,Impaired judgement,Decreased safety awareness,Obesity,Decreased skin integrity  Assessment: Pt agrees to PT treatment and is pleasant and cooperative throughout session  Progress noted as pt is able to ambulate increased distance this session  Pt fatigues very quickly and requires 2nd assist for transfers during 2nd half of session due to RLE weakness and knee buckling  Requires mod A for trunk control at times to pull from reclined position into upright sitting on edge of chair  Pt is very motivated and cooperative throughout session and will make a great rehab candidate  Will continue to benefit from ongoing skilled PT to maximize her functional mobility and increase her level of independence  Barriers to Discharge: Decreased caregiver support  Barriers to Discharge Comments:  works     PT Discharge Recommendation: Post acute rehabilitation services          See flowsheet documentation for full assessment  no

## 2022-05-09 NOTE — PHYSICAL THERAPY NOTE
PHYSICAL THERAPY NOTE    Patient Name: Krysta GUEVARA Date: 22 1139   PT Last Visit   PT Visit Date 22   Note Type   Note Type Treatment   Pain Assessment   Pain Assessment Tool 0-10   Pain Score No Pain   Restrictions/Precautions   Weight Bearing Precautions Per Order No   Other Precautions Contact/isolation; Chair Alarm; Bed Alarm; Fall Risk;Multiple lines   General   Chart Reviewed Yes   Response to Previous Treatment Patient with no complaints from previous session  Family/Caregiver Present No   Cognition   Overall Cognitive Status WFL   Arousal/Participation Alert   Attention Within functional limits   Comments Pt identified by name and   Subjective   Subjective Agrees to PT treatment and is pleasant and cooperative throughout session  ("I just don't want to disappoint you ")   Bed Mobility   Supine to Sit Unable to assess  (OOB in chair pre/post session with alarm intact)   Transfers   Sit to Stand 2  Maximal assistance   Additional items Increased time required;Verbal cues; Assist x 2  (initially max Ax1; regressed to max A x2 2nd half of session)   Stand to Sit 2  Maximal assistance   Additional items Assist x 1; Increased time required;Verbal cues;Armrests  (hand placement/foot placement)   Additional Comments standing tolerance ~2`; limited due to RLE weakness/knee buckling  (x4 sit to stand trials from recliner chair)   Ambulation/Elevation   Gait pattern Improper Weight shift;Decreased foot clearance;Shuffling; Short stride; Excessively slow;Decreased heel strike;Decreased toe off   Gait Assistance 3  Moderate assist   Additional items Assist x 1;Verbal cues  (and close chair follow)   Assistive Device Rolling walker   Distance 4` x1 with RW   Balance   Static Sitting Fair -  (with multiple posterior LOB back into chair)   Dynamic Sitting Poor +   Static Standing   (variable from Poor to Zero with Ax2)   Ambulatory   (variable from Poor to zero )   Endurance Deficit   Endurance Deficit Yes   Endurance Deficit Description limited standing tolerance, fatigue, RLE weakness/knee buckling   Activity Tolerance   Activity Tolerance Patient limited by fatigue   Nurse Made Aware Per RN, pt appropriate to treat   Assessment   Prognosis Fair   Problem List Decreased range of motion;Decreased endurance; Impaired balance;Decreased mobility; Decreased coordination;Decreased cognition; Impaired judgement;Decreased safety awareness; Obesity; Decreased skin integrity   Assessment Pt agrees to PT treatment and is pleasant and cooperative throughout session  Progress noted as pt is able to ambulate increased distance this session  Pt fatigues very quickly and requires 2nd assist for transfers during 2nd half of session due to RLE weakness and knee buckling  Requires mod A for trunk control at times to pull from reclined position into upright sitting on edge of chair  Pt is very motivated and cooperative throughout session and will make a great rehab candidate  Will continue to benefit from ongoing skilled PT to maximize her functional mobility and increase her level of independence  Goals   Patient Goals to go home eventually   STG Expiration Date 05/20/22   PT Treatment Day 1   Plan   Treatment/Interventions Functional transfer training;LE strengthening/ROM; Therapeutic exercise; Endurance training;Patient/family training;Equipment eval/education; Bed mobility;Gait training   Progress Progressing toward goals   PT Frequency 3-5x/wk   Recommendation   PT Discharge Recommendation Post acute rehabilitation services   Equipment Recommended [facility] Recommended Wheeled walker   AM-PAC Basic Mobility Inpatient   Turning in Bed Without Bedrails 2   Lying on Back to Sitting on Edge of Flat Bed 2   Moving Bed to Chair 2   Standing Up From Chair 2   Walk in Room 2   Climb 3-5 Stairs 1   Basic Mobility Inpatient Raw Score 11   Basic Mobility Standardized Score 30 25   Turning Head Towards Sound 4   Follow Simple Instructions 3   Low Function Basic Mobility Raw Score 18   Low Function Basic Mobility Standardized Score 29 25   Highest Level Of Mobility   -[facility] Goal 4: Move to chair/commode   -[facility] Highest Level of Mobility 5: Stand (1 or more minutes)   -[facility] Goal Achieved Yes   Education   Education Provided Mobility training;Assistive device   Patient Demonstrates verbal understanding;Reinforcement needed   End of Consult   Patient Position at End of Consult Bedside chair;Bed/Chair alarm activated; All needs within reach   The patient's [facility] Basic Mobility Inpatient Short Form Raw Score is 11, Standardized Score is 30 25  A standardized score less than 40 78 or Raw Score of 16 suggests the patient may benefit from discharge to post-acute rehabilitation services, which DOES coincide with CURRENT above PT recommendations  However please refer to therapist recommendation for discharge planning given other factors that may influence destination  Adapted from [employer] of AM-PAC 6-Clicks Basic Mobility and Daily Activity Scores With Discharge Destination  Physical Therapy, 2021;101:1-9  DOI: 10 [email or web address]    Patient requires PT/OT co-treat due to signficant assistance with mobility, cognitive-behavioral impairments, and to challenge activity tolerance  Both PT and OT goals were addressed separately during this session        Janeth Pitch, PT,DPT

## 2022-05-10 LAB
BASOPHILS # BLD AUTO: 0.05 THOUSANDS/ΜL (ref 0–0.1)
BASOPHILS NFR BLD AUTO: 1 % (ref 0–1)
EOSINOPHIL # BLD AUTO: 0.09 THOUSAND/ΜL (ref 0–0.61)
EOSINOPHIL NFR BLD AUTO: 2 % (ref 0–6)
ERYTHROCYTE [DISTWIDTH] IN BLOOD BY AUTOMATED COUNT: 15.2 % (ref 11.6–15.1)
HCT VFR BLD AUTO: 34.2 % (ref 34.8–46.1)
HGB BLD-MCNC: 11.1 G/DL (ref 11.5–15.4)
IMM GRANULOCYTES # BLD AUTO: 0.01 THOUSAND/UL (ref 0–0.2)
IMM GRANULOCYTES NFR BLD AUTO: 0 % (ref 0–2)
LYMPHOCYTES # BLD AUTO: 0.54 THOUSANDS/ΜL (ref 0.6–4.47)
LYMPHOCYTES NFR BLD AUTO: 14 % (ref 14–44)
MCH RBC QN AUTO: 32.1 PG (ref 26.8–34.3)
MCHC RBC AUTO-ENTMCNC: 32.5 G/DL (ref 31.4–37.4)
MCV RBC AUTO: 99 FL (ref 82–98)
MONOCYTES # BLD AUTO: 0.29 THOUSAND/ΜL (ref 0.17–1.22)
MONOCYTES NFR BLD AUTO: 8 % (ref 4–12)
NEUTROPHILS # BLD AUTO: 2.91 THOUSANDS/ΜL (ref 1.85–7.62)
NEUTS SEG NFR BLD AUTO: 75 % (ref 43–75)
NRBC BLD AUTO-RTO: 0 /100 WBCS
PLATELET # BLD AUTO: 343 THOUSANDS/UL (ref 149–390)
PMV BLD AUTO: 10.2 FL (ref 8.9–12.7)
RBC # BLD AUTO: 3.46 MILLION/UL (ref 3.81–5.12)
WBC # BLD AUTO: 3.89 THOUSAND/UL (ref 4.31–10.16)

## 2022-05-10 PROCEDURE — 99232 SBSQ HOSP IP/OBS MODERATE 35: CPT | Performed by: INTERNAL MEDICINE

## 2022-05-10 PROCEDURE — 99222 1ST HOSP IP/OBS MODERATE 55: CPT | Performed by: PHYSICAL MEDICINE & REHABILITATION

## 2022-05-10 PROCEDURE — 97110 THERAPEUTIC EXERCISES: CPT

## 2022-05-10 PROCEDURE — 97530 THERAPEUTIC ACTIVITIES: CPT

## 2022-05-10 PROCEDURE — 97535 SELF CARE MNGMENT TRAINING: CPT

## 2022-05-10 PROCEDURE — 97167 OT EVAL HIGH COMPLEX 60 MIN: CPT

## 2022-05-10 PROCEDURE — 85025 COMPLETE CBC W/AUTO DIFF WBC: CPT | Performed by: PHYSICAL MEDICINE & REHABILITATION

## 2022-05-10 PROCEDURE — 97162 PT EVAL MOD COMPLEX 30 MIN: CPT

## 2022-05-10 RX ORDER — METHENAMINE HIPPURATE 1000 MG/1
1 TABLET ORAL 2 TIMES DAILY
Status: DISCONTINUED | OUTPATIENT
Start: 2022-05-10 | End: 2022-05-10

## 2022-05-10 RX ORDER — BACLOFEN 10 MG/1
10 TABLET ORAL 3 TIMES DAILY
Status: DISCONTINUED | OUTPATIENT
Start: 2022-05-10 | End: 2022-05-26 | Stop reason: HOSPADM

## 2022-05-10 RX ORDER — METHENAMINE HIPPURATE 1000 MG/1
1 TABLET ORAL 2 TIMES DAILY
Status: DISCONTINUED | OUTPATIENT
Start: 2022-05-10 | End: 2022-05-26 | Stop reason: HOSPADM

## 2022-05-10 RX ADMIN — ASPIRIN 81 MG: 81 TABLET, COATED ORAL at 09:20

## 2022-05-10 RX ADMIN — ENOXAPARIN SODIUM 40 MG: 40 INJECTION SUBCUTANEOUS at 09:20

## 2022-05-10 RX ADMIN — DALFAMPRIDINE 10 MG: 10 TABLET, EXTENDED RELEASE ORAL at 21:58

## 2022-05-10 RX ADMIN — POLYETHYLENE GLYCOL 3350 17 G: 17 POWDER, FOR SOLUTION ORAL at 11:51

## 2022-05-10 RX ADMIN — AMLODIPINE BESYLATE 2.5 MG: 2.5 TABLET ORAL at 09:19

## 2022-05-10 RX ADMIN — CEPHALEXIN 500 MG: 500 CAPSULE ORAL at 17:56

## 2022-05-10 RX ADMIN — BACLOFEN 10 MG: 10 TABLET ORAL at 17:56

## 2022-05-10 RX ADMIN — CEPHALEXIN 500 MG: 500 CAPSULE ORAL at 06:13

## 2022-05-10 RX ADMIN — CEPHALEXIN 500 MG: 500 CAPSULE ORAL at 21:57

## 2022-05-10 RX ADMIN — DALFAMPRIDINE 10 MG: 10 TABLET, EXTENDED RELEASE ORAL at 11:51

## 2022-05-10 RX ADMIN — BACLOFEN 10 MG: 10 TABLET ORAL at 21:57

## 2022-05-10 RX ADMIN — Medication 2000 UNITS: at 09:20

## 2022-05-10 RX ADMIN — AMLODIPINE BESYLATE 2.5 MG: 2.5 TABLET ORAL at 21:57

## 2022-05-10 RX ADMIN — GABAPENTIN 100 MG: 100 CAPSULE ORAL at 09:19

## 2022-05-10 RX ADMIN — BACLOFEN 20 MG: 20 TABLET ORAL at 09:17

## 2022-05-10 RX ADMIN — CEPHALEXIN 500 MG: 500 CAPSULE ORAL at 11:51

## 2022-05-10 RX ADMIN — METHENAMINE HIPPURATE 1 G: 1 TABLET ORAL at 17:56

## 2022-05-10 NOTE — PCC NURSING
Patient is a 72 yo female with h/o MS & neurogenic bladder with urinary retention s/p sacral neuromodulator device placement however ultimately requiring chronic indwelling nixon catheter however had recurrent UTIs and therefore underwent suprapubic catheter placement on 4/26 by IR however on 5/5 presented with generalized weakness and chills and was found to have UTI which was treated first with IV abx then eventually transitioned to oral abx in acute care and transferred to acute inpt rehab on 5/9     Multiple Sclerosis - She has had a functional decline due to UTI, Therapy per PMR  Continue Baclofen to control spasticity, She also takes dalfampridine ER 10mg 2x daily to improve walking speed  HTN - Continue amlodipine 2 5mg 2x daily, She also takes Lasix 40mg twice weekly for LE edema, Continue to monitor BP  Hemorrhoids d/t chronic constipation try to avoid, appreciate GI input, no further testing unless drop in hgb and persistent maci blood, colonsocopy 4 years ago, they recommend f/u w/ GI o/p  Neurogenic bowel and bladder - SPC placed 4/26/22, PMR managing neurogenic bowel mgmt teaching, no further need for flomax since now w/ SPC  Anemia - stable  Review of previous labs revealed Hgb in the 12-13 range, Will continue to monitor  Pt is continent of bowel and uses a suprapubic cath to manage bladder  Pt has no complaints of pain, but is ordered prn tylenol  This week we will continue to monitor lab values and vital signs  We will continue to educate on the importance of turning and offloading in order to prevent skin breakdown and preform routine skin checks  We will continue to monitor for constipation and medicate per bowel protocol  We will continue to encourage independence with ADLs  We will continue to preform safe transfers and keep the pt free from falls  We will continue to monitor for adequate pain control

## 2022-05-10 NOTE — PROGRESS NOTES
[facility] OT GOALS   05/10/22 0700   Rehab Team Goals   ADL Team Goal Patient will require assist with ADLs with least restrictive device upon completion of rehab program   Rehab Team Interventions   OT Interventions Self Care;Home Management; Therapeutic Exercise;Community Reintegration; Energy Conservation;Cognitive Retraining;Cognitive Reintegration;Patient/Family Education   Eating Goal   Eating Goal 06  Independent - Patient completes the activity by him/herself with no assistance from a helper  Status Ongoing; Target goal - three weeks; Target goal - four weeks   Interventions Assistive Device; Compensation Strategies; Dysphagia Education; Neuromuscular Education; Optimal Position   Grooming Goal   Oral Hygiene Goal 06  Independent - Patient completes the activity by him/herself with no assistance from a helper  Status Ongoing; Target goal - three weeks; Target goal - four weeks   Intervention Assistive Device;Balance Work;Neuromuscular Education; Therapeutic Exercise; Tolerance Work   Tub/Shower Transfer Goal   Method Shower Stall   Assist Device Seat with Back   Status Ongoing; Target goal - three weeks; Target goal - four weeks   Interventions ADL Training;Assistive Device; Neuromuscular Education   Bathing Goal   Shower/bathe self Goal 04  Supervision or touching assistance- [place] provides VERBAL CUES or supervision throughout activity  Environment Seated   Adaptive Equipment Seat with back   Status Ongoing; Target goal - three weeks; Target goal - four weeks   Intervention ADL Training;Assistive Device; Neuromuscular Education; Therapeutic Exercise   Upper Body Dressing Goal   Upper body dressing Goal 04  Supervision or touching assistance- [place] provides VERBAL CUES or supervision throughout activity  Status Ongoing; Target goal - three weeks; Target goal - four weeks   Intervention Assistive Device;Balance Work;Neuromuscular Education; Therapeutic Exercise; Tolerance Work   Lower [place] Goal   Lower body dressing Goal 04  Supervision or touching assistance- [place] provides VERBAL CUES or supervision throughout activity  Putting on/taking off footwear Goal 03  Partial/moderate assistance - [place] does less than half the effort  [place] lifts or holds trunk or limbs and provides more than half the effort  Task Lower Body; Anti-Embolic;Socks;Pants; Undergarment;Orthotic;Shoe/Slipper   Status Ongoing; Target goal - three weeks; Target goal - four weeks   Intervention Assistive Device;Balance Work;Neuromuscular Education; Therapeutic Exercise; Tolerance Work   Toileting Transfer Goal   Toilet transfer Goal 04  Supervision or touching assistance- [place] provides VERBAL CUES or supervision throughout activity  Status Ongoing; Target goal - three weeks; Target goal - four weeks   Intervention ADL Training;Balance Work;Assistive Device   Toileting Goal   Toileting hygiene Goal 04  Supervision or touching assistance- [place] provides VERBAL CUES or supervision throughout activity  Status Ongoing; Target goal - three weeks; Target goal - four weeks   Intervention ADL Training;Balance Work;Assistive Device   Comprehension Goal   Comprehension Assist Level Supervision   Status Ongoing; Target goal - three weeks; Target goal - four weeks   Expression Goal   Expression Assist Level Supervision   Status Ongoing; Target goal - three weeks; Target goal - four weeks   Community Reintegration Goal   Goal SUP   Status Ongoing; Target - three weeks; Target - four weeks   Medication Management   Assist Level Supervision   Status Ongoing; Target goal - three weeks; Target goal - four weeks   Finance Management   Assist Level Supervision   Status Target goal - three weeks; Target goal - four weeks; Ongoing

## 2022-05-10 NOTE — PLAN OF CARE
Problem: Potential for Falls  Goal: Patient will remain free of falls  Description: INTERVENTIONS:  - Educate patient/family on patient safety including physical limitations  - Instruct patient to call for assistance with activity   - Consult OT/PT to assist with strengthening/mobility   - Keep Call bell within reach  - Keep bed low and locked with side rails adjusted as appropriate  - Keep care items and personal belongings within reach  - Initiate and maintain comfort rounds  - Make Fall Risk Sign visible to staff  - Offer Toileting every Hours, in advance of need  - Initiate/Maintain larm  - Obtain necessary fall risk management equipment:  - Apply yellow socks and bracelet for high fall risk patients  - Consider moving patient to room near nurses station  Outcome: Progressing     Problem: Prexisting or High Potential for Compromised Skin Integrity  Goal: Skin integrity is maintained or improved  Description: INTERVENTIONS:  - Identify patients at risk for skin breakdown  - Assess and monitor skin integrity  - Assess and monitor nutrition and hydration status  - Monitor labs   - Assess for incontinence   - Turn and reposition patient  - Assist with mobility/ambulation  - Relieve pressure over bony prominences  - Avoid friction and shearing  - Provide appropriate hygiene as needed including keeping skin clean and dry  - Evaluate need for skin moisturizer/barrier cream  - Collaborate with interdisciplinary team   - Patient/family teaching  - Consider wound care consult   Outcome: Progressing     Problem: PAIN - ADULT  Goal: Verbalizes/displays adequate comfort level or baseline comfort level  Description: Interventions:  - Encourage patient to monitor pain and request assistance  - Assess pain using appropriate pain scale  - Administer analgesics based on type and severity of pain and evaluate response  - Implement non-pharmacological measures as appropriate and evaluate response  - Consider cultural and social influences on pain and pain management  - Notify physician/advanced practitioner if interventions unsuccessful or patient reports new pain  Outcome: Progressing     Problem: INFECTION - ADULT  Goal: Absence or prevention of progression during hospitalization  Description: INTERVENTIONS:  - Assess and monitor for signs and symptoms of infection  - Monitor lab/diagnostic results  - Monitor all insertion sites, i e  indwelling lines, tubes, and drains  - Monitor endotracheal if appropriate and nasal secretions for changes in amount and color  - [place] appropriate cooling/warming therapies per order  - Administer medications as ordered  - Instruct and encourage patient and family to use good hand hygiene technique  - Identify and instruct in appropriate isolation precautions for identified infection/condition  Outcome: Progressing     Problem: SAFETY ADULT  Goal: Patient will remain free of falls  Description: INTERVENTIONS:  - Educate patient/family on patient safety including physical limitations  - Instruct patient to call for assistance with activity   - Consult OT/PT to assist with strengthening/mobility   - Keep Call bell within reach  - Keep bed low and locked with side rails adjusted as appropriate  - Keep care items and personal belongings within reach  - Initiate and maintain comfort rounds  - Make Fall Risk Sign visible to staff  - Offer Toileting everyours, in advance of need  - Initiate/Maintainalarm  - Obtain necessary fall risk management equipment:   - Apply yellow socks and bracelet for high fall risk patients  - Consider moving patient to room near nurses station  Outcome: Progressing  Goal: Maintain or return to baseline ADL function  Description: INTERVENTIONS:  -  Assess patient's ability to carry out ADLs; assess patient's baseline for ADL function and identify physical deficits which impact ability to perform ADLs (bathing, care of mouth/teeth, toileting, grooming, dressing, etc )  - Assess/evaluate cause of self-care deficits   - Assess range of motion  - Assess patient's mobility; develop plan if impaired  - Assess patient's need for assistive devices and provide as appropriate  - Encourage maximum independence but intervene and supervise when necessary  - Involve family in performance of ADLs  - Assess for home care needs following discharge   - Consider OT consult to assist with ADL evaluation and planning for discharge  - Provide patient education as appropriate  Outcome: Progressing  Goal: Maintains/Returns to pre admission functional level  Description: INTERVENTIONS:  - Perform BMAT or MOVE assessment daily    - Set and communicate daily mobility goal to care team and patient/family/caregiver     - Collaborate with rehabilitation services on mobility goals if consulted  - Perform Range of   - Out of bed for toileting  - Record patient progress and toleration of activity level   Outcome: Progressing     Problem: DISCHARGE PLANNING  Goal: Discharge to home or other facility with appropriate resources  Description: INTERVENTIONS:  - Identify barriers to discharge w/patient and caregiver  - Arrange for needed discharge resources and transportation as appropriate  - Identify discharge learning needs (meds, wound care, etc )  - Arrange for interpretive services to assist at discharge as needed  - Refer to Case Management Department for coordinating discharge planning if the patient needs post-hospital services based on physician/advanced practitioner order or complex needs related to functional status, cognitive ability, or social support system  Outcome: Progressing

## 2022-05-10 NOTE — PROGRESS NOTES
[facility] OT EVAL   05/10/22 0700   Patient Data   Rehab Impairment Impairment of mobility, safety and Activities of Daily Living (ADLs) due to Neurologic Conditions:  03 1  Multiple Sclerosis   Etiologic Diagnosis Multiple Sclerosis with functional incomplete tetraplegia ; Recurrent UTI   Date of Onset 05/05/22   Support System   Name [facility]   Relationship    Able to provide 24 hour supervision Yes   Able to provide physical help? Yes   Home Setup   Type of Home Single Level   Method of Entry   (ramp)   Number of Stairs 0   Number of Stairs in Home 0   First Floor Bathroom Shower;Full;Door   First Floor Setup Available Yes   Home Modification Comment   (pending progress)   Available Equipment Wheelchair;Roller Walker;Single Point Cane;Handheld Shower; Shower Stool  (raised toilet seat w/ rails)   Baseline Information   Outpatient Services Received Prior to Admission Physical Therapy   Outpatient Provider [employer] [facility] [address]   Prior Device(s) Used Roller Walker; Wheelchair;Single [employer]; Shower Stool;Handheld Shower  (toilet seat riser w/ handles)   Prior IADL Participation   Money Management   (PTA IND)   Meal Preparation   ( complets)   Laundry   ( completes)   Home Cleaning   ( completes)   Prior Level of Function   Self-Care 3  Independent - Patient completed the activities by him/herself, with or without an assistive device, with no assistance from a helper  Indoor-Mobility (Ambulation) 3  Independent - Patient completed the activities by him/herself, with or without an assistive device, with no assistance from a helper  Stairs 2  Needed Some Help - Patient needed a partial assistance from another person to complete activities  Functional Cognition 3  Independent - Patient completed the activities by him/herself, with or without an assistive device, with no assistance from a helper     Prior Assistance Needed for Driving;Household Chores/Cleaning;Meal Preparation; Shopping   Prior Device Used A  Manual wheelchair;D  Henry Khan in the Last Year   Number of falls in the past 12 months   (at least once)   Type of Injury Associated with Fall Injury  (shoulder pain following)   Patient Preference   Nickname (Patient Preference) Ellen   Psychosocial   Psychosocial (WDL) WDL   Restrictions/Precautions   Precautions Bed/chair alarms;Cognitive; Fall Risk;Contact/isolation;Pain;Supervision on toilet/commode  (hx MS)   Weight Bearing Restrictions No   ROM Restrictions No   Pain Assessment   Pain Assessment Tool 0-10   Pain Score No Pain   Eating Assessment   Type of Assistance Needed Set-up / clean-up   Physical Assistance Level No physical assistance   Eating CARE Score 5   Oral Hygiene   Type of Assistance Needed Physical assistance   Physical Assistance Level 26%-50%   Comment Seated EOB to complete  Ivonne to remain upright d/t dec trunk control  Oral Hygiene CARE Score 3   Tub/Shower Transfer   Reason Not Assessed Safety;Sponge Bath   Shower/Bathe Self   Type of Assistance Needed Physical assistance   Physical Assistance Level Total assistance   Comment Ax2 for LB washing bed level  ModA washing UB EOB d/t dec BUE sterngth and dec core strength   Shower/Bathe Self CARE Score 1   Bathing   Assessed Bath Style Sponge Bath   Anticipated D/C Bath Style Shower;Sponge Bath   Dressing/Undressing Clothing   Type of Assistance Needed Physical assistance   Physical Assistance Level 76% or more   Comment MaxA d/t BUE weakness and dec core strength while sitting EOB   Upper Body Dressing CARE Score 2   Type of Assistance Needed Physical assistance   Physical Assistance Level Total assistance   Comment bed level d/t BLE weakness and dec standing tolerance   Lower Body Dressing CARE Score 1   Limitations Noted In Balance; Coordination; Endurance; Safety;Strength; Snapping   Putting On/Taking Off Footwear   Type of Assistance Needed Physical assistance   Physical Assistance Level Total assistance   Comment total assist to don thigh high TEDs   Putting On/Taking Off Footwear CARE Score 1   Toileting Hygiene   Type of Assistance Needed Physical assistance   Physical Assistance Level Total assistance   Comment TA bed level   Francisco J Cohen 83 Score 1   Toilet Transfer   Comment Attempted BSC transfer at bedside Ax2, however; pt w/ complaints of L leg feeling weak and that was giving out w/ noted increased assist provided to L side  Not safe to continue transfer, pt then returned to seated EOB  Complete toileting bed level for now until therapy can reasses  Anticipate need for multiple person assist and SB transfer, or potentially multiple person assist w/ swap out technique  Reason if not Attempted Safety concerns   Toilet Transfer CARE Score 88   Sit to Stand   Type of Assistance Needed Physical assistance   Physical Assistance Level Total assistance   Comment ModAx2 w/ RW   Sit to Stand CARE Score 1   Comprehension   QI: Comprehension 3  Usually Understands: Understands most conversations, but misses some part/intent of message  Requires cues at times to understand  Expression   QI: Expression 3  Exhibits some difficulty with expressing needs and ideas (e g , some words or finishing thoughts) or speech is not clear   RUE Assessment   RUE Assessment X  (TBA further next session)   LUE Assessment   LUE Assessment X  (TBA further next session)   Coordination   Movements are Fluid and Coordinated 0   Sensation   Additional Comments reports numbness and intermittent tingling in L sided extremities    Cognition   Overall Cognitive Status WFL   Arousal/Participation Alert; Cooperative   Attention Within functional limits   Orientation Level Oriented X4   Memory Decreased recall of recent events   Following Commands Follows one step commands with increased time or repetition   Vision   Vision Comments reports no issues w/ vision other than using reading glasses   Objective Measure   OT Measure(s) TBA as appropriate   Discharge Information   Vocational Plan Retired/not working   Patient's Discharge Plan retrun home w/ family support and additional therapy   Patient's Rehab Expectations "I want to be able to get around w/ the walker again"   Barriers to Discharge Home Limited Family Support;Decreased Strength;Decreased Endurance;Pain; Safety Considerations   Impressions Rima Gallagher" is a 73 y  o  female with medical history of but not limited to MS,HTN,chronic back pain,recurrent UTI's, Neurogenic bladder (with SPT placed 4/26) with colonization, and Spasticity who presented to [address] [place] [address] ED on 5/5 with symptoms of recurrent UTI while she was being treated for one  Symptoms included generalized weakness and rigors at home  Pt now presents to [facility] on 5/10 for OT eval  Pt currently requires extensive assist x2 people for basic ADLs d/t remarkable weakness in all extremities and poor trunk control  Attempted transfer to [facility] but deemed unsafe d/t pt's profound weakness and pt returned to sitting EOB, will trial modified technique likely SB or swap out method in future session  This is a massive decline in function as PTA pt reports being IND w/ basic ADLs and mobility in the home using RW  Pt reports a ramp to enter, WIS w/ shower stool and HH shower head, along w/ adjustable bed  Pt is good candidate for skilled OT services in this setting  OT intervention to include ADL/IADL retraining, pt/family edu, modalities, AE/DME edu, TE, leisure pursuits, fxnl cog, standardized/nonstandardized assessments as appropriate  ELOS 3-4wks     OT Therapy Minutes   OT Time In 0700   OT Time Out 0830   OT Total Time (minutes) 90   OT Mode of treatment - Individual (minutes) 90   OT Mode of treatment - Concurrent (minutes) 0   OT Mode of treatment - Group (minutes) 0   OT Mode of treatment - Co-treat (minutes) 0   OT Mode of Treatment - Total time(minutes) 90 minutes   OT Cumulative Minutes 90   Cumulative Minutes Cumulative therapy minutes 90

## 2022-05-10 NOTE — CASE MANAGEMENT
Cm met with pt and reviewed rehab routine and cm role  Pt lives with her supportive  who provides assist and transport  [facility] [place] [facility]  She has a walk in shower with grab bars and a shower chair  She has a walker, transport chair, rollator  She has has [facility] for [facility] care and [facility]  She uses [facility]  Reviewed team meeting process, update, and potential LOS  Following to assist w/ d/c planning needs

## 2022-05-10 NOTE — H&P
PHYSICAL MEDICINE AND REHABILITATION H&P/ADMISSION NOTE  Rima Mora 73 y o  female MRN: [id number]  Unit/Bed#: [facility] 459-01 Encounter: [id number]     Rehab Diagnosis: 03 1    CC: UTI    HPI: Patient is a 72 yo female with h/o MS & neurogenic bladder with urinary retention s/p sacral neuromodulator device placement however ultimately requiring chronic indwelling nixon catheter however had recurrent UTIs and therefore underwent suprapubic catheter placement on 4/26 by IR however on 5/5 presented with generalized weakness and chills and was found to have UTI which was treated first with IV abx then eventually transitioned to oral abx in acute care and transferred to acute inpt rehab on 5/9    ROS: A 10 point review of systems was performed and was negative unless noted below    Subjective: d/w patient her home medication regimen       Assessment & Plan:     UTI: h/o recurrent UTI's in the setting of neurogenic bladder due to MS with chronic indwelling nixon s/p suprapubic catheter placement by IR on 4/26; transitioned from IV abx to PO keflex 500 mg Q6 in acute care with recommendation by acute care hospitalist that patient continue abx until 5/12 (of note UCx of 5/5/22 in addition to growing klebsiella also grew candida, however per d/w Dr Rowena Walsh of ID candida growth in the urine cx can occur in those who are repeatedly treated with abx for recurrent UTI and does not typically require treatment and patient has improved with abx treatment of klebsiella therefore he does not recommend treatment of the candida at this time); per d/w Joni Nguyen PA-C (whom patient follows up with as OP from a urology perspective) given that patient had UTI after placement of suprapubic cath placement Joni Nguyen PA-C recommend patient stop home nitrofurantoin 100 mg qd and switch to hiprex 1 gm PO BID which has been done (d/w pharmacy and confirmed with pharmacist that hiprex 1 gm PO BID is acceptable with patients hepatic and renal function)     Leukopenia: mild at 3 89 (LLN 4 31) and diff essentially WNL except for mildly decreased absolute lymphocyte at 0 54 (LLN 0 60); has been in the 3 to 4 range in the past as well; IM monitoring     Anemia: unclear etiology without obvious source of bleeding, acute care team suspected hemodilution; Hg currently trending up to 11 1;  IM monitoring    MS: on home cholecalciferol 2000 units qd and home dalfampridine ER TB12 10 mg q12; she is on intrathecal methotrexate inj Q2 months and per patient next due in June; BLESSING virus positive in 2018; follows with Dr Víctor Vazquez as an OP     HTN: at home on norvasc 2 5 mg Q12, was on toprol XL and losartan in the past however patient's cardiologist, Dr Trang Angulo, stopped the Toprol XL (per his OP note of 3/2021) and later her PCP stopped the losartan (per patient); management per IM     Dyslipidemia: per Dr Simon Baird note of 3/2021 he d/w patient starting statin however patient declined and preferred to address with lifestyle/dietary modifications     Elevated LV end diastolic pressure: follows with Dr Mally Wills who at last OP visit of 3/2021 noted that patient was on losartan 25 mg qd for this however per patient provided home med list is no longer on losartan and per patient this was stopped by her PCP; OP FU with Dr Mally Wills    weakness: chronic in the setting of MS but likely exacerbated by UTI, 2/5 B/L hip flexion, 3 to 4-/5 R dorsiflexion, 1-2/5 L dorsiflexion (multipodis ordered on L to prevent achilles tendon contracture), 4/5 UE B/L; OP FU with Dr Víctor Vazquez      Chronic lower extremity edema: follows with Dr Mally Wills of cards as OP who recommend she use lasix prn and it appears she uses 40 mg on Mondays and Fridays as she states that this schedule appears to control the LE edema best (it tends to improve per Dr Allyn Moyer OP notes when she is consistent with use of compression stockings, therefore TEDs ordered for patient); OP FU with Dr Chinedu Pastrana    Spasticity/neuropathic pain: in the setting of MS on home baclofen 10 mg TID, per patient provided home med list on baclofen 10 mg TID (not 20 mg TID as listed in OP notes and ER note) and is no longer on neurontin (as listed in OP notes and ER note) per patient provided home med list     Neurogenic bladder: chronic in the setting of MS; h/o of sacral neuromodulation device placement on 11/19/21 by Dr Chapis Vasquez for urinary retention however ultimately required chronic indwelling nixon; h/o of recurrent UTIs with chronic indwelling nixon s/p suprapubic catheter placement on 4/26 by IR, at home on flomax 0 8 mg qpm however per d/w Laurita Rios PA-C (whome patient follows with as OP from a urology perspective) now that patient has suprapubic catheter she no longer requires flomax therefore it was dc'd; OP FU with Laurita Rios PA-C of urology scheduled for 5/31 for catheter change (and Dr Chapis Vasquez of Urogyn)     Neurogenic bowel: chronic in the setting of MS, has occasional episodes of incontinence at home where she does not feel the stool coming, as episodes are not frequent and patient does not take anything at home will monitor off scheduled meds for now and use prn's     DVT ppx: lovenox    Note: unclear as to why patient is on ASA 81 mg qd at home (no h/o TIA/CVA/MI/CAD/PVD) and per d/w patient she began taking it on her own and was not specifically recommended to do so by her physicians therefore advised patient to hold off on taking asa until d/w her PCP     Note: blood cx of 5/5/22 x 2 not yet finalized     Note: home med regimen confirmed with patient who provided a list     Incidental findings:    Elevated alk phos: mildly elevated at 156 ( however can be up to 147 by some lab standards) and appears to be chronic going back to at least 1/2018 per EMR lab records    Elevated B12 level: appears to be chronic going back to at least 4/2018 per EMR lab records and was 2,937 () on 2/1/22 likely due to B12 supplementation patient takes at home, which patient states she started on her own rather than at the recommendation of her doctors therefore advised to hold off B12 supplementation until she d/w her PCP     Arachnoid cyst: noted to cause cord flattening at the T6/7 levels on past imaging; OP FU with Dr Orozco Guardian           Functional History - Prior to Admission:     PTA required assistance for self care and mobility     Functional Status Upon Admission to ARC:  Transfers: max  Mobility: mod  ADLs: mod-max     Physical Exam:        General: alert, no apparent distress, cooperative and comfortable  HEENT:  Head: Normal, normocephalic, atraumatic    Eye: Normal external eye, conjunctiva, lidsc cornea  Ears: Normal external ears  Nose: Normal external nose, mucus membranes  CARDIAC:  +S1/2  LUNGS:  no abnormal respiratory pattern, no retractions noted, non-labored breathing   ABDOMEN:  + suprapubic catheter in place   EXTREMITIES:  no cyanosis or clubbing   NEURO:  awake, alert, apporpriately answering questions, CN 2-12 grossly intact, no gross dysmetria on F-N testing B/L, lt touch grossly intact, 2/5 B/L hip flexion, 3 to 4-/5 R dorsiflexion, 1-2/5 L dorsiflexion, 4/5 UE B/L on MMT    PSYCH:  mood/affect currently stable     Laboratory:    Results from last 7 days   Lab Units 05/07/22  0545 05/06/22  0559 05/05/22  1849   HEMOGLOBIN g/dL 10 7* 10 3* 13 2   HEMATOCRIT % 33 0* 31 9* 40 3   WBC Thousand/uL 3 01* 3 97* 6 35     Results from last 7 days   Lab Units 05/07/22  0545 05/06/22  0559 05/05/22  1849   BUN mg/dL 21 18 23   SODIUM mmol/L 142 140 138   POTASSIUM mmol/L 4 1 4 3 4 9   CHLORIDE mmol/L 107 108 101   CREATININE mg/dL 0 76 0 92 1 14   AST U/L  --   --  25   ALT U/L  --   --  30     Results from last 7 days   Lab Units 05/05/22  1849   PROTIME seconds 13 0   INR  0 98            Past Medical History:   Past Surgical History: Family History:   Social history:   Past Medical History:   Diagnosis Date    Back pain 2016    Chronic UTI     Colon polyp     Hypertension     Incontinence     MS (multiple sclerosis) ([facility] [address] )     Age 37    Neurogenic bladder     Spasticity     Past Surgical History:   Procedure Laterality Date    BACK SURGERY  2016    Lumbar fusion    COLONOSCOPY      ELBOW SURGERY Right 2008    ORIF    IR SUPRAPUBIC CATHETER PLACEMENT  4/26/2022    UT IMPLANT PERIPH/GASTRIC NEUROSTIM/ N/A 11/19/2021    Procedure: leadwire placement and pulse generator  placement of SNM at S3 Bilateral S3  nerve testing, fluroscopy - c-arm guidance and complex programming;  Surgeon: Patricia Chapman MD;  Location: [facility];  Service: Gynecology     Family History   Problem Relation Age of Onset    Cancer Mother     Heart disease Father     Cancer Sister       Social History     Socioeconomic History    Marital status: /Civil Union     Spouse name: Not on file    Number of children: Not on file    Years of education: Not on file    Highest education level: Not on file   Occupational History    Not on file   Tobacco Use    Smoking status: Never Smoker    Smokeless tobacco: Never Used   Vaping Use    Vaping Use: Never used   Substance and Sexual Activity    Alcohol use: Not Currently    Drug use: Yes     Types: Marijuana     Comment: medical    Sexual activity: Not on file   Other Topics Concern    Not on file   Social History Narrative    Not on file     Social Determinants of Health     Financial Resource Strain: Not on file   Food Insecurity: No Food Insecurity    Worried About Running Out of Food in the Last Year: Never true    Federico of Food in the Last Year: Never true   Transportation Needs: No Transportation Needs    Lack of Transportation (Medical): No    Lack of Transportation (Non-Medical):  No   Physical Activity: Not on file   Stress: Not on file   Social Connections: Not on file   Intimate Partner Violence: Not on file   Housing Stability: Unknown    Unable to Pay for Housing in the Last Year: No    Number of [place] in the Last Year: Not on file    Unstable Housing in the Last Year: No          Current Medical Diagnosis Allergies   Patient Active Problem List   Diagnosis    Acquired polyneuropathy    Multiple sclerosis ([facility] [address]    Ambulatory dysfunction    Acute cystitis without hematuria    Hypertension    Neurogenic bladder    Leg swelling    Hypernatremia    Acute bilateral thoracic back pain    Debility    Urinary tract infection associated with cystostomy catheter ([facility] [address] [facility]    Anemia    No Known Allergies        Medical Necessity Criteria for [facility] Admission: HTN, UTI In addition, the preadmission screen, post-admission physical evaluation, overall plan of care and admissions order demonstrate a reasonable expectation that the following criteria were met at the time of admission to the [facility]  1  The patient requires active and ongoing therapeutic intervention of multiple therapy disciplines (physical therapy, occupational therapy, speech-language pathology, or prosthetics/orthotics), one of which is physical or occupational therapy  2  Patient requires an intensive rehabilitation therapy program, as defined in Chapter 1, section 110 2 2 of the CMS Medicare Policy Manual  This intensive rehabilitation therapy program will consist of at least 3 hours of therapy per day at least 5 days per week or at least 15 hours of intensive rehabilitation therapy within a 7 consecutive day period, beginning with the date of admission to the [facility]  3  The patient is reasonably expected to actively participate in, and benefit significantly from, the intensive rehabilitation therapy program as defined in Chapter 1, section 110 2 2 of the CMS Medicare Policy Manual at this time of admission to the [facility]   She can reasonably be expected to make measurable improvement (that will be of practical value to improve the patients functional capacity or adaptation to impairments) as a result of the rehabilitation treatment, as defined in section 110 3, and such improvement can be expected to be made within the prescribed period of time  As noted in the CMS Medicare Policy Manual, the patient need not be expected to achieve complete independence in the domain of self-care nor be expected to return to his or her prior level of functioning in order to meet this standard  4  The patient must require physician supervision by a rehabilitation physician  As such, a rehabilitation physician will conduct face-to-face visits with the patient at least 3 days per week throughout the patients stay in the [facility] to assess the patient both medically and functionally, as well as to modify the course of treatment as needed to maximize the patients capacity to benefit from the rehabilitation process  5  The patient requires an intensive and coordinated interdisciplinary approach to providing rehabilitation, as defined in Chapter 1, section 110 2 5 of the CMS Medicare Policy Manual  This will be achieved through periodic team conferences, conducted at least once in a 7-day period, and comprising of an interdisciplinary team of medical professionals consisting of: a rehabilitation physician, registered nurse,  and/or , and a licensed/certified therapist from each therapy discipline involved in treating the patient  Changes Since Pre-admission Assessment: None -This patient's participation in rehab continues to be reasonable, necessary and appropriate  CMS Required Post-Admission Physician Evaluation Elements  History and Physical, including medical history, functional history and active comorbidities as in above text  Post-Admission Physician Evaluation:  The patient has the potential to make improvement and is in need of physical, occupational, and/or therapy services   The patient may also need nutritional services  Given the patient's complex medical condition and risk of further medical complications, rehabilitative services cannot be safely provided at a lower level of care, such as a skilled nursing facility  I have reviewed the patient's functional and medical status at the time of the preadmission screening and they are the same as on the day of this admission  I acknowledge that I have personally performed a full physical examination on this patient within 24 hours of admission  The patient and/or family demonstrated understanding the rehabilitation program and the discharge process after we discussed them       Agree in entirety: yes  Minor adaptions: none    Major changes: none     Erik Tripp MD  Physical Medicine and Rehabilitation

## 2022-05-10 NOTE — PCC PHYSICAL THERAPY
Pt cont to have significant deficits in AROM, strength, flexibility, endurance, activity tolerance, which results in a significant variation in her functional performance throughout the day  FT has been in progress in regards to scheduling the FT to review current functional status  Pt and  report they feel comfortable with mobility at home  Pt cont to walk to be able to walk consistently and move without need for A at home  Currently she will need physical A for all mobility, and will need variable equipment such as WC, slide board, RW, swetha lift based upon her performance at the time in relation to fatigue  Pt has significant fall risk factors due to the variable performance in functional mobility  Pt has received education on reducing secondary complications that are common as a result of when MS progresses   Plan is for dc home on Thursday with physical A from /caregivers and cont home PT

## 2022-05-10 NOTE — PROGRESS NOTES
[facility] PT Evaluation Note   05/10/22 1230   Patient Data   Rehab Impairment c   Etiologic Diagnosis Multiple Sclerosis with functional incomplete tetraplegia ; Recurrent UTI   Date of Onset 05/05/22   Support System   Name Ton Engel    Relationship    Able to provide 24 hour supervision Yes   Able to provide physical help? Yes   Home Setup   Type of Home Single Level   Method of Entry Ramp   Number of Stairs 0   Number of Stairs in Home 0   First Floor Setup Available Yes   Home Modification Comment Per pt and  pt functional status a year ago was mod indep using RW and rollator walker for at least household distance mobilities while uses transport chair in the community  Functional declined steadily got progressively worse every after hospitalization (3 total)  secondary to UTI with pt increasingly needing physical assistance  Pt has an adjustable bed with L rail but only sits on a rolling chair with arm rest when at home for her comfort but does not use it to mobilize around the house   and pt educated that therapy will provide safety recommendation and if they still choose to use the rolling chair at home it is their choice and stressed the importance of  providing assist to the pt during sit to stand transition on the rolling chair and both verbalized understanding  Available Equipment Roller Walker;Rollator  (transport chair)   Baseline Information   Prior Device(s) Used Rollator;Roller Walker   Prior Level of Function   Indoor-Mobility (Ambulation) 2  Needed Some Help - Patient needed a partial assistance from another person to complete activities  Stairs 1  Dependent - A helper completed the activities for the patient  Prior Assistance Needed for Driving;Household Chores/Cleaning;Meal Preparation; Shopping   Prior Device Used   (rollator, RW )   Falls in the Last Year   Number of falls in the past 12 months 3   Type of Injury Associated with Fall Injury  (L shoulder)   Patient Preference   Nickname (Patient Preference) Ellen   Psychosocial   Psychosocial (WDL) X  (Simultaneous filing  User may not have seen previous data )   Restrictions/Precautions   Precautions Fall Risk;Cognitive;Bed/chair alarms;Supervision on toilet/commode;Contact/isolation; Bronson  (suprapubic catheter)   Braces or Orthoses   (thigh high TEDS)   Pain Assessment   Pain Assessment Tool 0-10   Pain Score No Pain   Dressing/Undressing Clothing   Remove LB Clothes Pants   Don LB Clothes Pants   Type of Assistance Needed Physical assistance   Physical Assistance Level Total assistance   Lower Body Dressing CARE Score 1   Limitations Noted In Endurance;Balance;Strength;ROM;Safety; Coordination;Problem Solving   Putting On/Taking Off Footwear   Type of Assistance Needed Physical assistance;Verbal cues   Physical Assistance Level Total assistance   Putting On/Taking Off Footwear CARE Score 1   Bowel/Bladder Management   QI: Bowel Continence 1  Occasionally incontinent (one episode of bowel incontinence)   Transfer Bed/Chair/Wheelchair   Limitations Noted In Balance;Confidence; Coordination; Endurance;Problem Solving;UE Strength;LE Strength;Sensation   Adaptive Equipment Transfer Board   Type of Assistance Needed Adaptive equipment;Verbal cues; Physical assistance   Physical Assistance Level Total assistance   Comment mod-min of 1 with assist of 2nd to stabilized w/c using slide board   Chair/Bed-to-Chair Transfer CARE Score 1   Roll Left and Right   Type of Assistance Needed Physical assistance;Verbal cues; Adaptive equipment   Physical Assistance Level 76% or more   Comment max rolling to R, mod A rolling to left with bedrail simulating home set up   sits on the EOB with min A due to dec core strength with posterior pelvic tilting/slouched posture   Roll Left and Right CARE Score 2   Sit to Lying   Type of Assistance Needed Physical assistance;Verbal cues; Adaptive equipment   Physical Assistance Level Total assistance   Comment  to take photos and measurements of bed and chair height at home as well as bathroom set up    Sit to Lying CARE Score 1   Lying to Sitting on Side of Bed   Type of Assistance Needed Physical assistance;Verbal cues; Adaptive equipment   Physical Assistance Level Total assistance   Comment has adjustable bed with L rail    Lying to Sitting on Side of Bed CARE Score 1   Sit to Stand   Type of Assistance Needed Physical assistance;Verbal cues   Physical Assistance Level Total assistance   Comment max of 1 initially and able to stand at least 10 seconds using RW for support but unable to complete sit to stand on 2nd and 3rd attempt despite 2 person assist due to LE weakness    Sit to Stand CARE Score 1   Picking Up Object   Reason if not Attempted Safety concerns   Picking Up Object CARE Score 88   Car Transfer   Reason if not Attempted Safety concerns   Car Transfer CARE Score 88   Walk 10 Feet   Comment unsafe at this time due to fatigue and weakness   Reason if not Attempted Safety concerns   Walk 10 Feet CARE Score 88   Walk 50 Feet with Two Turns   Reason if not Attempted Safety concerns   Walk 50 Feet with Two Turns CARE Score 88   Walk 150 Feet   Reason if not Attempted Activity not applicable   Walk 699 Feet CARE Score 9   Walking 10 Feet on Uneven Surfaces   Reason if not Attempted Activity not applicable   Walking 10 Feet on Uneven Surfaces CARE Score 9   Wheelchair mobility   Type of Wheelchair Used 1  Manual   Method Left upper extremity;Right upper extremity   Assistance Provided For Locking Brakes; Remove Leg Rest;Replace Leg Rest;Remove armrests;Replace armrests   Distance Level Surface (feet) 15 ft   Findings For household mobility training and strengthening   Wheel 50 Feet with Two Turns   Reason if not Attempted safety   Wheel 50 Feet with Two Turns CARE Score 88   Wheel 150 Feet   Reason if not Attempted Activity not applicable   Wheel 389 Feet CARE Score 9   Curb or Single Stair   Reason if not Attempted Activity not applicable   1 Step (Curb) CARE Score 9   4 Steps   Reason if not Attempted Activity not applicable   4 Steps CARE Score 9   12 Steps   Reason if not Attempted Activity not applicable   12 Steps CARE Score 9   Comprehension   QI: Comprehension 3  Usually Understands: Understands most conversations, but misses some part/intent of message  Requires cues at times to understand  Expression   QI: Expression 3  Exhibits some difficulty with expressing needs and ideas (e g , some words or finishing thoughts) or speech is not clear   RLE Assessment   RLE Assessment X   Strength RLE   R Hip Flexion 2/5   R Knee Flexion 2-/5   R Knee Extension 2/5   R Ankle Dorsiflexion   (2-/5has more EHL/ED,1/5 ant tibialis)   R Ankle Plantar Flexion 2/5  (tight gastroc)   LLE Assessment   LLE Assessment X   Strength LLE   L Hip Flexion 2/5   L Knee Flexion 2-/5   L Knee Extension 2/5   L Ankle Dorsiflexion   (2-/5has more EHL/ED,1/5 ant tibialis)   L Ankle Plantar Flexion 2/5  (tighter gastroc and limited ROM  compared to R side)   Tone LLE   LLE Tone Hypertonic  (spasticity on L gastroc)   Coordination   Movements are Fluid and Coordinated 0   Sensation   Light Touch Partial deficits in the LLE   Propioception Partial deficits in the LLE   Additional Comments partial impairment of kinesthetic awareness on L LE   Cognition   Overall Cognitive Status WFL   Arousal/Participation Alert; Cooperative   Attention Attends with cues to redirect   Orientation Level Oriented X4   Memory Decreased short term memory   Following Commands Follows one step commands with increased time or repetition   Therapeutic Exercise   Therapeutic Exercise/Activity  instructed on hands on stretching technique for bilat gastroc m  pt also instructed to perform ankle DF, gluteal sets and quad sets as HEP with handout to facilitate with transfers       Discharge Information   Vocational Plan Retired/not working   Patient's Discharge Plan return home with  support   Patient's Rehab Expectations "to be more indep and to return home "   Barriers to Discharge Home Limited Family Support;Decreased Strength;Decreased Endurance; Safety Considerations   Impressions Pt is a 73year old female with history of MDRO and MS with functional incomplete tetraplegia admitted to the hospital due to recurrent UTI and now admitted in [facility] for rehab due to functional decline with most recent acute hospitalization  Pt is seen for moderate complexity eval with co-morbidities affecting functional progress include MS, HTN, recurrent UTIs with suprapubic catheter, acquired polyneuropathy, neurogenic bladder and chronic back pain  Personal factors affecting pt at time of IE include limited family availability to provide physical assistance at d/c for only has  to provide assist at home since 2 children unable to do due to work and family  Pt lives at home with spouse Loi Lord and was using a RW or rollator at baseline at least for household distance mobilities while uses transport chair for community negotiation  On eval pt presentation impacting functional performance include impaired core and UE/LE mm strength, dec ankle ROM, impaired sitting/standing balance/tolerance with poor truncal control and poor righting reaction, impaired coordination, impaired L LE sensation/proprioception/kinesthesia, cardiovascular deconditioning, gait dysfunction, dec functional indep and activity tolerance which places pt a high risk for falls, at risk for skin breakdown and increase caregiver burden so unsafe to return home at this time  pt requires 1-2 person assist to complete functional activities safely requiring use of slide board and w/c, see above info for details of functional status  Pt is a good rehab candidate with anticipated S-min A goals using Least restrictive AD with ELOS of 3-4 weeks   Skilled PT will work on therapeutic exercises, therapeutic activities, NMR, w/c mobility and gait training to improve overall functional indep in order for pt to return home safely with reduce risk for falls      PT Therapy Minutes   PT Time In 1230   PT Time Out 1400   PT Total Time (minutes) 90   PT Mode of treatment - Individual (minutes) 90   PT Mode of treatment - Concurrent (minutes) 0   PT Mode of treatment - Group (minutes) 0   PT Mode of treatment - Co-treat (minutes) 0   PT Mode of Treatment - Total time(minutes) 90 minutes   PT Cumulative Minutes 90   Cumulative Minutes   Cumulative therapy minutes 180

## 2022-05-10 NOTE — PCC OCCUPATIONAL THERAPY
Rima Gallagher" is a 73 y o  female with medical history of but not limited to progressive MS, HTN, chronic back pain, recurrent UTI's, Neurogenic bladder (with SPT placed 4/26) with colonization, and spasticity who presented to [address] [place] [address] ED on 5/5 with symptoms of recurrent UTI while she was being treated for one  Pt now presents to [facility] on 5/10 for OT eval  Pt currently requires extensive assist x2 people for basic ADLs d/t remarkable weakness in all extremities and poor trunk control  LB ADLs completed Ax1 bed level and Ax2 when in stance and UB seated in w/c at sink with overall Min/mod A for activity completion  Ax2 w/ SB to DA platform BSC, bed and w/c--trialed STS and SPT this past week in which pt continues to require Ax2 for safety due to weakness  Pt initially reported to therapy staff that she was IND w/ RW PTA  It has since been learned that pt's fxnl status fluctuates at home w/  often provided extensive assist for ADLs and that they even have a swetha lift at home that it utilized intermittently  Pt reports a ramp to enter, WIS w/ shower stool and HH shower head, along w/ adjustable bed  Pt has shown improvement in last week most notably in sitting balance EOB as well as func transfers despite no change in CARE scores, but even this is still limited  Anticipate DC either home w/ extensive assist required at [facility] level and use of SB vs SPT vs swetha lift  Goals this week include continued strengthening and NMR, continue to address poor activity tolerance, and continued pt/family edu 
no difficulties

## 2022-05-10 NOTE — PCC CARE MANAGEMENT
Pt lives with her supportive  who provides assist and transport  She has no SHY to her [facility] and everything is on the first floor  She has a walk in shower with grab bars and a shower chair  She has a walker, transport chair, rollator  She has has [facility] for home care and [facility]  She uses [facility] in [facility]  Reviewed team meeting process, update, and potential LOS  Following to assist w/ d/c planning needs  Pt is scheduled for d c on 5/26 with home RN through [address] [place] [address] and home PT and OT through [facility]  Following to assist w/ d/c planning needs

## 2022-05-10 NOTE — TEAM CONFERENCE
Acute RehabilitationTeam Conference Note  Date: 5/10/2022   Time: 10:51 AM       Patient Name:  Gian Lucio       Medical Record Number: [id number]   YOB: 1955  Sex: Female          Room/Bed:  [facility]9/[facility]-01  Payor Info:  Payor: Florinaporfirioalex Voss / Plan: MEDICARE A AND B / Product Type: Medicare A & B Fee for Service /      Admitting Diagnosis: Multiple sclerosis [G35]   Admit Date/Time:  5/9/2022  2:25 PM  Admission Comments: No comment available     Primary Diagnosis:  Ambulatory dysfunction  Principal Problem: Ambulatory dysfunction    Patient Active Problem List    Diagnosis Date Noted    Anemia 05/06/2022    Urinary tract infection associated with cystostomy catheter ([facility] ) 05/05/2022    Debility 06/28/2021    Acute bilateral thoracic back pain 06/23/2021    Hypernatremia 06/22/2021    Multiple sclerosis ([facility] ) 06/20/2021    Ambulatory dysfunction 06/20/2021    Acute cystitis without hematuria 06/20/2021    Hypertension 06/20/2021    Neurogenic bladder 06/20/2021    Leg swelling 06/20/2021    Acquired polyneuropathy        Physical Therapy:         5/10/22  PT to evaluate pt at 12pm today  Occupational Therapy:          OT litzy completed  Pt w/ significant weakness in all extremities, requiring portions of ADL completed bed level and not able to achieve transfer to toilet or recliner d/t time constraints  ModA x2 to achieve full stand w/ RW  Anticipate 3-4wk LOS to achieve Sup-Laura level goals  Speech Therapy:           No notes on file    Nursing Notes:  Appetite: Good  Diet Type: Regular/House                      Diet Patient/Family Education Complete: Yes                            Bladder: Catheter  Catheter Type: Suprapubic  Bladder Patient/Family Education: Yes  Bowel:  Incontinent     Bowel Patient/Family Education: Yes     Pain Score: 0                          Pain Patient/Family Education: Yes  Medication Management/Safety  Injectable: Lovenox  Safe Administration: Yes (by staff)  Medication Patient/Family Education Complete: No (on going)    Multiple Sclerosis - She has had a functional decline due to UTI, Therapy per PMR  She examines like functional incomplete tetraplegia, Continue Baclofen and gabapentin to control spasticity and neuropathic pain, She also takes dalfampridine ER 10mg 2x daily to improve walking speed  HTN - Continue amlodipine 2 5mg 2x daily, She also takes Lasix 40mg twice weekly for LE edema, Continue to monitor BP  Neurogenic bowel and bladder - SPC placed 4/26/22, Continue Flomax, PMR to initiate neurogenic bowel management teaching  Anemia - Hgb 13 upon presentation, Dropped to 10-11 after IV fluids and has remained stable  Review of previous labs revealed Hgb in the 12-13 range, Will continue to monitor  Leukopenia - Seen on recent labs, Will continue to monitor  This week we will monitor lab values and vital signs  We will educate on the importance of turning and offloading in order to prevent skin breakdown and preform routine skin checks  We will monitor for constipation and medicate per bowel protocol  We will encourage independence with ADLs  We will preform safe transfers and keep the pt free from falls  Case Management:     Discharge Planning  Living Arrangements: Lives w/ Spouse/significant other  Support Systems: Spouse/significant other  Assistance Needed: TBD  Type of Current Residence: Private residence  Current Home Care Services: No  Pt is a new admission and initial evaluation to occur  Is the patient actively participating in therapies? yes  List any modifications to the treatment plan:     Barriers Interventions   Neurogenic bladder, risk for UTI Super pubic cath, family education   Constipation  miralax    UTI Oral anitbiotics   MS Flair  Therapy exercises   Poor core strength Therapy exercises     Is the patient making expected progress toward goals?  yes  List any update or changes to goals:     Medical Goals: Weekly Team Goals:   Rehab Team Goals  Bowel/Bladder Team Goal: Patient will require assist with bladder/bowel management with least restrictive device upon completion of rehab program    Discussion: Pt presents with the above barriers  Evaluations are still occurring  She is a 2 person assist for ADLs  She has poor core strength and can only sit for a few minutes before needed assistance  She has a wheelchair or transport chair  She uses a walker in community and has a ramp  ELOS is 3-4 weeks  Anticipated Discharge Date:  [facility] Team Members Present: The following team members are supervising care for this patient and were present during this Weekly Team Conference      Physician: Dr Faina Da Silva MD  : TODD Chin  Registered Nurse: Heavenly Muñoz RN  Physical Therapist: Jenae Hussein DPT  Occupational Therapist: Estlela Dorantes OTR/L  Speech Therapist: Ivet Metzger, [place], [address] [place] [address]

## 2022-05-10 NOTE — TREATMENT PLAN
PHYSICAL MEDICINE AND REHABILITATION IPOC  Rima Mora 73 y o  female MRN: [id number]  Unit/Bed#: -01 Encounter: [id number]       PATIENT INFORMATION  ADMISSION DATE: 5/9/22 THELMA CATEGORY: debility   ADMISSION DIAGNOSIS: UTI  EXPECTED LOS: 1-2 wks      MEDICAL/FUNCTIONAL PROGNOSIS  Based on my assessment of the patient's medical conditions and current functional status, the prognosis for attaining medical and functional goals or the IRF stay is:  Fair     Medical Goals: complete abx course to treat UTI     ANTICIPATED DISCHARGE DISPOSITION AND SERVICES  COMMUNITY SETTING: home     ANTICIPATED FOLLOW-UP SERVICE:   Outpatient Therapy Services: PT/OT    DISCIPLINE SPECIFIC PLANS:  Required Disciplines & Services: PT/OT    REQUIRED THERAPY:  Therapy Hours per Day Days per Week Total Days   Physical Therapy 1 5 5 10   Occupational Therapy 1 5 5 10   Speech/Language Therapy 0 0 0   NOTE: Additional therapy time(s) may be added as appropriate to meet patient needs and to achieve functional goals        ANTICIPATED FUNCTIONAL OUTCOMES:  ADL:   Patient will maximize level for ADLs with least restrictive device upon completion of the rehab program     Bladder/Bowel: Patient will maximize level for bladder/bowel management with least restrictive device upon completion of the rehab program     Transfers:   Patient will maximize level for transfers with least restrictive device upon completion of the rehab program     Locomotion:   Patient will maximize level for locomotion with least restrictive device upon completion of the rehab program     Cognitive:   Patient appears to be at baseline          DISCHARGE PLANNING NEEDS  Equipment needs: TBD       REHAB ANTICIPATED PARTICIPATION RESTRICTIONS:  None

## 2022-05-10 NOTE — PROGRESS NOTES
Internal Medicine Progress Note  Patient: Valerie Baltazar  Age/sex: 73 y o  female  Medical Record #: [id number]      ASSESSMENT/PLAN: (Interval History)  Rima Abbasi is seen and examined and management for following issues:    Multiple Sclerosis  · She has had a functional decline due to UTI  · Therapy per PMR  · She examines like functional incomplete tetraplegia  · Continue Baclofen and gabapentin to control spasticity and neuropathic pain  · She also takes dalfampridine ER 10mg 2x daily to improve walking speed      HTN  · Continue amlodipine 2 5mg 2x daily  · She also takes Lasix 40mg twice weekly for LE edema  · Continue to monitor BP      Neurogenic bowel and bladder  · SPC placed 4/26/22  · Continue Flomax  · PMR managing neurogenic bowel management teaching      Anemia  · Hgb 13 upon presentation  · Dropped to 10-11 after IV fluids and has remained stable  · Review of previous labs revealed Hgb in the 12-13 range  · stable     Leukopenia  · Seen on recent labs  · Will continue to monitor  · improving       The above assessment and plan was reviewed and updated as determined by my evaluation of the patient on 5/10/2022      Labs:   Results from last 7 days   Lab Units 05/10/22  0614 05/07/22  0545   WBC Thousand/uL 3 89* 3 01*   HEMOGLOBIN g/dL 11 1* 10 7*   HEMATOCRIT % 34 2* 33 0*   PLATELETS Thousands/uL 343 309     Results from last 7 days   Lab Units 05/07/22  0545 05/06/22  0559   SODIUM mmol/L 142 140   POTASSIUM mmol/L 4 1 4 3   CHLORIDE mmol/L 107 108   CO2 mmol/L 27 25   BUN mg/dL 21 18   CREATININE mg/dL 0 76 0 92   CALCIUM mg/dL 8 5 8 1*         Results from last 7 days   Lab Units 05/05/22  1849   INR  0 98           Review of Scheduled Meds:  Current Facility-Administered Medications   Medication Dose Route Frequency Provider Last Rate    amLODIPine  2 5 mg Oral Q12H Harry Castillo MD      aspirin  81 mg Oral Daily Harry Castillo MD      baclofen  20 mg Oral TID Harry Castillo MD  cephalexin  500 mg Oral Q6H [address] Amador Lopez MD      cholecalciferol  2,000 Units Oral Daily Amador Lopez MD      Dalfampridine ER  10 mg Oral Q12H Subha Brown MD      enoxaparin  40 mg Subcutaneous Daily Amador Lopez MD     Cheri Morales [START ON 5/13/2022] furosemide  40 mg Oral Once per day on Mon Fri Amador Lopez MD      gabapentin  100 mg Oral TID Amador Lopez MD      polyethylene glycol  17 g Oral Daily PRN Amador Lopez MD      tamsulosin  0 8 mg Oral Daily With Bel Mendoza MD         Subjective/ HPI: Patient seen and examined  Patients overnight issues or events were reviewed with nursing or staff during rounds or morning huddle session  New or overnight issues include the following:     Pt seen in bed  Had OT this a m and felt that she participated well  Slept well overnight    ROS:   A 10 point ROS was performed; negative except as noted above         Imaging:     No orders to display       *Labs /Radiology studies Reviewed  *Medications  reviewed and reconciled as needed  *Please refer to order section for additional ordered labs studies  *Case discussed with primary attending during morning huddle case rounds    Physical Examination:  Vitals:   Vitals:    05/09/22 1432 05/09/22 2040 05/10/22 0611 05/10/22 0900   BP: 126/61 113/61 115/55 139/63   BP Location: Left arm Left arm Left arm    Pulse: 85 78 65    Resp: 18 18 18    Temp: 97 9 °F (36 6 °C) 98 3 °F (36 8 °C) (!) 97 4 °F (36 3 °C)    TempSrc: Oral Oral Oral    SpO2: 95%  95%        GEN: No apparent distress, interactive  NEURO: Alert and oriented x3  HEENT: Pupils are equal and reactive, EOMI, mucous membranes are moist, face symmetrical  CV: S1 S2 regular, no MRG, no peripheral edema noted  RESP: Lungs are clear bilaterally, no wheezes, rales or rhonchi noted, on room air, respirations easy and non labored  GI: Flat, soft non tender, non distended; +BS x4  : SPC draining clear yellow urine  MUSC: Moves all extremities; +generalized deconditioning  SKIN: pink, warm and dry, normal turgor, no rashes, lesions      The above physical exam was reviewed and updated as determined by my evaluation of the patient on 5/10/2022  Invasive Devices  Report    Drain            Suprapubic Catheter 18 Fr  13 days                   VTE Pharmacologic Prophylaxis: Enoxaparin  Code Status: Level 1 - Full Code  Current Length of Stay: 1 day(s)      Total time spent:  30 minutes with more than 50% spent counseling/coordinating care  Counseling includes discussion with patient re: progress  and discussion with patient of his/her current medical state/information  Coordination of patient's care was performed in conjunction with primary service  Time invested included review of patient's labs, vitals, and management of their comorbidities with continued monitoring  In addition, this patient was discussed with medical team including physician and advanced extenders  The care of the patient was extensively discussed and appropriate treatment plan was formulated unique for this patient  ** Please Note:  voice to text software may have been used in the creation of this document   Although proof errors in transcription or interpretation are a potential of such software**

## 2022-05-10 NOTE — RESTORATIVE TECHNICIAN NOTE
Restorative Technician Note      Patient Name: Mario Jones     Note Type: Bracing, Initial consult  Patient Position Upon Consult: Supine  Brace Applied: Multipodous Boot (RLE)  Additional Brace Ordered: Yes  Additional Brace Applied: Multipodous Boot (LLE)  Patient Position When Brace Applied: Other (comment) (Pt currently working with OT to get OOB, braces left at bedside)  Bracing Recommendations: None  Education Provided: Yes  Nurse Communication: Nurse aware of consult, application of brace      Please call Mobility Coordinator at ext  [phone] or on Tiger text " [facility]-PT-Restorative Tech" role in regards to bracing instruction and/or adjustment    Dulce Li Restorative Technician, BS

## 2022-05-11 ENCOUNTER — TELEPHONE (OUTPATIENT)
Dept: NEUROLOGY | Facility: CLINIC | Age: 67
End: 2022-05-11

## 2022-05-11 LAB
BACTERIA BLD CULT: NORMAL
BACTERIA BLD CULT: NORMAL

## 2022-05-11 PROCEDURE — 97112 NEUROMUSCULAR REEDUCATION: CPT

## 2022-05-11 PROCEDURE — 99232 SBSQ HOSP IP/OBS MODERATE 35: CPT | Performed by: PHYSICAL MEDICINE & REHABILITATION

## 2022-05-11 PROCEDURE — 97530 THERAPEUTIC ACTIVITIES: CPT

## 2022-05-11 PROCEDURE — 99232 SBSQ HOSP IP/OBS MODERATE 35: CPT | Performed by: INTERNAL MEDICINE

## 2022-05-11 PROCEDURE — 97535 SELF CARE MNGMENT TRAINING: CPT

## 2022-05-11 RX ORDER — POLYETHYLENE GLYCOL 3350 17 G/17G
17 POWDER, FOR SOLUTION ORAL DAILY PRN
Status: DISCONTINUED | OUTPATIENT
Start: 2022-05-11 | End: 2022-05-26 | Stop reason: HOSPADM

## 2022-05-11 RX ORDER — POLYETHYLENE GLYCOL 3350 17 G/17G
17 POWDER, FOR SOLUTION ORAL DAILY
Status: DISCONTINUED | OUTPATIENT
Start: 2022-05-12 | End: 2022-05-11

## 2022-05-11 RX ADMIN — CEPHALEXIN 500 MG: 500 CAPSULE ORAL at 17:47

## 2022-05-11 RX ADMIN — CEPHALEXIN 500 MG: 500 CAPSULE ORAL at 11:37

## 2022-05-11 RX ADMIN — CEPHALEXIN 500 MG: 500 CAPSULE ORAL at 06:13

## 2022-05-11 RX ADMIN — BACLOFEN 10 MG: 10 TABLET ORAL at 16:31

## 2022-05-11 RX ADMIN — AMLODIPINE BESYLATE 2.5 MG: 2.5 TABLET ORAL at 07:51

## 2022-05-11 RX ADMIN — BACLOFEN 10 MG: 10 TABLET ORAL at 22:03

## 2022-05-11 RX ADMIN — CEPHALEXIN 500 MG: 500 CAPSULE ORAL at 23:07

## 2022-05-11 RX ADMIN — METHENAMINE HIPPURATE 1 G: 1 TABLET ORAL at 07:55

## 2022-05-11 RX ADMIN — DALFAMPRIDINE 10 MG: 10 TABLET, EXTENDED RELEASE ORAL at 22:03

## 2022-05-11 RX ADMIN — ENOXAPARIN SODIUM 40 MG: 40 INJECTION SUBCUTANEOUS at 07:50

## 2022-05-11 RX ADMIN — METHENAMINE HIPPURATE 1 G: 1 TABLET ORAL at 17:47

## 2022-05-11 RX ADMIN — AMLODIPINE BESYLATE 2.5 MG: 2.5 TABLET ORAL at 22:03

## 2022-05-11 RX ADMIN — DALFAMPRIDINE 10 MG: 10 TABLET, EXTENDED RELEASE ORAL at 09:49

## 2022-05-11 RX ADMIN — BACLOFEN 10 MG: 10 TABLET ORAL at 07:51

## 2022-05-11 RX ADMIN — Medication 2000 UNITS: at 07:51

## 2022-05-11 NOTE — PROGRESS NOTES
05/11/22 1000   Pain Assessment   Pain Assessment Tool 0-10   Pain Score No Pain   Restrictions/Precautions   Precautions Fall Risk;Bed/chair alarms;Cognitive;Supervision on toilet/commode; Bronson;Contact/isolation   Cognition   Overall Cognitive Status WFL   Arousal/Participation Alert; Cooperative   Subjective   Subjective pt had no c/o and ready for PT although asked to use bedpan to have BM  Roll Left and Right   Type of Assistance Needed Physical assistance;Verbal cues; Adaptive equipment   Physical Assistance Level Total assistance   Comment assist x 2 at times  due to fatigue with repeated rolling   Roll Left and Right CARE Score 1   Sit to Lying   Type of Assistance Needed Physical assistance;Verbal cues; Adaptive equipment   Physical Assistance Level Total assistance   Comment 2 person assist with L rail   Sit to Lying CARE Score 1   Lying to Sitting on Side of Bed   Type of Assistance Needed Physical assistance;Verbal cues; Adaptive equipment   Physical Assistance Level Total assistance   Lying to Sitting on Side of Bed CARE Score 1   Sit to Stand   Type of Assistance Needed Physical assistance;Verbal cues; Adaptive equipment   Physical Assistance Level Total assistance   Comment on standing frame x 12 mins with bilat knee hyperextension so required folded blanket/towels for positioning  also provided VC/TC for pt to engaged quads/gluts in stance   Sit to Stand CARE Score 1   Bed-Chair Transfer   Type of Assistance Needed Physical assistance;Verbal cues; Adaptive equipment   Physical Assistance Level Total assistance   Comment 2 person assist slide board   Chair/Bed-to-Chair Transfer CARE Score 1   Therapeutic Interventions   Neuromuscular Re-Education seated EOB high perch for sitting balance/tolerance training with emphasis on fwd and lateral weight shifting to assume/maintain upright midline posture prior to slide board transfer   standing frame balance/tolerance training with L side dynamic reaching to promote lateral weightshifting and LE mm activation   Other Comments   Comments bed pan for bowel movement with fnxl incontinence   Assessment   Treatment Assessment Skilled PT focused on sitting balance training, standing tolerance training using standing frame and repeated bed/chair slide board transfer and bed rolling to facilitate hygiene after bowel movement  Pt is aware with lateral leaning onset but does not initiate correction with expressed fear of falling singh when ask to weight shift forward  Due to L UE weakness and dec motor control requires assist to sustained grasp on object and complete reaching task  Pt also demos bilat knee hyperextension which according to pt is not new for she locks knees for stability in standing  PT will work on seated high perch balance training this PM    Barriers to Discharge Inaccessible home environment;Decreased caregiver support   PT Barriers   Functional Limitation Car transfers; Ramp negotiation;Stair negotiation;Transfers; Walking; Wheelchair management   Plan   Treatment/Interventions Functional transfer training; Therapeutic exercise; Endurance training;Bed mobility;Spoke to nursing;OT;Spoke to case management;Spoke to MD;Spoke to advanced practitioner   PT Therapy Minutes   PT Time In 1000   PT Time Out 1130   PT Total Time (minutes) 90   PT Mode of treatment - Individual (minutes) 90   PT Mode of treatment - Concurrent (minutes) 0   PT Mode of treatment - Group (minutes) 0   PT Mode of treatment - Co-treat (minutes) 0   PT Mode of Treatment - Total time(minutes) 90 minutes   PT Cumulative Minutes 180

## 2022-05-11 NOTE — PROGRESS NOTES
05/11/22 1230   Pain Assessment   Pain Assessment Tool 0-10   Pain Score No Pain   Restrictions/Precautions   Precautions Bed/chair alarms; Fall Risk; Bronson;Contact/isolation;Supervision on toilet/commode  (suprapubic cath)   Weight Bearing Restrictions No   ROM Restrictions No   Braces or Orthoses   (thigh high TEDS)   Lifestyle   Autonomy "I think I did something to this shoulder when I fell a couple months ago"   Bed-Chair Transfer   Type of Assistance Needed Physical assistance   Physical Assistance Level Total assistance   Comment 1st transfer from recliner to bed: Mod Ax1 with 2nd person stabilizing SB and providing min A at end of transfer; 2nd transfer from bed to recliner: Max A x1 with 2nd person stabilizing board/Min A  Inc assist required due to fatigue  Chair/Bed-to-Chair Transfer CARE Score 1   Toileting Hygiene   Type of Assistance Needed Physical assistance   Physical Assistance Level Total assistance   Comment bed level hygiene using bed pan-pt noted with bowel smear in brief--unable to have actual BM in bed pan  Pt toilets x2 as pt thought she would have successful BM during hygiene (with stimulation with wiping)   Toileting Hygiene CARE Score 1   Toilet Transfer   Comment (S)  use of bed pan this session due to fatigue; Please assess SB transfer to platform DA BSC next session as able  DA platform BSC obtained and placed in pt bathroom  Exercise Tools   Exercise Tools Yes   Other Exercise Tool 1 Pt with yellow sponge in room for hand strengthening  Pt requesting more challenging sponge so provided her with blue and green sponges as her hands are at different strength levels  Cognition   Overall Cognitive Status WFL   Arousal/Participation Alert; Cooperative   Attention Attends with cues to redirect   Orientation Level Oriented X4   Memory Decreased short term memory   Following Commands Follows one step commands with increased time or repetition   Additional Activities   Additional Activities Comments Pt engages in unsupported sitting balance/core strengthening tasks focusing on dynamic func reaching and returning to upright posture, demo's no LOB noted  Func reach limited by decreased shoulder ROM PTA  Pt presents with flexed posture, engaging in anterior pelvic tilt to facilitate upright sitting however fatigues very quickly  Activity Tolerance   Activity Tolerance Patient limited by fatigue   Assessment   Treatment Assessment Pt engages in 60 minute skilled OT session focusing on bed level toileting, SB transfers and hand strengthening as well as core strengthening/stability  See above for full func details  Pt with inc time spent on bed pan hopeful to have BM--pt noted with small bowel smear in brief, RN notified  Pt would benefit from assessment of SB transfer to DA platform BSC for ease with BMs  BSC obtained and placed in bathroom  Pt currently with yellow sponge bedside, requesting more challenging sponge so provided her with blue and green  Also plan to provided pt with theraputty and an HEP for in room as well with pt verbalizing understanding  Pt continues to fatigue very quickly initially starting session at Mod A x1 and stabilization of equipment by 2nd person progressing to Max Ax1 and min X1 due to fatigue  Recommend continued skilled care to focus on ADL retraining, func transfers, sitting maggi/bal, standing maggi/bal, core strengthening, activity tolerance, in order to decrease burden of care at d/c  Prognosis Fair   Problem List Decreased range of motion;Decreased endurance; Impaired balance;Decreased mobility; Decreased coordination;Decreased cognition; Impaired judgement;Decreased safety awareness; Obesity; Decreased skin integrity   Barriers to Discharge Inaccessible home environment;Decreased caregiver support   Plan   Treatment/Interventions ADL retraining;Functional transfer training; Therapeutic exercise; Endurance training;Patient/family training;Equipment eval/education; Compensatory technique education   OT Therapy Minutes   OT Time In 1230   OT Time Out 1330   OT Total Time (minutes) 60   OT Mode of treatment - Individual (minutes) 60   OT Mode of treatment - Concurrent (minutes) 0   OT Mode of treatment - Group (minutes) 0   OT Mode of treatment - Co-treat (minutes) 0   OT Mode of Treatment - Total time(minutes) 60 minutes   OT Cumulative Minutes 150   Therapy Time missed   Time missed?  No

## 2022-05-11 NOTE — PROGRESS NOTES
Physical Medicine and Rehabilitation Progress Note  Rima Mora 73 y o  female MRN: [id number]  Unit/Bed#: -01 Encounter: [id number]      HPI: Patient is a 72 yo female with h/o MS & neurogenic bladder with urinary retention s/p sacral neuromodulator device placement however ultimately requiring chronic indwelling nixon catheter however had recurrent UTIs and therefore underwent suprapubic catheter placement on 4/26 by IR however on 5/5 presented with generalized weakness and chills and was found to have UTI which was treated first with IV abx then eventually transitioned to oral abx in acute care and transferred to acute inpt rehab on 5/9    Chief Complaint: UTI     Subjective: Patient without complaint this AM     ROS: A 10 point ROS was performed; negative except as noted above       Assessment/Plan:    UTI: h/o recurrent UTI's in the setting of neurogenic bladder due to MS with chronic indwelling nixon s/p suprapubic catheter placement by IR on 4/26; transitioned from IV abx to PO keflex 500 mg Q6 in acute care with recommendation by acute care hospitalist that patient continue abx until 5/12 (of note UCx of 5/5/22 in addition to growing klebsiella also grew candida, however per d/w Dr Jeremías Waller of ID candida growth in the urine cx can occur in those who are repeatedly treated with abx for recurrent UTI and does not typically require treatment and patient has improved with abx treatment of klebsiella therefore he does not recommend treatment of the candida at this time); per d/w Usama Ceja PA-C (whom patient follows up with as OP from a urology perspective) given that patient had UTI after placement of suprapubic cath placement Usama Ceja PA-C recommend patient stop home nitrofurantoin 100 mg qd and switch to hiprex 1 gm PO BID which has been done (d/w pharmacy and confirmed with pharmacist that hiprex 1 gm PO BID is acceptable with patients hepatic and renal function)      Leukopenia: mild at 3 89 (LLN 4 31) and diff essentially WNL except for mildly decreased absolute lymphocyte at 0 54 (LLN 0 60); has been in the 3 to 4 range in the past as well; IM monitoring      Anemia: unclear etiology without obvious source of bleeding, acute care team suspected hemodilution; Hg currently trending up to 11 1;  IM monitoring     MS: on home cholecalciferol 2000 units qd and home dalfampridine ER TB12 10 mg q12; she is on intrathecal methotrexate inj Q2 months and per patient next due in June; BLESSING virus positive in 2018; follows with Dr Josey Guo as an OP      HTN: at home on norvasc 2 5 mg Q12, was on toprol XL and losartan in the past however patient's cardiologist, Dr Rashel Singh, stopped the Toprol XL (per his OP note of 3/2021) and later her PCP stopped the losartan (per patient); management per IM      Dyslipidemia: per Dr Uriel Miranda note of 3/2021 he d/w patient starting statin however patient declined and preferred to address with lifestyle/dietary modifications      Elevated LV end diastolic pressure: follows with Dr Roxy Cadet who at last OP visit of 3/2021 noted that patient was on losartan 25 mg qd for this however per patient provided home med list is no longer on losartan and per patient this was stopped by her PCP; OP FU with Dr Roxy Cadet     weakness: chronic in the setting of MS but likely exacerbated by UTI, 2/5 B/L hip flexion, 3 to 4-/5 R dorsiflexion, 1-2/5 L dorsiflexion (multipodis ordered on L to prevent achilles tendon contracture), 4/5 UE B/L; OP FU with Dr Josey Guo       Chronic lower extremity edema: follows with Dr Roxy Cadet of cards as OP who recommend she use lasix prn and it appears she uses 40 mg on Mondays and Fridays as she states that this schedule appears to control the LE edema best (it tends to improve per Dr Zulma Rodriguez OP notes when she is consistent with use of compression stockings, therefore TEDs ordered for patient); OP FU with Dr Roxy Cadet Spasticity/neuropathic pain: in the setting of MS on home baclofen 10 mg TID, per patient provided home med list on baclofen 10 mg TID (not 20 mg TID as listed in OP notes and ER note) and is no longer on neurontin (as listed in OP notes and ER note) per patient provided home med list      Neurogenic bladder: chronic in the setting of MS; h/o of sacral neuromodulation device placement on 11/19/21 by Dr Shannon Caldera for urinary retention however ultimately required chronic indwelling nixon; h/o of recurrent UTIs with chronic indwelling nixon s/p suprapubic catheter placement on 4/26 by IR, at home on flomax 0 8 mg qpm however per d/w Monique Wright PA-C (whome patient follows with as OP from a urology perspective) now that patient has suprapubic catheter she no longer requires flomax therefore it was dc'd; OP FU with Monique Wright PA-C of urology scheduled for 5/31 for catheter change (and Dr Shannon Caldera of Urogyn)      Neurogenic bowel: chronic in the setting of MS, has occasional episodes of incontinence at home where she does not feel the stool coming, as episodes are not frequent and patient does not take anything at home will monitor off scheduled meds for now and use prn's      DVT ppx: lovenox     Note: unclear as to why patient is on ASA 81 mg qd at home (no h/o TIA/CVA/MI/CAD/PVD) and per d/w patient she began taking it on her own and was not specifically recommended to do so by her physicians therefore advised patient to hold off on taking asa until d/w her PCP      Note: blood cx of 5/5/22 x 2 finalized and no growth     Note: home med regimen confirmed with patient who provided a list      Incidental findings:     Elevated alk phos: mildly elevated at 156 ( however can be up to 147 by some lab standards) and appears to be chronic going back to at least 1/2018 per EMR lab records     Elevated B12 level: appears to be chronic going back to at least 4/2018 per EMR lab records and was 2,937 () on 2/1/22 likely due to B12 supplementation patient takes at home, which patient states she started on her own rather than at the recommendation of her doctors therefore advised to hold off B12 supplementation until she d/w her PCP      Arachnoid cyst: noted to cause cord flattening at the T6/7 levels on past imaging; OP FU with Dr Chinmay Tinsley           Objective:    Functional Update:  Mobility: total   Transfers: total  ADLs: total       Physical Exam:  Vitals:    05/11/22 0615   BP: 147/66   Pulse: 75   Resp: 18   Temp: 97 7 °F (36 5 °C)   SpO2: 93%         General: alert, no apparent distress, cooperative and comfortable  HEENT:  Head: Normal, normocephalic, atraumatic    Eye: Normal external eye, conjunctiva, lidsc cornea  Ears: Normal external ears  Nose: Normal external nose, mucus membranes  CARDIAC:  +S1/2  LUNGS:  no abnormal respiratory pattern, no retractions noted, non-labored breathing   ABDOMEN:  + suprapubic catheter in place   EXTREMITIES:  no cyanosis or clubbing   NEURO:  awake, alert, apporpriately answering questions  PSYCH:  mood/affect currently stable

## 2022-05-11 NOTE — PROGRESS NOTES
Internal Medicine Progress Note  Patient: Arvin Ribeiro  Age/sex: 73 y o  female  Medical Record #: [id number]      ASSESSMENT/PLAN: (Interval History)  Rima Ibrahim is seen and examined and management for following issues:    Multiple Sclerosis  · She has had a functional decline due to UTI  · Therapy per PMR  · She examines like functional incomplete tetraplegia  · Continue Baclofen to control spasticity   · She also takes dalfampridine ER 10mg 2x daily to improve walking speed      HTN  · Continue amlodipine 2 5mg 2x daily  · She also takes Lasix 40mg twice weekly for LE edema  · Continue to monitor BP      Neurogenic bowel and bladder  · SPC placed 4/26/22  · PMR managing neurogenic bowel management teaching  · No further need for flomax since now with [address]     Anemia  · Hgb 13 upon presentation  · Dropped to 10-11 after IV fluids and has remained stable  · Review of previous labs revealed Hgb in the 12-13 range  · stable     Leukopenia  · Seen on recent labs  · Will continue to monitor  · improving       The above assessment and plan was reviewed and updated as determined by my evaluation of the patient on 5/11/2022      Labs:   Results from last 7 days   Lab Units 05/10/22  0614 05/07/22  0545   WBC Thousand/uL 3 89* 3 01*   HEMOGLOBIN g/dL 11 1* 10 7*   HEMATOCRIT % 34 2* 33 0*   PLATELETS Thousands/uL 343 309     Results from last 7 days   Lab Units 05/07/22  0545 05/06/22  0559   SODIUM mmol/L 142 140   POTASSIUM mmol/L 4 1 4 3   CHLORIDE mmol/L 107 108   CO2 mmol/L 27 25   BUN mg/dL 21 18   CREATININE mg/dL 0 76 0 92   CALCIUM mg/dL 8 5 8 1*         Results from last 7 days   Lab Units 05/05/22  1849   INR  0 98           Review of Scheduled Meds:  Current Facility-Administered Medications   Medication Dose Route Frequency Provider Last Rate    amLODIPine  2 5 mg Oral Q12H Humberto Olivas MD      baclofen  10 mg Oral TID Humberto Olivas MD      cephalexin  500 mg Oral Q6H Emily Alexander MD  cholecalciferol  2,000 Units Oral Daily Megha Sullivan MD      Dalfampridine ER  10 mg Oral Q12H Johnathon Quinn MD      enoxaparin  40 mg Subcutaneous Daily Megha Sullivan MD     Pablito King [START ON 5/13/2022] furosemide  40 mg Oral Once per day on Mon Fri Megha Sullivan MD      methenamine hippurate  1 g Oral BID Megha Sullivan MD      polyethylene glycol  17 g Oral Daily PRN Megha Sullivan MD         Subjective/ HPI: Patient seen and examined  Patients overnight issues or events were reviewed with nursing or staff during rounds or morning huddle session  New or overnight issues include the following:     Pt seen in bed  She has no complaints overnight        ROS:   A 10 point ROS was performed; negative except as noted above         Imaging:     No orders to display       *Labs /Radiology studies Reviewed  *Medications  reviewed and reconciled as needed  *Please refer to order section for additional ordered labs studies  *Case discussed with primary attending during morning huddle case rounds    Physical Examination:  Vitals:   Vitals:    05/10/22 2140 05/10/22 2315 05/11/22 0615 05/11/22 0707   BP: 116/63  147/66    BP Location: Left arm  Left arm    Pulse: 72  75    Resp: 20  18    Temp: 98 °F (36 7 °C)  97 7 °F (36 5 °C)    TempSrc: Oral  Oral    SpO2: 96%  93%    Weight:  74 8 kg (165 lb)  76 8 kg (169 lb 6 4 oz)   Height:  4' 7" (1 397 m)         GEN: No apparent distress, interactive  NEURO: Alert and oriented x3  HEENT: Pupils are equal and reactive, EOMI, mucous membranes are moist, face symmetrical  CV: S1 S2 regular, no MRG, no peripheral edema noted  RESP: Lungs are clear bilaterally, no wheezes, rales or rhonchi noted, on room air, respirations easy and non labored  GI: Flat, soft non tender, non distended; +BS x4  : Voiding without difficulty  MUSC: Moves all extremities; +generalized deconditioning  SKIN: pink, warm and dry, normal turgor, no rashes, lesions      The above physical exam was reviewed and updated as determined by my evaluation of the patient on 5/11/2022  Invasive Devices  Report    Drain  Duration           Suprapubic Catheter 18 Fr  14 days                   VTE Pharmacologic Prophylaxis: Enoxaparin  Code Status: Level 1 - Full Code  Current Length of Stay: 2 day(s)      Total time spent:  30 minutes with more than 50% spent counseling/coordinating care  Counseling includes discussion with patient re: progress  and discussion with patient of his/her current medical state/information  Coordination of patient's care was performed in conjunction with primary service  Time invested included review of patient's labs, vitals, and management of their comorbidities with continued monitoring  In addition, this patient was discussed with medical team including physician and advanced extenders  The care of the patient was extensively discussed and appropriate treatment plan was formulated unique for this patient  ** Please Note:  voice to text software may have been used in the creation of this document   Although proof errors in transcription or interpretation are a potential of such software**

## 2022-05-11 NOTE — PROGRESS NOTES
05/11/22 1400   Pain Assessment   Pain Assessment Tool 0-10   Pain Score No Pain   Restrictions/Precautions   Precautions Fall Risk;Bed/chair alarms;Supervision on toilet/commode; [place];Contact/isolation  (suprapubic cath- encouraged to inc fluid intake due to cierra colored urine, RN aware)   Cognition   Overall Cognitive Status WFL   Arousal/Participation Alert; Cooperative   Subjective   Subjective pt seated in recliner without any c/o and ready for PT   Bed-Chair Transfer   Type of Assistance Needed Physical assistance;Verbal cues; Adaptive equipment   Physical Assistance Level Total assistance   Comment 2 person slide board recliner<>bed   Chair/Bed-to-Chair Transfer CARE Score 1   Therapeutic Interventions   Neuromuscular Re-Education seated EOB on high perch position with foot stool for LE support x 2 person assist due to poor control and core mm weakness - pt performed dynamic reaching with UE but reach is limited due to dec shoulder ROM x 20 mins,active assist  fwd/bwd leaning with tapping provided on abdominals as pt shift weight fwd   Other Comments   Comments per  bed height is 26 5 inches high   Assessment   Treatment Assessment Pt engaged in slide board transfer training and seated EOB balance/tolerance training for core strengthening and improve truncal control to facilitate safety and tolerance when using BSC or toilet  At this time pt requires 2 person assist for unsupported sitting due to core mm weakness, impaired balance with poor correction ability and fear of falling limiting participation with weight shifting singh in fwd direction  PT will cont to work on LE and core mm strengthening, improve unsupported sitting balance/tolerance, standing tolerance and functional mobility training using LRAD to increase overall functional indep and dec caregiver burden     Family/Caregiver Present no   Barriers to Discharge Inaccessible home environment;Decreased caregiver support   PT Barriers Functional Limitation Car transfers;Stair negotiation;Standing;Ramp negotiation;Transfers; Walking   Plan   Treatment/Interventions Functional transfer training; Therapeutic exercise; Endurance training;Bed mobility;Spoke to nursing;OT;Spoke to MD   Progress Progressing toward goals   Recommendation   Equipment Recommended Wheelchair  (slide board)   PT - OK to Discharge No   PT Therapy Minutes   PT Time In 1400   PT Time Out 1430   PT Total Time (minutes) 30   PT Mode of treatment - Individual (minutes) 30   PT Mode of treatment - Concurrent (minutes) 0   PT Mode of treatment - Group (minutes) 0   PT Mode of treatment - Co-treat (minutes) 0   PT Mode of Treatment - Total time(minutes) 30 minutes   PT Cumulative Minutes 210   Therapy Time missed   Time missed?  No

## 2022-05-11 NOTE — PLAN OF CARE
Problem: Potential for Falls  Goal: Patient will remain free of falls  Description: INTERVENTIONS:  - Educate patient/family on patient safety including physical limitations  - Instruct patient to call for assistance with activity   - Consult OT/PT to assist with strengthening/mobility   - Keep Call bell within reach  - Keep bed low and locked with side rails adjusted as appropriate  - Keep care items and personal belongings within reach  - Initiate and maintain comfort rounds  - Make Fall Risk Sign visible to staff  - Offer Toileting every Hours, in advance of need  - Initiate/Maintain alarm  - Obtain necessary fall risk management equipment:   - Apply yellow socks and bracelet for high fall risk patients  - Consider moving patient to room near nurses station  Outcome: Progressing     Problem: Prexisting or High Potential for Compromised Skin Integrity  Goal: Skin integrity is maintained or improved  Description: INTERVENTIONS:  - Identify patients at risk for skin breakdown  - Assess and monitor skin integrity  - Assess and monitor nutrition and hydration status  - Monitor labs   - Assess for incontinence   - Turn and reposition patient  - Assist with mobility/ambulation  - Relieve pressure over bony prominences  - Avoid friction and shearing  - Provide appropriate hygiene as needed including keeping skin clean and dry  - Evaluate need for skin moisturizer/barrier cream  - Collaborate with interdisciplinary team   - Patient/family teaching  - Consider wound care consult   Outcome: Progressing     Problem: PAIN - ADULT  Goal: Verbalizes/displays adequate comfort level or baseline comfort level  Description: Interventions:  - Encourage patient to monitor pain and request assistance  - Assess pain using appropriate pain scale  - Administer analgesics based on type and severity of pain and evaluate response  - Implement non-pharmacological measures as appropriate and evaluate response  - Consider cultural and social influences on pain and pain management  - Notify physician/advanced practitioner if interventions unsuccessful or patient reports new pain  Outcome: Progressing     Problem: INFECTION - ADULT  Goal: Absence or prevention of progression during hospitalization  Description: INTERVENTIONS:  - Assess and monitor for signs and symptoms of infection  - Monitor lab/diagnostic results  - Monitor all insertion sites, i e  indwelling lines, tubes, and drains  - Monitor endotracheal if appropriate and nasal secretions for changes in amount and color  - [place] appropriate cooling/warming therapies per order  - Administer medications as ordered  - Instruct and encourage patient and family to use good hand hygiene technique  - Identify and instruct in appropriate isolation precautions for identified infection/condition  Outcome: Progressing     Problem: SAFETY ADULT  Goal: Patient will remain free of falls  Description: INTERVENTIONS:  - Educate patient/family on patient safety including physical limitations  - Instruct patient to call for assistance with activity   - Consult OT/PT to assist with strengthening/mobility   - Keep Call bell within reach  - Keep bed low and locked with side rails adjusted as appropriate  - Keep care items and personal belongings within reach  - Initiate and maintain comfort rounds  - Make Fall Risk Sign visible to staff  - Offer Toileting every  Hours, in advance of need  - Initiate/Maintain alarm  - Obtain necessary fall risk management equipment:   - Apply yellow socks and bracelet for high fall risk patients  - Consider moving patient to room near nurses station  Outcome: Progressing  Goal: Maintain or return to baseline ADL function  Description: INTERVENTIONS:  -  Assess patient's ability to carry out ADLs; assess patient's baseline for ADL function and identify physical deficits which impact ability to perform ADLs (bathing, care of mouth/teeth, toileting, grooming, dressing, etc )  - Assess/evaluate cause of self-care deficits   - Assess range of motion  - Assess patient's mobility; develop plan if impaired  - Assess patient's need for assistive devices and provide as appropriate  - Encourage maximum independence but intervene and supervise when necessary  - Involve family in performance of ADLs  - Assess for home care needs following discharge   - Consider OT consult to assist with ADL evaluation and planning for discharge  - Provide patient education as appropriate  Outcome: Progressing  Goal: Maintains/Returns to pre admission functional level  Description: INTERVENTIONS:  - Perform BMAT or MOVE assessment daily    - Set and communicate daily mobility goal to care team and patient/family/caregiver  - Collaborate with rehabilitation services on mobility goals if consulted  - Perform Range of Motion times a day  - Reposition patient every  hours    - Dangle patient  times a day  - Stand patient  times a day  - Ambulate patient  times a day  - Out of bed to chair  times a day   - Out of bed for meals  times a day  - Out of bed for toileting  - Record patient progress and toleration of activity level   Outcome: Progressing     Problem: DISCHARGE PLANNING  Goal: Discharge to home or other facility with appropriate resources  Description: INTERVENTIONS:  - Identify barriers to discharge w/patient and caregiver  - Arrange for needed discharge resources and transportation as appropriate  - Identify discharge learning needs (meds, wound care, etc )  - Arrange for interpretive services to assist at discharge as needed  - Refer to Case Management Department for coordinating discharge planning if the patient needs post-hospital services based on physician/advanced practitioner order or complex needs related to functional status, cognitive ability, or social support system  Outcome: Progressing

## 2022-05-11 NOTE — TELEPHONE ENCOUNTER
Patient calling to schedule new patient appointment for MS  Patient is currently seen by [facility] and [facility]  Testing and records in [address]  Triage intake sent

## 2022-05-12 PROCEDURE — 97110 THERAPEUTIC EXERCISES: CPT

## 2022-05-12 PROCEDURE — 97530 THERAPEUTIC ACTIVITIES: CPT

## 2022-05-12 PROCEDURE — 97535 SELF CARE MNGMENT TRAINING: CPT

## 2022-05-12 PROCEDURE — 97112 NEUROMUSCULAR REEDUCATION: CPT

## 2022-05-12 PROCEDURE — 99232 SBSQ HOSP IP/OBS MODERATE 35: CPT | Performed by: INTERNAL MEDICINE

## 2022-05-12 PROCEDURE — 99232 SBSQ HOSP IP/OBS MODERATE 35: CPT | Performed by: PHYSICAL MEDICINE & REHABILITATION

## 2022-05-12 RX ADMIN — DALFAMPRIDINE 10 MG: 10 TABLET, EXTENDED RELEASE ORAL at 10:35

## 2022-05-12 RX ADMIN — ENOXAPARIN SODIUM 40 MG: 40 INJECTION SUBCUTANEOUS at 12:05

## 2022-05-12 RX ADMIN — BACLOFEN 10 MG: 10 TABLET ORAL at 18:14

## 2022-05-12 RX ADMIN — AMLODIPINE BESYLATE 2.5 MG: 2.5 TABLET ORAL at 10:34

## 2022-05-12 RX ADMIN — BACLOFEN 10 MG: 10 TABLET ORAL at 21:08

## 2022-05-12 RX ADMIN — BACLOFEN 10 MG: 10 TABLET ORAL at 10:34

## 2022-05-12 RX ADMIN — METHENAMINE HIPPURATE 1 G: 1 TABLET ORAL at 10:36

## 2022-05-12 RX ADMIN — CEPHALEXIN 500 MG: 500 CAPSULE ORAL at 12:04

## 2022-05-12 RX ADMIN — DALFAMPRIDINE 10 MG: 10 TABLET, EXTENDED RELEASE ORAL at 21:08

## 2022-05-12 RX ADMIN — METHENAMINE HIPPURATE 1 G: 1 TABLET ORAL at 18:14

## 2022-05-12 RX ADMIN — CEPHALEXIN 500 MG: 500 CAPSULE ORAL at 05:43

## 2022-05-12 RX ADMIN — Medication 2000 UNITS: at 10:35

## 2022-05-12 RX ADMIN — AMLODIPINE BESYLATE 2.5 MG: 2.5 TABLET ORAL at 21:06

## 2022-05-12 NOTE — PROGRESS NOTES
Physical Medicine and Rehabilitation Progress Note  Rima Mora 73 y o  female MRN: [id number]  Unit/Bed#: -01 Encounter: [id number]             HPI: Patient is a 72 yo female with h/o MS & neurogenic bladder with urinary retention s/p sacral neuromodulator device placement however ultimately requiring chronic indwelling nixon catheter however had recurrent UTIs and therefore underwent suprapubic catheter placement on 4/26 by IR however on 5/5 presented with generalized weakness and chills and was found to have UTI which was treated first with IV abx then eventually transitioned to oral abx in acute care and transferred to acute inpt rehab on 5/9    Chief Complaint: UTI     Subjective: Patient denies any events overnight     ROS: A 10 point ROS was performed; negative except as noted above       Assessment/Plan:    UTI: h/o recurrent UTI's in the setting of neurogenic bladder due to MS with chronic indwelling nixon s/p suprapubic catheter placement by IR on 4/26; transitioned from IV abx to PO keflex 500 mg Q6 in acute care with recommendation by acute care hospitalist that patient continue abx until 5/12 which is now completed (of note UCx of 5/5/22 in addition to growing klebsiella also grew candida, however per d/w Dr Marcela Fitzpatrick of ID candida growth in the urine cx can occur in those who are repeatedly treated with abx for recurrent UTI and does not typically require treatment and patient has improved with abx treatment of klebsiella therefore he does not recommend treatment of the candida at this time); per d/w Cleopatra Hua PA-C (whom patient follows up with as OP from a urology perspective) given that patient had UTI after placement of suprapubic cath placement Cleopatra Hua PA-C recommend patient stop home nitrofurantoin 100 mg qd and switch to hiprex 1 gm PO BID which has been done (d/w pharmacy and confirmed with pharmacist that hiprex 1 gm PO BID is acceptable with patients hepatic and renal function)      Leukopenia: mild at 3 89 (LLN 4 31) and diff essentially WNL except for mildly decreased absolute lymphocyte at 0 54 (LLN 0 60); has been in the 3 to 4 range in the past as well; IM monitoring      Anemia: unclear etiology without obvious source of bleeding, acute care team suspected hemodilution; Hg currently trending up to 11 1;  IM monitoring     MS: on home cholecalciferol 2000 units qd and home dalfampridine ER TB12 10 mg q12; she is on intrathecal methotrexate inj Q2 months and per patient next due in June; BLESSING virus positive in 2018; follows with Dr Sung Reyes as an OP      HTN: at home on norvasc 2 5 mg Q12, was on toprol XL and losartan in the past however patient's cardiologist, Dr Lia Glass, stopped the Toprol XL (per his OP note of 3/2021) and later her PCP stopped the losartan (per patient); management per IM      Dyslipidemia: per Dr Idalia Winkler note of 3/2021 he d/w patient starting statin however patient declined and preferred to address with lifestyle/dietary modifications      Elevated LV end diastolic pressure: follows with Dr Rylie Montana who at last OP visit of 3/2021 noted that patient was on losartan 25 mg qd for this however per patient provided home med list is no longer on losartan and per patient this was stopped by her PCP; OP FU with Dr Rylie Montana     weakness: chronic in the setting of MS but likely exacerbated by UTI, 2/5 B/L hip flexion, 3 to 4-/5 R dorsiflexion, 1-2/5 L dorsiflexion (multipodis ordered on L to prevent achilles tendon contracture), 4/5 UE B/L; OP FU with Dr Sung Reyes       Chronic lower extremity edema: follows with Dr Rylie Montana of cards as OP who recommend she use lasix prn and it appears she uses 40 mg on Mondays and Fridays as she states that this schedule appears to control the LE edema best (it tends to improve per Dr Naveed Garcia OP notes when she is consistent with use of compression stockings, therefore TEDs ordered for patient); OP FU with Dr Mildred Villar     Spasticity/neuropathic pain: in the setting of MS on home baclofen 10 mg TID, per patient provided home med list on baclofen 10 mg TID (not 20 mg TID as listed in OP notes and ER note) and is no longer on neurontin (as listed in OP notes and ER note) per patient provided home med list      Neurogenic bladder: chronic in the setting of MS; h/o of sacral neuromodulation device placement on 11/19/21 by Dr Silvestre Jackson for urinary retention however ultimately required chronic indwelling nixon; h/o of recurrent UTIs with chronic indwelling nixon s/p suprapubic catheter placement on 4/26 by IR, at home on flomax 0 8 mg qpm however per d/w Tai Newberry PA-C (whome patient follows with as OP from a urology perspective) now that patient has suprapubic catheter she no longer requires flomax therefore it was dc'd; OP FU with Tai Newberry PA-C of urology scheduled for 5/31 for catheter change (and Dr Silvestre Jackson of Urogyn)      Neurogenic bowel: chronic in the setting of MS, has occasional episodes of incontinence at home where she does not feel the stool coming, as episodes are not frequent and patient does not take anything at home will monitor off scheduled meds for now and use prn's      DVT ppx: lovenox     Note: unclear as to why patient is on ASA 81 mg qd at home (no h/o TIA/CVA/MI/CAD/PVD) and per d/w patient she began taking it on her own and was not specifically recommended to do so by her physicians therefore advised patient to hold off on taking asa until d/w her PCP      Note: blood cx of 5/5/22 x 2 finalized and no growth     Note: home med regimen confirmed with patient who provided a list      Incidental findings:     Elevated alk phos: mildly elevated at 156 ( however can be up to 147 by some lab standards) and appears to be chronic going back to at least 1/2018 per EMR lab records     Elevated B12 level: appears to be chronic going back to at least 4/2018 per EMR lab records and was 2,937 () on 2/1/22 likely due to B12 supplementation patient takes at home, which patient states she started on her own rather than at the recommendation of her doctors therefore advised to hold off B12 supplementation until she d/w her PCP      Arachnoid cyst: noted to cause cord flattening at the T6/7 levels on past imaging; OP FU with Dr Gill Rosas           Objective:    Functional Update:  Mobility: total   Transfers: total  ADLs: total       Physical Exam:    Vitals:    05/12/22 1030   BP: 144/65   Pulse: 65   Resp:    Temp:    SpO2:          General: alert, no apparent distress, cooperative and comfortable  HEENT:  Head: Normal, normocephalic, atraumatic    Eye: Normal external eye, conjunctiva, lidsc cornea  Ears: Normal external ears  Nose: Normal external nose, mucus membranes  CARDIAC:  +S1/2  LUNGS:  no abnormal respiratory pattern, no retractions noted, non-labored breathing   ABDOMEN:  + suprapubic catheter in place   EXTREMITIES:  no cyanosis or clubbing   NEURO:  awake, alert, apporpriately answering questions  PSYCH:  mood/affect currently stable

## 2022-05-12 NOTE — PLAN OF CARE
Problem: Potential for Falls  Goal: Patient will remain free of falls  Description: INTERVENTIONS:  - Educate patient/family on patient safety including physical limitations  - Instruct patient to call for assistance with activity   - Consult OT/PT to assist with strengthening/mobility   - Keep Call bell within reach  - Keep bed low and locked with side rails adjusted as appropriate  - Keep care items and personal belongings within reach  - Initiate and maintain comfort rounds  - Make Fall Risk Sign visible to staff  - Offer Toileting every Hours, in advance of need  - Initiate/Maintain arm  - Obtain necessary fall risk management equipment:  - Apply yellow socks and bracelet for high fall risk patients  - Consider moving patient to room near nurses station  Outcome: Progressing     Problem: Prexisting or High Potential for Compromised Skin Integrity  Goal: Skin integrity is maintained or improved  Description: INTERVENTIONS:  - Identify patients at risk for skin breakdown  - Assess and monitor skin integrity  - Assess and monitor nutrition and hydration status  - Monitor labs   - Assess for incontinence   - Turn and reposition patient  - Assist with mobility/ambulation  - Relieve pressure over bony prominences  - Avoid friction and shearing  - Provide appropriate hygiene as needed including keeping skin clean and dry  - Evaluate need for skin moisturizer/barrier cream  - Collaborate with interdisciplinary team   - Patient/family teaching  - Consider wound care consult   Outcome: Progressing     Problem: PAIN - ADULT  Goal: Verbalizes/displays adequate comfort level or baseline comfort level  Description: Interventions:  - Encourage patient to monitor pain and request assistance  - Assess pain using appropriate pain scale  - Administer analgesics based on type and severity of pain and evaluate response  - Implement non-pharmacological measures as appropriate and evaluate response  - Consider cultural and social influences on pain and pain management  - Notify physician/advanced practitioner if interventions unsuccessful or patient reports new pain  Outcome: Progressing     Problem: INFECTION - ADULT  Goal: Absence or prevention of progression during hospitalization  Description: INTERVENTIONS:  - Assess and monitor for signs and symptoms of infection  - Monitor lab/diagnostic results  - Monitor all insertion sites, i e  indwelling lines, tubes, and drains  - Monitor endotracheal if appropriate and nasal secretions for changes in amount and color  - [place] appropriate cooling/warming therapies per order  - Administer medications as ordered  - Instruct and encourage patient and family to use good hand hygiene technique  - Identify and instruct in appropriate isolation precautions for identified infection/condition  Outcome: Progressing     Problem: SAFETY ADULT  Goal: Patient will remain free of falls  Description: INTERVENTIONS:  - Educate patient/family on patient safety including physical limitations  - Instruct patient to call for assistance with activity   - Consult OT/PT to assist with strengthening/mobility   - Keep Call bell within reach  - Keep bed low and locked with side rails adjusted as appropriate  - Keep care items and personal belongings within reach  - Initiate and maintain comfort rounds  - Make Fall Risk Sign visible to staff  - Offer Toileting every Hours, in advance of need  - Initiate/Maintainalarm  - Obtain necessary fall risk management equipment:  - Apply yellow socks and bracelet for high fall risk patients  - Consider moving patient to room near nurses station  Outcome: Progressing  Goal: Maintain or return to baseline ADL function  Description: INTERVENTIONS:  -  Assess patient's ability to carry out ADLs; assess patient's baseline for ADL function and identify physical deficits which impact ability to perform ADLs (bathing, care of mouth/teeth, toileting, grooming, dressing, etc )  - Assess/evaluate cause of self-care deficits   - Assess range of motion  - Assess patient's mobility; develop plan if impaired  - Assess patient's need for assistive devices and provide as appropriate  - Encourage maximum independence but intervene and supervise when necessary  - Involve family in performance of ADLs  - Assess for home care needs following discharge   - Consider OT consult to assist with ADL evaluation and planning for discharge  - Provide patient education as appropriate  Outcome: Progressing  Goal: Maintains/Returns to pre admission functional level  Description: INTERVENTIONS:  - Perform BMAT or MOVE assessment daily    - Set and communicate daily mobility goal to care team and patient/family/caregiver     - Collaborate with rehabilitation services on mobility goals if consulted  - Perform Range of Mot  - Out of bed for toileting  - Record patient progress and toleration of activity level   Outcome: Progressing     Problem: DISCHARGE PLANNING  Goal: Discharge to home or other facility with appropriate resources  Description: INTERVENTIONS:  - Identify barriers to discharge w/patient and caregiver  - Arrange for needed discharge resources and transportation as appropriate  - Identify discharge learning needs (meds, wound care, etc )  - Arrange for interpretive services to assist at discharge as needed  - Refer to Case Management Department for coordinating discharge planning if the patient needs post-hospital services based on physician/advanced practitioner order or complex needs related to functional status, cognitive ability, or social support system  Outcome: Progressing     Problem: MOBILITY - ADULT  Goal: Maintain or return to baseline ADL function  Description: INTERVENTIONS:  -  Assess patient's ability to carry out ADLs; assess patient's baseline for ADL function and identify physical deficits which impact ability to perform ADLs (bathing, care of mouth/teeth, toileting, grooming, dressing, etc )  - Assess/evaluate cause of self-care deficits   - Assess range of motion  - Assess patient's mobility; develop plan if impaired  - Assess patient's need for assistive devices and provide as appropriate  - Encourage maximum independence but intervene and supervise when necessary  - Involve family in performance of ADLs  - Assess for home care needs following discharge   - Consider OT consult to assist with ADL evaluation and planning for discharge  - Provide patient education as appropriate  Outcome: Progressing  Goal: Maintains/Returns to pre admission functional level  Description: INTERVENTIONS:  - Perform BMAT or MOVE assessment daily    - Set and communicate daily mobility goal to care team and patient/family/caregiver     - Collaborate with rehabilitation services on mobility goals if consulted  - Perform Range of M  - Out of bed for toileting  - Record patient progress and toleration of activity level   Outcome: Progressing Former smoker

## 2022-05-12 NOTE — PLAN OF CARE
Problem: Potential for Falls  Goal: Patient will remain free of falls  Description: INTERVENTIONS:  - Educate patient/family on patient safety including physical limitations  - Instruct patient to call for assistance with activity   - Consult OT/PT to assist with strengthening/mobility   - Keep Call bell within reach  - Keep bed low and locked with side rails adjusted as appropriate  - Keep care items and personal belongings within reach  - Initiate and maintain comfort rounds  - Make Fall Risk Sign visible to staff  - Offer Toileting every  Hours, in advance of need  - Initiate/Maintain alarm  - Obtain necessary fall risk management equipment:  - Apply yellow socks and bracelet for high fall risk patients  - Consider moving patient to room near nurses station  Outcome: Progressing     Problem: Prexisting or High Potential for Compromised Skin Integrity  Goal: Skin integrity is maintained or improved  Description: INTERVENTIONS:  - Identify patients at risk for skin breakdown  - Assess and monitor skin integrity  - Assess and monitor nutrition and hydration status  - Monitor labs   - Assess for incontinence   - Turn and reposition patient  - Assist with mobility/ambulation  - Relieve pressure over bony prominences  - Avoid friction and shearing  - Provide appropriate hygiene as needed including keeping skin clean and dry  - Evaluate need for skin moisturizer/barrier cream  - Collaborate with interdisciplinary team   - Patient/family teaching  - Consider wound care consult   Outcome: Progressing     Problem: PAIN - ADULT  Goal: Verbalizes/displays adequate comfort level or baseline comfort level  Description: Interventions:  - Encourage patient to monitor pain and request assistance  - Assess pain using appropriate pain scale  - Administer analgesics based on type and severity of pain and evaluate response  - Implement non-pharmacological measures as appropriate and evaluate response  - Consider cultural and social influences on pain and pain management  - Notify physician/advanced practitioner if interventions unsuccessful or patient reports new pain  Outcome: Progressing     Problem: INFECTION - ADULT  Goal: Absence or prevention of progression during hospitalization  Description: INTERVENTIONS:  - Assess and monitor for signs and symptoms of infection  - Monitor lab/diagnostic results  - Monitor all insertion sites, i e  indwelling lines, tubes, and drains  - Monitor endotracheal if appropriate and nasal secretions for changes in amount and color  - [place] appropriate cooling/warming therapies per order  - Administer medications as ordered  - Instruct and encourage patient and family to use good hand hygiene technique  - Identify and instruct in appropriate isolation precautions for identified infection/condition  Outcome: Progressing     Problem: SAFETY ADULT  Goal: Patient will remain free of falls  Description: INTERVENTIONS:  - Educate patient/family on patient safety including physical limitations  - Instruct patient to call for assistance with activity   - Consult OT/PT to assist with strengthening/mobility   - Keep Call bell within reach  - Keep bed low and locked with side rails adjusted as appropriate  - Keep care items and personal belongings within reach  - Initiate and maintain comfort rounds  - Make Fall Risk Sign visible to staff  - Offer Toileting every  Hours, in advance of need  - Initiate/Maintain alarm  - Obtain necessary fall risk management equipment:   - Apply yellow socks and bracelet for high fall risk patients  - Consider moving patient to room near nurses station  Outcome: Progressing  Goal: Maintain or return to baseline ADL function  Description: INTERVENTIONS:  -  Assess patient's ability to carry out ADLs; assess patient's baseline for ADL function and identify physical deficits which impact ability to perform ADLs (bathing, care of mouth/teeth, toileting, grooming, dressing, etc )  - Assess/evaluate cause of self-care deficits   - Assess range of motion  - Assess patient's mobility; develop plan if impaired  - Assess patient's need for assistive devices and provide as appropriate  - Encourage maximum independence but intervene and supervise when necessary  - Involve family in performance of ADLs  - Assess for home care needs following discharge   - Consider OT consult to assist with ADL evaluation and planning for discharge  - Provide patient education as appropriate  Outcome: Progressing  Goal: Maintains/Returns to pre admission functional level  Description: INTERVENTIONS:  - Perform BMAT or MOVE assessment daily    - Set and communicate daily mobility goal to care team and patient/family/caregiver  - Collaborate with rehabilitation services on mobility goals if consulted  - Perform Range of Motion  times a day  - Reposition patient every  hours    - Dangle patient  times a day  - Stand patient  times a day  - Ambulate patient times a day  - Out of bed to chair times a day   - Out of bed for meals times a day  - Out of bed for toileting  - Record patient progress and toleration of activity level   Outcome: Progressing     Problem: DISCHARGE PLANNING  Goal: Discharge to home or other facility with appropriate resources  Description: INTERVENTIONS:  - Identify barriers to discharge w/patient and caregiver  - Arrange for needed discharge resources and transportation as appropriate  - Identify discharge learning needs (meds, wound care, etc )  - Arrange for interpretive services to assist at discharge as needed  - Refer to Case Management Department for coordinating discharge planning if the patient needs post-hospital services based on physician/advanced practitioner order or complex needs related to functional status, cognitive ability, or social support system  Outcome: Progressing     Problem: MOBILITY - ADULT  Goal: Maintain or return to baseline ADL function  Description: INTERVENTIONS:  - Assess patient's ability to carry out ADLs; assess patient's baseline for ADL function and identify physical deficits which impact ability to perform ADLs (bathing, care of mouth/teeth, toileting, grooming, dressing, etc )  - Assess/evaluate cause of self-care deficits   - Assess range of motion  - Assess patient's mobility; develop plan if impaired  - Assess patient's need for assistive devices and provide as appropriate  - Encourage maximum independence but intervene and supervise when necessary  - Involve family in performance of ADLs  - Assess for home care needs following discharge   - Consider OT consult to assist with ADL evaluation and planning for discharge  - Provide patient education as appropriate  Outcome: Progressing  Goal: Maintains/Returns to pre admission functional level  Description: INTERVENTIONS:  - Perform BMAT or MOVE assessment daily    - Set and communicate daily mobility goal to care team and patient/family/caregiver  - Collaborate with rehabilitation services on mobility goals if consulted  - Perform Range of Motion times a day  - Reposition patient every hours    - Dangle patient times a day  - Stand patient times a day  - Ambulate patienttimes a day  - Out of bed to chair times a day   - Out of bed for meals times a day  - Out of bed for toileting  - Record patient progress and toleration of activity level   Outcome: Progressing

## 2022-05-12 NOTE — PROGRESS NOTES
05/12/22 1430   Pain Assessment   Pain Assessment Tool 0-10   Pain Score No Pain   Restrictions/Precautions   Precautions Fall Risk;Bed/chair alarms;Contact/isolation; Bronson  (suprapubic catheter)   Cognition   Overall Cognitive Status WFL   Arousal/Participation Alert; Cooperative   Subjective   Subjective pt seated in w/c and ready for PT  Roll Left and Right   Type of Assistance Needed Physical assistance;Verbal cues   Physical Assistance Level Total assistance   Comment assist of 2 at times due to fatigue with rail  (pt has L rail at home with adjustable bed)   Roll Left and Right CARE Score 1   Sit to Lying   Type of Assistance Needed Physical assistance;Verbal cues   Physical Assistance Level Total assistance   Comment total assist of 1 for trunk and LE with rail , HOB flat   Sit to Lying CARE Score 1   Bed-Chair Transfer   Type of Assistance Needed Physical assistance;Verbal cues; Adaptive equipment   Physical Assistance Level Total assistance   Comment mod-max of 1 slide board with 2nd person stabilizing furniture  max VC to weight shift forward   Chair/Bed-to-Chair Transfer CARE Score 1   Therapeutic Interventions   Strengthening supine TE include gluteal sets x 5 SH x 12 reps, bridging x 10 reps then modified bridging x 12 reps x 2 sets, LAQ x 12 reps x 2 sets   Neuromuscular Re-Education unsupported seated EOB balance/tolerance training with yellow balance disc for high perch position  with feet flat on floor and facilitate ant pelvic tilting and promote upright posture  pt then engaged in repeated dynamic reaching including retrieving pillow with R hand then transferring to left hand to place on her left side  also had pt place her hands on PT's shoulder and push PT backward to facilitate fwd weightshifting then back to midline for core mm strengthening and increase pt's confidence in performing task to overcome fear of falling  PNF D1 pattern for bilat LE with active/assist as needed x  10 reps x 2 sets each side   Other Comments   Comments also change pt hospital bed at end of tx then assisted pt doffing day clothes to sleeping shirt with TEDS off and multipodus on but pt declined SCD pump " it makes me feel hot"   Assessment   Treatment Assessment Pt engaged in EOB unsupported sitting balance/tolerance training and bed level exercises for strengthening  Pt cont to demo significant impaired core and UE/LE mm strength deficits singh with proximal mm group impacting participation and tolerance with functional transfers requiring of 1-2 person assist  For today's session pt able to participate in EOB sitting balance training with 1 person assist without significant LOB although cont to verbalized fear of falling during fwd weight shifting as well as dec postural awareness stating " I don't think I am sitting upright" so tomorrow will use mirror for visual feedback  PT will also cont strengthening exercise and slide board transfer to improve overall functions  Family/Caregiver Present no   Barriers to Discharge Inaccessible home environment;Decreased caregiver support   Plan   Treatment/Interventions Functional transfer training;LE strengthening/ROM; Therapeutic exercise; Endurance training;Bed mobility;Spoke to nursing;OT;Equipment eval/education   Progress Slow progress, decreased activity tolerance   Recommendation   PT - OK to Discharge No   PT Therapy Minutes   PT Time In 1430   PT Time Out 1530   PT Total Time (minutes) 60   PT Mode of treatment - Individual (minutes) 60   PT Mode of treatment - Concurrent (minutes) 0   PT Mode of treatment - Group (minutes) 0   PT Mode of treatment - Co-treat (minutes) 0   PT Mode of Treatment - Total time(minutes) 60 minutes   PT Cumulative Minutes 270

## 2022-05-12 NOTE — PROGRESS NOTES
05/12/22 0930   Pain Assessment   Pain Assessment Tool 0-10   Pain Score No Pain   Restrictions/Precautions   Precautions Fall Risk;Bed/chair alarms; [place]onson;Contact/isolation  (suprapubic cath)   Lifestyle   Autonomy "I wish I could just stand up and do more for myself"   Eating   Type of Assistance Needed Set-up / clean-up   Physical Assistance Level No physical assistance   Eating CARE Score 5   Bed-Chair Transfer   Type of Assistance Needed Physical assistance   Physical Assistance Level Total assistance   Comment Ivonne x1 and stand by assist of second for safety d/t pts history of poor trunk control   Chair/Bed-to-Chair Transfer CARE Score 1   Therapeutic Excerise-Strength   UE Strength Yes   Right Upper Extremity- Strength   RUE Strength Comment First, assessed BUE strength  Not assessed at L shoulder d/t hx pain since fall, but L shoulder 3-/5 - 3/5, R shoulder 3-3+/5, 3+/5 at B elbows  The following UE TE for 3x10 use of no weight-1lb DB as tolerated: punches forward 6x5, elbow flx/extn 3x10  Completed to increase BUE strength for increased IND w/ ADLs/IADLs  Left Upper Extremity-Strength   LUE Strength Comment First, assessed BUE strength  Not assessed at L shoulder d/t hx pain since fall, but L shoulder 3-/5 - 3/5, R shoulder 3-3+/5, 3+/5 at B elbows  The following UE TE for 3x10 use of no weight-1lb DB as tolerated: punches forward 6x5, elbow flx/extn 3x10  Completed to increase BUE strength for increased IND w/ ADLs/IADLs  Exercise Tools   Exercise Tools Yes   Theraputty Issued and reviewed all exercises in theraputty HEP  Provided pt w/ pink and yellow theraputty as pt using both depending on the exercises  Instructed pt that she may complete these exercises in her room as able between therapies  Will continue to follow up w/ pt regarding this HEP  Issued to increase B hand strength for increased strength and coordination for ADLs/IADLs     Cognition   Overall Cognitive Status [facility] Arousal/Participation Cooperative   Attention Attends with cues to redirect   Orientation Level Oriented X4   Memory Decreased short term memory   Following Commands Follows one step commands with increased time or repetition   Activity Tolerance   Activity Tolerance Patient limited by fatigue   Assessment   Treatment Assessment OT session focusing on fxnl transfers and UE TE  Pt demo'ing progress this session w/ SB transfers but continued to required Ax2 for safety  OT to see pt this afternoon for additional IPOT session  Prognosis Fair   Problem List Decreased strength;Decreased range of motion;Decreased endurance; Impaired balance;Decreased mobility; Decreased coordination; Impaired sensation; Impaired tone   Plan   Treatment/Interventions ADL retraining;Functional transfer training; Therapeutic exercise; Endurance training;Equipment eval/education;Patient/family training; Compensatory technique education;Continued evaluation   OT Therapy Minutes   OT Time In 0930   OT Time Out 1100   OT Total Time (minutes) 90   OT Mode of treatment - Individual (minutes) 90   OT Mode of treatment - Concurrent (minutes) 0   OT Mode of treatment - Group (minutes) 0   OT Mode of treatment - Co-treat (minutes) 0   OT Mode of Treatment - Total time(minutes) 90 minutes   OT Cumulative Minutes 240   Therapy Time missed   Time missed?  No

## 2022-05-12 NOTE — PROGRESS NOTES
Internal Medicine Progress Note  Patient: Silke Garg  Age/sex: 73 y o  female  Medical Record #: [id number]      ASSESSMENT/PLAN: (Interval History)  Rima Irene is seen and examined and management for following issues:    Multiple Sclerosis  · She has had a functional decline due to UTI  · Therapy per PMR  · She examines like functional incomplete tetraplegia  · Continue Baclofen to control spasticity   · She also takes dalfampridine ER 10mg 2x daily to improve walking speed      HTN  · Continue amlodipine 2 5mg 2x daily  · She also takes Lasix 40mg twice weekly for LE edema  · Continue to monitor BP      Neurogenic bowel and bladder  · SPC placed 4/26/22  · PMR managing neurogenic bowel management teaching  · No further need for flomax since now with [facility]     Anemia  · Hgb 13 upon presentation  · Dropped to 10-11 after IV fluids and has remained stable  · Review of previous labs revealed Hgb in the 12-13 range  · stable     Leukopenia  · Seen on recent labs  · Will continue to monitor  · Improving       The above assessment and plan was reviewed and updated as determined by my evaluation of the patient on 5/12/2022      Labs:   Results from last 7 days   Lab Units 05/10/22  0614 05/07/22  0545   WBC Thousand/uL 3 89* 3 01*   HEMOGLOBIN g/dL 11 1* 10 7*   HEMATOCRIT % 34 2* 33 0*   PLATELETS Thousands/uL 343 309     Results from last 7 days   Lab Units 05/07/22  0545 05/06/22  0559   SODIUM mmol/L 142 140   POTASSIUM mmol/L 4 1 4 3   CHLORIDE mmol/L 107 108   CO2 mmol/L 27 25   BUN mg/dL 21 18   CREATININE mg/dL 0 76 0 92   CALCIUM mg/dL 8 5 8 1*         Results from last 7 days   Lab Units 05/05/22  1849   INR  0 98           Review of Scheduled Meds:  Current Facility-Administered Medications   Medication Dose Route Frequency Provider Last Rate    amLODIPine  2 5 mg Oral Q12H Cherie Chen MD      baclofen  10 mg Oral TID Cherie Chen MD      cephalexin  500 mg Oral Q6H August Sesay MD  cholecalciferol  2,000 Units Oral Daily Demetrius Raymundo MD      Dalfampridine ER  10 mg Oral Q12H Otf England MD      enoxaparin  40 mg Subcutaneous Daily Demetrius Raymundo MD     Tanna Crump [START ON 5/13/2022] furosemide  40 mg Oral Once per day on Mon Fri Demetrius Raymundo MD      methenamine hippurate  1 g Oral BID Demetrius Raymundo MD      polyethylene glycol  17 g Oral Daily PRN Demetrius Raymundo MD         Subjective/ HPI: Patient seen and examined  Patients overnight issues or events were reviewed with nursing or staff during rounds or morning huddle session  New or overnight issues include the following:     Pt seen in OT  She denies any current complaints  ROS:   A 10 point ROS was performed; negative except as noted above         Imaging:     No orders to display       *Labs /Radiology studies Reviewed  *Medications  reviewed and reconciled as needed  *Please refer to order section for additional ordered labs studies  *Case discussed with primary attending during morning huddle case rounds    Physical Examination:  Vitals:   Vitals:    05/11/22 1000 05/11/22 1500 05/11/22 2203 05/12/22 0544   BP: 140/65 138/63 114/65 136/65   BP Location: Left arm Left arm Left arm Left arm   Pulse: 75 82 64 61   Resp:  18 18 18   Temp:  97 6 °F (36 4 °C) (!) 97 4 °F (36 3 °C) 98 °F (36 7 °C)   TempSrc:  Oral Oral Oral   SpO2:  98% 96% 97%   Weight:       Height:         GEN: No apparent distress, interactive  NEURO: Alert and oriented x3  HEENT: Pupils are equal and reactive, EOMI, mucous membranes are moist, face symmetrical  CV: S1 S2 regular, no MRG, no peripheral edema noted  RESP: Lungs are clear bilaterally, no wheezes, rales or rhonchi noted, on room air, respirations easy and non labored  GI: Flat, soft non tender, non distended; +BS x4  : Voiding without difficulty  MUSC: Moves all extremities; +generalized deconditioning  SKIN: pink, warm and dry, normal turgor, no rashes, lesions    The above physical exam was reviewed and updated as determined by my evaluation of the patient on 5/12/2022  Invasive Devices  Report    Drain  Duration           Suprapubic Catheter 18 Fr  15 days                   VTE Pharmacologic Prophylaxis: Enoxaparin  Code Status: Level 1 - Full Code  Current Length of Stay: 3 day(s)      Total time spent:  30 minutes with more than 50% spent counseling/coordinating care  Counseling includes discussion with patient re: progress  and discussion with patient of his/her current medical state/information  Coordination of patient's care was performed in conjunction with primary service  Time invested included review of patient's labs, vitals, and management of their comorbidities with continued monitoring  In addition, this patient was discussed with medical team including physician and advanced extenders  The care of the patient was extensively discussed and appropriate treatment plan was formulated unique for this patient  ** Please Note:  voice to text software may have been used in the creation of this document   Although proof errors in transcription or interpretation are a potential of such software**

## 2022-05-13 PROCEDURE — 99232 SBSQ HOSP IP/OBS MODERATE 35: CPT | Performed by: INTERNAL MEDICINE

## 2022-05-13 PROCEDURE — 97530 THERAPEUTIC ACTIVITIES: CPT

## 2022-05-13 PROCEDURE — 97112 NEUROMUSCULAR REEDUCATION: CPT

## 2022-05-13 PROCEDURE — 97110 THERAPEUTIC EXERCISES: CPT

## 2022-05-13 RX ADMIN — DALFAMPRIDINE 10 MG: 10 TABLET, EXTENDED RELEASE ORAL at 21:09

## 2022-05-13 RX ADMIN — ENOXAPARIN SODIUM 40 MG: 40 INJECTION SUBCUTANEOUS at 09:16

## 2022-05-13 RX ADMIN — Medication 2000 UNITS: at 09:18

## 2022-05-13 RX ADMIN — BACLOFEN 10 MG: 10 TABLET ORAL at 21:09

## 2022-05-13 RX ADMIN — BACLOFEN 10 MG: 10 TABLET ORAL at 09:19

## 2022-05-13 RX ADMIN — METHENAMINE HIPPURATE 1 G: 1 TABLET ORAL at 09:17

## 2022-05-13 RX ADMIN — AMLODIPINE BESYLATE 2.5 MG: 2.5 TABLET ORAL at 09:18

## 2022-05-13 RX ADMIN — AMLODIPINE BESYLATE 2.5 MG: 2.5 TABLET ORAL at 21:09

## 2022-05-13 RX ADMIN — DALFAMPRIDINE 10 MG: 10 TABLET, EXTENDED RELEASE ORAL at 09:17

## 2022-05-13 RX ADMIN — FUROSEMIDE 40 MG: 40 TABLET ORAL at 09:18

## 2022-05-13 RX ADMIN — BACLOFEN 10 MG: 10 TABLET ORAL at 16:17

## 2022-05-13 RX ADMIN — METHENAMINE HIPPURATE 1 G: 1 TABLET ORAL at 17:37

## 2022-05-13 NOTE — PLAN OF CARE
Problem: Potential for Falls  Goal: Patient will remain free of falls  Description: INTERVENTIONS:  - Educate patient/family on patient safety including physical limitations  - Instruct patient to call for assistance with activity   - Consult OT/PT to assist with strengthening/mobility   - Keep Call bell within reach  - Keep bed low and locked with side rails adjusted as appropriate  - Keep care items and personal belongings within reach  - Initiate and maintain comfort rounds  - Make Fall Risk Sign visible to staff  - Offer Toileting every  Hours, in advance of need  - Initiate/Maintain alarm  - Obtain necessary fall risk management equipment:   - Apply yellow socks and bracelet for high fall risk patients  - Consider moving patient to room near nurses station  Outcome: Progressing     Problem: Prexisting or High Potential for Compromised Skin Integrity  Goal: Skin integrity is maintained or improved  Description: INTERVENTIONS:  - Identify patients at risk for skin breakdown  - Assess and monitor skin integrity  - Assess and monitor nutrition and hydration status  - Monitor labs   - Assess for incontinence   - Turn and reposition patient  - Assist with mobility/ambulation  - Relieve pressure over bony prominences  - Avoid friction and shearing  - Provide appropriate hygiene as needed including keeping skin clean and dry  - Evaluate need for skin moisturizer/barrier cream  - Collaborate with interdisciplinary team   - Patient/family teaching  - Consider wound care consult   Outcome: Progressing     Problem: PAIN - ADULT  Goal: Verbalizes/displays adequate comfort level or baseline comfort level  Description: Interventions:  - Encourage patient to monitor pain and request assistance  - Assess pain using appropriate pain scale  - Administer analgesics based on type and severity of pain and evaluate response  - Implement non-pharmacological measures as appropriate and evaluate response  - Consider cultural and social influences on pain and pain management  - Notify physician/advanced practitioner if interventions unsuccessful or patient reports new pain  Outcome: Progressing     Problem: INFECTION - ADULT  Goal: Absence or prevention of progression during hospitalization  Description: INTERVENTIONS:  - Assess and monitor for signs and symptoms of infection  - Monitor lab/diagnostic results  - Monitor all insertion sites, i e  indwelling lines, tubes, and drains  - Monitor endotracheal if appropriate and nasal secretions for changes in amount and color  - [place] appropriate cooling/warming therapies per order  - Administer medications as ordered  - Instruct and encourage patient and family to use good hand hygiene technique  - Identify and instruct in appropriate isolation precautions for identified infection/condition  Outcome: Progressing     Problem: SAFETY ADULT  Goal: Patient will remain free of falls  Description: INTERVENTIONS:  - Educate patient/family on patient safety including physical limitations  - Instruct patient to call for assistance with activity   - Consult OT/PT to assist with strengthening/mobility   - Keep Call bell within reach  - Keep bed low and locked with side rails adjusted as appropriate  - Keep care items and personal belongings within reach  - Initiate and maintain comfort rounds  - Make Fall Risk Sign visible to staff  - Offer Toileting every  Hours, in advance of need  - Initiate/Maintain alarm  - Obtain necessary fall risk management equipment:   - Apply yellow socks and bracelet for high fall risk patients  - Consider moving patient to room near nurses station  Outcome: Progressing  Goal: Maintain or return to baseline ADL function  Description: INTERVENTIONS:  -  Assess patient's ability to carry out ADLs; assess patient's baseline for ADL function and identify physical deficits which impact ability to perform ADLs (bathing, care of mouth/teeth, toileting, grooming, dressing, etc )  - Assess/evaluate cause of self-care deficits   - Assess range of motion  - Assess patient's mobility; develop plan if impaired  - Assess patient's need for assistive devices and provide as appropriate  - Encourage maximum independence but intervene and supervise when necessary  - Involve family in performance of ADLs  - Assess for home care needs following discharge   - Consider OT consult to assist with ADL evaluation and planning for discharge  - Provide patient education as appropriate  Outcome: Progressing  Goal: Maintains/Returns to pre admission functional level  Description: INTERVENTIONS:  - Perform BMAT or MOVE assessment daily    - Set and communicate daily mobility goal to care team and patient/family/caregiver  - Collaborate with rehabilitation services on mobility goals if consulted  - Perform Range of Motion  times a day  - Reposition patient every hours    - Dangle patient  times a day  - Stand patient  times a day  - Ambulate patient  times a day  - Out of bed to chair  times a day   - Out of bed for meals  times a day  - Out of bed for toileting  - Record patient progress and toleration of activity level   Outcome: Progressing     Problem: DISCHARGE PLANNING  Goal: Discharge to home or other facility with appropriate resources  Description: INTERVENTIONS:  - Identify barriers to discharge w/patient and caregiver  - Arrange for needed discharge resources and transportation as appropriate  - Identify discharge learning needs (meds, wound care, etc )  - Arrange for interpretive services to assist at discharge as needed  - Refer to Case Management Department for coordinating discharge planning if the patient needs post-hospital services based on physician/advanced practitioner order or complex needs related to functional status, cognitive ability, or social support system  Outcome: Progressing     Problem: MOBILITY - ADULT  Goal: Maintain or return to baseline ADL function  Description: INTERVENTIONS:  -  Assess patient's ability to carry out ADLs; assess patient's baseline for ADL function and identify physical deficits which impact ability to perform ADLs (bathing, care of mouth/teeth, toileting, grooming, dressing, etc )  - Assess/evaluate cause of self-care deficits   - Assess range of motion  - Assess patient's mobility; develop plan if impaired  - Assess patient's need for assistive devices and provide as appropriate  - Encourage maximum independence but intervene and supervise when necessary  - Involve family in performance of ADLs  - Assess for home care needs following discharge   - Consider OT consult to assist with ADL evaluation and planning for discharge  - Provide patient education as appropriate  Outcome: Progressing  Goal: Maintains/Returns to pre admission functional level  Description: INTERVENTIONS:  - Perform BMAT or MOVE assessment daily    - Set and communicate daily mobility goal to care team and patient/family/caregiver  - Collaborate with rehabilitation services on mobility goals if consulted  - Perform Range of Motion  times a day  - Reposition patient every  hours    - Dangle patient  times a day  - Stand patient  times a day  - Ambulate patient  times a day  - Out of bed to chair  times a day   - Out of bed for meals  times a day  - Out of bed for toileting  - Record patient progress and toleration of activity level   Outcome: Progressing

## 2022-05-13 NOTE — PLAN OF CARE
Problem: Potential for Falls  Goal: Patient will remain free of falls  Description: INTERVENTIONS:  - Educate patient/family on patient safety including physical limitations  - Instruct patient to call for assistance with activity   - Consult OT/PT to assist with strengthening/mobility   - Keep Call bell within reach  - Keep bed low and locked with side rails adjusted as appropriate  - Keep care items and personal belongings within reach  - Initiate and maintain comfort rounds  - Make Fall Risk Sign visible to staff  - Offer Toileting every  Hours, in advance of need  - Initiate/Maintain alarm  - Obtain necessary fall risk management equipment:   - Apply yellow socks and bracelet for high fall risk patients  - Consider moving patient to room near nurses station  Outcome: Progressing     Problem: Prexisting or High Potential for Compromised Skin Integrity  Goal: Skin integrity is maintained or improved  Description: INTERVENTIONS:  - Identify patients at risk for skin breakdown  - Assess and monitor skin integrity  - Assess and monitor nutrition and hydration status  - Monitor labs   - Assess for incontinence   - Turn and reposition patient  - Assist with mobility/ambulation  - Relieve pressure over bony prominences  - Avoid friction and shearing  - Provide appropriate hygiene as needed including keeping skin clean and dry  - Evaluate need for skin moisturizer/barrier cream  - Collaborate with interdisciplinary team   - Patient/family teaching  - Consider wound care consult   Outcome: Progressing     Problem: PAIN - ADULT  Goal: Verbalizes/displays adequate comfort level or baseline comfort level  Description: Interventions:  - Encourage patient to monitor pain and request assistance  - Assess pain using appropriate pain scale  - Administer analgesics based on type and severity of pain and evaluate response  - Implement non-pharmacological measures as appropriate and evaluate response  - Consider cultural and social influences on pain and pain management  - Notify physician/advanced practitioner if interventions unsuccessful or patient reports new pain  Outcome: Progressing     Problem: INFECTION - ADULT  Goal: Absence or prevention of progression during hospitalization  Description: INTERVENTIONS:  - Assess and monitor for signs and symptoms of infection  - Monitor lab/diagnostic results  - Monitor all insertion sites, i e  indwelling lines, tubes, and drains  - Monitor endotracheal if appropriate and nasal secretions for changes in amount and color  - [place] appropriate cooling/warming therapies per order  - Administer medications as ordered  - Instruct and encourage patient and family to use good hand hygiene technique  - Identify and instruct in appropriate isolation precautions for identified infection/condition  Outcome: Progressing     Problem: SAFETY ADULT  Goal: Patient will remain free of falls  Description: INTERVENTIONS:  - Educate patient/family on patient safety including physical limitations  - Instruct patient to call for assistance with activity   - Consult OT/PT to assist with strengthening/mobility   - Keep Call bell within reach  - Keep bed low and locked with side rails adjusted as appropriate  - Keep care items and personal belongings within reach  - Initiate and maintain comfort rounds  - Make Fall Risk Sign visible to staff  - Offer Toileting every  Hours, in advance of need  - Initiate/Maintain alarm  - Obtain necessary fall risk management equipment:   - Apply yellow socks and bracelet for high fall risk patients  - Consider moving patient to room near nurses station  Outcome: Progressing  Goal: Maintain or return to baseline ADL function  Description: INTERVENTIONS:  -  Assess patient's ability to carry out ADLs; assess patient's baseline for ADL function and identify physical deficits which impact ability to perform ADLs (bathing, care of mouth/teeth, toileting, grooming, dressing, etc )  - Assess/evaluate cause of self-care deficits   - Assess range of motion  - Assess patient's mobility; develop plan if impaired  - Assess patient's need for assistive devices and provide as appropriate  - Encourage maximum independence but intervene and supervise when necessary  - Involve family in performance of ADLs  - Assess for home care needs following discharge   - Consider OT consult to assist with ADL evaluation and planning for discharge  - Provide patient education as appropriate  Outcome: Progressing  Goal: Maintains/Returns to pre admission functional level  Description: INTERVENTIONS:  - Perform BMAT or MOVE assessment daily    - Set and communicate daily mobility goal to care team and patient/family/caregiver  - Collaborate with rehabilitation services on mobility goals if consulted  - Perform Range of Motion  times a day  - Reposition patient every  hours    - Dangle patient  times a day  - Stand patient  times a day  - Ambulate patient times a day  - Out of bed to chair  times a day   - Out of bed for meals  times a day  - Out of bed for toileting  - Record patient progress and toleration of activity level   Outcome: Progressing     Problem: DISCHARGE PLANNING  Goal: Discharge to home or other facility with appropriate resources  Description: INTERVENTIONS:  - Identify barriers to discharge w/patient and caregiver  - Arrange for needed discharge resources and transportation as appropriate  - Identify discharge learning needs (meds, wound care, etc )  - Arrange for interpretive services to assist at discharge as needed  - Refer to Case Management Department for coordinating discharge planning if the patient needs post-hospital services based on physician/advanced practitioner order or complex needs related to functional status, cognitive ability, or social support system  Outcome: Progressing     Problem: MOBILITY - ADULT  Goal: Maintain or return to baseline ADL function  Description: INTERVENTIONS:  -  Assess patient's ability to carry out ADLs; assess patient's baseline for ADL function and identify physical deficits which impact ability to perform ADLs (bathing, care of mouth/teeth, toileting, grooming, dressing, etc )  - Assess/evaluate cause of self-care deficits   - Assess range of motion  - Assess patient's mobility; develop plan if impaired  - Assess patient's need for assistive devices and provide as appropriate  - Encourage maximum independence but intervene and supervise when necessary  - Involve family in performance of ADLs  - Assess for home care needs following discharge   - Consider OT consult to assist with ADL evaluation and planning for discharge  - Provide patient education as appropriate  Outcome: Progressing  Goal: Maintains/Returns to pre admission functional level  Description: INTERVENTIONS:  - Perform BMAT or MOVE assessment daily    - Set and communicate daily mobility goal to care team and patient/family/caregiver  - Collaborate with rehabilitation services on mobility goals if consulted  - Perform Range of Motion  times a day  - Reposition patient every  hours    - Dangle patient  times a day  - Stand patient  times a day  - Ambulate patient  times a day  - Out of bed to chair  times a day   - Out of bed for meals  times a day  - Out of bed for toileting  - Record patient progress and toleration of activity level   Outcome: Progressing

## 2022-05-13 NOTE — PROGRESS NOTES
05/13/22 1230   Pain Assessment   Pain Score No Pain   Restrictions/Precautions   Precautions Bed/chair alarms; Fall Risk;Contact/isolation; Bronson  (suprapubic catheter)   Cognition   Arousal/Participation Alert; Cooperative   Attention Attends with cues to redirect   Memory Decreased recall of precautions   Following Commands Follows one step commands with increased time or repetition   Subjective   Subjective Pt  has no new complaints  Is ready to participate in therapy   present and was able to describe patient PLOD prior to this hospitalization   See below   Lying to Sitting on Side of Bed   Type of Assistance Needed Physical assistance   Physical Assistance Level 76% or more   Comment max a X1 using bedrail   Lying to Sitting on Side of Bed CARE Score 2   Sit to Stand   Type of Assistance Needed Physical assistance   Physical Assistance Level Total assistance   Comment mod A X 2 , pushing up from armrest then holding on to parallel bars (outside )   Sit to Stand CARE Score 1   Bed-Chair Transfer   Type of Assistance Needed Physical assistance   Physical Assistance Level Total assistance   Comment mod A x 1 with 2nd person assist to manage/ stabilize equipment, bed to w/c and w/c<> mat, mod A to max A X 2 going karen to bed due to elevated bed ht  and patient was already fatigue   Chair/Bed-to-Chair Transfer CARE Score 1   Transfer Bed/Chair/Wheelchair   Adaptive Equipment Transfer Board   Walk 10 Feet   Reason if not Attempted Safety concerns   Walk 10 Feet CARE Score 88   Walk 50 Feet with Two Turns   Reason if not Attempted Safety concerns   Walk 50 Feet with Two Turns CARE Score 88   Walk 150 Feet   Reason if not Attempted Safety concerns   Walk 150 Feet CARE Score 88   Walking 10 Feet on Uneven Surfaces   Reason if not Attempted Safety concerns   Walking 10 Feet on Uneven Surfaces CARE Score 88   Wheel 50 Feet with Two Turns   Reason if not Attempted Safety concerns   Wheel 50 Feet with Two Turns CARE Score 88   Wheel 150 Feet   Reason if not Attempted Safety concerns   Wheel 150 Feet CARE Score 88   Wheelchair mobility   Does the patient use a wheelchair? 0  No   Findings pt  unable to propel w/c due to shoulder pain   Curb or Single Stair   Reason if not Attempted Safety concerns   1 Step (Curb) CARE Score 88   4 Steps   Reason if not Attempted Safety concerns   4 Steps CARE Score 88   12 Steps   Reason if not Attempted Safety concerns   12 Steps CARE Score 88   Picking Up Object   Reason if not Attempted Safety concerns   Picking Up Object CARE Score 88   Toilet Transfer   Type of Assistance Needed Physical assistance   Physical Assistance Level Total assistance   Comment mod A X 2 SB transfers from w/c to commode over toilet  Toilet Transfer CARE Score 1   Toilet Transfer   Surface Assessed Raised Toilet   Adaptive Equipment Grab Bar   Findings A x 3 for toileting   A X 2 for sit to stand and maintaining standing for pants management which a 3rd person was assisting   Therapeutic Interventions   Neuromuscular Re-Education sitting at EOB X 15 mins and work on reaching forward, sideways up and down to challenge trunk control and improve core strength  Other sit to stand from w/c to parall bars (outside) with mod A X 2 with initial B knee blocking but pt  able to lock B knees in extenxion  Pt  stood x 3 reps and able to stand x 1 min to 1 min and 50 secs  Other Comments   Comments (S)  Spoke to patient's  about pt's PLOF and stated that in a good day patient can do things for herself, walk with RW and able to dress herself  On a bad day, pt's  does a SPT (giving pt  a bear hug to stand and pivots patient  Pt's  able to dmeonstrate to this therapist how he does it at home when pt  was assisted back to bed at 3:00   Pt's  able to stand patient but this therapist provided min A to help patient wt  shift so she can move her feet while pivoting     Assessment   Treatment Assessment pt  tolerated session well and was able to participate well in activities above  Pt  was able to participate in sit to stands and stood for max of 1 min and 50 secs with min A X 2 to maintaing standing (mostly guarding B knees to not buckle)  pt  also able to participate in toilet transfers and standing while was being assisted with toielting task  Overall slide board transfers was mod A X 1 except at end of session which she needed extra assist to do slideboard unto higher bed surface  Pt's  expressed at beginning of session that they were not happy with their last rehab because patient was laying in bed a lot and is worried that it seem like it is happrning again here  Explained to them that patient cannot be OOB for so long because she gets so fatigue after and it would be harder for her to transfer back to bed  Was able to have patient stay for 1 more hour after this session and demonstrate good tolerance and posture on w/c with b leg rest on  pt  then was assiste dbakc to bed at 3 pm , PCA and rehab aide present to set up patient in bed  No other complaints reported  COnt with POC as tolerated  Problem List Decreased strength;Decreased endurance; Impaired balance;Decreased mobility; Decreased coordination; Impaired tone   Barriers to Discharge Inaccessible home environment;Decreased caregiver support   PT Barriers   Functional Limitation Standing;Transfers; Wheelchair management;Car transfers   Plan   Treatment/Interventions Functional transfer training;LE strengthening/ROM; Therapeutic exercise; Endurance training;Patient/family training;Equipment eval/education; Bed mobility   Recommendation   PT Discharge Recommendation   (TBD)   Equipment Recommended Wheelchair   PT Therapy Minutes   PT Time In 1230   PT Time Out 1400   PT Total Time (minutes) 90   PT Mode of treatment - Individual (minutes) 90   PT Mode of treatment - Concurrent (minutes) 0   PT Mode of treatment - Group (minutes) 0   PT Mode of treatment - Co-treat (minutes) 0   PT Mode of Treatment - Total time(minutes) 90 minutes   PT Cumulative Minutes 360   Therapy Time missed   Time missed?  No

## 2022-05-13 NOTE — PROGRESS NOTES
05/13/22 0830   Pain Assessment   Pain Assessment Tool 0-10   Pain Score No Pain   Restrictions/Precautions   Precautions Bed/chair alarms;Cognitive; Fall Risk;Contact/isolation;Supervision on toilet/commode  (hx progressive MS)   Weight Bearing Restrictions No   ROM Restrictions No   Braces or Orthoses   (KNEE high TEDs as off 5/13)   Lifestyle   Autonomy "I feel like I get stuck in the bed"   Putting On/Taking Off Footwear   Type of Assistance Needed Physical assistance   Physical Assistance Level Total assistance   Putting On/Taking Off Footwear CARE Score 1   Bed-Chair Transfer   Type of Assistance Needed Physical assistance   Physical Assistance Level Total assistance   Comment MaxA SB EOB <> WC w/ seconod person as stand by assist for safety   Chair/Bed-to-Chair Transfer CARE Score 1   Exercise Tools   Other Exercise Tool 1 Towel slides on table top w/ BUE completing reach forward to increase pt's ability to reach and lean forward as pt has been demo'ing difficulty w/ this while seated in WC  Pt able to complete 4x10 w/ no difficulties     Cognition   Overall Cognitive Status Impaired   Arousal/Participation Cooperative   Attention Within functional limits   Orientation Level Oriented X4   Memory Decreased short term memory;Decreased recall of recent events;Decreased recall of precautions;Decreased long term memory   Following Commands Follows one step commands with increased time or repetition   Comments Pt seemingly unaware of nature of her progressive MS as continues to make comments about why she can't walk today, when in reality she continues to demo difficulty w/ precursor activities such as sitting EOB unsupported for any length of time (although EOB tolerance improved this morning compared to yesterday whether it was d/t time of day of session or not) and continuing to require MaxA of 1 and STA of 2nd person for SB transfers, in previous therapy sessions pt did complete STS MaxA but then unable to perform second trial despite attempts d/t onset of fatigue  Pt demo'ing dec insight into diagnosis (despite reporting having this diagnosis for 25yrs) and decreased insight into current deficits and what it will mean for her fxnl abilities for potential return to home as pt has made limited progress since admission to [facility]  Additional Activities   Additional Activities Comments Pt participated in table top "air hockey" game using cups and foam golf ball to catch and push ball across table top w/ therapist to challenge GM/FM strength and endurance  Pt switched back and forth b/t R and L hand, Demo'd weakness and dec coordination in both hands/arms but able to participate in game w/ downgrading provided by therapist    Activity Tolerance   Activity Tolerance Patient limited by fatigue   Assessment   Treatment Assessment OT session focusing on fxnl SB transfers, edu, UE activity  Pt continues to experience difficulty completing fxnl transfers as second person for SBA continues to be required  Pt is demo'ing progress today w/ tolerance seated EOB unsupported, but requires use of her BUE to support self EOB d/t limited trunk strength  Pt called  during session and he will be here this PM to observe PT session in hopes that he will be able to provide insight on pt's current abilities compared to her baseline  It appears that pt has been poor historian as she reports she was consistently MILLA w/ RW at home PTA  OT to continue to treat and follow POC, with likelihood of updating OT goals at beginning of next week based on pt's limited progress since [facility] admission  Prognosis Fair   Problem List Decreased strength;Decreased range of motion;Decreased endurance; Impaired balance;Decreased mobility; Decreased coordination;Decreased cognition; Impaired judgement;Decreased safety awareness; Impaired sensation; Impaired tone   Plan   Treatment/Interventions ADL retraining;LE strengthening/ROM; Therapeutic exercise; Endurance training;Patient/family training;Equipment eval/education; Compensatory technique education;Continued evaluation   Progress Slow progress, decreased activity tolerance   Recommendation   OT Discharge Recommendation   (pending progress)   OT Therapy Minutes   OT Time In 0830   OT Time Out 1000   OT Total Time (minutes) 90   OT Mode of treatment - Individual (minutes) 90   OT Mode of treatment - Concurrent (minutes) 0   OT Mode of treatment - Group (minutes) 0   OT Mode of treatment - Co-treat (minutes) 0   OT Mode of Treatment - Total time(minutes) 90 minutes   OT Cumulative Minutes 360   Therapy Time missed   Time missed?  No

## 2022-05-13 NOTE — PROGRESS NOTES
Internal Medicine Progress Note  Patient: Coby Shrestha  Age/sex: 73 y o  female  Medical Record #: [id number]      ASSESSMENT/PLAN: (Interval History)  Rima Rivers is seen and examined and management for following issues:    Multiple Sclerosis  · She has had a functional decline due to UTI  · Therapy per PMR  · She examines like functional incomplete tetraplegia  · Continue Baclofen to control spasticity   · She also takes dalfampridine ER 10mg 2x daily to improve walking speed      HTN  · Continue amlodipine 2 5mg 2x daily  · She also takes Lasix 40mg twice weekly for LE edema  · Continue to monitor BP      Neurogenic bowel and bladder  · SPC placed 4/26/22  · PMR managing neurogenic bowel management teaching  · No further need for flomax since now with [facility]     Anemia  · Review of previous labs revealed Hgb in the 12-13 range  · stable     Leukopenia  · Seen on recent labs  · Will continue to monitor  · Improving likely d/t infection       The above assessment and plan was reviewed and updated as determined by my evaluation of the patient on 5/13/2022      Labs:   Results from last 7 days   Lab Units 05/10/22  0614 05/07/22  0545   WBC Thousand/uL 3 89* 3 01*   HEMOGLOBIN g/dL 11 1* 10 7*   HEMATOCRIT % 34 2* 33 0*   PLATELETS Thousands/uL 343 309     Results from last 7 days   Lab Units 05/07/22  0545   SODIUM mmol/L 142   POTASSIUM mmol/L 4 1   CHLORIDE mmol/L 107   CO2 mmol/L 27   BUN mg/dL 21   CREATININE mg/dL 0 76   CALCIUM mg/dL 8 5                   Review of Scheduled Meds:  Current Facility-Administered Medications   Medication Dose Route Frequency Provider Last Rate    amLODIPine  2 5 mg Oral Q12H Som Barr MD      baclofen  10 mg Oral TID Som Barr MD      cholecalciferol  2,000 Units Oral Daily Som Barr MD      Dalfampridine ER  10 mg Oral Q12H Zhao Hope MD      enoxaparin  40 mg Subcutaneous Daily Som Barr MD      furosemide  40 mg Oral Once per day on Mon Chris Quiñones MD      methenamine hippurate  1 g Oral BID Vince Licea MD      polyethylene glycol  17 g Oral Daily PRN Vince Licea MD         Subjective/ HPI: Patient seen and examined  Patients overnight issues or events were reviewed with nursing or staff during rounds or morning huddle session  New or overnight issues include the following:     Pt seen in her room  She is anxious for dc home    ROS:   A 10 point ROS was performed; negative except as noted above  Imaging:     No orders to display       *Labs /Radiology studies Reviewed  *Medications  reviewed and reconciled as needed  *Please refer to order section for additional ordered labs studies  *Case discussed with primary attending during morning huddle case rounds    Physical Examination:  Vitals:   Vitals:    05/12/22 1030 05/12/22 1500 05/12/22 2106 05/13/22 0545   BP: 144/65 139/62 132/70 119/60   BP Location:  Left arm Left arm Left arm   Pulse: 65 70 70 68   Resp:  17 17 18   Temp:  98 5 °F (36 9 °C) 97 6 °F (36 4 °C) 97 8 °F (36 6 °C)   TempSrc:  Oral Oral Oral   SpO2:  96% 96% 96%   Weight:       Height:         GEN: No apparent distress, interactive  NEURO: Alert and oriented x3  HEENT: Pupils are equal and reactive, EOMI, mucous membranes are moist, face symmetrical  CV: S1 S2 regular, no MRG, no peripheral edema noted  RESP: Lungs are clear bilaterally, no wheezes, rales or rhonchi noted, on room air, respirations easy and non labored  GI: Flat, soft non tender, non distended; +BS x4  : SP catheter draining clear yellow urine  MUSC: Moves all extremities; generalized deconditioning d/t MS  SKIN: pink, warm and dry, normal turgor, no rashes, lesions        The above physical exam was reviewed and updated as determined by my evaluation of the patient on 5/13/2022      Invasive Devices  Report    Drain  Duration           Suprapubic Catheter 18 Fr  16 days                   VTE Pharmacologic Prophylaxis: Enoxaparin  Code Status: Level 1 - Full Code  Current Length of Stay: 4 day(s)      Total time spent:  30 minutes with more than 50% spent counseling/coordinating care  Counseling includes discussion with patient re: progress  and discussion with patient of his/her current medical state/information  Coordination of patient's care was performed in conjunction with primary service  Time invested included review of patient's labs, vitals, and management of their comorbidities with continued monitoring  In addition, this patient was discussed with medical team including physician and advanced extenders  The care of the patient was extensively discussed and appropriate treatment plan was formulated unique for this patient  ** Please Note:  voice to text software may have been used in the creation of this document   Although proof errors in transcription or interpretation are a potential of such software**

## 2022-05-13 NOTE — PLAN OF CARE
Problem: INFECTION - ADULT  Goal: Absence or prevention of progression during hospitalization  Description: INTERVENTIONS:  - Assess and monitor for signs and symptoms of infection  - Monitor lab/diagnostic results  - Monitor all insertion sites, i e  indwelling lines, tubes, and drains  - Monitor endotracheal if appropriate and nasal secretions for changes in amount and color  - [place] appropriate cooling/warming therapies per order  - Administer medications as ordered  - Instruct and encourage patient and family to use good hand hygiene technique  - Identify and instruct in appropriate isolation precautions for identified infection/condition  Outcome: Progressing

## 2022-05-14 PROCEDURE — 99232 SBSQ HOSP IP/OBS MODERATE 35: CPT | Performed by: INTERNAL MEDICINE

## 2022-05-14 PROCEDURE — 97530 THERAPEUTIC ACTIVITIES: CPT

## 2022-05-14 PROCEDURE — 97116 GAIT TRAINING THERAPY: CPT

## 2022-05-14 PROCEDURE — 97110 THERAPEUTIC EXERCISES: CPT

## 2022-05-14 PROCEDURE — 97535 SELF CARE MNGMENT TRAINING: CPT

## 2022-05-14 RX ORDER — MICONAZOLE NITRATE 20 MG/G
CREAM TOPICAL 2 TIMES DAILY
Status: DISCONTINUED | OUTPATIENT
Start: 2022-05-14 | End: 2022-05-26 | Stop reason: HOSPADM

## 2022-05-14 RX ADMIN — METHENAMINE HIPPURATE 1 G: 1 TABLET ORAL at 17:45

## 2022-05-14 RX ADMIN — AMLODIPINE BESYLATE 2.5 MG: 2.5 TABLET ORAL at 09:07

## 2022-05-14 RX ADMIN — MICONAZOLE NITRATE 1 APPLICATION: 20 CREAM TOPICAL at 18:40

## 2022-05-14 RX ADMIN — BACLOFEN 10 MG: 10 TABLET ORAL at 15:57

## 2022-05-14 RX ADMIN — ENOXAPARIN SODIUM 40 MG: 40 INJECTION SUBCUTANEOUS at 09:08

## 2022-05-14 RX ADMIN — METHENAMINE HIPPURATE 1 G: 1 TABLET ORAL at 09:09

## 2022-05-14 RX ADMIN — AMLODIPINE BESYLATE 2.5 MG: 2.5 TABLET ORAL at 21:52

## 2022-05-14 RX ADMIN — DALFAMPRIDINE 10 MG: 10 TABLET, EXTENDED RELEASE ORAL at 21:53

## 2022-05-14 RX ADMIN — BACLOFEN 10 MG: 10 TABLET ORAL at 21:52

## 2022-05-14 RX ADMIN — BACLOFEN 10 MG: 10 TABLET ORAL at 09:07

## 2022-05-14 RX ADMIN — Medication 2000 UNITS: at 09:08

## 2022-05-14 RX ADMIN — DALFAMPRIDINE 10 MG: 10 TABLET, EXTENDED RELEASE ORAL at 09:09

## 2022-05-14 NOTE — PROGRESS NOTES
05/14/22 1100   Pain Assessment   Pain Assessment Tool 0-10   Restrictions/Precautions   Precautions Cognitive;Bed/chair alarms; Fall Risk;Contact/isolation;Supervision on toilet/commode; Bronson  (suprapubic cath)   Cognition   Overall Cognitive Status Impaired   Arousal/Participation Alert; Cooperative   Attention Attends with cues to redirect   Subjective   Subjective pt in bed but awake when asked why she still in bed pt stated " I don't know there is where they put me" but when PT f/u per OT pt refused to get OOB at end of tx including using BSC but did use bedpan during their session and participated with EOB sitting balance  Roll Left and Right   Comment max of 1 rolling to left side with rail same set up as home, HOB ~30 deg elevated   Lying to Sitting on Side of Bed   Type of Assistance Needed Verbal cues; Physical assistance; Adaptive equipment   Physical Assistance Level 76% or more   Comment HOB 30 deg elevated, left rail   Lying to Sitting on Side of Bed CARE Score 2   Bed-Chair Transfer   Type of Assistance Needed Physical assistance;Verbal cues; Adaptive equipment   Physical Assistance Level Total assistance   Comment mod of 1 with VC/assist as needed to move LE during slide board transfer, SBA of 2nd person to stabilized equipment   Chair/Bed-to-Chair Transfer CARE Score 1   Therapeutic Interventions   Strengthening gluteal sets x 15 reps, A/A heel slides with focus on flexion direction   Neuromuscular Re-Education PNF D1 pattern with emphasis for pt assist with flexor pattern to reduce extensor tone with reps as needed, L side harder to break versus R LE     Assessment   Treatment Assessment Skilled PT for 30 mins worked on PNF and gentle LE exercise to reduce LE spasticity, ROM and strengthening before getting pt OOB to w/c  PT initiated conversation regarding considering motorized w/c for mobilities to which pt expressed personal goal of being able to walk with rollator again but receptive when PT ask to consider option singh in the future in case unable to achieve walking goal singh for use in the community  PT will work on standing this PM and will also discuss possibility of using sit to stand or swetha lift at d/c    Barriers to Discharge Comments  works [employer] at [employer]   Treatment/Interventions Functional transfer training;LE strengthening/ROM; Therapeutic exercise; Bed mobility;Spoke to nursing;OT   Progress Slow progress, decreased activity tolerance   Recommendation   PT - OK to Discharge No   PT Therapy Minutes   PT Time In 1100   PT Time Out 1130   PT Total Time (minutes) 30   PT Mode of treatment - Individual (minutes) 0   PT Mode of treatment - Concurrent (minutes) 0   PT Mode of treatment - Group (minutes) 0   PT Mode of treatment - Co-treat (minutes) 0   PT Mode of Treatment - Total time(minutes) 0 minutes   PT Cumulative Minutes 360

## 2022-05-14 NOTE — PROGRESS NOTES
05/14/22 0900   Pain Assessment   Pain Assessment Tool 0-10   Pain Score No Pain   Restrictions/Precautions   Precautions Bed/chair alarms; Fall Risk;Contact/isolation; [place]   Braces or Orthoses   (knee high Teds)   Lifestyle   Autonomy "I'm angry that this happened to me"   Eating   Comment pt ate prior to OT arrival   Oral Hygiene   Type of Assistance Needed Supervision   Physical Assistance Level No physical assistance   Comment Pt CS seated EOB with no LOB noted   Oral Hygiene CARE Score 4   Grooming   Able To Initiate Tasks;Comb/Brush Hair;Wash/Dry Face;Brush/Clean Teeth;Wash/Dry Hands   Limitation Noted In Safety;Strength;Timeliness   Findings seated EOB to shampoo hair, brush/part hair, wash face, and complete oral hygiene   Shower/Bathe Self   Type of Assistance Needed Physical assistance   Physical Assistance Level 76% or more   Comment Pt able to bathe UB seated EOB with increased time and CS, cues for thoroughness due to fatigue  Pt able to bathe BL upper thighs  Pt requires A from therapist to bathe BL LE and lotion applied while still seated EOB  Pt dependent bed level for bathing Buttocks and stacey  Pt stacey noted with inflammation, itchiness and redness at end of session with JOSÉ Clarke notified and in room to take note  Shower/Bathe Self CARE Score 2   Bathing   Assessed Bath Style Sponge Bath   Anticipated D/C Bath Style Shower;Sponge Bath   Able to [place] No   Able to [employer] Temperature No   Able to Wash/Rinse/Dry (body part) Left Arm;Right Arm;L Upper Leg;R Upper Leg;Chest;Abdomen   Limitations Noted in Balance; Endurance; Safety;Strength;Timeliness   Positioning Seated;Supine   Findings  Pt upright seated EOB with no support up to 45 minutes to engage in bathing, grooming, and dressing routine with Supervision  Only returned to bed for bathing stacey/buttocks and donning pants/SULEMA stockings     Tub/Shower Transfer   Reason Not Assessed Safety;Sponge Bath   Upper Body Dressing   Type of Assistance Needed Physical assistance   Physical Assistance Level 51%-75%   Comment modA seated EOB, OT encouraged pt to try as much for herself as possible with pt receptive to feedback to inc independence, pt is able to thread and manage sports bra with Laura from OT to adjust tight strap  Pt able to thread short sleeve shirt over BL UE and requires A with VC to lean fwd to thread shirt overhead and A to manage shirt down over trunk though pt attempting herself  Upper Body Dressing CARE Score 2   Lower Body Dressing   Type of Assistance Needed Physical assistance   Physical Assistance Level Total assistance   Comment bed level 2* BL LE weakness and decreased core strengh  pt rolling with Laura to R and L side for therapist to complete CM up over hips  Pt initially wanting to don brief however OT recommend keeping brief off for now due to irritation and itichenss of vaginal area with RN and pt both agreeable  Assist to thread catheter bag   Lower Body Dressing CARE Score 1   Putting On/Taking Off Footwear   Type of Assistance Needed Physical assistance   Physical Assistance Level Total assistance   Comment dependent bed level to don knee high SULEMA stockings   Putting On/Taking Off Footwear CARE Score 1   Dressing/Undressing Clothing   Remove UB Clothes Pullover Shirt   Don UB Clothes Pullover Shirt;Bra   Remove LB Clothes   (none)   Don LB Clothes Pants;TEDs   Limitations Noted In Balance; Endurance; Safety;Strength;ROM; Timeliness   Positioning Sit Edge Of Bed; In Bed   Roll Left and Right   Type of Assistance Needed Physical assistance   Physical Assistance Level 26%-50%   Comment Laura with VC and use of bed rails, pt unable to bend BL knees to facilitate roll without assist   Roll Left and Right CARE Score 3   Sit to Lying   Type of Assistance Needed Physical assistance   Physical Assistance Level Total assistance   Comment maxAx1 with step by step VC on sequencing to complete safely, A of 2nd person to boost to top of bed  Pt returned to bed at end of session due to fatigue and pt request   Sit to Lying CARE Score 1   Lying to Sitting on Side of Bed   Type of Assistance Needed Physical assistance   Physical Assistance Level 76% or more   Comment maxAx1 to move supine to sit EOB with log roll technique and cue to push through RUE to come to sitting positioning  OT encouraged pt to assist more with pt able to  Lying to Sitting on Side of Bed CARE Score 2   Sit to Stand   Reason if not Attempted Safety concerns   Sit to Stand CARE Score 88   Bed-Chair Transfer   Comment declined due to fatigue post full bathing and dressing routine   Reason if not Attempted Refused to perform   Chair/Bed-to-Chair Transfer CARE Score 7   Toileting Hygiene   Type of Assistance Needed Physical assistance   Physical Assistance Level Total assistance   Comment bedpan and bed level hygiene for full BM with RN Cassandra Sanchez present and noted BM in chart  Pt dependent for rear and stacey care  Toileting Hygiene CARE Score 1   Toilet Transfer   Comment OT offered to trial SB to [facility] for BM at start of session, pt declined offer and requested to use bedpan instead   Reason if not Attempted Refused to perform   Toilet Transfer CARE Score 7   Cognition   Overall Cognitive Status Impaired   Arousal/Participation Alert   Attention Attends with cues to redirect   Orientation Level Oriented X4   Memory Decreased recall of precautions   Following Commands Follows multistep commands with increased time or repetition   Comments Pt pleasant though presents frustrated with her level of ability and how difficult sponge bathing and dressing is seated EOB without support though motivated to try today  Pt does need help for dressing/bathing especially with fatigue     Activity Tolerance   Activity Tolerance Patient limited by fatigue   Assessment   Treatment Assessment Pt participated in 90 minute OT Tx session with focus on sitting balance/tolerance EOB to engage in full ADL routine  Pt tolerated up to 45 minutes with CS to complete full grooming, bathing UB, thighs, and donning bra/short sleeve shirt  Pt returned to bed to bathe stacey/buttocks thorough/safe and to don pants safely with total A from OT due to dec LE strength and core strength  Pt declined wanting to trial SB to [facility] for BM with BM taking up first half of session due to excess amount and hygiene completed bed level  Pt returned to bed with call bell within reach at end of session due to fatigue  Pt participated well today and motivated to try to help more despite fatigue 2* progressive MS  Pt continues to require skilled OT intervention with focus on OOB tolerance, fxnl transfers, stand tolerance as able, and UE strengthening/endurance building to increase pt independence and decrease caregiver burden  Prognosis Fair   Problem List Decreased strength;Decreased endurance; Impaired balance;Decreased mobility; Decreased coordination; Impaired tone   Barriers to Discharge Inaccessible home environment;Decreased caregiver support   Plan   Treatment/Interventions ADL retraining; Endurance training;Patient/family training;Bed mobility; Compensatory technique education   Progress Slow progress, decreased activity tolerance   Recommendation   OT Discharge Recommendation   (pending progress)   OT Therapy Minutes   OT Time In 0900   OT Time Out 1030   OT Total Time (minutes) 90   OT Mode of treatment - Individual (minutes) 90   OT Mode of treatment - Concurrent (minutes) 0   OT Mode of treatment - Group (minutes) 0   OT Mode of treatment - Co-treat (minutes) 0   OT Mode of Treatment - Total time(minutes) 90 minutes   OT Cumulative Minutes 450   Therapy Time missed   Time missed?  No

## 2022-05-14 NOTE — PLAN OF CARE
Problem: INFECTION - ADULT  Goal: Absence or prevention of progression during hospitalization  Description: INTERVENTIONS:  - Assess and monitor for signs and symptoms of infection  - Monitor lab/diagnostic results  - Monitor all insertion sites, i e  indwelling lines, tubes, and drains  - Monitor endotracheal if appropriate and nasal secretions for changes in amount and color  - [place] appropriate cooling/warming therapies per order  - Administer medications as ordered  - Instruct and encourage patient and family to use good hand hygiene technique  - Identify and instruct in appropriate isolation precautions for identified infection/condition  Outcome: Progressing

## 2022-05-14 NOTE — PROGRESS NOTES
Internal Medicine Progress Note  Patient: Crista Pugh  Age/sex: 73 y o  female  Medical Record #: [id number]      ASSESSMENT/PLAN: (Interval History)  Rima Melo Husbands is seen and examined and management for following issues:    Multiple Sclerosis  · She has had a functional decline due to UTI  · Therapy per PMR  · She examines like functional incomplete tetraplegia  · Continue Baclofen to control spasticity   · She also takes dalfampridine ER 10mg 2x daily to improve walking speed      HTN  · Continue Amlodipine 2 5mg 2x daily  · She also takes Lasix 40mg twice weekly for LE edema  · Continue to monitor BP      Neurogenic bowel and bladder  · SPC placed 4/26/22  · PMR managing neurogenic bowel management teaching  · No further need for flomax since now with [facility]     Anemia  · Review of previous labs revealed Hgb in the 12-13 range  · stable     Leukopenia  · Seen on recent labs  · Will continue to monitor  · Improving likely d/t infection    Vulvar itching  · Add Zana cream BID      The above assessment and plan was reviewed and updated as determined by my evaluation of the patient on 5/14/2022      Labs:   Results from last 7 days   Lab Units 05/10/22  0614   WBC Thousand/uL 3 89*   HEMOGLOBIN g/dL 11 1*   HEMATOCRIT % 34 2*   PLATELETS Thousands/uL 343           Invalid input(s): LABGLOM, ISABELL                Review of Scheduled Meds:  Current Facility-Administered Medications   Medication Dose Route Frequency Provider Last Rate    amLODIPine  2 5 mg Oral Q12H Tiana Edwards MD      baclofen  10 mg Oral TID Tiana Edwards MD      cholecalciferol  2,000 Units Oral Daily Tiana Edwards MD      Dalfampridine ER  10 mg Oral Q12H Ryan Wylie MD      enoxaparin  40 mg Subcutaneous Daily Tiana Edwards MD      furosemide  40 mg Oral Once per day on Mon Fri Tiana Edwards MD      methenamine hippurate  1 g Oral BID Tiana Edwards MD      polyethylene glycol  17 g Oral Daily PRN Tiana Edwards MD Subjective/ HPI: Patient seen and examined  Patients overnight issues or events were reviewed with nursing or staff during rounds or morning huddle session  New or overnight issues include the following:     No new or overnight issues  Offers no complaints  Had BM today  ROS:   A 10 point ROS was performed; negative except as noted above  Imaging:     No orders to display       *Labs /Radiology studies reviewed  *Medications reviewed and reconciled as needed  *Please refer to order section for additional ordered labs studies  *Case discussed with primary attending during morning huddle case rounds    Physical Examination:  Vitals:   Vitals:    05/13/22 1457 05/13/22 2037 05/14/22 0633 05/14/22 0907   BP: 144/77 137/64 121/55 131/67   BP Location: Right arm Right arm Right arm    Pulse: 83 71 62    Resp: 17 17 18    Temp: 97 7 °F (36 5 °C) 97 6 °F (36 4 °C) 97 8 °F (36 6 °C)    TempSrc: Oral Oral Oral    SpO2: 96% 96% 96%    Weight:       Height:           General Appearance: no distress, conversive  HEENT: PERRLA, conjuctiva normal; oropharynx clear; mucous membranes moist   Neck:  Supple, normal ROM  Lungs: CTA, normal respiratory effort, no retractions, expiratory effort normal  CV: regular rate and rhythm; no rubs/murmurs/gallops, PMI normal   ABD: soft; ND/NT; +BS  EXT: no edema  Skin: normal turgor, normal texture, no rashes  Psych: affect normal, mood normal  Neuro: AAO      The above physical exam was reviewed and updated as determined by my evaluation of the patient on 5/14/2022  Invasive Devices  Report    Drain  Duration           Suprapubic Catheter 18 Fr  17 days                   VTE Pharmacologic Prophylaxis: Enoxaparin  Code Status: Level 1 - Full Code  Current Length of Stay: 5 day(s)      Total time spent:  30 minutes with more than 50% spent counseling/coordinating care   Counseling includes discussion with patient re: progress  and discussion with patient of his/her current medical state/information  Coordination of patient's care was performed in conjunction with primary service  Time invested included review of patient's labs, vitals, and management of their comorbidities with continued monitoring  In addition, this patient was discussed with medical team including physician and advanced extenders  The care of the patient was extensively discussed and appropriate treatment plan was formulated unique for this patient  ** Please Note:  voice to text software may have been used in the creation of this document   Although proof errors in transcription or interpretation are a potential of such software**

## 2022-05-14 NOTE — PROGRESS NOTES
05/14/22 1430   Pain Assessment   Pain Assessment Tool 0-10   Pain Score No Pain   Restrictions/Precautions   Precautions Bed/chair alarms; Fall Risk;Multiple lines;Contact/isolation   Braces or Orthoses   (small left knee cage during gait training)   Cognition   Overall Cognitive Status Impaired   Arousal/Participation Alert; Cooperative   Attention Attends with cues to redirect   Subjective   Subjective pt seated in w/c without any c/o and was agreeable to have PT  Sit to Lying   Type of Assistance Needed Physical assistance;Verbal cues; Adaptive equipment   Physical Assistance Level 76% or more   Comment max of 1 with HOB flat and L rail simulating home set up   Sit to Lying CARE Score 2   Sit to Stand   Type of Assistance Needed Physical assistance;Verbal cues; Adaptive equipment   Physical Assistance Level Total assistance   Comment repeated training with emphasis on fwd weightshifting with VC to stick buttocks back even when she sits down to prevent flopping down  with max of 1 with R hand pushing on w/c with dycem to facilitate hand  on arm rest and L hand pulling on // bar   Sit to Stand CARE Score 1   Bed-Chair Transfer   Type of Assistance Needed Physical assistance;Verbal cues; Adaptive equipment   Physical Assistance Level Total assistance   Comment slide board from w/c to 24 inch high bed - although at  home has 26 inch high bed so does not touch feet on floor when sitting EOB   Chair/Bed-to-Chair Transfer CARE Score 1   Transfer Bed/Chair/Wheelchair   Findings pt also educated why  is not yet allowed to assist with her transfers after she asked to stay in chair and for  to assist her getting back to bed  Walk 10 Feet   Type of Assistance Needed Physical assistance;Verbal cues; Adaptive equipment   Physical Assistance Level Total assistance   Walk 10 Feet CARE Score 1   Ambulation   Distance Walked (feet) 10 ft  (x 5 reps)   Assist Device Parallel Bars   Gait Pattern Inconsistant Faiza; Slow [place]iza;Decreased foot clearance; Improper weight shift;Decreased L stance;Decreased R stance;Narrow RAMON;Step to  (bilat knee hyperextension L>R side, with R knee onset with fatigue)   Limitations Noted In Endurance;Balance;Sensation; Safety;Posture; Heel Strike;Speed;Strength;Swing;Sequencing   Provided Assistance with: Balance;Weight Shift;Trunk Support;Limb Advancement;Limb Stabilization   Walk Assist Level Maximum Assist;Chair Follow   Findings 2 person assist   Assessment   Treatment Assessment skilled PT focused on repeated gait and sit to stand transfer training with emphasis on weight shifting using // bars  Due to impaired proprioception/kinesthetic awareness pt requires max VC and assist to place LE during transfers and for sequencing during gait training  pt able to walk ~10' inside // bars 5x but PT provided physical assist for lateral weightshifting to allow pt to advance LE  initially only noted L knee hyperextension so used small knee cage but as pt fatigue ~ on 4th trial R knee starts to hyperextend as well  knee buckling only demonstrated during stand to sit transitions  Pt educated on CMS guidelines for 5x/week 3 hr rule after expressing " I was under the impression that I will have therapy 7x a week" after PT initially inform pt she will not have therapy tomorrow  so PT rearrange her schedule to see pt for at least 30 mins tomorrow with plan to be out of bed for the day, PT will assist pt back to bed at end of day as well  pt also expressed frustration with overall slow progress" I want a quick fix" but is realizing that is not possible   pt assisted back to bed and end of tx per RN Shiva Velasquez request so pt also expressed frustration why she has to return back to bed instead of staying in w/c, both RN and PT explained why mainly due to short nursing staff including having a float PCA tonight and that based on previous incident pt required 3 person to safely slide board back to bed if stays in the chair later  Pt eventually expressed understanding and was notified she can voice out concerns to patient care satisfaction liason or to RN/therapy managers  Barriers to Discharge Inaccessible home environment;Decreased caregiver support   PT Barriers   Functional Limitation Car transfers; Ramp negotiation;Stair negotiation;Standing;Transfers; Walking; Wheelchair management   Plan   Treatment/Interventions Functional transfer training; Therapeutic exercise; Bed mobility;Gait training;Spoke to nursing;OT;Equipment eval/education; Endurance training;Patient/family training   Progress Slow progress, decreased activity tolerance   Recommendation   PT - OK to Discharge No   PT Therapy Minutes   PT Time In 1430   PT Time Out 1530   PT Total Time (minutes) 60   PT Mode of treatment - Individual (minutes) 60   PT Mode of treatment - Concurrent (minutes) 0   PT Mode of treatment - Group (minutes) 0   PT Mode of treatment - Co-treat (minutes) 0   PT Mode of Treatment - Total time(minutes) 60 minutes   PT Cumulative Minutes 420   Therapy Time missed   Time missed?  No

## 2022-05-15 PROCEDURE — 97530 THERAPEUTIC ACTIVITIES: CPT

## 2022-05-15 PROCEDURE — 99232 SBSQ HOSP IP/OBS MODERATE 35: CPT | Performed by: INTERNAL MEDICINE

## 2022-05-15 RX ADMIN — Medication 2000 UNITS: at 09:58

## 2022-05-15 RX ADMIN — ENOXAPARIN SODIUM 40 MG: 40 INJECTION SUBCUTANEOUS at 10:04

## 2022-05-15 RX ADMIN — METHENAMINE HIPPURATE 1 G: 1 TABLET ORAL at 09:59

## 2022-05-15 RX ADMIN — METHENAMINE HIPPURATE 1 G: 1 TABLET ORAL at 17:33

## 2022-05-15 RX ADMIN — MICONAZOLE NITRATE 1 APPLICATION: 20 CREAM TOPICAL at 17:34

## 2022-05-15 RX ADMIN — BACLOFEN 10 MG: 10 TABLET ORAL at 17:32

## 2022-05-15 RX ADMIN — AMLODIPINE BESYLATE 2.5 MG: 2.5 TABLET ORAL at 09:59

## 2022-05-15 RX ADMIN — DALFAMPRIDINE 10 MG: 10 TABLET, EXTENDED RELEASE ORAL at 10:00

## 2022-05-15 RX ADMIN — BACLOFEN 10 MG: 10 TABLET ORAL at 21:38

## 2022-05-15 RX ADMIN — BACLOFEN 10 MG: 10 TABLET ORAL at 09:59

## 2022-05-15 RX ADMIN — DALFAMPRIDINE 10 MG: 10 TABLET, EXTENDED RELEASE ORAL at 21:39

## 2022-05-15 NOTE — PROGRESS NOTES
05/15/22 0935   Pain Assessment   Pain Score 7   Pain Location/Orientation Orientation: Left; Location: Shoulder  (during practiced sit to stand transfer training with L hand on the walker)   Restrictions/Precautions   Precautions Bed/chair alarms; Fall Risk;Supervision on toilet/commode;Multiple lines  (suprapubic)   Cognition   Overall Cognitive Status Impaired   Arousal/Participation Alert; Cooperative   Subjective   Subjective pt was in bed but awake and ready for PT  Roll Left and Right   Type of Assistance Needed Physical assistance;Verbal cues; Adaptive equipment   Physical Assistance Level 76% or more   Comment due to LE extensory tone requires max A to roll   Roll Left and Right CARE Score 2   Lying to Sitting on Side of Bed   Type of Assistance Needed Physical assistance;Verbal cues; Adaptive equipment   Physical Assistance Level Total assistance   Comment total assist of 1 with assist on trunk and LE with HOB flat with L rail  per pt at home it was easier for her to get out of bed with [address] elevated for they have an adjustable bed so PT will practice that set up tomorrow   Lying to Sitting on Side of Bed CARE Score 1   Sit to Stand   Type of Assistance Needed Physical assistance;Verbal cues; Adaptive equipment   Physical Assistance Level Total assistance   Comment 2 person max assist with L hand on walker which caused onset of L shoulder pain and due to weakness unable to maintain L hand  on walker so required PT assist to keep in place  standing tolerance ~ 1 min only due to reported fatigue singh on UE due to heavy reliance to support LE which pt kept knees lock in hyperextension to prevent knee buckling   Sit to Stand CARE Score 1   Bed-Chair Transfer   Type of Assistance Needed Physical assistance;Verbal cues; Adaptive equipment   Physical Assistance Level Total assistance   Comment max of 1 slide board including placement/removal of board from 26 5 inch high bed to simulate home set up to w/c   pt requires assist to reposition LE singh with R LE as she bumps across the slide board  SBA of 2nd person but did not provide assist even with furniture stabilization   Chair/Bed-to-Chair Transfer CARE Score 1   Therapeutic Interventions   Strengthening A/A heel slides pre transfer training   Neuromuscular Re-Education PNF D1 pattern on bilat LE to reduce extensor tone pre transfer   Assessment   Treatment Assessment Pt cont to demo significant UE/LE and core mm strength deficits impacting ability to assume and maintain unsupported sitting/standing and indep with mobilities so requires increase caregiver burden  Pt has tendency to pull on furniture or person assisting her during transfers to keep herself up  In addition to above mentioned deficits with impaired proprioception/kinesthetic awareness and spasticity unable to assist in placement or repositioning of limbs during transfers  Per pt she already has a swetha lift at home from previous [facility] inpatient rehabilitation admission few months back but that " we barely use it before coming back to the hospital"  PT will confirm information with  and will perform review of swetha lift transfer technique for anticipate pt will be safer to transfer with swetha lift at d/c due to slow progress and functional gains  at the same time PT will still cont to focus on improving pt's overall strength and activity tolerance, improve unsupported sitting balance/tolerance training to facilitate ADLs to dec overall caregiver burden  Will work on standing and gait training inside // bar but will not be the focus in therapy at this time  Family/Caregiver Present no   Plan   Treatment/Interventions Functional transfer training;LE strengthening/ROM; Therapeutic exercise; Bed mobility;Spoke to nursing;OT   Progress Slow progress, decreased activity tolerance   Recommendation   Equipment Recommended Wheelchair  (swetha lift)   PT - OK to Discharge No   PT Therapy Minutes   PT Time In 1955 PT Time Out 1005   PT Total Time (minutes) 30   PT Mode of treatment - Individual (minutes) 30   PT Mode of treatment - Concurrent (minutes) 0   PT Mode of treatment - Group (minutes) 0   PT Mode of treatment - Co-treat (minutes) 0   PT Mode of Treatment - Total time(minutes) 30 minutes   PT Cumulative Minutes 450

## 2022-05-15 NOTE — PROGRESS NOTES
05/15/22 1500   Pain Assessment   Pain Assessment Tool 0-10   Restrictions/Precautions   Precautions Bed/chair alarms; Fall Risk;Supervision on toilet/commode   Cognition   Overall Cognitive Status Impaired   Arousal/Participation Alert; Cooperative   Subjective   Subjective pt agreed to return back to bed at this hour per RN request for therapy to assist in transferring pt back to bed  Lying to Sitting on Side of Bed   Type of Assistance Needed Physical assistance;Verbal cues; Adaptive equipment   Physical Assistance Level Total assistance   Comment 2 person assist to which pt attributes inability to assist from " I have been sitting on the w/c all afternoon"  Lying to Sitting on Side of Bed CARE Score 1   Bed-Chair Transfer   Type of Assistance Needed Physical assistance;Verbal cues; Adaptive equipment   Physical Assistance Level Total assistance   Comment 2 person assist w/c to 26 5 bed  ht to simulate home set up  Chair/Bed-to-Chair Transfer CARE Score 1   Assessment   Treatment Assessment pt engaged in transfer training from w/c to 26 5 bed height using slide board requiring 2 person assist to complete safely with assist to position/reposition LE during transfers  also required 2 person assist for sit to supine transfer even with use of bedrail, pt does not have the energy nor the strength to assist at this time although pt expressed inability to assist due to being in the w/c too long   Drew Bangura was present and observed training  although when PT came back to hand out pt's schedule for tomorrow he expressed frustration that " Ellen will not get better with the way you do therapy here, she has been sitting here, she needs to move to get better"  PT again reiterated CMS guidelines of 5x a week for 3 hrs and other days we see pt for best practice and not because of short staffing   pt and  informed that PT already notified PT supervisor Laura Smith regarding their concerns, Drew Bangura confirmed they have a swetha lift at home but barely used it " I may have to use it some days" but feels strongly pt will get better sooner if she goes home with home therapy services and with him walking pt 5x a day with the rollator  PT did informed Roberto Snowden that pt is not ready to walk with even a RW at this time for she required 2 person assist to stand up earlier with RW and was not able to tolerate standing too long  Karlorafita Isi asked for CM to call him on Tuesday after team meeting although he 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 election poll but will make time to answer the call  at this time PT recommend family meeting for next week or for MD and therapy manager to call   Plan   Treatment/Interventions Functional transfer training;Spoke to nursing;OT;Patient/family training   Progress Slow progress, decreased activity tolerance   PT Therapy Minutes   PT Time In 1500   PT Time Out 1515   PT Total Time (minutes) 15   PT Mode of treatment - Individual (minutes) 15   PT Mode of treatment - Concurrent (minutes) 0   PT Mode of treatment - Group (minutes) 0   PT Mode of treatment - Co-treat (minutes) 0   PT Mode of Treatment - Total time(minutes) 15 minutes   PT Cumulative Minutes 465   Therapy Time missed   Time missed?  No

## 2022-05-15 NOTE — PLAN OF CARE
Problem: Potential for Falls  Goal: Patient will remain free of falls  Description: INTERVENTIONS:  - Educate patient/family on patient safety including physical limitations  - Instruct patient to call for assistance with activity   - Consult OT/PT to assist with strengthening/mobility   - Keep Call bell within reach  - Keep bed low and locked with side rails adjusted as appropriate  - Keep care items and personal belongings within reach  - Initiate and maintain comfort rounds  - Make Fall Risk Sign visible to staff  - Offer Toileting every  Hours, in advance of need  - Initiate/Maintain larm  - Obtain necessary fall risk management equipment:  - Apply yellow socks and bracelet for high fall risk patients  - Consider moving patient to room near nurses station  Outcome: Progressing     Problem: Prexisting or High Potential for Compromised Skin Integrity  Goal: Skin integrity is maintained or improved  Description: INTERVENTIONS:  - Identify patients at risk for skin breakdown  - Assess and monitor skin integrity  - Assess and monitor nutrition and hydration status  - Monitor labs   - Assess for incontinence   - Turn and reposition patient  - Assist with mobility/ambulation  - Relieve pressure over bony prominences  - Avoid friction and shearing  - Provide appropriate hygiene as needed including keeping skin clean and dry  - Evaluate need for skin moisturizer/barrier cream  - Collaborate with interdisciplinary team   - Patient/family teaching  - Consider wound care consult   Outcome: Progressing     Problem: PAIN - ADULT  Goal: Verbalizes/displays adequate comfort level or baseline comfort level  Description: Interventions:  - Encourage patient to monitor pain and request assistance  - Assess pain using appropriate pain scale  - Administer analgesics based on type and severity of pain and evaluate response  - Implement non-pharmacological measures as appropriate and evaluate response  - Consider cultural and social influences on pain and pain management  - Notify physician/advanced practitioner if interventions unsuccessful or patient reports new pain  Outcome: Progressing     Problem: INFECTION - ADULT  Goal: Absence or prevention of progression during hospitalization  Description: INTERVENTIONS:  - Assess and monitor for signs and symptoms of infection  - Monitor lab/diagnostic results  - Monitor all insertion sites, i e  indwelling lines, tubes, and drains  - Monitor endotracheal if appropriate and nasal secretions for changes in amount and color  - [place] appropriate cooling/warming therapies per order  - Administer medications as ordered  - Instruct and encourage patient and family to use good hand hygiene technique  - Identify and instruct in appropriate isolation precautions for identified infection/condition  Outcome: Progressing     Problem: SAFETY ADULT  Goal: Patient will remain free of falls  Description: INTERVENTIONS:  - Educate patient/family on patient safety including physical limitations  - Instruct patient to call for assistance with activity   - Consult OT/PT to assist with strengthening/mobility   - Keep Call bell within reach  - Keep bed low and locked with side rails adjusted as appropriate  - Keep care items and personal belongings within reach  - Initiate and maintain comfort rounds  - Make Fall Risk Sign visible to staff  - Offer Toileting every Hours, in advance of need  - Initiate/Maintainlarm  - Obtain necessary fall risk management equipment:  - Apply yellow socks and bracelet for high fall risk patients  - Consider moving patient to room near nurses station  Outcome: Progressing  Goal: Maintain or return to baseline ADL function  Description: INTERVENTIONS:  -  Assess patient's ability to carry out ADLs; assess patient's baseline for ADL function and identify physical deficits which impact ability to perform ADLs (bathing, care of mouth/teeth, toileting, grooming, dressing, etc )  - Assess/evaluate cause of self-care deficits   - Assess range of motion  - Assess patient's mobility; develop plan if impaired  - Assess patient's need for assistive devices and provide as appropriate  - Encourage maximum independence but intervene and supervise when necessary  - Involve family in performance of ADLs  - Assess for home care needs following discharge   - Consider OT consult to assist with ADL evaluation and planning for discharge  - Provide patient education as appropriate  Outcome: Progressing  Goal: Maintains/Returns to pre admission functional level  Description: INTERVENTIONS:  - Perform BMAT or MOVE assessment daily    - Set and communicate daily mobility goal to care team and patient/family/caregiver     - Collaborate with rehabilitation services on mobility goals if consulted  - Perform Range o  - Out of bed for toileting  - Record patient progress and toleration of activity level   Outcome: Progressing     Problem: DISCHARGE PLANNING  Goal: Discharge to home or other facility with appropriate resources  Description: INTERVENTIONS:  - Identify barriers to discharge w/patient and caregiver  - Arrange for needed discharge resources and transportation as appropriate  - Identify discharge learning needs (meds, wound care, etc )  - Arrange for interpretive services to assist at discharge as needed  - Refer to Case Management Department for coordinating discharge planning if the patient needs post-hospital services based on physician/advanced practitioner order or complex needs related to functional status, cognitive ability, or social support system  Outcome: Progressing     Problem: MOBILITY - ADULT  Goal: Maintain or return to baseline ADL function  Description: INTERVENTIONS:  -  Assess patient's ability to carry out ADLs; assess patient's baseline for ADL function and identify physical deficits which impact ability to perform ADLs (bathing, care of mouth/teeth, toileting, grooming, dressing, etc )  - Assess/evaluate cause of self-care deficits   - Assess range of motion  - Assess patient's mobility; develop plan if impaired  - Assess patient's need for assistive devices and provide as appropriate  - Encourage maximum independence but intervene and supervise when necessary  - Involve family in performance of ADLs  - Assess for home care needs following discharge   - Consider OT consult to assist with ADL evaluation and planning for discharge  - Provide patient education as appropriate  Outcome: Progressing  Goal: Maintains/Returns to pre admission functional level  Description: INTERVENTIONS:  - Perform BMAT or MOVE assessment daily    - Set and communicate daily mobility goal to care team and patient/family/caregiver     - Collaborate with rehabilitation services on mobility goals if consulted  - Perform Range  - Out of bed for toileting  - Record patient progress and toleration of activity level   Outcome: Progressing

## 2022-05-15 NOTE — PROGRESS NOTES
Internal Medicine Progress Note  Patient: Emile Mitchell  Age/sex: 73 y o  female  Medical Record #: [id number]      ASSESSMENT/PLAN: (Interval History)  Rima Oshea is seen and examined and management for following issues:    Multiple Sclerosis  · She has had a functional decline due to UTI  · Therapy per PMR  · Continue Baclofen to control spasticity   · She also takes dalfampridine ER 10mg 2x daily to improve walking speed      HTN  · Continue Amlodipine 2 5mg 2x daily  · She also takes Lasix 40mg twice weekly for LE edema  · Continue to monitor BP      Neurogenic bowel and bladder  · SPC placed 4/26/22  · PMR managing neurogenic bowel management teaching  · No further need for flomax since now with [facility]     Anemia  · Review of previous labs revealed Hgb in the 12-13 range  · stable     Leukopenia  · Seen on recent labs  · Will continue to monitor  · Improving likely d/t infection     Vulvar itching  · Added Zana cream BID 5/14/22      The above assessment and plan was reviewed and updated as determined by my evaluation of the patient on 5/15/2022      Labs:   Results from last 7 days   Lab Units 05/10/22  0614   WBC Thousand/uL 3 89*   HEMOGLOBIN g/dL 11 1*   HEMATOCRIT % 34 2*   PLATELETS Thousands/uL 343           Invalid input(s): LABGLOM, CMP                Review of Scheduled Meds:  Current Facility-Administered Medications   Medication Dose Route Frequency Provider Last Rate    amLODIPine  2 5 mg Oral Q12H Ivet Posadas MD      baclofen  10 mg Oral TID Ivet Posadas MD      cholecalciferol  2,000 Units Oral Daily Ivet Posadas MD      Dalfampridine ER  10 mg Oral Q12H Kavitha Anne MD      enoxaparin  40 mg Subcutaneous Daily Ivet Posadas MD      furosemide  40 mg Oral Once per day on Mon Fri Ivet Posadas MD      methenamine hippurate  1 g Oral BID MD Phan Carver Plaster ANTIFUNGAL   Topical BID COLIN [place]      polyethylene glycol  17 g Oral Daily PRN Ivet Posadas MD         Subjective/ HPI: Patient seen and examined  Patients overnight issues or events were reviewed with nursing or staff during rounds or morning huddle session  New or overnight issues include the following:     No new or overnight issues  Offers no complaints    ROS:   A 10 point ROS was performed; negative except as noted above  Imaging:     No orders to display       *Labs /Radiology studies reviewed  *Medications reviewed and reconciled as needed  *Please refer to order section for additional ordered labs studies  *Case discussed with primary attending during morning huddle case rounds    Physical Examination:  Vitals:   Vitals:    05/14/22 1559 05/14/22 2032 05/15/22 0732 05/15/22 0954   BP: 139/64 133/69 123/56 122/58   BP Location: Left arm Left arm Left arm    Pulse: 69 74 63    Resp: 18 20 18    Temp: 97 6 °F (36 4 °C) 97 8 °F (36 6 °C) 98 5 °F (36 9 °C)    TempSrc: Oral Oral Oral    SpO2: 96% 95% 98%    Weight:       Height:           General Appearance: no distress, conversive  HEENT: PERRLA, conjuctiva normal; oropharynx clear; mucous membranes moist   Neck:  Supple, normal ROM  Lungs: CTA, normal respiratory effort, no retractions, expiratory effort normal  CV: regular rate and rhythm; no rubs/murmurs/gallops, PMI normal   ABD: soft; ND/NT; +BS  EXT: no edema  Skin: normal turgor, normal texture, no rashes  Psych: affect normal, mood normal  Neuro: AAO      The above physical exam was reviewed and updated as determined by my evaluation of the patient on 5/15/2022  Invasive Devices  Report    Drain  Duration           Suprapubic Catheter 18 Fr  18 days                   VTE Pharmacologic Prophylaxis: Enoxaparin  Code Status: Level 1 - Full Code  Current Length of Stay: 6 day(s)      Total time spent:  30 minutes with more than 50% spent counseling/coordinating care   Counseling includes discussion with patient re: progress  and discussion with patient of his/her current medical state/information  Coordination of patient's care was performed in conjunction with primary service  Time invested included review of patient's labs, vitals, and management of their comorbidities with continued monitoring  In addition, this patient was discussed with medical team including physician and advanced extenders  The care of the patient was extensively discussed and appropriate treatment plan was formulated unique for this patient  ** Please Note:  voice to text software may have been used in the creation of this document   Although proof errors in transcription or interpretation are a potential of such software**

## 2022-05-16 LAB
BASOPHILS # BLD AUTO: 0.07 THOUSANDS/ΜL (ref 0–0.1)
BASOPHILS NFR BLD AUTO: 2 % (ref 0–1)
EOSINOPHIL # BLD AUTO: 0.07 THOUSAND/ΜL (ref 0–0.61)
EOSINOPHIL NFR BLD AUTO: 2 % (ref 0–6)
ERYTHROCYTE [DISTWIDTH] IN BLOOD BY AUTOMATED COUNT: 14.8 % (ref 11.6–15.1)
HCT VFR BLD AUTO: 40.8 % (ref 34.8–46.1)
HGB BLD-MCNC: 13 G/DL (ref 11.5–15.4)
IMM GRANULOCYTES # BLD AUTO: 0.01 THOUSAND/UL (ref 0–0.2)
IMM GRANULOCYTES NFR BLD AUTO: 0 % (ref 0–2)
LYMPHOCYTES # BLD AUTO: 0.71 THOUSANDS/ΜL (ref 0.6–4.47)
LYMPHOCYTES NFR BLD AUTO: 15 % (ref 14–44)
MCH RBC QN AUTO: 31.8 PG (ref 26.8–34.3)
MCHC RBC AUTO-ENTMCNC: 31.9 G/DL (ref 31.4–37.4)
MCV RBC AUTO: 100 FL (ref 82–98)
MONOCYTES # BLD AUTO: 0.37 THOUSAND/ΜL (ref 0.17–1.22)
MONOCYTES NFR BLD AUTO: 8 % (ref 4–12)
NEUTROPHILS # BLD AUTO: 3.4 THOUSANDS/ΜL (ref 1.85–7.62)
NEUTS SEG NFR BLD AUTO: 73 % (ref 43–75)
NRBC BLD AUTO-RTO: 0 /100 WBCS
PLATELET # BLD AUTO: 363 THOUSANDS/UL (ref 149–390)
PMV BLD AUTO: 10.1 FL (ref 8.9–12.7)
RBC # BLD AUTO: 4.09 MILLION/UL (ref 3.81–5.12)
WBC # BLD AUTO: 4.63 THOUSAND/UL (ref 4.31–10.16)

## 2022-05-16 PROCEDURE — 99232 SBSQ HOSP IP/OBS MODERATE 35: CPT | Performed by: PHYSICAL MEDICINE & REHABILITATION

## 2022-05-16 PROCEDURE — 97530 THERAPEUTIC ACTIVITIES: CPT

## 2022-05-16 PROCEDURE — 99232 SBSQ HOSP IP/OBS MODERATE 35: CPT | Performed by: INTERNAL MEDICINE

## 2022-05-16 PROCEDURE — 97110 THERAPEUTIC EXERCISES: CPT

## 2022-05-16 PROCEDURE — 97116 GAIT TRAINING THERAPY: CPT

## 2022-05-16 PROCEDURE — 99222 1ST HOSP IP/OBS MODERATE 55: CPT | Performed by: INTERNAL MEDICINE

## 2022-05-16 PROCEDURE — 97535 SELF CARE MNGMENT TRAINING: CPT

## 2022-05-16 PROCEDURE — 85025 COMPLETE CBC W/AUTO DIFF WBC: CPT | Performed by: PHYSICIAN ASSISTANT

## 2022-05-16 RX ADMIN — BACLOFEN 10 MG: 10 TABLET ORAL at 15:47

## 2022-05-16 RX ADMIN — METHENAMINE HIPPURATE 1 G: 1 TABLET ORAL at 10:18

## 2022-05-16 RX ADMIN — AMLODIPINE BESYLATE 2.5 MG: 2.5 TABLET ORAL at 10:16

## 2022-05-16 RX ADMIN — ENOXAPARIN SODIUM 40 MG: 40 INJECTION SUBCUTANEOUS at 10:13

## 2022-05-16 RX ADMIN — METHENAMINE HIPPURATE 1 G: 1 TABLET ORAL at 17:06

## 2022-05-16 RX ADMIN — FUROSEMIDE 40 MG: 40 TABLET ORAL at 10:15

## 2022-05-16 RX ADMIN — DALFAMPRIDINE 10 MG: 10 TABLET, EXTENDED RELEASE ORAL at 10:19

## 2022-05-16 RX ADMIN — MICONAZOLE NITRATE: 20 CREAM TOPICAL at 17:11

## 2022-05-16 RX ADMIN — BACLOFEN 10 MG: 10 TABLET ORAL at 21:24

## 2022-05-16 RX ADMIN — DALFAMPRIDINE 10 MG: 10 TABLET, EXTENDED RELEASE ORAL at 21:24

## 2022-05-16 RX ADMIN — BACLOFEN 10 MG: 10 TABLET ORAL at 10:14

## 2022-05-16 RX ADMIN — Medication 2000 UNITS: at 10:15

## 2022-05-16 RX ADMIN — AMLODIPINE BESYLATE 2.5 MG: 2.5 TABLET ORAL at 21:24

## 2022-05-16 NOTE — CONSULTS
[facility] - Inpatient Consultation  Rima Mora 73 y o  female MRN: [id number]  Unit/Bed#: -01 Encounter: [id number]    Reason for Consult / Principal Problem:     Rectal bleed    ASSESSMENT & PLAN:      59-year-old female with history of multiple sclerosis and neurogenic bladder who was admitted to rehab following issues with UTI secondary to neurogenic bladder with retention  GI consultation requested for episode of rectal bleed  1  Rectal bleeding - reported brown stool with streaks of visible blood  Patient reports that this is a chronic issue for her and is due to known constipation in the context of multiple sclerosis  Has had colonoscopy 4 years ago at [facility]  That report is not available but patient states that she does have known hemorrhoid which has been noted bleed from time to time  This is typical for her  Other potential etiologies include diverticulosis versus neoplasm versus AVM  Less likely given the description which is more consistent with outlet bleeding  · Favor conservative management for now; patient also favors this at this time  · Recheck CBC tomorrow  · Defer colonoscopy for now but could certainly reconsider inpatient colonoscopy if there is significant maci GI bleeding and/or associated decline in hemoglobin or transfusion dependence  · If this is due to recurrent hemorrhoidal bleeding or if patient has issues with hemorrhoids, would recommend colorectal surgery consult for potential banding or hemorrhoidectomy if patient is a candidate; this can likely be pursued as outpatient  · Minimize constipation as noted below  · Follow clinically for now  · Okay to continue with DVT prophylaxis from GI standpoint  · If hemoglobin remains stable, patient can follow-up as outpatient for colonoscopy    2  Anemia - chronic anemia with slight drop in hemoglobin although overall relatively stable    Low suspicion for significant GI bleeding contributing to acute anemia  Outlet bleeding from hemorrhoids is unlikely to cause any significant decline in hemoglobin  There is likely other contributing factors as well including chronic disease  · Check CBC tomorrow  · Transfuse if needed  · GI recommendations otherwise as noted above  · Follow clinically    3  Constipation - chronic constipation likely in the context of multiple sclerosis with likely underlying dysmotility  Reports having bowel movement every other day at home which is baseline for her  Reports passing formed but solid stools  More constipated during her hospitalization and required MiraLax  Does not typically require any medications at home but does take over-the-counter things like aloe vera to help her go to the bathroom  · Recommend daily MiraLax as needed  · Encourage hydration which will help soften stools  · Continue with physical therapy and mobility exercises to help with peristalsis  · Management of multiple sclerosis per primary team  · Would minimize use of constipating agents including narcotics and anticholinergics    GI will follow peripherally  Please contact the GI fellow on call with any questions or concerns  ______________________________________________________________________    HISTORY OF PRESENT ILLNESS:  36-year-old female with history of progressive multiple sclerosis, hypertension, and neurogenic bladder who presented with symptoms of UTI following suprapubic catheter placement  Patient was treated for UTI and possible sepsis and improved  Antibiotics were adjusted and patient was eventually transferred to rehab on 5/9/2022  During her rehab stay, patient was noted to have mild acute on chronic anemia and possible streaks of blood mixed in with brown stool  GI consultation requested for concern for rectal bleeding  Patient states that she has known hemorrhoids and has noted intermittent streaks of blood mixed in with stool    She says that this has been going on for a long time  She has had colonoscopy approximately 4 years ago at [facility]   She does not recall any significant findings  She denies any abdominal pain, nausea, vomiting, change in bowel habits, melena, dysphagia, odynophagia  She does report chronic constipation  This has been going on for many years and is attributed to her diagnosis of multiple sclerosis  She does not take any medications for her constipation but reports taking over-the-counter things such as aloe vera  She reports having bowel movement every other day  She describes the stools as formed and brown in appearance  She does admit to having some streaks of blood sometimes with her bowel movements  She attributes this to her known hemorrhoids        REVIEW OF SYSTEMS:    CONSTITUTIONAL: Denies any fever, chills, rigors, and weight loss  HEENT: No earache or tinnitus, denies hearing loss or visual disturbances  CARDIOVASCULAR: No chest pain or palpitations   RESPIRATORY: Denies any cough, hemoptysis, shortness of breath or dyspnea on exertion  GASTROINTESTINAL: As noted in the History of Present Illness   GENITOURINARY: +urinary retention status post suprapubic catheter  NEUROLOGIC: No dizziness or vertigo, denies headaches   MUSCULOSKELETAL: Denies any muscle or joint pain   SKIN: Denies skin rashes or itching   ENDOCRINE: Denies excessive thirst, denies intolerance to heat or cold  PSYCHOSOCIAL: Denies depression or anxiety, denies any recent memory loss     Historical Information   Past Medical History:   Diagnosis Date    Back pain 2016    Chronic UTI     Colon polyp     Hypertension     Incontinence     MS (multiple sclerosis) ([facility] [address] )     Age 37    Neurogenic bladder     Spasticity      Past Surgical History:   Procedure Laterality Date    BACK SURGERY  2016    Lumbar fusion    COLONOSCOPY      ELBOW SURGERY Right 2008    ORIF    IR SUPRAPUBIC CATHETER PLACEMENT  4/26/2022    NM IMPLANT PERIPH/GASTRIC NEUROSTIM/ N/A 11/19/2021    Procedure: leadwire placement and pulse generator  placement of SNM at S3 Bilateral S3  nerve testing, fluroscopy - c-arm guidance and complex programming;  Surgeon: Silvestre Jackson MD;  Location: [facility];  Service: Gynecology     Social History   Social History     Substance and Sexual Activity   Alcohol Use Not Currently     Social History     Substance and Sexual Activity   Drug Use Yes    Types: Marijuana    Comment: medical     Social History     Tobacco Use   Smoking Status Never Smoker   Smokeless Tobacco Never Used     Family History   Problem Relation Age of Onset    Cancer Mother     Heart disease Father     Cancer Sister        Meds/Allergies   Medications Prior to Admission   Medication    acetaminophen (TYLENOL) 325 mg tablet    AMLODIPINE BESYLATE PO    Ascorbic Acid (vitamin C) 1000 MG tablet    aspirin (ECOTRIN LOW STRENGTH) 81 mg EC tablet    baclofen 20 mg tablet    cholecalciferol (VITAMIN D3) 1,000 units tablet    Dalfampridine ER 10 MG TB12    doxycycline hyclate (VIBRA-TABS) 100 mg tablet    furosemide (LASIX) 40 mg tablet    gabapentin (NEURONTIN) 100 mg capsule    Lidocaine 5 % CREA    multivitamin (THERAGRAN) TABS    nabumetone (RELAFEN) 500 mg tablet    nitrofurantoin (MACRODANTIN) 100 mg capsule    Omega-3 Fatty Acids (FISH OIL PO)    tamsulosin (FLOMAX) 0 4 mg    vitamin B-12 (CYANOCOBALAMIN) 250 MCG tablet    vitamin E, tocopherol, 400 units capsule     Current Facility-Administered Medications   Medication Dose Route Frequency    amLODIPine (NORVASC) tablet 2 5 mg  2 5 mg Oral Q12H    baclofen tablet 10 mg  10 mg Oral TID    cholecalciferol (VITAMIN D3) tablet 2,000 Units  2,000 Units Oral Daily    Dalfampridine ER TB12 10 mg  10 mg Oral Q12H    furosemide (LASIX) tablet 40 mg  40 mg Oral Once per day on Mon Fri    methenamine hippurate (HIPREX) tablet 1 g  1 g Oral BID    moisture barrier miconazole 2% cream (aka NOE MOISTURE BARRIER ANTIFUNGAL CREAM)   Topical BID    polyethylene glycol (MIRALAX) packet 17 g  17 g Oral Daily PRN     No Known Allergies    PHYSICAL EXAM:      Objective   Blood pressure 141/73, pulse 66, temperature 97 9 °F (36 6 °C), temperature source Oral, resp  rate 18, height 4' 7" (1 397 m), weight 76 8 kg (169 lb 6 4 oz), SpO2 92 %  Body mass index is 39 37 kg/m²  Intake/Output Summary (Last 24 hours) at 5/16/2022 1220  Last data filed at 5/16/2022 1210  Gross per 24 hour   Intake 1020 ml   Output 1500 ml   Net -480 ml       General Appearance:   Alert, cooperative, no distress   HEENT:   Normocephalic, atraumatic, anicteric     Neck:   Supple, symmetrical, trachea midline   Lungs:   Equal chest rise, respirations unlabored    Heart:   Regular rate and rhythm   Abdomen:   Soft, non-tender, non-distended; normal bowel sounds; no masses, no organomegaly    Rectal:   Deferred    Extremities:   No cyanosis, clubbing or edema    Neuro:    Follows commands    Skin:   No jaundice, rashes, or lesions      LAB RESULTS:     Admission on 05/09/2022   Component Date Value    WBC 05/10/2022 3 89 (A)    RBC 05/10/2022 3 46 (A)    Hemoglobin 05/10/2022 11 1 (A)    Hematocrit 05/10/2022 34 2 (A)    MCV 05/10/2022 99 (A)    MCH 05/10/2022 32 1     MCHC 05/10/2022 32 5     RDW 05/10/2022 15 2 (A)    MPV 05/10/2022 10 2     Platelets 83/81/8240 343     nRBC 05/10/2022 0     Neutrophils Relative 05/10/2022 75     Immat GRANS % 05/10/2022 0     Lymphocytes Relative 05/10/2022 14     Monocytes Relative 05/10/2022 8     Eosinophils Relative 05/10/2022 2     Basophils Relative 05/10/2022 1     Neutrophils Absolute 05/10/2022 2 91     Immature Grans Absolute 05/10/2022 0 01     Lymphocytes Absolute 05/10/2022 0 54 (A)    Monocytes Absolute 05/10/2022 0 29     Eosinophils Absolute 05/10/2022 0 09     Basophils Absolute 05/10/2022 0 05        RADIOLOGY RESULTS: I have personally reviewed pertinent imaging studies  KIKE Wisdom  Chief Gastroenterology Fellow  [facility]  Available on [email or web address]

## 2022-05-16 NOTE — PROGRESS NOTES
Internal Medicine Progress Note  Patient: Starr Baker  Age/sex: 73 y o  female  Medical Record #: [id number]      ASSESSMENT/PLAN: (Interval History)  Rima Lake is seen and examined and management for following issues:    Multiple Sclerosis  · She has had a functional decline due to UTI  · Therapy per PMR  · Continue Baclofen to control spasticity   · She also takes dalfampridine ER 10mg 2x daily to improve walking speed      HTN  · Continue Amlodipine 2 5mg 2x daily  · She also takes Lasix 40mg twice weekly for LE edema  · Continue to monitor BP      Neurogenic bowel and bladder  · SPC placed 4/26/22  · PMR managing neurogenic bowel management teaching  · No further need for flomax since now with [facility]      Anemia  · Review of previous labs revealed Hgb in the 12-13 range  · stable     Leukopenia  · Seen on recent labs  · Will continue to monitor  · Improving likely d/t infection     Vulvar itching  · Added Zana cream BID 5/14/22      The above assessment and plan was reviewed and updated as determined by my evaluation of the patient on 5/16/2022      Labs:   Results from last 7 days   Lab Units 05/10/22  0614   WBC Thousand/uL 3 89*   HEMOGLOBIN g/dL 11 1*   HEMATOCRIT % 34 2*   PLATELETS Thousands/uL 343           Invalid input(s): LABGLOM, CMP                Review of Scheduled Meds:  Current Facility-Administered Medications   Medication Dose Route Frequency Provider Last Rate    amLODIPine  2 5 mg Oral Q12H Erik Tripp MD      baclofen  10 mg Oral TID Erik Tripp MD      cholecalciferol  2,000 Units Oral Daily Erik Tripp MD      Dalfampridine ER  10 mg Oral Q12H Sandra Jorge MD      enoxaparin  40 mg Subcutaneous Daily Erik Tripp MD      furosemide  40 mg Oral Once per day on Mon Fri Erik Tripp MD      methenamine hippurate  1 g Oral BID MD Phan Allen ANTIFUNGAL   Topical BID COLIN Aguero      polyethylene glycol  17 g Oral Daily PRN Erik Tripp MD         Subjective/ HPI: Patient seen and examined  Patients overnight issues or events were reviewed with nursing or staff during rounds or morning huddle session  New or overnight issues include the following:     Nursing reports that pt had stool streaked with blood this AM  Discussed with Dr Sonia Willoughby and will stop Lovenox and consult GI  She denies any dizziness or other complaints  ROS:   A 10 point ROS was performed; negative except as noted above  Imaging:     No orders to display       *Labs /Radiology studies reviewed  *Medications reviewed and reconciled as needed  *Please refer to order section for additional ordered labs studies  *Case discussed with primary attending during morning huddle case rounds    Physical Examination:  Vitals:   Vitals:    05/15/22 0954 05/15/22 1548 05/15/22 2120 05/16/22 0500   BP: 122/58 141/65 109/58 128/60   BP Location:  Left arm Left arm Left arm   Pulse:  64 69 66   Resp:  20 17 18   Temp:  97 7 °F (36 5 °C) 98 8 °F (37 1 °C) 97 9 °F (36 6 °C)   TempSrc:  Oral Oral Oral   SpO2:  96% 98% 92%   Weight:       Height:         General Appearance: no distress, conversive  HEENT: PERRLA, conjuctiva normal; oropharynx clear; mucous membranes moist   Neck:  Supple, normal ROM  Lungs: CTA, normal respiratory effort, no retractions, expiratory effort normal  CV: regular rate and rhythm; no rubs/murmurs/gallops, PMI normal   ABD: soft; ND/NT; +BS  EXT: no edema  Skin: normal turgor, normal texture, no rashes  Psych: affect normal, mood normal  Neuro: AAO     The above physical exam was reviewed and updated as determined by my evaluation of the patient on 5/16/2022  Invasive Devices  Report    Drain  Duration           Suprapubic Catheter 18 Fr  19 days                   VTE Pharmacologic Prophylaxis: Enoxaparin  Code Status: Level 1 - Full Code  Current Length of Stay: 7 day(s)      Total time spent:  30 minutes with more than 50% spent counseling/coordinating care  Counseling includes discussion with patient re: progress  and discussion with patient of his/her current medical state/information  Coordination of patient's care was performed in conjunction with primary service  Time invested included review of patient's labs, vitals, and management of their comorbidities with continued monitoring  In addition, this patient was discussed with medical team including physician and advanced extenders  The care of the patient was extensively discussed and appropriate treatment plan was formulated unique for this patient  ** Please Note:  voice to text software may have been used in the creation of this document   Although proof errors in transcription or interpretation are a potential of such software**

## 2022-05-16 NOTE — PROGRESS NOTES
05/16/22 0900   Pain Assessment   Pain Assessment Tool 0-10   Pain Score No Pain   Restrictions/Precautions   Precautions Bed/chair alarms;Cognitive; Fall Risk; [place]; Supervision on toilet/commode  (hx MS, suprapubic cath)   Lifestyle   Autonomy "I'm sorry you guys have to help me with this"   Oral Hygiene   Type of Assistance Needed Supervision   Physical Assistance Level No physical assistance   Comment CS seated in WC at sink   Oral Hygiene CARE Score 4   Shower/Bathe Self   Type of Assistance Needed Physical assistance   Physical Assistance Level 76% or more   Comment Pt able to bathe UB seated EOB with increased time and CS, cues for thoroughness due to fatigue  Pt able to bathe BL upper thighs  Pt requires A from therapist to bathe BL LE  Pt dependent bed level for bathing Buttocks and stacey     Shower/Bathe Self CARE Score 2   Bathing   Assessed Bath Style Sponge Bath   Anticipated D/C Bath Style Sponge Bath   Upper Body Dressing   Type of Assistance Needed Physical assistance   Physical Assistance Level 26%-50%   Comment Assist for pulling overhead and down trunk   Upper Body Dressing CARE Score 3   Lower Body Dressing   Type of Assistance Needed Physical assistance   Physical Assistance Level Total assistance   Comment total assist bed level   Lower Body Dressing CARE Score 1   Putting On/Taking Off Footwear   Type of Assistance Needed Physical assistance   Physical Assistance Level Total assistance   Comment TA to don TEDs and sneakers   Putting On/Taking Off Footwear CARE Score 1   Roll Left and Right   Type of Assistance Needed Physical assistance   Physical Assistance Level 26%-50%   Comment HOB flat w/ bed rails   Roll Left and Right CARE Score 3   Bed-Chair Transfer   Type of Assistance Needed Physical assistance   Physical Assistance Level Total assistance   Comment ModA SB Ax1 but second person for SBA d/t safety concerns   Chair/Bed-to-Chair Transfer CARE Score 1   Toileting Hygiene   Type of Assistance Needed Physical assistance   Physical Assistance Level Total assistance   Comment Pt incontinent of bowel on BSC  MaxA x2 to take off soiled clothing, don clean clothing and to assist pt w/ clean up   Pt w/ noted improved tolerance seated upright and participation w/ leaning for hygiene and CM  Toileting Hygiene CARE Score 1   Toilet Transfer   Type of Assistance Needed Physical assistance   Physical Assistance Level Total assistance   Comment Ax2 SB to [facility]   Toilet Transfer CARE Score 1   Exercise Tools   Theraputty reviewed all exercises in theraputty HEP  Use of pink and yellow theraputty as pt using both depending on the exercises  Issued to increase B hand strength for increased strength and coordination for ADLs/IADLs  Cognition   Overall Cognitive Status Impaired   Arousal/Participation Cooperative   Attention Attends with cues to redirect   Orientation Level Oriented X4   Memory Decreased short term memory;Decreased recall of recent events;Decreased recall of precautions   Following Commands Follows multistep commands with increased time or repetition   Activity Tolerance   Activity Tolerance Patient limited by fatigue   Assessment   Treatment Assessment OT session focusing on basic ADL completion  Pt w/ noted improvement w/ seated EOB tolerance and overall activity tolerance today, however; continues to require Ax2 for toileting following incontinent of bowel and Ax2 for all SB transfers  OT to continue to addressing fxnl deficits including dec strength, coordination, activity tolerance, fxnl cog, dec balance  Problem List Decreased strength;Decreased range of motion;Decreased endurance; Impaired balance;Decreased mobility; Decreased coordination;Decreased cognition; Impaired judgement;Decreased safety awareness; Impaired sensation; Impaired tone   Plan   Treatment/Interventions ADL retraining;Functional transfer training; Therapeutic exercise;Cognitive reorientation; Endurance training;Patient/family training;Equipment eval/education; Compensatory technique education;Continued evaluation   Progress Slow progress, decreased activity tolerance   OT Therapy Minutes   OT Time In 0900   OT Time Out 1030   OT Total Time (minutes) 90   OT Mode of treatment - Individual (minutes) 90   OT Mode of treatment - Concurrent (minutes) 0   OT Mode of treatment - Group (minutes) 0   OT Mode of treatment - Co-treat (minutes) 0   OT Mode of Treatment - Total time(minutes) 90 minutes   OT Cumulative Minutes 540   Therapy Time missed   Time missed?  No

## 2022-05-16 NOTE — PLAN OF CARE
Problem: Potential for Falls  Goal: Patient will remain free of falls  Description: INTERVENTIONS:  - Educate patient/family on patient safety including physical limitations  - Instruct patient to call for assistance with activity   - Consult OT/PT to assist with strengthening/mobility   - Keep Call bell within reach  - Keep bed low and locked with side rails adjusted as appropriate  - Keep care items and personal belongings within reach  - Initiate and maintain comfort rounds  - Make Fall Risk Sign visible to staff  - Offer Toileting every  Hours, in advance of need  - Initiate/Maintain alarm  - Obtain necessary fall risk management equipment:   - Apply yellow socks and bracelet for high fall risk patients  - Consider moving patient to room near nurses station  Outcome: Progressing     Problem: Prexisting or High Potential for Compromised Skin Integrity  Goal: Skin integrity is maintained or improved  Description: INTERVENTIONS:  - Identify patients at risk for skin breakdown  - Assess and monitor skin integrity  - Assess and monitor nutrition and hydration status  - Monitor labs   - Assess for incontinence   - Turn and reposition patient  - Assist with mobility/ambulation  - Relieve pressure over bony prominences  - Avoid friction and shearing  - Provide appropriate hygiene as needed including keeping skin clean and dry  - Evaluate need for skin moisturizer/barrier cream  - Collaborate with interdisciplinary team   - Patient/family teaching  - Consider wound care consult   Outcome: Progressing     Problem: PAIN - ADULT  Goal: Verbalizes/displays adequate comfort level or baseline comfort level  Description: Interventions:  - Encourage patient to monitor pain and request assistance  - Assess pain using appropriate pain scale  - Administer analgesics based on type and severity of pain and evaluate response  - Implement non-pharmacological measures as appropriate and evaluate response  - Consider cultural and social influences on pain and pain management  - Notify physician/advanced practitioner if interventions unsuccessful or patient reports new pain  Outcome: Progressing     Problem: INFECTION - ADULT  Goal: Absence or prevention of progression during hospitalization  Description: INTERVENTIONS:  - Assess and monitor for signs and symptoms of infection  - Monitor lab/diagnostic results  - Monitor all insertion sites, i e  indwelling lines, tubes, and drains  - Monitor endotracheal if appropriate and nasal secretions for changes in amount and color  - [place] appropriate cooling/warming therapies per order  - Administer medications as ordered  - Instruct and encourage patient and family to use good hand hygiene technique  - Identify and instruct in appropriate isolation precautions for identified infection/condition  Outcome: Progressing     Problem: SAFETY ADULT  Goal: Patient will remain free of falls  Description: INTERVENTIONS:  - Educate patient/family on patient safety including physical limitations  - Instruct patient to call for assistance with activity   - Consult OT/PT to assist with strengthening/mobility   - Keep Call bell within reach  - Keep bed low and locked with side rails adjusted as appropriate  - Keep care items and personal belongings within reach  - Initiate and maintain comfort rounds  - Make Fall Risk Sign visible to staff  - Offer Toileting every  Hours, in advance of need  - Initiate/Maintain larm  - Obtain necessary fall risk management equipment:   - Apply yellow socks and bracelet for high fall risk patients  - Consider moving patient to room near nurses station  Outcome: Progressing  Goal: Maintain or return to baseline ADL function  Description: INTERVENTIONS:  -  Assess patient's ability to carry out ADLs; assess patient's baseline for ADL function and identify physical deficits which impact ability to perform ADLs (bathing, care of mouth/teeth, toileting, grooming, dressing, etc )  - Assess/evaluate cause of self-care deficits   - Assess range of motion  - Assess patient's mobility; develop plan if impaired  - Assess patient's need for assistive devices and provide as appropriate  - Encourage maximum independence but intervene and supervise when necessary  - Involve family in performance of ADLs  - Assess for home care needs following discharge   - Consider OT consult to assist with ADL evaluation and planning for discharge  - Provide patient education as appropriate  Outcome: Progressing  Goal: Maintains/Returns to pre admission functional level  Description: INTERVENTIONS:  - Perform BMAT or MOVE assessment daily    - Set and communicate daily mobility goal to care team and patient/family/caregiver     - Collaborate with rehabilitation services on mobility goals if consulted  - Perform Range of M  - Out of bed for toileting  - Record patient progress and toleration of activity level   Outcome: Progressing     Problem: DISCHARGE PLANNING  Goal: Discharge to home or other facility with appropriate resources  Description: INTERVENTIONS:  - Identify barriers to discharge w/patient and caregiver  - Arrange for needed discharge resources and transportation as appropriate  - Identify discharge learning needs (meds, wound care, etc )  - Arrange for interpretive services to assist at discharge as needed  - Refer to Case Management Department for coordinating discharge planning if the patient needs post-hospital services based on physician/advanced practitioner order or complex needs related to functional status, cognitive ability, or social support system  Outcome: Progressing     Problem: MOBILITY - ADULT  Goal: Maintain or return to baseline ADL function  Description: INTERVENTIONS:  -  Assess patient's ability to carry out ADLs; assess patient's baseline for ADL function and identify physical deficits which impact ability to perform ADLs (bathing, care of mouth/teeth, toileting, grooming, dressing, etc )  - Assess/evaluate cause of self-care deficits   - Assess range of motion  - Assess patient's mobility; develop plan if impaired  - Assess patient's need for assistive devices and provide as appropriate  - Encourage maximum independence but intervene and supervise when necessary  - Involve family in performance of ADLs  - Assess for home care needs following discharge   - Consider OT consult to assist with ADL evaluation and planning for discharge  - Provide patient education as appropriate  Outcome: Progressing  Goal: Maintains/Returns to pre admission functional level  Description: INTERVENTIONS:  - Perform BMAT or MOVE assessment daily    - Set and communicate daily mobility goal to care team and patient/family/caregiver     - Collaborate with rehabilitation services on mobility goals if consulted  - Perform Range of Motion   - Out of bed for toileting  - Record patient progress and toleration of activity level   Outcome: Progressing

## 2022-05-16 NOTE — PROGRESS NOTES
05/16/22 1200   Pain Assessment   Pain Score No Pain   Cognition   Arousal/Participation Alert; Cooperative   Attention Attends with cues to redirect   Memory Decreased recall of precautions   Following Commands Follows one step commands with increased time or repetition   Subjective   Subjective Pt  reported to not having a good night sleep last night but is ready to participate in therapy   Sit to Lying   Type of Assistance Needed Physical assistance   Physical Assistance Level Total assistance   Comment max A x 1 at mat but needed A x2 to get back into bed at end of session due to fatigue   Sit to Lying CARE Score 1   Lying to Sitting on Side of Bed   Type of Assistance Needed Physical assistance   Physical Assistance Level 76% or more   Comment max A X1, assist for LE and for trunk   Lying to Sitting on Side of Bed CARE Score 2   Sit to Stand   Type of Assistance Needed Physical assistance   Physical Assistance Level 76% or more   Comment at parallel bars   Sit to Stand CARE Score 2   Bed-Chair Transfer   Type of Assistance Needed Physical assistance   Physical Assistance Level Total assistance   Comment slideboard transfers mod to max A x1 with 2nd person to stabilize equipment   Chair/Bed-to-Chair Transfer CARE Score 1   Transfer Bed/Chair/Wheelchair   Adaptive Equipment Transfer Board   Car Transfer   Reason if not Attempted Safety concerns   Car Transfer CARE Score 88   Walk 10 Feet   Comment walked 5 feet in parallel bars   Reason if not Attempted Safety concerns   Walk 10 Feet CARE Score 88   Walk 50 Feet with Two Turns   Reason if not Attempted Safety concerns   Walk 50 Feet with Two Turns CARE Score 88   Walk 150 Feet   Reason if not Attempted Safety concerns   Walk 150 Feet CARE Score 88   Ambulation   Does the patient walk? 2   Yes   Primary Mode of Locomotion Prior to Admission Walk;Wheelchair   Distance Walked (feet) 5 ft  (X 3)   Assist Device Parallel Bars   Gait Pattern Inconsistant [place];Decreased foot clearance;R foot drag;L foot drag;R knee hyperextension;L knee hyperextension; Improper weight shift   Limitations Noted In Balance; Coordination; Endurance;Strength   Provided Assistance with: Balance;Weight Shift;Limb Stabilization   Walk Assist Level Maximum Assist;Moderate Assist;Chair Follow   Therapeutic Interventions   Strengthening supine hip + knee flex with static stretch (knee to chest position to dec extensor tone), then AAROM hip+ knee flex, quads sets, gluts sets, post pelvic tilt (as patient could not lift buttocks for bridging ), SAQ , add squeeze with ball   Assessment   Treatment Assessment pt  engaged in 90 mins PT session focused on B LE strengthening, balance and increasing activity tolerance through functional mobility and thera ex  Pt  able to walk today on // bars X 5 feet with mod to max A   Pt  noted to have B knee hyperextension at stance to compensate for B knee buckling and also relies heavily on B UE on bars  Pt  needing mod to max A for wt  shifting but otherwise no sig buckling/ LOB noted  Pt  also able to actively participate better on slideboard transfers and was mostly mod A X 1 except for going back to bed (uphill) at end of session  No complaints reported by pt  Pt  assisted back to bed at end of session with patient having been up since OT session at 9 am  Needs and call bell in place  Problem List Decreased strength;Decreased endurance; Impaired balance;Decreased mobility; Decreased coordination; Impaired tone   Barriers to Discharge Inaccessible home environment;Decreased caregiver support   PT Barriers   Functional Limitation Standing;Transfers; Wheelchair management;Car transfers   Plan   Treatment/Interventions Functional transfer training;LE strengthening/ROM; Therapeutic exercise; Endurance training;Patient/family training;Equipment eval/education; Bed mobility;Gait training   Recommendation   PT Discharge Recommendation   (TBD)   Equipment Recommended Wheelchair  (TBD)   PT Therapy Minutes   PT Time In 1200   PT Time Out 1330   PT Total Time (minutes) 90   PT Mode of treatment - Individual (minutes) 90   PT Mode of treatment - Concurrent (minutes) 0   PT Mode of treatment - Group (minutes) 0   PT Mode of treatment - Co-treat (minutes) 0   PT Mode of Treatment - Total time(minutes) 90 minutes   PT Cumulative Minutes 555   Therapy Time missed   Time missed?  No

## 2022-05-17 PROCEDURE — 99232 SBSQ HOSP IP/OBS MODERATE 35: CPT | Performed by: INTERNAL MEDICINE

## 2022-05-17 PROCEDURE — 97530 THERAPEUTIC ACTIVITIES: CPT

## 2022-05-17 PROCEDURE — 97112 NEUROMUSCULAR REEDUCATION: CPT

## 2022-05-17 PROCEDURE — 97110 THERAPEUTIC EXERCISES: CPT

## 2022-05-17 PROCEDURE — 99233 SBSQ HOSP IP/OBS HIGH 50: CPT | Performed by: PHYSICAL MEDICINE & REHABILITATION

## 2022-05-17 RX ORDER — ENOXAPARIN SODIUM 100 MG/ML
40 INJECTION SUBCUTANEOUS DAILY
Status: DISCONTINUED | OUTPATIENT
Start: 2022-05-18 | End: 2022-05-26 | Stop reason: HOSPADM

## 2022-05-17 RX ORDER — ENOXAPARIN SODIUM 100 MG/ML
40 INJECTION SUBCUTANEOUS EVERY 12 HOURS SCHEDULED
Status: DISCONTINUED | OUTPATIENT
Start: 2022-05-17 | End: 2022-05-17

## 2022-05-17 RX ADMIN — BACLOFEN 10 MG: 10 TABLET ORAL at 08:16

## 2022-05-17 RX ADMIN — Medication 2000 UNITS: at 08:16

## 2022-05-17 RX ADMIN — DALFAMPRIDINE 10 MG: 10 TABLET, EXTENDED RELEASE ORAL at 10:26

## 2022-05-17 RX ADMIN — ENOXAPARIN SODIUM 40 MG: 40 INJECTION SUBCUTANEOUS at 10:25

## 2022-05-17 RX ADMIN — METHENAMINE HIPPURATE 1 G: 1 TABLET ORAL at 17:08

## 2022-05-17 RX ADMIN — MICONAZOLE NITRATE: 20 CREAM TOPICAL at 17:10

## 2022-05-17 RX ADMIN — DALFAMPRIDINE 10 MG: 10 TABLET, EXTENDED RELEASE ORAL at 21:22

## 2022-05-17 RX ADMIN — BACLOFEN 10 MG: 10 TABLET ORAL at 17:08

## 2022-05-17 RX ADMIN — METHENAMINE HIPPURATE 1 G: 1 TABLET ORAL at 08:16

## 2022-05-17 RX ADMIN — AMLODIPINE BESYLATE 2.5 MG: 2.5 TABLET ORAL at 21:21

## 2022-05-17 RX ADMIN — AMLODIPINE BESYLATE 2.5 MG: 2.5 TABLET ORAL at 08:15

## 2022-05-17 RX ADMIN — BACLOFEN 10 MG: 10 TABLET ORAL at 21:21

## 2022-05-17 NOTE — PLAN OF CARE
Problem: Potential for Falls  Goal: Patient will remain free of falls  Description: INTERVENTIONS:  - Educate patient/family on patient safety including physical limitations  - Instruct patient to call for assistance with activity   - Consult OT/PT to assist with strengthening/mobility   - Keep Call bell within reach  - Keep bed low and locked with side rails adjusted as appropriate  - Keep care items and personal belongings within reach  - Initiate and maintain comfort rounds  - Make Fall Risk Sign visible to staff  - Offer Toileting every Hours, in advance of need  - Initiate/Maintainalarm  - Obtain necessary fall risk management equipment:  - Apply yellow socks and bracelet for high fall risk patients  - Consider moving patient to room near nurses station  Outcome: Progressing     Problem: Prexisting or High Potential for Compromised Skin Integrity  Goal: Skin integrity is maintained or improved  Description: INTERVENTIONS:  - Identify patients at risk for skin breakdown  - Assess and monitor skin integrity  - Assess and monitor nutrition and hydration status  - Monitor labs   - Assess for incontinence   - Turn and reposition patient  - Assist with mobility/ambulation  - Relieve pressure over bony prominences  - Avoid friction and shearing  - Provide appropriate hygiene as needed including keeping skin clean and dry  - Evaluate need for skin moisturizer/barrier cream  - Collaborate with interdisciplinary team   - Patient/family teaching  - Consider wound care consult   Outcome: Progressing     Problem: PAIN - ADULT  Goal: Verbalizes/displays adequate comfort level or baseline comfort level  Description: Interventions:  - Encourage patient to monitor pain and request assistance  - Assess pain using appropriate pain scale  - Administer analgesics based on type and severity of pain and evaluate response  - Implement non-pharmacological measures as appropriate and evaluate response  - Consider cultural and social influences on pain and pain management  - Notify physician/advanced practitioner if interventions unsuccessful or patient reports new pain  Outcome: Progressing     Problem: INFECTION - ADULT  Goal: Absence or prevention of progression during hospitalization  Description: INTERVENTIONS:  - Assess and monitor for signs and symptoms of infection  - Monitor lab/diagnostic results  - Monitor all insertion sites, i e  indwelling lines, tubes, and drains  - Monitor endotracheal if appropriate and nasal secretions for changes in amount and color  - [place] appropriate cooling/warming therapies per order  - Administer medications as ordered  - Instruct and encourage patient and family to use good hand hygiene technique  - Identify and instruct in appropriate isolation precautions for identified infection/condition  Outcome: Progressing     Problem: SAFETY ADULT  Goal: Patient will remain free of falls  Description: INTERVENTIONS:  - Educate patient/family on patient safety including physical limitations  - Instruct patient to call for assistance with activity   - Consult OT/PT to assist with strengthening/mobility   - Keep Call bell within reach  - Keep bed low and locked with side rails adjusted as appropriate  - Keep care items and personal belongings within reach  - Initiate and maintain comfort rounds  - Make Fall Risk Sign visible to staff  - Offer Toileting every  Hours, in advance of need  - Initiate/Maintainalarm  - Obtain necessary fall risk management equipment:  - Apply yellow socks and bracelet for high fall risk patients  - Consider moving patient to room near nurses station  Outcome: Progressing  Goal: Maintain or return to baseline ADL function  Description: INTERVENTIONS:  -  Assess patient's ability to carry out ADLs; assess patient's baseline for ADL function and identify physical deficits which impact ability to perform ADLs (bathing, care of mouth/teeth, toileting, grooming, dressing, etc )  - Assess/evaluate cause of self-care deficits   - Assess range of motion  - Assess patient's mobility; develop plan if impaired  - Assess patient's need for assistive devices and provide as appropriate  - Encourage maximum independence but intervene and supervise when necessary  - Involve family in performance of ADLs  - Assess for home care needs following discharge   - Consider OT consult to assist with ADL evaluation and planning for discharge  - Provide patient education as appropriate  Outcome: Progressing  Goal: Maintains/Returns to pre admission functional level  Description: INTERVENTIONS:  - Perform BMAT or MOVE assessment daily    - Set and communicate daily mobility goal to care team and patient/family/caregiver     - Collaborate with rehabilitation services on mobility goals if consulted  - Perform Range of Mo  - Out of bed for toileting  - Record patient progress and toleration of activity level   Outcome: Progressing     Problem: DISCHARGE PLANNING  Goal: Discharge to home or other facility with appropriate resources  Description: INTERVENTIONS:  - Identify barriers to discharge w/patient and caregiver  - Arrange for needed discharge resources and transportation as appropriate  - Identify discharge learning needs (meds, wound care, etc )  - Arrange for interpretive services to assist at discharge as needed  - Refer to Case Management Department for coordinating discharge planning if the patient needs post-hospital services based on physician/advanced practitioner order or complex needs related to functional status, cognitive ability, or social support system  Outcome: Progressing     Problem: MOBILITY - ADULT  Goal: Maintain or return to baseline ADL function  Description: INTERVENTIONS:  -  Assess patient's ability to carry out ADLs; assess patient's baseline for ADL function and identify physical deficits which impact ability to perform ADLs (bathing, care of mouth/teeth, toileting, grooming, dressing, etc )  - Assess/evaluate cause of self-care deficits   - Assess range of motion  - Assess patient's mobility; develop plan if impaired  - Assess patient's need for assistive devices and provide as appropriate  - Encourage maximum independence but intervene and supervise when necessary  - Involve family in performance of ADLs  - Assess for home care needs following discharge   - Consider OT consult to assist with ADL evaluation and planning for discharge  - Provide patient education as appropriate  Outcome: Progressing  Goal: Maintains/Returns to pre admission functional level  Description: INTERVENTIONS:  - Perform BMAT or MOVE assessment daily    - Set and communicate daily mobility goal to care team and patient/family/caregiver     - Collaborate with rehabilitation services on mobility goals if consulted  - Perform Range of Mot  - Out of bed for toileting  - Record patient progress and toleration of activity level   Outcome: Progressing

## 2022-05-17 NOTE — PROGRESS NOTES
05/17/22 0830   Pain Assessment   Pain Assessment Tool 0-10   Pain Score No Pain   Restrictions/Precautions   Precautions Bed/chair alarms;Cognitive; Fall Risk;Contact/isolation;Supervision on toilet/commode  (hx MS)   Lifestyle   Autonomy "I feel like it is hard for me to do the things that you guys want me to do, like put my feet where you say I should"   Putting On/Taking Off Footwear   Type of Assistance Needed Physical assistance   Physical Assistance Level Total assistance   Putting On/Taking Off Footwear CARE Score 1   Lying to Sitting on Side of Bed   Type of Assistance Needed Physical assistance   Physical Assistance Level 76% or more   Lying to Sitting on Side of Bed CARE Score 2   Bed-Chair Transfer   Type of Assistance Needed Physical assistance   Physical Assistance Level Total assistance   Comment SB transfer ModAx1 w/ second person for standby assist, One instance of SB transfer where second person had to provide Ivonne  Chair/Bed-to-Chair Transfer CARE Score 1   Transfers   Additional Comments SB transfer training from [facility] <> EOM ModAx1 w/ second person as standby assist    Neuromuscular Education   Comments Pt seated EOM completing unsupported sit at [address] when not engaging in activity and CGA while engaged in task  Pt prompted to completed trunk rotation to transport objects from one side to the other  Overall pt completed ~30x  Pt participated in fxnl reach task while seated instructed to reach and take object from therapist hand and place and return to therapist  Demo'd ability to reach in all available planes of movement but noted dec shoulder strength and inability to reach over shoulder as well as pain in L shoulder limiting flx/extn  Pt then completed additional movements w/ 2# weighted ball, completed trunk rotation tapping ball on each side  Forward reach out from abdomen, and reaching forward tapping ball onto floor and then bringing back to abdomen   Activities completed to increase strength, activity tolerance and as method of NMR  [facility] provided t/o for energy conservation in which pt was responsive to  Overall pt seated EOM for 30min  Cognition   Overall Cognitive Status Impaired   Arousal/Participation Cooperative   Attention Attends with cues to redirect   Orientation Level Oriented X4   Memory Decreased recall of precautions   Following Commands Follows one step commands with increased time or repetition   Activity Tolerance   Activity Tolerance Patient tolerated treatment well   Assessment   Treatment Assessment OT session focusing on transfers w/ SB, addressing activity tolerance in unsupported sit EOM and pt edu  Pt demo's best activity tolerance and participation today to date in OT  Despite best performance so far pt continues to demo Ax2 for SB transfers and continues to require extensive hands on assist for ADLs  Pt continues to demo deficits fully understanding her Dx and long term implications for her fxnl performance  OT to continue to follow and treat according to established POC  Prognosis Fair   Problem List Decreased strength;Decreased range of motion;Decreased endurance; Impaired balance;Decreased mobility; Decreased coordination;Decreased cognition; Impaired judgement;Decreased safety awareness; Impaired sensation; Impaired tone   Plan   Treatment/Interventions ADL retraining;Functional transfer training; Therapeutic exercise; Endurance training;Cognitive reorientation;Patient/family training; Compensatory technique education;Continued evaluation   Progress Progressing toward goals   OT Therapy Minutes   OT Time In 0830   OT Time Out 1000   OT Total Time (minutes) 90   OT Mode of treatment - Individual (minutes) 90   OT Mode of treatment - Concurrent (minutes) 0   OT Mode of treatment - Group (minutes) 0   OT Mode of treatment - Co-treat (minutes) 0   OT Mode of Treatment - Total time(minutes) 90 minutes   OT Cumulative Minutes 630   Therapy Time missed   Time missed?  No

## 2022-05-17 NOTE — TEAM CONFERENCE
Acute RehabilitationTeam Conference Note  Date: 5/17/2022   Time: 11:00 AM       Patient Name:  Krysta Benz       Medical Record Number: [id number]   YOB: 1955  Sex: Female          Room/Bed:  [facility] 459/[facility] 459-01  Payor Info:  Payor: Klarissa Palacio / Plan: MEDICARE A AND B / Product Type: Medicare A & B Fee for Service /      Admitting Diagnosis: Multiple sclerosis [G35]   Admit Date/Time:  5/9/2022  2:25 PM  Admission Comments: No comment available     Primary Diagnosis:  Ambulatory dysfunction  Principal Problem: Ambulatory dysfunction    Patient Active Problem List    Diagnosis Date Noted    Anemia 05/06/2022    Urinary tract infection associated with cystostomy catheter ([facility] ) 05/05/2022    Debility 06/28/2021    Acute bilateral thoracic back pain 06/23/2021    Hypernatremia 06/22/2021    Multiple sclerosis ([facility] ) 06/20/2021    Ambulatory dysfunction 06/20/2021    Acute cystitis without hematuria 06/20/2021    Hypertension 06/20/2021    Neurogenic bladder 06/20/2021    Leg swelling 06/20/2021    Acquired polyneuropathy        Physical Therapy:    Weight Bearing Status: Full Weight Bearing  Transfers: Total Assistance, Assist of 2  Bed Mobility: Total Assistance, Maximum Assistance (A X 1-2)  Amulation Distance (ft): 5 feet (unsafe for now)  Ambulation: Assist of 2, Maximum Assistance  Assistive Device for Ambulation:  (parallel bars)  Wheelchair Mobility:  (pt  has shoulder pain and would probably aggreviate pain  Will trial if appropriate )  Stair Assistance:  (unsafe)  Ramp:  (unsafe, will need to be wheeled up and down ramp)  Discharge Recommendations: Home with:  [address] with[de-identified] 24 Hour Supervision, 24 Hour Assisteance, Family Support, First Floor Setup, Home Physical Therapy    Pt is a 73year old female with history of MDRO and MS with functional incomplete tetraplegia admitted to the hospital due to recurrent UTI   Pt admitted in [facility]  on 5/9 for rehab due to functional decline with most recent acute hospitalization  Pt was seen for moderate complexity eval on 5/10  with co-morbidities affecting functional progress include MS, HTN, recurrent UTIs with suprapubic catheter, acquired polyneuropathy, neurogenic bladder and chronic back pain  Pt  Demonstrates slow steady progress since then and is currently mod to max A X 1 with slide board transfers ( varies depending on fatigue level) with 2nd person to manage/ stabilize equipment, max A for sit to stand and mod to max A X 1 walking to 5 feet in // bars  Pt  Cont to be limited by B LE weakness, tone and dec motor control, dec core strength although sitting balance is getting better , dec B LE sensation and shoulder pain  Pt  Fluctuates and has her goo and bad days but usually fatigues towards the afternoon which she mostly needs A X 2 for mobility  Pt  Lives with her  maci and had previously assisted patient at home  Pt  Will benefit from skilled PT to maximize functional mobility and dec burden of care at d/c  Occupational Therapy:  Eating:  (set up)  Grooming: Minimal Assistance  Bathing: Maximum Assistance (LB bed level)  Bathing: Maximum Assistance (LB bed level)  Upper Body Dressing: Minimal Assistance  Lower Body Dressing:  Total Assistance (LB bed level)  Toileting: Assist of 2  Toilet Transfer: Assist of 2 (SB Ax2)  Cognition: Exceptions to WNL  Cognition: Decreased Memory, Decreased Executive Functions, Decreased Comprehension, Decreased Safety  Orientation: Person, Place, Time, Situation  Discharge Recommendations: Home with:  [address] with[de-identified] 24 Hour Supervision, 24 Hour Assistance, Family Support, First Floor Setup, Home Occupational Therapy       Rima Gallagher" is a 73 y o  female with medical history of but not limited to progressive MS,HTN,chronic back pain,recurrent UTI's, Neurogenic bladder (with SPT placed 4/26) with colonization, and Spasticity who presented to [address] [place] [address] ED on 5/5 with symptoms of recurrent UTI while she was being treated for one  Pt now presents to [facility] on 5/10 for OT eval  Pt currently requires extensive assist x2 people for basic ADLs d/t remarkable weakness in all extremities and poor trunk control  LB ADLs completed Ax1 bed level and UB seated EOB w/ CGA for sitting balance and Ivonne for activity completion  Ax2 w/ SB to DA platform BSC  Pt initially reported to therapy staff that she was IND w/ RW PTA  It has since been learned that pt's fxnl status fluctuates at home w/  often provided extensive assist for ADLs and that they even have a swetha lift at home that it utilized intermittently  Pt reports a ramp to enter, WIS w/ shower stool and HH shower head, along w/ adjustable bed  Pt has shown improvement in last week most notably in sitting balance EOB, but even this is still limited  Anticipate DC either home w/ extensive assist required at [facility] level and use of swetha or SNF  Goals this week include continued strengthening and NMR, continue to address poor activity tolerance, and continued pt/family edu  Speech Therapy:           No notes on file    Nursing Notes:  Appetite: Good  Diet Type: Regular/House                      Diet Patient/Family Education Complete: Yes                            Bladder: Continent  Catheter Type: Suprapubic  Bladder Patient/Family Education: Yes  Bowel: Continent     Bowel Patient/Family Education: Yes  Pain Location/Orientation: Orientation: Left, Location: Shoulder (during practiced sit to stand transfer training with L hand on the walker)  Pain Score: 0                          Pain Patient/Family Education: Yes  Medication Management/Safety  Injectable: Lovenox  Safe Administration: Yes (by staff)  Medication Patient/Family Education Complete: No (on going)    Multiple Sclerosis - She has had a functional decline due to UTI, Therapy per PMR   Continue Baclofen to control spasticity, She also takes dalfampridine ER 10mg 2x daily to improve walking speed  HTN - Continue amlodipine 2 5mg 2x daily, She also takes Lasix 40mg twice weekly for LE edema, Continue to monitor BP  Neurogenic bowel and bladder - SPC placed 4/26/22, PMR managing neurogenic bowel mgmt teaching, no further need for flomax since now w/ SPC  Anemia - stable  Review of previous labs revealed Hgb in the 12-13 range, Will continue to monitor  Leukopenia - Seen on recent labs, Will continue to monitor  Improving likely d/t infection  Vulvar itching - added Zana cream BID 5/14  Pt is continent of bowel and bladder  Pt has no complaints of pain  This week we will continue to monitor lab values and vital signs  We will continue to educate on the importance of turning and offloading in order to prevent skin breakdown and preform routine skin checks  We will continue to monitor for constipation and medicate per bowel protocol  We will continue to encourage independence with ADLs  We will continue to preform safe transfers and keep the pt free from falls  Case Management:     Discharge Planning  Living Arrangements: Lives w/ Spouse/significant other  Support Systems: Spouse/significant other  Assistance Needed: TBD  Type of Current Residence: Private residence  Current Home Care Services: No  Pt lives with her supportive  who provides assist and transport  She has no SHY to her [facility] and everything is on the first floor  She has a walk in shower with grab bars and a shower chair  She has a walker, transport chair, rollator  She has has [facility] [employer] for home care and [facility]  She uses [facility] in [place] [facility]  Reviewed team meeting process, update, and potential LOS  Following to assist w/ d/c planning needs  Is the patient actively participating in therapies?  yes  List any modifications to the treatment plan:     Barriers Interventions   Neurogenic bladder, risk for UTI Super pubic cath, family education   Constipation  miralax as needed   Spasticity  Therapy exercises   MS Flair  Therapy exercises   Poor core strength, shoulder pain Therapy exercises     Is the patient making expected progress toward goals? yes  List any update or changes to goals:     Medical Goals:     Weekly Team Goals:   Rehab Team Goals  ADL Team Goal: Patient will require assist with ADLs with least restrictive device upon completion of rehab program  Bowel/Bladder Team Goal: Patient will require assist with bladder/bowel management with least restrictive device upon completion of rehab program  Transfer Team Goal: Patient will require assist with transfers with least restrictive device upon completion of rehab program  Locomotion Team Goal: Patient will require assist with locomotion with least restrictive device upon completion of rehab program    Discussion: Pt presents with the above barriers  She cannot move legs during transfers  Looking adjusting schedule for therapy to give therapy more frequently throughout the day  They have a swetha lift  Recommendation is for home PT, OT, and RN  Anticipated Discharge Date:  5/26  [facility] Team Members Present: The following team members are supervising care for this patient and were present during this Weekly Team Conference      Physician: Dr Sonia Willoughby MD  : Theda Babinski, LSW  Registered Nurse: Tracie Wright RN  Physical Therapist: Daniel Michael DPT  Occupational Therapist: Yuko Rutledge OTR/L  Speech Therapist: Earnestine Blank [address], [address] [facility]

## 2022-05-17 NOTE — CASE MANAGEMENT
In preparation for d/c, cm placed referral for [facility] for home RN (PT and OT, through [facility] who contracts with [address] [place] [address])  Referral placed via ecin  Pt accepted  Following to assist w/ d/c planning needs

## 2022-05-17 NOTE — PROGRESS NOTES
Physical Medicine and Rehabilitation Progress Note  Rima Mora 73 y o  female MRN: [id number]  Unit/Bed#: [facility] 459-01 Encounter: [id number]            HPI: Patient is a 74 yo female with h/o MS & neurogenic bladder with urinary retention s/p sacral neuromodulator device placement however ultimately requiring chronic indwelling nixon catheter however had recurrent UTIs and therefore underwent suprapubic catheter placement on 4/26 by IR however on 5/5 presented with generalized weakness and chills and was found to have UTI which was treated first with IV abx then eventually transitioned to oral abx in acute care and transferred to acute inpt rehab on 5/9    Chief Complaint: UTI     Subjective: Patient seen this  AM and was without complaint      ROS: A 10 point ROS was performed; negative except as noted above       Assessment/Plan:    UTI: h/o recurrent UTI's in the setting of neurogenic bladder due to MS with chronic indwelling nixon s/p suprapubic catheter placement by IR on 4/26; transitioned from IV abx to PO keflex 500 mg Q6 in acute care with recommendation by acute care hospitalist that patient continue abx until 5/12 which is now completed (of note UCx of 5/5/22 in addition to growing klebsiella also grew candida, however per d/w Dr Griffin Billwilberto of ID candida growth in the urine cx can occur in those who are repeatedly treated with abx for recurrent UTI and does not typically require treatment and patient has improved with abx treatment of klebsiella therefore he does not recommend treatment of the candida at this time); per d/w Serene Kapadia PA-C (whom patient follows up with as OP from a urology perspective) given that patient had UTI after placement of suprapubic cath placement Serene Kapadia PA-C recommend patient stop home nitrofurantoin 100 mg qd and switch to hiprex 1 gm PO BID which has been done (d/w pharmacy and confirmed with pharmacist that hiprex 1 gm PO BID is acceptable with patients hepatic and renal function)     Blood in stool: seen by GI who suspects this is 2/2 to hemorrhoids and recommended OP colonoscopy & did clear patient to continue pharmacologic DVT ppx, Hg currently WNL      Leukopenia: now WNL      Anemia: now WNL      MS: on home cholecalciferol 2000 units qd and home dalfampridine ER TB12 10 mg q12; she is on intrathecal methotrexate inj Q2 months and per patient next due in June; BLESSING virus positive in 2018; follows with Dr Kaleb Tran as an OP      HTN: at home on norvasc 2 5 mg Q12, was on toprol XL and losartan in the past however patient's cardiologist, Dr Fahad Torres, stopped the Toprol XL (per his OP note of 3/2021) and later her PCP stopped the losartan (per patient); management per IM      Dyslipidemia: per Dr Oli Srivastava's OP note of 3/2021 he d/w patient starting statin however patient declined and preferred to address with lifestyle/dietary modifications      Elevated LV end diastolic pressure: follows with Dr Carla Alanis who at last OP visit of 3/2021 noted that patient was on losartan 25 mg qd for this however per patient provided home med list is no longer on losartan and per patient this was stopped by her PCP; OP FU with Dr Carla Alanis     weakness: chronic in the setting of MS but likely exacerbated by UTI, 2/5 B/L hip flexion, 3 to 4-/5 R dorsiflexion, 1-2/5 L dorsiflexion (multipodis ordered on L to prevent achilles tendon contracture), 4/5 UE B/L; OP FU with Dr Kaleb Tran       Chronic lower extremity edema: follows with Dr Carla Alanis of cards as OP who recommend she use lasix prn and it appears she uses 40 mg on Mondays and Fridays as she states that this schedule appears to control the LE edema best (it tends to improve per Dr Ashleigh Delgadillo OP notes when she is consistent with use of compression stockings, therefore TEDs ordered for patient); OP FU with Dr Carla Alanis     Spasticity/neuropathic pain: in the setting of MS on home baclofen 10 mg TID, per patient provided home med list on baclofen 10 mg TID (not 20 mg TID as listed in OP notes and ER note) and is no longer on neurontin (as listed in OP notes and ER note) per patient provided home med list      Neurogenic bladder: chronic in the setting of MS; h/o of sacral neuromodulation device placement on 11/19/21 by Dr Clara Harris for urinary retention however ultimately required chronic indwelling nixon; h/o of recurrent UTIs with chronic indwelling nixon s/p suprapubic catheter placement on 4/26 by IR, at home on flomax 0 8 mg qpm however per d/w Usama Ceja PA-C (whome patient follows with as OP from a urology perspective) now that patient has suprapubic catheter she no longer requires flomax therefore it was dc'd; OP FU with Usama Ceja PA-C of urology scheduled for 5/31 for catheter change (and Dr Clara Harris of Urogyn)      Neurogenic bowel: chronic in the setting of MS, has occasional episodes of incontinence at home where she does not feel the stool coming, as episodes are not frequent and patient does not take anything at home will monitor off scheduled meds for now and use prn's      DVT ppx: lovenox     Note: unclear as to why patient is on ASA 81 mg qd at home (no h/o TIA/CVA/MI/CAD/PVD) and per d/w patient she began taking it on her own and was not specifically recommended to do so by her physicians therefore advised patient to hold off on taking asa until d/w her PCP      Note: blood cx of 5/5/22 x 2 finalized and no growth     Note: home med regimen confirmed with patient who provided a list      Incidental findings:     Elevated alk phos: mildly elevated at 156 ( however can be up to 147 by some lab standards) and appears to be chronic going back to at least 1/2018 per EMR lab records     Elevated B12 level: appears to be chronic going back to at least 4/2018 per EMR lab records and was 2,937 () on 2/1/22 likely due to B12 supplementation patient takes at home, which patient states she started on her own rather than at the recommendation of her doctors therefore advised to hold off B12 supplementation until she d/w her PCP      Arachnoid cyst: noted to cause cord flattening at the T6/7 levels on past imaging; OP FU with Dr Gill Rosas           Objective:    Functional Update:  Mobility: total   Transfers: total  ADLs: total       Physical Exam:    Vitals:    05/16/22 2111   BP: 128/60   Pulse: 67   Resp: 20   SpO2: 94%         General: alert, no apparent distress, cooperative and comfortable  HEENT:  Head: Normal, normocephalic, atraumatic    Eye: Normal external eye, conjunctiva, lidsc cornea  Ears: Normal external ears  Nose: Normal external nose, mucus membranes  CARDIAC:  +S1/2  LUNGS:  no abnormal respiratory pattern, no retractions noted, non-labored breathing   ABDOMEN:  + suprapubic catheter in place   EXTREMITIES:  no cyanosis or clubbing   NEURO:  awake, alert, apporpriately answering questions  PSYCH:  mood/affect currently stable

## 2022-05-17 NOTE — QUICK NOTE
GI Quick Note:    Hemoglobin recheck yesterday at 13  Suspect outlet bleeding as noted in consultation note  No indication for urgent endoscopic evaluation at this time  Okay to resume care per primary team  Patient of follow-up with her outpatient gastroenterologist for screening colonoscopy  No further inpatient workup necessary at this time  GI will sign off  Please contact the GI fellow on call with any questions or concerns  KIKE Lamb  Chief Gastroenterology Fellow  [facility]  Available on [email or web address]

## 2022-05-17 NOTE — PROGRESS NOTES
Internal Medicine Progress Note  Patient: Leslie Albarado  Age/sex: 73 y o  female  Medical Record #: [id number]      ASSESSMENT/PLAN: (Interval History)  Rima Mcnulty is seen and examined and management for following issues:    Multiple Sclerosis  · She had a functional decline due to UTI  · Therapy per PMR  · Continue Baclofen to control spasticity   · She also takes dalfampridine ER 10mg 2x daily to improve walking speed      HTN  · Continue Amlodipine 2 5mg 2x daily  · She also takes Lasix 40mg twice weekly for LE edema  · Continue to monitor BP      Hemorrhoids  · D/t chronic constipation  · Appreciate GI input  · No further testing unless drop in hgb and persistent maci blood  · Colonoscopy 4 years ago  · They recommend f/u with GI as outpatient  · Resume DVT prophylaxis    Neurogenic bowel and bladder  · SPC placed 4/26/22  · PMR managing neurogenic bowel management teaching  · No further need for flomax since now with [facility]      Anemia  · Review of previous labs revealed Hgb in the 12-13 range  · stable     Leukopenia  · resolved     Vulvar itching  · Cont Zana cream BID 5/14/22      The above assessment and plan was reviewed and updated as determined by my evaluation of the patient on 5/17/2022      Labs:   Results from last 7 days   Lab Units 05/16/22  1204   WBC Thousand/uL 4 63   HEMOGLOBIN g/dL 13 0   HEMATOCRIT % 40 8   PLATELETS Thousands/uL 363           Invalid input(s): ISABELL ACEVEDO                Review of Scheduled Meds:  Current Facility-Administered Medications   Medication Dose Route Frequency Provider Last Rate    amLODIPine  2 5 mg Oral Q12H Amador Milner MD      baclofen  10 mg Oral TID Amador Milner MD      cholecalciferol  2,000 Units Oral Daily Amador Milner MD      Dalfampridine ER  10 mg Oral Q12H Miah Cam MD      furosemide  40 mg Oral Once per day on Mon Fri Amador Milner MD      methenamine hippurate  1 g Oral BID Amador Milner MD     [place] ZANA ANTIFUNGAL   Topical COLIN Clement      polyethylene glycol  17 g Oral Daily PRN Amador Lopez MD         Subjective/ HPI: Patient seen and examined  Patients overnight issues or events were reviewed with nursing or staff during rounds or morning huddle session  New or overnight issues include the following:     Pt seen and examined at bedside  No complaints overnight    ROS:   A 10 point ROS was performed; negative except as noted above  Imaging:     No orders to display       *Labs /Radiology studies reviewed  *Medications reviewed and reconciled as needed  *Please refer to order section for additional ordered labs studies  *Case discussed with primary attending during morning huddle case rounds    Physical Examination:  Vitals:   Vitals:    05/16/22 1013 05/16/22 1551 05/16/22 2111 05/17/22 0545   BP: 141/73 146/68 128/60 126/60   BP Location:  Left arm Left arm Left arm   Pulse:  64 67 61   Resp:  16 20 18   Temp:  97 7 °F (36 5 °C) (!) 97 4 °F (36 3 °C) 97 5 °F (36 4 °C)   TempSrc:  Oral Oral Oral   SpO2:  96% 94% 95%   Weight:       Height:         GEN: No apparent distress, interactive  NEURO: Alert and oriented x3  HEENT: Pupils are equal and reactive, EOMI, mucous membranes are moist, face symmetrical  CV: S1 S2 regular, no MRG, no peripheral edema noted  RESP: Lungs are clear bilaterally, no wheezes, rales or rhonchi noted, on room air, respirations easy and non labored  GI: Flat, soft non tender, non distended; +BS x4  : SPC draining cloudy yellow urine  MUSC: Moves all extremities; +generalized deconditioning  SKIN: pink, warm and dry, normal turgor, no rashes, lesions        The above physical exam was reviewed and updated as determined by my evaluation of the patient on 5/17/2022      Invasive Devices  Report    Drain  Duration           Suprapubic Catheter 18 Fr  20 days                   VTE Pharmacologic Prophylaxis: Enoxaparin  Code Status: Level 1 - Full Code  Current Length of Stay: 8 day(s)      Total time spent:  30 minutes with more than 50% spent counseling/coordinating care  Counseling includes discussion with patient re: progress  and discussion with patient of his/her current medical state/information  Coordination of patient's care was performed in conjunction with primary service  Time invested included review of patient's labs, vitals, and management of their comorbidities with continued monitoring  In addition, this patient was discussed with medical team including physician and advanced extenders  The care of the patient was extensively discussed and appropriate treatment plan was formulated unique for this patient  ** Please Note:  voice to text software may have been used in the creation of this document   Although proof errors in transcription or interpretation are a potential of such software**

## 2022-05-17 NOTE — PROGRESS NOTES
Physical Medicine and Rehabilitation Progress Note  Rima Mora 73 y o  female MRN: [id number]  Unit/Bed#: -01 Encounter: [id number]          HPI: Patient is a 72 yo female with h/o MS & neurogenic bladder with urinary retention s/p sacral neuromodulator device placement however ultimately requiring chronic indwelling nixon catheter however had recurrent UTIs and therefore underwent suprapubic catheter placement on 4/26 by IR however on 5/5 presented with generalized weakness and chills and was found to have UTI which was treated first with IV abx then eventually transitioned to oral abx in acute care and transferred to acute inpt rehab on 5/9    Chief Complaint: UTI     Subjective: d/w patient potential dc date and patient was agreeable     ROS: A 10 point ROS was performed; negative except as noted above       Assessment/Plan:    UTI: h/o recurrent UTI's in the setting of neurogenic bladder due to MS with chronic indwelling nixon s/p suprapubic catheter placement by IR on 4/26; transitioned from IV abx to PO keflex 500 mg Q6 in acute care with recommendation by acute care hospitalist that patient continue abx until 5/12 which is now completed (of note UCx of 5/5/22 in addition to growing klebsiella also grew candida, however per d/w Dr Scooter Zamora of ID candida growth in the urine cx can occur in those who are repeatedly treated with abx for recurrent UTI and does not typically require treatment and patient has improved with abx treatment of klebsiella therefore he does not recommend treatment of the candida at this time); per d/w Patience Reyna PA-C (whom patient follows up with as OP from a urology perspective) given that patient had UTI after placement of suprapubic cath placement Patience Reyna PA-C recommend patient stop home nitrofurantoin 100 mg qd and switch to hiprex 1 gm PO BID which has been done (d/w pharmacy and confirmed with pharmacist that hiprex 1 gm PO BID is acceptable with patients hepatic and renal function)     Blood in stool: seen by GI who suspects this is 2/2 to hemorrhoids and recommended OP colonoscopy & did clear patient to continue pharmacologic DVT ppx, Hg currently WNL      MS: on home cholecalciferol 2000 units qd and home dalfampridine ER TB12 10 mg q12; she is on intrathecal methotrexate inj Q2 months and per patient next due in June; BLESSING virus positive in 2018; follows with Dr Mehdi Valentine as an OP      HTN: at home on norvasc 2 5 mg Q12, was on toprol XL and losartan in the past however patient's cardiologist, Dr Oxana Solo, stopped the Toprol XL (per his OP note of 3/2021) and later her PCP stopped the losartan (per patient); management per IM      Dyslipidemia: per Dr Gerald Sethi OP note of 3/2021 he d/w patient starting statin however patient declined and preferred to address with lifestyle/dietary modifications      Elevated LV end diastolic pressure: follows with Dr Logan Lovelace who at last OP visit of 3/2021 noted that patient was on losartan 25 mg qd for this however per patient provided home med list is no longer on losartan and per patient this was stopped by her PCP; OP FU with Dr Logan Lovelace     weakness: chronic in the setting of MS but likely exacerbated by UTI, 2/5 B/L hip flexion, 3 to 4-/5 R dorsiflexion, 1-2/5 L dorsiflexion (multipodis ordered on L to prevent achilles tendon contracture), 4/5 UE B/L; OP FU with Dr Mehdi Valentine       Chronic lower extremity edema: follows with Dr Logan Lovelace of cards as OP who recommend she use lasix prn and it appears she uses 40 mg on Mondays and Fridays as she states that this schedule appears to control the LE edema best (it tends to improve per Dr Apryl Su OP notes when she is consistent with use of compression stockings, therefore TEDs ordered for patient); OP FU with Dr Logan Lovelace     Spasticity/neuropathic pain: in the setting of MS on home baclofen 10 mg TID, per patient provided home med list on baclofen 10 mg TID (not 20 mg TID as listed in OP notes and ER note) and is no longer on neurontin (as listed in OP notes and ER note) per patient provided home med list      Neurogenic bladder: chronic in the setting of MS; h/o of sacral neuromodulation device placement on 11/19/21 by Dr Carli Vance for urinary retention however ultimately required chronic indwelling nixon; h/o of recurrent UTIs with chronic indwelling nixon s/p suprapubic catheter placement on 4/26 by IR, at home on flomax 0 8 mg qpm however per d/w Holly Andrew PA-C (whome patient follows with as OP from a urology perspective) now that patient has suprapubic catheter she no longer requires flomax therefore it was dc'd; OP FU with Holly Andrew PA-C of urology scheduled for 5/31 for catheter change (and Dr Carli Vance of Urogyn)      Neurogenic bowel: chronic in the setting of MS, has occasional episodes of incontinence at home where she does not feel the stool coming, as episodes are not frequent and patient does not take anything at home will monitor off scheduled meds for now and use prn's      DVT ppx: lovenox     Note: unclear as to why patient is on ASA 81 mg qd at home (no h/o TIA/CVA/MI/CAD/PVD) and per d/w patient she began taking it on her own and was not specifically recommended to do so by her physicians therefore advised patient to hold off on taking asa until d/w her PCP      Note: blood cx of 5/5/22 x 2 finalized and no growth     Note: home med regimen confirmed with patient who provided a list      Incidental findings:     Elevated alk phos: mildly elevated at 156 ( however can be up to 147 by some lab standards) and appears to be chronic going back to at least 1/2018 per EMR lab records     Elevated B12 level: appears to be chronic going back to at least 4/2018 per EMR lab records and was 2,937 () on 2/1/22 likely due to B12 supplementation patient takes at home, which patient states she started on her own rather than at the recommendation of her doctors therefore advised to hold off B12 supplementation until she d/w her PCP      Arachnoid cyst: noted to cause cord flattening at the T6/7 levels on past imaging; OP FU with Dr Sung Reyes           Objective:    Functional Update:  Mobility: max x 2   Transfers: total x 2   ADLs: max-total       Physical Exam:    Vitals:    05/17/22 1458   BP: 133/59   Pulse: 73   Resp: 18   Temp: 97 6 °F (36 4 °C)   SpO2: 99%         General: alert, no apparent distress, cooperative and comfortable  HEENT:  Head: Normal, normocephalic, atraumatic  Eye: Normal external eye, conjunctiva, lidsc cornea  Ears: Normal external ears  Nose: Normal external nose, mucus membranes  CARDIAC:  +S1/2  LUNGS:  no abnormal respiratory pattern, no retractions noted, non-labored breathing   ABDOMEN:  + suprapubic catheter in place   EXTREMITIES:  no cyanosis or clubbing   NEURO:  awake, alert, apporpriately answering questions  PSYCH:  mood/affect currently stable     This patient was discussed by the Interdisciplinary Team in weekly case conference today  The care of the patient was extensively discussed with all care providers and an appropriate rehabilitation plan was formulated unique for this patient  Barriers were identified preventing progression of therapy and appropriate interventions were discussed with each discipline  Please see the team note for input from all disciplines regarding barriers, intervention, and discharge planning  [ x ] Total time spent: 35 Mins, and greater than 50% of this time was spent counseling/coordinating care

## 2022-05-17 NOTE — PROGRESS NOTES
05/17/22 1255   Pain Assessment   Pain Score No Pain   Restrictions/Precautions   Precautions Bed/chair alarms;Cognitive; Fall Risk;Contact/isolation;Supervision on toilet/commode; [place]   Cognition   Overall Cognitive Status Impaired   Subjective   Subjective no complaints; agreeable to therapy   Roll Left and Right   Type of Assistance Needed Physical assistance   Physical Assistance Level 26%-50%   Roll Left and Right CARE Score 3   Sit to Lying   Type of Assistance Needed Physical assistance   Physical Assistance Level 76% or more   Comment A with LEs and repositioning   Sit to Lying CARE Score 2   Lying to Sitting on Side of Bed   Type of Assistance Needed Physical assistance   Physical Assistance Level 76% or more   Comment with trunk and LEs and scooting to edge   Lying to Sitting on Side of Bed CARE Score 2   Sit to Stand   Type of Assistance Needed Physical assistance   Physical Assistance Level Total assistance   Comment mod/maxAx1 second person standby for safety  pt's holding onto therapist shoulders   Sit to Stand CARE Score 1   Bed-Chair Transfer   Type of Assistance Needed Physical assistance   Physical Assistance Level Total assistance   Comment modA x2 slideboard    second person CGA and stabilizing equipment   Chair/Bed-to-Chair Transfer CARE Score 1   Transfer Bed/Chair/Wheelchair   Adaptive Equipment Transfer Board   Sit to Stand Moderate Assist;Maximum Assist;Assist x 2   Stand to Sit Moderate Assist   Supine to Sit Maximum Assist   Sit to Supine Maximum Assist   Car Transfer   Reason if not Attempted Safety concerns   Car Transfer CARE Score 88   Walk 10 Feet   Reason if not Attempted Safety concerns   Walk 10 Feet CARE Score 88   Walk 50 Feet with Two Turns   Reason if not Attempted Safety concerns   Walk 50 Feet with Two Turns CARE Score 88   Walk 150 Feet   Reason if not Attempted Safety concerns   Walk 150 Feet CARE Score 88   Walking 10 Feet on Uneven Surfaces   Reason if not Attempted Safety concerns   Walking 10 Feet on Uneven Surfaces CARE Score 88   Wheel 50 Feet with Two Turns   Reason if not Attempted Safety concerns   Wheel 50 Feet with Two Turns CARE Score 88   Wheel 150 Feet   Reason if not Attempted Safety concerns   Wheel 150 Feet CARE Score 88   Curb or Single Stair   Reason if not Attempted Safety concerns   1 Step (Curb) CARE Score 88   4 Steps   Reason if not Attempted Safety concerns   4 Steps CARE Score 88   12 Steps   Reason if not Attempted Safety concerns   12 Steps CARE Score 88   Therapeutic Interventions   Strengthening modified bridging x10 with green bolster,  heel slides x20 B/L   seated LAQ x20 B/L   Flexibility PNF D1 supine, with ext tone and with stretching of heel cord, HS and piriformis   Neuromuscular Re-Education seated edge of mat working on core and trunk control  trunk rotation with ball in hand  reaching out of RAMON with B UEs   Assessment   Treatment Assessment pt cont to work on strenghtening and flexibility to improve safety with SB txs  pt unable to move LEs on own with SB txs  and due to weakness needs A to bump over on SB   recommeneded PNF to LEs to help break ext tone  pt cont to be below baseline and will benefit from cont strengthening nad flexibility to improve safety and ind with w/c level with SB   Problem List Decreased strength;Decreased range of motion;Decreased endurance; Impaired balance;Decreased mobility; Decreased coordination;Decreased cognition; Impaired judgement;Decreased safety awareness; Impaired sensation; Impaired tone   Barriers to Discharge Inaccessible home environment;Decreased caregiver support   PT Barriers   Functional Limitation Standing;Transfers; Wheelchair management;Car transfers   Plan   Progress Progressing toward goals   Recommendation   PT Discharge Recommendation   (pending progress)   Equipment Recommended Wheelchair   PT Therapy Minutes   PT Time In 1255   PT Time Out 1425   PT Total Time (minutes) 90   PT Mode of treatment - Individual (minutes) 90   PT Mode of treatment - Concurrent (minutes) 0   PT Mode of treatment - Group (minutes) 0   PT Mode of treatment - Co-treat (minutes) 0   PT Mode of Treatment - Total time(minutes) 90 minutes   PT Cumulative Minutes 645   Therapy Time missed   Time missed?  No

## 2022-05-17 NOTE — PLAN OF CARE
Problem: Potential for Falls  Goal: Patient will remain free of falls  Description: INTERVENTIONS:  - Educate patient/family on patient safety including physical limitations  - Instruct patient to call for assistance with activity   - Consult OT/PT to assist with strengthening/mobility   - Keep Call bell within reach  - Keep bed low and locked with side rails adjusted as appropriate  - Keep care items and personal belongings within reach  - Initiate and maintain comfort rounds  - Make Fall Risk Sign visible to staff  - Offer Toileting every 3 Hours, in advance of need  - Initiate/Maintain 3alarm  - Obtain necessary fall risk management equipment: 3  - Apply yellow socks and bracelet for high fall risk patients  - Consider moving patient to room near nurses station  Outcome: Progressing     Problem: Prexisting or High Potential for Compromised Skin Integrity  Goal: Skin integrity is maintained or improved  Description: INTERVENTIONS:  - Identify patients at risk for skin breakdown  - Assess and monitor skin integrity  - Assess and monitor nutrition and hydration status  - Monitor labs   - Assess for incontinence   - Turn and reposition patient  - Assist with mobility/ambulation  - Relieve pressure over bony prominences  - Avoid friction and shearing  - Provide appropriate hygiene as needed including keeping skin clean and dry  - Evaluate need for skin moisturizer/barrier cream  - Collaborate with interdisciplinary team   - Patient/family teaching  - Consider wound care consult   Outcome: Progressing     Problem: PAIN - ADULT  Goal: Verbalizes/displays adequate comfort level or baseline comfort level  Description: Interventions:  - Encourage patient to monitor pain and request assistance  - Assess pain using appropriate pain scale  - Administer analgesics based on type and severity of pain and evaluate response  - Implement non-pharmacological measures as appropriate and evaluate response  - Consider cultural and social influences on pain and pain management  - Notify physician/advanced practitioner if interventions unsuccessful or patient reports new pain  Outcome: Progressing     Problem: INFECTION - ADULT  Goal: Absence or prevention of progression during hospitalization  Description: INTERVENTIONS:  - Assess and monitor for signs and symptoms of infection  - Monitor lab/diagnostic results  - Monitor all insertion sites, i e  indwelling lines, tubes, and drains  - Monitor endotracheal if appropriate and nasal secretions for changes in amount and color  - [place] appropriate cooling/warming therapies per order  - Administer medications as ordered  - Instruct and encourage patient and family to use good hand hygiene technique  - Identify and instruct in appropriate isolation precautions for identified infection/condition  Outcome: Progressing     Problem: SAFETY ADULT  Goal: Patient will remain free of falls  Description: INTERVENTIONS:  - Educate patient/family on patient safety including physical limitations  - Instruct patient to call for assistance with activity   - Consult OT/PT to assist with strengthening/mobility   - Keep Call bell within reach  - Keep bed low and locked with side rails adjusted as appropriate  - Keep care items and personal belongings within reach  - Initiate and maintain comfort rounds  - Make Fall Risk Sign visible to staff  - Offer Toileting every 2-4 Hours, in advance of need  - Initiate/Maintain bed/chair alarm  - Obtain necessary fall risk management equipment: yellow socks  - Apply yellow socks and bracelet for high fall risk patients  - Consider moving patient to room near nurses station  Outcome: Progressing  Goal: Maintain or return to baseline ADL function  Description: INTERVENTIONS:  -  Assess patient's ability to carry out ADLs; assess patient's baseline for ADL function and identify physical deficits which impact ability to perform ADLs (bathing, care of mouth/teeth, toileting, grooming, dressing, etc )  - Assess/evaluate cause of self-care deficits   - Assess range of motion  - Assess patient's mobility; develop plan if impaired  - Assess patient's need for assistive devices and provide as appropriate  - Encourage maximum independence but intervene and supervise when necessary  - Involve family in performance of ADLs  - Assess for home care needs following discharge   - Consider OT consult to assist with ADL evaluation and planning for discharge  - Provide patient education as appropriate  Outcome: Progressing  Goal: Maintains/Returns to pre admission functional level  Description: INTERVENTIONS:  - Perform BMAT or MOVE assessment daily    - Set and communicate daily mobility goal to care team and patient/family/caregiver  - Collaborate with rehabilitation services on mobility goals if consulted  - Perform Range of Motion 3 times a day  - Reposition patient every 2 hours    - Dangle patient 3 times a day  - Stand patient 3 times a day  - Ambulate patient 3 times a day  - Out of bed to chair 3 times a day   - Out of bed for meals 3 times a day  - Out of bed for toileting  - Record patient progress and toleration of activity level   Outcome: Progressing     Problem: DISCHARGE PLANNING  Goal: Discharge to home or other facility with appropriate resources  Description: INTERVENTIONS:  - Identify barriers to discharge w/patient and caregiver  - Arrange for needed discharge resources and transportation as appropriate  - Identify discharge learning needs (meds, wound care, etc )  - Arrange for interpretive services to assist at discharge as needed  - Refer to Case Management Department for coordinating discharge planning if the patient needs post-hospital services based on physician/advanced practitioner order or complex needs related to functional status, cognitive ability, or social support system  Outcome: Progressing     Problem: MOBILITY - ADULT  Goal: Maintain or return to baseline ADL function  Description: INTERVENTIONS:  -  Assess patient's ability to carry out ADLs; assess patient's baseline for ADL function and identify physical deficits which impact ability to perform ADLs (bathing, care of mouth/teeth, toileting, grooming, dressing, etc )  - Assess/evaluate cause of self-care deficits   - Assess range of motion  - Assess patient's mobility; develop plan if impaired  - Assess patient's need for assistive devices and provide as appropriate  - Encourage maximum independence but intervene and supervise when necessary  - Involve family in performance of ADLs  - Assess for home care needs following discharge   - Consider OT consult to assist with ADL evaluation and planning for discharge  - Provide patient education as appropriate  Outcome: Progressing  Goal: Maintains/Returns to pre admission functional level  Description: INTERVENTIONS:  - Perform BMAT or MOVE assessment daily    - Set and communicate daily mobility goal to care team and patient/family/caregiver  - Collaborate with rehabilitation services on mobility goals if consulted  - Perform Range of Motion 3 times a day  - Reposition patient every 2 hours    - Dangle patient 3 times a day  - Stand patient 3 times a day  - Ambulate patient 3 times a day  - Out of bed to chair 3 times a day   - Out of bed for meals 3 times a day  - Out of bed for toileting  - Record patient progress and toleration of activity level   Outcome: Progressing

## 2022-05-18 PROCEDURE — 97530 THERAPEUTIC ACTIVITIES: CPT

## 2022-05-18 PROCEDURE — 97110 THERAPEUTIC EXERCISES: CPT

## 2022-05-18 PROCEDURE — 97535 SELF CARE MNGMENT TRAINING: CPT

## 2022-05-18 PROCEDURE — 99232 SBSQ HOSP IP/OBS MODERATE 35: CPT | Performed by: PHYSICAL MEDICINE & REHABILITATION

## 2022-05-18 PROCEDURE — 97116 GAIT TRAINING THERAPY: CPT

## 2022-05-18 PROCEDURE — 99232 SBSQ HOSP IP/OBS MODERATE 35: CPT | Performed by: INTERNAL MEDICINE

## 2022-05-18 RX ORDER — ACETAMINOPHEN 325 MG/1
650 TABLET ORAL EVERY 6 HOURS PRN
Status: DISCONTINUED | OUTPATIENT
Start: 2022-05-18 | End: 2022-05-26 | Stop reason: HOSPADM

## 2022-05-18 RX ADMIN — AMLODIPINE BESYLATE 2.5 MG: 2.5 TABLET ORAL at 10:56

## 2022-05-18 RX ADMIN — METHENAMINE HIPPURATE 1 G: 1 TABLET ORAL at 10:58

## 2022-05-18 RX ADMIN — Medication 2000 UNITS: at 10:56

## 2022-05-18 RX ADMIN — BACLOFEN 10 MG: 10 TABLET ORAL at 15:54

## 2022-05-18 RX ADMIN — DALFAMPRIDINE 10 MG: 10 TABLET, EXTENDED RELEASE ORAL at 21:37

## 2022-05-18 RX ADMIN — BACLOFEN 10 MG: 10 TABLET ORAL at 10:56

## 2022-05-18 RX ADMIN — BACLOFEN 10 MG: 10 TABLET ORAL at 21:23

## 2022-05-18 RX ADMIN — MICONAZOLE NITRATE: 20 CREAM TOPICAL at 17:31

## 2022-05-18 RX ADMIN — AMLODIPINE BESYLATE 2.5 MG: 2.5 TABLET ORAL at 21:23

## 2022-05-18 RX ADMIN — METHENAMINE HIPPURATE 1 G: 1 TABLET ORAL at 17:31

## 2022-05-18 RX ADMIN — ENOXAPARIN SODIUM 40 MG: 40 INJECTION SUBCUTANEOUS at 10:57

## 2022-05-18 RX ADMIN — DALFAMPRIDINE 10 MG: 10 TABLET, EXTENDED RELEASE ORAL at 10:58

## 2022-05-18 RX ADMIN — ACETAMINOPHEN 650 MG: 325 TABLET, FILM COATED ORAL at 18:39

## 2022-05-18 NOTE — PROGRESS NOTES
Physical Medicine and Rehabilitation Progress Note  Rima Mora 73 y o  female MRN: [id number]  Unit/Bed#: [facility] 459-01 Encounter: [id number]    Chief Complaint: "I think I just want to go home"    Interval History: Feeling well, denies any CP, SOB, fevers, chills, N/V, abdominal pain  Last BM 5/16  Has SPT functioning well  No abdominal pain  No new blurry/double vision  Assessment/Plan - Continue plan of care as per primary attending, unless otherwise stated below:      · Multiple Sclerosis: PT/OT  Continue Vitamin D supplementation, home Dalfampridine  hAs intrathecal methotrexate due in June  Outpatient f/u with Neuro  · UTI: recurrent  Has SPT  Completed antibiotics  On hiprex now  · HTN: Continue current regimen  · Dyslipidemia: Declined statin  · Chronic LE edema: Stable, improved  · Spasticity/neuropathic pain: continue current plan of care  · Neurogenic bladder: continue SPT  Outpatient f/u  · Neurogenic bowel: Off scheduled meds  Monitor  · DVT PPx: Lovenox  Fox Meadows MD  Physical Medicine and Rehabilitation      Review of Systems: A 10 point review of systems was negative except for what is noted in the HPI          Current Facility-Administered Medications:     amLODIPine (NORVASC) tablet 2 5 mg, 2 5 mg, Oral, Q12H, Som Barr MD, 2 5 mg at 05/17/22 2121    baclofen tablet 10 mg, 10 mg, Oral, TID, Som Barr MD, 10 mg at 05/17/22 2121    cholecalciferol (VITAMIN D3) tablet 2,000 Units, 2,000 Units, Oral, Daily, Som Barr MD, 2,000 Units at 05/17/22 0816    Dalfampridine ER TB12 10 mg, 10 mg, Oral, Q12H, Zhao Hope MD, 10 mg at 05/17/22 2122    enoxaparin (LOVENOX) subcutaneous injection 40 mg, 40 mg, Subcutaneous, Daily, Som Barr MD    furosemide (LASIX) tablet 40 mg, 40 mg, Oral, Once per day on Mon Fri, Som Barr MD, 40 mg at 05/16/22 1015    methenamine hippurate (HIPREX) tablet 1 g, 1 g, Oral, BID, Som Barr MD, 1 g at 05/17/22 1708    moisture barrier miconazole 2% cream (aka NOE MOISTURE BARRIER ANTIFUNGAL CREAM), , Topical, BID, TIMO YoungbloodNP, Given at 05/17/22 1710    polyethylene glycol (MIRALAX) packet 17 g, 17 g, Oral, Daily PRN, Thena Schlatter, MD    Physical Exam:  Temp:  [97 4 °F (36 3 °C)-97 6 °F (36 4 °C)] 97 5 °F (36 4 °C)  HR:  [66-75] 75  Resp:  [18] 18  BP: (110-137)/(59-63) 137/61  SpO2:  [96 %-99 %] 96 %    Gen: No acute distress, Well-nourished, well-appearing  HEENT: Moist mucus membranes, Normocephalic/Atraumatic  Cardiovascular: Regular rate, rhythm, S1/S2  Distal pulses palpable  Pulmonary: Non-labored breathing  Lungs CTAB  : No nixon  SPT in place, no breakdown  Functioning  GI: Soft, non-tender, non-distended  Integumentary: Skin is warm, dry  Neuro: AAOx3, Speech is intact  Appropriate to questioning  Psych: Normal mood and affect  Laboratory:  Reviewed, no new labs  Results from last 7 days   Lab Units 05/16/22  1204   HEMOGLOBIN g/dL 13 0   HEMATOCRIT % 40 8   WBC Thousand/uL 4 63           Invalid input(s): CA         Diagnostic Studies: Reviewed, no new imaging   No orders to display         ** Please Note: Fluency Direct voice to text software may have been used in the creation of this document  **    Total visit time: 25 minutes, with more than 50% spent counseling/coordinating care  Counseling includes discussion with patient re: progress in therapies, functional issues observed by therapy staff, and discussion with patient regarding their current medical state and wellbeing  Coordination of patient's care was performed in conjunction with Internal Medicine service to monitor patient's labs, vitals, and management of their comorbidities

## 2022-05-18 NOTE — PROGRESS NOTES
05/18/22 0924   Pain Assessment   Pain Assessment Tool 0-10   Pain Score No Pain   Restrictions/Precautions   Precautions Bed/chair alarms; Fall Risk;Cognitive;Contact/isolation; Bronson; Supervision on toilet/commode   Roll Left and Right   Type of Assistance Needed Physical assistance   Physical Assistance Level 26%-50%   Roll Left and Right CARE Score 3   Sit to Lying   Type of Assistance Needed Physical assistance   Physical Assistance Level 51%-75%   Sit to Lying CARE Score 2   Lying to Sitting on Side of Bed   Type of Assistance Needed Physical assistance   Physical Assistance Level 76% or more   Lying to Sitting on Side of Bed CARE Score 2   Bed-Chair Transfer   Type of Assistance Needed Physical assistance   Physical Assistance Level 51%-75%   Comment mod A x 1 slide board xfer to/from w/c   Chair/Bed-to-Chair Transfer CARE Score 2   Toileting Hygiene   Type of Assistance Needed Physical assistance   Physical Assistance Level Total assistance   Comment Pt incontinent of bowels today with A x 2 for clothing management and hygiene  Pt with poor awareness of BM and not able to verbalize need to go to bathroom prior to accident and incontinent episode  Pt completed at bed level with A x 2   Toileting Hygiene CARE Score 1   Neuromuscular Education   Comments Pt engaged in core ROM and trunk control exercises to inc fxnl control for slide board transfers and fxnl ADLs  pt completed with large therapy ball repetitive trunk flexion and extension with b/l UEs positioned on ball  Assessment   Treatment Assessment Pt engaged in 55 min OT tx session with focus on ADL fxn, fxnl xfers, core balance/trunk control and pt education  see above for details  pt completed core exercsies and following repetiive exercise, pt with odor of BM with pt unaware if she had incontinent episode  Pt with + accident and assisted back to bed to complete hygiene   Pt completed mod/max A x 1 slide board xfer with OTR assist for cathater management and cues for safe hand placement  Pt with incontinent epsiode with poor awarenss, recommended to [facility] pt be placed on bowel program as pt states that this occurs often with exercise and transfers  Recommending start with Q4  offering BM to improve success with continence  Prognosis Fair   OT Therapy Minutes   OT Time In 0930   OT Time Out 1025   OT Total Time (minutes) 55   OT Mode of treatment - Individual (minutes) 55   OT Mode of treatment - Concurrent (minutes) 0   OT Mode of treatment - Group (minutes) 0   OT Mode of treatment - Co-treat (minutes) 0   OT Mode of Treatment - Total time(minutes) 55 minutes   OT Cumulative Minutes 745   Therapy Time missed   Time missed?  No

## 2022-05-18 NOTE — CASE MANAGEMENT
Geovani met with pt after hearing she has concerns about how to handle her wound vac at home  She would like someone from the medical team who manages her wound vac to call her dtr or to meet with her when she is here tomorrow at 10:30 am to explain what to do if the alarm goes off  Geovani educated her that home RN will be out 3 times a week to do the wound vac changes  If her dtr does not feel she can handle it, pt would like to see if she can be accepted to a SNF for its management but only under STR, she does not want to pay for retirement care, long term care SNF or respite care  Geovani notified the plastic surgeon team of the approve and asked them to call or meet with dtr

## 2022-05-18 NOTE — PROGRESS NOTES
05/18/22 1515   Pain Assessment   Pain Score No Pain   Restrictions/Precautions   Precautions Fall Risk;Bed/chair alarms;Supervision on toilet/commode   Cognition   Arousal/Participation Cooperative   Subjective   Subjective pt reported that today's schedule is going well but she was tired at end of session   Sit to Stand   Type of Assistance Needed Physical assistance; Adaptive equipment   Physical Assistance Level 51%-75%   Comment partial sit<>stand was Ivonne, but full stand needs more A   Sit to Stand CARE Score 2   Transfer Bed/Chair/Wheelchair   Findings session foucsed on partial sit<>stand from [facility] to place/remove the sling for the standing frame  rest of session was focused on 15 min in standing frame, working on shoulder flexion up to 90 deg, with palm down and thumb u ptowards ceiling, alteranting sids  scap squeezes  bending knees one at a time and extending to work on [employer], some TF provided behind knee for input, then worked on lateral weight shifting, as when standing she leans more to R side, which loads RLE contribuing to more hyperextension at the knee   Assessment   Treatment Assessment Pt is making progress towards PLOF as she was able to walk with RW earlier today in PT  This session cont to focus on overall activity tolerance, improving cardiovascular endurance and focusing on trunk stablity to cont to facilitate improvement in walking and trasnfers  Pt will cont to benefit from skilled PT to gradually progress sit<>Stands, SPT with RWm, however cont to reocmmend use of WC level and SB as primary mode of mobility at home at this time  PT Barriers   Physical Impairment Decreased strength;Decreased range of motion;Decreased endurance; Impaired balance;Decreased mobility; Decreased coordination; Impaired sensation   Functional Limitation Car transfers;Stair negotiation; Walking; Wheelchair management;Transfers;Standing;Ramp negotiation   Plan   Treatment/Interventions Functional transfer training;LE strengthening/ROM; Therapeutic exercise; Endurance training;Patient/family training;Bed mobility;Gait training;Equipment eval/education   Progress Progressing toward goals   Recommendation   PT Discharge Recommendation Home with home health rehabilitation   Equipment Recommended Wheelchair   PT Therapy Minutes   PT Time In 1515   PT Time Out 1545   PT Total Time (minutes) 30   PT Mode of treatment - Individual (minutes) 30   PT Mode of treatment - Concurrent (minutes) 0   PT Mode of treatment - Group (minutes) 0   PT Mode of treatment - Co-treat (minutes) 0   PT Mode of Treatment - Total time(minutes) 30 minutes   PT Cumulative Minutes 735   Therapy Time missed   Time missed?  No

## 2022-05-18 NOTE — PROGRESS NOTES
05/18/22 1230   Pain Assessment   Pain Assessment Tool 0-10   Pain Score No Pain   Restrictions/Precautions   Precautions Bed/chair alarms;Cognitive; Fall Risk;Contact/isolation   Cognition   Overall Cognitive Status Impaired   Arousal/Participation Alert; Cooperative   Attention Attends with cues to redirect   Subjective   Subjective pt had no c/o and ready for PT   Sit to Stand   Type of Assistance Needed Physical assistance;Verbal cues; Adaptive equipment   Physical Assistance Level Total assistance   Comment mod-min of 2 person on RW, mod of 1 on //bars with L hand on RW/bar and R hand pushing off   Sit to Stand CARE Score 1   Bed-Chair Transfer   Type of Assistance Needed Physical assistance;Verbal cues; Adaptive equipment   Physical Assistance Level Total assistance   Comment mod of 1 slide board with level surfaces, mod-max of 1 w/c <> 26 5 bed height with slide board, mod-min of 2 SPT with RW x 2 trials   Chair/Bed-to-Chair Transfer CARE Score 1   Walk 10 Feet   Type of Assistance Needed Physical assistance;Verbal cues; Adaptive equipment   Physical Assistance Level Total assistance   Comment 10' with RW x mod-min of 2 person and 3rd person CF   Walk 10 Feet CARE Score 1   Therapeutic Interventions   Strengthening bilat seated hip flexion x 12 reps x 2 sets, LAQ x 12 reps x 2 sets pre gait training   Neuromuscular Re-Education improve sitting EOM balance requiring CG-CS   Equipment Use   Parallel Bars gait training fwd/bwd inside // bars x 3 trials with small knee cage on left   Assessment   Treatment Assessment skilled PT for 60 mins work on seated TE, gait training inside // bars and with RW, repeated transfer training with slide board then RW  Pt demo'd improvement with unsupported sitting EOM and during slide board transfer training  able to use L hand better holding on the AD during transfers without reported L shoulder pain   dec assist provided to weight shift during transfers and gait training with improve ability to advance and reposition LE during slide board transfers as well  although despite using L knee cage it continue to hyperextend in stance and if pt does not lock knee in stances it still gives out  anticipate knee structures are already compromised due to chronic knee hyperextension so using knee cage will not be beneficial anymore  PT will cont to work on strengthening,  sitting/standing balance/tolerance, repeated transfer training and gait training with SB and RW as tolerated to maximize functions and dec overall caregiver burden to facilitate a safe d/c to home on 5/26/22  Family/Caregiver Present no   Barriers to Discharge Inaccessible home environment;Decreased caregiver support   PT Barriers   Functional Limitation Car transfers; Ramp negotiation;Stair negotiation;Standing;Transfers; Walking; Wheelchair management   Plan   Treatment/Interventions Functional transfer training;LE strengthening/ROM; Therapeutic exercise; Endurance training;Gait training;Spoke to MD;Spoke to nursing;Spoke to case management;Spoke to advanced practitioner;OT   Progress Progressing toward goals   Recommendation   PT Discharge Recommendation Home with home health rehabilitation   Equipment Recommended   (has scooter, swetha lift, transport chair, rollator, RW)   PT - OK to Discharge No   PT Therapy Minutes   PT Time In 1230   PT Time Out 1330   PT Total Time (minutes) 60   PT Mode of treatment - Individual (minutes) 60   PT Mode of treatment - Concurrent (minutes) 0   PT Mode of treatment - Group (minutes) 0   PT Mode of treatment - Co-treat (minutes) 0   PT Mode of Treatment - Total time(minutes) 60 minutes   PT Cumulative Minutes 705

## 2022-05-18 NOTE — PROGRESS NOTES
Internal Medicine Progress Note  Patient: Abbie Driver  Age/sex: 73 y o  female  Medical Record #: [id number]      ASSESSMENT/PLAN: (Interval History)  Rima Gonzales is seen and examined and management for following issues:    Multiple Sclerosis  · She had a functional decline due to UTI  · Therapy per PMR  · Continue Baclofen to control spasticity   · She also takes dalfampridine ER 10mg 2x daily to improve walking speed      HTN  · Continue Amlodipine 2 5mg 2x daily  · She also takes Lasix 40mg twice weekly for LE edema  · Continue to monitor BP      Hemorrhoids  · D/t chronic constipation try to avoid  · Appreciate GI input  · No further testing unless drop in hgb and persistent maci blood  · Colonoscopy 4 years ago  · They recommend f/u with GI as outpatient    Neurogenic bowel and bladder  · SPC placed 4/26/22  · PMR managing neurogenic bowel management teaching  · No further need for flomax since now with [facility]      Anemia  · Review of previous labs revealed Hgb in the 12-13 range  · stable     Leukopenia  · resolved     Vulvar itching  · Cont Zana cream BID 5/14/22      The above assessment and plan was reviewed and updated as determined by my evaluation of the patient on 5/18/2022      Labs:   Results from last 7 days   Lab Units 05/16/22  1204   WBC Thousand/uL 4 63   HEMOGLOBIN g/dL 13 0   HEMATOCRIT % 40 8   PLATELETS Thousands/uL 363           Invalid input(s): ISABELL ACEVEDO                Review of Scheduled Meds:  Current Facility-Administered Medications   Medication Dose Route Frequency Provider Last Rate    amLODIPine  2 5 mg Oral Q12H Thena Schlatter, MD      baclofen  10 mg Oral TID Thena Schlatter, MD      cholecalciferol  2,000 Units Oral Daily Thena Schlatter, MD      Dalfampridine ER  10 mg Oral Q12H Annika Hernandez MD      enoxaparin  40 mg Subcutaneous Daily Thena Schlatter, MD      furosemide  40 mg Oral Once per day on Mon Fri Thena Schlatter, MD      methenamine hippurate  1 g Oral BID Ivet Posadas MD      Cholo Plaster ANTIFUNGAL   Topical BID COLIN Sarabia      polyethylene glycol  17 g Oral Daily PRN Ivet Posadas MD         Subjective/ HPI: Patient seen and examined  Patients overnight issues or events were reviewed with nursing or staff during rounds or morning huddle session  New or overnight issues include the following:     Pt seen and examined at bedside  She had no complaints overnight, no further blood seen in BM    ROS:   A 10 point ROS was performed; negative except as noted above  Imaging:     No orders to display       *Labs /Radiology studies reviewed  *Medications reviewed and reconciled as needed  *Please refer to order section for additional ordered labs studies  *Case discussed with primary attending during morning huddle case rounds    Physical Examination:  Vitals:   Vitals:    05/17/22 0545 05/17/22 1458 05/17/22 2112 05/18/22 0600   BP: 126/60 133/59 110/63 137/61   BP Location: Left arm Right arm Left arm Left arm   Pulse: 61 73 66 75   Resp: 18 18 18 18   Temp: 97 5 °F (36 4 °C) 97 6 °F (36 4 °C) (!) 97 4 °F (36 3 °C) 97 5 °F (36 4 °C)   TempSrc: Oral Oral Oral Oral   SpO2: 95% 99% 98% 96%   Weight:    76 4 kg (168 lb 6 9 oz)   Height:         GEN: No apparent distress, interactive  NEURO: Alert and oriented x3; STM loss noted  HEENT: Pupils are equal and reactive, EOMI, mucous membranes are moist, face symmetrical  CV: S1 S2 regular, no MRG, no peripheral edema noted  RESP: Lungs are clear bilaterally, no wheezes, rales or rhonchi noted, on room air, respirations easy and non labored  GI: Flat, soft non tender, non distended; +BS x4  : Voiding without difficulty  MUSC: Moves all extremities; +generalized deconditioning  SKIN: pink, warm and dry, normal turgor, no rashes, lesions      The above physical exam was reviewed and updated as determined by my evaluation of the patient on 5/18/2022      Invasive Devices  Report    Drain  Duration           Suprapubic Catheter 18 Fr  21 days                   VTE Pharmacologic Prophylaxis: Enoxaparin  Code Status: Level 1 - Full Code  Current Length of Stay: 9 day(s)      Total time spent:  30 minutes with more than 50% spent counseling/coordinating care  Counseling includes discussion with patient re: progress  and discussion with patient of his/her current medical state/information  Coordination of patient's care was performed in conjunction with primary service  Time invested included review of patient's labs, vitals, and management of their comorbidities with continued monitoring  In addition, this patient was discussed with medical team including physician and advanced extenders  The care of the patient was extensively discussed and appropriate treatment plan was formulated unique for this patient  ** Please Note:  voice to text software may have been used in the creation of this document   Although proof errors in transcription or interpretation are a potential of such software**

## 2022-05-18 NOTE — PROGRESS NOTES
05/18/22 0830   Pain Assessment   Pain Assessment Tool 0-10   Pain Score No Pain   Restrictions/Precautions   Precautions Bed/chair alarms;Cognitive; Fall Risk;Contact/isolation; Bronson; Supervision on toilet/commode   Weight Bearing Restrictions No   ROM Restrictions No   Oral Hygiene   Type of Assistance Needed Supervision   Physical Assistance Level No physical assistance   Comment seated in w/c at sink   Oral Hygiene CARE Score 4   Upper Body Dressing   Type of Assistance Needed Physical assistance   Physical Assistance Level 76% or more   Comment bed level to don over head shirt requiring assist to thread head and pull down over trunk   Upper Body Dressing CARE Score 2   Lower Body Dressing   Type of Assistance Needed Physical assistance   Physical Assistance Level Total assistance   Comment bed level to don brief/pants; rolls side to side with heavy reliance on bed rail during CM   Lower Body Dressing CARE Score 1   Putting On/Taking Off Footwear   Type of Assistance Needed Physical assistance   Physical Assistance Level Total assistance   Comment to don/doff sneakers   Putting On/Taking Off Footwear CARE Score 1   Bed-Chair Transfer   Type of Assistance Needed Physical assistance   Physical Assistance Level Total assistance   Comment Mod Ax1; 2nd person stabilizing SB; pt demo's slightly improved ability to "bump" across board as well as adjust feet throughout transfer   Chair/Bed-to-Chair Transfer CARE Score 1   Cognition   Overall Cognitive Status Impaired   Arousal/Participation Cooperative   Attention Attends with cues to redirect   Orientation Level Oriented X4   Memory Decreased recall of precautions   Following Commands Follows one step commands with increased time or repetition   Additional Activities   Additional Activities Comments pt engages in sitting balance/core strengthening seated EOM completing lateral leaning onto forearms using core to return to upright position and scooting along edge of mat requiring inc time to complete with focus on anterior weight shifting and buttocks clearance in order to inc ind with SB transfers/ADLs  Activity Tolerance   Activity Tolerance Patient tolerated treatment well   Assessment   Treatment Assessment pt engages in 60 minute skilled OT session focusing on self care tasks, SB transfers and core strengthening/sitting EOM  see above for full func details  pt demo's slight improvement with SB transfers this date at  Ax1 with 2nd person only providing equipment stabilization--pt level assist does fluctuate quickly throughout session depending upon fatigue  pt tolerates sitting EOM for approx 45 minutes focusing on scooting along edge of mat focusing on anterior weight shifting, buttocks clearance and overall endurance/strength  CS provided for sitting balance  recommend continued skilled care to focus on ADL retraining, func transfers, sitting maggi/bal, standing maggi/bal, func cog, safety, in order to decrease burden of care at d/c  Prognosis Fair   Problem List Decreased strength;Decreased range of motion;Decreased endurance; Impaired balance;Decreased mobility; Decreased coordination;Decreased cognition; Impaired judgement;Decreased safety awareness; Impaired sensation; Impaired tone   Barriers to Discharge Inaccessible home environment;Decreased caregiver support   Plan   Treatment/Interventions ADL retraining;Functional transfer training; Therapeutic exercise; Endurance training;Cognitive reorientation;Patient/family training;Equipment eval/education; Compensatory technique education   OT Therapy Minutes   OT Time In 0830   OT Time Out 0930   OT Total Time (minutes) 60   OT Mode of treatment - Individual (minutes) 60   OT Mode of treatment - Concurrent (minutes) 0   OT Mode of treatment - Group (minutes) 0   OT Mode of treatment - Co-treat (minutes) 0   OT Mode of Treatment - Total time(minutes) 60 minutes   OT Cumulative Minutes 690   Therapy Time missed   Time missed? No

## 2022-05-18 NOTE — PLAN OF CARE
Problem: Potential for Falls  Goal: Patient will remain free of falls  Description: INTERVENTIONS:  - Educate patient/family on patient safety including physical limitations  - Instruct patient to call for assistance with activity   - Consult OT/PT to assist with strengthening/mobility   - Keep Call bell within reach  - Keep bed low and locked with side rails adjusted as appropriate  - Keep care items and personal belongings within reach  - Initiate and maintain comfort rounds  - Make Fall Risk Sign visible to staff  - Offer Toileting every Hours, in advance of need  - Initiate/Maintainlarm  - Obtain necessary fall risk management equipment:   - Apply yellow socks and bracelet for high fall risk patients  - Consider moving patient to room near nurses station  Outcome: Progressing     Problem: Prexisting or High Potential for Compromised Skin Integrity  Goal: Skin integrity is maintained or improved  Description: INTERVENTIONS:  - Identify patients at risk for skin breakdown  - Assess and monitor skin integrity  - Assess and monitor nutrition and hydration status  - Monitor labs   - Assess for incontinence   - Turn and reposition patient  - Assist with mobility/ambulation  - Relieve pressure over bony prominences  - Avoid friction and shearing  - Provide appropriate hygiene as needed including keeping skin clean and dry  - Evaluate need for skin moisturizer/barrier cream  - Collaborate with interdisciplinary team   - Patient/family teaching  - Consider wound care consult   Outcome: Progressing     Problem: PAIN - ADULT  Goal: Verbalizes/displays adequate comfort level or baseline comfort level  Description: Interventions:  - Encourage patient to monitor pain and request assistance  - Assess pain using appropriate pain scale  - Administer analgesics based on type and severity of pain and evaluate response  - Implement non-pharmacological measures as appropriate and evaluate response  - Consider cultural and social influences on pain and pain management  - Notify physician/advanced practitioner if interventions unsuccessful or patient reports new pain  Outcome: Progressing     Problem: INFECTION - ADULT  Goal: Absence or prevention of progression during hospitalization  Description: INTERVENTIONS:  - Assess and monitor for signs and symptoms of infection  - Monitor lab/diagnostic results  - Monitor all insertion sites, i e  indwelling lines, tubes, and drains  - Monitor endotracheal if appropriate and nasal secretions for changes in amount and color  - [place] appropriate cooling/warming therapies per order  - Administer medications as ordered  - Instruct and encourage patient and family to use good hand hygiene technique  - Identify and instruct in appropriate isolation precautions for identified infection/condition  Outcome: Progressing     Problem: SAFETY ADULT  Goal: Patient will remain free of falls  Description: INTERVENTIONS:  - Educate patient/family on patient safety including physical limitations  - Instruct patient to call for assistance with activity   - Consult OT/PT to assist with strengthening/mobility   - Keep Call bell within reach  - Keep bed low and locked with side rails adjusted as appropriate  - Keep care items and personal belongings within reach  - Initiate and maintain comfort rounds  - Make Fall Risk Sign visible to staff  - Offer Toileting everyHours, in advance of need  - Initiate/[place]  - Obtain necessary fall risk management equipment:   - Apply yellow socks and bracelet for high fall risk patients  - Consider moving patient to room near nurses station  Outcome: Progressing  Goal: Maintain or return to baseline ADL function  Description: INTERVENTIONS:  -  Assess patient's ability to carry out ADLs; assess patient's baseline for ADL function and identify physical deficits which impact ability to perform ADLs (bathing, care of mouth/teeth, toileting, grooming, dressing, etc )  - Assess/evaluate cause of self-care deficits   - Assess range of motion  - Assess patient's mobility; develop plan if impaired  - Assess patient's need for assistive devices and provide as appropriate  - Encourage maximum independence but intervene and supervise when necessary  - Involve family in performance of ADLs  - Assess for home care needs following discharge   - Consider OT consult to assist with ADL evaluation and planning for discharge  - Provide patient education as appropriate  Outcome: Progressing  Goal: Maintains/Returns to pre admission functional level  Description: INTERVENTIONS:  - Perform BMAT or MOVE assessment daily    - Set and communicate daily mobility goal to care team and patient/family/caregiver     - Collaborate with rehabilitation services on mobility goals if consulted  - Perform Range of  - Out of bed for toileting  - Record patient progress and toleration of activity level   Outcome: Progressing     Problem: DISCHARGE PLANNING  Goal: Discharge to home or other facility with appropriate resources  Description: INTERVENTIONS:  - Identify barriers to discharge w/patient and caregiver  - Arrange for needed discharge resources and transportation as appropriate  - Identify discharge learning needs (meds, wound care, etc )  - Arrange for interpretive services to assist at discharge as needed  - Refer to Case Management Department for coordinating discharge planning if the patient needs post-hospital services based on physician/advanced practitioner order or complex needs related to functional status, cognitive ability, or social support system  Outcome: Progressing     Problem: MOBILITY - ADULT  Goal: Maintain or return to baseline ADL function  Description: INTERVENTIONS:  -  Assess patient's ability to carry out ADLs; assess patient's baseline for ADL function and identify physical deficits which impact ability to perform ADLs (bathing, care of mouth/teeth, toileting, grooming, dressing, etc )  - Assess/evaluate cause of self-care deficits   - Assess range of motion  - Assess patient's mobility; develop plan if impaired  - Assess patient's need for assistive devices and provide as appropriate  - Encourage maximum independence but intervene and supervise when necessary  - Involve family in performance of ADLs  - Assess for home care needs following discharge   - Consider OT consult to assist with ADL evaluation and planning for discharge  - Provide patient education as appropriate  Outcome: Progressing  Goal: Maintains/Returns to pre admission functional level  Description: INTERVENTIONS:  - Perform BMAT or MOVE assessment daily    - Set and communicate daily mobility goal to care team and patient/family/caregiver     - Collaborate with rehabilitation services on mobility goals if consulted  - Perform Range of M  - Out of bed for toileting  - Record patient progress and toleration of activity level   Outcome: Progressing

## 2022-05-18 NOTE — PROGRESS NOTES
05/18/22 1100   Pain Assessment   Pain Assessment Tool 0-10   Pain Score No Pain   Restrictions/Precautions   Precautions Bed/chair alarms;Cognitive; Fall Risk;Contact/isolation;Supervision on toilet/commode   Weight Bearing Restrictions No   ROM Restrictions No   Bed-Chair Transfer   Type of Assistance Needed Physical assistance   Physical Assistance Level Total assistance   Comment Mod Ax1; 2nd person stabilizing SB; pt demo's sustained level of assist from earlier AM session   Chair/Bed-to-Chair Transfer CARE Score 1   Exercise Tools   Theraputty Pt with limited recall of theraputty exercises so reviewed gripping, pinching, finger extension and lateral pinching using red theraputty, except for finger ext in which she uses yellow theraputty  pt completes bilaterally to inc overall strength and coordination for ADLs/IADLs  Cognition   Overall Cognitive Status Impaired   Arousal/Participation Cooperative   Attention Attends with cues to redirect   Orientation Level Oriented X4   Memory Decreased recall of precautions   Following Commands Follows one step commands with increased time or repetition   Activity Tolerance   Activity Tolerance Patient tolerated treatment well   Assessment   Treatment Assessment pt engages in brief 30 minute skilled OT session focusing on bed mobility, SB transfer and theraputty exercises  see above for full func details  pt reports getting OOB on L side PTA so completed this date with [facility] elevated and use of bed rail, still remains max assist however pt better able to use R UE when pushing off bed rail  of note, pt remains consistent with level of assist for SB transfers this date from earlier AM session to this session indicating slightly improved activity tolerance, strength and endurance  recommend continued skilled care to focus on ADL retraining, func transfers, sitting maggi/bal, standing maggi/bal, core strengthening, in order to decrease burden of care at d/c     Prognosis Fair Problem List Decreased strength;Decreased range of motion;Decreased endurance; Impaired balance;Decreased mobility; Decreased coordination;Decreased cognition; Impaired judgement;Decreased safety awareness; Impaired sensation; Impaired tone   Barriers to Discharge Inaccessible home environment;Decreased caregiver support   Plan   Treatment/Interventions ADL retraining;Functional transfer training; Therapeutic exercise; Endurance training;Cognitive reorientation;Patient/family training;Equipment eval/education; Compensatory technique education   OT Therapy Minutes   OT Time In 1100   OT Time Out 1130   OT Total Time (minutes) 30   OT Mode of treatment - Individual (minutes) 30   OT Mode of treatment - Concurrent (minutes) 0   OT Mode of treatment - Group (minutes) 0   OT Mode of treatment - Co-treat (minutes) 0   OT Mode of Treatment - Total time(minutes) 30 minutes   OT Cumulative Minutes 720   Therapy Time missed   Time missed?  No   1100

## 2022-05-19 PROCEDURE — 97530 THERAPEUTIC ACTIVITIES: CPT

## 2022-05-19 PROCEDURE — 99232 SBSQ HOSP IP/OBS MODERATE 35: CPT | Performed by: INTERNAL MEDICINE

## 2022-05-19 PROCEDURE — 97110 THERAPEUTIC EXERCISES: CPT

## 2022-05-19 PROCEDURE — 97116 GAIT TRAINING THERAPY: CPT

## 2022-05-19 PROCEDURE — 97535 SELF CARE MNGMENT TRAINING: CPT

## 2022-05-19 PROCEDURE — 99232 SBSQ HOSP IP/OBS MODERATE 35: CPT | Performed by: PHYSICAL MEDICINE & REHABILITATION

## 2022-05-19 RX ADMIN — ENOXAPARIN SODIUM 40 MG: 40 INJECTION SUBCUTANEOUS at 10:22

## 2022-05-19 RX ADMIN — Medication 2000 UNITS: at 10:18

## 2022-05-19 RX ADMIN — BACLOFEN 10 MG: 10 TABLET ORAL at 10:19

## 2022-05-19 RX ADMIN — METHENAMINE HIPPURATE 1 G: 1 TABLET ORAL at 18:38

## 2022-05-19 RX ADMIN — METHENAMINE HIPPURATE 1 G: 1 TABLET ORAL at 10:20

## 2022-05-19 RX ADMIN — BACLOFEN 10 MG: 10 TABLET ORAL at 21:31

## 2022-05-19 RX ADMIN — AMLODIPINE BESYLATE 2.5 MG: 2.5 TABLET ORAL at 10:19

## 2022-05-19 RX ADMIN — AMLODIPINE BESYLATE 2.5 MG: 2.5 TABLET ORAL at 21:31

## 2022-05-19 RX ADMIN — ACETAMINOPHEN 650 MG: 325 TABLET, FILM COATED ORAL at 14:59

## 2022-05-19 RX ADMIN — DALFAMPRIDINE 10 MG: 10 TABLET, EXTENDED RELEASE ORAL at 10:27

## 2022-05-19 RX ADMIN — DALFAMPRIDINE 10 MG: 10 TABLET, EXTENDED RELEASE ORAL at 21:31

## 2022-05-19 RX ADMIN — BACLOFEN 10 MG: 10 TABLET ORAL at 15:00

## 2022-05-19 NOTE — PROGRESS NOTES
05/19/22 1000   Pain Assessment   Pain Assessment Tool 0-10   Pain Score No Pain   Restrictions/Precautions   Precautions Bed/chair alarms;Cognitive; Fall Risk; [place]; Supervision on toilet/commode  (suprapubic catheter)   Weight Bearing Restrictions No   ROM Restrictions No   Eating   Type of Assistance Needed Independent   Physical Assistance Level No physical assistance   Eating CARE Score 6   Oral Hygiene   Type of Assistance Needed Supervision   Physical Assistance Level No physical assistance   Comment seated in w/c at sink   Oral Hygiene CARE Score 4   Shower/Bathe Self   Type of Assistance Needed Physical assistance   Physical Assistance Level Total assistance   Comment pt completes sponge bathing routine seated in w/c at sink bathing UB at supervision/set up level; LB bathing pt able to bathe upper legs, lower legs to calves requiring assist for lower legs/feet  Ax2 in stance for stacey/rear hygiene     Shower/Bathe Self CARE Score 1   Upper Body Dressing   Type of Assistance Needed Physical assistance   Physical Assistance Level 51%-75%   Comment pt don sports bra with minimal assist to untwist strapping; dons shirt requiring assist to pull down over trunk   Upper Body Dressing CARE Score 2   Lower Body Dressing   Type of Assistance Needed Physical assistance   Physical Assistance Level Total assistance   Comment total assist to thread pull up brief and pants over feet, Ax2 in stance for balance, CM   Lower Body Dressing CARE Score 1   Putting On/Taking Off Footwear   Type of Assistance Needed Physical assistance   Physical Assistance Level Total assistance   Comment for TEDS, socks and shoes   Putting On/Taking Off Footwear CARE Score 1   Sit to Stand   Type of Assistance Needed Physical assistance   Physical Assistance Level 76% or more   Comment mod A x1 at best, however quickly fatigues and requires max A x1 for most of STS transfers especially for stand to sit transfer as pt with limited to no control of decend  Sit to Stand CARE Score 2   Bed-Chair Transfer   Type of Assistance Needed Physical assistance   Physical Assistance Level Total assistance   Comment Mod Ax1 and SBA x1 for SPT with RW to raised EOB, w/c   Chair/Bed-to-Chair Transfer CARE Score 1   Cognition   Overall Cognitive Status Impaired   Arousal/Participation Alert; Cooperative   Attention Attends with cues to redirect   Orientation Level Oriented X4   Memory Decreased recall of precautions   Following Commands Follows one step commands with increased time or repetition   Activity Tolerance   Activity Tolerance Patient tolerated treatment well   Assessment   Treatment Assessment pt engages in 90 minute skilled OT session focusing on ADL routine seated in w/c at sink, func transfers with RW and standing maggi/bal  see above for full func details  despite no significant change in CARE scores, pt demo's improved ability to perform ADL routine progressing from bed level to seated in w/c at sink and standing at sink during hygiene/CM  pt continues to require Ax2 when in stance due to general weakness and inc fatigue, however demo's progress with transfers as well progressing to STS transfers with RW and SPT with RW--Ax2 for safety  recommend continued skilled care to focus on ADL retraining with possible SHOWER in upcoming sessions, func transfers with LRAD, standing maggi/bal, core strengthening, in order to decrease burden of care at d/c  Prognosis Fair   Problem List Decreased strength;Decreased range of motion;Decreased endurance; Impaired balance;Decreased mobility; Decreased coordination;Decreased cognition; Impaired judgement;Decreased safety awareness; Impaired sensation; Impaired tone   Barriers to Discharge Inaccessible home environment;Decreased caregiver support   Plan   Treatment/Interventions ADL retraining;Functional transfer training; Therapeutic exercise; Endurance training;Cognitive reorientation;Patient/family training;Equipment eval/education; Compensatory technique education   OT Therapy Minutes   OT Time In 1000   OT Time Out 1130   OT Total Time (minutes) 90   OT Mode of treatment - Individual (minutes) 90   OT Mode of treatment - Concurrent (minutes) 0   OT Mode of treatment - Group (minutes) 0   OT Mode of treatment - Co-treat (minutes) 0   OT Mode of Treatment - Total time(minutes) 90 minutes   OT Cumulative Minutes 865   Therapy Time missed   Time missed?  No

## 2022-05-19 NOTE — PLAN OF CARE
Problem: Potential for Falls  Goal: Patient will remain free of falls  Description: INTERVENTIONS:  - Educate patient/family on patient safety including physical limitations  - Instruct patient to call for assistance with activity   - Consult OT/PT to assist with strengthening/mobility   - Keep Call bell within reach  - Keep bed low and locked with side rails adjusted as appropriate  - Keep care items and personal belongings within reach  - Initiate and maintain comfort rounds  - Make Fall Risk Sign visible to staff  - Offer Toileting every Hours, in advance of need  - Initiate/Maintain alarm  - Obtain necessary fall risk management equipment:   - Apply yellow socks and bracelet for high fall risk patients  - Consider moving patient to room near nurses station  Outcome: Progressing     Problem: Prexisting or High Potential for Compromised Skin Integrity  Goal: Skin integrity is maintained or improved  Description: INTERVENTIONS:  - Identify patients at risk for skin breakdown  - Assess and monitor skin integrity  - Assess and monitor nutrition and hydration status  - Monitor labs   - Assess for incontinence   - Turn and reposition patient  - Assist with mobility/ambulation  - Relieve pressure over bony prominences  - Avoid friction and shearing  - Provide appropriate hygiene as needed including keeping skin clean and dry  - Evaluate need for skin moisturizer/barrier cream  - Collaborate with interdisciplinary team   - Patient/family teaching  - Consider wound care consult   Outcome: Progressing     Problem: PAIN - ADULT  Goal: Verbalizes/displays adequate comfort level or baseline comfort level  Description: Interventions:  - Encourage patient to monitor pain and request assistance  - Assess pain using appropriate pain scale  - Administer analgesics based on type and severity of pain and evaluate response  - Implement non-pharmacological measures as appropriate and evaluate response  - Consider cultural and social influences on pain and pain management  - Notify physician/advanced practitioner if interventions unsuccessful or patient reports new pain  Outcome: Progressing     Problem: INFECTION - ADULT  Goal: Absence or prevention of progression during hospitalization  Description: INTERVENTIONS:  - Assess and monitor for signs and symptoms of infection  - Monitor lab/diagnostic results  - Monitor all insertion sites, i e  indwelling lines, tubes, and drains  - Monitor endotracheal if appropriate and nasal secretions for changes in amount and color  - [place] appropriate cooling/warming therapies per order  - Administer medications as ordered  - Instruct and encourage patient and family to use good hand hygiene technique  - Identify and instruct in appropriate isolation precautions for identified infection/condition  Outcome: Progressing     Problem: SAFETY ADULT  Goal: Patient will remain free of falls  Description: INTERVENTIONS:  - Educate patient/family on patient safety including physical limitations  - Instruct patient to call for assistance with activity   - Consult OT/PT to assist with strengthening/mobility   - Keep Call bell within reach  - Keep bed low and locked with side rails adjusted as appropriate  - Keep care items and personal belongings within reach  - Initiate and maintain comfort rounds  - Make Fall Risk Sign visible to staff  - Offer Toileting every  Hours, in advance of need  - Initiate/Maintain alarm  - Obtain necessary fall risk management equipment:   - Apply yellow socks and bracelet for high fall risk patients  - Consider moving patient to room near nurses station  Outcome: Progressing  Goal: Maintain or return to baseline ADL function  Description: INTERVENTIONS:  -  Assess patient's ability to carry out ADLs; assess patient's baseline for ADL function and identify physical deficits which impact ability to perform ADLs (bathing, care of mouth/teeth, toileting, grooming, dressing, etc )  - Assess/evaluate cause of self-care deficits   - Assess range of motion  - Assess patient's mobility; develop plan if impaired  - Assess patient's need for assistive devices and provide as appropriate  - Encourage maximum independence but intervene and supervise when necessary  - Involve family in performance of ADLs  - Assess for home care needs following discharge   - Consider OT consult to assist with ADL evaluation and planning for discharge  - Provide patient education as appropriate  Outcome: Progressing  Goal: Maintains/Returns to pre admission functional level  Description: INTERVENTIONS:  - Perform BMAT or MOVE assessment daily    - Set and communicate daily mobility goal to care team and patient/family/caregiver     - Collaborate with rehabilitation services on mobility goals if consulted  - Perform Range of Mo  - Out of bed for toileting  - Record patient progress and toleration of activity level   Outcome: Progressing     Problem: DISCHARGE PLANNING  Goal: Discharge to home or other facility with appropriate resources  Description: INTERVENTIONS:  - Identify barriers to discharge w/patient and caregiver  - Arrange for needed discharge resources and transportation as appropriate  - Identify discharge learning needs (meds, wound care, etc )  - Arrange for interpretive services to assist at discharge as needed  - Refer to Case Management Department for coordinating discharge planning if the patient needs post-hospital services based on physician/advanced practitioner order or complex needs related to functional status, cognitive ability, or social support system  Outcome: Progressing     Problem: MOBILITY - ADULT  Goal: Maintain or return to baseline ADL function  Description: INTERVENTIONS:  -  Assess patient's ability to carry out ADLs; assess patient's baseline for ADL function and identify physical deficits which impact ability to perform ADLs (bathing, care of mouth/teeth, toileting, grooming, dressing, etc )  - Assess/evaluate cause of self-care deficits   - Assess range of motion  - Assess patient's mobility; develop plan if impaired  - Assess patient's need for assistive devices and provide as appropriate  - Encourage maximum independence but intervene and supervise when necessary  - Involve family in performance of ADLs  - Assess for home care needs following discharge   - Consider OT consult to assist with ADL evaluation and planning for discharge  - Provide patient education as appropriate  Outcome: Prog  Goal: Maintains/Returns to pre admission functional level  Description: INTERVENTIONS:  - Perform BMAT or MOVE assessment daily    - Set and communicate daily mobility goal to care team and patient/family/caregiver     - Collaborate with rehabilitation services on mobility goals if consulted  - Perform Range o  - Out of bed for toileting  - Record patient progress and toleration of activity level   Outcome: Progressing

## 2022-05-19 NOTE — PROGRESS NOTES
05/19/22 0900   Pain Assessment   Pain Assessment Tool 0-10   Restrictions/Precautions   Precautions Fall Risk;Cognitive;Bed/chair alarms;Supervision on toilet/commode; Bronson  (suprapubic)   Cognition   Overall Cognitive Status Impaired   Arousal/Participation Alert; Cooperative   Roll Left and Right   Type of Assistance Needed Physical assistance;Verbal cues; Adaptive equipment   Physical Assistance Level 26%-50%   Comment HOB elevated per pt preference (has adjustable bed at home) , L rail  PNF passive to active assist D1 pattern to break extensor tone pre transfer    Roll Left and Right CARE Score 3   Lying to Sitting on Side of Bed   Type of Assistance Needed Physical assistance;Verbal cues; Adaptive equipment   Physical Assistance Level 76% or more   Comment assist on trunk and to fully position LE EOB with L rail   Lying to Sitting on Side of Bed CARE Score 2   Sit to Stand   Type of Assistance Needed Physical assistance;Verbal cues; Adaptive equipment   Physical Assistance Level Total assistance   Comment 2 person assist (max-min of 2) with RW x 1 min and 40 secs standing tolerance   Sit to Stand CARE Score 1   Bed-Chair Transfer   Type of Assistance Needed Physical assistance;Verbal cues; Adaptive equipment   Physical Assistance Level Total assistance   Comment mod of 1 26 5 inches bed to w/c with slide board , SBA of 2nd person   Chair/Bed-to-Chair Transfer CARE Score 1   Walk 10 Feet   Type of Assistance Needed Physical assistance;Verbal cues; Adaptive equipment   Physical Assistance Level Total assistance   Comment 10' with RW mod of 1 with CF of 2nd person   Walk 10 Feet CARE Score 1   Assessment   Treatment Assessment skilled PT worked on repeated transfer training using slide board and RW and gait training  pt cont to require inc physical assist of 2 person when mobilizing using a RW singh with sit to stand transition    Pt confirmed having all DME at home except for slide board and if needed at d/c she will purchase online for she thinks insurance will not cover due to previous approved DME  Barriers to Discharge Inaccessible home environment;Decreased caregiver support   PT Barriers   Functional Limitation Car transfers; Ramp negotiation;Standing;Transfers;Stair negotiation; Walking; Wheelchair management   Plan   Treatment/Interventions Functional transfer training; Therapeutic exercise;Gait training;Bed mobility;Spoke to nursing;OT;Spoke to case management   Progress Progressing toward goals   Recommendation   PT Discharge Recommendation Home with home health rehabilitation   Equipment Recommended   (pt has all DME inc RW, will purchase SB if needed at d/c)   PT - OK to Discharge No   PT Therapy Minutes   PT Time In 0900   PT Time Out 0930   PT Total Time (minutes) 30   PT Mode of treatment - Individual (minutes) 30   PT Mode of treatment - Concurrent (minutes) 0   PT Mode of treatment - Group (minutes) 0   PT Mode of treatment - Co-treat (minutes) 0   PT Mode of Treatment - Total time(minutes) 30 minutes   PT Cumulative Minutes 485

## 2022-05-19 NOTE — PROGRESS NOTES
05/19/22 1210   Pain Assessment   Pain Assessment Tool 0-10   Pain Score No Pain   Restrictions/Precautions   Precautions Bed/chair alarms;Cognitive; Fall Risk; Bronson; Supervision on toilet/commode   Weight Bearing Restrictions No   ROM Restrictions No   Lower Body Dressing   Type of Assistance Needed Physical assistance   Physical Assistance Level Total assistance   Comment TA to doff soiled brief and pants while seated on PF BSC; TA to don new brief and hospital pants while seated on PF BSC  Lower Body Dressing CARE Score 1   Putting On/Taking Off Footwear   Type of Assistance Needed Physical assistance   Physical Assistance Level Total assistance   Comment TA to doff/don B/L sneakers   Putting On/Taking Off Footwear CARE Score 1   Sit to Stand   Type of Assistance Needed Physical assistance; Adaptive equipment   Physical Assistance Level Total assistance   Comment A x2 STS with RW; Utilized gaitbelt   Sit to Stand CARE Score 1   Toileting Hygiene   Type of Assistance Needed Physical assistance; Adaptive equipment   Physical Assistance Level Total assistance   Comment A x2 in stance at RW for CM down/up and to complete thorough stacey/rear hygiene; Majority of rear hygiene performed while seated on PF BSC with pt completing fwd wt shift, req A x1 for support and A x1 to complete hygiene task  Toileting Hygiene CARE Score 1   Toilet Transfer   Type of Assistance Needed Physical assistance; Adaptive equipment;Verbal cues   Physical Assistance Level Total assistance   Comment A x2 SBT W/C <> drop arm PF BSC with cues for proper hand placement and technique  Assist to reposition buttocks back onto commode, as pt initially observed sliding to edge of commode seat  Cues to maintain an upright position to prevent buttocks from sliding fwd  Toilet Transfer CARE Score 1   Cognition   Overall Cognitive Status Impaired   Arousal/Participation Alert; Cooperative   Attention Attends with cues to redirect   Orientation Level Oriented X4   Memory Decreased recall of precautions   Following Commands Follows one step commands with increased time or repetition   Activity Tolerance   Activity Tolerance Patient tolerated treatment well   Assessment   Treatment Assessment Pt participated in brief 30 min skilled OT Tx session with focus on ADL of toileting  See above for further Tx details  Pt currently req A x2 to complete SBT W/C <> drop arm PF BSC  Rear hygiene initiated seated on BSC, however, A x2 in stance at [facility] for CM and thoroughness of hygiene  Pt appearing fatigued, req x3 attempts to achieve STS for CM up over hips  Discussed plan to continue engaging in ADL of toileting with focus on improving IND with ADL of toileting and to dec caregiver burden upon D/C  Pt receptive  Pt would continue to benefit from skilled OT services to improve standing tolerance, improve fxnls tanding balance, improve IND with SBT's, and increase IND with ADL performance in order to progress towards OT goals and dec caregiver burden upon D/C  Prognosis Fair   Problem List Decreased strength;Decreased range of motion;Decreased endurance; Impaired balance;Decreased mobility; Decreased coordination;Decreased cognition; Impaired judgement;Decreased safety awareness; Impaired tone; Impaired sensation   Barriers to Discharge Inaccessible home environment;Decreased caregiver support   Plan   Treatment/Interventions ADL retraining;Functional transfer training; Therapeutic exercise; Endurance training;Patient/family training   Progress Progressing toward goals   Recommendation   OT Discharge Recommendation   (Pending progress)   Equipment Recommended   (TBD - Pt reports having drop arm PF BSC)   OT - OK to Discharge No   OT Therapy Minutes   OT Time In 1210   OT Time Out 1240   OT Total Time (minutes) 30   OT Mode of treatment - Individual (minutes) 30   OT Mode of treatment - Concurrent (minutes) 0   OT Mode of treatment - Group (minutes) 0   OT Mode of treatment - Co-treat (minutes) 0   OT Mode of Treatment - Total time(minutes) 30 minutes   OT Cumulative Minutes 805   Therapy Time missed   Time missed?  No

## 2022-05-19 NOTE — PROGRESS NOTES
05/19/22 1440   Pain Assessment   Pain Assessment Tool 0-10   Pain Score 4   Pain Location/Orientation Orientation: Bilateral;Location: Back;Orientation: Lower   Pain Onset/Description Onset: Ongoing; Descriptor: Sore;Frequency: Constant/Continuous   Hospital Pain Intervention(s) Repositioned;Cold applied; Other (Comment)  (RN notified and medicated pt with tylenol and baclofen)   Restrictions/Precautions   Precautions Bed/chair alarms;Cognitive; Fall Risk; Bronson; Supervision on toilet/commode;Contact/isolation   Cognition   Arousal/Participation Alert; Cooperative   Attention Attends with cues to redirect   Subjective   Subjective pt reported soreness across LB so RN was notified and pt was medicated  Sit to Stand   Type of Assistance Needed Physical assistance;Verbal cues; Adaptive equipment   Physical Assistance Level Total assistance   Comment max of 1 x 4 reps pulling on bedrail x 30 secs to 1 min standing tolerance to practicefor furniture swap technique for toileting - OT notified to reassess tomorrow but pt requires mod-max of 2 sit to stand with RW x 4 reps - PT also gave pt a narrow w/c with full arm length and ROHO cushion to facilitate better hand placement with sit to stand transfer   Sit to Stand CARE Score 1   Bed-Chair Transfer   Type of Assistance Needed Physical assistance;Verbal cues; Adaptive equipment   Physical Assistance Level Total assistance   Comment mod of 1 with SBA of 2nd person for safety with RW   Chair/Bed-to-Chair Transfer CARE Score 1   Walk 10 Feet   Type of Assistance Needed Physical assistance;Verbal cues; Adaptive equipment   Physical Assistance Level Total assistance   Comment 10' with RW   Walk 10 Feet CARE Score 1   Walk 50 Feet with Two Turns   Type of Assistance Needed Physical assistance;Verbal cues; Adaptive equipment   Physical Assistance Level Total assistance   Comment 60 with RW mod of 1 with CG/SBA of 2nd person while doing CF   Walk 50 Feet with Two Turns CARE Score 1 Picking Up Object   Type of Assistance Needed Physical assistance;Verbal cues; Adaptive equipment   Physical Assistance Level 76% or more   Comment max of 1 with reacher and walker support able to picked up small bottle off the floor using R hand   Picking Up Object CARE Score 2   Toilet Transfer   Comment recommended to Daryle Birchwood to trial furniture swap technique to toileting tomorrow if safe and appropriate for nursing to complete with 2 person   Therapeutic Interventions   Strengthening seated TE include bilat hip flexion, LAQ, ankle DF x 10 reps x 2 sets, Hamstring curls with yellow tband x 10 reps x 2 sets, ball hip adduction squeezes x 5 SH x 10 reps x 2 sets   Assessment   Treatment Assessment skilled PT focused on transfer and gait training using a RW  pt able to inc amb distance to 60' at best however cont to demo poor control and dec indep with sit to stand transition due to ongoing strength deficits, dec confidence with fwd weight shifting requiring 2 person assist consistently to complete except when pt pulls up on a stable surface  Pt to ask  to re-measure bed height for she thinks he made a mistake as well as measure car seat height with SUV car  PT to cont with strengthening, sitting/standing balance training and functional mobility training using slide board and gait training with RW as tolerated to improve overall functions and dec caregiver burden  Barriers to Discharge Inaccessible home environment;Decreased caregiver support   PT Barriers   Functional Limitation Car transfers;Stair negotiation;Ramp negotiation;Standing;Transfers; Walking; Wheelchair management   Plan   Treatment/Interventions Functional transfer training;LE strengthening/ROM; Therapeutic exercise; Endurance training;Gait training;Spoke to nursing;OT   Progress Progressing toward goals   Recommendation   PT Discharge Recommendation Home with home health rehabilitation   PT Therapy Minutes   PT Time In 1440   PT Time Out 1540   PT Total Time (minutes) 60   PT Mode of treatment - Individual (minutes) 60   PT Mode of treatment - Concurrent (minutes) 0   PT Mode of treatment - Group (minutes) 0   PT Mode of treatment - Co-treat (minutes) 0   PT Mode of Treatment - Total time(minutes) 60 minutes   PT Cumulative Minutes 825

## 2022-05-19 NOTE — PROGRESS NOTES
Physical Medicine and Rehabilitation Progress Note  Rima Mora 73 y o  female MRN: [id number]  Unit/Bed#: [facility] 459-01 Encounter: [id number]              HPI: Patient is a 72 yo female with h/o MS & neurogenic bladder with urinary retention s/p sacral neuromodulator device placement however ultimately requiring chronic indwelling nixon catheter however had recurrent UTIs and therefore underwent suprapubic catheter placement on 4/26 by IR however on 5/5 presented with generalized weakness and chills and was found to have UTI which was treated first with IV abx then eventually transitioned to oral abx in acute care and transferred to acute inpt rehab on 5/9    Chief Complaint: UTI     Subjective: patient seen at bedside and was comfortable     ROS: A 10 point ROS was performed; negative except as noted above       Assessment/Plan:    UTI: h/o recurrent UTI's in the setting of neurogenic bladder due to MS with chronic indwelling nixon s/p suprapubic catheter placement by IR on 4/26; transitioned from IV abx to PO keflex 500 mg Q6 in acute care with recommendation by acute care hospitalist that patient continue abx until 5/12 which is now completed (of note UCx of 5/5/22 in addition to growing klebsiella also grew candida, however per d/w Dr Theta Mcburney of ID candida growth in the urine cx can occur in those who are repeatedly treated with abx for recurrent UTI and does not typically require treatment and patient has improved with abx treatment of klebsiella therefore he does not recommend treatment of the candida at this time); per d/w Neptali Nguyễn PA-C (whom patient follows up with as OP from a urology perspective) given that patient had UTI after placement of suprapubic cath placement Neptali gNuyễn PA-C recommend patient stop home nitrofurantoin 100 mg qd and switch to hiprex 1 gm PO BID which has been done (d/w pharmacy and confirmed with pharmacist that hiprex 1 gm PO BID is acceptable with patients hepatic and renal function)     Blood in stool: seen by GI who suspects this is 2/2 to hemorrhoids and recommended OP colonoscopy & did clear patient to continue pharmacologic DVT ppx, Hg currently WNL      MS: on home cholecalciferol 2000 units qd and home dalfampridine ER TB12 10 mg q12; she is on intrathecal methotrexate inj Q2 months and per patient next due in June; BLESSING virus positive in 2018; follows with Dr Sung Reyes as an OP      HTN: at home on norvasc 2 5 mg Q12, was on toprol XL and losartan in the past however patient's cardiologist, Dr Lia Glass, stopped the Toprol XL (per his OP note of 3/2021) and later her PCP stopped the losartan (per patient); management per IM      Dyslipidemia: per Dr Eloy Dukes OP note of 3/2021 he d/w patient starting statin however patient declined and preferred to address with lifestyle/dietary modifications      Elevated LV end diastolic pressure: follows with Dr Rylie Montana who at last OP visit of 3/2021 noted that patient was on losartan 25 mg qd for this however per patient provided home med list is no longer on losartan and per patient this was stopped by her PCP; OP FU with Dr Rylie Montana     weakness: chronic in the setting of MS but likely exacerbated by UTI, 2/5 B/L hip flexion, 3 to 4-/5 R dorsiflexion, 1-2/5 L dorsiflexion (multipodis ordered on L to prevent achilles tendon contracture), 4/5 UE B/L; OP FU with Dr Sung Reyes       Chronic lower extremity edema: follows with Dr Rylie Montana of cards as OP who recommend she use lasix prn and it appears she uses 40 mg on Mondays and Fridays as she states that this schedule appears to control the LE edema best (it tends to improve per Dr Naveed Garcia OP notes when she is consistent with use of compression stockings, therefore TEDs ordered for patient); OP FU with Dr Rylie Montana     Spasticity/neuropathic pain: in the setting of MS on home baclofen 10 mg TID, per patient provided home med list on baclofen 10 mg TID (not 20 mg TID as listed in OP notes and ER note) and is no longer on neurontin (as listed in OP notes and ER note) per patient provided home med list      Neurogenic bladder: chronic in the setting of MS; h/o of sacral neuromodulation device placement on 11/19/21 by Dr Juan Diez for urinary retention however ultimately required chronic indwelling nixon; h/o of recurrent UTIs with chronic indwelling nixon s/p suprapubic catheter placement on 4/26 by IR, at home on flomax 0 8 mg qpm however per d/w Joni Nguyen PA-C (whome patient follows with as OP from a urology perspective) now that patient has suprapubic catheter she no longer requires flomax therefore it was dc'd; OP FU with Joni Nguyen PA-C of urology scheduled for 5/31 for catheter change (and Dr Juan Diez of Urogyn)      Neurogenic bowel: chronic in the setting of MS, has occasional episodes of incontinence at home where she does not feel the stool coming, as episodes are not frequent and patient does not take anything at home will monitor off scheduled meds for now and use prn's      DVT ppx: lovenox     Note: unclear as to why patient is on ASA 81 mg qd at home (no h/o TIA/CVA/MI/CAD/PVD) and per d/w patient she began taking it on her own and was not specifically recommended to do so by her physicians therefore advised patient to hold off on taking asa until d/w her PCP      Note: blood cx of 5/5/22 x 2 finalized and no growth     Note: home med regimen confirmed with patient who provided a list      Incidental findings:     Elevated alk phos: mildly elevated at 156 ( however can be up to 147 by some lab standards) and appears to be chronic going back to at least 1/2018 per EMR lab records     Elevated B12 level: appears to be chronic going back to at least 4/2018 per EMR lab records and was 2,937 () on 2/1/22 likely due to B12 supplementation patient takes at home, which patient states she started on her own rather than at the recommendation of her doctors therefore advised to hold off B12 supplementation until she d/w her PCP      Arachnoid cyst: noted to cause cord flattening at the T6/7 levels on past imaging; OP FU with Dr Luis Hanley           Objective:    Functional Update:  Mobility: max x 2   Transfers: total x 2   ADLs: max-total       Physical Exam:    Vitals:    05/19/22 1324   BP: 110/62   Pulse: 62   Resp: 16   Temp: 98 °F (36 7 °C)   SpO2: 96%           General: alert, no apparent distress, cooperative and comfortable  HEENT:  Head: Normal, normocephalic, atraumatic    Eye: Normal external eye, conjunctiva, lidsc cornea  Ears: Normal external ears  Nose: Normal external nose, mucus membranes  CARDIAC:  +S1/2  LUNGS:  no abnormal respiratory pattern, no retractions noted, non-labored breathing   ABDOMEN:  + suprapubic catheter in place   EXTREMITIES:  no cyanosis or clubbing   NEURO:  awake, alert, apporpriately answering questions  PSYCH:  mood/affect currently stable

## 2022-05-19 NOTE — PLAN OF CARE
Problem: Potential for Falls  Goal: Patient will remain free of falls  Description: INTERVENTIONS:  - Educate patient/family on patient safety including physical limitations  - Instruct patient to call for assistance with activity   - Consult OT/PT to assist with strengthening/mobility   - Keep Call bell within reach  - Keep bed low and locked with side rails adjusted as appropriate  - Keep care items and personal belongings within reach  - Initiate and maintain comfort rounds  - Make Fall Risk Sign visible to staff  - Offer Toileting every  Hours, in advance of need  - Initiate/Maintain alarm  - Obtain necessary fall risk management equipment:   - Apply yellow socks and bracelet for high fall risk patients  - Consider moving patient to room near nurses station  Outcome: Progressing     Problem: Prexisting or High Potential for Compromised Skin Integrity  Goal: Skin integrity is maintained or improved  Description: INTERVENTIONS:  - Identify patients at risk for skin breakdown  - Assess and monitor skin integrity  - Assess and monitor nutrition and hydration status  - Monitor labs   - Assess for incontinence   - Turn and reposition patient  - Assist with mobility/ambulation  - Relieve pressure over bony prominences  - Avoid friction and shearing  - Provide appropriate hygiene as needed including keeping skin clean and dry  - Evaluate need for skin moisturizer/barrier cream  - Collaborate with interdisciplinary team   - Patient/family teaching  - Consider wound care consult   Outcome: Progressing

## 2022-05-19 NOTE — PROGRESS NOTES
Internal Medicine Progress Note  Patient: Valerie Baltazar  Age/sex: 73 y o  female  Medical Record #: [id number]      ASSESSMENT/PLAN: (Interval History)  Rima Abbasi is seen and examined and management for following issues:    Multiple Sclerosis  · She had a functional decline due to UTI  · Therapy per PMR  · Continue Baclofen to control spasticity   · She also takes dalfampridine ER 10mg 2x daily to improve walking speed      HTN  · Continue Amlodipine 2 5mg 2x daily  · She also takes Lasix 40mg twice weekly for LE edema  · Continue to monitor BP      Hemorrhoids  · D/t chronic constipation try to avoid  · Appreciate GI input  · No further testing unless drop in hgb and persistent maci blood  · Colonoscopy 4 years ago  · They recommend f/u with GI as outpatient    Neurogenic bowel and bladder  · SPC placed 4/26/22  · PMR managing neurogenic bowel management teaching  · No further need for flomax since now with [facility]      Anemia  · Review of previous labs revealed Hgb in the 12-13 range  · stable     Leukopenia  · resolved     Vulvar itching  · Cont Zana cream BID 5/14/22      The above assessment and plan was reviewed and updated as determined by my evaluation of the patient on 5/19/2022      Labs:   Results from last 7 days   Lab Units 05/16/22  1204   WBC Thousand/uL 4 63   HEMOGLOBIN g/dL 13 0   HEMATOCRIT % 40 8   PLATELETS Thousands/uL 363           Invalid input(s): ISABELL ACEVEDO                Review of Scheduled Meds:  Current Facility-Administered Medications   Medication Dose Route Frequency Provider Last Rate    acetaminophen  650 mg Oral Q6H PRN Torey Gardiner MD      amLODIPine  2 5 mg Oral Q12H Harry Castillo MD      baclofen  10 mg Oral TID Harry Castillo MD      cholecalciferol  2,000 Units Oral Daily Harry Castillo MD      Dalfampridine ER  10 mg Oral Q12H Uday Mack MD      enoxaparin  40 mg Subcutaneous Daily Harry Castillo MD      furosemide  40 mg Oral Once per day on Mon Fri Adrien Nevarez MD      methenamine hippurate  1 g Oral BID Adrien Nevarez MD     Clau Horne Alston ANTIFUNGAL   Topical BID COLIN Nowak      polyethylene glycol  17 g Oral Daily PRN Adrien Nevarez MD         Subjective/ HPI: Patient seen and examined  Patients overnight issues or events were reviewed with nursing or staff during rounds or morning huddle session  New or overnight issues include the following:     Pt seen in her room  She states she is doing well  She is looking forward to DC next week  She denies any current complaints  ROS:   A 10 point ROS was performed; negative except as noted above  Imaging:     No orders to display       *Labs /Radiology studies reviewed  *Medications reviewed and reconciled as needed  *Please refer to order section for additional ordered labs studies  *Case discussed with primary attending during morning huddle case rounds    Physical Examination:  Vitals:   Vitals:    05/18/22 1038 05/18/22 1558 05/18/22 2132 05/19/22 0522   BP: 140/60 141/69 121/56 108/56   BP Location:  Right arm Right arm Left arm   Pulse:  85 64 63   Resp:  18 17 18   Temp:  97 5 °F (36 4 °C) 97 9 °F (36 6 °C) 97 5 °F (36 4 °C)   TempSrc:  Oral Oral Axillary   SpO2:  100% 96% 96%   Weight:       Height:         GEN: No apparent distress, interactive  NEURO: Alert and oriented x3; STM loss noted  HEENT: Pupils are equal and reactive, EOMI, mucous membranes are moist, face symmetrical  CV: S1 S2 regular, no MRG, no peripheral edema noted  RESP: Lungs are clear bilaterally, no wheezes, rales or rhonchi noted, on room air, respirations easy and non labored  GI: Flat, soft non tender, non distended; +BS x4  : Voiding without difficulty  MUSC: Moves all extremities; +generalized deconditioning  SKIN: pink, warm and dry, normal turgor, no rashes, lesions    The above physical exam was reviewed and updated as determined by my evaluation of the patient on 5/19/2022      Invasive Devices  Report    Drain Duration           Suprapubic Catheter 18 Fr  22 days                   VTE Pharmacologic Prophylaxis: Enoxaparin  Code Status: Level 1 - Full Code  Current Length of Stay: 10 day(s)      Total time spent:  30 minutes with more than 50% spent counseling/coordinating care  Counseling includes discussion with patient re: progress  and discussion with patient of his/her current medical state/information  Coordination of patient's care was performed in conjunction with primary service  Time invested included review of patient's labs, vitals, and management of their comorbidities with continued monitoring  In addition, this patient was discussed with medical team including physician and advanced extenders  The care of the patient was extensively discussed and appropriate treatment plan was formulated unique for this patient  ** Please Note:  voice to text software may have been used in the creation of this document   Although proof errors in transcription or interpretation are a potential of such software**

## 2022-05-20 PROCEDURE — 97110 THERAPEUTIC EXERCISES: CPT

## 2022-05-20 PROCEDURE — 97530 THERAPEUTIC ACTIVITIES: CPT

## 2022-05-20 PROCEDURE — 97112 NEUROMUSCULAR REEDUCATION: CPT

## 2022-05-20 PROCEDURE — 99232 SBSQ HOSP IP/OBS MODERATE 35: CPT | Performed by: INTERNAL MEDICINE

## 2022-05-20 PROCEDURE — 97535 SELF CARE MNGMENT TRAINING: CPT

## 2022-05-20 PROCEDURE — 97116 GAIT TRAINING THERAPY: CPT

## 2022-05-20 PROCEDURE — 99232 SBSQ HOSP IP/OBS MODERATE 35: CPT | Performed by: PHYSICAL MEDICINE & REHABILITATION

## 2022-05-20 RX ADMIN — METHENAMINE HIPPURATE 1 G: 1 TABLET ORAL at 19:35

## 2022-05-20 RX ADMIN — Medication 2000 UNITS: at 08:59

## 2022-05-20 RX ADMIN — DALFAMPRIDINE 10 MG: 10 TABLET, EXTENDED RELEASE ORAL at 22:37

## 2022-05-20 RX ADMIN — ENOXAPARIN SODIUM 40 MG: 40 INJECTION SUBCUTANEOUS at 08:57

## 2022-05-20 RX ADMIN — FUROSEMIDE 40 MG: 40 TABLET ORAL at 08:57

## 2022-05-20 RX ADMIN — BACLOFEN 10 MG: 10 TABLET ORAL at 17:45

## 2022-05-20 RX ADMIN — BACLOFEN 10 MG: 10 TABLET ORAL at 08:59

## 2022-05-20 RX ADMIN — METHENAMINE HIPPURATE 1 G: 1 TABLET ORAL at 09:00

## 2022-05-20 RX ADMIN — ACETAMINOPHEN 650 MG: 325 TABLET, FILM COATED ORAL at 10:26

## 2022-05-20 RX ADMIN — AMLODIPINE BESYLATE 2.5 MG: 2.5 TABLET ORAL at 08:58

## 2022-05-20 RX ADMIN — BACLOFEN 10 MG: 10 TABLET ORAL at 21:09

## 2022-05-20 RX ADMIN — MICONAZOLE NITRATE: 20 CREAM TOPICAL at 17:46

## 2022-05-20 RX ADMIN — DALFAMPRIDINE 10 MG: 10 TABLET, EXTENDED RELEASE ORAL at 09:00

## 2022-05-20 NOTE — PROGRESS NOTES
Physical Medicine and Rehabilitation Progress Note  Rima Mora 73 y o  female MRN: [id number]  Unit/Bed#: [facility] 459-01 Encounter: [id number]          HPI: Patient is a 72 yo female with h/o MS & neurogenic bladder with urinary retention s/p sacral neuromodulator device placement however ultimately requiring chronic indwelling nixon catheter however had recurrent UTIs and therefore underwent suprapubic catheter placement on 4/26 by IR however on 5/5 presented with generalized weakness and chills and was found to have UTI which was treated first with IV abx then eventually transitioned to oral abx in acute care and transferred to acute inpt rehab on 5/9    Chief Complaint: UTI     Subjective: patient seen at bedside and denied any events overnight and noted that she is looking forward to dc next week     ROS: A 10 point ROS was performed; negative except as noted above       Assessment/Plan:    UTI: h/o recurrent UTI's in the setting of neurogenic bladder due to MS with chronic indwelling nixon s/p suprapubic catheter placement by IR on 4/26; transitioned from IV abx to PO keflex 500 mg Q6 in acute care with recommendation by acute care hospitalist that patient continue abx until 5/12 which is now completed (of note UCx of 5/5/22 in addition to growing klebsiella also grew candida, however per d/w Dr Janet Serrano of ID candida growth in the urine cx can occur in those who are repeatedly treated with abx for recurrent UTI and does not typically require treatment and patient has improved with abx treatment of klebsiella therefore he does not recommend treatment of the candida at this time); per d/w Shukri Elias PA-C (whom patient follows up with as OP from a urology perspective) given that patient had UTI after placement of suprapubic cath placement Shukri Elias PA-C recommend patient stop home nitrofurantoin 100 mg qd and switch to hiprex 1 gm PO BID which has been done (d/w pharmacy and confirmed with pharmacist that hiprex 1 gm PO BID is acceptable with patients hepatic and renal function)     Blood in stool: seen by GI who suspects this is 2/2 to hemorrhoids and recommended OP colonoscopy & did clear patient to continue pharmacologic DVT ppx, Hg currently WNL      MS: on home cholecalciferol 2000 units qd and home dalfampridine ER TB12 10 mg q12; she is on intrathecal methotrexate inj Q2 months and per patient next due in June; BLESSING virus positive in 2018; follows with Dr Zeina Garibay as an OP      HTN: at home on norvasc 2 5 mg Q12, was on toprol XL and losartan in the past however patient's cardiologist, Dr Terri Duval, stopped the Toprol XL (per his OP note of 3/2021) and later her PCP stopped the losartan (per patient); management per IM      Dyslipidemia: per Dr Flavio Srivastava's OP note of 3/2021 he d/w patient starting statin however patient declined and preferred to address with lifestyle/dietary modifications      Elevated LV end diastolic pressure: follows with Dr Dewayne Reyna who at last OP visit of 3/2021 noted that patient was on losartan 25 mg qd for this however per patient provided home med list is no longer on losartan and per patient this was stopped by her PCP; OP FU with Dr Dewayne Reyna     weakness: chronic in the setting of MS but likely exacerbated by UTI, 2/5 B/L hip flexion, 3 to 4-/5 R dorsiflexion, 1-2/5 L dorsiflexion (multipodis ordered on L to prevent achilles tendon contracture), 4/5 UE B/L; OP FU with Dr Zeina Garibay       Chronic lower extremity edema: follows with Dr Dewayne Reyna of cards as OP who recommend she use lasix prn and it appears she uses 40 mg on Mondays and Fridays as she states that this schedule appears to control the LE edema best (it tends to improve per Dr Dagoberto Corea OP notes when she is consistent with use of compression stockings, therefore TEDs ordered for patient); OP FU with Dr Dewayne Reyna     Spasticity/neuropathic pain: in the setting of MS on home baclofen 10 mg TID, per patient provided home med list on baclofen 10 mg TID (not 20 mg TID as listed in OP notes and ER note) and is no longer on neurontin (as listed in OP notes and ER note) per patient provided home med list      Neurogenic bladder: chronic in the setting of MS; h/o of sacral neuromodulation device placement on 11/19/21 by Dr Armaan Turner for urinary retention however ultimately required chronic indwelling nixon; h/o of recurrent UTIs with chronic indwelling nixon s/p suprapubic catheter placement on 4/26 by IR, at home on flomax 0 8 mg qpm however per d/w Marjie Canavan PA-C (whome patient follows with as OP from a urology perspective) now that patient has suprapubic catheter she no longer requires flomax therefore it was dc'd; OP FU with Marjie Canavan PA-C of urology scheduled for 5/31 for catheter change (and Dr Armaan Turner of Urogyn)      Neurogenic bowel: chronic in the setting of MS, has occasional episodes of incontinence at home where she does not feel the stool coming, as episodes are not frequent and patient does not take anything at home will monitor off scheduled meds for now and use prn's      DVT ppx: lovenox     Note: unclear as to why patient is on ASA 81 mg qd at home (no h/o TIA/CVA/MI/CAD/PVD) and per d/w patient she began taking it on her own and was not specifically recommended to do so by her physicians therefore advised patient to hold off on taking asa until d/w her PCP      Note: blood cx of 5/5/22 x 2 finalized and no growth     Note: home med regimen confirmed with patient who provided a list      Incidental findings:     Elevated alk phos: mildly elevated at 156 ( however can be up to 147 by some lab standards) and appears to be chronic going back to at least 1/2018 per EMR lab records     Elevated B12 level: appears to be chronic going back to at least 4/2018 per EMR lab records and was 2,937 () on 2/1/22 likely due to B12 supplementation patient takes at home, which patient states she started on her own rather than at the recommendation of her doctors therefore advised to hold off B12 supplementation until she d/w her PCP      Arachnoid cyst: noted to cause cord flattening at the T6/7 levels on past imaging; OP FU with Dr Gill Rosas           Objective:    Functional Update:  Mobility: max x 2   Transfers: total x 2   ADLs: max-total       Physical Exam:    Vitals:    05/20/22 0858   BP: 133/62   Pulse:    Resp:    Temp:    SpO2:            General: alert, no apparent distress, cooperative and comfortable  HEENT:  Head: Normal, normocephalic, atraumatic    Eye: Normal external eye, conjunctiva, lidsc cornea  Ears: Normal external ears  Nose: Normal external nose, mucus membranes  CARDIAC:  +S1/2  LUNGS:  no abnormal respiratory pattern, no retractions noted, non-labored breathing   ABDOMEN:  + suprapubic catheter in place   EXTREMITIES:  no cyanosis or clubbing   NEURO:  awake, alert, apporpriately answering questions  PSYCH:  mood/affect currently stable

## 2022-05-20 NOTE — PROGRESS NOTES
05/20/22 1300   Pain Assessment   Pain Assessment Tool 0-10   Pain Score No Pain   Restrictions/Precautions   Precautions Bed/chair alarms;Cognitive; Fall Risk; Bronson; Supervision on toilet/commode  (suprapubic cath, hx MS)   Lifestyle   Autonomy "I do feel tired this afternoon"   Sit to Stand   Type of Assistance Needed Physical assistance   Physical Assistance Level 76% or more   Comment MaxA x1 for stand from R Nusrat Arsenio 23 to RW  WC surface now >23 5 inches on w/c with added extra cushion anchored by 3 dycem for safety x 2 trials - pt to have seat belt and leg rest on when in w/c for safety, also cognizant enough to call for assistance if needed assist to reposition  Sit to Stand CARE Score 2   Toileting Hygiene   Type of Assistance Needed Physical assistance   Physical Assistance Level Total assistance   Comment Ax2 in stance at bed rail   Francisco J Cohen 83 Score 1   Toilet Transfer   Type of Assistance Needed Physical assistance   Physical Assistance Level Total assistance   Comment Ax2 swap out method using bedrail and platfrom BSC at bedside   Toilet Transfer CARE Score 1   Functional Standing Tolerance   Comments pt stood for 2 5min at table top w/ RW and min-ModA x2  pt completed unilateral release from RW w/ both hands to then reach out and tap cone onplaced on table at midline  Activity trialed to improve standing balance and endurance as well as improve ability to complete unilateral release from RW to imrpove pts participation w/ ADLs  Exercise Tools   Other Exercise Tool 1 Towel slides on table top w/ BUE completing reach forward to increase pt's ability to reach and lean forward as pt has been demo'ing difficulty w/ this while seated in WC   Pt able to complete 4x10 w/ no difficulties   Cognition   Overall Cognitive Status Impaired   Arousal/Participation Cooperative   Attention Attends with cues to redirect   Orientation Level Oriented X4   Memory Decreased recall of precautions;Decreased short term memory;Decreased long term memory   Following Commands Follows one step commands with increased time or repetition   Activity Tolerance   Activity Tolerance Patient tolerated treatment well   Assessment   Treatment Assessment OT session focusing on fxnl transfers, NMR, fxnl standing tolerance and intro to completion of unilateral release from RW  Pt overall showing improvements since beginning of week despite not all care scores reflecting so  Pt now completing SPT Ax2 vs SB, pt also completed Ax1 STS this session w/ maxA and gait belt  Upcoming OT session to continue to focus on NMR, standing tolerance, fxnl trasnfers  Assessment of family training (S)  Spoke w/ pt's  Radha Sow at beginning of session to confirm FT on Tuesday of next week  Pt states, "I don't know if that will work "  appeared to not understand purpose of family training so [facility] provided  Instructed pt and her  to confirm over the weekend, or Monday at the latest if  can be present for therapy sessions on Tuesday for FT  Problem List Decreased strength;Decreased range of motion;Decreased endurance; Impaired balance;Decreased mobility; Decreased coordination;Decreased cognition; Impaired judgement;Decreased safety awareness; Impaired sensation; Impaired tone   Plan   Treatment/Interventions ADL retraining;Functional transfer training; Therapeutic exercise; Endurance training;Patient/family training;Equipment eval/education; Compensatory technique education;Continued evaluation;Cognitive reorientation   Progress Progressing toward goals   Recommendation   OT Discharge Recommendation Home with home health rehabilitation   OT - OK to Discharge No   OT Therapy Minutes   OT Time In 1300   OT Time Out 1430   OT Total Time (minutes) 90   OT Mode of treatment - Individual (minutes) 90   OT Mode of treatment - Concurrent (minutes) 0   OT Mode of treatment - Group (minutes) 0   OT Mode of treatment - Co-treat (minutes) 0   OT Mode of Treatment - Total time(minutes) 90 minutes   OT Cumulative Minutes 985   Therapy Time missed   Time missed?  No

## 2022-05-20 NOTE — PROGRESS NOTES
05/20/22 0852   Pain Assessment   Pain Assessment Tool 0-10   Pain Score No Pain   Restrictions/Precautions   Precautions Cognitive;Bed/chair alarms; Fall Risk;Supervision on toilet/commode; Bronson  (suprapubic)   Cognition   Overall Cognitive Status Unable to assess   Arousal/Participation Alert; Cooperative   Lying to Sitting on Side of Bed   Type of Assistance Needed Physical assistance;Verbal cues; Adaptive equipment   Physical Assistance Level 76% or more   Lying to Sitting on Side of Bed CARE Score 2   Sit to Stand   Type of Assistance Needed Physical assistance;Verbal cues; Adaptive equipment   Physical Assistance Level Total assistance   Comment mod-max of 2 x 2 trials standing up from 22 inches high surface like w/c which is 22 inch if using roho cushion only , min-mod of 1 from surface >26 but mod-max of 1 from seat surfaces x  >23 5 inches x 4 trials on w/c with added extra cushion anchored by 3 dycem for safety x 2 trials - pt to have seat belt and leg rest on when in w/c for safety, also cognizant enough to call for assistance if needed assist to reposition  Sit to Stand CARE Score 1   Bed-Chair Transfer   Type of Assistance Needed Physical assistance;Verbal cues; Adaptive equipment   Physical Assistance Level Total assistance   Comment mod-min of 2 initial SPT with RW due to pt fear of falling after unable to move L foot off the walker slides then she started to panic stating " I feel like im going down" even though pt was stable  additional SPT with RW required min-mod of 1 and SBA of 2nd person for safety   Chair/Bed-to-Chair Transfer CARE Score 1   Walk 10 Feet   Type of Assistance Needed Physical assistance;Verbal cues; Adaptive equipment   Physical Assistance Level Total assistance   Comment 30', 20' with RW min-mod of 1 with SBA/CF of 2nd person   Walk 10 Feet CARE Score 1   Wheel 50 Feet with Two Turns   Type of Assistance Needed Supervision;Verbal cues   Comment using bilat UE x 50 wearing gloves for better hand  but caused onset of L shoulder pain 5-6/10 to 4/10 after cold pack application  But for d/c pt will not be using manual w/c for has a transport chair and scooter for community access so we dont have to practice in therapy for it might caused exacerbation of baseline L shoulder pain that will in turn limits pt's participation/tolerance with other functional activities   Wheel 50 Feet with Two Turns CARE Score 4   Wheel 150 Feet   Type of Assistance Needed Physical assistance   Physical Assistance Level Total assistance   Comment dependent   Wheel 150 Feet CARE Score 1   Therapeutic Interventions   Strengthening seated hip flexor, ankle DF x 10 reps x 3 sets  manually resisted hip adduction and hip abduction x 10 reps x 3 sets , seated glut sets x 5 SH x 15 reps   Flexibility PNF D1 in supine to break extensor tone pre transfer training  sitting passive gentle bilat hamstring and gastroc mm stretching - L LE more spastic as previously noted   Assessment   Treatment Assessment skilled PT focused on flexibility and strengthening exercise as well as repeated transfer and gait training with RW  at this time pt is able to complete sit to stand transition if standing up from higher surfaces at least higher than 23 inches and using R arm to push up with L hand on the walker vs both hands pushing off from chair for demo difficulty transitioning L hand from arm rest to walker  pt encouraged not to panic when starts to have difficulty moving LE during standing mobilities for it increases her risk for falls so at this time still would benefit from 2 person assist to optimize safety  OT to reassess toilet transfer this PM while pt to call  to confirm availability for FT on 5/24 at 12:30 in preparation for home d/c on 5/26/22  for the weekend PT will cont to work on strengthening, standing balance/tolerance and functional transfer and gait training using a RW     Barriers to Discharge Inaccessible home environment;Decreased caregiver support - per pt  re-measured height of bed at 26 5 inches and SUV car seat is also of similar height   PT Barriers   Functional Limitation Ramp negotiation;Car transfers;Stair negotiation;Standing;Transfers; Walking; Wheelchair management   Plan   Treatment/Interventions Functional transfer training;LE strengthening/ROM; Therapeutic exercise; Endurance training;Bed mobility;Gait training;OT;Spoke to case management;Spoke to nursing;Spoke to MD;Spoke to advanced practitioner   Progress Progressing toward goals   Recommendation   PT Discharge Recommendation Home with home health rehabilitation   PT - OK to Discharge No   PT Therapy Minutes   PT Time In 0852   PT Time Out 1030   PT Total Time (minutes) 98   PT Mode of treatment - Individual (minutes) 98   PT Mode of treatment - Concurrent (minutes) 0   PT Mode of treatment - Group (minutes) 0   PT Mode of treatment - Co-treat (minutes) 0   PT Mode of Treatment - Total time(minutes) 98 minutes   PT Cumulative Minutes 923   Therapy Time missed   Time missed?  No

## 2022-05-20 NOTE — PROGRESS NOTES
Internal Medicine Progress Note  Patient: Abbie Driver  Age/sex: 73 y o  female  Medical Record #: [id number]      ASSESSMENT/PLAN: (Interval History)  Rima Gonzales is seen and examined and management for following issues:    Multiple Sclerosis  · She had a functional decline due to UTI  · Therapy per PMR  · Continue Baclofen to control spasticity   · She also takes dalfampridine ER 10mg 2x daily to improve walking speed      HTN  · Continue Amlodipine 2 5mg 2x daily  · She also takes Lasix 40mg twice weekly for LE edema  · Continue to monitor BP      Hemorrhoids  · D/t chronic constipation try to avoid  · Appreciate GI input  · No further testing unless drop in hgb and persistent maci blood  · Colonoscopy 4 years ago  · They recommend f/u with GI as outpatient    Neurogenic bowel and bladder  · SPC placed 22  · PMR managing neurogenic bowel management teaching  · No further need for flomax since now with [facility]      Anemia  · Review of previous labs revealed Hgb in the 12-13 range  · stable     Leukopenia  · resolved     Vulvar itching  · Cont Zana cream BID 22    DC plannin/26  The above assessment and plan was reviewed and updated as determined by my evaluation of the patient on 2022      Labs:   Results from last 7 days   Lab Units 22  1204   WBC Thousand/uL 4 63   HEMOGLOBIN g/dL 13 0   HEMATOCRIT % 40 8   PLATELETS Thousands/uL 363           Invalid input(s): LABISABELL KATZ                Review of Scheduled Meds:  Current Facility-Administered Medications   Medication Dose Route Frequency Provider Last Rate    acetaminophen  650 mg Oral Q6H PRN Angelito Paul MD      amLODIPine  2 5 mg Oral Q12H Thena Schlatter, MD      baclofen  10 mg Oral TID Thena Schlatter, MD      cholecalciferol  2,000 Units Oral Daily Thena Schlatter, MD      Dalfampridine ER  10 mg Oral Q12H Annika Hernandez MD      enoxaparin  40 mg Subcutaneous Daily Thena Schlatter, MD      furosemide  40 mg Oral Once per day on Mon Fri Mis Blank MD      methenamine hippurate  1 g Oral BID Mis Blank MD     Jackelinelizeth Kruger ANTIFUNGAL   Topical BID COLIN Dennis      polyethylene glycol  17 g Oral Daily PRN Mis Blank MD         Subjective/ HPI: Patient seen and examined  Patients overnight issues or events were reviewed with nursing or staff during rounds or morning huddle session  New or overnight issues include the following:     Pt seen in therapy, states that she didn't sleep well last night but otherwise feels well  ROS:   A 10 point ROS was performed; negative except as noted above  Imaging:     No orders to display       *Labs /Radiology studies reviewed  *Medications reviewed and reconciled as needed  *Please refer to order section for additional ordered labs studies  *Case discussed with primary attending during morning huddle case rounds    Physical Examination:  Vitals:   Vitals:    05/19/22 1324 05/19/22 2125 05/20/22 0610 05/20/22 0858   BP: 110/62 133/58 125/60 133/62   BP Location: Right arm Left arm Left arm    Pulse: 62 58 59    Resp: 16 20 18    Temp: 98 °F (36 7 °C) 97 5 °F (36 4 °C) (!) 97 4 °F (36 3 °C)    TempSrc: Oral Tympanic Oral    SpO2: 96% 97% 98%    Weight:       Height:         GEN: No apparent distress, interactive  NEURO: Alert and oriented x3  HEENT: Pupils are equal and reactive, EOMI, mucous membranes are moist, face symmetrical  CV: S1 S2 regular, no MRG, no peripheral edema noted  RESP: Lungs are clear bilaterally, no wheezes, rales or rhonchi noted, on room air, respirations easy and non labored  GI: Flat, soft non tender, non distended; +BS x4  : SP catheter draining clear yellow urine  MUSC: Moves all extremities; +generalized deconditioning  SKIN: pink, warm and dry, normal turgor, no rashes, lesions        The above physical exam was reviewed and updated as determined by my evaluation of the patient on 5/20/2022      Invasive Devices  Report    Drain  Duration Suprapubic Catheter 18 Fr  23 days                   VTE Pharmacologic Prophylaxis: Enoxaparin  Code Status: Level 1 - Full Code  Current Length of Stay: 11 day(s)      Total time spent:  30 minutes with more than 50% spent counseling/coordinating care  Counseling includes discussion with patient re: progress  and discussion with patient of his/her current medical state/information  Coordination of patient's care was performed in conjunction with primary service  Time invested included review of patient's labs, vitals, and management of their comorbidities with continued monitoring  In addition, this patient was discussed with medical team including physician and advanced extenders  The care of the patient was extensively discussed and appropriate treatment plan was formulated unique for this patient  ** Please Note:  voice to text software may have been used in the creation of this document   Although proof errors in transcription or interpretation are a potential of such software**

## 2022-05-20 NOTE — PLAN OF CARE
Problem: INFECTION - ADULT  Goal: Absence or prevention of progression during hospitalization  Description: INTERVENTIONS:  - Assess and monitor for signs and symptoms of infection  - Monitor lab/diagnostic results  - Monitor all insertion sites, i e  indwelling lines, tubes, and drains  - Monitor endotracheal if appropriate and nasal secretions for changes in amount and color  - [place] appropriate cooling/warming therapies per order  - Administer medications as ordered  - Instruct and encourage patient and family to use good hand hygiene technique  - Identify and instruct in appropriate isolation precautions for identified infection/condition  Outcome: Progressing

## 2022-05-21 PROCEDURE — 99233 SBSQ HOSP IP/OBS HIGH 50: CPT

## 2022-05-21 PROCEDURE — 97530 THERAPEUTIC ACTIVITIES: CPT

## 2022-05-21 PROCEDURE — 99232 SBSQ HOSP IP/OBS MODERATE 35: CPT | Performed by: INTERNAL MEDICINE

## 2022-05-21 PROCEDURE — 97110 THERAPEUTIC EXERCISES: CPT

## 2022-05-21 RX ADMIN — ACETAMINOPHEN 650 MG: 325 TABLET, FILM COATED ORAL at 11:14

## 2022-05-21 RX ADMIN — BACLOFEN 10 MG: 10 TABLET ORAL at 16:16

## 2022-05-21 RX ADMIN — AMLODIPINE BESYLATE 2.5 MG: 2.5 TABLET ORAL at 22:11

## 2022-05-21 RX ADMIN — BACLOFEN 10 MG: 10 TABLET ORAL at 09:31

## 2022-05-21 RX ADMIN — DALFAMPRIDINE 10 MG: 10 TABLET, EXTENDED RELEASE ORAL at 22:12

## 2022-05-21 RX ADMIN — METHENAMINE HIPPURATE 1 G: 1 TABLET ORAL at 18:31

## 2022-05-21 RX ADMIN — DALFAMPRIDINE 10 MG: 10 TABLET, EXTENDED RELEASE ORAL at 09:35

## 2022-05-21 RX ADMIN — AMLODIPINE BESYLATE 2.5 MG: 2.5 TABLET ORAL at 09:34

## 2022-05-21 RX ADMIN — ENOXAPARIN SODIUM 40 MG: 40 INJECTION SUBCUTANEOUS at 09:30

## 2022-05-21 RX ADMIN — BACLOFEN 10 MG: 10 TABLET ORAL at 22:11

## 2022-05-21 RX ADMIN — METHENAMINE HIPPURATE 1 G: 1 TABLET ORAL at 11:13

## 2022-05-21 RX ADMIN — Medication 2000 UNITS: at 09:31

## 2022-05-21 NOTE — PROGRESS NOTES
Internal Medicine Progress Note  Patient: Drew Rodríguez  Age/sex: 73 y o  female  Medical Record #: [id number]      ASSESSMENT/PLAN: (Interval History)  Rima Ochoa is seen and examined and management for following issues:    Multiple Sclerosis  · She had a functional decline due to UTI  · Therapy per PMR  · Continue Baclofen to control spasticity   · She also takes dalfampridine ER 10mg 2x daily to improve walking speed      HTN  · Continue Amlodipine 2 5mg 2x daily  · She also takes Lasix 40mg twice weekly for LE edema  · Continue to monitor BP      Hemorrhoids  · D/t chronic constipation try to avoid  · Appreciate GI input  · No further testing unless drop in hgb and persistent maci blood  · Colonoscopy 4 years ago  · They recommend f/u with GI as outpatient    Neurogenic bowel and bladder  · SPC placed 22  · PMR managing neurogenic bowel management teaching  · No further need for flomax since now with [facility]      Anemia  · Review of previous labs revealed Hgb in the 12-13 range  · stable     Leukopenia  · resolved     Vulvar itching  · Cont Zana cream BID 22    DC plannin/26  The above assessment and plan was reviewed and updated as determined by my evaluation of the patient on 2022      Labs:   Results from last 7 days   Lab Units 22  1204   WBC Thousand/uL 4 63   HEMOGLOBIN g/dL 13 0   HEMATOCRIT % 40 8   PLATELETS Thousands/uL 363           Invalid input(s): LABSTERLING CMP                Review of Scheduled Meds:  Current Facility-Administered Medications   Medication Dose Route Frequency Provider Last Rate    acetaminophen  650 mg Oral Q6H PRN Yuriy Walters MD      amLODIPine  2 5 mg Oral Q12H Lior Hayden MD      baclofen  10 mg Oral TID Lior Hayden MD      cholecalciferol  2,000 Units Oral Daily Lior Hayden MD      Dalfampridine ER  10 mg Oral Q12H Brice Mccarthy MD      enoxaparin  40 mg Subcutaneous Daily Lior Hayden MD      furosemide  40 mg Oral Once per day on Mon Fri Som Barr MD      methenamine hippurate  1 g Oral BID Som Barr MD     [facility] Geovannylisa Jesse ANTIFUNGAL   Topical BID COLIN Hammond      polyethylene glycol  17 g Oral Daily PRN Som Barr MD         Subjective/ HPI: Patient seen and examined  Patients overnight issues or events were reviewed with nursing or staff during rounds or morning huddle session  New or overnight issues include the following:     Pt seen at bedside  No complaints overnight         ROS:   A 10 point ROS was performed; negative except as noted above  Imaging:     No orders to display       *Labs /Radiology studies reviewed  *Medications reviewed and reconciled as needed  *Please refer to order section for additional ordered labs studies  *Case discussed with primary attending during morning huddle case rounds    Physical Examination:  Vitals:   Vitals:    05/20/22 0858 05/20/22 1307 05/20/22 2110 05/21/22 0545   BP: 133/62 154/69 108/55 122/58   BP Location:  Right arm Left arm Right arm   Pulse:  75 60 63   Resp:  18 16 16   Temp:  97 9 °F (36 6 °C) 97 8 °F (36 6 °C) 97 8 °F (36 6 °C)   TempSrc:  Oral Oral Oral   SpO2:  100% 96% 97%   Weight:       Height:         GEN: No apparent distress, interactive  NEURO: Alert and oriented x3  HEENT: Pupils are equal and reactive, EOMI, mucous membranes are moist, face symmetrical  CV: S1 S2 regular, no MRG, no peripheral edema noted  RESP: Lungs are clear bilaterally, no wheezes, rales or rhonchi noted, on room air, respirations easy and non labored  GI: Flat, soft non tender, non distended; +BS x4  : SP catheter draining clear yellow urine  MUSC: Moves all extremities; +generalized deconditioning  SKIN: pink, warm and dry, normal turgor, no rashes, lesions      The above physical exam was reviewed and updated as determined by my evaluation of the patient on 5/21/2022  Invasive Devices  Report    Drain  Duration           Suprapubic Catheter 18 Fr   24 days VTE Pharmacologic Prophylaxis: Enoxaparin  Code Status: Level 1 - Full Code  Current Length of Stay: 12 day(s)      Total time spent:  30 minutes with more than 50% spent counseling/coordinating care  Counseling includes discussion with patient re: progress  and discussion with patient of his/her current medical state/information  Coordination of patient's care was performed in conjunction with primary service  Time invested included review of patient's labs, vitals, and management of their comorbidities with continued monitoring  In addition, this patient was discussed with medical team including physician and advanced extenders  The care of the patient was extensively discussed and appropriate treatment plan was formulated unique for this patient  ** Please Note:  voice to text software may have been used in the creation of this document   Although proof errors in transcription or interpretation are a potential of such software**

## 2022-05-21 NOTE — PROGRESS NOTES
05/21/22 1500   Sit to Lying   Type of Assistance Needed Physical assistance   Physical Assistance Level Total assistance   Comment mod A X 2   Sit to Lying CARE Score 1   Bed-Chair Transfer   Type of Assistance Needed Physical assistance   Physical Assistance Level Total assistance   Comment mod A X 1 and 2nd person to manage equipment via slide board   Chair/Bed-to-Chair Transfer CARE Score 1   Assessment   Treatment Assessment Pt  seen for short 10 mins for w/c to bed transfers and bed mobility  Pt  did well with mod A X 1 for slide board   present and verified that he can be here on wednesday 5/25 for FT   Recommendation   PT Discharge Recommendation Home with home health rehabilitation   Equipment Recommended Wheelchair   PT Therapy Minutes   PT Time In 1500   PT Time Out 1510   PT Total Time (minutes) 10   PT Mode of treatment - Individual (minutes) 10   PT Mode of treatment - Concurrent (minutes) 0   PT Mode of treatment - Group (minutes) 0   PT Mode of treatment - Co-treat (minutes) 0   PT Mode of Treatment - Total time(minutes) 10 minutes   PT Cumulative Minutes 1023   Therapy Time missed   Time missed?  No

## 2022-05-21 NOTE — PROGRESS NOTES
05/21/22 1000   Pain Assessment   Pain Score 5   Pain Location/Orientation Orientation: Lower; Location: Back  (pt  also c/o sore neck during nu step)   Hospital Pain Intervention(s) Rest;Relaxation technique  (nurse made aware)   Restrictions/Precautions   Precautions Bed/chair alarms;Cognitive; Fall Risk; [place]; Supervision on toilet/commode;Contact/isolation   Cognition   Arousal/Participation Alert; Cooperative   Attention Attends with cues to redirect   Memory Decreased recall of precautions   Following Commands Follows one step commands with increased time or repetition   Subjective   Subjective pt  reported that she got all twisted last night when she was sleeping and her low back is hurting her today because of that   Sit to Lying   Type of Assistance Needed Physical assistance   Physical Assistance Level 76% or more   Sit to Lying CARE Score 2   Sit to Stand   Type of Assistance Needed Physical assistance   Physical Assistance Level Total assistance   Comment attempted to stand from EOB and nu step using RW but pt  unable to engage B knees in extension with B knee bukling even with max A X 2, Pt  able to stand infront of hallway rail with B knee block initially and was able to do sit stand with min to mod A X 2   Sit to Stand CARE Score 1   Bed-Chair Transfer   Type of Assistance Needed Physical assistance   Physical Assistance Level Total assistance   Comment mod A X 1 SB transfers with 2nd person assist to manage/ stabilize equipment   RW not atttempted (unsafe)   Chair/Bed-to-Chair Transfer CARE Score 1   Transfer Bed/Chair/Wheelchair   Adaptive Equipment Roller Walker   Walk 10 Feet   Reason if not Attempted Safety concerns   Walk 10 Feet CARE Score 88   Walk 50 Feet with Two Turns   Reason if not Attempted Safety concerns   Walk 50 Feet with Two Turns CARE Score 88   Walk 150 Feet   Reason if not Attempted Safety concerns   Walk 150 Feet CARE Score 88   Walking 10 Feet on Uneven Surfaces   Reason if not Attempted Safety concerns   Walking 10 Feet on Uneven Surfaces CARE Score 88   Ambulation   Does the patient walk? 2  Yes   Findings not safe to be performed today as pt  demonstrate sig B knee buckling with standing without B knee block   Wheelchair mobility   Does the patient use a wheelchair? 1  Yes   Findings pt  dependent on w/c propulsion as she will use scooter at home   Curb or Single Stair   Reason if not Attempted Safety concerns   1 Step (Curb) CARE Score 88   4 Steps   Reason if not Attempted Safety concerns   4 Steps CARE Score 88   12 Steps   Reason if not Attempted Safety concerns   12 Steps CARE Score 88   Picking Up Object   Reason if not Attempted Safety concerns   Picking Up Object CARE Score 88   Therapeutic Interventions   Strengthening sitting add squeeze with ball, hip abd with yellow TB, LAQ limite range AROM   Flexibility PNF D1 flex and ext prior to getting OOb and static stretch at knee to chest, sitting EOB leaning forward sliding B hands toward her shin to dec dec trunk tone and engage core   Other sit to stand and standing balance along rail in hallways X 3 reps with 2 mins - 2 mins and 20 secs standing tolerance  verbal cueing to engage quads muscles  Intiially needing B knee blocking to stand but eventually was able to extend/ lock B knees to maintain standing   Equipment Use   NuStep Level 1 X 10 mins R UE and B LE with B leg attachement for alignment )  Pt  initially used L UE but was painful  Pt  compensates by leaning back and does a lot of neck movement to be able to push well using R UE   Assessment   Treatment Assessment pt  able to engage in 90 mins PT session and was able to tolerate above activities  Pt  though was not able to perform as well as she did yesterday   Most probably from MS fatigue with the activities that she did yesterday  pt  needing extra assist to stand today and was not able to fully stand using RW with B knee buckling   pt  also reported of low back pain from how she was sleeping last night and somewaht making her hips feel weaker/ off  Pt  did eventually stand but had to use railing to maintain standing and given B knee blocking assist  pt  had no other complaints  Pt  expressed that she is getting frustrated about why she is not doing as well today  Educated pt  that [address] will bring in muscle fatigue even if she feels not fatigue endurance wise and it can affect how she transfers  Pt  might benefit from more MD education about her progressive MS and pt  and family might be able to understand better about why pt  is inconsistency with mobility  pt  remained in w/c at end of session with seatbelt on and alarms on   Call bell is also in place and pt  agreed to stay on w/c until 3 and therapy will put her back to bed unless she really has to go back to bed  Problem List Decreased strength;Decreased endurance; Impaired balance;Decreased mobility; Decreased coordination; Impaired tone   Barriers to Discharge Inaccessible home environment;Decreased caregiver support   PT Barriers   Functional Limitation Standing;Transfers; Wheelchair management;Car transfers   Plan   Treatment/Interventions Functional transfer training;LE strengthening/ROM; Therapeutic exercise; Endurance training;Patient/family training;Bed mobility;Gait training   Recommendation   PT Discharge Recommendation Home with home health rehabilitation   Equipment Recommended Wheelchair   PT Therapy Minutes   PT Time In 1000   PT Time Out 1130   PT Total Time (minutes) 90   PT Mode of treatment - Individual (minutes) 90   PT Mode of treatment - Concurrent (minutes) 0   PT Mode of treatment - Group (minutes) 0   PT Mode of treatment - Co-treat (minutes) 0   PT Mode of Treatment - Total time(minutes) 90 minutes   PT Cumulative Minutes 1013   Therapy Time missed   Time missed?  No

## 2022-05-21 NOTE — PLAN OF CARE
Problem: INFECTION - ADULT  Goal: Absence or prevention of progression during hospitalization  Description: INTERVENTIONS:  - Assess and monitor for signs and symptoms of infection  - Monitor lab/diagnostic results  - Monitor all insertion sites, i e  indwelling lines, tubes, and drains  - Monitor endotracheal if appropriate and nasal secretions for changes in amount and color  - [place] appropriate cooling/warming therapies per order  - Administer medications as ordered  - Instruct and encourage patient and family to use good hand hygiene technique  - Identify and instruct in appropriate isolation precautions for identified infection/condition  Outcome: Progressing

## 2022-05-22 PROCEDURE — 97530 THERAPEUTIC ACTIVITIES: CPT

## 2022-05-22 PROCEDURE — 97112 NEUROMUSCULAR REEDUCATION: CPT

## 2022-05-22 PROCEDURE — 97116 GAIT TRAINING THERAPY: CPT

## 2022-05-22 PROCEDURE — 99232 SBSQ HOSP IP/OBS MODERATE 35: CPT | Performed by: INTERNAL MEDICINE

## 2022-05-22 RX ADMIN — Medication 2000 UNITS: at 09:51

## 2022-05-22 RX ADMIN — BACLOFEN 10 MG: 10 TABLET ORAL at 21:12

## 2022-05-22 RX ADMIN — DALFAMPRIDINE 10 MG: 10 TABLET, EXTENDED RELEASE ORAL at 21:13

## 2022-05-22 RX ADMIN — AMLODIPINE BESYLATE 2.5 MG: 2.5 TABLET ORAL at 09:51

## 2022-05-22 RX ADMIN — MICONAZOLE NITRATE 1 APPLICATION: 20 CREAM TOPICAL at 17:51

## 2022-05-22 RX ADMIN — METHENAMINE HIPPURATE 1 G: 1 TABLET ORAL at 17:49

## 2022-05-22 RX ADMIN — METHENAMINE HIPPURATE 1 G: 1 TABLET ORAL at 09:54

## 2022-05-22 RX ADMIN — BACLOFEN 10 MG: 10 TABLET ORAL at 09:52

## 2022-05-22 RX ADMIN — AMLODIPINE BESYLATE 2.5 MG: 2.5 TABLET ORAL at 21:12

## 2022-05-22 RX ADMIN — BACLOFEN 10 MG: 10 TABLET ORAL at 17:49

## 2022-05-22 RX ADMIN — DALFAMPRIDINE 10 MG: 10 TABLET, EXTENDED RELEASE ORAL at 09:53

## 2022-05-22 RX ADMIN — ENOXAPARIN SODIUM 40 MG: 40 INJECTION SUBCUTANEOUS at 09:50

## 2022-05-22 NOTE — PROGRESS NOTES
Internal Medicine Progress Note  Patient: Latesha Pickett  Age/sex: 73 y o  female  Medical Record #: [id number]      ASSESSMENT/PLAN: (Interval History)  Rima Dutton is seen and examined and management for following issues:    Multiple Sclerosis  · She had a functional decline due to UTI  · Therapy per PMR  · Continue Baclofen to control spasticity   · She also takes dalfampridine ER 10mg 2x daily to improve walking speed      HTN  · Continue Amlodipine 2 5mg 2x daily  · She also takes Lasix 40mg twice weekly for LE edema  · Continue to monitor BP      Hemorrhoids  · D/t chronic constipation try to avoid  · Appreciate GI input  · No further testing unless drop in hgb and persistent maci blood  · Colonoscopy 4 years ago  · They recommend f/u with GI as outpatient    Neurogenic bowel and bladder  · SPC placed 22  · PMR managing neurogenic bowel management teaching  · No further need for flomax since now with [facility]      Anemia  · Review of previous labs revealed Hgb in the 12-13 range  · stable       DC plannin/26  The above assessment and plan was reviewed and updated as determined by my evaluation of the patient on 2022      Labs:   Results from last 7 days   Lab Units 22  1204   WBC Thousand/uL 4 63   HEMOGLOBIN g/dL 13 0   HEMATOCRIT % 40 8   PLATELETS Thousands/uL 363           Invalid input(s): LABGLOM, ISABELL                Review of Scheduled Meds:  Current Facility-Administered Medications   Medication Dose Route Frequency Provider Last Rate    acetaminophen  650 mg Oral Q6H PRN Adis Canchola MD      amLODIPine  2 5 mg Oral Q12H Demetrius Raymundo MD      baclofen  10 mg Oral TID Demetrius Raymundo MD      cholecalciferol  2,000 Units Oral Daily Demetrius Raymundo MD      Dalfampridine ER  10 mg Oral Q12H Otf England MD      enoxaparin  40 mg Subcutaneous Daily Demetrius Raymundo MD      furosemide  40 mg Oral Once per day on  Demetrius Raymundo MD      methenamine hippurate  1 g Oral BID MD Phan Cheung ANTIFUNGAL   Topical BID COLIN Stock      polyethylene glycol  17 g Oral Daily PRN Latosha Young MD         Subjective/ HPI: Patient seen and examined  Patients overnight issues or events were reviewed with nursing or staff during rounds or morning huddle session  New or overnight issues include the following:     Pt seen at bedside  No complaints overnight  She is counting the days til dc         ROS:   A 10 point ROS was performed; negative except as noted above  Imaging:     No orders to display       *Labs /Radiology studies reviewed  *Medications reviewed and reconciled as needed  *Please refer to order section for additional ordered labs studies  *Case discussed with primary attending during morning huddle case rounds    Physical Examination:  Vitals:   Vitals:    05/21/22 0934 05/21/22 1304 05/21/22 2211 05/22/22 0548   BP: 115/58 118/56 121/58 124/69   BP Location:  Right arm Left arm Left arm   Pulse:  64 56 60   Resp:  18 16 20   Temp:  98 °F (36 7 °C) 97 5 °F (36 4 °C) (!) 97 4 °F (36 3 °C)   TempSrc:  Oral Oral Tympanic   SpO2:  97% 98% 95%   Weight:       Height:         GEN: No apparent distress, interactive  NEURO: Alert and oriented x3  HEENT: Pupils are equal and reactive, EOMI, mucous membranes are moist, face symmetrical  CV: S1 S2 regular, no MRG, no peripheral edema noted  RESP: Lungs are clear bilaterally, no wheezes, rales or rhonchi noted, on room air, respirations easy and non labored  GI: Flat, soft non tender, non distended; +BS x4  : SP catheter draining clear yellow urine  MUSC: Moves all extremities; generalized deconditioning  SKIN: pink, warm and dry, normal turgor, no rashes, lesions    The above physical exam was reviewed and updated as determined by my evaluation of the patient on 5/22/2022      Invasive Devices  Report    Drain  Duration           Suprapubic Catheter 18 Fr  25 days                   VTE Pharmacologic Prophylaxis: Enoxaparin  Code Status: Level 1 - Full Code  Current Length of Stay: 13 day(s)      Total time spent:  30 minutes with more than 50% spent counseling/coordinating care  Counseling includes discussion with patient re: progress  and discussion with patient of his/her current medical state/information  Coordination of patient's care was performed in conjunction with primary service  Time invested included review of patient's labs, vitals, and management of their comorbidities with continued monitoring  In addition, this patient was discussed with medical team including physician and advanced extenders  The care of the patient was extensively discussed and appropriate treatment plan was formulated unique for this patient  ** Please Note:  voice to text software may have been used in the creation of this document   Although proof errors in transcription or interpretation are a potential of such software**

## 2022-05-22 NOTE — PLAN OF CARE
Problem: Potential for Falls  Goal: Patient will remain free of falls  Description: INTERVENTIONS:  - Educate patient/family on patient safety including physical limitations  - Instruct patient to call for assistance with activity   - Consult OT/PT to assist with strengthening/mobility   - Keep Call bell within reach  - Keep bed low and locked with side rails adjusted as appropriate  - Keep care items and personal belongings within reach  - Initiate and maintain comfort rounds  - Make Fall Risk Sign visible to staff  - Offer Toileting every Hours, in advance of need  - Initiate/Maintain alarm  - Obtain necessary fall risk management equipment:  - Apply yellow socks and bracelet for high fall risk patients  - Consider moving patient to room near nurses station  Outcome: Progressing     Problem: Prexisting or High Potential for Compromised Skin Integrity  Goal: Skin integrity is maintained or improved  Description: INTERVENTIONS:  - Identify patients at risk for skin breakdown  - Assess and monitor skin integrity  - Assess and monitor nutrition and hydration status  - Monitor labs   - Assess for incontinence   - Turn and reposition patient  - Assist with mobility/ambulation  - Relieve pressure over bony prominences  - Avoid friction and shearing  - Provide appropriate hygiene as needed including keeping skin clean and dry  - Evaluate need for skin moisturizer/barrier cream  - Collaborate with interdisciplinary team   - Patient/family teaching  - Consider wound care consult   Outcome: Progressing     Problem: PAIN - ADULT  Goal: Verbalizes/displays adequate comfort level or baseline comfort level  Description: Interventions:  - Encourage patient to monitor pain and request assistance  - Assess pain using appropriate pain scale  - Administer analgesics based on type and severity of pain and evaluate response  - Implement non-pharmacological measures as appropriate and evaluate response  - Consider cultural and social influences on pain and pain management  - Notify physician/advanced practitioner if interventions unsuccessful or patient reports new pain  Outcome: Progressing     Problem: INFECTION - ADULT  Goal: Absence or prevention of progression during hospitalization  Description: INTERVENTIONS:  - Assess and monitor for signs and symptoms of infection  - Monitor lab/diagnostic results  - Monitor all insertion sites, i e  indwelling lines, tubes, and drains  - Monitor endotracheal if appropriate and nasal secretions for changes in amount and color  - Burton appropriate cooling/warming therapies per order  - Administer medications as ordered  - Instruct and encourage patient and family to use good hand hygiene technique  - Identify and instruct in appropriate isolation precautions for identified infection/condition  Outcome: Progressing     Problem: SAFETY ADULT  Goal: Patient will remain free of falls  Description: INTERVENTIONS:  - Educate patient/family on patient safety including physical limitations  - Instruct patient to call for assistance with activity   - Consult OT/PT to assist with strengthening/mobility   - Keep Call bell within reach  - Keep bed low and locked with side rails adjusted as appropriate  - Keep care items and personal belongings within reach  - Initiate and maintain comfort rounds  - Make Fall Risk Sign visible to staff  - Offer Toileting every Hours, in advance of need  - Initiate/Maintain alarm  - Obtain necessary fall risk management equipment:  - Apply yellow socks and bracelet for high fall risk patients  - Consider moving patient to room near nurses station  Outcome: Progressing  Goal: Maintain or return to baseline ADL function  Description: INTERVENTIONS:  -  Assess patient's ability to carry out ADLs; assess patient's baseline for ADL function and identify physical deficits which impact ability to perform ADLs (bathing, care of mouth/teeth, toileting, grooming, dressing, etc )  - Assess/evaluate cause of self-care deficits   - Assess range of motion  - Assess patient's mobility; develop plan if impaired  - Assess patient's need for assistive devices and provide as appropriate  - Encourage maximum independence but intervene and supervise when necessary  - Involve family in performance of ADLs  - Assess for home care needs following discharge   - Consider OT consult to assist with ADL evaluation and planning for discharge  - Provide patient education as appropriate  Outcome: Progressing  Goal: Maintains/Returns to pre admission functional level  Description: INTERVENTIONS:  - Perform BMAT or MOVE assessment daily    - Set and communicate daily mobility goal to care team and patient/family/caregiver     - Collaborate with rehabilitation services on mobility goals if consulted  - Perform Range   - Out of bed for toileting  - Record patient progress and toleration of activity level   Outcome: Progressing     Problem: DISCHARGE PLANNING  Goal: Discharge to home or other facility with appropriate resources  Description: INTERVENTIONS:  - Identify barriers to discharge w/patient and caregiver  - Arrange for needed discharge resources and transportation as appropriate  - Identify discharge learning needs (meds, wound care, etc )  - Arrange for interpretive services to assist at discharge as needed  - Refer to Case Management Department for coordinating discharge planning if the patient needs post-hospital services based on physician/advanced practitioner order or complex needs related to functional status, cognitive ability, or social support system  Outcome: Progressing     Problem: MOBILITY - ADULT  Goal: Maintain or return to baseline ADL function  Description: INTERVENTIONS:  -  Assess patient's ability to carry out ADLs; assess patient's baseline for ADL function and identify physical deficits which impact ability to perform ADLs (bathing, care of mouth/teeth, toileting, grooming, dressing, etc )  - Assess/evaluate cause of self-care deficits   - Assess range of motion  - Assess patient's mobility; develop plan if impaired  - Assess patient's need for assistive devices and provide as appropriate  - Encourage maximum independence but intervene and supervise when necessary  - Involve family in performance of ADLs  - Assess for home care needs following discharge   - Consider OT consult to assist with ADL evaluation and planning for discharge  - Provide patient education as appropriate  Outcome: Progressing  Goal: Maintains/Returns to pre admission functional level  Description: INTERVENTIONS:  - Perform BMAT or MOVE assessment daily    - Set and communicate daily mobility goal to care team and patient/family/caregiver     - Collaborate with rehabilitation services on mobility goals if consulted  - Perform Range   - Out of bed for toileting  - Record patient progress and toleration of activity level   Outcome: Progressing

## 2022-05-22 NOTE — PLAN OF CARE
Problem: Potential for Falls  Goal: Patient will remain free of falls  Description: INTERVENTIONS:  - Educate patient/family on patient safety including physical limitations  - Instruct patient to call for assistance with activity   - Consult OT/PT to assist with strengthening/mobility   - Keep Call bell within reach  - Keep bed low and locked with side rails adjusted as appropriate  - Keep care items and personal belongings within reach  - Initiate and maintain comfort rounds  - Make Fall Risk Sign visible to staff  - Offer Toileting every 2 Hours, in advance of need  - Initiate/Maintain alarm  - Obtain necessary fall risk management equipment:   Problem: Prexisting or High Potential for Compromised Skin Integrity  Goal: Skin integrity is maintained or improved  Description: INTERVENTIONS:  - Identify patients at risk for skin breakdown  - Assess and monitor skin integrity  - Assess and monitor nutrition and hydration status  - Monitor labs   - Assess for incontinence   - Turn and reposition patient  - Assist with mobility/ambulation  - Relieve pressure over bony prominences  - Avoid friction and shearing  - Provide appropriate hygiene as needed including keeping skin clean and dry  - Evaluate need for skin moisturizer/barrier cream  - Collaborate with interdisciplinary team   - Patient/family teaching  - Consider wound care consult   Outcome: Progressing     - Apply yellow socks and bracelet for high fall risk patients  - Consider moving patient to room near nurses station  Outcome: Progressing

## 2022-05-22 NOTE — PROGRESS NOTES
Physical Medicine and Rehabilitation Progress Note  Rima Mora 73 y o  female MRN: [id number]  Unit/Bed#: -01 Encounter: [id number]      Assessment & Plan:     Decline in ADLs and mobility: Functional assessment    Max assist bathing, UBD  Total assist LBD, to hygiene, transfers  WC mobility 50 ft supervision   Continue PT, OT     MS  - Continue PT/OT as previously outlined  - Dalfampridine  - Baclofen for spasticity     Constipation/neurogenic bowel/hemorrhoids  - monitor for signs and symptoms of obstruction/ileus > not currently; hold stimulant lax if occurs  - Limiting constipating medications if possible  - Ensure adequate hydration   - Reports goes every 3 days at home and feels at about her baseline   - PRN miralax > low threshold to schedule bowel meds  - OP GI follow-up     Recurrent UTI/neurogenic bladder s/p SPC placement  - Hiprex for UTI prevention (changed per urology) - reviewed chart and discussed with patient   - Monitor for s/s infection; monitor output and site    Risk of difficulty coping  - Provide supportive counseling     Other Medical Issues:   Per IM and primary PMR attending returning Monday       Objective:     Allergies per EMR  Diagnostic Studies: Reviewed, no new imaging  No orders to display     See above as well    Laboratory: Labs reviewed  Results from last 7 days   Lab Units 05/16/22  1204   HEMOGLOBIN g/dL 13 0   HEMATOCRIT % 40 8   WBC Thousand/uL 4 63           Invalid input(s): CA         Drug regimen reviewed, all potential adverse effects identified and addressed:    Scheduled Meds:  Current Facility-Administered Medications   Medication Dose Route Frequency Provider Last Rate    acetaminophen  650 mg Oral Q6H PRN Tonja Birmingham MD      amLODIPine  2 5 mg Oral Q12H Erik Tripp MD      baclofen  10 mg Oral TID Erik Tripp MD      cholecalciferol  2,000 Units Oral Daily Erik Tripp MD      Dalfampridine ER  10 mg Oral Q12H MD Brad Domínguez enoxaparin  40 mg Subcutaneous Daily Harry Castillo MD      furosemide  40 mg Oral Once per day on Mon Fri Harry Castillo MD      methenamine hippurate  1 g Oral BID MD Chandler Moody ANTIFUNGAL   Topical BID COLIN Mandel      polyethylene glycol  17 g Oral Daily PRN Harry Castillo MD         Chief Complaints:  Rehab follow-up      Subjective: On eval, patient had questions about UTI prophylaxis medication  I did chart review and answered questions best I could  She can discuss further with IM and primary PMR on Monday  She denies abdominal/bladder pain/cramping, fever, chills, worsening strength, spasms, lightheadedness, SOB  She reports stooling about at her baseline every 3 days  ROS: A 10 point ROS was performed; negative except as noted above  Physical Exam:  05/20/22 1307 97 9 °F (36 6 °C) 75 18 154/69 99 100 % None (Room air)     Vitals above reviewed on date of encounter    GEN:  Lying in bed in NAD   HEENT/NECK: MMM  CARDIAC: Regular rate rhythm, no murmers, no rubs, no gallops  LUNGS:  clear to auscultation, no wheezes, rales, or rhonchi  ABDOMEN: Soft, non-tender, non-distended, normal active bowel sounds  EXTREMITIES/SKIN:  no calf edema, no calf tenderness to palpation  NEURO:   MENTAL STATUS: awake, oriented to person, place, time, and situation, MENTAL STATUS:  Appropriate wakefulness and interaction  and Strength/MMT:  Prox BUE 4/5, Distal BUE about 2 to 3+ little worse on R; prox RUE 2, prox LLE 2-, bilateral PF 3, R DF 2+, L DF  1  PSYCH:  Affect:  Euthymic     ** Please Note: Fluency Direct voice to text software may have been used in the creation of this document  **    Total time spent:  35 minutes, with more than 50% spent counseling/coordinating care   Counseling includes discussion with patient re: UTI hx, med mgmt, bowel/bladder - progress in therapies, functional issues observed by therapy staff, and discussion with patient his/her current medical state/wellbeing

## 2022-05-22 NOTE — PROGRESS NOTES
05/22/22 1300   Pain Assessment   Pain Assessment Tool 0-10   Pain Score 4   Pain Location/Orientation Orientation: Left; Location: Shoulder  (during activity)   Pain Onset/Description Onset: Ongoing;Frequency: Intermittent   Hospital Pain Intervention(s) Repositioned; Rest   Restrictions/Precautions   Precautions Fall Risk;Bed/chair alarms;Cognitive;Contact/isolation;Supervision on toilet/commode  (MS)   Weight Bearing Restrictions No   ROM Restrictions No   Cognition   Arousal/Participation Cooperative   Subjective   Subjective Pt reports feeling well today and able to participate in PT  Roll Left and Right   Type of Assistance Needed Physical assistance   Physical Assistance Level 51%-75%   Comment max A to roll R w/o HR, CGA to roll L w/ HR  (Home set up with L HR only)   Roll Left and Right CARE Score 2   Sit to Lying   Type of Assistance Needed Physical assistance   Physical Assistance Level 26%-50%   Comment modAx1 at LE   Sit to Lying CARE Score 3   Lying to Sitting on Side of Bed   Type of Assistance Needed Physical assistance   Physical Assistance Level 26%-50%   Comment adjustable bed used to raise HOB to improve transfer   Lying to Sitting on Side of Bed CARE Score 3   Sit to Stand   Type of Assistance Needed Physical assistance   Physical Assistance Level 26%-50%   Comment VARIED from CGA-modAx1, second person available for safety but CGA-SBA only   Sit to Stand CARE Score 3   Bed-Chair Transfer   Type of Assistance Needed Physical assistance   Physical Assistance Level 26%-50%   Comment SB from [facility] <> bed with CGA-SBA (+1 on standby for safety); SPT from [facility] <> bed and WC <> side commode with min-modAx1 (+1 for CGA-SBA)   Chair/Bed-to-Chair Transfer CARE Score 3   Transfer Bed/Chair/Wheelchair   Limitations Noted In Balance; Coordination; Endurance; Sequencing;UE Strength;LE Strength   Adaptive Equipment Roller Walker;Transfer Board   Stand Pivot Moderate Assist  (1 person on standby for safety)   Sit to Stand Moderate Assist  (varied CGA-modAx1 (+1 for SBA))   Stand to Sit Contact Guard   Supine to Sit Minimal Assist  (raised HOB)   Sit to Supine Moderate Assist  (modA at LE)   Car Transfer Assist x 2  (Required Ax2 for car transfer in NW9 as pt had difficulty performing STS from low car surface with cushion  Pt was unable to perform STS from car surface until 3rd attempt and required therapist to perform posterior scooting with maxA due to sliding)   Car Transfer   Type of Assistance Needed Physical assistance   Physical Assistance Level Total assistance   Comment Ax2 required for car transfer in NW9 (low car surface with cushion); pt has a higher SUV surface for usual car transfer   Car Transfer CARE Score 1   Walk 10 Feet   Type of Assistance Needed Physical assistance   Physical Assistance Level Total assistance   Comment modA + WC follow (Ax2 total)   Walk 10 Feet CARE Score 1   Walk 50 Feet with Two Turns   Type of Assistance Needed Physical assistance   Physical Assistance Level Total assistance   Comment up to 80ft with modAx1 + WC follow of 2nd person   Walk 50 Feet with Two Turns CARE Score 1   Walk 150 Feet   Reason if not Attempted Safety concerns   Walk 150 Feet CARE Score 88   Ambulation   Does the patient walk? 2  Yes   Primary Mode of Locomotion Prior to Admission Walk   Distance Walked (feet) 80 ft  (20, 45ft)   Assist Device Roller Walker   Gait Pattern Inconsistant Faiza; Slow Faiza;Decreased foot clearance;R knee hyperextension;L knee hyperextension; Lateral deviation; Step through; Improper weight shift   Limitations Noted In Balance; Coordination; Endurance;Posture; Safety; Sequencing;Speed;Strength;Swing   Provided Assistance with: Balance;Weight Shift   Walk Assist Level Moderate Assist;Chair Follow   Findings Pt ambulated up to 80ft at RW with modA for stability and weight shifting + WC follow of another person   Toilet Transfer   Type of Assistance Needed Physical assistance   Physical Assistance Level 26%-50%   Comment WC <> BSC with min-modAx1 at RW + 1 on standby for safety   Toilet Transfer CARE Score 3   Toilet Transfer   Surface Assessed Bedside Commode   Limitations Noted In Balance;Confidence; Endurance; Safety; Sequencing;UE Strength;LE Strength   Adaptive Equipment   (RW)   Therapeutic Interventions   Strengthening Seated in WC: 10 reps of hip flx, knee ext, hip abd/add, ankle DF/PF; Anterior/posterior scooting in WC and on EOB for repositioning purposes   Flexibility Seated in WC: hamstring and gastroc PROM to improve muscle flexibility 60"x3 bilaterally due to fatigue at end of session   Assessment   Treatment Assessment Pt demonstrated ability to safely stand, transfer, and ambulate with RW this date  Pt performed SPT at [address] [place] [address] min-modAx1 with 1 on stand by  Pt performed sliding board transfers from [facility] <> bed with CGAx2 after assistance from setting up board, demonstrating good trunk control, lateral scooting, and hand placement  Pt demonstrated ability to ambulate up to 80ft with RW with min-modA for weight shifting and stability + WC follow of another person  Pt continues to lack eccentric control when descending to sit in chair but was able to perform STS Ax1 and lock knees with minimal to no knee buckling prior to pivoting to sit in chair  Pt required Ax2 for the car transfer due to low height of car on NW9 + sliding nature of the seat cushion  Pt required Ax2 for STS from low car surface and cuing on appropriate and safe hand placement; will plan on trialing car transfer as tolerated in pt's SUV during family training  Pt reporting intermittent fatigue in LE, requiring prolonged rest breaks between each task; however, pt had no major LOB or knee buckling with activity when provided with extensive rest breaks   Due to the progressive nature of MS and pt's fluctuating performance levels, continuing to recommend Ax2 slideboard transfers with nursing staff and may require full mechanical lift at home  Pt is progressing as expected with d/c planned for Thursday with family training on Wednesday  Problem List Decreased strength;Decreased endurance; Impaired balance;Decreased mobility; Decreased coordination;Decreased cognition; Impaired judgement;Decreased safety awareness;Pain   Barriers to Discharge Decreased caregiver support   PT Barriers   Physical Impairment Decreased strength;Decreased endurance; Impaired balance;Decreased mobility; Decreased coordination;Decreased cognition; Impaired judgement;Decreased safety awareness; Impaired sensation;Pain   Functional Limitation Car transfers; Ramp negotiation;Stair negotiation;Standing;Transfers; Walking; Wheelchair management   Plan   Treatment/Interventions Functional transfer training;LE strengthening/ROM; Elevations; Therapeutic exercise; Endurance training;Patient/family training;Bed mobility;Gait training   Progress Progressing toward goals  (intermittent progression/regression due to progressive MS)   Recommendation   PT Discharge Recommendation Home with home health rehabilitation   PT Therapy Minutes   PT Time In 1300   PT Time Out 1430   PT Total Time (minutes) 90   PT Mode of treatment - Individual (minutes) 90   PT Mode of treatment - Concurrent (minutes) 0   PT Mode of treatment - Group (minutes) 0   PT Mode of treatment - Co-treat (minutes) 0   PT Mode of Treatment - Total time(minutes) 90 minutes   PT Cumulative Minutes 1113   Therapy Time missed   Time missed?  No

## 2022-05-22 NOTE — PROGRESS NOTES
05/22/22 1030   Pain Assessment   Pain Assessment Tool 0-10   Pain Score No Pain   Restrictions/Precautions   Precautions Bed/chair alarms;Cognitive; Fall Risk;Contact/isolation;Supervision on toilet/commode  (hx MS)   Lifestyle   Autonomy "I do have a WC but we keep it in [facility] because we have to use it more often when we are there "   Eating   Type of Assistance Needed Set-up / clean-up   Physical Assistance Level No physical assistance   Eating CARE Score 5   Putting On/Taking Off Footwear   Type of Assistance Needed Physical assistance   Physical Assistance Level Total assistance   Putting On/Taking Off Footwear CARE Score 1   Sit to Stand   Type of Assistance Needed Physical assistance   Physical Assistance Level 76% or more   Comment MaxA w/ gait belt donned to stand at RW   Sit to Stand CARE Score 2   Bed-Chair Transfer   Type of Assistance Needed Physical assistance   Physical Assistance Level 76% or more   Comment MaxA x1 SB transfer from [facility]   Chair/Bed-to-Chair Transfer CARE Score 2   Functional Standing Tolerance   Time 3min 30s, 5min   Comments Two standing trials w/ RW at elevated table top while completing fxnl reach activity w/ unialteral release from RW to place small balls into tall bucket  Noted dec coordination in strength in BUE but most noted in LUE  Pt requries MaxA to achieve full stand w/ B knees locked out, but then maintains stance w/ Ivonne  Exercise Tools   Other Exercise Tool 1 Towel slides on table top w/ BUE completing reach forward to increase pt's ability to reach and lean forward as pt has been demo'ing difficulty w/ this while seated in WC  Pt able to complete 4x10 w/ no difficulties   Cognition   Overall Cognitive Status Impaired   Arousal/Participation Alert; Cooperative   Attention Attends with cues to redirect   Orientation Level Oriented X4   Memory Decreased recall of recent events   Following Commands Follows one step commands with increased time or repetition Assessment   Treatment Assessment OT session focusing on fxnl transfer w/ SB, UE TE, and STS trials w/ RW  Pt completes entire session this morning w/ only Ax1, however; time prevented SPT trials and would have used second person for at least SBA for safety  Pt reports to OT that she does own a WC which insurance purchased for her (she recalls purchase was w/in the last 5yrs) but that they keep the [facility] in [place]  Questioned pt as to why WC is kept in different state and pt states, "we use it more often when we are there    we use the transport chair around here " Reviewed w/ pt that her assist level continues to vary and if goal is for her to be Ax1 consistently when DC'd that it would be important for WC to be available for her to use w/ her  at home  Questioned pt if [facility] can be gotten from [facility]  this week and pt states "We won't be able to get it until the next time we go down    but it doesn't seem like we will make it there this season " Pt continues to demo dec understanding to her longtime dx of primary progressive MS despite frequent EDU provided by multiple disciples while on [facility]  Upcoming OT sessions to conintue to focus on NMR, strengthening, ADL routine in hopes that pt will be consistent AX1 prior to DC later this week, if needed may need to rec using Alanis lift and bed level ADLs, pt has own alanis at home  Assessment of family training FT to hopefully occur w/  Manette Signs prior to DC if he is agreeable to attend   Prognosis Fair   Problem List Decreased strength;Decreased range of motion;Decreased endurance;Decreased mobility; Impaired balance;Decreased coordination;Decreased cognition; Impaired judgement;Decreased safety awareness; Impaired sensation; Impaired tone   Plan   Treatment/Interventions ADL retraining;Functional transfer training; Therapeutic exercise; Endurance training;Patient/family training;Cognitive reorientation;Equipment eval/education; Compensatory technique education;Continued evaluation   Progress Progressing toward goals   Recommendation   OT Discharge Recommendation Home with home health rehabilitation   Equipment Recommended   (prieto GREENE BSC, WC, RW, swetha (all of [facility] pt states she already owns but not all is availble to her directly upon DC))   OT - OK to Discharge No   OT Therapy Minutes   OT Time In 1030   OT Time Out 1130   OT Total Time (minutes) 60   OT Mode of treatment - Individual (minutes) 60   OT Mode of treatment - Concurrent (minutes) 0   OT Mode of treatment - Group (minutes) 0   OT Mode of treatment - Co-treat (minutes) 0   OT Mode of Treatment - Total time(minutes) 60 minutes   OT Cumulative Minutes 1045

## 2022-05-23 PROCEDURE — 97530 THERAPEUTIC ACTIVITIES: CPT

## 2022-05-23 PROCEDURE — 97112 NEUROMUSCULAR REEDUCATION: CPT

## 2022-05-23 PROCEDURE — 99232 SBSQ HOSP IP/OBS MODERATE 35: CPT | Performed by: PHYSICAL MEDICINE & REHABILITATION

## 2022-05-23 PROCEDURE — 97535 SELF CARE MNGMENT TRAINING: CPT

## 2022-05-23 PROCEDURE — 99232 SBSQ HOSP IP/OBS MODERATE 35: CPT | Performed by: INTERNAL MEDICINE

## 2022-05-23 RX ADMIN — ENOXAPARIN SODIUM 40 MG: 40 INJECTION SUBCUTANEOUS at 10:48

## 2022-05-23 RX ADMIN — METHENAMINE HIPPURATE 1 G: 1 TABLET ORAL at 10:45

## 2022-05-23 RX ADMIN — BACLOFEN 10 MG: 10 TABLET ORAL at 15:45

## 2022-05-23 RX ADMIN — FUROSEMIDE 40 MG: 40 TABLET ORAL at 10:47

## 2022-05-23 RX ADMIN — METHENAMINE HIPPURATE 1 G: 1 TABLET ORAL at 18:15

## 2022-05-23 RX ADMIN — BACLOFEN 10 MG: 10 TABLET ORAL at 10:46

## 2022-05-23 RX ADMIN — DALFAMPRIDINE 10 MG: 10 TABLET, EXTENDED RELEASE ORAL at 10:47

## 2022-05-23 RX ADMIN — DALFAMPRIDINE 10 MG: 10 TABLET, EXTENDED RELEASE ORAL at 21:29

## 2022-05-23 RX ADMIN — Medication 2000 UNITS: at 10:46

## 2022-05-23 RX ADMIN — AMLODIPINE BESYLATE 2.5 MG: 2.5 TABLET ORAL at 10:45

## 2022-05-23 RX ADMIN — BACLOFEN 10 MG: 10 TABLET ORAL at 21:29

## 2022-05-23 NOTE — PROGRESS NOTES
05/23/22 0700   Pain Assessment   Pain Assessment Tool 0-10   Pain Score No Pain   Restrictions/Precautions   Precautions Bed/chair alarms;Cognitive; Fall Risk;Contact/isolation;Supervision on toilet/commode  (MS, suprapubic catheter)   Weight Bearing Restrictions No   ROM Restrictions No   Eating   Type of Assistance Needed Set-up / clean-up   Physical Assistance Level No physical assistance   Comment seated   Eating CARE Score 5   Oral Hygiene   Type of Assistance Needed Supervision   Physical Assistance Level No physical assistance   Comment seated in w/c at sink   Oral Hygiene CARE Score 4   Shower/Bathe Self   Type of Assistance Needed Physical assistance   Physical Assistance Level 51%-75%   Comment pt engages in shower routine seated on shower bench bathing all parts while remaining seated entirely  pt requires assist for lower legs/feet and buttocks  Shower/Bathe Self CARE Score 2   Tub/Shower Transfer   Limitations Noted In Balance; Coordination; Endurance;ROM;Safety; Sequencing;UE Strength;LE Strength   Adaptive Equipment Grab Bars;Transfer Bench   Assessed Shower   Findings pt completes SB transfer in/out of shower with Ax2 for safety   Upper Body Dressing   Type of Assistance Needed Physical assistance   Physical Assistance Level 51%-75%   Comment assist to don and adjust bra; assist to thread head through shirt and adjust over trunk   Upper Body Dressing CARE Score 2   Lower Body Dressing   Type of Assistance Needed Physical assistance   Physical Assistance Level Total assistance   Comment total assist to thread pull up brief and pants over feet, Ax2 in stance with GB to complete CM   Lower Body Dressing CARE Score 1   Putting On/Taking Off Footwear   Type of Assistance Needed Physical assistance   Physical Assistance Level Total assistance   Comment to complete footwear; TEDS   Putting On/Taking Off Footwear CARE Score 1   Sit to Stand   Type of Assistance Needed Physical assistance   Physical Assistance Level Total assistance   Comment Ax2 from shower chair to GB   Sit to Stand CARE Score 1   Bed-Chair Transfer   Type of Assistance Needed Physical assistance   Physical Assistance Level 51%-75%   Comment Mod Ax1 with SB bed to w/c   Chair/Bed-to-Chair Transfer CARE Score 2   Cognition   Overall Cognitive Status Impaired   Arousal/Participation Cooperative   Attention Attends with cues to redirect   Orientation Level Oriented X4   Memory Decreased recall of recent events   Following Commands Follows one step commands with increased time or repetition   Activity Tolerance   Activity Tolerance Patient tolerated treatment well   Assessment   Treatment Assessment pt engages in 90 minute skilled OT session focusing on ADL routine in shower, SB transfers, standing maggi/bal  see above for full func details  pt requesting use of SB for transfers this AM due to "it being early" and she feeling "stiff"  SB transfers OOB to w/c completed mod Ax1 however SB transfers in/out of shower with Ax2 for safety  attempted to stand Ax1 in shower (dry floor) with GB for CM however pt unable to safely complete stand with Ax1 so completed with Ax2  recommend continued skilled care to focus on ADL retraining, func transfers, standing maggi/bal, strength, endurance, family training, in order to decrease burden of care at d/c  Prognosis Fair   Problem List Decreased strength;Decreased endurance; Impaired balance;Decreased mobility; Decreased coordination;Decreased cognition; Impaired judgement;Decreased safety awareness;Pain   Barriers to Discharge Decreased caregiver support   Plan   Treatment/Interventions ADL retraining;Functional transfer training; Therapeutic exercise; Endurance training;Cognitive reorientation;Patient/family training;Equipment eval/education; Compensatory technique education   OT Therapy Minutes   OT Time In 0700   OT Time Out 0830   OT Total Time (minutes) 90   OT Mode of treatment - Individual (minutes) 90   OT Mode of treatment - Concurrent (minutes) 0   OT Mode of treatment - Group (minutes) 0   OT Mode of treatment - Co-treat (minutes) 0   OT Mode of Treatment - Total time(minutes) 90 minutes   OT Cumulative Minutes 1135   Therapy Time missed   Time missed?  No

## 2022-05-23 NOTE — PLAN OF CARE
Problem: Potential for Falls  Goal: Patient will remain free of falls  Description: INTERVENTIONS:  - Educate patient/family on patient safety including physical limitations  - Instruct patient to call for assistance with activity   - Consult OT/PT to assist with strengthening/mobility   - Keep Call bell within reach  - Keep bed low and locked with side rails adjusted as appropriate  - Keep care items and personal belongings within reach  - Initiate and maintain comfort rounds  - Make Fall Risk Sign visible to staff  - Offer Toileting every Hours, in advance of need  - Initiate/Maintainalarm  - Obtain necessary fall risk management equipment:   - Apply yellow socks and bracelet for high fall risk patients  - Consider moving patient to room near nurses station  Outcome: Progressing     Problem: Prexisting or High Potential for Compromised Skin Integrity  Goal: Skin integrity is maintained or improved  Description: INTERVENTIONS:  - Identify patients at risk for skin breakdown  - Assess and monitor skin integrity  - Assess and monitor nutrition and hydration status  - Monitor labs   - Assess for incontinence   - Turn and reposition patient  - Assist with mobility/ambulation  - Relieve pressure over bony prominences  - Avoid friction and shearing  - Provide appropriate hygiene as needed including keeping skin clean and dry  - Evaluate need for skin moisturizer/barrier cream  - Collaborate with interdisciplinary team   - Patient/family teaching  - Consider wound care consult   Outcome: Progressing     Problem: PAIN - ADULT  Goal: Verbalizes/displays adequate comfort level or baseline comfort level  Description: Interventions:  - Encourage patient to monitor pain and request assistance  - Assess pain using appropriate pain scale  - Administer analgesics based on type and severity of pain and evaluate response  - Implement non-pharmacological measures as appropriate and evaluate response  - Consider cultural and social influences on pain and pain management  - Notify physician/advanced practitioner if interventions unsuccessful or patient reports new pain  Outcome: Progressing     Problem: INFECTION - ADULT  Goal: Absence or prevention of progression during hospitalization  Description: INTERVENTIONS:  - Assess and monitor for signs and symptoms of infection  - Monitor lab/diagnostic results  - Monitor all insertion sites, i e  indwelling lines, tubes, and drains  - Monitor endotracheal if appropriate and nasal secretions for changes in amount and color  - [place] appropriate cooling/warming therapies per order  - Administer medications as ordered  - Instruct and encourage patient and family to use good hand hygiene technique  - Identify and instruct in appropriate isolation precautions for identified infection/condition  Outcome: Progressing     Problem: SAFETY ADULT  Goal: Patient will remain free of falls  Description: INTERVENTIONS:  - Educate patient/family on patient safety including physical limitations  - Instruct patient to call for assistance with activity   - Consult OT/PT to assist with strengthening/mobility   - Keep Call bell within reach  - Keep bed low and locked with side rails adjusted as appropriate  - Keep care items and personal belongings within reach  - Initiate and maintain comfort rounds  - Make Fall Risk Sign visible to staff  - Offer Toileting everyHours, in advance of need  - Initiate/Maintain rm  - Obtain necessary fall risk management equipment:  - Apply yellow socks and bracelet for high fall risk patients  - Consider moving patient to room near nurses station  Outcome: Progressing  Goal: Maintain or return to baseline ADL function  Description: INTERVENTIONS:  -  Assess patient's ability to carry out ADLs; assess patient's baseline for ADL function and identify physical deficits which impact ability to perform ADLs (bathing, care of mouth/teeth, toileting, grooming, dressing, etc )  - Assess/evaluate cause of self-care deficits   - Assess range of motion  - Assess patient's mobility; develop plan if impaired  - Assess patient's need for assistive devices and provide as appropriate  - Encourage maximum independence but intervene and supervise when necessary  - Involve family in performance of ADLs  - Assess for home care needs following discharge   - Consider OT consult to assist with ADL evaluation and planning for discharge  - Provide patient education as appropriate  Outcome: Progressing  Goal: Maintains/Returns to pre admission functional level  Description: INTERVENTIONS:  - Perform BMAT or MOVE assessment daily    - Set and communicate daily mobility goal to care team and patient/family/caregiver     - Collaborate with rehabilitation services on mobility goals if consulted  - Perform Range o  - Out of bed for toileting  - Record patient progress and toleration of activity level   Outcome: Progressing     Problem: DISCHARGE PLANNING  Goal: Discharge to home or other facility with appropriate resources  Description: INTERVENTIONS:  - Identify barriers to discharge w/patient and caregiver  - Arrange for needed discharge resources and transportation as appropriate  - Identify discharge learning needs (meds, wound care, etc )  - Arrange for interpretive services to assist at discharge as needed  - Refer to Case Management Department for coordinating discharge planning if the patient needs post-hospital services based on physician/advanced practitioner order or complex needs related to functional status, cognitive ability, or social support system  Outcome: Progressing     Problem: MOBILITY - ADULT  Goal: Maintain or return to baseline ADL function  Description: INTERVENTIONS:  -  Assess patient's ability to carry out ADLs; assess patient's baseline for ADL function and identify physical deficits which impact ability to perform ADLs (bathing, care of mouth/teeth, toileting, grooming, dressing, etc )  - Assess/evaluate cause of self-care deficits   - Assess range of motion  - Assess patient's mobility; develop plan if impaired  - Assess patient's need for assistive devices and provide as appropriate  - Encourage maximum independence but intervene and supervise when necessary  - Involve family in performance of ADLs  - Assess for home care needs following discharge   - Consider OT consult to assist with ADL evaluation and planning for discharge  - Provide patient education as appropriate  Outcome: Progressing  Goal: Maintains/Returns to pre admission functional level  Description: INTERVENTIONS:  - Perform BMAT or MOVE assessment daily    - Set and communicate daily mobility goal to care team and patient/family/caregiver     - Collaborate with rehabilitation services on mobility goals if consulted  - Perform Range o  - Out of bed for toileting  - Record patient progress and toleration of activity level   Outcome: Progressing

## 2022-05-23 NOTE — PROGRESS NOTES
Internal Medicine Progress Note  Patient: Arvin Ribeiro  Age/sex: 73 y o  female  Medical Record #: [id number]      ASSESSMENT/PLAN: (Interval History)  Rima Ibrahim is seen and examined and management for following issues:    Multiple Sclerosis  · She had a functional decline due to UTI  · Therapy per PMR  · Continue Baclofen to control spasticity   · She also takes dalfampridine ER 10mg 2x daily to improve walking speed       HTN  · Continue Amlodipine 2 5mg 2x daily  · She also takes Lasix 40mg twice weekly for LE edema  · Continue to monitor BP      Hemorrhoids  · D/t chronic constipation try to avoid  · Appreciate GI input  · No further testing unless drop in hgb and persistent maci blood  · Colonoscopy 4 years ago  · They recommend f/u with GI as outpatient    Neurogenic bowel and bladder  · SPC placed 22  · PMR managing neurogenic bowel management teaching  · No further need for flomax since now with [facility]      Anemia  · Review of previous labs revealed Hgb in the 12-13 range  · stable       DC plannin/26  The above assessment and plan was reviewed and updated as determined by my evaluation of the patient on 2022      Labs:   Results from last 7 days   Lab Units 22  1204   WBC Thousand/uL 4 63   HEMOGLOBIN g/dL 13 0   HEMATOCRIT % 40 8   PLATELETS Thousands/uL 363           Invalid input(s): LABISABELL KATZ                Review of Scheduled Meds:  Current Facility-Administered Medications   Medication Dose Route Frequency Provider Last Rate    acetaminophen  650 mg Oral Q6H PRN Becky Stone MD      amLODIPine  2 5 mg Oral Q12H Humberto Olivas MD      baclofen  10 mg Oral TID Humberto Olivas MD      cholecalciferol  2,000 Units Oral Daily Humberto Olivas MD      Dalfampridine ER  10 mg Oral Q12H Cintia Fernandez MD      enoxaparin  40 mg Subcutaneous Daily Humberto Olivas MD      furosemide  40 mg Oral Once per day on  Humberto Olivas MD      methenamine hippurate  1 g Oral BID MD Phna Peace ANTIFUNGAL   Topical BID Sebastian COLIN Ar[place]ula      polyethylene glycol  17 g Oral Daily PRN Keke Webber MD         Subjective/ HPI: Patient seen and examined  Patients overnight issues or events were reviewed with nursing or staff during rounds or morning huddle session  New or overnight issues include the following:     Pt seen in her room  She was inconsistent with therapy this weekend but reports feeling better today  She denies any other complaints  ROS:   A 10 point ROS was performed; negative except as noted above  Imaging:     No orders to display       *Labs /Radiology studies reviewed  *Medications reviewed and reconciled as needed  *Please refer to order section for additional ordered labs studies  *Case discussed with primary attending during morning huddle case rounds    Physical Examination:  Vitals:   Vitals:    05/22/22 0945 05/22/22 1500 05/22/22 2042 05/23/22 0602   BP: 123/52 134/61 118/58 126/75   BP Location:  Right arm Left arm Left arm   Pulse:  64 68 69   Resp:  17 17 18   Temp:  97 6 °F (36 4 °C) 97 8 °F (36 6 °C) 97 5 °F (36 4 °C)   TempSrc:  Oral Oral Oral   SpO2:  99% 95% 95%   Weight:       Height:         GEN: No apparent distress, interactive  NEURO: Alert and oriented x3  HEENT: Pupils are equal and reactive, EOMI, mucous membranes are moist, face symmetrical  CV: S1 S2 regular, no MRG, no peripheral edema noted  RESP: Lungs are clear bilaterally, no wheezes, rales or rhonchi noted, on room air, respirations easy and non labored  GI: Flat, soft non tender, non distended; +BS x4  : SP catheter draining clear yellow urine  MUSC: Moves all extremities; generalized deconditioning  SKIN: pink, warm and dry, normal turgor, no rashes, lesions    The above physical exam was reviewed and updated as determined by my evaluation of the patient on 5/23/2022      Invasive Devices  Report    Drain  Duration           Suprapubic Catheter 18 Fr  26 days VTE Pharmacologic Prophylaxis: Enoxaparin  Code Status: Level 1 - Full Code  Current Length of Stay: 14 day(s)      Total time spent:  30 minutes with more than 50% spent counseling/coordinating care  Counseling includes discussion with patient re: progress  and discussion with patient of his/her current medical state/information  Coordination of patient's care was performed in conjunction with primary service  Time invested included review of patient's labs, vitals, and management of their comorbidities with continued monitoring  In addition, this patient was discussed with medical team including physician and advanced extenders  The care of the patient was extensively discussed and appropriate treatment plan was formulated unique for this patient  ** Please Note:  voice to text software may have been used in the creation of this document   Although proof errors in transcription or interpretation are a potential of such software**

## 2022-05-23 NOTE — PROGRESS NOTES
Physical Medicine and Rehabilitation Progress Note  Rima Mora 73 y o  female MRN: [id number]  Unit/Bed#: [facility] 459-01 Encounter: [id number]    Chief Complaint: Feeling well  Excited to go home this week  Interval History: No acute events over the weekend  Saturday therapy noticed she was unable to ambulate with them  The next day she was able to do [de-identified]' modA with chair follow (maxA)  She states she feels fine, and had no CP, SOB, fevers, chills, N/V  No symptoms of UTI (She can tell when she is developing one)  She is feelign fine today  She says she tends to have large fluctuations like this at home  She denies any abdominal pain, flank pain and otherwise is doing well  Assessment/Plan - Continue plan of care as per primary attending, unless otherwise stated below:      · Multiple Sclerosis: PT/OT  Continue Vitamin D supplementation, home Dalfampridine  Has intrathecal methotrexate due in June  Outpatient f/u with Neuro  · Plan is for family training on 5/25 (family cannot make it tomorrow)  · Plan is for discharge on 5/26  · Recurrent UTIs: Most recently completed abx this past hospitalization  Now on hiprex for prevention  Outpatient f/u with Urology arranged for 5/31  · HTN: Continue current regimen with Amlodipine BID and Lasix twice weekly  IM is following   · Dyslipidemia: Declined statin  Continue current management and f/u  · Chronic LE edema: Stable, improved  Continue Lasix  · Spasticity/neuropathic pain: Continue current management  · Neurogenic bladder: continue SPT  Outpatient f/u on 5/31  · Neurogenic bowel: Off scheduled meds  Monitor  Encouraged her to take meds if no BM by tomorrow  · DVT PPx: Lovenox, SCDs      Santiago Sheppard MD  Physical Medicine and Rehabilitation      Review of Systems: A 10 point review of systems was negative except for what is noted in the HPI          Current Facility-Administered Medications:     acetaminophen (TYLENOL) tablet 650 mg, 650 mg, Oral, Q6H PRN, Modesta Meléndez MD, 650 mg at 05/21/22 1114    amLODIPine (NORVASC) tablet 2 5 mg, 2 5 mg, Oral, Q12H, Ivette Drew MD, 2 5 mg at 05/23/22 1045    baclofen tablet 10 mg, 10 mg, Oral, TID, Ivette Drew MD, 10 mg at 05/23/22 1046    cholecalciferol (VITAMIN D3) tablet 2,000 Units, 2,000 Units, Oral, Daily, Ivette Drew MD, 2,000 Units at 05/23/22 1046    Dalfampridine ER TB12 10 mg, 10 mg, Oral, Q12H, Ekta Bains MD, 10 mg at 05/23/22 1047    enoxaparin (LOVENOX) subcutaneous injection 40 mg, 40 mg, Subcutaneous, Daily, Ivette Drew MD, 40 mg at 05/23/22 1048    furosemide (LASIX) tablet 40 mg, 40 mg, Oral, Once per day on Mon Fri, Ivette Drew MD, 40 mg at 05/23/22 1047    methenamine hippurate (HIPREX) tablet 1 g, 1 g, Oral, BID, Ivette Drew MD, 1 g at 05/23/22 1045    moisture barrier miconazole 2% cream (aka NOE MOISTURE BARRIER ANTIFUNGAL CREAM), , Topical, BID, COLIN Lee, 1 application at 80/45/95 1751    polyethylene glycol (MIRALAX) packet 17 g, 17 g, Oral, Daily PRN, Ivette Drew MD    Physical Exam:  Temp:  [97 5 °F (36 4 °C)-97 8 °F (36 6 °C)] 97 5 °F (36 4 °C)  HR:  [64-69] 69  Resp:  [17-18] 18  BP: (118-134)/(58-75) 120/69  SpO2:  [95 %-99 %] 95 %    Gen: No acute distress, Well-nourished, well-appearing  HEENT: Moist mucus membranes, Normocephalic/Atraumatic  Cardiovascular: Regular rate, rhythm, S1/S2  Distal pulses palpable  Heme/Extr: No edema  Pulmonary: Non-labored breathing  Lungs CTAB  : + SPT, functioning  GI: Soft, non-tender, non-distended  BS+  Integumentary: Skin is warm, dry  Neuro: AAOx3, Speech is intact  Appropriate to questioning  Psych: Normal mood and affect  Laboratory:  Reviewed, no new labs     Results from last 7 days   Lab Units 05/16/22  1204   HEMOGLOBIN g/dL 13 0   HEMATOCRIT % 40 8   WBC Thousand/uL 4 63           Invalid input(s): CA         Diagnostic Studies: Reviewed, no new imaging   No orders to display ** Please Note: Fluency Direct voice to text software may have been used in the creation of this document  **    Total visit time: 25 minutes, with more than 50% spent counseling/coordinating care  Counseling includes discussion with patient re: progress in therapies, functional issues observed by therapy staff, and discussion with patient regarding their current medical state and wellbeing  Coordination of patient's care was performed in conjunction with Internal Medicine service to monitor patient's labs, vitals, and management of their comorbidities

## 2022-05-23 NOTE — PROGRESS NOTES
05/23/22 1510   Pain Assessment   Effect of Pain on Daily Activities noted moderate pain intermittently in L UE from shoudler down to her wrist, she said this has been on and off pain for 6  months   Restrictions/Precautions   Precautions Bed/chair alarms; Fall Risk; [place]; Supervision on toilet/commode   Cognition   Arousal/Participation Cooperative   Subjective   Subjective pt reported her  needed to leave before this session   Sit to Stand   Type of Assistance Needed Physical assistance; Adaptive equipment   Physical Assistance Level 76% or more   Sit to Stand CARE Score 2   Transfer Bed/Chair/Wheelchair   Limitations Noted In Balance; Endurance;Problem Solving; Sequencing;UE Strength;LE Strength   Adaptive Equipment Roller Walker   Findings first performed sit<>stand from [facility] to RW at [facility] in total, with A provided at trunk/proximal hip area to provide stability and balance for her to transition her hands to [facility] RW  then stood for 1 min with R knee blocked  MAxA to sit down as she had difficulty finding the arm rest with her hand  for each attempt to stand, did not initially proivde A but allowed her time to prblem solve through sequencing  reviewed ant weight shift with her to facilitate improvement in sit<>stand, but then pt reported this is when her arms hurt/feel fatigued at times and her arms cant push her up  reviewed the general goal of her legs and trunk being strong enough to allow her to stand so she doesnt' rely on her arms  Other Comments   Comments session focused on mobility from [facility], as well as review of positioning/HEP items for home  She noted she occasionally gets a pain traveling down her left arm to her hand, and has been going on for about 6 months  It's intermittent pain, and it came on today when she was trying to stand up wtih L hand on the arm rest of the   Due to her forward head posture, and kyphotic posture, it's possible there is nerve root compression    reviewed proper posture, discussed sitting in a recliner chair during the day at times to have support from the chair as her body fatigues during the day  reviewed how she can use pillows for arm support  , folded a bed sheet to use as a lumbar support roll, which she said was more comfortable with it in place than without it  reviewed they sell back cushions that are contoured to give her lumbar support as an option for home  she reported she typically sits in a kitchen chair that is on wheels / its like an office chair  reviewed recommendations for HEP including scap squeezes, which she was able to complete today  Pt demonstrated undersatnding of education  to review with team/pt handout for HEP  also talked about prevetning secondary complications, such as how posture is related to breathing to prevent secondary complication of pneumonia; reviewed need to sit<>stand/change position druing the day to prevent skin breakdown  and to incorporate flexibility exercises at home (with husbands help) to maintain ROM and prevetn contractures   Assessment   PT Family training done with: will need to perform car trasnfer on Thursday (day of d/c) as pt's  wont be able to come in tomorrow or wednesday   PT Barriers   Physical Impairment Decreased strength;Decreased range of motion;Decreased endurance; Impaired balance;Decreased mobility;Pain   Functional Limitation Car transfers; Ramp negotiation;Stair negotiation;Standing;Transfers; Wheelchair management   Plan   Treatment/Interventions Functional transfer training;LE strengthening/ROM; Therapeutic exercise; Endurance training;Patient/family training;Equipment eval/education; Bed mobility;Gait training   Progress Slow progress, decreased activity tolerance   Recommendation   PT Discharge Recommendation Home with home health rehabilitation   Equipment Recommended Wheelchair   PT Therapy Minutes   PT Time In 1510   PT Time Out 1540   PT Total Time (minutes) 30   PT Mode of treatment - Individual (minutes) 30   PT Mode of treatment - Concurrent (minutes) 0   PT Mode of treatment - Group (minutes) 0   PT Mode of treatment - Co-treat (minutes) 0   PT Mode of Treatment - Total time(minutes) 30 minutes   PT Cumulative Minutes 1203   Therapy Time missed   Time missed?  No

## 2022-05-23 NOTE — PROGRESS NOTES
05/23/22 0909   Pain Assessment   Pain Assessment Tool 0-10   Pain Score No Pain   Restrictions/Precautions   Precautions Fall Risk;Bed/chair alarms;Contact/isolation;Supervision on toilet/commode;Cognitive   Cognition   Overall Cognitive Status Impaired   Subjective   Subjective no complaints at this time; pt agreeable to therapy   Sit to Stand   Type of Assistance Needed Physical assistance   Physical Assistance Level 51%-75%   Comment at best CG/Laura, modA at end of session   Sit to Stand CARE Score 2   Bed-Chair Transfer   Type of Assistance Needed Physical assistance; Adaptive equipment   Physical Assistance Level 26%-50%   Comment SPT with RW   Chair/Bed-to-Chair Transfer CARE Score 3   Transfer Bed/Chair/Wheelchair   Findings txs cont to fluctuate based on fatigue   Ambulation   Findings will assess next session   Wheel 150 Feet   Reason if not Attempted Safety concerns   Wheel 150 Feet CARE Score 88   Curb or Single Stair   Reason if not Attempted Safety concerns   1 Step (Curb) CARE Score 88   4 Steps   Reason if not Attempted Safety concerns   4 Steps CARE Score 88   12 Steps   Reason if not Attempted Safety concerns   12 Steps CARE Score 88   Therapeutic Interventions   Strengthening repeated STS with rest break between for functional strengthening   Flexibility B heel cord and HS x5mins end of session   Balance unsupported sitting edge ofmat with ball toss and 1-2# dowel  Assessment   Treatment Assessment pt tolerated tx well today focusing on unsupported sitting for core and trunk control and strengthening with exs to faciliate trunk rotation  pt cont to fluctuate with STS requiring up to modA to stand even from elevated surface  pt contacted  who will be in later today and will come back tomorrow for family training  pt making good progress and will cont to work on functional txs and mobility for safety and to decrease burden of care at time of d/c     Problem List Decreased strength;Decreased endurance; Impaired balance;Decreased mobility; Decreased coordination;Decreased cognition; Impaired judgement;Decreased safety awareness;Pain   Barriers to Discharge Decreased caregiver support   PT Barriers   Functional Limitation Car transfers; Ramp negotiation;Stair negotiation;Standing;Transfers; Walking; Wheelchair management   Plan   Progress Progressing toward goals   Recommendation   PT Discharge Recommendation Home with home health rehabilitation   Equipment Recommended Wheelchair   PT Therapy Minutes   PT Time In 0930   PT Time Out 1030   PT Total Time (minutes) 60   PT Mode of treatment - Individual (minutes) 60   PT Mode of treatment - Concurrent (minutes) 0   PT Mode of treatment - Group (minutes) 0   PT Mode of treatment - Co-treat (minutes) 0   PT Mode of Treatment - Total time(minutes) 60 minutes   PT Cumulative Minutes 1173   Therapy Time missed   Time missed?  No

## 2022-05-24 PROCEDURE — 99232 SBSQ HOSP IP/OBS MODERATE 35: CPT | Performed by: INTERNAL MEDICINE

## 2022-05-24 PROCEDURE — 97535 SELF CARE MNGMENT TRAINING: CPT

## 2022-05-24 PROCEDURE — 97116 GAIT TRAINING THERAPY: CPT

## 2022-05-24 PROCEDURE — 97530 THERAPEUTIC ACTIVITIES: CPT

## 2022-05-24 PROCEDURE — 99233 SBSQ HOSP IP/OBS HIGH 50: CPT | Performed by: PHYSICAL MEDICINE & REHABILITATION

## 2022-05-24 RX ADMIN — AMLODIPINE BESYLATE 2.5 MG: 2.5 TABLET ORAL at 08:17

## 2022-05-24 RX ADMIN — METHENAMINE HIPPURATE 1 G: 1 TABLET ORAL at 17:02

## 2022-05-24 RX ADMIN — MICONAZOLE NITRATE: 20 CREAM TOPICAL at 17:04

## 2022-05-24 RX ADMIN — BACLOFEN 10 MG: 10 TABLET ORAL at 08:17

## 2022-05-24 RX ADMIN — DALFAMPRIDINE 10 MG: 10 TABLET, EXTENDED RELEASE ORAL at 10:17

## 2022-05-24 RX ADMIN — DALFAMPRIDINE 10 MG: 10 TABLET, EXTENDED RELEASE ORAL at 21:02

## 2022-05-24 RX ADMIN — METHENAMINE HIPPURATE 1 G: 1 TABLET ORAL at 08:18

## 2022-05-24 RX ADMIN — BACLOFEN 10 MG: 10 TABLET ORAL at 21:01

## 2022-05-24 RX ADMIN — AMLODIPINE BESYLATE 2.5 MG: 2.5 TABLET ORAL at 21:01

## 2022-05-24 RX ADMIN — Medication 2000 UNITS: at 08:17

## 2022-05-24 RX ADMIN — BACLOFEN 10 MG: 10 TABLET ORAL at 17:02

## 2022-05-24 RX ADMIN — ENOXAPARIN SODIUM 40 MG: 40 INJECTION SUBCUTANEOUS at 08:17

## 2022-05-24 NOTE — CASE MANAGEMENT
In preparation for d/c, cm received DME order for 29 inch slideboard from therapy  Cm placed order via ecin to [facility] to deliver to room prior to d/c on 5/26  Following to assist w/ d/c planning needs

## 2022-05-24 NOTE — PROGRESS NOTES
05/24/22 0830   Pain Assessment   Pain Assessment Tool 0-10   Pain Score No Pain   Restrictions/Precautions   Precautions Bed/chair alarms;Cognitive; Fall Risk; Nixon; Supervision on toilet/commode   Weight Bearing Restrictions No   ROM Restrictions No   Oral Hygiene   Type of Assistance Needed Supervision   Physical Assistance Level No physical assistance   Comment seated to complete; pt reports either sitting when having a "bad day" or ability to stand when having a "good day"   Oral Hygiene CARE Score 4   Shower/Bathe Self   Type of Assistance Needed Physical assistance   Physical Assistance Level 26%-50%   Comment pt completes sponge bathing routine seated EOB to simulate home set up  pt bathes UB with set up/supervision  cross leg tech to bathe lower legs/feet  stands with RW with assist to bathe stacey area and buttocks due to decreased UE release due to fatigue/weakness  Shower/Bathe Self CARE Score 3   Upper Body Dressing   Type of Assistance Needed Physical assistance   Physical Assistance Level 26%-50%   Comment to don/adjust bra and pull shirt down over trunk   Upper Body Dressing CARE Score 3   Lower Body Dressing   Type of Assistance Needed Physical assistance   Physical Assistance Level 26%-50%   Comment seated EOB to thread pull up brief and pants over feet, able to thread nixon bag through pants as well; requires assist for CM in stance due to decreased UE release  Lower Body Dressing CARE Score 3   Putting On/Taking Off Footwear   Type of Assistance Needed Set-up / Isabel Pipe; Adaptive equipment   Physical Assistance Level No physical assistance   Comment seated EOB performing cross leg tech to don TEDS, shoes with elastic shoe laces and use of shoe horn to don shoes  inc time required     Putting On/Taking Off Footwear CARE Score 5   Sit to Stand   Type of Assistance Needed Incidental touching   Physical Assistance Level No physical assistance   Comment CG with RW from 26 in height bed to simulate home set up; inc time and multiple attempts however does complete at CG  level of assist does vary depending upon surface height and level of fatigue  Sit to Stand CARE Score 4   Bed Mobility   Supine to Sit 5  Supervision   Additional items HOB elevated; Bedrails  (HOB elevate almost fully, use of bed rail to simulate home set up)   Bed-Chair Transfer   Type of Assistance Needed Physical assistance   Physical Assistance Level 25% or less   Comment with RW   Chair/Bed-to-Chair Transfer CARE Score 3   Cognition   Overall Cognitive Status Impaired   Arousal/Participation Cooperative   Attention Attends with cues to redirect   Orientation Level Oriented X4   Memory Decreased recall of recent events   Following Commands Follows one step commands with increased time or repetition   Activity Tolerance   Activity Tolerance Patient tolerated treatment well   Assessment   Treatment Assessment pt engages in 90 minute skilled OT sessin focusing on ADL retraining EOB, STS and SPT with RW  see above for full func details  pt in bed completing partial sponge bath with RN  pt voices frustrations as she reports, "people just come in here and wipe me down and throw clothes on me  I never have a chance to do it myself " discussed that when nursing assists pt with ADLs, it is usually quick so that pt can be on time for therapy schedule however recalled that when pt has ADL sessions with OT that pt completes OOB in w/c at sink or in the shower with pt denying this despite having had a shower yesterday morning with OT  pt reports that she does not "function before 11 am" and that we are not seeing the "real her" and so discussed that unfortunately therapy is only present until 3:30 and that we have 3 hours of therapy to schedule within pt "window" (no earlier than 11 am) which is also not feasible  pt just repeating, "it'll be better when I get home  I'll be able to manage  Things are different here"   set up environment to simulate home set up with pt sitting EOB, raised bed to 26 in high and placed metal stool with railing under pt feet as pt would at home  pt able to complete LB self care by performing cross leg tech requiring assist for stacey/rear hygiene and CM when in stance due to decreased UE release  continue to recommend AD which is safest at that particular moment ie RW vs SB vs swetha  pt to purchase SB however did ask to have SB ran through insurance to see if it would be covered prior to purchasing--informed CM Ivanna  recommend continued skilled care to focus on ADL retraining, func transfers, standing maggi/bal, strength, endurance, in order to decrease burden of care at d/c  Prognosis Fair   Problem List Decreased strength;Decreased endurance; Impaired balance;Decreased mobility; Decreased coordination;Decreased cognition; Impaired judgement;Decreased safety awareness;Pain   Barriers to Discharge Decreased caregiver support   Plan   Treatment/Interventions ADL retraining;Functional transfer training; Therapeutic exercise; Endurance training;Cognitive reorientation;Patient/family training;Equipment eval/education; Compensatory technique education   OT Therapy Minutes   OT Time In 0830   OT Time Out 1000   OT Total Time (minutes) 90   OT Mode of treatment - Individual (minutes) 90   OT Mode of treatment - Concurrent (minutes) 0   OT Mode of treatment - Group (minutes) 0   OT Mode of treatment - Co-treat (minutes) 0   OT Mode of Treatment - Total time(minutes) 90 minutes   OT Cumulative Minutes 1225   Therapy Time missed   Time missed?  No

## 2022-05-24 NOTE — PLAN OF CARE
Problem: Potential for Falls  Goal: Patient will remain free of falls  Description: INTERVENTIONS:  - Educate patient/family on patient safety including physical limitations  - Instruct patient to call for assistance with activity   - Consult OT/PT to assist with strengthening/mobility   - Keep Call bell within reach  - Keep bed low and locked with side rails adjusted as appropriate  - Keep care items and personal belongings within reach  - Initiate and maintain comfort rounds  - Make Fall Risk Sign visible to staff  - - Consider moving patient to room near nurses station  Outcome: Progressing

## 2022-05-24 NOTE — PROGRESS NOTES
05/24/22 1200   Pain Assessment   Pain Assessment Tool 0-10   Pain Score 5   Pain Location/Orientation Orientation: Left;Orientation: Upper; Location: Arm   Hospital Pain Intervention(s) Repositioned; Emotional support   Restrictions/Precautions   Precautions Bed/chair alarms;Cognitive; Fall Risk; [place]onson; Supervision on toilet/commode;Pain   Weight Bearing Restrictions No   ROM Restrictions No   Cognition   Overall Cognitive Status Impaired   Arousal/Participation Cooperative   Attention Attends with cues to redirect   Orientation Level Oriented X4   Memory Decreased recall of recent events   Following Commands Follows one step commands with increased time or repetition   Subjective   Subjective Pt stating her  was supposed to be in for FT later this PM however now is not sure he will be able to attend  Requesting to use commode  RN assisted PT with chair/commode swap out method; pt sat but was unable to have BM on first attempt  Did request to use commode again later in session and was able to have BM  Sit to Stand   Type of Assistance Needed Physical assistance;Verbal cues   Physical Assistance Level 26%-50%   Comment Fluctuates throughout session  At best, pt able to perform sit/stand from w/c (one hand on armrest, one on RW) with CGA  As pt fatigued, pt required mod A for sit/stands from w/c or from bedside  Sit to Stand CARE Score 3   Bed-Chair Transfer   Type of Assistance Needed Physical assistance;Verbal cues; Adaptive equipment   Physical Assistance Level 25% or less   Comment Once standing, pt consistently able to perform SPT with RW at min A/steadying assist level  Inc time required  Cues for posture, foot placement, RW management around w/c wheels  Chair/Bed-to-Chair Transfer CARE Score 3   Transfer Bed/Chair/Wheelchair   Limitations Noted In Confidence;Balance; Endurance;Problem Solving;Sensation;UE Strength;LE Strength   Adaptive Equipment Roller Walker   Findings Repeated sit/stands and SPT with RW performed  Fluctuating assist required from CGA at best to mod A for sit to stand from w/c with multiple attempts, momentum and cues to maintain fwd weight shift  Once standing, pt able to complete SPT at CG/min A level  Occasional bilat knee instability noted however no significant buckling or LOB req in assist observed  Manual cues to trunk, pelvis to provide inc stability during SPT  Walk 10 Feet   Type of Assistance Needed Physical assistance;Verbal cues; Adaptive equipment   Physical Assistance Level Total assistance   Comment Min A + w/c follow   Walk 10 Feet CARE Score 1   Walk 50 Feet with Two Turns   Comment Distance limited by pt c/o fatigue  Reason if not Attempted Safety concerns   Walk 50 Feet with Two Turns CARE Score 88   Walk 150 Feet   Reason if not Attempted Safety concerns   Walk 150 Feet CARE Score 88   Walking 10 Feet on Uneven Surfaces   Reason if not Attempted Safety concerns   Walking 10 Feet on Uneven Surfaces CARE Score 88   Ambulation   Does the patient walk? 2  Yes   Primary Mode of Locomotion Prior to Admission Walk   Distance Walked (feet) 30 ft   Assist Device Roller Walker   Gait Pattern Inconsistant Faiza; Slow Faiza;Decreased foot clearance;R knee hyperextension;L knee hyperextension; Shuffle;Improper weight shift   Limitations Noted In Balance;Device Management; Endurance; Heel Strike;Midline Orientation;Posture; Safety; Sensation; Sequencing;Speed;Strength;Swing   Provided Assistance with: Balance;Weight Shift;Trunk Support   Walk Assist Level Minimum Assist;Chair Follow   Findings Min A for balance/stability  W/c follow from second person for safety however no significant LOB/knee buckling or instability noted and pt able to communicate need for seated rest break     Curb or Single Stair   Reason if not Attempted Safety concerns   1 Step (Curb) CARE Score 88   4 Steps   Reason if not Attempted Safety concerns   4 Steps CARE Score 88   12 Steps   Reason if not Attempted Safety concerns   12 Steps CARE Score 88   Toilet Transfer   Type of Assistance Needed Physical assistance   Physical Assistance Level Total assistance   Comment Toileting x2 this session using chair swap out method, assist x2  Min A for standing/steadying balance  Assist of second person for chair swap and hygiene/clothing mgmt  Toilet Transfer CARE Score 1   Toilet Transfer   Surface Assessed Bedside Commode   Limitations Noted In Balance;Confidence; Endurance;UE Strength;LE Strength; Safety   Adaptive Equipment   (Standing at bedrail for chair swap method )   Findings A x2 for toileting   Therapeutic Interventions   Strengthening Repeated sit to stands from w/c and from bed  Repeated SPT with RW w/c to/from bed for functional strengthening  Flexibility Bilat hamstring, heel cord, hip IR/ER stretch  Assessment   Treatment Assessment Pt participated in 60 minute PT session with focus on functional transfers and functional strengthening with repeated sit/stands and review/edu on SPT with RW  Session also included toileting x2  Pt demo good tolerance to standing and transfers today  She was able to complete consistent SPT with RW at min A level  She conts to require fluctuating level of assist for sit to stand transfers dependent upon level of fatigue, surface height and pt consistency with technique (fwd weight shift, hand placement, posture)  Edu provided on both SPT with RW and slide board transfers at home  Reviewed LE stretches and upper bdy scap pinches to inc postural support, singh with transfers and to prevent secondary complications related to her MS dx  Pt receptive to all edu however does acknowledge her and her  "have their way" of doing things "over the years " Reinforced fluctuating and progressive nature of her dx to encourage safety of both herself and her spouse  At this time, cont to recommend use of slide board, SPT with RW and w/c level at home   Anticipating d/c home with spouse Thursday 5/26  Briefly discussed option for pt to hire assist (to offset some caregiver burden from spouse) and pt states they have discussed this option however are not yet ready to pursue hiring outside help  Family/Caregiver Present No   Problem List Decreased strength;Decreased range of motion;Decreased endurance; Impaired balance;Decreased mobility; Decreased coordination;Decreased cognition; Impaired judgement;Decreased safety awareness; Impaired sensation; Impaired tone;Pain   Barriers to Discharge Decreased caregiver support   PT Barriers   Physical Impairment Decreased strength;Decreased range of motion;Decreased endurance; Impaired balance;Decreased mobility; Decreased coordination;Decreased cognition; Impaired judgement;Decreased safety awareness; Impaired sensation; Impaired tone;Pain   Functional Limitation Car transfers; Ramp negotiation;Stair negotiation;Standing;Transfers; Walking; Wheelchair management   Plan   Treatment/Interventions Functional transfer training;LE strengthening/ROM; Elevations; Therapeutic exercise; Endurance training;Cognitive reorientation;Patient/family training;Equipment eval/education; Bed mobility;Gait training; Compensatory technique education   Progress Slow progress, decreased activity tolerance   Recommendation   PT Discharge Recommendation Home with home health rehabilitation   Equipment Recommended Wheelchair   PT Therapy Minutes   PT Time In 1200   PT Time Out 1300   PT Total Time (minutes) 60   PT Mode of treatment - Individual (minutes) 60   PT Mode of treatment - Concurrent (minutes) 0   PT Mode of treatment - Group (minutes) 0   PT Mode of treatment - Co-treat (minutes) 0   PT Mode of Treatment - Total time(minutes) 60 minutes   PT Cumulative Minutes 0601   Therapy Time missed   Time missed?  No

## 2022-05-24 NOTE — PLAN OF CARE
Problem: Potential for Falls  Goal: Patient will remain free of falls  Description: INTERVENTIONS:  - Educate patient/family on patient safety including physical limitations  - Instruct patient to call for assistance with activity   - Consult OT/PT to assist with strengthening/mobility   - Keep Call bell within reach  - Keep bed low and locked with side rails adjusted as appropriate  - Keep care items and personal belongings within reach  - Initiate and maintain comfort rounds  - Make Fall Risk Sign visible to staff  - Offer Toileting every  Hours, in advance of need  - Initiate/Maintain alarm  - Obtain necessary fall risk management equipment  - Apply yellow socks and bracelet for high fall risk patients  - Consider moving patient to room near nurses station  Outcome: Progressing     Problem: Prexisting or High Potential for Compromised Skin Integrity  Goal: Skin integrity is maintained or improved  Description: INTERVENTIONS:  - Identify patients at risk for skin breakdown  - Assess and monitor skin integrity  - Assess and monitor nutrition and hydration status  - Monitor labs   - Assess for incontinence   - Turn and reposition patient  - Assist with mobility/ambulation  - Relieve pressure over bony prominences  - Avoid friction and shearing  - Provide appropriate hygiene as needed including keeping skin clean and dry  - Evaluate need for skin moisturizer/barrier cream  - Collaborate with interdisciplinary team   - Patient/family teaching  - Consider wound care consult   Outcome: Progressing     Problem: PAIN - ADULT  Goal: Verbalizes/displays adequate comfort level or baseline comfort level  Description: Interventions:  - Encourage patient to monitor pain and request assistance  - Assess pain using appropriate pain scale  - Administer analgesics based on type and severity of pain and evaluate response  - Implement non-pharmacological measures as appropriate and evaluate response  - Consider cultural and social influences on pain and pain management  - Notify physician/advanced practitioner if interventions unsuccessful or patient reports new pain  Outcome: Progressing

## 2022-05-24 NOTE — PROGRESS NOTES
Internal Medicine Progress Note  Patient: Silke Garg  Age/sex: 73 y o  female  Medical Record #: [id number]      ASSESSMENT/PLAN: (Interval History)  Rima Irene is seen and examined and management for following issues:    Multiple Sclerosis  · She had a functional decline due to UTI  · Therapy per PMR  · Continue Baclofen to control spasticity   · She also takes dalfampridine ER 10mg 2x daily to improve walking speed       HTN  · Continue Amlodipine 2 5mg 2x daily  · She also takes Lasix 40mg twice weekly for LE edema  · Continue to monitor BP      Hemorrhoids  · D/t chronic constipation try to avoid  · Appreciate GI input  · No further testing unless drop in hgb and persistent maci blood  · Colonoscopy 4 years ago  · They recommend f/u with GI as outpatient    Neurogenic bowel and bladder  · SPC placed 22  · PMR managing neurogenic bowel management teaching  · No further need for flomax since now with [address]      Anemia  · Review of previous labs revealed Hgb in the 12-13 range  · stable       DC plannin/26  The above assessment and plan was reviewed and updated as determined by my evaluation of the patient on 2022  Labs:              Invalid input(s): LABGLOM, CMP                Review of Scheduled Meds:  Current Facility-Administered Medications   Medication Dose Route Frequency Provider Last Rate    acetaminophen  650 mg Oral Q6H PRN Forest Frederick MD      amLODIPine  2 5 mg Oral Q12H Cherie Chen MD      baclofen  10 mg Oral TID Cherie Chen MD      cholecalciferol  2,000 Units Oral Daily Cherie Chen MD      Dalfampridine ER  10 mg Oral Q12H Kory Gonzalez MD      enoxaparin  40 mg Subcutaneous Daily Cherie Chen MD      furosemide  40 mg Oral Once per day on  Cherie Chen MD      methenamine hippurate  1 g Oral BID MD Phan Castano ANTIFUNGAL   Topical BID Wilbur Severe Harleman, CRNP      polyethylene glycol  17 g Oral Daily PRN Cherie Chen MD Subjective/ HPI: Patient seen and examined  Patients overnight issues or events were reviewed with nursing or staff during rounds or morning huddle session  New or overnight issues include the following:     Pt seen in her chair  No complaints    ROS:   A 10 point ROS was performed; negative except as noted above  Imaging:     No orders to display       *Labs /Radiology studies reviewed  *Medications reviewed and reconciled as needed  *Please refer to order section for additional ordered labs studies  *Case discussed with primary attending during morning huddle case rounds    Physical Examination:  Vitals:   Vitals:    05/23/22 1303 05/23/22 2129 05/24/22 0556 05/24/22 0817   BP: 110/61 102/58 117/58 124/76   BP Location: Right arm  Left arm    Pulse: 76  66    Resp: 18  19    Temp: 97 5 °F (36 4 °C)  97 6 °F (36 4 °C)    TempSrc: Oral  Oral    SpO2: 100%  98%    Weight:       Height:         GEN: No apparent distress, interactive  NEURO: Alert and oriented x3  HEENT: Pupils are equal and reactive, EOMI, mucous membranes are moist, face symmetrical  CV: S1 S2 regular, no MRG, no peripheral edema noted  RESP: Lungs are clear bilaterally, no wheezes, rales or rhonchi noted, on room air, respirations easy and non labored  GI: Flat, soft non tender, non distended; +BS x4  : Voiding without difficulty  MUSC: Moves all extremities; +generalized deconditioning  SKIN: pink, warm and dry, normal turgor, no rashes, lesions      The above physical exam was reviewed and updated as determined by my evaluation of the patient on 5/24/2022  Invasive Devices  Report    Drain  Duration           Suprapubic Catheter 18 Fr  27 days                   VTE Pharmacologic Prophylaxis: Enoxaparin  Code Status: Level 1 - Full Code  Current Length of Stay: 15 day(s)      Total time spent:  20 minutes with more than 50% spent counseling/coordinating care   Counseling includes discussion with patient re: progress  and discussion with patient of his/her current medical state/information  Coordination of patient's care was performed in conjunction with primary service  Time invested included review of patient's labs, vitals, and management of their comorbidities with continued monitoring  In addition, this patient was discussed with medical team including physician and advanced extenders  The care of the patient was extensively discussed and appropriate treatment plan was formulated unique for this patient  ** Please Note:  voice to text software may have been used in the creation of this document   Although proof errors in transcription or interpretation are a potential of such software**

## 2022-05-24 NOTE — TEAM CONFERENCE
Acute RehabilitationTeam Conference Note  Date: 5/24/2022   Time: 11:04 AM       Patient Name:  Carleen Sherwood       Medical Record Number: [id number]   YOB: 1955  Sex: Female          Room/Bed:  [facility] 459/[facility] 459-01  Payor Info:  Payor: Samia Hernandez / Plan: MEDICARE A AND B / Product Type: Medicare A & B Fee for Service /      Admitting Diagnosis: Multiple sclerosis [G35]   Admit Date/Time:  5/9/2022  2:25 PM  Admission Comments: No comment available     Primary Diagnosis:  Ambulatory dysfunction  Principal Problem: Ambulatory dysfunction    Patient Active Problem List    Diagnosis Date Noted    Anemia 05/06/2022    Urinary tract infection associated with cystostomy catheter ([facility] ) 05/05/2022    Debility 06/28/2021    Acute bilateral thoracic back pain 06/23/2021    Hypernatremia 06/22/2021    Multiple sclerosis ([facility] ) 06/20/2021    Ambulatory dysfunction 06/20/2021    Acute cystitis without hematuria 06/20/2021    Hypertension 06/20/2021    Neurogenic bladder 06/20/2021    Leg swelling 06/20/2021    Acquired polyneuropathy        Physical Therapy:    Weight Bearing Status: Full Weight Bearing  Transfers: Moderate Assistance  Bed Mobility: Maximum Assistance, Moderate Assistance  Amulation Distance (ft): 80 feet (up to 80 feet but variable)  Ambulation: Total Assistance, Assist of 2 (modA x1 and CFA)  Assistive Device for Ambulation:  (parallel bars)  Wheelchair Mobility:  (pt  has shoulder pain and would probably aggreviate pain  Will trial if appropriate )  Stair Assistance:  (unsafe)  Ramp:  (unsafe, will need to be wheeled up and down ramp)  Discharge Recommendations: Home with:  [address] with[de-identified] 24 Hour Assisteance, First Floor Setup, Home Physical Therapy    Pt cont to have significant deficits in AROM, strength, flexibility, endurance, activity tolerance, which results in a significant variation in her functional performance throughout the day   FT has been in progress in regards to scheduling the FT to review current functional status  Pt and  report they feel comfortable with mobility at home  Pt cont to walk to be able to walk consistently and move without need for A at home  Currently she will need physical A for all mobility, and will need variable equipment such as WC, slide board, RW, swetha lift based upon her performance at the time in relation to fatigue  Pt has significant fall risk factors due to the variable performance in functional mobility  Pt has received education on reducing secondary complications that are common as a result of when MS progresses  Plan is for dc home on Thursday with physical A from /caregivers and cont home PT  Occupational Therapy:  Eating: Supervision  Grooming: Supervision (seated)  Bathing: Moderate Assistance  Bathing: Moderate Assistance  Upper Body Dressing: Moderate Assistance  Lower Body Dressing: Assist of 2 (for CM in stance)  Toileting: Assist of 2 (for CM in stance)  Tub/Shower Transfer: Assist of 2  Toilet Transfer: Assist of 2  Cognition: Exceptions to WNL  Cognition: Decreased Memory, Decreased Executive Functions, Decreased Comprehension, Decreased Safety  Orientation: Person, Place, Time, Situation  Discharge Recommendations: Home with:  [address] with[de-identified] 24 Hour Supervision, 24 Hour Assistance, Family Support, First Floor Setup, Home Occupational Therapy       Rima Gallagher" is a 73 y o  female with medical history of but not limited to progressive MS, HTN, chronic back pain, recurrent UTI's, Neurogenic bladder (with SPT placed 4/26) with colonization, and spasticity who presented to [address] [place] [address] ED on 5/5 with symptoms of recurrent UTI while she was being treated for one  Pt now presents to [facility] on 5/10 for OT eval  Pt currently requires extensive assist x2 people for basic ADLs d/t remarkable weakness in all extremities and poor trunk control   LB ADLs completed Ax1 bed level and Ax2 when in stance and UB seated in w/c at sink with overall Min/mod A for activity completion  Ax2 w/ SB to DA platform BSC, bed and w/c--trialed STS and SPT this past week in which pt continues to require Ax2 for safety due to weakness  Pt initially reported to therapy staff that she was IND w/ RW PTA  It has since been learned that pt's fxnl status fluctuates at home w/  often provided extensive assist for ADLs and that they even have a swetha lift at home that it utilized intermittently  Pt reports a ramp to enter, WIS w/ shower stool and HH shower head, along w/ adjustable bed  Pt has shown improvement in last week most notably in sitting balance EOB as well as func transfers despite no change in CARE scores, but even this is still limited  Anticipate DC either home w/ extensive assist required at [address] and use of SB vs SPT vs swetha lift  Goals this week include continued strengthening and NMR, continue to address poor activity tolerance, and continued pt/family edu  Speech Therapy:           No notes on file    Nursing Notes:  Appetite: Fair  Diet Type: Regular/House                      Diet Patient/Family Education Complete:  Yes                            Bladder: Catheter  Catheter Type: Suprapubic  Bladder Patient/Family Education: Yes  Bowel: Continent     Bowel Patient/Family Education: Yes  Pain Location/Orientation: Orientation: Left, Location: Shoulder (during activity)  Pain Score: 0                       Hospital Pain Intervention(s): Repositioned, Rest  Pain Patient/Family Education: Yes  Medication Management/Safety  Injectable: Lovenox  Safe Administration: Yes (by staff)  Medication Patient/Family Education Complete: Yes    Patient is a 74 yo female with h/o MS & neurogenic bladder with urinary retention s/p sacral neuromodulator device placement however ultimately requiring chronic indwelling nixon catheter however had recurrent UTIs and therefore underwent suprapubic catheter placement on 4/26 by IR however on 5/5 presented with generalized weakness and chills and was found to have UTI which was treated first with IV abx then eventually transitioned to oral abx in acute care and transferred to acute in rehab on 5/9     Multiple Sclerosis - She has had a functional decline due to UTI, Therapy per PMR  Continue Baclofen to control spasticity, She also takes dalfampridine ER 10mg 2x daily to improve walking speed  HTN - Continue amlodipine 2 5mg 2x daily, She also takes Lasix 40mg twice weekly for LE edema, Continue to monitor BP  Hemorrhoids d/t chronic constipation try to avoid, appreciate GI input, no further testing unless drop in hgb and persistent maci blood, colonsocopy 4 years ago, they recommend f/u w/ GI o/p  Neurogenic bowel and bladder - SPC placed 4/26/22, PMR managing neurogenic bowel mgmt teaching, no further need for flomax since now w/ SPC  Anemia - stable  Review of previous labs revealed Hgb in the 12-13 range, Will continue to monitor  Pt is continent of bowel and uses a suprapubic cath to manage bladder  Pt has no complaints of pain, but is ordered prn tylenol  This week we will continue to monitor lab values and vital signs  We will continue to educate on the importance of turning and offloading in order to prevent skin breakdown and preform routine skin checks  We will continue to monitor for constipation and medicate per bowel protocol  We will continue to encourage independence with ADLs  We will continue to preform safe transfers and keep the pt free from falls  We will continue to monitor for adequate pain control  Case Management:     Discharge Planning  Living Arrangements: Lives w/ Spouse/significant other  Support Systems: Spouse/significant other  Assistance Needed: TBD  Type of Current Residence: Private residence  Current Home Care Services: No  Pt lives with her supportive  who provides assist and transport   She has no SHY to her [facility] and everything is on the first floor  She has a walk in shower with grab bars and a shower chair  She has a walker, transport chair, rollator  She has has [facility] for home care and [facility] [place] [facility]  She uses [facility] in [facility]  Reviewed team meeting process, update, and potential LOS  Following to assist w/ d/c planning needs  Pt is scheduled for d c on 5/26 with home RN through [address] [place] [address] and home PT and OT through [facility] [place] ([facility])  Following to assist w/ d/c planning needs  Is the patient actively participating in therapies? yes  List any modifications to the treatment plan:     Barriers Interventions   Neurogenic bladder, risk for UTI Super pubic cath, family education   Constipation  miralax as needed   Spasticity  Therapy exercises   MS Flair  Therapy exercises   Poor core strength, shoulder pain Therapy exercises     Is the patient making expected progress toward goals? yes  List any update or changes to goals:     Medical Goals:     Weekly Team Goals:   Rehab Team Goals  ADL Team Goal: Patient will require assist with ADLs with least restrictive device upon completion of rehab program  Bowel/Bladder Team Goal: Patient will require assist with bladder/bowel management with least restrictive device upon completion of rehab program  Transfer Team Goal: Patient will require assist with transfers with least restrictive device upon completion of rehab program  Locomotion Team Goal: Patient will require assist with locomotion with least restrictive device upon completion of rehab program    Discussion: Pt presents with the above barriers   cannot come for family training today but car transfer training will be done before d/c on Thursday  They have a swetha lift and roller walker and a scooter  Pt will buy a slideboard herself  They have a plan for 24/7 supervision for pt as pt fluctuates with transfers  They have a transport chair       Anticipated Discharge Date:  5/26  [facility] Team Members Present: The following team members are supervising care for this patient and were present during this Weekly Team Conference      Physician: Dr Caprice Arreola MD  : TODD Colon  Registered Nurse: Jerry Paz RN  Physical Therapist: Marichuy Caballero DPT  Occupational Therapist: Heather Alfaro OTR/L  Speech Therapist: Korina Mendon, [place], [address] [place] [address]

## 2022-05-24 NOTE — PROGRESS NOTES
05/24/22 1400   Pain Assessment   Pain Assessment Tool 0-10   Pain Score No Pain   Restrictions/Precautions   Precautions Bed/chair alarms;Cognitive; Fall Risk;Supervision on toilet/commode; [place]   Cognition   Overall Cognitive Status Impaired   Arousal/Participation Alert; Cooperative   Subjective   Subjective pt and  agreeable to have PT   Sit to Stand   Type of Assistance Needed Physical assistance;Verbal cues; Adaptive equipment   Physical Assistance Level 51%-75%   Comment mod-max with fatigue   Sit to Stand CARE Score 2   Bed-Chair Transfer   Type of Assistance Needed Physical assistance;Verbal cues; Adaptive equipment   Physical Assistance Level 76% or more   Comment 6 reps slide board mod-min of 1 if level surfaces or downhill tranfers but mod-max if going uphill w/c to 27 inch high bed to mimic car/bed height  min-mod of 1 SPT with RW x 2 reps   also provided mod assist SPT with " bear hug" technique and pt was able to move LE indep to assist with transfer  Chair/Bed-to-Chair Transfer CARE Score 2   Walk 10 Feet   Type of Assistance Needed Physical assistance;Verbal cues; Adaptive equipment   Physical Assistance Level Total assistance   Comment RW x 40', 30'   Walk 10 Feet CARE Score 1   Therapeutic Interventions   Strengthening reviewed previously given HEP including toe raises, passive ankle stretching, glut and quad sets   PT will add HEP tomorrow as applicable  Assessment   Treatment Assessment Skilled PT focused on hands on FT with pt and  Stephan Encinas who is her primary caregiver at d/c  Both reported they have a manual w/c but it is at their condo in [place] so made aware that they will not be able to utilize slide board transfer for transport chair has fixed arms vs removable arm rest of manual wheelchair   So it was recommended on days where pt is fatigue with poor truncal control and unable to participate with SPT bear hug technique safely for pt and  to use the swetha lift at home   expressed confidence  he will be able to performed SPT without AD or using RW for transfers at home  PT also recommended to have home PT practice slide board transfer to/from car (once manual w/c is on hand) or explore other ways under home PT guidance including practice SPT from curb to car vs SPT on their incline driveway or trial use of plastic bag over car seat to facilitate scooting and see if that helps as well  although  also recognize possibility of pt slipping out of the car if not properly position so that is why they need to trial technique with home PT present  pt and  verbalized understanding not to do car transfer with aforementioned suggestion by themselves   Sarah Billy also provided assist to the pt during repeated slide board transfer training with emphasis on safe board placement and allowing pt to position herself and participate in the transfer vs providing physical assist right away  Sarah Cervantes also did SPT with RW then without AD as well as provided assist while pt performed level surface ambulation using a RW  Sarah Cervantes was able to provide appropriate level of assistance to the pt during mobilities and pt was able to tell  if he is providing too much assistance too soon  both encouraged to work together and communicate  at the end of tx session Sarah Cervantes denied additional needs for hands on FT and verbalized understanding of all given recommendations  PT will reassess all care score mobility section tomorrow in preparation for home d/c on 5/26/22 as well as provide additional HEP  Family/Caregiver Present yes   PT Family training done with:  Sarah Billy engaged in hands on FT, see note above   Barriers to Discharge None   Barriers to Discharge Comments FT done   PT Barriers   Functional Limitation Walking;Stair negotiation;Ramp negotiation;Transfers;Standing;Car transfers; Wheelchair management   Plan   Treatment/Interventions Functional transfer training;LE strengthening/ROM; Therapeutic exercise; Endurance training;Gait training;Spoke to nursing;OT   Progress Slow progress, decreased activity tolerance   Recommendation   PT Discharge Recommendation Home with home health rehabilitation   Equipment Recommended   (30 inch slide board)   PT Equipment ordered pt already has a transport chair, manual w/c, rollator, RW , scooter, BSC, swetha lift, reacher   PT - OK to Discharge No   PT Therapy Minutes   PT Time In 1400   PT Time Out 1500   PT Total Time (minutes) 60   PT Mode of treatment - Individual (minutes) 60   PT Mode of treatment - Concurrent (minutes) 0   PT Mode of treatment - Group (minutes) 0   PT Mode of treatment - Co-treat (minutes) 0   PT Mode of Treatment - Total time(minutes) 60 minutes   PT Cumulative Minutes 1323   Therapy Time missed   Time missed?  No

## 2022-05-24 NOTE — PROGRESS NOTES
Physical Medicine and Rehabilitation Progress Note  Rima Mora 73 y o  female MRN: [id number]  Unit/Bed#: -01 Encounter: [id number]        HPI: Patient is a 74 yo female with h/o MS & neurogenic bladder with urinary retention s/p sacral neuromodulator device placement however ultimately requiring chronic indwelling nixon catheter however had recurrent UTIs and therefore underwent suprapubic catheter placement on 4/26 by IR however on 5/5 presented with generalized weakness and chills and was found to have UTI which was treated first with IV abx then eventually transitioned to oral abx in acute care and transferred to acute inpt rehab on 5/9    Chief Complaint: UTI     Subjective: d/w patient potential dc date and patient is agreeable     ROS: A 10 point ROS was performed; negative except as noted above       Assessment/Plan:    UTI: h/o recurrent UTI's in the setting of neurogenic bladder due to MS with chronic indwelling nixon s/p suprapubic catheter placement by IR on 4/26; transitioned from IV abx to PO keflex 500 mg Q6 in acute care with recommendation by acute care hospitalist that patient continue abx until 5/12 which is now completed (of note UCx of 5/5/22 in addition to growing klebsiella also grew candida, however per d/w Dr Gabby Muñoz of ID candida growth in the urine cx can occur in those who are repeatedly treated with abx for recurrent UTI and does not typically require treatment and patient has improved with abx treatment of klebsiella therefore he does not recommend treatment of the candida at this time); per d/w Ross Erazo PA-C (whom patient follows up with as OP from a urology perspective) given that patient had UTI after placement of suprapubic cath placement Ross Erazo PA-C recommend patient stop home nitrofurantoin 100 mg qd and switch to hiprex 1 gm PO BID which has been done (d/w pharmacy and confirmed with pharmacist that hiprex 1 gm PO BID is acceptable with patients hepatic and renal function)     Blood in stool: seen by GI who suspects this is 2/2 to hemorrhoids and recommended OP colonoscopy & did clear patient to continue pharmacologic DVT ppx, Hg currently WNL      MS: on home cholecalciferol 2000 units qd and home dalfampridine ER TB12 10 mg q12; she is on intrathecal methotrexate inj Q2 months and per patient next due in June; BLESSING virus positive in 2018; follows with Dr Xander Saravia as an OP      HTN: at home on norvasc 2 5 mg Q12, was on toprol XL and losartan in the past however patient's cardiologist, Dr Shiva Foss, stopped the Toprol XL (per his OP note of 3/2021) and later her PCP stopped the losartan (per patient); management per IM      Dyslipidemia: per Dr Coolidge Ganser OP note of 3/2021 he d/w patient starting statin however patient declined and preferred to address with lifestyle/dietary modifications      Elevated LV end diastolic pressure: follows with Dr Ale Delgado who at last OP visit of 3/2021 noted that patient was on losartan 25 mg qd for this however per patient provided home med list is no longer on losartan and per patient this was stopped by her PCP; OP FU with Dr Ale Delgado     weakness: chronic in the setting of MS but likely exacerbated by UTI, 2/5 B/L hip flexion, 3 to 4-/5 R dorsiflexion, 1-2/5 L dorsiflexion (multipodis ordered on L to prevent achilles tendon contracture), 4/5 UE B/L; OP FU with Dr Xander Saravia       Chronic lower extremity edema: follows with Dr Ale Delgado of cards as OP who recommend she use lasix prn and it appears she uses 40 mg on Mondays and Fridays as she states that this schedule appears to control the LE edema best (it tends to improve per Dr Dio Gorman OP notes when she is consistent with use of compression stockings, therefore TEDs ordered for patient); OP FU with Dr Ale Delgado     Spasticity/neuropathic pain: in the setting of MS on home baclofen 10 mg TID, per patient provided home med list on baclofen 10 mg TID (not 20 mg TID as listed in OP notes and ER note) and is no longer on neurontin (as listed in OP notes and ER note) per patient provided home med list      Neurogenic bladder: chronic in the setting of MS; h/o of sacral neuromodulation device placement on 11/19/21 by Dr Yuan Westbrook for urinary retention however ultimately required chronic indwelling nixon; h/o of recurrent UTIs with chronic indwelling nioxn s/p suprapubic catheter placement on 4/26 by IR, at home on flomax 0 8 mg qpm however per d/w Eladia Ardon PA-C (whome patient follows with as OP from a urology perspective) now that patient has suprapubic catheter she no longer requires flomax therefore it was dc'd; OP FU with Eladia Ardon PA-C of urology scheduled for 5/31 for catheter change (and Dr Yuan Westbrook of Urogyn)      Neurogenic bowel: chronic in the setting of MS, has occasional episodes of incontinence at home where she does not feel the stool coming, as episodes are not frequent and patient does not take anything at home will monitor off scheduled meds for now and use prn's      DVT ppx: lovenox  Dispo: home with 24 hr support from spouse + occasional assistance provided friends/neighbors if patient's spouse would need to leave the house but patient will never be alone       Note: unclear as to why patient is on ASA 81 mg qd at home (no h/o TIA/CVA/MI/CAD/PVD) and per d/w patient she began taking it on her own and was not specifically recommended to do so by her physicians therefore advised patient to hold off on taking asa until d/w her PCP      Note: blood cx of 5/5/22 x 2 finalized and no growth     Note: home med regimen confirmed with patient who provided a list      Incidental findings:     Elevated alk phos: mildly elevated at 156 ( however can be up to 147 by some lab standards) and appears to be chronic going back to at least 1/2018 per EMR lab records     Elevated B12 level: appears to be chronic going back to at least 4/2018 per EMR lab records and was 2,937 () on 2/1/22 likely due to B12 supplementation patient takes at home, which patient states she started on her own rather than at the recommendation of her doctors therefore advised to hold off B12 supplementation until she d/w her PCP      Arachnoid cyst: noted to cause cord flattening at the T6/7 levels on past imaging; OP FU with Dr Beth Soria           Objective:    Functional Update:  Mobility: variable due to MS   Transfers: mod   ADLs: mod       Physical Exam:    Vitals:    05/24/22 0817   BP: 124/76   Pulse:    Resp:    Temp:    SpO2:            General: alert, no apparent distress, cooperative and comfortable  HEENT:  Head: Normal, normocephalic, atraumatic  Eye: Normal external eye, conjunctiva, lidsc cornea  Ears: Normal external ears  Nose: Normal external nose, mucus membranes  CARDIAC:  +S1/2  LUNGS:  no abnormal respiratory pattern, no retractions noted, non-labored breathing   ABDOMEN:  + suprapubic catheter in place   EXTREMITIES:  no cyanosis or clubbing   NEURO:  awake, alert, apporpriately answering questions  PSYCH:  mood/affect currently stable     This patient was discussed by the Interdisciplinary Team in weekly case conference today  The care of the patient was extensively discussed with all care providers and an appropriate rehabilitation plan was formulated unique for this patient  Barriers were identified preventing progression of therapy and appropriate interventions were discussed with each discipline  Please see the team note for input from all disciplines regarding barriers, intervention, and discharge planning  [ x ] Total time spent: 35 Mins, and greater than 50% of this time was spent counseling/coordinating care

## 2022-05-24 NOTE — CASE MANAGEMENT
Cm met with pt and reviewed team meeting update  She and her  were present and are still in agreement with d/c on 5/26 with home PT, OT, and RN through [facility]  She will discharge about 12:30 on 5/26  Following to assist w/ d/c planning needs

## 2022-05-25 PROCEDURE — 97116 GAIT TRAINING THERAPY: CPT

## 2022-05-25 PROCEDURE — 97110 THERAPEUTIC EXERCISES: CPT

## 2022-05-25 PROCEDURE — 99232 SBSQ HOSP IP/OBS MODERATE 35: CPT | Performed by: PHYSICAL MEDICINE & REHABILITATION

## 2022-05-25 PROCEDURE — 99232 SBSQ HOSP IP/OBS MODERATE 35: CPT | Performed by: INTERNAL MEDICINE

## 2022-05-25 PROCEDURE — 97535 SELF CARE MNGMENT TRAINING: CPT

## 2022-05-25 PROCEDURE — 97530 THERAPEUTIC ACTIVITIES: CPT

## 2022-05-25 RX ORDER — BACLOFEN 10 MG/1
10 TABLET ORAL 3 TIMES DAILY
COMMUNITY

## 2022-05-25 RX ORDER — METHENAMINE HIPPURATE 1000 MG/1
1 TABLET ORAL 2 TIMES DAILY
Qty: 60 TABLET | Refills: 0 | Status: SHIPPED | OUTPATIENT
Start: 2022-05-26 | End: 2022-06-16 | Stop reason: SDUPTHER

## 2022-05-25 RX ORDER — AMLODIPINE BESYLATE 2.5 MG/1
2.5 TABLET ORAL EVERY 12 HOURS
COMMUNITY

## 2022-05-25 RX ADMIN — BACLOFEN 10 MG: 10 TABLET ORAL at 10:51

## 2022-05-25 RX ADMIN — DALFAMPRIDINE 10 MG: 10 TABLET, EXTENDED RELEASE ORAL at 21:30

## 2022-05-25 RX ADMIN — AMLODIPINE BESYLATE 2.5 MG: 2.5 TABLET ORAL at 10:52

## 2022-05-25 RX ADMIN — Medication 2000 UNITS: at 10:51

## 2022-05-25 RX ADMIN — METHENAMINE HIPPURATE 1 G: 1 TABLET ORAL at 10:53

## 2022-05-25 RX ADMIN — ENOXAPARIN SODIUM 40 MG: 40 INJECTION SUBCUTANEOUS at 10:54

## 2022-05-25 RX ADMIN — AMLODIPINE BESYLATE 2.5 MG: 2.5 TABLET ORAL at 21:29

## 2022-05-25 RX ADMIN — METHENAMINE HIPPURATE 1 G: 1 TABLET ORAL at 18:37

## 2022-05-25 RX ADMIN — BACLOFEN 10 MG: 10 TABLET ORAL at 21:29

## 2022-05-25 RX ADMIN — MICONAZOLE NITRATE 1 APPLICATION: 20 CREAM TOPICAL at 18:37

## 2022-05-25 RX ADMIN — BACLOFEN 10 MG: 10 TABLET ORAL at 16:46

## 2022-05-25 RX ADMIN — DALFAMPRIDINE 10 MG: 10 TABLET, EXTENDED RELEASE ORAL at 10:53

## 2022-05-25 NOTE — PROGRESS NOTES
05/25/22 1030   Pain Assessment   Pain Assessment Tool 0-10   Pain Score No Pain   Restrictions/Precautions   Precautions Bed/chair alarms;Cognitive; Fall Risk; Nixon; Supervision on toilet/commode   Weight Bearing Restrictions No   ROM Restrictions No   Eating   Type of Assistance Needed Independent   Physical Assistance Level No physical assistance   Comment seated   Eating CARE Score 6   Oral Hygiene   Type of Assistance Needed Independent   Physical Assistance Level No physical assistance   Comment seated at sink to complete oral care  pt reports either sitting or standing at sink depending upon fatigue level PTA  pt fatigued from ADL so requesting to sit  Oral Hygiene CARE Score 6   Shower/Bathe Self   Type of Assistance Needed Physical assistance   Physical Assistance Level 26%-50%   Comment pt completes sponge bathing routine seated EOB bathing 9/10 parts requiring assist for rear when in stance  CG provided for standing balance during hygiene  when pt showers, pt sits on shower chair to complete bathing routine  Shower/Bathe Self CARE Score 3   Upper Body Dressing   Type of Assistance Needed Physical assistance   Physical Assistance Level 26%-50%   Comment pt able to don sports bra with set up seated EOB, dons shirt seated EOB with overall min assist for adjusting over trunk   Upper Body Dressing CARE Score 3   Lower Body Dressing   Type of Assistance Needed Physical assistance   Physical Assistance Level 25% or less   Comment seated EOB to thread nixon bag through pants and thread pants over LEs using cross leg tech  pt requires inc time to complete this  in stance with CG for balance, requires overall min assist to ensure pants are fully up over rear  Lower Body Dressing CARE Score 3   Putting On/Taking Off Footwear   Type of Assistance Needed Set-up / Haydee Hilger; Adaptive equipment   Physical Assistance Level No physical assistance   Comment seated EOB using foot stool and cross leg tech to don TEDS, sneakers with elastic shoe laces and shoe horn to simulate home set up  Putting On/Taking Off Footwear CARE Score 5   Picking Up Object   Comment seated EOB with use of reacher to retrieve shoes from floor to simulate home set up   Lying to Sitting on Side of Bed   Comment pt manages bed features and raises HOB almost fully to upright postion and then demo's ability with inc time to get LEs OOB and get to upright sitting position   Sit to Stand   Type of Assistance Needed Physical assistance   Physical Assistance Level 76% or more   Comment from various surfaces with RW; inc assist required this date due to fatigue from ADL  pt level of assist fluctuates depending upon fatigue  Sit to Stand CARE Score 2   Bed-Chair Transfer   Type of Assistance Needed Physical assistance   Physical Assistance Level 26%-50%   Comment with RW performing short distance func mob into bathroom per pt request    Chair/Bed-to-Chair Transfer CARE Score 3   Toileting Hygiene   Type of Assistance Needed Physical assistance   Physical Assistance Level 26%-50%   Comment pt able to manage bladder hygiene with CG in stance, inc time to complete CM however ultimately requires assist to ensure pants are fully up over rear  pt level of assist fluctuates and requires inc assist at times especially post BM  Toileting Hygiene CARE Score 3   Toilet Transfer   Type of Assistance Needed Physical assistance   Physical Assistance Level 76% or more   Comment pt requires significant assist with STS transfers however requires min assist for pivot portion of transfers with RW  Toilet Transfer CARE Score 2   Cognition   Overall Cognitive Status Impaired   Arousal/Participation Alert; Cooperative   Attention Attends with cues to redirect   Orientation Level Oriented X4   Memory Decreased recall of recent events   Following Commands Follows one step commands with increased time or repetition   Activity Tolerance   Activity Tolerance Patient tolerated treatment well  (slightly limited in second half of session due to fatigue from bathing/dressing)   Assessment   Treatment Assessment pt engages in 90 minute skilled OT session focusing on D/C ADL routine seated primarily EOB to simulate home set up, STS, short distance transfers with RW  see above for full func details  pt reports not sponge bathing at home and only showers "every other day" however declined a shower this date but was agreeable to sponge bathing routine (despite not sponge bathing at home)  pt completes dressing routine seated EOB with foot stool under feet as pt bed height is ~26in high and pt with short stature-- pt does have similar foot stool at home that she uses at bedside as well  pt requires significantly inc time to complete dressing routine with most difficulty noted with B/L [address] and hand strength often dropping what she is holding or having to re-situate her hands to ensure proper   pt requires prolonged seated rest break prior to standing for CM and transferring, however does require inc assist with STS transfers even from simulated high bed height, however once up with RW, pt able to complete pivot transfers at overall min assist level  pt reports either sitting or standing to complete oral care depending upon fatigue level PTA, so complete sitting this date due to fatigue per pt request  pt level of assist varies greatly from day to day however pt has all necessary DME (swetha lift, w/c, scooter, RW, transport chair, leg , reacher, shoe horn, BSC and now SB which was ordered while on ARC)  it is recommended with pt in agreement with HHOT at time of d/c  spouse completed family training with PT in yesterday's session--see note for further details regarding transfers recommendations  discussed with pt to complete type of transfer depending upon fatigue level in that particular moment (ie swetha lift vs RW vs SB) with pt voicing understanding   pt with no questions/concerns regarding d/c  pt provided with theraputty HEP continuing to focus on hand strength, dexterity and DELTA [facility]  Prognosis Fair   Problem List Decreased strength;Decreased range of motion;Decreased endurance; Impaired balance;Decreased mobility; Decreased coordination;Decreased cognition; Impaired judgement;Decreased safety awareness; Impaired sensation; Impaired tone;Pain   Barriers to Discharge None   Plan   Treatment/Interventions ADL retraining;Functional transfer training; Therapeutic exercise; Endurance training;Cognitive reorientation;Patient/family training;Equipment eval/education; Compensatory technique education   OT Therapy Minutes   OT Time In 1030   OT Time Out 1200   OT Total Time (minutes) 90   OT Mode of treatment - Individual (minutes) 90   OT Mode of treatment - Concurrent (minutes) 0   OT Mode of treatment - Group (minutes) 0   OT Mode of treatment - Co-treat (minutes) 0   OT Mode of Treatment - Total time(minutes) 90 minutes   OT Cumulative Minutes 1315   Therapy Time missed   Time missed?  No

## 2022-05-25 NOTE — DISCHARGE INSTRUCTIONS
Should you develop fevers, chills, sweats, rigors, or any drainage from your suprapubic cath site please contact your family doctor, urologist, or the physician who performed the procedure immediately or go to the ER immediately as these are indicators of possible infection     Please discuss with your primary care provider prior to resuming the aspirin your were taking prior to hospitalization regarding wether or not you should continue to take this medication     Please follow up with [facility] so that they may arrange for you to have a colonoscopy    Please note you are restricted from driving/operating a motorized vehicle/operating heavy machinery/etc     Please see your doctors listed in the follow up providers section of your discharge paperwork, and take the discharge paperwork with you to your appointments    Please call the doctors listed in the follow up providers section to confirm office locations of your follow up appointments     Please note changes may have been made to your medications please refer to your discharge paperwork for your current medications and take this list with you to all your doctors appointments for your doctors to review    Please do not resume a home medication unless the medication reconciliation sheet indicates to do so, please do not assume that a medication that you were given a prescription for is the same as a medication you have at home based on both medications having the same name as dosages and frequency may have changed  Your nurse also reviewed with you just prior to your discharge from the hospital your medications, when to take them, how to take them, what they are for, how much to take, and when your next dose is due, please follow these instructions          Please check your blood pressure prior to taking your blood pressure medications and 1 hour after, please contact your family doctor or cardiologist immediately for a blood pressure below 100/60 and do not take your blood pressure medications until speaking with them, please contact your family doctor or cardiologist immediately for blood pressure greater than 160/100 (but do still take your blood pressure medications if you are unable to reach your health care providers in a timely manner)     Please note the following incidental findings were found during your recent hospitalization please discuss them with your doctors so that they may arrange any tests/referrals as they deem necessary:       Elevated alkaline phosphatase level: This appears to be chronic however please follow up with your primary care provider for further testing/treatment if any and/or specialist referral if any as they deem necessary     Elevated B12 level: This appears to be chronic and may be due to taking B12 supplements therefore do not resume taking B12 supplements until you discuss this with your primary care provider      Arachnoid cyst: Please follow up with Dr Katrina Sosa for further testing/treatment/monitoring if any as they deem necessary      BLESSING virus positive:  You were found to have a positive BLESSING virus test in 2018 however please discuss this with Dr Katrina Sosa so that they may manage your medications accordingly    Elevated left ventricle end diastolic pressure: Please follow up with Dr Terri Duval for further testing/treatment/monitoring if any as they deem necessary         Please continue to use your multipodis boot as instructed       Please note a summary of your hospital stay with relevant information for your doctors has been sent to them, please confirm with your doctors at your follow up visits that they have received this summary and have them contact [address] [place] [address] if they have not received them along with any other medical records they may require

## 2022-05-25 NOTE — PLAN OF CARE
Problem: Potential for Falls  Goal: Patient will remain free of falls  Description: INTERVENTIONS:  - Educate patient/family on patient safety including physical limitations  - Instruct patient to call for assistance with activity   - Consult OT/PT to assist with strengthening/mobility   - Keep Call bell within reach  - Keep bed low and locked with side rails adjusted as appropriate  - Keep care items and personal belongings within reach  - Initiate and maintain comfort rounds  - Make Fall Risk Sign visible to staff  - Offer Toileting every Hours, in advance of need  - Initiate/Maintainalarm  - Obtain necessary fall risk management equipment:   - Apply yellow socks and bracelet for high fall risk patients  - Consider moving patient to room near nurses station  Outcome: Progressing     Problem: Prexisting or High Potential for Compromised Skin Integrity  Goal: Skin integrity is maintained or improved  Description: INTERVENTIONS:  - Identify patients at risk for skin breakdown  - Assess and monitor skin integrity  - Assess and monitor nutrition and hydration status  - Monitor labs   - Assess for incontinence   - Turn and reposition patient  - Assist with mobility/ambulation  - Relieve pressure over bony prominences  - Avoid friction and shearing  - Provide appropriate hygiene as needed including keeping skin clean and dry  - Evaluate need for skin moisturizer/barrier cream  - Collaborate with interdisciplinary team   - Patient/family teaching  - Consider wound care consult   Outcome: Progressing     Problem: PAIN - ADULT  Goal: Verbalizes/displays adequate comfort level or baseline comfort level  Description: Interventions:  - Encourage patient to monitor pain and request assistance  - Assess pain using appropriate pain scale  - Administer analgesics based on type and severity of pain and evaluate response  - Implement non-pharmacological measures as appropriate and evaluate response  - Consider cultural and social influences on pain and pain management  - Notify physician/advanced practitioner if interventions unsuccessful or patient reports new pain  Outcome: Progressing     Problem: INFECTION - ADULT  Goal: Absence or prevention of progression during hospitalization  Description: INTERVENTIONS:  - Assess and monitor for signs and symptoms of infection  - Monitor lab/diagnostic results  - Monitor all insertion sites, i e  indwelling lines, tubes, and drains  - Monitor endotracheal if appropriate and nasal secretions for changes in amount and color  - [place] appropriate cooling/warming therapies per order  - Administer medications as ordered  - Instruct and encourage patient and family to use good hand hygiene technique  - Identify and instruct in appropriate isolation precautions for identified infection/condition  Outcome: Progressing     Problem: SAFETY ADULT  Goal: Patient will remain free of falls  Description: INTERVENTIONS:  - Educate patient/family on patient safety including physical limitations  - Instruct patient to call for assistance with activity   - Consult OT/PT to assist with strengthening/mobility   - Keep Call bell within reach  - Keep bed low and locked with side rails adjusted as appropriate  - Keep care items and personal belongings within reach  - Initiate and maintain comfort rounds  - Make Fall Risk Sign visible to staff  - Offer Toileting every  Hours, in advance of need  - Initiate/Maintain larm  - Obtain necessary fall risk management equipment:  - Apply yellow socks and bracelet for high fall risk patients  - Consider moving patient to room near nurses station  Outcome: Progressing  Goal: Maintain or return to baseline ADL function  Description: INTERVENTIONS:  -  Assess patient's ability to carry out ADLs; assess patient's baseline for ADL function and identify physical deficits which impact ability to perform ADLs (bathing, care of mouth/teeth, toileting, grooming, dressing, etc )  - Assess/evaluate cause of self-care deficits   - Assess range of motion  - Assess patient's mobility; develop plan if impaired  - Assess patient's need for assistive devices and provide as appropriate  - Encourage maximum independence but intervene and supervise when necessary  - Involve family in performance of ADLs  - Assess for home care needs following discharge   - Consider OT consult to assist with ADL evaluation and planning for discharge  - Provide patient education as appropriate  Outcome: Progressing  Goal: Maintains/Returns to pre admission functional level  Description: INTERVENTIONS:  - Perform BMAT or MOVE assessment daily    - Set and communicate daily mobility goal to care team and patient/family/caregiver     - Collaborate with rehabilitation services on mobility goals if consulted  - Perform Range of M  - Out of bed for toileting  - Record patient progress and toleration of activity level   Outcome: Progressing     Problem: DISCHARGE PLANNING  Goal: Discharge to home or other facility with appropriate resources  Description: INTERVENTIONS:  - Identify barriers to discharge w/patient and caregiver  - Arrange for needed discharge resources and transportation as appropriate  - Identify discharge learning needs (meds, wound care, etc )  - Arrange for interpretive services to assist at discharge as needed  - Refer to Case Management Department for coordinating discharge planning if the patient needs post-hospital services based on physician/advanced practitioner order or complex needs related to functional status, cognitive ability, or social support system  Outcome: Progressing     Problem: MOBILITY - ADULT  Goal: Maintain or return to baseline ADL function  Description: INTERVENTIONS:  -  Assess patient's ability to carry out ADLs; assess patient's baseline for ADL function and identify physical deficits which impact ability to perform ADLs (bathing, care of mouth/teeth, toileting, grooming, dressing, etc )  - Assess/evaluate cause of self-care deficits   - Assess range of motion  - Assess patient's mobility; develop plan if impaired  - Assess patient's need for assistive devices and provide as appropriate  - Encourage maximum independence but intervene and supervise when necessary  - Involve family in performance of ADLs  - Assess for home care needs following discharge   - Consider OT consult to assist with ADL evaluation and planning for discharge  - Provide patient education as appropriate  Outcome: Progressing  Goal: Maintains/Returns to pre admission functional level  Description: INTERVENTIONS:  - Perform BMAT or MOVE assessment daily    - Set and communicate daily mobility goal to care team and patient/family/caregiver     - Collaborate with rehabilitation services on mobility goals if consulted  - Perform Range of Mo  - Out of bed for toileting  - Record patient progress and toleration of activity level   Outcome: Progressing

## 2022-05-25 NOTE — INCIDENTAL FINDINGS
Elevated alkaline phosphatase level: This appears to be chronic however please follow up with your primary care provider for further testing/treatment if any and/or specialist referral if any as they deem necessary     Elevated B12 level: This appears to be chronic and may be due to taking B12 supplements therefore do not resume taking B12 supplements until you discuss this with your primary care provider      Arachnoid cyst: Please follow up with Dr Kenya Miller for further testing/treatment/monitoring if any as they deem necessary      BLESSING virus positive:  You were found to have a positive BLESSING virus test in 2018 however please discuss this with Dr Zambrano Sample so that they may manage your medications accordingly    Elevated left ventricle end diastolic pressure: Please follow up with Dr Lolita Baer for further testing/treatment/monitoring if any as they deem necessary

## 2022-05-25 NOTE — PROGRESS NOTES
Internal Medicine Progress Note  Patient: Emile Mitchell  Age/sex: 73 y o  female  Medical Record #: [id number]      ASSESSMENT/PLAN: (Interval History)  Rima Oshea is seen and examined and management for following issues:    Multiple Sclerosis  · She had a functional decline due to UTI  · Therapy per PMR  · Continue Baclofen to control spasticity   · She also takes dalfampridine ER 10mg 2x daily to improve walking speed       HTN  · Continue Amlodipine 2 5mg 2x daily  · She also takes Lasix 40mg twice weekly for LE edema  · Continue to monitor BP      Hemorrhoids  · D/t chronic constipation try to avoid  · Appreciate GI input  · No further testing unless drop in hgb and persistent maci blood  · Colonoscopy 4 years ago  · They recommend f/u with GI as outpatient    Neurogenic bowel and bladder  · SPC placed 22  · PMR managing neurogenic bowel management teaching  · No further need for flomax since now with [facility]      Anemia  · Review of previous labs revealed Hgb in the 12-13 range  · stable       DC plannin/26  The above assessment and plan was reviewed and updated as determined by my evaluation of the patient on 2022  Labs:              Invalid input(s): LABGLOM, CMP                Review of Scheduled Meds:  Current Facility-Administered Medications   Medication Dose Route Frequency Provider Last Rate    acetaminophen  650 mg Oral Q6H PRN Tk Villaseñor MD      amLODIPine  2 5 mg Oral Q12H Ivet Posadas MD      baclofen  10 mg Oral TID Ivet Posadas MD      cholecalciferol  2,000 Units Oral Daily Ivet Posadas MD      Dalfampridine ER  10 mg Oral Q12H Kavitha Anne MD      enoxaparin  40 mg Subcutaneous Daily Ivet Posadas MD      furosemide  40 mg Oral Once per day on  Ivet Posadas MD      methenamine hippurate  1 g Oral BID Ivet Posadas MD      Cholo Plaster ANTIFUNGAL   Topical BID COLIN Sarabia      polyethylene glycol  17 g Oral Daily PRN Ivet Posadas MD Subjective/ HPI: Patient seen and examined  Patients overnight issues or events were reviewed with nursing or staff during rounds or morning huddle session  New or overnight issues include the following:     Pt seen in her chair  No complaints, she is anxious for dc tomorrow    ROS:   A 10 point ROS was performed; negative except as noted above  Imaging:     No orders to display       *Labs /Radiology studies reviewed  *Medications reviewed and reconciled as needed  *Please refer to order section for additional ordered labs studies  *Case discussed with primary attending during morning huddle case rounds    Physical Examination:  Vitals:   Vitals:    05/24/22 0817 05/24/22 1602 05/24/22 2055 05/25/22 0616   BP: 124/76 125/80 115/58 121/64   BP Location:  Left arm Left arm Left arm   Pulse:  86 79 70   Resp:  16 17 18   Temp:  98 5 °F (36 9 °C) 97 5 °F (36 4 °C) 97 9 °F (36 6 °C)   TempSrc:  Oral Oral Oral   SpO2:  96%  97%   Weight:       Height:         GEN: No apparent distress, interactive  NEURO: Alert and oriented x3  HEENT: Pupils are equal and reactive, EOMI, mucous membranes are moist, face symmetrical  CV: S1 S2 regular, no MRG, no peripheral edema noted  RESP: Lungs are clear bilaterally, no wheezes, rales or rhonchi noted, on room air, respirations easy and non labored  GI: Flat, soft non tender, non distended; +BS x4  : SP catheter draining clear yellow urine  MUSC: Moves all extremities; generalized weakness  SKIN: pink, warm and dry, normal turgor, no rashes, lesions    The above physical exam was reviewed and updated as determined by my evaluation of the patient on 5/25/2022  Invasive Devices  Report    Drain  Duration           Suprapubic Catheter 18 Fr  28 days                   VTE Pharmacologic Prophylaxis: Enoxaparin  Code Status: Level 1 - Full Code  Current Length of Stay: 16 day(s)      Total time spent:  20 minutes with more than 50% spent counseling/coordinating care  Counseling includes discussion with patient re: progress  and discussion with patient of his/her current medical state/information  Coordination of patient's care was performed in conjunction with primary service  Time invested included review of patient's labs, vitals, and management of their comorbidities with continued monitoring  In addition, this patient was discussed with medical team including physician and advanced extenders  The care of the patient was extensively discussed and appropriate treatment plan was formulated unique for this patient  ** Please Note:  voice to text software may have been used in the creation of this document   Although proof errors in transcription or interpretation are a potential of such software**

## 2022-05-25 NOTE — PROGRESS NOTES
05/25/22 1330   Pain Assessment   Pain Assessment Tool 0-10   Pain Score No Pain   Restrictions/Precautions   Precautions Bed/chair alarms;Cognitive; Fall Risk; [place]; Supervision on toilet/commode   Weight Bearing Restrictions No   ROM Restrictions No   Cognition   Arousal/Participation Alert; Cooperative   Subjective   Subjective "i'm as good as i can be i guess" pt recieved sitting in w/c and agreeable to PT  Sit to Stand   Type of Assistance Needed Physical assistance;Verbal cues   Physical Assistance Level 51%-75%   Comment min-mod Ax1; increased assistance needed when pt improper foot placement set up; VC required for foot placement   Sit to Stand CARE Score 2   Bed-Chair Transfer   Type of Assistance Needed Physical assistance   Physical Assistance Level 26%-50%   Comment with RW, VC for safe/appropriate starting body positioning, VC to lock knees out with ambulation   Chair/Bed-to-Chair Transfer CARE Score 3   Car Transfer   Type of Assistance Needed Verbal cues; Physical assistance   Physical Assistance Level 76% or more   Comment mod Ax1 for stand to sit; Max Ax1 for sit to stand and b/l LE assist in/out of car using 4 inch seat cushion; Pt reports higher SUV surface for usual car transfer   Car Transfer CARE Score 2   Walk 10 Feet   Type of Assistance Needed Physical assistance   Physical Assistance Level 25% or less   Comment Min Ax1 with RW   Walk 10 Feet CARE Score 3   Walk 50 Feet with Two Turns   Type of Assistance Needed Physical assistance   Physical Assistance Level Total assistance   Comment Min Ax1 with RW and second person for w/c follow for safety   Walk 50 Feet with Two Turns CARE Score 1   Walk 150 Feet   Reason if not Attempted Safety concerns   Walk 150 Feet CARE Score 88   Walking 10 Feet on Uneven Surfaces   Reason if not Attempted Safety concerns   Walking 10 Feet on Uneven Surfaces CARE Score 88   Ambulation   Does the patient walk? 2   Yes   Primary Mode of Locomotion Prior to Admission Walk   Distance Walked (feet) 80 ft  (35', 10')   Assist Device Roller Walker   Gait Pattern Slow [place]; Inconsistant [place];Decreased foot clearance;R knee hyperextension;L knee hyperextension; Shuffle   Limitations Noted In Balance; Coordination; Endurance;Posture;Strength;Speed   Provided Assistance with: Balance;Trunk Support;Weight Shift   Walk Assist Level Minimum Assist;Chair Follow   Findings W/c follow with second person for safety however no significant LOB noted and pt able to communicate need for seated rest break; Pt required VC toward end to increase LE foot clearance and to lock knees out for stability   Curb or Single Stair   Reason if not Attempted Safety concerns   1 Step (Curb) CARE Score 88   4 Steps   Reason if not Attempted Safety concerns   4 Steps CARE Score 88   12 Steps   Reason if not Attempted Safety concerns   12 Steps CARE Score 88   Picking Up Object   Type of Assistance Needed Physical assistance;Verbal cues   Physical Assistance Level 76% or more   Comment Pt required multiple attempts to complete from standing with RW; unable to complete pill bottle  due to weight; able to complete pen retrieval with reacher and RW   Picking Up Object CARE Score 2   Therapeutic Interventions   Strengthening Pt administered HEP including LAQ 2x10 b/l with 3 sec hold, hip adduction isometric with 3 sec hold 10 reps, b/l marches in sitting 2x10, ankle DF 3 sec hold b/l 10 reps, gluteal sets 10x 5SH, quad set 10 rep 5SH b/l  Pt demo understanding and competence with HEP  Flexibility B/L gastroc stretch 3x 30SH   Balance Standing at EOB with RW 3 min x 2 with CG assist   Assessment   Treatment Assessment Pt able to complete in 90 min skilled PT session focussing on functional mobility for d/c home including ambulation, car transfers, balance and LE strengthening   Pt able to complete multiple bouts of ambulation (80', 10', 35') with RW and min Ax1 with 2nd person w/c follow and VC for foot clearance, and knee stability with increased distance and fatigue  Pt feels confident in HEP and demo'd competence  FT completed 5/24  At this time she would be safe/appropriate for d/c home with home health and assist from   Family/Caregiver Present no   PT Family training done with:  5/24   Problem List Decreased strength;Decreased endurance; Impaired balance;Decreased mobility; Decreased coordination; Impaired judgement;Decreased safety awareness   Barriers to Discharge None   PT Barriers   Physical Impairment Decreased strength;Decreased endurance;Decreased range of motion; Impaired balance;Decreased mobility; Decreased coordination;Decreased safety awareness   Functional Limitation Car transfers; Ramp negotiation;Standing;Transfers; Walking;Stair negotiation   Plan   Progress Slow progress, decreased activity tolerance   Recommendation   PT Discharge Recommendation Home with home health rehabilitation   Equipment Recommended Other (Comment)  (slide board)   PT Equipment ordered pt already has a transport chair, manual w/c, rollator, RW , scooter, BSC, swetha lift, reacher   PT - OK to Discharge Yes   PT Therapy Minutes   PT Time In 1330   PT Time Out 1500   PT Total Time (minutes) 90   PT Mode of treatment - Individual (minutes) 90   PT Mode of treatment - Concurrent (minutes) 0   PT Mode of treatment - Group (minutes) 0   PT Mode of treatment - Co-treat (minutes) 0   PT Mode of Treatment - Total time(minutes) 90 minutes   PT Cumulative Minutes 1413   Therapy Time missed   Time missed?  No

## 2022-05-25 NOTE — PROGRESS NOTES
Physical Medicine and Rehabilitation Progress Note  Rima Mora 73 y o  female MRN: [id number]  Unit/Bed#: [facility] 459-01 Encounter: [id number]            HPI: Patient is a 72 yo female with h/o MS & neurogenic bladder with urinary retention s/p sacral neuromodulator device placement however ultimately requiring chronic indwelling nixon catheter however had recurrent UTIs and therefore underwent suprapubic catheter placement on 4/26 by IR however on 5/5 presented with generalized weakness and chills and was found to have UTI which was treated first with IV abx then eventually transitioned to oral abx in acute care and transferred to acute inpt rehab on 5/9    Chief Complaint: UTI     Subjective: Patient looking forward to dc tomorrow      ROS: A 10 point ROS was performed; negative except as noted above       Assessment/Plan:    UTI: h/o recurrent UTI's in the setting of neurogenic bladder due to MS with chronic indwelling nixon s/p suprapubic catheter placement by IR on 4/26; transitioned from IV abx to PO keflex 500 mg Q6 in acute care with recommendation by acute care hospitalist that patient continue abx until 5/12 which is now completed (of note UCx of 5/5/22 in addition to growing klebsiella also grew candida, however per d/w Dr Makenzie Allred of ID candida growth in the urine cx can occur in those who are repeatedly treated with abx for recurrent UTI and does not typically require treatment and patient has improved with abx treatment of klebsiella therefore he does not recommend treatment of the candida at this time); per d/w Sonia Webber PA-C (whom patient follows up with as OP from a urology perspective) given that patient had UTI after placement of suprapubic cath placement Sonia Webber PA-C recommend patient stop home nitrofurantoin 100 mg qd and switch to hiprex 1 gm PO BID which has been done (d/w pharmacy and confirmed with pharmacist that hiprex 1 gm PO BID is acceptable with patients hepatic and renal function)     Blood in stool: seen by GI who suspects this is 2/2 to hemorrhoids and recommended OP colonoscopy & did clear patient to continue pharmacologic DVT ppx, Hg currently WNL      MS: on home cholecalciferol 2000 units qd and home dalfampridine ER TB12 10 mg q12; she is on intrathecal methotrexate inj Q2 months and per patient next due in June; BLESSING virus positive in 2018; follows with Dr Giselle Hebert as an OP (advised patient to d/w Dr Ross Chou regarding finding of positive BLESSING virus in 2018 so as to make them aware and will also send DC summary to Dr Ross Chou so as to ensure they are aware as well)      HTN: on home norvasc 2 5 mg Q12, was on toprol XL and losartan in the past however patient's cardiologist, Dr Sabrina Talley, stopped the Toprol XL (per his OP note of 3/2021) and later her PCP stopped the losartan (per patient); management per IM who recommends patient continue home norvasc 2 5 mg Q12 upon dc      Dyslipidemia: per Dr Anastasiya Srivastava's OP note of 3/2021 he d/w patient starting statin however patient declined and preferred to address with lifestyle/dietary modifications      Elevated LV end diastolic pressure: follows with Dr Mildred Villar who at last OP visit of 3/2021 noted that patient was on losartan 25 mg qd for this however per patient provided home med list is no longer on losartan and per patient this was stopped by her PCP; OP FU with Dr Mildred Villar     weakness: chronic in the setting of MS but likely exacerbated by UTI, 2/5 B/L hip flexion, 3 to 4-/5 R dorsiflexion, 1-2/5 L dorsiflexion (multipodis ordered on L to prevent achilles tendon contracture, will continue to use at home & ROM at home to prevent contracture), 4/5 UE B/L; OP FU with Dr Giselle Hebert       Chronic lower extremity edema: follows with Dr Mildred Villar of cards as OP who recommend she use lasix prn and it appears she uses 40 mg on Mondays and Fridays as she states that this schedule appears to control the LE edema best (it tends to improve per Dr Walter Kaur OP notes when she is consistent with use of compression stockings, therefore TEDs ordered for patient); OP FU with Dr Dustin Hraper     Spasticity/neuropathic pain: in the setting of MS on home baclofen 10 mg TID, per patient provided home med list on baclofen 10 mg TID (not 20 mg TID as listed in OP notes and ER note) and is no longer on neurontin (as listed in OP notes and ER note) per patient provided home med list      Neurogenic bladder: chronic in the setting of MS; h/o of sacral neuromodulation device placement on 11/19/21 by Dr Autumn Forte for urinary retention however ultimately required chronic indwelling nixon; h/o of recurrent UTIs with chronic indwelling nixon s/p suprapubic catheter placement on 4/26 by IR, at home on flomax 0 8 mg qpm however per d/w Blanche Macdonald PA-C (whome patient follows with as OP from a urology perspective) now that patient has suprapubic catheter she no longer requires flomax therefore it was dc'd; OP FU with Blanceh Macdonald PA-C of urology scheduled for 5/31 for catheter change (and Dr Autumn Forte of Urogyn)      Neurogenic bowel: chronic in the setting of MS, has occasional episodes of incontinence at home where she does not feel the stool coming, as episodes are not frequent and patient does not take anything at home will monitor off scheduled meds for now and use prn's      DVT ppx: lovenox  Dispo: home with 24 hr support from spouse + occasional assistance provided friends/neighbors if patient's spouse would need to leave the house but patient will never be alone       Note: unclear as to why patient is on ASA 81 mg qd at home (no h/o TIA/CVA/MI/CAD/PVD) and per d/w patient she began taking it on her own and was not specifically recommended to do so by her physicians therefore advised patient to hold off on taking asa until d/w her PCP      Note: blood cx of 5/5/22 x 2 finalized and no growth     Note: home med regimen confirmed with patient who provided a list      Incidental findings:     Elevated alk phos: mildly elevated at 156 ( however can be up to 147 by some lab standards) and appears to be chronic going back to at least 1/2018 per EMR lab records     Elevated B12 level: appears to be chronic going back to at least 4/2018 per EMR lab records and was 2,937 () on 2/1/22 likely due to B12 supplementation patient takes at home, which patient states she started on her own rather than at the recommendation of her doctors therefore advised to hold off B12 supplementation until she d/w her PCP      Arachnoid cyst: noted to cause cord flattening at the T6/7 levels on past imaging; OP FU with Dr Beth Soria           Objective:    Functional Update:  Mobility: variable due to MS   Transfers: mod   ADLs: mod       Physical Exam:    Vitals:    05/25/22 1554   BP: 119/65   Pulse: 92   Resp: 17   Temp: 97 5 °F (36 4 °C)   SpO2: 98%           General: alert, no apparent distress, cooperative and comfortable  HEENT:  Head: Normal, normocephalic, atraumatic    Eye: Normal external eye, conjunctiva, lidsc cornea  Ears: Normal external ears  Nose: Normal external nose, mucus membranes  CARDIAC:  +S1/2  LUNGS:  no abnormal respiratory pattern, no retractions noted, non-labored breathing   ABDOMEN:  + suprapubic catheter in place   EXTREMITIES:  no cyanosis or clubbing   NEURO:  awake, alert, apporpriately answering questions  PSYCH:  mood/affect currently stable

## 2022-05-26 VITALS
WEIGHT: 168.43 LBS | TEMPERATURE: 97.8 F | DIASTOLIC BLOOD PRESSURE: 62 MMHG | HEART RATE: 78 BPM | SYSTOLIC BLOOD PRESSURE: 121 MMHG | HEIGHT: 55 IN | BODY MASS INDEX: 38.98 KG/M2 | RESPIRATION RATE: 18 BRPM | OXYGEN SATURATION: 96 %

## 2022-05-26 PROCEDURE — 99232 SBSQ HOSP IP/OBS MODERATE 35: CPT | Performed by: INTERNAL MEDICINE

## 2022-05-26 PROCEDURE — 99238 HOSP IP/OBS DSCHRG MGMT 30/<: CPT | Performed by: PHYSICAL MEDICINE & REHABILITATION

## 2022-05-26 RX ADMIN — BACLOFEN 10 MG: 10 TABLET ORAL at 09:49

## 2022-05-26 RX ADMIN — Medication 2000 UNITS: at 09:50

## 2022-05-26 RX ADMIN — METHENAMINE HIPPURATE 1 G: 1 TABLET ORAL at 09:51

## 2022-05-26 RX ADMIN — DALFAMPRIDINE 10 MG: 10 TABLET, EXTENDED RELEASE ORAL at 09:52

## 2022-05-26 RX ADMIN — AMLODIPINE BESYLATE 2.5 MG: 2.5 TABLET ORAL at 09:50

## 2022-05-26 NOTE — PLAN OF CARE
Problem: Potential for Falls  Goal: Patient will remain free of falls  Description: INTERVENTIONS:  - Educate patient/family on patient safety including physical limitations  - Instruct patient to call for assistance with activity   - Consult OT/PT to assist with strengthening/mobility   - Keep Call bell within reach  - Keep bed low and locked with side rails adjusted as appropriate  - Keep care items and personal belongings within reach  - Initiate and maintain comfort rounds  - Make Fall Risk Sign visible to staff  - Offer Toileting every Hours, in advance of need  - Initiate/Maintainalarm  - Obtain necessary fall risk management equipment:  - Apply yellow socks and bracelet for high fall risk patients  - Consider moving patient to room near nurses station  5/26/2022 1409 by Jannet Scheuermann, RN  Outcome: Adequate for Discharge  5/26/2022 1408 by Jannet Scheuermann, RN  Outcome: Progressing     Problem: Prexisting or High Potential for Compromised Skin Integrity  Goal: Skin integrity is maintained or improved  Description: INTERVENTIONS:  - Identify patients at risk for skin breakdown  - Assess and monitor skin integrity  - Assess and monitor nutrition and hydration status  - Monitor labs   - Assess for incontinence   - Turn and reposition patient  - Assist with mobility/ambulation  - Relieve pressure over bony prominences  - Avoid friction and shearing  - Provide appropriate hygiene as needed including keeping skin clean and dry  - Evaluate need for skin moisturizer/barrier cream  - Collaborate with interdisciplinary team   - Patient/family teaching  - Consider wound care consult   5/26/2022 1409 by Jannet Scheuermann, RN  Outcome: Adequate for Discharge  5/26/2022 1408 by Jannet Scheuermann, RN  Outcome: Progressing     Problem: PAIN - ADULT  Goal: Verbalizes/displays adequate comfort level or baseline comfort level  Description: Interventions:  - Encourage patient to monitor pain and request assistance  - Assess pain using appropriate pain scale  - Administer analgesics based on type and severity of pain and evaluate response  - Implement non-pharmacological measures as appropriate and evaluate response  - Consider cultural and social influences on pain and pain management  - Notify physician/advanced practitioner if interventions unsuccessful or patient reports new pain  5/26/2022 1409 by Liliana Vasquez RN  Outcome: Adequate for Discharge  5/26/2022 1408 by Liliana Vasquez RN  Outcome: Progressing     Problem: INFECTION - ADULT  Goal: Absence or prevention of progression during hospitalization  Description: INTERVENTIONS:  - Assess and monitor for signs and symptoms of infection  - Monitor lab/diagnostic results  - Monitor all insertion sites, i e  indwelling lines, tubes, and drains  - Monitor endotracheal if appropriate and nasal secretions for changes in amount and color  - [place] appropriate cooling/warming therapies per order  - Administer medications as ordered  - Instruct and encourage patient and family to use good hand hygiene technique  - Identify and instruct in appropriate isolation precautions for identified infection/condition  5/26/2022 1409 by Liliana Vasquez RN  Outcome: Adequate for Discharge  5/26/2022 1408 by Liliana Vasquez RN  Outcome: Progressing     Problem: SAFETY ADULT  Goal: Patient will remain free of falls  Description: INTERVENTIONS:  - Educate patient/family on patient safety including physical limitations  - Instruct patient to call for assistance with activity   - Consult OT/PT to assist with strengthening/mobility   - Keep Call bell within reach  - Keep bed low and locked with side rails adjusted as appropriate  - Keep care items and personal belongings within reach  - Initiate and maintain comfort rounds  - Make Fall Risk Sign visible to staff  - Offer Toileting every  Hours, in advance of need  - Initiate/Maintain alarm  - Obtain necessary fall risk management equipment:   - Apply yellow socks and bracelet for high fall risk patients  - Consider moving patient to room near nurses station  5/26/2022 1409 by Vi Anglin RN  Outcome: Adequate for Discharge  5/26/2022 1408 by Vi Anglin RN  Outcome: Progressing  Goal: Maintain or return to baseline ADL function  Description: INTERVENTIONS:  -  Assess patient's ability to carry out ADLs; assess patient's baseline for ADL function and identify physical deficits which impact ability to perform ADLs (bathing, care of mouth/teeth, toileting, grooming, dressing, etc )  - Assess/evaluate cause of self-care deficits   - Assess range of motion  - Assess patient's mobility; develop plan if impaired  - Assess patient's need for assistive devices and provide as appropriate  - Encourage maximum independence but intervene and supervise when necessary  - Involve family in performance of ADLs  - Assess for home care needs following discharge   - Consider OT consult to assist with ADL evaluation and planning for discharge  - Provide patient education as appropriate  5/26/2022 1409 by Vi Anglin RN  Outcome: Adequate for Discharge  5/26/2022 1408 by Vi Anglin RN  Outcome: Progressing  Goal: Maintains/Returns to pre admission functional level  Description: INTERVENTIONS:  - Perform BMAT or MOVE assessment daily    - Set and communicate daily mobility goal to care team and patient/family/caregiver     - Collaborate with rehabilitation services on mobility goals if consulted  - Perform Range of   - Out of bed for toileting  - Record patient progress and toleration of activity level   5/26/2022 1409 by Vi Anglin RN  Outcome: Adequate for Discharge  5/26/2022 1408 by Vi Anglin RN  Outcome: Progressing     Problem: DISCHARGE PLANNING  Goal: Discharge to home or other facility with appropriate resources  Description: INTERVENTIONS:  - Identify barriers to discharge w/patient and caregiver  - Arrange for needed discharge resources and transportation as appropriate  - Identify discharge learning needs (meds, wound care, etc )  - Arrange for interpretive services to assist at discharge as needed  - Refer to Case Management Department for coordinating discharge planning if the patient needs post-hospital services based on physician/advanced practitioner order or complex needs related to functional status, cognitive ability, or social support system  5/26/2022 1409 by Liliana Vasquez RN  Outcome: Adequate for Discharge  5/26/2022 1408 by Liliana Vasquez RN  Outcome: Progressing     Problem: MOBILITY - ADULT  Goal: Maintain or return to baseline ADL function  Description: INTERVENTIONS:  -  Assess patient's ability to carry out ADLs; assess patient's baseline for ADL function and identify physical deficits which impact ability to perform ADLs (bathing, care of mouth/teeth, toileting, grooming, dressing, etc )  - Assess/evaluate cause of self-care deficits   - Assess range of motion  - Assess patient's mobility; develop plan if impaired  - Assess patient's need for assistive devices and provide as appropriate  - Encourage maximum independence but intervene and supervise when necessary  - Involve family in performance of ADLs  - Assess for home care needs following discharge   - Consider OT consult to assist with ADL evaluation and planning for discharge  - Provide patient education as appropriate  5/26/2022 1409 by Liliana Vasquez RN  Outcome: Adequate for Discharge  5/26/2022 1408 by Liliana Vasquez RN  Outcome: Progressing  Goal: Maintains/Returns to pre admission functional level  Description: INTERVENTIONS:  - Perform BMAT or MOVE assessment daily    - Set and communicate daily mobility goal to care team and patient/family/caregiver     - Collaborate with rehabilitation services on mobility goals if consulted  - Perform Range of M  - Out  - Out of bed for toileting  - Record patient progress and toleration of activity level   5/26/2022 1409 by Liliana Vasquez RN  Outcome: Adequate for Discharge  5/26/2022 1408 by Meeker Standard, RN  Outcome: Progressing

## 2022-05-26 NOTE — PLAN OF CARE
Problem: Potential for Falls  Goal: Patient will remain free of falls  Description: INTERVENTIONS:  - Educate patient/family on patient safety including physical limitations  - Instruct patient to call for assistance with activity   - Consult OT/PT to assist with strengthening/mobility   - Keep Call bell within reach  - Keep bed low and locked with side rails adjusted as appropriate  - Keep care items and personal belongings within reach  - Initiate and maintain comfort rounds  - Make Fall Risk Sign visible to staff  - Offer Toileting every Hours, in advance of need  - Initiate/Maintain larm  - Obtain necessary fall risk management equipment:   - Apply yellow socks and bracelet for high fall risk patients  - Consider moving patient to room near nurses station  Outcome: Progressing     Problem: Prexisting or High Potential for Compromised Skin Integrity  Goal: Skin integrity is maintained or improved  Description: INTERVENTIONS:  - Identify patients at risk for skin breakdown  - Assess and monitor skin integrity  - Assess and monitor nutrition and hydration status  - Monitor labs   - Assess for incontinence   - Turn and reposition patient  - Assist with mobility/ambulation  - Relieve pressure over bony prominences  - Avoid friction and shearing  - Provide appropriate hygiene as needed including keeping skin clean and dry  - Evaluate need for skin moisturizer/barrier cream  - Collaborate with interdisciplinary team   - Patient/family teaching  - Consider wound care consult   Outcome: Progressing     Problem: PAIN - ADULT  Goal: Verbalizes/displays adequate comfort level or baseline comfort level  Description: Interventions:  - Encourage patient to monitor pain and request assistance  - Assess pain using appropriate pain scale  - Administer analgesics based on type and severity of pain and evaluate response  - Implement non-pharmacological measures as appropriate and evaluate response  - Consider cultural and social influences on pain and pain management  - Notify physician/advanced practitioner if interventions unsuccessful or patient reports new pain  Outcome: Progressing     Problem: INFECTION - ADULT  Goal: Absence or prevention of progression during hospitalization  Description: INTERVENTIONS:  - Assess and monitor for signs and symptoms of infection  - Monitor lab/diagnostic results  - Monitor all insertion sites, i e  indwelling lines, tubes, and drains  - Monitor endotracheal if appropriate and nasal secretions for changes in amount and color  - [place] appropriate cooling/warming therapies per order  - Administer medications as ordered  - Instruct and encourage patient and family to use good hand hygiene technique  - Identify and instruct in appropriate isolation precautions for identified infection/condition  Outcome: Progressing     Problem: SAFETY ADULT  Goal: Patient will remain free of falls  Description: INTERVENTIONS:  - Educate patient/family on patient safety including physical limitations  - Instruct patient to call for assistance with activity   - Consult OT/PT to assist with strengthening/mobility   - Keep Call bell within reach  - Keep bed low and locked with side rails adjusted as appropriate  - Keep care items and personal belongings within reach  - Initiate and maintain comfort rounds  - Make Fall Risk Sign visible to staff  - Offer Toileting every Hours, in advance of need  - Initiate/Maintain larm  - Obtain necessary fall risk management equipment:  - Apply yellow socks and bracelet for high fall risk patients  - Consider moving patient to room near nurses station  Outcome: Progressing  Goal: Maintain or return to baseline ADL function  Description: INTERVENTIONS:  -  Assess patient's ability to carry out ADLs; assess patient's baseline for ADL function and identify physical deficits which impact ability to perform ADLs (bathing, care of mouth/teeth, toileting, grooming, dressing, etc )  - Assess/evaluate cause of self-care deficits   - Assess range of motion  - Assess patient's mobility; develop plan if impaired  - Assess patient's need for assistive devices and provide as appropriate  - Encourage maximum independence but intervene and supervise when necessary  - Involve family in performance of ADLs  - Assess for home care needs following discharge   - Consider OT consult to assist with ADL evaluation and planning for discharge  - Provide patient education as appropriate  Outcome: Progressing  Goal: Maintains/Returns to pre admission functional level  Description: INTERVENTIONS:  - Perform BMAT or MOVE assessment daily    - Set and communicate daily mobility goal to care team and patient/family/caregiver     - Collaborate with rehabilitation services on mobility goals if consulted  - Perform Range of   - Out of bed for toileting  - Record patient progress and toleration of activity level   Outcome: Progressing     Problem: DISCHARGE PLANNING  Goal: Discharge to home or other facility with appropriate resources  Description: INTERVENTIONS:  - Identify barriers to discharge w/patient and caregiver  - Arrange for needed discharge resources and transportation as appropriate  - Identify discharge learning needs (meds, wound care, etc )  - Arrange for interpretive services to assist at discharge as needed  - Refer to Case Management Department for coordinating discharge planning if the patient needs post-hospital services based on physician/advanced practitioner order or complex needs related to functional status, cognitive ability, or social support system  Outcome: Progressing     Problem: MOBILITY - ADULT  Goal: Maintain or return to baseline ADL function  Description: INTERVENTIONS:  -  Assess patient's ability to carry out ADLs; assess patient's baseline for ADL function and identify physical deficits which impact ability to perform ADLs (bathing, care of mouth/teeth, toileting, grooming, dressing, etc )  - Assess/evaluate cause of self-care deficits   - Assess range of motion  - Assess patient's mobility; develop plan if impaired  - Assess patient's need for assistive devices and provide as appropriate  - Encourage maximum independence but intervene and supervise when necessary  - Involve family in performance of ADLs  - Assess for home care needs following discharge   - Consider OT consult to assist with ADL evaluation and planning for discharge  - Provide patient education as appropriate  Outcome: Progressing  Goal: Maintains/Returns to pre admission functional level  Description: INTERVENTIONS:  - Perform BMAT or MOVE assessment daily    - Set and communicate daily mobility goal to care team and patient/family/caregiver     - Collaborate with rehabilitation services on mobility goals if consulted  - Perform Range of M  - Out of bed for toileting  - Record patient progress and toleration of activity level   Outcome: Progressing

## 2022-05-26 NOTE — PLAN OF CARE
Problem: Potential for Falls  Goal: Patient will remain free of falls  Description: INTERVENTIONS:  - Educate patient/family on patient safety including physical limitations  - Instruct patient to call for assistance with activity   - Consult OT/PT to assist with strengthening/mobility   - Keep Call bell within reach  - Keep bed low and locked with side rails adjusted as appropriate  - Keep care items and personal belongings within reach  - Initiate and maintain comfort rounds  - Make Fall Risk Sign visible to staff  - Offer Toileting every Hours, in advance of need  - Initiate/Maintain alarm  - Obtain necessary fall risk management equipment:   - Apply yellow socks and bracelet for high fall risk patients  - Consider moving patient to room near nurses station  5/26/2022 1411 by Vi Anglin RN  Outcome: Completed  5/26/2022 1409 by Vi Anglin RN  Outcome: Adequate for Discharge  5/26/2022 1408 by Vi Anglin RN  Outcome: Progressing     Problem: Prexisting or High Potential for Compromised Skin Integrity  Goal: Skin integrity is maintained or improved  Description: INTERVENTIONS:  - Identify patients at risk for skin breakdown  - Assess and monitor skin integrity  - Assess and monitor nutrition and hydration status  - Monitor labs   - Assess for incontinence   - Turn and reposition patient  - Assist with mobility/ambulation  - Relieve pressure over bony prominences  - Avoid friction and shearing  - Provide appropriate hygiene as needed including keeping skin clean and dry  - Evaluate need for skin moisturizer/barrier cream  - Collaborate with interdisciplinary team   - Patient/family teaching  - Consider wound care consult   5/26/2022 1411 by Vi Anglin RN  Outcome: Completed  5/26/2022 1409 by Vi Anglin RN  Outcome: Adequate for Discharge  5/26/2022 1408 by Vi Anglin RN  Outcome: Progressing     Problem: PAIN - ADULT  Goal: Verbalizes/displays adequate comfort level or baseline comfort level  Description: Interventions:  - Encourage patient to monitor pain and request assistance  - Assess pain using appropriate pain scale  - Administer analgesics based on type and severity of pain and evaluate response  - Implement non-pharmacological measures as appropriate and evaluate response  - Consider cultural and social influences on pain and pain management  - Notify physician/advanced practitioner if interventions unsuccessful or patient reports new pain  5/26/2022 1411 by Sharron Espinoza RN  Outcome: Completed  5/26/2022 1409 by Sharron Espinoza RN  Outcome: Adequate for Discharge  5/26/2022 1408 by Sharron Espinoza RN  Outcome: Progressing     Problem: INFECTION - ADULT  Goal: Absence or prevention of progression during hospitalization  Description: INTERVENTIONS:  - Assess and monitor for signs and symptoms of infection  - Monitor lab/diagnostic results  - Monitor all insertion sites, i e  indwelling lines, tubes, and drains  - Monitor endotracheal if appropriate and nasal secretions for changes in amount and color  - [place] appropriate cooling/warming therapies per order  - Administer medications as ordered  - Instruct and encourage patient and family to use good hand hygiene technique  - Identify and instruct in appropriate isolation precautions for identified infection/condition  5/26/2022 1411 by Sharron Espinoza RN  Outcome: Completed  5/26/2022 1409 by Sharron Espinoza RN  Outcome: Adequate for Discharge  5/26/2022 1408 by Sharron Espinoza RN  Outcome: Progressing     Problem: SAFETY ADULT  Goal: Patient will remain free of falls  Description: INTERVENTIONS:  - Educate patient/family on patient safety including physical limitations  - Instruct patient to call for assistance with activity   - Consult OT/PT to assist with strengthening/mobility   - Keep Call bell within reach  - Keep bed low and locked with side rails adjusted as appropriate  - Keep care items and personal belongings within reach  - Initiate and maintain comfort rounds  - Make Fall Risk Sign visible to staff  - Offer Toileting every  Hours, in advance of need  - Initiate/Maintainalarm  - Obtain necessary fall risk management equipment:   - Apply yellow socks and bracelet for high fall risk patients  - Consider moving patient to room near nurses station  5/26/2022 1411 by Haylee Gonzalez RN  Outcome: Completed  5/26/2022 1409 by Haylee Gonzalez RN  Outcome: Adequate for Discharge  5/26/2022 1408 by Haylee Gonzalez RN  Outcome: Progressing  Goal: Maintain or return to baseline ADL function  Description: INTERVENTIONS:  -  Assess patient's ability to carry out ADLs; assess patient's baseline for ADL function and identify physical deficits which impact ability to perform ADLs (bathing, care of mouth/teeth, toileting, grooming, dressing, etc )  - Assess/evaluate cause of self-care deficits   - Assess range of motion  - Assess patient's mobility; develop plan if impaired  - Assess patient's need for assistive devices and provide as appropriate  - Encourage maximum independence but intervene and supervise when necessary  - Involve family in performance of ADLs  - Assess for home care needs following discharge   - Consider OT consult to assist with ADL evaluation and planning for discharge  - Provide patient education as appropriate  5/26/2022 1411 by Haylee Gonzalez RN  Outcome: Completed  5/26/2022 1409 by Haylee Gonzalez RN  Outcome: Adequate for Discharge  5/26/2022 1408 by Haylee Gonzalez RN  Outcome: Progressing  Goal: Maintains/Returns to pre admission functional level  Description: INTERVENTIONS:  - Perform BMAT or MOVE assessment daily    - Set and communicate daily mobility goal to care team and patient/family/caregiver     - Collaborate with rehabilitation services on mobility goals if consulted  - Perform Range of Mo  - Reposition p  - Out of bed for toileting  - Record patient progress and toleration of activity level   5/26/2022 1411 by Ole Gerard Anjali Norton RN  Outcome: Completed  5/26/2022 1409 by Liliana Vasquez RN  Outcome: Adequate for Discharge  5/26/2022 1408 by Liliana Vasquez RN  Outcome: Progressing     Problem: DISCHARGE PLANNING  Goal: Discharge to home or other facility with appropriate resources  Description: INTERVENTIONS:  - Identify barriers to discharge w/patient and caregiver  - Arrange for needed discharge resources and transportation as appropriate  - Identify discharge learning needs (meds, wound care, etc )  - Arrange for interpretive services to assist at discharge as needed  - Refer to Case Management Department for coordinating discharge planning if the patient needs post-hospital services based on physician/advanced practitioner order or complex needs related to functional status, cognitive ability, or social support system  5/26/2022 1411 by Liliana Vasquez RN  Outcome: Completed  5/26/2022 1409 by Liliana Vasquez RN  Outcome: Adequate for Discharge  5/26/2022 1408 by Liliana Vasquez RN  Outcome: Progressing     Problem: MOBILITY - ADULT  Goal: Maintain or return to baseline ADL function  Description: INTERVENTIONS:  -  Assess patient's ability to carry out ADLs; assess patient's baseline for ADL function and identify physical deficits which impact ability to perform ADLs (bathing, care of mouth/teeth, toileting, grooming, dressing, etc )  - Assess/evaluate cause of self-care deficits   - Assess range of motion  - Assess patient's mobility; develop plan if impaired  - Assess patient's need for assistive devices and provide as appropriate  - Encourage maximum independence but intervene and supervise when necessary  - Involve family in performance of ADLs  - Assess for home care needs following discharge   - Consider OT consult to assist with ADL evaluation and planning for discharge  - Provide patient education as appropriate  5/26/2022 1411 by Liliana Vasquez RN  Outcome: Completed  5/26/2022 1409 by Liliana Vasquez RN  Outcome: Adequate for Discharge  5/26/2022 1408 by Scott Olmedo RN  Outcome: Progressing  Goal: Maintains/Returns to pre admission functional level  Description: INTERVENTIONS:  - Perform BMAT or MOVE assessment daily    - Set and communicate daily mobility goal to care team and patient/family/caregiver     - Collaborate with rehabilitation services on mobility goals if consulted  - Perform Range of Mot  - Out of bed for toileting  - Record patient progress and toleration of activity level   5/26/2022 1411 by Scott Olmedo RN  Outcome: Completed  5/26/2022 1409 by Scott Olmedo RN  Outcome: Adequate for Discharge  5/26/2022 1408 by Scott Olmedo RN  Outcome: Progressing

## 2022-05-26 NOTE — CASE MANAGEMENT
Pt made good rehab progress and returned home with his   She received a slideboard from [employer]  She will receive continue home PT and OT through [facility] and home RN through partnership with [address] [place] [address]  Pt's  was present for d/c instructions and is aware of functional ability

## 2022-05-26 NOTE — PROGRESS NOTES
Internal Medicine Progress Note  Patient: Jessie Reza  Age/sex: 73 y o  female  Medical Record #: [id number]      ASSESSMENT/PLAN: (Interval History)  Rima Robbins is seen and examined and management for following issues:    Multiple Sclerosis  · She had a functional decline due to UTI  · Therapy per PMR  · Continue Baclofen to control spasticity   · She also takes dalfampridine ER 10mg 2x daily to improve walking speed       HTN  · Continue Amlodipine 2 5mg 2x daily  · She also takes Lasix 40mg twice weekly for LE edema  · Continue to monitor BP      Hemorrhoids  · D/t chronic constipation try to avoid  · Appreciate GI input  · No further testing unless drop in hgb and persistent maci blood  · Colonoscopy 4 years ago  · They recommend f/u with GI as outpatient    Neurogenic bowel and bladder  · SPC placed 22  · PMR managing neurogenic bowel management teaching  · No further need for flomax since now with [facility]      Anemia  · Review of previous labs revealed Hgb in the 12-13 range  · stable       DC plannin/26=OK for dc from medicine standpoint  The above assessment and plan was reviewed and updated as determined by my evaluation of the patient on 2022  Labs:              Invalid input(s): LABSTERLING, CMP                Review of Scheduled Meds:  Current Facility-Administered Medications   Medication Dose Route Frequency Provider Last Rate    acetaminophen  650 mg Oral Q6H PRN Crow Joshua MD      amLODIPine  2 5 mg Oral Q12H Adrien Nevarez MD      baclofen  10 mg Oral TID Adrien Nevarez MD      cholecalciferol  2,000 Units Oral Daily Adrien Nevarez MD      Dalfampridine ER  10 mg Oral Q12H Marie Hernandez MD      enoxaparin  40 mg Subcutaneous Daily Adrien Nevarez MD      furosemide  40 mg Oral Once per day on  Adrien Nevarez MD      methenamine hippurate  1 g Oral BID MD Phan Joyner ANTIFUNGAL   Topical BID COLIN Nowak      polyethylene glycol  17 g Oral Daily PRN Lior Hayden MD         Subjective/ HPI: Patient seen and examined  Patients overnight issues or events were reviewed with nursing or staff during rounds or morning huddle session  New or overnight issues include the following:     Pt seen in her chair  Ready for dc today    ROS:   A 10 point ROS was performed; negative except as noted above  Imaging:     No orders to display       *Labs /Radiology studies reviewed  *Medications reviewed and reconciled as needed  *Please refer to order section for additional ordered labs studies  *Case discussed with primary attending during morning huddle case rounds    Physical Examination:  Vitals:   Vitals:    05/25/22 1330 05/25/22 1554 05/25/22 2131 05/26/22 0634   BP: 137/62 119/65 112/54 121/62   BP Location: Left arm Right arm Right arm Right arm   Pulse: 68 92 71 78   Resp:  17 16 18   Temp:  97 5 °F (36 4 °C) 97 9 °F (36 6 °C) 97 8 °F (36 6 °C)   TempSrc:  Oral Oral Oral   SpO2:  98% 97% 96%   Weight:       Height:         GEN: No apparent distress, interactive  NEURO: Alert and oriented x3  HEENT: Pupils are equal and reactive, EOMI, mucous membranes are moist, face symmetrical  CV: S1 S2 regular, no MRG, no peripheral edema noted  RESP: Lungs are clear bilaterally, no wheezes, rales or rhonchi noted, on room air, respirations easy and non labored  GI: Flat, soft non tender, non distended; +BS x4  : SP catheter draining clear yellow urine  MUSC: Moves all extremities; generalized weakness  SKIN: pink, warm and dry, normal turgor, no rashes, lesions    The above physical exam was reviewed and updated as determined by my evaluation of the patient on 5/26/2022      Invasive Devices  Report    Drain  Duration           Suprapubic Catheter 18 Fr  29 days                   VTE Pharmacologic Prophylaxis: Enoxaparin  Code Status: Level 1 - Full Code  Current Length of Stay: 17 day(s)      Total time spent:  20 minutes with more than 50% spent counseling/coordinating care  Counseling includes discussion with patient re: progress  and discussion with patient of his/her current medical state/information  Coordination of patient's care was performed in conjunction with primary service  Time invested included review of patient's labs, vitals, and management of their comorbidities with continued monitoring  In addition, this patient was discussed with medical team including physician and advanced extenders  The care of the patient was extensively discussed and appropriate treatment plan was formulated unique for this patient  ** Please Note:  voice to text software may have been used in the creation of this document   Although proof errors in transcription or interpretation are a potential of such software**

## 2022-05-26 NOTE — DISCHARGE SUMMARY
Discharge Summary - PMR           Admission Date:    5/9/22  Discharge Date:   5/26/22    Diagnosis:   UTI     Functional Status Upon Discharge:   Mobility: variable due to MS   Transfers: mod   ADLs: mod     Condition at Discharge: stable    Discharge instructions/Information to patient and family:   See after visit summary for information provided to patient and family  Provisions for Follow-Up Care:  See after visit summary for information related to follow-up care and any pertinent home health orders  Disposition: home with 24 hr support     Planned Readmission: no        Discharge Medications:  See after visit summary for reconciled discharge medications provided to patient and family  Comorbidities:   See below for medical details     Hospital Course:    The patient had an unremarkable rehab course with significant functional gains made, please see below for medical details:              Patient is a 72 yo female with h/o MS & neurogenic bladder with urinary retention s/p sacral neuromodulator device placement however ultimately requiring chronic indwelling nixon catheter however had recurrent UTIs and therefore underwent suprapubic catheter placement on 4/26 by IR however on 5/5 presented with generalized weakness and chills and was found to have UTI which was treated first with IV abx then eventually transitioned to oral abx in acute care and transferred to acute inpt rehab on 5/9        UTI: h/o recurrent UTI's in the setting of neurogenic bladder due to MS with chronic indwelling nixon s/p suprapubic catheter placement by IR on 4/26; transitioned from IV abx to PO keflex 500 mg Q6 in acute care with recommendation by acute care hospitalist that patient continue abx until 5/12 which is now completed (of note UCx of 5/5/22 in addition to growing klebsiella also grew candida, however per d/w Dr Angie Tilley of ID candida growth in the urine cx can occur in those who are repeatedly treated with abx for recurrent UTI and does not typically require treatment and patient has improved with abx treatment of klebsiella therefore he does not recommend treatment of the candida at this time); per d/w Cheryl Farfan PA-C (whom patient follows up with as OP from a urology perspective) given that patient had UTI after placement of suprapubic cath placement Cheryl Farfan PA-C recommend patient stop home nitrofurantoin 100 mg qd and switch to hiprex 1 gm PO BID which has been done (d/w pharmacy and confirmed with pharmacist that hiprex 1 gm PO BID is acceptable with patients hepatic and renal function)     Blood in stool: seen by GI who suspects this is 2/2 to hemorrhoids and recommended OP colonoscopy & did clear patient to continue pharmacologic DVT ppx, Hg currently WNL      MS: on home cholecalciferol 2000 units qd and home dalfampridine ER TB12 10 mg q12; she is on intrathecal methotrexate inj Q2 months and per patient next due in June; BLESSING virus positive in 2018; follows with Dr Katelynn Horton as an OP (advised patient to d/w Dr Afshan Fox regarding finding of positive BLESSING virus in 2018 so as to make them aware and will also send DC summary to Dr Afshan Fox so as to ensure they are aware as well)      HTN: on home norvasc 2 5 mg Q12, was on toprol XL and losartan in the past however patient's cardiologist, Dr Gray Galicia, stopped the Toprol XL (per his OP note of 3/2021) and later her PCP stopped the losartan (per patient); management per IM who recommends patient continue home norvasc 2 5 mg Q12 upon dc      Dyslipidemia: per Dr Jameson Tobias note of 3/2021 he d/w patient starting statin however patient declined and preferred to address with lifestyle/dietary modifications      Elevated LV end diastolic pressure: follows with Dr Caron Waterman who at last OP visit of 3/2021 noted that patient was on losartan 25 mg qd for this however per patient provided home med list is no longer on losartan and per patient this was stopped by her PCP; OP FU with Dr Darryle Newton     weakness: chronic in the setting of MS but likely exacerbated by UTI, 2/5 B/L hip flexion, 3 to 4-/5 R dorsiflexion, 1-2/5 L dorsiflexion (multipodis ordered on L to prevent achilles tendon contracture, will continue to use at home & ROM at home to prevent contracture), 4/5 UE B/L; OP FU with Dr Thuy Bar       Chronic lower extremity edema: follows with Dr Darryle Newton of cards as OP who recommend she use lasix prn and it appears she uses 40 mg on Mondays and Fridays as she states that this schedule appears to control the LE edema best (it tends to improve per Dr Robin Rogers OP notes when she is consistent with use of compression stockings, therefore TEDs ordered for patient); OP FU with Dr Darryle Newton     Spasticity/neuropathic pain: in the setting of MS on home baclofen 10 mg TID, per patient provided home med list on baclofen 10 mg TID (not 20 mg TID as listed in OP notes and ER note) and is no longer on neurontin (as listed in OP notes and ER note) per patient provided home med list      Neurogenic bladder: chronic in the setting of MS; h/o of sacral neuromodulation device placement on 11/19/21 by Dr Nithin Roberts for urinary retention however ultimately required chronic indwelling nixon; h/o of recurrent UTIs with chronic indwelling nixon s/p suprapubic catheter placement on 4/26 by IR, at home on flomax 0 8 mg qpm however per d/w Uriah Varghese PA-C (whome patient follows with as OP from a urology perspective) now that patient has suprapubic catheter she no longer requires flomax therefore it was dc'd; OP FU with Uriah Varghese PA-C of urology scheduled for 5/31 for catheter change (and Dr Nithin Roberts of Urogyn)      Neurogenic bowel: chronic in the setting of MS, has occasional episodes of incontinence at home where she does not feel the stool coming, as episodes are not frequent and patient does not take anything at home will monitor off scheduled meds for now and use prn's      DVT ppx: lovenox  Dispo: home with 24 hr support from spouse + occasional assistance provided friends/neighbors if patient's spouse would need to leave the house but patient will never be alone       Note: unclear as to why patient is on ASA 81 mg qd at home (no h/o TIA/CVA/MI/CAD/PVD) and per d/w patient she began taking it on her own and was not specifically recommended to do so by her physicians therefore advised patient to hold off on taking asa until d/w her PCP      Note: blood cx of 5/5/22 x 2 finalized and no growth     Note: home med regimen confirmed with patient who provided a list      Incidental findings:     Elevated alk phos: mildly elevated at 156 ( however can be up to 147 by some lab standards) and appears to be chronic going back to at least 1/2018 per EMR lab records     Elevated B12 level: appears to be chronic going back to at least 4/2018 per EMR lab records and was 2,937 () on 2/1/22 likely due to B12 supplementation patient takes at home, which patient states she started on her own rather than at the recommendation of her doctors therefore advised to hold off B12 supplementation until she d/w her PCP      Arachnoid cyst: noted to cause cord flattening at the T6/7 levels on past imaging; OP FU with Dr Lola Arizmendi

## 2022-05-27 NOTE — PROGRESS NOTES
05/27/22 1244   Hello, [Guardians Name / Asim Carmichael, this is [Caller Toni Reading from [facility], and our clinical care team wanted to check on you / your child after your recent visit to the hospital  It will only take 3-5 minutes  Is this a good time? Discharge Call Type/ Specific Diagnosis: General Call   General Discharge Phone Call   Were your/your child's discharge instructions clear and understandable? Please tell me in your own words how to care for yourself/your child now that you're home Yes;Patient understood instructions   Have you filled your/your child's new prescriptions yet? Yes   What questions do you have about those medications? No questions   Are you/your child having any unusual symptoms or problems? (Specific to problem- i e , dressing, pain, bruising or swelling, procedure, etc ) No reported symptoms/problems   Do you have follow up appointment with your/your child's physician? Yes   Is there anything preventing you from keeping that appointment? No;Patient able to keep appointment   Are there any physicians, nurses, or hospital staff you would like us to recognize for doing a very good job? Nurse;PCA/Tech;PT/OT/RT/SLP  (Everyone did a great job!)   Thank you for taking the time to share with me about your care and recovery  Do you have any suggestions for us? No   This call resulted in: No interventions needed   Call Complete   Attempted Number of Calls 1   Discharge phone call complete? Complete   Hi, This is ________ from [facility]  This is just a courtesy call, and there is no need to call us back  Have a great day     (Called by [facility])

## 2022-05-31 ENCOUNTER — PROCEDURE VISIT (OUTPATIENT)
Dept: UROLOGY | Facility: CLINIC | Age: 67
End: 2022-05-31
Payer: MEDICARE

## 2022-05-31 VITALS
BODY MASS INDEX: 39.15 KG/M2 | SYSTOLIC BLOOD PRESSURE: 132 MMHG | HEART RATE: 83 BPM | RESPIRATION RATE: 18 BRPM | OXYGEN SATURATION: 93 % | DIASTOLIC BLOOD PRESSURE: 70 MMHG | HEIGHT: 55 IN

## 2022-05-31 DIAGNOSIS — N31.9 NEUROGENIC BLADDER: Primary | ICD-10-CM

## 2022-05-31 PROCEDURE — 99214 OFFICE O/P EST MOD 30 MIN: CPT | Performed by: PHYSICIAN ASSISTANT

## 2022-05-31 NOTE — PROGRESS NOTES
1  Neurogenic bladder  Cystostomy tube change         Assessment and plan:       1  Neurogenic bladder  2  urinary colonization  3  Recurrent urinary infections  -Suprapubic tube exchange today in the office  We will continue with Q 5-6 weeks changes  The next visit will be with our nursing staff and patient's has been will be counseled on how to exchange this  His subsequent exchange will also be in the office however the  will do the exchange under nursing supervision  If he has concerns or trepidation, will need to coordinate visiting nursing   -continue methenamine hippurate  -contact us with any signs symptoms of breakthrough infection      Che Casas PA-C      Chief Complaint     Neurogenic bladder    History of Present Illness     Rima Matamoros is a 78 y o  female presenting for f/u of neurogenic bladder  Patient has a history multiple sclerosis and ultimately has neurogenic bladder as a result  This was managed by clean intermittent catheterization for by her  (whom is a patient of ours)  There had been barriers to clean intermittent catheterization as he was having increasing difficulty lifting the patient up to perform the procedure as well as increasing bladder spasms  Patient had been Bronson catheter dependent over the past year  They transition to Bronson catheter due to concerns of recurrent infections  She did have 2 hospitalizations for urinary infection and report multiple antibiotics as well  Denies any previous history of pelvic surgery or radiation  Patient did previously have a sacral neuromodulator placed in November of 2021 with Dr Kathy Quintanilla however they feel that this was not beneficial for her urinary symptoms  It is MRI compatible however  Patient had a suprapubic tube placed at the end of April with Interventional Radiology  Hospitalist shortly thereafter with urinary infection and decline with her MS  Started on hiprex         Laboratory Lab Results   Component Value Date    CREATININE 0 76 05/07/2022       Review of Systems     Review of Systems   Constitutional: Negative for activity change, appetite change, chills, diaphoresis, fatigue, fever and unexpected weight change  Respiratory: Negative for chest tightness and shortness of breath  Cardiovascular: Negative for chest pain, palpitations and leg swelling  Gastrointestinal: Negative for abdominal distention, abdominal pain, constipation, diarrhea, nausea and vomiting  Genitourinary: Negative for decreased urine volume, difficulty urinating, dysuria, enuresis, flank pain, frequency, genital sores, hematuria and urgency  Musculoskeletal: Negative for back pain, gait problem and myalgias  Skin: Negative for color change, pallor, rash and wound  Psychiatric/Behavioral: Negative for behavioral problems  The patient is not nervous/anxious  Allergies     No Known Allergies    Physical Exam     Physical Exam  Constitutional:       General: She is not in acute distress  Appearance: Normal appearance  She is not ill-appearing, toxic-appearing or diaphoretic  HENT:      Head: Normocephalic and atraumatic  Eyes:      General:         Right eye: No discharge  Left eye: No discharge  Conjunctiva/sclera: Conjunctivae normal    Genitourinary:     Comments: SPT in place draining clear yellow urine  Site c/d/i  Musculoskeletal:      Comments: Ambulates with wheelchair assistance   Skin:     General: Skin is warm and dry  Coloration: Skin is not jaundiced or pale  Neurological:      Mental Status: She is alert             Vital Signs     Vitals:    05/31/22 1044   BP: 132/70   BP Location: Left arm   Patient Position: Sitting   Cuff Size: Large   Pulse: 83   Resp: 18   SpO2: 93%   Height: 4' 7" (1 397 m)         Current Medications       Current Outpatient Medications:     amLODIPine (NORVASC) 2 5 mg tablet, Take 2 5 mg by mouth every 12 (twelve) hours, Disp: , Rfl:     baclofen 10 mg tablet, Take 10 mg by mouth 3 (three) times a day, Disp: , Rfl:     Dalfampridine ER 10 MG TB12, Take 10 mg by mouth every 12 (twelve) hours, Disp: , Rfl:     Furosemide (LASIX PO), Resume use as you were prior to hospitalization as instructed by Dr Lisa West, Disp: , Rfl:     methenamine hippurate (HIPREX) 1 g tablet, Take 1 tablet (1 g total) by mouth 2 (two) times a day, Disp: 60 tablet, Rfl: 0    VITAMIN D, CHOLECALCIFEROL, PO, Take 2,000 Units by mouth in the morning, Disp: , Rfl:       Active Problems     Patient Active Problem List   Diagnosis    Acquired polyneuropathy    Multiple sclerosis ([facility]    Ambulatory dysfunction    Acute cystitis without hematuria    Hypertension    Neurogenic bladder    Leg swelling    Hypernatremia    Acute bilateral thoracic back pain    Debility    Urinary tract infection associated with cystostomy catheter ([facility] [address] )    Anemia         Past Medical History     Past Medical History:   Diagnosis Date    Back pain 2016    Chronic UTI     Colon polyp     Hypertension     Incontinence     MS (multiple sclerosis) ([facility] [address] )     Age 37    Neurogenic bladder     Spasticity          Surgical History     Past Surgical History:   Procedure Laterality Date    BACK SURGERY  2016    Lumbar fusion    COLONOSCOPY      ELBOW SURGERY Right 2008    ORIF    IR SUPRAPUBIC CATHETER PLACEMENT  4/26/2022    [place] IMPLANT PERIPH/GASTRIC NEUROSTIM/ N/A 11/19/2021    Procedure: leadwire placement and pulse generator  placement of SNM at S3 Bilateral S3  nerve testing, fluroscopy - c-arm guidance and complex programming;  Surgeon: Heide Gallo MD;  Location: [facility];  Service: Gynecology         Family History     Family History   Problem Relation Age of Onset    Cancer Mother     Heart disease Father     Cancer Sister          Social History     Social History       Radiology

## 2022-05-31 NOTE — PROGRESS NOTES
Cystostomy tube change     Date/Time 5/31/2022 12:46 PM     Performed by  Vivek Castellanos PA-C     Authorized by Vivek Castellanos PA-C      Universal Protocol   Consent: Verbal consent obtained  Procedure Details   Procedure Notes: 18 Fr silicone catheter removed  Prepped with chlorhexadine, inserted 18Fr catheter  Inflated 10cc  Irrigated with 60cc without difficulty     Patient tolerance: patient tolerated the procedure well with no immediate complications

## 2022-06-16 DIAGNOSIS — N39.0 RECURRENT UTI: ICD-10-CM

## 2022-06-16 RX ORDER — METHENAMINE HIPPURATE 1000 MG/1
1 TABLET ORAL 2 TIMES DAILY
Qty: 60 TABLET | Refills: 0 | Status: SHIPPED | OUTPATIENT
Start: 2022-06-16 | End: 2022-06-20 | Stop reason: SDUPTHER

## 2022-06-16 NOTE — OCCUPATIONAL THERAPY NOTE
[facility] OT DC SUMMARY    Pt admitted to [facility] on 5/9/22 requiring MaxA x2 for bed level ADLs and SB transfers  Pt DC on 5/26/22 to home requiring Ivonne for fxnl transfers and up to [address] for toileting/LB dressing  Pt achieved set OT goals  Pt limited by dec cog, strength, sensation, coordination, endurance  FT was successfully completed w/  w/ pt and  voicing and demo'ing understanding of learned techniques and rec  Pt has all necessary equipment at home including CHRISTOPHE posada, RW, BSC  Rec home OT

## 2022-06-16 NOTE — TELEPHONE ENCOUNTER
Medication Refill Request     Name: methenamine hippurate (HIPREX)    Dose/Frequency: 1 g tablet 2 times daily  Quantity 10 tablets to [facility] / 180 tablets to [facility]  Verified pharmacy   [x]  Verified ordering Provider   [x]  Verified enough for 3 days  [x]    Pt has a total of 10 tablets left  She is requesting an urgent medication refill be sent to [facility] for 10 tablets, then requesting a 90 day supply be sent to [facility]

## 2022-06-20 NOTE — TELEPHONE ENCOUNTER
Patients  calling checking on status  Methenamine hippurate was ordered on 6/16/22  However, the #10 pill emergency script was never electronically sent to local [facility] pharmacy  Also, #90 day supply never sent to mail in pharmacy as directed       asking for a courtesy call when the above has been completed, preferably today

## 2022-06-20 NOTE — TELEPHONE ENCOUNTER
Patient stated the HIpprex has not been received by her pharmacy [facility], Medication was not sent electronically to the pharmacy  Please resend

## 2022-06-23 DIAGNOSIS — N39.0 RECURRENT UTI: ICD-10-CM

## 2022-06-23 NOTE — TELEPHONE ENCOUNTER
Patient called that mail order has not received the Rx for 90 days supply  She would need it to be sent over

## 2022-06-24 RX ORDER — METHENAMINE HIPPURATE 1000 MG/1
1 TABLET ORAL 2 TIMES DAILY
Qty: 180 TABLET | Refills: 1 | Status: SHIPPED | OUTPATIENT
Start: 2022-06-24

## 2022-06-24 NOTE — TELEPHONE ENCOUNTER
This has been sent multiple times   I will resend, but please someone follow up with the pharmacy to see if there is a reason it is not being filled

## 2022-06-24 NOTE — PHYSICAL THERAPY NOTE
[facility] PT Discharge Summary  Pt demonstrated fair progress in PT from requiring 2 person assist on eval to min-max A of 1 person at d/c for household distance mobilities using a RW or slide board with fatigue onset  DME for slide board submitted to CM and issued to pt prior to d/c  Pt already has all other DME at home from previous hospitalization including a swetha lift, RW and manual w/c  Pt and family education and hands on family training with spouse completed with good carry over of safety practices and proper techniques noted  Pt also instructed with verbal training and modelling for HEP with handout provided to promote maintenance of increased muscle strength and endurance upon return to home  Pt was d/c on 5/26/22 to home with family support and home PT services to address ongoing rehab needs

## 2022-07-12 ENCOUNTER — PROCEDURE VISIT (OUTPATIENT)
Dept: UROLOGY | Facility: CLINIC | Age: 67
End: 2022-07-12

## 2022-07-12 DIAGNOSIS — N31.9 NEUROGENIC BLADDER: Primary | ICD-10-CM

## 2022-07-12 NOTE — PROGRESS NOTES
7/12/2022    Rima Mora  1955  [id number]    Diagnosis  Chief Complaint     Urinary Retention          Patient presents for SPT exchange managed by our office    Plan  Next change in 6 wk with  doing and RN supervising    Procedure: Suprapubic Tube Change         Cystostomy tube change     Date/Time 7/12/2022 12:07 PM     Performed by  Jerman Alanis RN     Authorized by Nury Ceja MD      Universal Protocol   Consent: Verbal consent obtained  Risks and benefits: risks, benefits and alternatives were discussed  Consent given by: patient  Patient understanding: patient states understanding of the procedure being performed  Patient identity confirmed: verbally with patient        Local anesthesia used: no     Anesthesia   Local anesthesia used: no     Procedure Details   Procedure Notes: Patient's  was present and watched as RN changed the SPT  RN provided instructions as the catheter was changed  After it was attached to drainage bag, 2 gauze pads were placed around the catheter at the site due to minor bleeding  Suggested to patient keeping fenestrated gauze around site and changing as needed  Otherwise the stoma looked normal without swelling or colored drainage  Next appt  plans to perform the change under RN supervision and guidance  Patient and  provided with extra leg and drainage bags along with 3 stat locks  Patient tolerance: patient tolerated the procedure well with no immediate complications               Current catheter removed without difficulty after deflation of an intact balloon  Site prepped with Betadine, new 18F  latex spt change via aseptic technique without incident, 10 ml balloon inflated with sterile water  irrigated easily for pink-tinged return, mild spasm noted  Patient tolerated well  Attached to drainage bag           Jerman Alanis RN,BSN

## 2022-08-23 ENCOUNTER — PROCEDURE VISIT (OUTPATIENT)
Dept: UROLOGY | Facility: CLINIC | Age: 67
End: 2022-08-23
Payer: MEDICARE

## 2022-08-23 ENCOUNTER — TELEPHONE (OUTPATIENT)
Dept: UROLOGY | Facility: CLINIC | Age: 67
End: 2022-08-23

## 2022-08-23 VITALS — SYSTOLIC BLOOD PRESSURE: 120 MMHG | DIASTOLIC BLOOD PRESSURE: 70 MMHG

## 2022-08-23 DIAGNOSIS — N31.9 NEUROGENIC BLADDER: ICD-10-CM

## 2022-08-23 DIAGNOSIS — N39.0 RECURRENT UTI: Primary | ICD-10-CM

## 2022-08-23 PROCEDURE — 87186 SC STD MICRODIL/AGAR DIL: CPT | Performed by: PHYSICIAN ASSISTANT

## 2022-08-23 PROCEDURE — 87086 URINE CULTURE/COLONY COUNT: CPT | Performed by: PHYSICIAN ASSISTANT

## 2022-08-23 PROCEDURE — 51705 CHANGE OF BLADDER TUBE: CPT

## 2022-08-23 PROCEDURE — 87077 CULTURE AEROBIC IDENTIFY: CPT | Performed by: PHYSICIAN ASSISTANT

## 2022-08-23 NOTE — TELEPHONE ENCOUNTER
Patient was in office for routine SPT exchange today   mentioned patient is not as alert as she normally acts and is much weaker  He requested urine culture to check for infection even though visually her urine looks normal   Specimen obtained via new SPT  Patient and  aware office will call them with results when available

## 2022-08-23 NOTE — PROGRESS NOTES
8/23/2022    Rima Mora  1955  [id number]    Diagnosis  Chief Complaint     neurogenic bladder          Patient presents for SPT exchange managed by our office    Plan  Next SPT exchange in 6 wk (10/4/22)    Procedure: Suprapubic Tube Change         Cystostomy tube change     Date/Time 8/23/2022 11:57 AM     Performed by  Anum Lilly RN     Authorized by Ruben Amos PA-C      Universal Protocol   Consent: Verbal consent obtained  Risks and benefits: risks, benefits and alternatives were discussed  Consent given by: patient  Patient understanding: patient states understanding of the procedure being performed  Patient identity confirmed: verbally with patient        Local anesthesia used: no     Anesthesia   Local anesthesia used: no     Procedure Details   Procedure Notes:  did the SPT change with little assistance from RN  Provided patient with extra drainage bags and stat locks  New appt for next change was made for 10/4/22  Patient tolerance: patient tolerated the procedure well with no immediate complications               Current catheter removed without difficulty after deflation of an intact balloon  Site prepped with Betadine, new 18F  latex spt change via aseptic technique without incident, 10 ml balloon inflated with sterile water  irrigated easily for clear return, mild spasm noted  Patient tolerated well  Attached to drainage bag  Patient's  did the primary change with help from RN  They will continue to come to office for changes, but due to patient's difficulty getting to office,  would like to learn to do changes in case of emergency or snowstorm  A urine culture specimen was obtained from new SPT as  states patient has been acting more weak over the past week even though visually her urine looks normal   Patient also slightly confused at times per family  Will rule out infection and call with results      Vitals:    08/23/22 1053   BP: 120/70   BP Location: Right arm         Jerman Alanis, RN,BSN

## 2022-08-25 LAB
BACTERIA UR CULT: ABNORMAL
BACTERIA UR CULT: ABNORMAL

## 2022-08-25 NOTE — TELEPHONE ENCOUNTER
Spoke with patient's  and informed him we are still waiting on results of urine culture  He states patient is still weak and urine looks normal   Patient is no worse than a couple days ago  Advised him we are monitoring for the results and will keep him posted    He will let the office know if patient's status changes

## 2022-08-25 NOTE — TELEPHONE ENCOUNTER
Urine culture results came back  Message  Received: Today  Alfonso Corona PA-C  [facility]  Bactrim sent to pharmacy     Informed Yun Moment of Bactrim being sent to pharmacy  He verbalized understanding and agrees to plan

## 2022-09-07 ENCOUNTER — TELEPHONE (OUTPATIENT)
Dept: OTHER | Facility: OTHER | Age: 67
End: 2022-09-07

## 2022-09-07 NOTE — TELEPHONE ENCOUNTER
Called spoke with patient's  Davis Jose Eduardo June pt just finished her sulfa medication two days ago, said he did an at home test and it was positive for nitrates and leukocytes  Pt seems a little weak again, urine in her bag is clear, no foul odor  No fever/chills  Pt having some low back pain in the middle  Husbands states usually after the sulfa drugs she still has UTI, believes this is the case this time  ED precautions advised  Also advised will forward message to provider pool for recommendation

## 2022-09-07 NOTE — TELEPHONE ENCOUNTER
Pt's  states that pt still has an UTI and he needs to know what to do or if something else will be sent in  Please call and advise

## 2022-09-10 DIAGNOSIS — N39.0 RECURRENT UTI: ICD-10-CM

## 2022-09-12 RX ORDER — METHENAMINE HIPPURATE 1000 MG/1
TABLET ORAL
Qty: 180 TABLET | Refills: 1 | Status: SHIPPED | OUTPATIENT
Start: 2022-09-12

## 2022-09-15 ENCOUNTER — APPOINTMENT (OUTPATIENT)
Dept: LAB | Facility: CLINIC | Age: 67
End: 2022-09-15
Payer: MEDICARE

## 2022-09-15 ENCOUNTER — TELEPHONE (OUTPATIENT)
Dept: UROLOGY | Facility: CLINIC | Age: 67
End: 2022-09-15

## 2022-09-15 DIAGNOSIS — N39.0 RECURRENT UTI: Primary | ICD-10-CM

## 2022-09-15 DIAGNOSIS — R82.90 FOUL SMELLING URINE: ICD-10-CM

## 2022-09-15 DIAGNOSIS — N39.0 RECURRENT UTI: ICD-10-CM

## 2022-09-15 LAB
BACTERIA UR QL AUTO: ABNORMAL /HPF
BILIRUB UR QL STRIP: NEGATIVE
CLARITY UR: CLEAR
COLOR UR: COLORLESS
GLUCOSE UR STRIP-MCNC: NEGATIVE MG/DL
HGB UR QL STRIP.AUTO: NEGATIVE
KETONES UR STRIP-MCNC: NEGATIVE MG/DL
LEUKOCYTE ESTERASE UR QL STRIP: ABNORMAL
NITRITE UR QL STRIP: POSITIVE
NON-SQ EPI CELLS URNS QL MICRO: ABNORMAL /HPF
PH UR STRIP.AUTO: 6 [PH]
PROT UR STRIP-MCNC: NEGATIVE MG/DL
RBC #/AREA URNS AUTO: ABNORMAL /HPF
SP GR UR STRIP.AUTO: 1.01 (ref 1–1.03)
UROBILINOGEN UR STRIP-ACNC: <2 MG/DL
WBC #/AREA URNS AUTO: ABNORMAL /HPF

## 2022-09-15 PROCEDURE — 87086 URINE CULTURE/COLONY COUNT: CPT

## 2022-09-15 PROCEDURE — 87186 SC STD MICRODIL/AGAR DIL: CPT

## 2022-09-15 PROCEDURE — 87077 CULTURE AEROBIC IDENTIFY: CPT

## 2022-09-15 NOTE — TELEPHONE ENCOUNTER
Keisha Richmond, RN at 9/15/2022  7:27 AM    Status: Signed      Conversation: Rima's UTI Infection  (Oldest Message First)  September 14, 2022     Niya Phan Hodeisy   to Chang Corado PA-C           6:27 PM  Corona Camacho,  I tried messaging Octavia Corey but message said she is not available  Rima was an other antibiotic for the UTI infection for 5 days  I did a home test and she is still positive but this time only for nitrates  Can you or someone from the office call me at [phone] for what to do next? Thank you,  Piedad Myers     September 15, 2022     Me  to Rima            7:08 AM  Kaylyn Ott sorry for the trouble  What sort of symptoms is she having? I would need her to get repeat testing in order to asses the infection  Please tell me her full name and birthday     Last read by Rosas Anthony  at 7:11 AM on 9/15/2022      Rima    to Chang Corado PA-C           7:17 AM  Rima's full name is: Rima Geronimo   Birthday  is [birth year]  We are both patients of [facility]         Me  to Rima             7:26 AM  That's fine I just cant access her chart without her information  I will place orders for lab work under her name if you can take her to the lab to get it processed       If you have access to her mychart please reach out regarding concerns for her on her chart from now on   It becomes confusing for orders as well as purposes of documentation and we wouldn't want incorrect information under the wrong patients chart     This [facility]'s [employer] message has not been read

## 2022-09-15 NOTE — PROGRESS NOTES
Conversation: Rima's UTI Infection  [employer] First)  September 14, 2022    Michael Duval  to Kerri Kc PA-C          6:27 PM  Hi Marleny Romo,  I tried messaging Pravin Machuca but message said she is not available  Rima was an other antibiotic for the UTI infection for 5 days  I did a home test and she is still positive but this time only for nitrates  Can you or someone from the office call me at [phone] for what to do next? Thank you,  Rashmi Casillas    September 15, 2022    Me  to Michael Duval          7:08 AM  Kaylyn Ott sorry for the trouble  What sort of symptoms is she having? I would need her to get repeat testing in order to asses the infection  Please tell me her full name and birthday     Last read by Isha Mendoza  at 7:11 AM on 9/15/2022  Michael Duval  to Kerri Kc PA-C          7:17 AM  Rima's full name is: Rima Josephnsico Killer  is [birth year]  We are both patients of [facility]       Me  to Michael Duval          7:26 AM  That's fine I just cant access her chart without her information  I will place orders for lab work under her name if you can take her to the lab to get it processed       If you have access to her mychart please reach out regarding concerns for her on her chart from now on  It becomes confusing for orders as well as purposes of documentation and we wouldn't want incorrect information under the wrong patients chart     This [facility]'s [employer] message has not been read

## 2022-09-17 LAB — BACTERIA UR CULT: ABNORMAL

## 2022-09-30 ENCOUNTER — TELEPHONE (OUTPATIENT)
Dept: UROLOGY | Facility: CLINIC | Age: 67
End: 2022-09-30

## 2022-09-30 NOTE — TELEPHONE ENCOUNTER
Please advise patient her appt time on 10/4/22 changed to 1pm due to room availability at [facility]

## 2022-10-03 NOTE — TELEPHONE ENCOUNTER
Patient's  returning call    He is confirming appointment change for tomorrow at 1 pm at the [facility]

## 2022-10-04 ENCOUNTER — PROCEDURE VISIT (OUTPATIENT)
Dept: UROLOGY | Facility: CLINIC | Age: 67
End: 2022-10-04
Payer: MEDICARE

## 2022-10-04 VITALS — SYSTOLIC BLOOD PRESSURE: 118 MMHG | DIASTOLIC BLOOD PRESSURE: 78 MMHG

## 2022-10-04 DIAGNOSIS — N31.9 NEUROGENIC BLADDER: Primary | ICD-10-CM

## 2022-10-04 PROCEDURE — 51705 CHANGE OF BLADDER TUBE: CPT

## 2022-10-04 NOTE — PROGRESS NOTES
10/4/2022    Rima Moar  1955  [id number]    Diagnosis  Chief Complaint     neurogenic bladder          Patient presents for SPT exchange managed by our office    Plan  Next SPT change 11/14/22    Procedure: Suprapubic Tube Change         Cystostomy tube change     Date/Time 10/4/2022 1:36 PM     Performed by  Reed Caceres RN     Authorized by Payton Sloan MD      Universal Protocol   Consent: Verbal consent obtained  Risks and benefits: risks, benefits and alternatives were discussed  Consent given by: patient  Patient understanding: patient states understanding of the procedure being performed  Patient identity confirmed: verbally with patient        Local anesthesia used: no     Anesthesia   Local anesthesia used: no     Procedure Details   Procedure Notes: After insertion of catheter, it was attached to bedside drainage bag  Patient and spouse were supplied with 2 extra drainage bags and stat locks  Next appt scheduled for 11/14/22  Patient tolerance: patient tolerated the procedure well with no immediate complications               Current catheter removed by RN without difficulty after deflation of an intact balloon  Site prepped with Betadine, new 18F  latex spt change via aseptic technique without incident, 10 ml balloon inflated with sterile water  irrigated easily for blood-tinged return, mild spasm noted  Patient tolerated well  Attached to drainage bag  Patient's spouse assisted in doing the insertion via aseptic technique  He has not spoken with insurance yet as to find out where supply order can be sent to in order for spouse to do changes at home  Other option would be [facility]   and patient to discuss at home and will revisit at next office visit  Patient seemed much happier and healthier at this visit as compared to last visit when she was experiencing UTI  She is still on antibiotic at this time        Vitals:    10/04/22 1251   BP: 118/78   BP Location: Left arm Rodrick Blevins

## 2022-10-11 PROBLEM — T83.510A URINARY TRACT INFECTION ASSOCIATED WITH CYSTOSTOMY CATHETER (HCC): Status: RESOLVED | Noted: 2022-05-05 | Resolved: 2022-10-11

## 2022-10-11 PROBLEM — N39.0 URINARY TRACT INFECTION ASSOCIATED WITH CYSTOSTOMY CATHETER (HCC): Status: RESOLVED | Noted: 2022-05-05 | Resolved: 2022-10-11

## 2022-10-12 PROBLEM — N30.00 ACUTE CYSTITIS WITHOUT HEMATURIA: Status: RESOLVED | Noted: 2021-06-20 | Resolved: 2022-10-12

## 2022-11-02 ENCOUNTER — APPOINTMENT (INPATIENT)
Dept: CT IMAGING | Facility: HOSPITAL | Age: 67
End: 2022-11-02

## 2022-11-02 ENCOUNTER — APPOINTMENT (EMERGENCY)
Dept: RADIOLOGY | Facility: HOSPITAL | Age: 67
End: 2022-11-02

## 2022-11-02 ENCOUNTER — HOSPITAL ENCOUNTER (INPATIENT)
Facility: HOSPITAL | Age: 67
LOS: 2 days | End: 2022-11-04
Attending: EMERGENCY MEDICINE | Admitting: HOSPITALIST

## 2022-11-02 DIAGNOSIS — G35 MS (MULTIPLE SCLEROSIS) (HCC): ICD-10-CM

## 2022-11-02 DIAGNOSIS — S49.92XA INJURY OF LEFT SHOULDER, INITIAL ENCOUNTER: ICD-10-CM

## 2022-11-02 DIAGNOSIS — R62.7 FTT (FAILURE TO THRIVE) IN ADULT: ICD-10-CM

## 2022-11-02 DIAGNOSIS — J21.0 RSV (ACUTE BRONCHIOLITIS DUE TO RESPIRATORY SYNCYTIAL VIRUS): Primary | ICD-10-CM

## 2022-11-02 PROBLEM — M25.512 LEFT SHOULDER PAIN: Status: ACTIVE | Noted: 2022-11-02

## 2022-11-02 LAB
ANION GAP SERPL CALCULATED.3IONS-SCNC: 7 MMOL/L (ref 4–13)
BACTERIA UR QL AUTO: ABNORMAL /HPF
BASOPHILS # BLD AUTO: 0.03 THOUSANDS/ÂΜL (ref 0–0.1)
BASOPHILS NFR BLD AUTO: 1 % (ref 0–1)
BILIRUB UR QL STRIP: NEGATIVE
BUDDING YEAST: PRESENT
BUN SERPL-MCNC: 13 MG/DL (ref 5–25)
CALCIUM SERPL-MCNC: 9.3 MG/DL (ref 8.4–10.2)
CHLORIDE SERPL-SCNC: 105 MMOL/L (ref 96–108)
CLARITY UR: ABNORMAL
CO2 SERPL-SCNC: 32 MMOL/L (ref 21–32)
COLOR UR: ABNORMAL
CREAT SERPL-MCNC: 0.74 MG/DL (ref 0.6–1.3)
EOSINOPHIL # BLD AUTO: 0.03 THOUSAND/ÂΜL (ref 0–0.61)
EOSINOPHIL NFR BLD AUTO: 1 % (ref 0–6)
ERYTHROCYTE [DISTWIDTH] IN BLOOD BY AUTOMATED COUNT: 15.9 % (ref 11.6–15.1)
FLUAV RNA RESP QL NAA+PROBE: NEGATIVE
FLUBV RNA RESP QL NAA+PROBE: NEGATIVE
GFR SERPL CREATININE-BSD FRML MDRD: 84 ML/MIN/1.73SQ M
GLUCOSE SERPL-MCNC: 82 MG/DL (ref 65–140)
GLUCOSE UR STRIP-MCNC: NEGATIVE MG/DL
HCT VFR BLD AUTO: 33.3 % (ref 34.8–46.1)
HGB BLD-MCNC: 10.8 G/DL (ref 11.5–15.4)
HGB UR QL STRIP.AUTO: ABNORMAL
HYALINE CASTS #/AREA URNS LPF: ABNORMAL /LPF
IMM GRANULOCYTES # BLD AUTO: 0.02 THOUSAND/UL (ref 0–0.2)
IMM GRANULOCYTES NFR BLD AUTO: 0 % (ref 0–2)
KETONES UR STRIP-MCNC: NEGATIVE MG/DL
LEUKOCYTE ESTERASE UR QL STRIP: ABNORMAL
LYMPHOCYTES # BLD AUTO: 0.48 THOUSANDS/ÂΜL (ref 0.6–4.47)
LYMPHOCYTES NFR BLD AUTO: 7 % (ref 14–44)
MCH RBC QN AUTO: 31.8 PG (ref 26.8–34.3)
MCHC RBC AUTO-ENTMCNC: 32.4 G/DL (ref 31.4–37.4)
MCV RBC AUTO: 98 FL (ref 82–98)
MONOCYTES # BLD AUTO: 0.42 THOUSAND/ÂΜL (ref 0.17–1.22)
MONOCYTES NFR BLD AUTO: 7 % (ref 4–12)
MUCOUS THREADS UR QL AUTO: ABNORMAL
NEUTROPHILS # BLD AUTO: 5.51 THOUSANDS/ÂΜL (ref 1.85–7.62)
NEUTS SEG NFR BLD AUTO: 84 % (ref 43–75)
NITRITE UR QL STRIP: NEGATIVE
NON-SQ EPI CELLS URNS QL MICRO: ABNORMAL /HPF
NRBC BLD AUTO-RTO: 0 /100 WBCS
PH UR STRIP.AUTO: 6 [PH]
PLATELET # BLD AUTO: 167 THOUSANDS/UL (ref 149–390)
PLATELET # BLD AUTO: 191 THOUSANDS/UL (ref 149–390)
PMV BLD AUTO: 10.1 FL (ref 8.9–12.7)
PMV BLD AUTO: 9.9 FL (ref 8.9–12.7)
POTASSIUM SERPL-SCNC: 4 MMOL/L (ref 3.5–5.3)
PROT UR STRIP-MCNC: NEGATIVE MG/DL
RBC # BLD AUTO: 3.4 MILLION/UL (ref 3.81–5.12)
RBC #/AREA URNS AUTO: ABNORMAL /HPF
RSV RNA RESP QL NAA+PROBE: POSITIVE
SARS-COV-2 RNA RESP QL NAA+PROBE: NEGATIVE
SODIUM SERPL-SCNC: 144 MMOL/L (ref 135–147)
SP GR UR STRIP.AUTO: 1.01 (ref 1–1.03)
UROBILINOGEN UR STRIP-ACNC: <2 MG/DL
WBC # BLD AUTO: 6.49 THOUSAND/UL (ref 4.31–10.16)
WBC #/AREA URNS AUTO: ABNORMAL /HPF

## 2022-11-02 RX ORDER — FUROSEMIDE 40 MG/1
40 TABLET ORAL DAILY
Status: DISCONTINUED | OUTPATIENT
Start: 2022-11-03 | End: 2022-11-04 | Stop reason: HOSPADM

## 2022-11-02 RX ORDER — LIDOCAINE 50 MG/G
1 PATCH TOPICAL ONCE
Status: COMPLETED | OUTPATIENT
Start: 2022-11-02 | End: 2022-11-02

## 2022-11-02 RX ORDER — LIDOCAINE 50 MG/G
1 PATCH TOPICAL DAILY
Status: DISCONTINUED | OUTPATIENT
Start: 2022-11-02 | End: 2022-11-04

## 2022-11-02 RX ORDER — ENOXAPARIN SODIUM 100 MG/ML
40 INJECTION SUBCUTANEOUS EVERY 12 HOURS
Status: DISCONTINUED | OUTPATIENT
Start: 2022-11-02 | End: 2022-11-04 | Stop reason: HOSPADM

## 2022-11-02 RX ORDER — ACETAMINOPHEN 325 MG/1
650 TABLET ORAL EVERY 6 HOURS PRN
Status: DISCONTINUED | OUTPATIENT
Start: 2022-11-02 | End: 2022-11-04 | Stop reason: HOSPADM

## 2022-11-02 RX ORDER — ONDANSETRON 2 MG/ML
4 INJECTION INTRAMUSCULAR; INTRAVENOUS EVERY 6 HOURS PRN
Status: DISCONTINUED | OUTPATIENT
Start: 2022-11-02 | End: 2022-11-04 | Stop reason: HOSPADM

## 2022-11-02 RX ORDER — FUROSEMIDE 40 MG/1
TABLET ORAL
COMMUNITY
Start: 2022-08-23

## 2022-11-02 RX ORDER — TIZANIDINE 2 MG/1
4 TABLET ORAL EVERY 8 HOURS PRN
Status: DISCONTINUED | OUTPATIENT
Start: 2022-11-02 | End: 2022-11-04 | Stop reason: HOSPADM

## 2022-11-02 RX ORDER — AMLODIPINE BESYLATE 2.5 MG/1
2.5 TABLET ORAL EVERY 12 HOURS
Status: DISCONTINUED | OUTPATIENT
Start: 2022-11-02 | End: 2022-11-04 | Stop reason: HOSPADM

## 2022-11-02 RX ORDER — SODIUM CHLORIDE 9 MG/ML
100 INJECTION, SOLUTION INTRAVENOUS CONTINUOUS
Status: DISPENSED | OUTPATIENT
Start: 2022-11-02 | End: 2022-11-03

## 2022-11-02 RX ORDER — ALBUTEROL SULFATE 2.5 MG/3ML
2.5 SOLUTION RESPIRATORY (INHALATION) EVERY 6 HOURS PRN
Status: DISCONTINUED | OUTPATIENT
Start: 2022-11-02 | End: 2022-11-04 | Stop reason: HOSPADM

## 2022-11-02 RX ORDER — NABUMETONE 500 MG/1
TABLET, FILM COATED ORAL
COMMUNITY
Start: 2022-09-01

## 2022-11-02 RX ORDER — ZOLPIDEM TARTRATE 5 MG/1
10 TABLET ORAL
Status: DISCONTINUED | OUTPATIENT
Start: 2022-11-02 | End: 2022-11-04 | Stop reason: HOSPADM

## 2022-11-02 RX ORDER — GUAIFENESIN 600 MG/1
600 TABLET, EXTENDED RELEASE ORAL EVERY 12 HOURS SCHEDULED
Status: DISCONTINUED | OUTPATIENT
Start: 2022-11-02 | End: 2022-11-04 | Stop reason: HOSPADM

## 2022-11-02 RX ORDER — ZOLPIDEM TARTRATE 10 MG/1
10 TABLET ORAL
COMMUNITY
Start: 2022-09-08

## 2022-11-02 RX ORDER — BACLOFEN 10 MG/1
10 TABLET ORAL 3 TIMES DAILY
Status: DISCONTINUED | OUTPATIENT
Start: 2022-11-02 | End: 2022-11-04

## 2022-11-02 RX ORDER — METHENAMINE HIPPURATE 1000 MG/1
1 TABLET ORAL 2 TIMES DAILY
Status: DISCONTINUED | OUTPATIENT
Start: 2022-11-02 | End: 2022-11-04 | Stop reason: HOSPADM

## 2022-11-02 RX ORDER — TIZANIDINE 4 MG/1
TABLET ORAL
COMMUNITY
Start: 2022-08-29

## 2022-11-02 RX ORDER — BENZONATATE 100 MG/1
200 CAPSULE ORAL 3 TIMES DAILY PRN
Status: DISCONTINUED | OUTPATIENT
Start: 2022-11-02 | End: 2022-11-04 | Stop reason: HOSPADM

## 2022-11-02 RX ORDER — DALFAMPRIDINE 10 MG/1
10 TABLET, FILM COATED, EXTENDED RELEASE ORAL EVERY 12 HOURS
Status: DISCONTINUED | OUTPATIENT
Start: 2022-11-02 | End: 2022-11-04 | Stop reason: HOSPADM

## 2022-11-02 RX ADMIN — AMLODIPINE BESYLATE 2.5 MG: 2.5 TABLET ORAL at 11:59

## 2022-11-02 RX ADMIN — METHENAMINE HIPPURATE 1 G: 1 TABLET ORAL at 12:04

## 2022-11-02 RX ADMIN — ENOXAPARIN SODIUM 40 MG: 40 INJECTION SUBCUTANEOUS at 12:00

## 2022-11-02 RX ADMIN — SODIUM CHLORIDE, SODIUM LACTATE, POTASSIUM CHLORIDE, AND CALCIUM CHLORIDE 500 ML: .6; .31; .03; .02 INJECTION, SOLUTION INTRAVENOUS at 07:22

## 2022-11-02 RX ADMIN — BACLOFEN 10 MG: 10 TABLET ORAL at 18:09

## 2022-11-02 RX ADMIN — SODIUM CHLORIDE 100 ML/HR: 0.9 INJECTION, SOLUTION INTRAVENOUS at 21:43

## 2022-11-02 RX ADMIN — BACLOFEN 10 MG: 10 TABLET ORAL at 21:36

## 2022-11-02 RX ADMIN — ENOXAPARIN SODIUM 40 MG: 40 INJECTION SUBCUTANEOUS at 21:37

## 2022-11-02 RX ADMIN — METHENAMINE HIPPURATE 1 G: 1 TABLET ORAL at 18:09

## 2022-11-02 RX ADMIN — GUAIFENESIN 600 MG: 600 TABLET, EXTENDED RELEASE ORAL at 21:36

## 2022-11-02 RX ADMIN — LIDOCAINE 5% 1 PATCH: 700 PATCH TOPICAL at 08:42

## 2022-11-02 RX ADMIN — AMLODIPINE BESYLATE 2.5 MG: 2.5 TABLET ORAL at 21:37

## 2022-11-02 RX ADMIN — SODIUM CHLORIDE 100 ML/HR: 0.9 INJECTION, SOLUTION INTRAVENOUS at 12:02

## 2022-11-02 RX ADMIN — GUAIFENESIN 600 MG: 600 TABLET, EXTENDED RELEASE ORAL at 12:00

## 2022-11-02 RX ADMIN — BACLOFEN 10 MG: 10 TABLET ORAL at 12:00

## 2022-11-02 NOTE — ASSESSMENT & PLAN NOTE
· With her sister has a known sick contact  Tested positive for RSV in the ER  Complaints of shortness of breath with coughing congestion  · Supportive care with Mucinex, Tessalon, albuterol p r n  Linard Bread · Chest x-ray without infiltrate  · Fortunately on room air

## 2022-11-02 NOTE — PLAN OF CARE
Problem: Potential for Falls  Goal: Patient will remain free of falls  Description: INTERVENTIONS:  - Educate patient/family on patient safety including physical limitations  - Instruct patient to call for assistance with activity   - Consult OT/PT to assist with strengthening/mobility   - Keep Call bell within reach  - Keep bed low and locked with side rails adjusted as appropriate  - Keep care items and personal belongings within reach  - Initiate and maintain comfort rounds  - Make Fall Risk Sign visible to staff  - Offer Toileting every 4 Hours, in advance of need  - Initiate/Maintain Bed alarm  - Obtain necessary fall risk management equipment: Alarms  - Apply yellow socks and bracelet for high fall risk patients  - Consider moving patient to room near nurses station  Outcome: Progressing     Problem: Prexisting or High Potential for Compromised Skin Integrity  Goal: Skin integrity is maintained or improved  Description: INTERVENTIONS:  - Identify patients at risk for skin breakdown  - Assess and monitor skin integrity  - Assess and monitor nutrition and hydration status  - Monitor labs   - Assess for incontinence   - Turn and reposition patient  - Assist with mobility/ambulation  - Relieve pressure over bony prominences  - Avoid friction and shearing  - Provide appropriate hygiene as needed including keeping skin clean and dry  - Evaluate need for skin moisturizer/barrier cream  - Collaborate with interdisciplinary team   - Patient/family teaching  - Consider wound care consult   Outcome: Progressing     Problem: MOBILITY - ADULT  Goal: Maintain or return to baseline ADL function  Description: INTERVENTIONS:  -  Assess patient's ability to carry out ADLs; assess patient's baseline for ADL function and identify physical deficits which impact ability to perform ADLs (bathing, care of mouth/teeth, toileting, grooming, dressing, etc )  - Assess/evaluate cause of self-care deficits   - Assess range of motion  - Assess patient's mobility; develop plan if impaired  - Assess patient's need for assistive devices and provide as appropriate  - Encourage maximum independence but intervene and supervise when necessary  - Involve family in performance of ADLs  - Assess for home care needs following discharge   - Consider OT consult to assist with ADL evaluation and planning for discharge  - Provide patient education as appropriate  Outcome: Progressing  Goal: Maintains/Returns to pre admission functional level  Description: INTERVENTIONS:  - Perform BMAT or MOVE assessment daily    - Set and communicate daily mobility goal to care team and patient/family/caregiver  - Collaborate with rehabilitation services on mobility goals if consulted  - Perform Range of Motion 4 times a day  - Reposition patient every 4 hours    - Dangle patient 4 times a day  - Stand patient 4 times a day  - Ambulate patient 4 times a day  - Out of bed to chair 4 times a day   - Out of bed for meals 3 times a day  - Out of bed for toileting  - Record patient progress and toleration of activity level   Outcome: Progressing     Problem: PAIN - ADULT  Goal: Verbalizes/displays adequate comfort level or baseline comfort level  Description: Interventions:  - Encourage patient to monitor pain and request assistance  - Assess pain using appropriate pain scale  - Administer analgesics based on type and severity of pain and evaluate response  - Implement non-pharmacological measures as appropriate and evaluate response  - Consider cultural and social influences on pain and pain management  - Notify physician/advanced practitioner if interventions unsuccessful or patient reports new pain  Outcome: Progressing     Problem: INFECTION - ADULT  Goal: Absence or prevention of progression during hospitalization  Description: INTERVENTIONS:  - Assess and monitor for signs and symptoms of infection  - Monitor lab/diagnostic results  - Monitor all insertion sites, i e  indwelling lines, tubes, and drains  - Monitor endotracheal if appropriate and nasal secretions for changes in amount and color  - [place] appropriate cooling/warming therapies per order  - Administer medications as ordered  - Instruct and encourage patient and family to use good hand hygiene technique  - Identify and instruct in appropriate isolation precautions for identified infection/condition  Outcome: Progressing     Problem: DISCHARGE PLANNING  Goal: Discharge to home or other facility with appropriate resources  Description: INTERVENTIONS:  - Identify barriers to discharge w/patient and caregiver  - Arrange for needed discharge resources and transportation as appropriate  - Identify discharge learning needs (meds, wound care, etc )  - Arrange for interpretive services to assist at discharge as needed  - Refer to Case Management Department for coordinating discharge planning if the patient needs post-hospital services based on physician/advanced practitioner order or complex needs related to functional status, cognitive ability, or social support system  Outcome: Progressing     Problem: Knowledge Deficit  Goal: Patient/family/caregiver demonstrates understanding of disease process, treatment plan, medications, and discharge instructions  Description: Complete learning assessment and assess knowledge base    Interventions:  - Provide teaching at level of understanding  - Provide teaching via preferred learning methods  Outcome: Progressing

## 2022-11-02 NOTE — ASSESSMENT & PLAN NOTE
· With profound weakness presently secondary to RSV infection    · PT/OT evaluation  · May need rehab

## 2022-11-02 NOTE — ASSESSMENT & PLAN NOTE
· Acute musculoskeletal left shoulder pain after  tried to pick the patient up off floor  No traumatic injuries identified on x-ray  · Lidocaine ordered

## 2022-11-02 NOTE — ED PROCEDURE NOTE
PROCEDURE  ECG 12 Lead Documentation Only    Date/Time: 11/2/2022 8:05 AM  Performed by: Monique Nava MD  Authorized by: Monique Nava MD     Indications / Diagnosis:  Weakness   ECG reviewed by me, the ED Provider: yes    Patient location:  ED  Previous ECG:     Previous ECG:  Compared to current    Comparison ECG info:  5/5/22- primary av block is new- diffuse t wave falttening is new- no sign changes    Similarity:  Changes noted    Comparison to cardiac monitor: Yes    Interpretation:     Interpretation: non-specific    Rate:     ECG rate:  93    ECG rate assessment: normal    Rhythm:     Rhythm: sinus rhythm    Ectopy:     Ectopy: none    QRS:     QRS axis:  Normal    QRS intervals:  Normal  Conduction:     Conduction: abnormal      Abnormal conduction: 1st degree    ST segments:     ST segments:  Normal  T waves:     T waves: flattening      Flattening:  III, aVF, II, V1, V4, V5 and V6  Q waves:     Q waves:  V1 and V2  Comments:      Low voltage criteria- no ecg signs of ischemia/ injury / r ehart strain / kimberly/pericarditis          Monique Nava MD  11/02/22 4281

## 2022-11-02 NOTE — PLAN OF CARE
Problem: Potential for Falls  Goal: Patient will remain free of falls  Description: INTERVENTIONS:  - Educate patient/family on patient safety including physical limitations  - Instruct patient to call for assistance with activity   - Consult OT/PT to assist with strengthening/mobility   - Keep Call bell within reach  - Keep bed low and locked with side rails adjusted as appropriate  - Keep care items and personal belongings within reach  - Initiate and maintain comfort rounds  - Make Fall Risk Sign visible to staff  - Offer Toileting every 4 Hours, in advance of need  - Initiate/Maintain Bed alarm  - Obtain necessary fall risk management equipment: Alarms  - Apply yellow socks and bracelet for high fall risk patients  - Consider moving patient to room near nurses station  Outcome: Progressing     Problem: Prexisting or High Potential for Compromised Skin Integrity  Goal: Skin integrity is maintained or improved  Description: INTERVENTIONS:  - Identify patients at risk for skin breakdown  - Assess and monitor skin integrity  - Assess and monitor nutrition and hydration status  - Monitor labs   - Assess for incontinence   - Turn and reposition patient  - Assist with mobility/ambulation  - Relieve pressure over bony prominences  - Avoid friction and shearing  - Provide appropriate hygiene as needed including keeping skin clean and dry  - Evaluate need for skin moisturizer/barrier cream  - Collaborate with interdisciplinary team   - Patient/family teaching  - Consider wound care consult   Outcome: Progressing     Problem: MOBILITY - ADULT  Goal: Maintain or return to baseline ADL function  Description: INTERVENTIONS:  -  Assess patient's ability to carry out ADLs; assess patient's baseline for ADL function and identify physical deficits which impact ability to perform ADLs (bathing, care of mouth/teeth, toileting, grooming, dressing, etc )  - Assess/evaluate cause of self-care deficits   - Assess range of motion  - Assess patient's mobility; develop plan if impaired  - Assess patient's need for assistive devices and provide as appropriate  - Encourage maximum independence but intervene and supervise when necessary  - Involve family in performance of ADLs  - Assess for home care needs following discharge   - Consider OT consult to assist with ADL evaluation and planning for discharge  - Provide patient education as appropriate  Outcome: Progressing  Goal: Maintains/Returns to pre admission functional level  Description: INTERVENTIONS:  - Perform BMAT or MOVE assessment daily    - Set and communicate daily mobility goal to care team and patient/family/caregiver  - Collaborate with rehabilitation services on mobility goals if consulted  - Perform Range of Motion 4 times a day  - Reposition patient every 4 hours    - Dangle patient 4 times a day  - Stand patient 4 times a day  - Ambulate patient 4 times a day  - Out of bed to chair 4 times a day   - Out of bed for meals 3 times a day  - Out of bed for toileting  - Record patient progress and toleration of activity level   Outcome: Progressing     Problem: PAIN - ADULT  Goal: Verbalizes/displays adequate comfort level or baseline comfort level  Description: Interventions:  - Encourage patient to monitor pain and request assistance  - Assess pain using appropriate pain scale  - Administer analgesics based on type and severity of pain and evaluate response  - Implement non-pharmacological measures as appropriate and evaluate response  - Consider cultural and social influences on pain and pain management  - Notify physician/advanced practitioner if interventions unsuccessful or patient reports new pain  Outcome: Progressing     Problem: INFECTION - ADULT  Goal: Absence or prevention of progression during hospitalization  Description: INTERVENTIONS:  - Assess and monitor for signs and symptoms of infection  - Monitor lab/diagnostic results  - Monitor all insertion sites, i e  indwelling lines, tubes, and drains  - Monitor endotracheal if appropriate and nasal secretions for changes in amount and color  - [place] appropriate cooling/warming therapies per order  - Administer medications as ordered  - Instruct and encourage patient and family to use good hand hygiene technique  - Identify and instruct in appropriate isolation precautions for identified infection/condition  Outcome: Progressing     Problem: DISCHARGE PLANNING  Goal: Discharge to home or other facility with appropriate resources  Description: INTERVENTIONS:  - Identify barriers to discharge w/patient and caregiver  - Arrange for needed discharge resources and transportation as appropriate  - Identify discharge learning needs (meds, wound care, etc )  - Arrange for interpretive services to assist at discharge as needed  - Refer to Case Management Department for coordinating discharge planning if the patient needs post-hospital services based on physician/advanced practitioner order or complex needs related to functional status, cognitive ability, or social support system  Outcome: Progressing     Problem: Knowledge Deficit  Goal: Patient/family/caregiver demonstrates understanding of disease process, treatment plan, medications, and discharge instructions  Description: Complete learning assessment and assess knowledge base    Interventions:  - Provide teaching at level of understanding  - Provide teaching via preferred learning methods  Outcome: Progressing

## 2022-11-02 NOTE — ASSESSMENT & PLAN NOTE
· Progressive MS, with suprapubic catheter in place  Typically ambulates with a walker  · Complains of diplopia over the past few days, await CT head    · Continue Dalfampridine   · Follow up with outpatient neurology

## 2022-11-02 NOTE — H&P
[facility]  H&P- Rima Mora 1955, 78 y o  female MRN: [id number]  Unit/Bed#: ED-03 Encounter: [id number]  Primary Care Provider: Eulogio Wise MD   Date and time admitted to hospital: 11/2/2022  6:47 AM    * RSV (acute bronchiolitis due to respiratory syncytial virus)  Assessment & Plan  · With her sister has a known sick contact  Tested positive for RSV in the ER  Complaints of shortness of breath with coughing congestion  · Supportive care with Mucinex, Tessalon, albuterol p r n  [place] [place] · Chest x-ray without infiltrate  · Fortunately on room air  Ambulatory dysfunction  Assessment & Plan  · With profound weakness presently secondary to RSV infection  · PT/OT evaluation  · May need rehab    Multiple sclerosis [facility])  Assessment & Plan  · Progressive MS, with suprapubic catheter in place  Typically ambulates with a walker  · Complains of diplopia over the past few days, await CT head  · Continue Dalfampridine   · Follow up with outpatient neurology    Left shoulder pain  Assessment & Plan  · Acute musculoskeletal left shoulder pain after  tried to pick the patient up off floor  No traumatic injuries identified on x-ray  · Lidocaine ordered  Anemia  Assessment & Plan  · Hemoglobin around baseline, no evidence of active bleeding  Hypertension  Assessment & Plan  · Blood pressure stable  · Continue Lasix, amlodipine  · Monitor blood pressure    VTE Pharmacologic Prophylaxis: VTE Score: 5 High Risk (Score >/= 5) - Pharmacological DVT Prophylaxis Ordered: enoxaparin (Lovenox)  Sequential Compression Devices Ordered  Code Status: full code   Discussion with family: Updated  () at bedside  Anticipated Length of Stay: Patient will be admitted on an inpatient basis with an anticipated length of stay of greater than 2 midnights secondary to weakness due to RSV      Total Time for Visit, including Counseling / Coordination of Care: 70 minutes Greater than 50% of this total time spent on direct patient counseling and coordination of care  Chief Complaint: weakness, cough    History of Present Illness:  Rima Garcia is a 78 y o  female with a PMH of MS who presents with weakness and viral syndrome  The patient came in contact with her sister last week who was sick  Reports that she had coughing with congestion and shortness of breath afterwards  She has had profound weakness over the past few days was unable to get up today  Typically ambulates with a walker now has a been unable to walk  Also has reported some diplopia over the past few days  No headache, lightheadedness, dizziness  No weakness is generalized, she has chronic left greater than right weakness which is at her baseline relatively  Denies fevers but did have chills  Review of Systems:  Review of Systems   Constitutional: Negative for activity change, appetite change, chills, fatigue and fever  HENT: Negative for congestion, rhinorrhea, sinus pressure and sore throat  Eyes: Negative for photophobia, pain and visual disturbance  Respiratory: Positive for cough and shortness of breath  Negative for wheezing  Cardiovascular: Negative for chest pain, palpitations and leg swelling  Gastrointestinal: Negative for abdominal distention, abdominal pain, constipation, diarrhea, nausea and vomiting  Endocrine: Negative for cold intolerance, heat intolerance, polydipsia and polyuria  Genitourinary: Negative for difficulty urinating, dysuria, flank pain, frequency and hematuria  Musculoskeletal: Negative for arthralgias, back pain and joint swelling  Left shoulder pain   Skin: Negative for color change, pallor and rash  Allergic/Immunologic: Negative  Neurological: Positive for weakness  Negative for dizziness, syncope, light-headedness and headaches  Hematological: Negative  Psychiatric/Behavioral: Negative           Past Medical and Surgical History:   Past Medical History:   Diagnosis Date   • Back pain 2016   • Chronic UTI    • Colon polyp    • Hypertension    • Incontinence    • MS (multiple sclerosis) ([facility] [address] )     Age 37   • Neurogenic bladder    • Spasticity        Past Surgical History:   Procedure Laterality Date   • BACK SURGERY  2016    Lumbar fusion   • COLONOSCOPY     • ELBOW SURGERY Right 2008    ORIF   • IR SUPRAPUBIC CATHETER PLACEMENT  4/26/2022   • [place] IMPLANT PERIPH/GASTRIC NEUROSTIM/ N/A 11/19/2021    Procedure: leadwire placement and pulse generator  placement of SNM at S3 Bilateral S3  nerve testing, fluroscopy - c-arm guidance and complex programming;  Surgeon: Jay Gamboa MD;  Location: [facility];  Service: Gynecology       Meds/Allergies:  Prior to Admission medications    Medication Sig Start Date End Date Taking? Authorizing Provider   amLODIPine (NORVASC) 2 5 mg tablet Take 2 5 mg by mouth every 12 (twelve) hours    Historical Provider, MD   baclofen 10 mg tablet Take 10 mg by mouth 3 (three) times a day    Historical Provider, MD   Dalfampridine ER 10 MG TB12 Take 10 mg by mouth every 12 (twelve) hours       Furosemide (LASIX PO) Resume use as you were prior to hospitalization as instructed by Dr Molina Rape Provider, MD   furosemide (LASIX) 40 mg tablet  8/23/22   Historical Provider, MD   methenamine hippurate (HIPREX) 1 g tablet TAKE 1 TABLET BY MOUTH  TWICE DAILY 9/12/22   Hannah Sanchez PA-C   nabumetone (RELAFEN) 500 mg tablet  9/1/22   Historical Provider, MD   tiZANidine (ZANAFLEX) 4 mg tablet  8/29/22   Historical Provider, MD   VITAMIN D, CHOLECALCIFEROL, PO Take 2,000 Units by mouth in the morning    Historical Provider, MD   zolpidem (AMBIEN) 10 mg tablet Take 10 mg by mouth daily at bedtime as needed 9/8/22   Historical Provider, MD     I have reviewed home medications with patient personally      Allergies: No Known Allergies    Social History:  Marital Status: /Civil Union   Occupation: none  Patient Pre-hospital Living Situation: Home, With spouse  Patient Pre-hospital Level of Mobility: walks  Patient Pre-hospital Diet Restrictions: none  Substance Use History:   Social History     Substance and Sexual Activity   Alcohol Use Not Currently     Social History     Tobacco Use   Smoking Status Never Smoker   Smokeless Tobacco Never Used     Social History     Substance and Sexual Activity   Drug Use Yes   • Types: Marijuana    Comment: medical       Family History:  Family History   Problem Relation Age of Onset   • Cancer Mother    • Heart disease Father    • Cancer Sister        Physical Exam:     Vitals:   Blood Pressure: 114/56 (11/02/22 0900)  Pulse: 72 (11/02/22 0900)  Temperature: 98 °F (36 7 °C) (11/02/22 0650)  Temp Source: Oral (11/02/22 0650)  Respirations: 18 (11/02/22 0650)  Height: 4' 7" (139 7 cm) (11/02/22 0650)  Weight - Scale: 81 8 kg (180 lb 5 4 oz) (11/02/22 0650)  SpO2: 92 % (11/02/22 0900)    Physical Exam  Vitals and nursing note reviewed  Constitutional:       General: She is not in acute distress  Appearance: Normal appearance  HENT:      Head: Normocephalic and atraumatic  Mouth/Throat:      Mouth: Mucous membranes are moist    Eyes:      General: No scleral icterus  Extraocular Movements: Extraocular movements intact  Pupils: Pupils are equal, round, and reactive to light  Cardiovascular:      Rate and Rhythm: Normal rate and regular rhythm  Heart sounds: Normal heart sounds  No murmur heard  No friction rub  Pulmonary:      Effort: Pulmonary effort is normal       Breath sounds: Normal breath sounds  No wheezing or rhonchi  Abdominal:      General: Bowel sounds are normal  There is no distension  Palpations: Abdomen is soft  Tenderness: There is no abdominal tenderness  There is no guarding  Genitourinary:     Comments: + suprapubic catheter  Musculoskeletal:         General: No swelling  Cervical back: Neck supple  Right lower leg: No edema  Left lower leg: No edema  Skin:     General: Skin is warm and dry  Capillary Refill: Capillary refill takes less than 2 seconds  Coloration: Skin is not jaundiced or pale  Neurological:      General: No focal deficit present  Mental Status: She is alert and oriented to person, place, and time  Mental status is at baseline  Cranial Nerves: No cranial nerve deficit  Motor: Weakness (generalized) present  Additional Data:     Lab Results:  Results from last 7 days   Lab Units 11/02/22  0718   WBC Thousand/uL 6 49   HEMOGLOBIN g/dL 10 8*   HEMATOCRIT % 33 3*   PLATELETS Thousands/uL 191   NEUTROS PCT % 84*   LYMPHS PCT % 7*   MONOS PCT % 7   EOS PCT % 1     Results from last 7 days   Lab Units 11/02/22  0718   SODIUM mmol/L 144   POTASSIUM mmol/L 4 0   CHLORIDE mmol/L 105   CO2 mmol/L 32   BUN mg/dL 13   CREATININE mg/dL 0 74   ANION GAP mmol/L 7   CALCIUM mg/dL 9 3   GLUCOSE RANDOM mg/dL 82                       Imaging: Reviewed radiology reports from this admission including: chest xray  XR chest 2 views   Final Result by Toño Levy MD (11/02 1851)      Question small pleural effusions on the lateral projection  Lungs clear  No abnormalities about the left shoulder  Workstation performed: [id number]         XR shoulder 2+ views LEFT   Final Result by Damion Fuller MD (11/02 0840)      No acute osseous abnormality  Workstation performed: [id number]         CT head wo contrast    (Results Pending)       EKG and Other Studies Reviewed on Admission:   · EKG: No EKG obtained  ** Please Note: This note has been constructed using a voice recognition system   **

## 2022-11-03 PROBLEM — R82.90 ABNORMAL URINALYSIS: Status: ACTIVE | Noted: 2022-11-03

## 2022-11-03 RX ORDER — LORATADINE 10 MG/1
10 TABLET ORAL DAILY
Status: DISCONTINUED | OUTPATIENT
Start: 2022-11-03 | End: 2022-11-04 | Stop reason: HOSPADM

## 2022-11-03 RX ORDER — FLUTICASONE PROPIONATE 50 MCG
1 SPRAY, SUSPENSION (ML) NASAL DAILY
Status: DISCONTINUED | OUTPATIENT
Start: 2022-11-03 | End: 2022-11-04 | Stop reason: HOSPADM

## 2022-11-03 RX ORDER — LIDOCAINE 50 MG/G
1 PATCH TOPICAL DAILY
Status: DISCONTINUED | OUTPATIENT
Start: 2022-11-03 | End: 2022-11-03 | Stop reason: SDUPTHER

## 2022-11-03 RX ADMIN — METHENAMINE HIPPURATE 1 G: 1 TABLET ORAL at 18:08

## 2022-11-03 RX ADMIN — AMLODIPINE BESYLATE 2.5 MG: 2.5 TABLET ORAL at 09:53

## 2022-11-03 RX ADMIN — BACLOFEN 10 MG: 10 TABLET ORAL at 09:53

## 2022-11-03 RX ADMIN — GUAIFENESIN 600 MG: 600 TABLET, EXTENDED RELEASE ORAL at 09:53

## 2022-11-03 RX ADMIN — ENOXAPARIN SODIUM 40 MG: 40 INJECTION SUBCUTANEOUS at 09:53

## 2022-11-03 RX ADMIN — METHENAMINE HIPPURATE 1 G: 1 TABLET ORAL at 09:57

## 2022-11-03 RX ADMIN — LIDOCAINE 5% 1 PATCH: 700 PATCH TOPICAL at 09:53

## 2022-11-03 RX ADMIN — FUROSEMIDE 40 MG: 40 TABLET ORAL at 09:53

## 2022-11-03 RX ADMIN — AMLODIPINE BESYLATE 2.5 MG: 2.5 TABLET ORAL at 21:30

## 2022-11-03 RX ADMIN — BACLOFEN 10 MG: 10 TABLET ORAL at 15:13

## 2022-11-03 RX ADMIN — LORATADINE 10 MG: 10 TABLET ORAL at 15:13

## 2022-11-03 RX ADMIN — BACLOFEN 10 MG: 10 TABLET ORAL at 20:38

## 2022-11-03 RX ADMIN — DALFAMPRIDINE 10 MG: 10 TABLET, FILM COATED, EXTENDED RELEASE ORAL at 21:30

## 2022-11-03 RX ADMIN — ENOXAPARIN SODIUM 40 MG: 40 INJECTION SUBCUTANEOUS at 21:45

## 2022-11-03 RX ADMIN — FLUTICASONE PROPIONATE 1 SPRAY: 50 SPRAY, METERED NASAL at 15:13

## 2022-11-03 RX ADMIN — GUAIFENESIN 600 MG: 600 TABLET, EXTENDED RELEASE ORAL at 20:38

## 2022-11-03 NOTE — PLAN OF CARE
Problem: PHYSICAL THERAPY ADULT  Goal: Performs mobility at highest level of function for planned discharge setting  See evaluation for individualized goals  Description: Treatment/Interventions: LE strengthening/ROM, Therapeutic exercise, Endurance training, Functional transfer training, Patient/family training, Equipment eval/education, Bed mobility  Equipment Recommended: Other (Comment) (pt requires swetha lift for OOB mobility at this time)       See flowsheet documentation for full assessment, interventions and recommendations  Note:    Problem List: Decreased strength, Decreased endurance, Impaired balance, Decreased mobility, Decreased range of motion, Pain, Impaired sensation  Assessment: Pt is a 78 y o  female seen for PT evaluation s/p admit to [address] [place] [address] on 8/18/2022 w/ RSV (acute bronchiolitis due to respiratory syncytial virus)  Order placed for PT  Comorbidities affecting pt's physical performance at time of assessment include: HTN and underlying neurological disorder  Personal factors affecting pt at time of IE include: inability to perform ADLs, inability to ambulate household distances and recent fall(s)  Prior to admission, pt was ambulating with mod I for short distances with RW  Pt reports having occasional "bad" days that she would require assist from  or use of WC  Pt lived in 1st floor setup home with ramped entrance  Upon evaluation: Pt requires max A x1 for rolling and Dx2 for supine to sit  (Please find full objective findings from PT assessment regarding body systems outlined above)  Impairments and limitations also listed above, especially due to  weakness, decreased ROM, impaired balance, decreased endurance, pain, decreased activity tolerance, altered sensation and fall risk  Pt's clinical presentation is currently unstable/unpredictable seen in pt's presentation of significant decline in functional mobility compared to baseline and fall risk    Pt to benefit from continued skilled PT tx while in hospital and upon DC to address deficits as defined above and maximize level of functional mobility  From PT/mobility standpoint, recommendation at time of d/c would be inpatient rehab pending progress  Recommend progression of sitting tolerance/balance and initiation of HEP as appropriate  PT Discharge Recommendation: Post acute rehabilitation services (PM&R consult)    See flowsheet documentation for full assessment

## 2022-11-03 NOTE — PHYSICAL THERAPY NOTE
PHYSICAL THERAPY EVALUATION  NAME: Rima Mora  AGE:   78 y o  MRN:  [id number]  ADMIT DX: RSV (acute bronchiolitis due to respiratory syncytial virus) [J21 0]  MS (multiple sclerosis) ([facility] ) [G35]  FTT (failure to thrive) in adult [R62 7]  Injury of left shoulder, initial encounter [S49 92XA]    PMH:   Past Medical History:   Diagnosis Date    Back pain 2016    Chronic UTI     Colon polyp     Hypertension     Incontinence     MS (multiple sclerosis) ([facility] )     Age 37    Neurogenic bladder     Spasticity      LENGTH OF STAY: 1       11/03/22 1129   PT Last Visit   PT Visit Date 11/13/22   Note Type   Note type Evaluation   Pain Assessment   Pain Assessment Tool FLACC   Pain Location/Orientation Orientation: Left; Location: Shoulder   Pain Rating: FLACC (Rest) - Face 1   Pain Rating: FLACC (Rest) - Legs 1   Pain Rating: FLACC (Rest) - Activity 0   Pain Rating: FLACC (Rest) - Cry 1   Pain Rating: FLACC (Rest) - Consolability 0   Score: FLACC (Rest) 3   Pain Rating: FLACC (Activity) - Face 1   Pain Rating: FLACC (Activity) - Legs 1   Pain Rating: FLACC (Activity) - Activity 1   Pain Rating: FLACC (Activity) - Cry 1   Pain Rating: FLACC (Activity) - Consolability 1   Score: FLACC (Activity) 5   Restrictions/Precautions   Weight Bearing Precautions Per Order No   Other Precautions Contact/isolation;Droplet precautions; Fall Risk;Pain  (masimo; catheter; (+) RSV)   Home Living   Type of [address] [place] [address] Two level; Able to live on main level with bedroom/bathroom; [address]; Wheelchair-manual;Mechanical lift  (adjustable bed)   Additional Comments Pt reports she ambulates with mod I and RW at baseline, however occasionally has "bad" days were  will assist or will use the WC     Prior Function   Lives With Spouse   Receives Help From Family   IADLs Family/Friend/Other provides transportation   Falls in the last 6 months 1 to 4   General   Additional Pertinent History h/o progressive MS   Family/Caregiver Present Yes   Cognition   Overall Cognitive Status WFL   Arousal/Participation Cooperative   Orientation Level Oriented to person;Oriented to place;Oriented to situation   Memory Within functional limits   Following Commands Follows one step commands without difficulty   Comments Pt identified by name and   Subjective   Subjective Agrees to PT evaluation and is pleasant and cooperative throughout session  Became tearful due to decreased functional status  RLE Assessment   RLE Assessment X   Strength RLE   RLE Overall Strength 1/5  (grossly)   LLE Assessment   LLE Assessment X   Strength LLE   LLE Overall Strength 0/5  (grossly)   Coordination   Sensation X   Light Touch   RLE Light Touch Impaired   LLE Light Touch Impaired   Bed Mobility   Rolling L 2  Maximal assistance   Additional items Assist x 1;Bedrails; Increased time required;Verbal cues;LE management   Supine to Sit 1  Dependent   Additional items Assist x 2; Increased time required;Verbal cues;LE management   Sit to Supine 1  Dependent   Additional items Assist x 2; Increased time required;Verbal cues;LE management   Transfers   Sit to Stand Unable to assess  (not appropriate due to limited sitting tolerance/balance)   Balance   Static Sitting Poor  (initially Poor)   Endurance Deficit   Endurance Deficit Yes   Endurance Deficit Description limited sitting tolerance/fatigue   Activity Tolerance   Activity Tolerance Patient limited by fatigue;Patient limited by pain   Nurse Made Aware Per RN, pt appropriate to evaluate   Assessment   Problem List Decreased strength;Decreased endurance; Impaired balance;Decreased mobility; Decreased range of motion;Pain; Impaired sensation   Assessment Pt is a 78 y o  female seen for PT evaluation s/p admit to [address] [place] [address] on 2022 w/ RSV (acute bronchiolitis due to respiratory syncytial virus)  Order placed for PT   Comorbidities affecting pt's physical performance at time of assessment include: HTN and underlying neurological disorder  Personal factors affecting pt at time of IE include: inability to perform ADLs, inability to ambulate household distances and recent fall(s)  Prior to admission, pt was ambulating with mod I for short distances with RW  Pt reports having occasional "bad" days that she would require assist from  or use of WC  Pt lived in 1st floor setup home with ramped entrance  Upon evaluation: Pt requires max A x1 for rolling and Dx2 for supine to sit  (Please find full objective findings from PT assessment regarding body systems outlined above)  Impairments and limitations also listed above, especially due to  weakness, decreased ROM, impaired balance, decreased endurance, pain, decreased activity tolerance, altered sensation and fall risk  Pt's clinical presentation is currently unstable/unpredictable seen in pt's presentation of significant decline in functional mobility compared to baseline and fall risk  Pt to benefit from continued skilled PT tx while in hospital and upon DC to address deficits as defined above and maximize level of functional mobility  From PT/mobility standpoint, recommendation at time of d/c would be inpatient rehab pending progress  Recommend progression of sitting tolerance/balance and initiation of HEP as appropriate  Goals   Patient Goals to be as independent as possible   STG Expiration Date 11/13/22   Short Term Goal #1 Pt will be able to: (1) perform rolling with mod Ax1 to decrease caregiver burden  (2) tolerate sitting EOB for ~15` with Fair balance (3) perform supine to sit with mod A x1 and use of bedrails to promote upright posture and prepare for OOB sitting (4) PT to see for further transfer assessment as appropriate (5) initiate HEP for supine and or sitting LE exercises as appropriate   PT Treatment Day 1   Plan   Treatment/Interventions LE strengthening/ROM; Therapeutic exercise; Endurance training;Functional transfer training;Patient/family training;Equipment eval/education; Bed mobility   PT Frequency 4-6x/wk   Recommendation   PT Discharge Recommendation Post acute rehabilitation services  (PM&R consult)   Equipment Recommended Other (Comment)  (pt requires swetha lift for OOB mobility at this time)   [address] [place] [address] Mobility Inpatient   Turning in Bed Without Bedrails 2   Lying on Back to Sitting on Edge of Flat Bed 1   Moving Bed to Chair 1   Standing Up From Chair 1   Walk in Room 1   Climb 3-5 Stairs 1   Basic Mobility Inpatient Raw Score 7   Turning Head Towards Sound 4   Follow Simple Instructions 3   Low Function Basic Mobility Raw Score 14   Low Function Basic Mobility Standardized Score 22 01   Highest Level Of Mobility   JH-HLM Goal 2: Bed activities/Dependent transfer   JH-HL Achieved 3: Sit at edge of bed   Additional Treatment Session   Start Time 1100   End Time 1129   Treatment Assessment Pt agrees to further treatment and is motivated to continue to work  Able to perform multiple trials of unsupported sitting EOB for ~2`, 1`, 1`, and 7 1/2` with Fair (-) balance  Pt using UEs and trunk to assist into appropriate positioning  Requires verbal cues for minor LOB and pt holding hands with PT in front of pt for support as well as occasionally leaning on forearms on lap  Pt requires dependent balance support between trials due to fatigue  Able to perform B trunk leans x3 reps each to work on dynamic sitting balance and UE strengthening  Min A required to pull upright from R lateral lean and mod/max A required from L lateral lean  RLE PROM LAQ performed to promote muscle activation  x1 trace contraction noted in RLE quad only  Pt was dependently repositioned in bed with pillows under heels for offloading  Will continue to benefit from ongoing skilled PT to maximize her functional mobility and increase her level of independence  Pt is very motivated and would make a great rehab candidate     Additional Treatment Day 1   End of Consult   Patient Position at End of Consult Supine;Bed/Chair alarm activated; All needs within reach     Pt was seen for a co-eval with OT due to potential need for significant physical assist, poor pain control, impaired mental status, limiting behaviors, and poor adherence to precautions  The patient's -[facility] Basic Mobility Inpatient Short Form Raw Score is 7, Standardized Score is     A Raw Score of less than 16 suggests the patient may benefit from discharge to post-acute rehabilitation services, which DOES coincide with CURRENT above PT recommendations  However please refer to therapist recommendation for discharge planning given other factors that may influence destination  Adapted from [employer] [facility] “6-Clicks” Basic Mobility and Daily Activity Scores With Discharge Destination  Physical Therapy, 2021;101:1-9   DOI: [email or web address]      Lorena Bach

## 2022-11-03 NOTE — ASSESSMENT & PLAN NOTE
· Typically ambulates with a walker at baseline  · Known neurogenic bladder with suprapubic catheter in place  · CT the head on admission negative  · Continue Dalfampridine - family to bring from home  · Follow up with outpatient neurology

## 2022-11-03 NOTE — ASSESSMENT & PLAN NOTE
· Acute musculoskeletal left shoulder pain after  tried to pick the patient up off floor  · No traumatic injuries identified on x-ray  · lidoderm ordered

## 2022-11-03 NOTE — PLAN OF CARE
Problem: OCCUPATIONAL THERAPY ADULT  Goal: Performs self-care activities at highest level of function for planned discharge setting  See evaluation for individualized goals  Description: Treatment Interventions: ADL retraining, Functional transfer training, UE strengthening/ROM, Endurance training, Patient/family training, Equipment evaluation/education, Neuromuscular reeducation, Fine motor coordination activities, Compensatory technique education, Continued evaluation, Activityengagement, Energy conservation (Functional balance)          See flowsheet documentation for full assessment, interventions and recommendations  Outcome: Progressing  Note: Limitation: Decreased ADL status, Decreased UE ROM, Decreased UE strength, Decreased endurance, Decreased sensation, Decreased fine motor control, Decreased self-care trans, Decreased high-level ADLs (dec'd functional balance)  Prognosis: Fair  Assessment: Pt is a 78 y o  female seen for OT evaluation s/[facility] w/ RSV (acute bronchiolitis due to respiratory syncytial virus)  Comorbidities affecting pt's functional performance at time of assessment are listed above but pertinent for hx of MS  Personal factors affecting pt at time of IE include:difficulty performing ADLS and difficulty performing IADLS   Prior to admission, pt was reportedly mod (I) with functional mobility and ADLs  Spouse home with pt and assists with LB dresing  Upon evaluation: Pt requires (D) assist for bed mobility, initially presents with poor static sitting balance but progressed to Fair- sitting balance, total assist for toileting and LB self cares, mod/max (A) for UB self cares, assist with self feeding to manage utensils as well as assist with G&H to open/close items   Pt not appropriate to progress with ADL transfers on this date as the following deficits impacting occupational performance: weakness, decreased ROM, decreased strength, decreased balance, decreased tolerance, impaired GMC, impaired [address], impaired sensation, decreased safety awareness, abnormal tone and increased pain  Pt to benefit from continued skilled OT tx while in the hospital to address deficits as defined above and maximize level of functional independence w ADL's and functional mobility  Occupational Performance areas to address include: eating, grooming, bathing/shower, toilet hygiene, dressing, functional mobility, community mobility, clothing management and leisure participation  Pt with score of 15/100 on the Barthel Index  Pt's raw score on the AM-PAC Daily activity inpatient short form is 11, standardized score is 29 04, less than 39 4  Patients at this level are likely to benefit from DC to inpatient rehab  This writer agrees with the latter recommendation  Based on OT evaluation, the assessment(s) completed, performance deficits listed, and current level of function, pt identified as a high complexity evaluation       OT Discharge Recommendation: Post acute rehabilitation services

## 2022-11-03 NOTE — ASSESSMENT & PLAN NOTE
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

## 2022-11-03 NOTE — OCCUPATIONAL THERAPY NOTE
Occupational Therapy Evaluation      Rima Mora    11/3/2022    Principal Problem:    RSV (acute bronchiolitis due to respiratory syncytial virus)  Active Problems:    Multiple sclerosis ([facility] [address] )    Ambulatory dysfunction    Hypertension    Anemia    Left shoulder pain    Abnormal urinalysis      Past Medical History:   Diagnosis Date    Back pain 2016    Chronic UTI     Colon polyp     Hypertension     Incontinence     MS (multiple sclerosis) ([facility] [address] )     Age 37    Neurogenic bladder     Spasticity        Past Surgical History:   Procedure Laterality Date    BACK SURGERY  2016    Lumbar fusion    COLONOSCOPY      ELBOW SURGERY Right 2008    ORIF    IR SUPRAPUBIC CATHETER PLACEMENT  4/26/2022    MA IMPLANT PERIPH/GASTRIC NEUROSTIM/ N/A 11/19/2021    Procedure: leadwire placement and pulse generator  placement of SNM at S3 Bilateral S3  nerve testing, fluroscopy - c-arm guidance and complex programming;  Surgeon: Bucky Sosa MD;  [facility];  Service: Gynecology        11/03/22 1030   OT Last Visit   OT Visit Date 11/03/22   Note Type   Note type Evaluation   Pain Assessment   Pain Assessment Tool 0-10   Pain Score 8   Pain Location/Orientation Orientation: Left; Location: Arm   Pain Onset/Description Other (Comment)  ("electric"- shooting pains in hand (3-5th digits) and up to shoulder)   Effect of Pain on Daily Activities limits ability to utilize LUE as an assist with transfers as well as limits ability to participate in purposeful tasks   Restrictions/Precautions   Other Precautions Droplet precautions  (masimo, suprapubic cath)   Home Living   Type of [address] [place] [address] Two level; Able to live on main level with bedroom/bathroom; Ramped entrance   [employer] Grab bars in shower; Shower chair   Home Equipment Sock aid;Reacher  (long handled soap sponge, electric bed, mechanical lift)   Prior Function   Level of [place] Independent with ADLs  (mod (I) fnctional mobility)   Lives With Spouse   Receives Help From Family   IADLs Family/Friend/Other provides transportation; Family/Friend/Other provides meals   Falls in the last 6 months 1 to 4   Lifestyle   Autonomy PTA, pt reportedly was able to mnage all self cares mod (I)/(I)ly  Reciprocal Relationships PTA, spouse provides assist in/out of shower, drives, and assists pt on "bad days"   Intrinsic Gratification "nothing right now "   Subjective   Subjective "I don't think I can get up today" (pt referring to standing and expressed feelign significantly weak)  ADL   Where Assessed Edge of bed   Eating Assistance 3  Moderate Assistance   Eating Deficit Scoop assist;Beverage management   Grooming Assistance 3  Moderate Assistance   Grooming Deficit   (assist to bring hand to math and hold utensils)   UB Bathing Assistance 2  Maximal Assistance   LB Bathing Assistance 1  Total Assistance   UB Dressing Assistance 2  Maximal Assistance   LB Dressing Assistance 1  Total Assistance   Toileting Assistance  1  Total Assistance   Bed Mobility   Rolling L 2  Maximal assistance   Additional items Assist x 1;Bedrails; Increased time required;Verbal cues;LE management   Supine to Sit 1  Dependent   Additional items Assist x 2; Increased time required;Verbal cues;LE management   Sit to Supine 1  Dependent   Additional items Assist x 2; Increased time required;Verbal cues;LE management   Transfers   Sit to Stand Unable to assess  (not appropriate on this date due to impaired sitting balance/tolerance )   Functional Mobility   Functional Mobility   (unable to assess; see above comment)   Balance   Static Sitting Poor  (initially poor; progressed to fair-)   Dynamic Sitting Poor   Activity Tolerance   Activity Tolerance Patient limited by fatigue;Patient limited by pain   Medical Staff Made Aware Co-evaluation/tx session on this date with PT for pt safety as well as to conserve pt energy due to low tolerance  Nurse Made Aware RN cleared pt for OT evlauation  pt presents in semi-fowlers position, agreeable to OT evalaution  RUE Assessment   RUE Assessment X   RUE Strength   RUE Overall Strength Deficits  (AROM of shoulder to approx 115 deg forward flexion, distal ROM WNL, approx 2-/5 shoulder, 3-/5 distal strength; breaks easily with resistance, grasp 3+/5)   LUE Assessment   LUE Assessment X   LUE Strength   LUE Overall Strength Deficits  (painful ROM of shoulder- PROM to approx 90deg but did not assess further due to dicomfort, distal ROM WFL and grossly 3-/5; breaks easily with resistance  gross grasp WFL)   Hand Function   Gross Motor Coordination Impaired   Fine Motor Coordination Impaired   Hand Function Comments Pt utilizes compensatory hand movements to grasp objects due to impaired FM skills   Sensation   Light Touch Partial deficits in the RUE;Partial deficits in the LUE;Partial deficits in the RLE;Partial deficits in the LLE   Vision - Complex Assessment   Additional Comments Pt with reports of new onset double vision "since this sickness" (referring to RSV)   Cognition   Overall Cognitive Status [facility]   Arousal/Participation Alert; Cooperative   Attention Within functional limits   Orientation Level Oriented to place;Oriented to person;Oriented to time;Oriented to situation   Memory Within functional limits   Following Commands Follows one step commands without difficulty   Comments Pt identified by name and    Assessment   Limitation Decreased ADL status; Decreased UE ROM; Decreased UE strength;Decreased endurance;Decreased sensation;Decreased fine motor control;Decreased self-care trans;Decreased high-level ADLs  (dec'd functional balance)   Prognosis Fair   Assessment Pt is a 78 y o  female seen for OT evaluation s/p [facility] on 2022 w/ RSV (acute bronchiolitis due to respiratory syncytial virus)    Comorbidities affecting pt's functional performance at time of assessment are listed above but pertinent for hx of MS  Personal factors affecting pt at time of IE include:difficulty performing ADLS and difficulty performing IADLS   Prior to admission, pt was reportedly mod (I) with functional mobility and ADLs  Spouse home with pt and assists with LB dresing  Upon evaluation: Pt requires (D) assist for bed mobility, initially presents with poor static sitting balance but progressed to Fair- sitting balance, total assist for toileting and LB self cares, mod/max (A) for UB self cares, assist with self feeding to manage utensils as well as assist with G&H to open/close items  Pt not appropriate to progress with ADL transfers on this date as the following deficits impacting occupational performance: weakness, decreased ROM, decreased strength, decreased balance, decreased tolerance, impaired GMC, impaired [address], impaired sensation, decreased safety awareness, abnormal tone and increased pain  Pt to benefit from continued skilled OT tx while in the hospital to address deficits as defined above and maximize level of functional independence w ADL's and functional mobility  Occupational Performance areas to address include: eating, grooming, bathing/shower, toilet hygiene, dressing, functional mobility, community mobility, clothing management and leisure participation  Pt with score of 15/100 on the Barthel Index  Pt's raw score on the AM-PAC Daily activity inpatient short form is 11, standardized score is 29 04, less than 39 4  Patients at this level are likely to benefit from DC to inpatient rehab  This writer agrees with the latter recommendation  Based on OT evaluation, the assessment(s) completed, performance deficits listed, and current level of function, pt identified as a high complexity evaluation  Goals   Patient Goals To be as (I) as possible and return to PLOF     LTG Time Frame 7-10   Long Term Goal #1 see goals listed below   Plan   Treatment Interventions ADL retraining;Functional transfer training;UE strengthening/ROM; Endurance training;Patient/family training;Equipment evaluation/education; Neuromuscular reeducation; Fine motor coordination activities; Compensatory technique education;Continued evaluation; Activityengagement; Energy conservation  (Functional balance)   Goal Expiration Date 11/13/22   OT Treatment Day 1   OT Frequency 3-5x/wk   Recommendation   OT Discharge Recommendation Post acute rehabilitation services   AM-PAC Daily Activity Inpatient   Lower Body Dressing 1   Bathing 2   Toileting 1   Upper Body Dressing 2   Grooming 2   Eating 3   Daily Activity Raw Score 11   Daily Activity Standardized Score (Calc for Raw Score >=11) 29 04   AM-PAC Applied Cognition Inpatient   Following a Speech/Presentation 4   Understanding Ordinary Conversation 4   Taking Medications 4   Remembering Where Things Are Placed or Put Away 3   Remembering List of 4-5 Errands 3   Taking Care of Complicated Tasks 3   Applied Cognition Raw Score 21   Applied Cognition Standardized Score 44 3   Barthel Index   Feeding 5   Bathing 0   Grooming Score 0   Dressing Score 0   Bladder Score 0   Bowels Score 10   Toilet Use Score 0   Transfers (Bed/Chair) Score 0   Mobility (Level Surface) Score 0   Stairs Score 0   Barthel Index Score 15   Additional Treatment Session   Start Time 1100   End Time 1130   Treatment Assessment [facility] for OT evaluation and tx session  Tx session focused on addressing activity tolerance and sitting balance in prep for higher level ADL and ADL transfer training  Pt initially with poor sitting balance however with OT positioned behind pt, and PT at front holding pt's hands for support, pt progressed to fair- with min (A)/CGA for support  Pt participated in four sitting trials with seated supported rest breaks on EOB  Minimal supported sitting trial times as follows: 2m, 1m, 1m, 7 5m  For the last trial, pt participated in lateral leans in prep for higher level transfers   Pt required min (A) to bring trunk back to upright from the right side and mod to max (A) from the left side  5 reps of lateral leans bilaterally  Pt also reports inc'd "electric" sensation when performing left lateral leans  It should be noted, pt requesting rest breaks after first 3 minimally supported sitting trials but then with inc'd tolerance noted for 4th trial when participating in conversation that related to grandchildren, condo in [place], and own children  Pt returned to bed at the end of session and educated on importance of 'floating heels' due to risks of skin breakdown  Pt and spouse verbalized understanding to the latter  Furthermore, discussed having staff utilize swetha to transfer OOB to chair at this time and encouraged pt to advocate for needs if desiring OOB 2' pt states, "I don't want to bother them" (referring to nursing)  Pt agreeable to the latter  Pt showing progress toward the end of session with regards to sitting tolerance and balance  Recommend therapy to continue to follow during hospital course to address goals, progress towards OOB using most appropriate techniues (transfer board vs  sit<>stand, vs  RW)  End of Consult   Education Provided Yes;Family or social support of family present for education by provider   Patient Position at End of Consult Supine; All needs within reach   Nurse Communication Nurse aware of consult       GOALS:    Pt will achieve the following within specified time frame:  *Perform ADL transfers with mod (A)x1 using most appropriate technique (ie: transfer board, RW, sit<>stand, anterior approach) for inc'd independence with ADLs/purposeful tasks    *Bed mobility- mod (A)x1 for inc'd independence to manage own comfort and initiate EOB & OOB purposeful tasks    *Perform UB ADL (S) for inc'd independence with self cares    *Perform LB ADL mod (A) using AE prn for inc'd independence with self cares    *Increase dynamic seated balance to Fair- for inc'd safety with seated purposeful tasks  *Increase static stand balance to Poor+ supported for inc'd safety with standing purposeful tasks    *Increase UNSUPPORTED sit tolerance x10m for inc'd tolerance with seated purposeful tasks    *Participate in 10m UE therex to increase overall stamina/activity tolerance for purposeful tasks    *Pt will increase sitting/OOB tolerance to 1hr for inc'd participation in self cares/purposeful/leisure tasks with no s/s of exertion      *Pt will self feed mod (I)/(I)ly to increase independence with meals    *Perform G&H with (S)/setup, for inc'd independence with self cares        Addie Barnes, OT

## 2022-11-03 NOTE — ASSESSMENT & PLAN NOTE
· Likely from sister who was also noted to be positive for RSV  · Patient with cough and congestion  · Supportive care with Mucinex, Tessalon, albuterol PRN  · Add Flonase and Claritin  · Chest x-ray without infiltrate    · Remains on room air

## 2022-11-03 NOTE — CASE MANAGEMENT
Case Management Discharge Planning Note    Patient name Lui Bryson  Location S /S -87 MRN [id number]  : 1955 Date 11/3/2022       Current Admission Date: 2022  Current Admission Diagnosis:RSV (acute bronchiolitis due to respiratory syncytial virus)   Patient Active Problem List    Diagnosis Date Noted   • Abnormal urinalysis 2022   • RSV (acute bronchiolitis due to respiratory syncytial virus) 2022   • Left shoulder pain 2022   • Anemia 2022   • Debility 2021   • Acute bilateral thoracic back pain 2021   • Hypernatremia 2021   • Multiple sclerosis ([address] ) 2021   • Ambulatory dysfunction 2021   • Hypertension 2021   • Neurogenic bladder 2021   • Leg swelling 2021   • Acquired polyneuropathy       LOS (days): 1  Geometric Mean LOS (GMLOS) (days): 2 80  Days to GMLOS:1 6     OBJECTIVE:  Risk of Unplanned Readmission Score: 12 24         Current admission status: Inpatient   Preferred Pharmacy:   [facility]/pharmacy #[id number]- [place] [id number] [address] [address]  Phone: [phone] Fax: [address]  Phone: [phone] Fax: [phone]    PATIENT/FAMILY REPORTS NO PREFERRED PHARMACY  No address on file      [facility] ([facility]) - [address],[facility]  [address]  Phone: [phone] Fax: [phone]    Primary Care Provider: Toro Perez MD    Primary Insurance: MEDICARE  Secondary Insurance: BLUE CROSS    DISCHARGE DETAILS:    Discharge planning discussed with[de-identified] Patient and spouse at bedside  Freedom of Choice: Yes  Comments - Freedom of Choice: FOC discussed regarding therapy recs for IP rehab  CM placed referrals to [facility] and blanket referral to STR per discussion with pt and family    First choice is [facility] at [address] [place] [address], does not want referrals to [facility], [facility], or [facility]  Preference for STR is [facility] or [facility]  CM contacted family/caregiver?: Yes  Were Treatment Team discharge recommendations reviewed with patient/caregiver?: Yes  Did patient/caregiver verbalize understanding of patient care needs?: N/A- going to facility  Were patient/caregiver advised of the risks associated with not following Treatment Team discharge recommendations?: Yes    Contacts  Patient Contacts: Patient and spouse-Christopher  Relationship to Patient[de-identified] Family  Contact Method:  In Person  Reason/Outcome: Continuity of Care, Discharge Planning, Referral    Other Referral/Resources/Interventions Provided:  Interventions: Short Term Rehab  Referral Comments: CM placed referrals to IP rehab    Treatment Team Recommendation: Acute Rehab     IMM Given (Date):: 11/02/22

## 2022-11-03 NOTE — ASSESSMENT & PLAN NOTE
· With profound weakness presently secondary to RSV infection    · PT/OT evaluated and recommended for rehab  · Consult physical medicine and rehab team  · Patient may benefit from acute rehab

## 2022-11-03 NOTE — PROGRESS NOTES
[facility]  Progress Note - Rima Marin 1955, 78 y o  female MRN: [id number]  Unit/Bed#: S -01 Encounter: [id number]  Primary Care Provider: Kathy Wheeler MD   Date and time admitted to hospital: 11/2/2022  6:47 AM    * RSV (acute bronchiolitis due to respiratory syncytial virus)  Assessment & Plan  · Likely from sister who was also noted to be positive for RSV  · Patient with cough and congestion  · Supportive care with Mucinex, Tessalon, albuterol PRN  · Add Flonase and Claritin  · Chest x-ray without infiltrate  · Remains on room air    Abnormal urinalysis  Assessment & Plan  · Patient has an abnormal urinalysis however this has a chronic suprapubic catheter and 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 chronically colonized  · At this time hold on antibiotics  · Urine culture was obtained  Will follow-up on the results of this however this time patient's symptoms appear more consistent with generalized weakness related to RSV as opposed to urinary tract infection  · Currently afebrile    Multiple sclerosis ([facility] [address] )  Assessment & Plan  · Typically ambulates with a walker at baseline  · Known neurogenic bladder with suprapubic catheter in place  · CT the head on admission negative  · Continue Dalfampridine - family to bring from home  · Follow up with outpatient neurology    Ambulatory dysfunction  Assessment & Plan  · With profound weakness presently secondary to RSV infection  · PT/OT evaluated and recommended for rehab  · Consult physical medicine and rehab team  · Patient may benefit from acute rehab    Hypertension  Assessment & Plan  · Blood pressure stable  · Continue Lasix and amlodipine  · Monitor blood pressure    Anemia  Assessment & Plan  · Hemoglobin at baseline  · No evidence of bleeding    Left shoulder pain  Assessment & Plan  · Acute musculoskeletal left shoulder pain after  tried to pick the patient up off floor  · No traumatic injuries identified on x-ray    · lidoderm ordered  VTE Pharmacologic Prophylaxis: VTE Score: 5 High Risk (Score >/= 5) - Pharmacological DVT Prophylaxis Ordered: enoxaparin (Lovenox)  Sequential Compression Devices Ordered  Patient Centered Rounds: I performed bedside rounds with nursing staff today  Discussions with Specialists or Other Care Team Provider: nursing, PT    Education and Discussions with Family / Patient: Updated  () at bedside  Time Spent for Care: 45 minutes  More than 50% of total time spent on counseling and coordination of care as described above  Current Length of Stay: 1 day(s)  Current Patient Status: Inpatient   Certification Statement: The patient will continue to require additional inpatient hospital stay due to follow up on cultures, await rehab  Discharge Plan: Anticipate discharge tomorrow to rehab facility  Code Status: Level 1 - Full Code    Subjective:   No nausea or vomiting  No chest pain or shortness of breath  Feels really weak  Family concerned that she may have a urinary tract infection and wanting to know that if this is contributing  Patient reports that she still has congestion as well as sometimes a difficulty taking a deep breath  Objective:     Vitals:   Temp (24hrs), Av 7 °F (37 1 °C), Min:98 6 °F (37 °C), Max:98 8 °F (37 1 °C)    Temp:  [98 6 °F (37 °C)-98 8 °F (37 1 °C)] 98 7 °F (37 1 °C)  HR:  [83-99] 99  Resp:  [17-18] 17  BP: (105-135)/(50-72) 125/50  SpO2:  [93 %-94 %] 94 %  Body mass index is 41 91 kg/m²  Input and Output Summary (last 24 hours): Intake/Output Summary (Last 24 hours) at 11/3/2022 1357  Last data filed at 11/3/2022 0900  Gross per 24 hour   Intake 320 ml   Output 800 ml   Net -480 ml       Physical Exam:   Physical Exam  Vitals and nursing note reviewed  Constitutional:       General: She is not in acute distress  Appearance: Normal appearance  She is ill-appearing  She is not diaphoretic     HENT:      Head: Normocephalic and atraumatic  Mouth/Throat:      Mouth: Mucous membranes are dry  Cardiovascular:      Rate and Rhythm: Normal rate and regular rhythm  Pulmonary:      Effort: Pulmonary effort is normal       Breath sounds: Normal breath sounds  No stridor  No wheezing, rhonchi or rales  Abdominal:      General: Bowel sounds are normal       Palpations: Abdomen is soft  There is no mass  Tenderness: There is no abdominal tenderness  There is no guarding  Musculoskeletal:      Right lower leg: No edema  Left lower leg: No edema  Skin:     General: Skin is warm and dry  Neurological:      Mental Status: She is alert        Comments: Patient has difficulty raising her arms up to get her water glass   Psychiatric:         Mood and Affect: Mood normal          Behavior: Behavior normal           Additional Data:     Labs:  Results from last 7 days   Lab Units 11/02/22  1156 11/02/22  0718   WBC Thousand/uL  --  6 49   HEMOGLOBIN g/dL  --  10 8*   HEMATOCRIT %  --  33 3*   PLATELETS Thousands/uL 167 191   NEUTROS PCT %  --  84*   LYMPHS PCT %  --  7*   MONOS PCT %  --  7   EOS PCT %  --  1     Results from last 7 days   Lab Units 11/02/22  0718   SODIUM mmol/L 144   POTASSIUM mmol/L 4 0   CHLORIDE mmol/L 105   CO2 mmol/L 32   BUN mg/dL 13   CREATININE mg/dL 0 74   ANION GAP mmol/L 7   CALCIUM mg/dL 9 3   GLUCOSE RANDOM mg/dL 82                       Lines/Drains:  Invasive Devices  Report    Peripheral Intravenous Line  Duration           Peripheral IV 11/02/22 Right Hand 1 day          Drain  Duration           Suprapubic Catheter 18 Fr  30 days              Imaging: Reviewed radiology reports from this admission including: CT head    Recent Cultures (last 7 days):   Results from last 7 days   Lab Units 11/02/22  1007   URINE CULTURE  Culture too young- will reincubate       Last 24 Hours Medication List:   Current Facility-Administered Medications   Medication Dose Route Frequency Provider Last Rate   • acetaminophen  650 mg Oral Q6H PRN Taya Nicole PA-C     • albuterol  2 5 mg Nebulization Q6H PRN Taya Nicole PA-C     • amLODIPine  2 5 mg Oral Q12H Taya Nicole PA-C     • baclofen  10 mg Oral TID Taya Nicole PA-C     • benzonatate  200 mg Oral TID PRN Taya Nicole PA-C     • Dalfampridine ER  10 mg Oral Q12H Taya Nicole PA-C     • enoxaparin  40 mg Subcutaneous Q12H Taya Nicole PA-C     • fluticasone  1 spray Each Nare Daily Jen Funk PA-C     • furosemide  40 mg Oral Daily Taya Nicole PA-C     • guaiFENesin  600 mg Oral Q12H [address] Taya Nicole PA-C     • lidocaine  1 patch Topical Daily Taya Nicole PA-C     • loratadine  10 mg Oral Daily Jen Funk PA-C     • methenamine hippurate  1 g Oral BID Taya Nicole PA-C     • ondansetron  4 mg Intravenous Q6H PRN Taya Nicole PA-C     • tiZANidine  4 mg Oral Q8H PRN Taya Nicole PA-C     • zolpidem  10 mg Oral HS PRN Taya Nicole PA-C          Today, Patient Was Seen By: Beba García    **Please Note: This note may have been constructed using a voice recognition system  **

## 2022-11-04 VITALS
HEIGHT: 55 IN | SYSTOLIC BLOOD PRESSURE: 119 MMHG | DIASTOLIC BLOOD PRESSURE: 49 MMHG | OXYGEN SATURATION: 90 % | BODY MASS INDEX: 41.73 KG/M2 | RESPIRATION RATE: 18 BRPM | HEART RATE: 85 BPM | WEIGHT: 180.34 LBS | TEMPERATURE: 99 F

## 2022-11-04 LAB
FLUAV RNA RESP QL NAA+PROBE: NEGATIVE
FLUBV RNA RESP QL NAA+PROBE: NEGATIVE
RSV RNA RESP QL NAA+PROBE: POSITIVE
SARS-COV-2 RNA RESP QL NAA+PROBE: NEGATIVE

## 2022-11-04 RX ORDER — ACETAMINOPHEN 325 MG/1
650 TABLET ORAL EVERY 6 HOURS PRN
Status: CANCELLED | OUTPATIENT
Start: 2022-11-04

## 2022-11-04 RX ORDER — GUAIFENESIN 600 MG/1
600 TABLET, EXTENDED RELEASE ORAL EVERY 12 HOURS SCHEDULED
Refills: 0
Start: 2022-11-04

## 2022-11-04 RX ORDER — MUSCLE RUB CREAM 100; 150 MG/G; MG/G
CREAM TOPICAL 3 TIMES DAILY
Refills: 0
Start: 2022-11-04

## 2022-11-04 RX ORDER — BACLOFEN 10 MG/1
10 TABLET ORAL DAILY
Status: DISCONTINUED | OUTPATIENT
Start: 2022-11-04 | End: 2022-11-04 | Stop reason: HOSPADM

## 2022-11-04 RX ORDER — BACLOFEN 10 MG/1
10 TABLET ORAL DAILY
Refills: 0
Start: 2022-11-04

## 2022-11-04 RX ORDER — FLUTICASONE PROPIONATE 50 MCG
1 SPRAY, SUSPENSION (ML) NASAL DAILY
Status: CANCELLED | OUTPATIENT
Start: 2022-11-05

## 2022-11-04 RX ORDER — BACLOFEN 10 MG/1
15 TABLET ORAL 2 TIMES DAILY
Status: DISCONTINUED | OUTPATIENT
Start: 2022-11-04 | End: 2022-11-04 | Stop reason: HOSPADM

## 2022-11-04 RX ORDER — FUROSEMIDE 40 MG/1
40 TABLET ORAL DAILY
Status: CANCELLED | OUTPATIENT
Start: 2022-11-05

## 2022-11-04 RX ORDER — ONDANSETRON 2 MG/ML
4 INJECTION INTRAMUSCULAR; INTRAVENOUS EVERY 6 HOURS PRN
Status: CANCELLED | OUTPATIENT
Start: 2022-11-04

## 2022-11-04 RX ORDER — ALBUTEROL SULFATE 2.5 MG/3ML
2.5 SOLUTION RESPIRATORY (INHALATION) EVERY 6 HOURS PRN
Status: CANCELLED | OUTPATIENT
Start: 2022-11-04

## 2022-11-04 RX ORDER — ALBUTEROL SULFATE 90 UG/1
2 AEROSOL, METERED RESPIRATORY (INHALATION) EVERY 6 HOURS PRN
Qty: 8.5 G | Refills: 0
Start: 2022-11-04

## 2022-11-04 RX ORDER — ENOXAPARIN SODIUM 100 MG/ML
40 INJECTION SUBCUTANEOUS EVERY 12 HOURS
Status: CANCELLED | OUTPATIENT
Start: 2022-11-04

## 2022-11-04 RX ORDER — TIZANIDINE 2 MG/1
4 TABLET ORAL EVERY 8 HOURS PRN
Status: CANCELLED | OUTPATIENT
Start: 2022-11-04

## 2022-11-04 RX ORDER — AMLODIPINE BESYLATE 2.5 MG/1
2.5 TABLET ORAL EVERY 12 HOURS
Status: CANCELLED | OUTPATIENT
Start: 2022-11-04

## 2022-11-04 RX ORDER — FLUTICASONE PROPIONATE 50 MCG
1 SPRAY, SUSPENSION (ML) NASAL DAILY
Refills: 0
Start: 2022-11-05

## 2022-11-04 RX ORDER — BACLOFEN 10 MG/1
10 TABLET ORAL 3 TIMES DAILY
Status: CANCELLED | OUTPATIENT
Start: 2022-11-04

## 2022-11-04 RX ORDER — DALFAMPRIDINE 10 MG/1
10 TABLET, FILM COATED, EXTENDED RELEASE ORAL EVERY 12 HOURS
Status: CANCELLED | OUTPATIENT
Start: 2022-11-04

## 2022-11-04 RX ORDER — METHENAMINE HIPPURATE 1000 MG/1
1 TABLET ORAL 2 TIMES DAILY
Status: CANCELLED | OUTPATIENT
Start: 2022-11-04

## 2022-11-04 RX ORDER — BACLOFEN 5 MG/1
15 TABLET ORAL 2 TIMES DAILY
Refills: 0
Start: 2022-11-04

## 2022-11-04 RX ORDER — ACETAMINOPHEN 325 MG/1
650 TABLET ORAL EVERY 6 HOURS PRN
Refills: 0
Start: 2022-11-04

## 2022-11-04 RX ORDER — GUAIFENESIN 600 MG/1
600 TABLET, EXTENDED RELEASE ORAL EVERY 12 HOURS SCHEDULED
Status: CANCELLED | OUTPATIENT
Start: 2022-11-04

## 2022-11-04 RX ORDER — MUSCLE RUB CREAM 100; 150 MG/G; MG/G
CREAM TOPICAL 3 TIMES DAILY
Status: CANCELLED | OUTPATIENT
Start: 2022-11-04

## 2022-11-04 RX ORDER — LORATADINE 10 MG/1
10 TABLET ORAL DAILY
Status: CANCELLED | OUTPATIENT
Start: 2022-11-05

## 2022-11-04 RX ORDER — ZOLPIDEM TARTRATE 5 MG/1
10 TABLET ORAL
Status: CANCELLED | OUTPATIENT
Start: 2022-11-04

## 2022-11-04 RX ORDER — BENZONATATE 200 MG/1
200 CAPSULE ORAL 3 TIMES DAILY PRN
Qty: 20 CAPSULE | Refills: 0
Start: 2022-11-04

## 2022-11-04 RX ORDER — BENZONATATE 100 MG/1
200 CAPSULE ORAL 3 TIMES DAILY PRN
Status: CANCELLED | OUTPATIENT
Start: 2022-11-04

## 2022-11-04 RX ORDER — MUSCLE RUB CREAM 100; 150 MG/G; MG/G
CREAM TOPICAL 3 TIMES DAILY
Status: DISCONTINUED | OUTPATIENT
Start: 2022-11-04 | End: 2022-11-04 | Stop reason: HOSPADM

## 2022-11-04 RX ORDER — LORATADINE 10 MG/1
10 TABLET ORAL DAILY
Refills: 0
Start: 2022-11-05

## 2022-11-04 RX ADMIN — DALFAMPRIDINE 10 MG: 10 TABLET, FILM COATED, EXTENDED RELEASE ORAL at 08:59

## 2022-11-04 RX ADMIN — FUROSEMIDE 40 MG: 40 TABLET ORAL at 08:55

## 2022-11-04 RX ADMIN — METHENAMINE HIPPURATE 1 G: 1 TABLET ORAL at 08:59

## 2022-11-04 RX ADMIN — ENOXAPARIN SODIUM 40 MG: 40 INJECTION SUBCUTANEOUS at 08:55

## 2022-11-04 RX ADMIN — LORATADINE 10 MG: 10 TABLET ORAL at 08:55

## 2022-11-04 RX ADMIN — MENTHOL, UNSPECIFIED FORM AND METHYL SALICYLATE: 10; 150 CREAM TOPICAL at 15:53

## 2022-11-04 RX ADMIN — AMLODIPINE BESYLATE 2.5 MG: 2.5 TABLET ORAL at 08:55

## 2022-11-04 RX ADMIN — BACLOFEN 10 MG: 10 TABLET ORAL at 08:55

## 2022-11-04 RX ADMIN — BACLOFEN 10 MG: 10 TABLET ORAL at 15:52

## 2022-11-04 RX ADMIN — FLUTICASONE PROPIONATE 1 SPRAY: 50 SPRAY, METERED NASAL at 08:58

## 2022-11-04 RX ADMIN — GUAIFENESIN 600 MG: 600 TABLET, EXTENDED RELEASE ORAL at 08:55

## 2022-11-04 NOTE — CASE MANAGEMENT
Case Management Discharge Planning Note    Patient name Vicki Arboleda  Location S /S -89 MRN [id number]  : 1955 Date 2022       Current Admission Date: 2022  Current Admission Diagnosis:RSV (acute bronchiolitis due to respiratory syncytial virus)   Patient Active Problem List    Diagnosis Date Noted   • Abnormal urinalysis 2022   • RSV (acute bronchiolitis due to respiratory syncytial virus) 2022   • Left shoulder pain 2022   • Anemia 2022   • Debility 2021   • Acute bilateral thoracic back pain 2021   • Hypernatremia 2021   • Multiple sclerosis ([facility] [address] ) 2021   • Ambulatory dysfunction 2021   • Hypertension 2021   • Neurogenic bladder 2021   • Leg swelling 2021   • Acquired polyneuropathy       LOS (days): 2  Geometric Mean LOS (GMLOS) (days): 2 80  Days to GMLOS:0 6     OBJECTIVE:  Risk of Unplanned Readmission Score: 12 84         Current admission status: Inpatient   Preferred Pharmacy:   [facility]/pharmacy #[id number]- [facility] [address] [address]  Phone: [phone] Fax: [facility], [address] [place] [address] [place] [address] [place] [address]  Phone: [phone] Fax: [phone]    PATIENT/FAMILY REPORTS NO PREFERRED PHARMACY  No address on file      [facility] ([facility]) - [address]  Phone: [phone] Fax: [phone]    Primary Care Provider: Nba Winters MD    Primary Insurance: MEDICARE  Secondary Insurance: BLUE CROSS    DISCHARGE DETAILS:    Discharge planning discussed with[de-identified] Patient and spouse-Christopher  Freedom of Choice: Yes  Comments - Freedom of Choice: [facility] accepted pt for DC today   CM confirmed plan with patient and spouse-Christopher at bedside    Contacts  Patient Contacts: Patient and spouse-Christopher  Relationship to Patient[de-identified] Family  Contact Method: In Person  Reason/Outcome: Continuity of Care, Discharge Planning    Other Referral/Resources/Interventions Provided:  Interventions: Acute Rehab, Transportation  Referral Comments: CM requested BLS transport for 3:45pm via [facility], pending confirmation of time    CM informed RH-Jo, KAT Jordan, patient, spouse-Christopher, and RN-Maite that confirmed time will be TTd to [employer] RN    IMM Given (Date):: 11/04/22

## 2022-11-04 NOTE — CONSULTS
PHYSICAL MEDICINE AND REHABILITATION CONSULT NOTE  Rima Mora 78 y o  female MRN: [id number]  Unit/Bed#: S -01 Encounter: [id number]    Requested by (Physician/Service): Ke Anderson MD  Reason for Consultation:  Assessment of rehabilitation needs    Assessment:  Rehabilitation Diagnosis:   • MS possible exacerbation/progression related to recent RSV Versus UTI  • Significantly increased moderate-severe spasticity shoulder abductors/shoulder flexors (R> L), and flexor synergistic pattern in bilateral lower extremities (L > R)  • Impaired mobility and self care    Recommendations:  Rehabilitation Plan:  • Continue PT/OT while on acute care  • Patient with significant functional decline from her baseline  This may be related to her current virus versus UTI or a combination of both, however, unclear if this is also an MS exacerbation  o Recommend neurology consultation and evaluation as patient may have MS exacerbation - is a role for steroids? o Spasticity:  Moderately increased from her baseline  Currently on baclofen 10 mg t i d  = 30 mg per day  Would recommend small increase during the day as follows: Baclofen 15 mg q a m , 10 mg q afternoon, 15 mg q h s  = 40 mg per day     o Soreness in bilateral upper extremities may be related to painful spasticity:  Would consider low doses of gabapentin 100 mg b i d  if patient agreeable  o Recommend topical ICE p r n  to keep patient cool as she subjectively feels hot, offer drinks with ICE  o Related to primary service  • Rehabilitation recommendations:   Today, patient would not tolerate an acute inpatient rehabilitation program and she would be more appropriate for a subacute inpatient rehabilitation program   However over the course of the next several days, if she makes improvement in her transfers and reduce spasticity she would be very appropriate for an acute inpatient rehabilitation program   • Physiatry service to follow  o Related recommendations to both patient, her  and her   o Covid-19 Testing: [facility] inpatient rehabilitation units require testing within 48 hours of all potential admissions at this time  *Re-testing is NOT required for patients recovering from COVID-19 infection if isolation has been discontinued per CDC criteria  Medical Co-morbidities Plan:  Multiple sclerosis:  Continue her baseline medication dalfampridine  Follows with Dr Flory Samuel  Spasticity related to multiple sclerosis:  Managed on baclofen 10 mg t i d  Neurogenic bladder - spastic pattern  Suprapubic catheter in place  Neurogenic bowel  Hypertension:  BP stable  DVT ppx:  Managed on subcutaneous Lovenox      Thank you for this consultation  Do not hesitate to contact service with further questions  [address] [place]  PM&R        Total time spent:  1 hour with more than 50% spent counseling/coordinating care  Counseling includes extended discussion with patient (+/- family/relevant historian)  re: history, exam, function, mood, rehabilitation management plan, medical co-morbidities plan, and disposition options  Additional time spent with thorough chart review in EMR, reviewing recent medications, labs, imaging, and management plan of primary service  History of Present Illness:  Rima Hayes is a 78 y o  female with  has a past medical history of Back pain (2016), Chronic UTI, Colon polyp, Hypertension, Incontinence, MS (multiple sclerosis) ([facility] [address] ), Neurogenic bladder, and Spasticity     Patient presented to the [address] on 11/2/22 with shortness of breath, left shoulder pain and functional decline  Apparently at home, patient was unable to ambulate at her baseline which is with a rolling walker  Patient found to have positive RSV PCR  States that her sister recently also had RSV  Patient treated supportively with Mucinex, Tessalon, albuterol    Patient has not required any additional oxygen  Patient also with abnormal UA however deemed to be chronically colonized and antibiotics were not started  Urine culture pending  Patient seen face to face today in her room at the bedside with her  present  Patient states she still has a cough but denies current shortness of breath  Reports she can not move as she is very stiff and weak  Has soreness in her shoulders  Has chronic lower back pain    * at baseline, patient has mild weakness in her bilateral lower extremities and numbness and tingling in her bilateral hands  She has functional at household level utilizing a rolling walker - per her  she is able to take at least 5-6 laps around her home every day  She is able to feed herself, Groom herself, dress herself with adaptive equipment, toilet herself with grab bars  She only requires assistance with bathing  * her last MS progression requiring steroid treatment was about 1 year ago - her main neurologist is Dr Mickey Kelly [facility]: 10 point ROS negative except for what is noted in HPI    Function:  Prior level of function and living situation:  Patient resides in a two-story home with her spouse  She has ramped entrance  She does have a mechanical lift in her home additionally  She receives assistance from her family and friends  Prior level of functioning:  Patient ambulated household distances at a modified independent level with a rolling walker  For further distances or it is more difficult days she would utilize a wheelchair at a modified independent level  With ADLs patient was at a modified independent level utilizing a reacher and a sock aid    Current level of function:  Physical Therapy:  Significantly functionally declined, max assist - total assist with bed mobility, transfers for unable to be performed, ambulation unable to be performed  Occupational Therapy:  Significant functional decline:   Moderate assist for eating, moderate assist for grooming, max-total assist for upper and lower body ADLs      Physical Exam:  BP (!) 119/49 (BP Location: Left arm)   Pulse 85   Temp 99 °F (37 2 °C) (Oral)   Resp 18   Ht 4' 7" (1 397 m)   Wt 81 8 kg (180 lb 5 4 oz)   SpO2 90%   BMI 41 91 kg/m²        Intake/Output Summary (Last 24 hours) at 11/4/2022 1001  Last data filed at 11/3/2022 1939  Gross per 24 hour   Intake --   Output 2000 ml   Net -2000 ml       Body mass index is 41 91 kg/m²  Physical Exam  Vitals and nursing note reviewed  Constitutional:       General: She is not in acute distress  HENT:      Head: Normocephalic and atraumatic  Nose: Nose normal       Mouth/Throat:      Mouth: Mucous membranes are moist    Eyes:      Conjunctiva/sclera: Conjunctivae normal    Cardiovascular:      Rate and Rhythm: Normal rate and regular rhythm  Pulses: Normal pulses  Heart sounds: Normal heart sounds  Pulmonary:      Effort: Pulmonary effort is normal  No respiratory distress  Breath sounds: Rhonchi present  No wheezing or rales  Comments: Cough  Abdominal:      General: Bowel sounds are normal  There is no distension  Palpations: Abdomen is soft  Tenderness: There is no abdominal tenderness  Genitourinary:     Comments: + suprapubic catheter in place  Musculoskeletal:      Cervical back: Neck supple  Comments: Moderate-severe spasticity in shoulder abductors, shoulder flexors bilaterally R>L  Moderate-severe spasticity in bilateral hip flexors, knee flexors, dorsiflexors L > R   Skin:     General: Skin is warm  Neurological:      Mental Status: She is alert and oriented to person, place, and time  Motor: Weakness present        Comments: Motor exam:  Right upper extremity: 1/5 shoulder abduction, 2-/5 shoulder flexion, 3/5 elbow extension, elbow flexion, wrist extension,   Left upper extremity:  2-/5 shoulder abduction, shoulder flexion, 3+/5 elbow extension, elbow flexion, wrist extension,   Bilateral lower extremities:  1/5 hip flexion, 1+/5 knee flexion, 3-/5 knee extension, 2-/5 dorsiflexion, 3-/5 plantar flexion   Psychiatric:         Mood and Affect: Mood normal       Comments: Slightly anxious            Social History:    Social History     Socioeconomic History   • Marital status: /Civil Union     Spouse name: None   • Number of children: None   • Years of education: None   • Highest education level: None   Occupational History   • None   Tobacco Use   • Smoking status: Never Smoker   • Smokeless tobacco: Never Used   Vaping Use   • Vaping Use: Never used   Substance and Sexual Activity   • Alcohol use: Not Currently   • Drug use: Yes     Types: Marijuana     Comment: medical   • Sexual activity: None   Other Topics Concern   • None   Social History Narrative   • None     Social Determinants of Health     Financial Resource Strain: Not on file   Food Insecurity: No Food Insecurity   • Worried About Running Out of Food in the Last Year: Never true   • Ran Out of Food in the Last Year: Never true   Transportation Needs: No Transportation Needs   • Lack of Transportation (Medical): No   • Lack of Transportation (Non-Medical):  No   Physical Activity: Not on file   Stress: Not on file   Social Connections: Not on file   Intimate Partner Violence: Not on file   Housing Stability: Unknown   • Unable to Pay for Housing in the Last Year: No   • Number of Places Lived in the Last Year: Not on file   • Unstable Housing in the Last Year: No        Family History:    Family History   Problem Relation Age of Onset   • Cancer Mother    • Heart disease Father    • Cancer Sister          Medications:     Current Facility-Administered Medications:   •  acetaminophen (TYLENOL) tablet 650 mg, 650 mg, Oral, Q6H PRN, Karan Molina PA-C  •  albuterol inhalation solution 2 5 mg, 2 5 mg, Nebulization, Q6H PRN, Karan Molina PA-C  •  amLODIPine (NORVASC) tablet 2 5 mg, 2 5 mg, Oral, Q12H, Kai Larsen KAT Ash, 2 5 mg at 11/04/22 7484  •  baclofen tablet 10 mg, 10 mg, Oral, TID, Mirna Elizalde PA-C, 10 mg at 11/04/22 7799  •  benzonatate (TESSALON PERLES) capsule 200 mg, 200 mg, Oral, TID PRN, Mirna Elizalde PA-C  •  Dalfampridine ER TB12 10 mg, 10 mg, Oral, Q12H, Mirna Elizalde PA-C, 10 mg at 11/04/22 6855  •  enoxaparin (LOVENOX) subcutaneous injection 40 mg, 40 mg, Subcutaneous, Q12H, Mirna Elizalde PA-C, 40 mg at 11/04/22 0855  •  fluticasone (FLONASE) 50 mcg/act nasal spray 1 spray, 1 spray, Each Nare, Daily, Jen Funk PA-C, 1 spray at 11/04/22 0858  •  furosemide (LASIX) tablet 40 mg, 40 mg, Oral, Daily, Mirna Elizalde PA-C, 40 mg at 11/04/22 0372  •  guaiFENesin (MUCINEX) 12 hr tablet 600 mg, 600 mg, Oral, Q12H [facility], Mirna Elizalde PA-C, 600 mg at 11/04/22 6389  •  loratadine (CLARITIN) tablet 10 mg, 10 mg, Oral, Daily, Jen Funk PA-C, 10 mg at 11/04/22 0855  •  menthol-methyl salicylate (BENGAY) 86-57 % cream, , Apply externally, TID, Mirna Elizalde PA-C  •  methenamine hippurate (HIPREX) tablet 1 g, 1 g, Oral, BID, Mirna Elizalde PA-C, 1 g at 11/04/22 0859  •  ondansetron (ZOFRAN) injection 4 mg, 4 mg, Intravenous, Q6H PRN, Mirna Elizalde PA-C  •  tiZANidine (ZANAFLEX) tablet 4 mg, 4 mg, Oral, Q8H PRN, Mirna Elizalde PA-C  •  zolpidem (AMBIEN) tablet 10 mg, 10 mg, Oral, HS PRN, Mirna Elizalde PA-C    Past Medical History:     Past Medical History:   Diagnosis Date   • Back pain 2016   • Chronic UTI    • Colon polyp    • Hypertension    • Incontinence    • MS (multiple sclerosis) ([facility] [address] )     Age 37   • Neurogenic bladder    • Spasticity         Past Surgical History:     Past Surgical History:   Procedure Laterality Date   • BACK SURGERY  2016    Lumbar fusion   • COLONOSCOPY     • ELBOW SURGERY Right 2008    ORIF   • IR SUPRAPUBIC CATHETER PLACEMENT  4/26/2022   • CT IMPLANT PERIPH/GASTRIC NEUROSTIM/ N/A 11/19/2021    Procedure: leadwire placement and pulse generator  placement of SNM at S3 Bilateral S3  nerve testing, fluroscopy - c-arm guidance and complex programming;  Surgeon: Alexa Masters MD;  Location: [facility];  Service: Gynecology         Allergies:     No Known Allergies        LABORATORY RESULTS:      Lab Results   Component Value Date    HGB 10 8 (L) 11/02/2022    HCT 33 3 (L) 11/02/2022    WBC 6 49 11/02/2022     Lab Results   Component Value Date    BUN 13 11/02/2022    K 4 0 11/02/2022     11/02/2022    CREATININE 0 74 11/02/2022     Lab Results   Component Value Date    PROTIME 13 0 05/05/2022    INR 0 98 05/05/2022        DIAGNOSTIC STUDIES: Reviewed  XR chest 2 views    Result Date: 11/2/2022  Impression: Question small pleural effusions on the lateral projection  Lungs clear  No abnormalities about the left shoulder  Workstation performed: [id number]     XR shoulder 2+ views LEFT    Result Date: 11/2/2022  Impression: No acute osseous abnormality  Workstation performed: [id number]     CT head wo contrast    Result Date: 11/2/2022  Impression: No acute intracranial or orbital abnormality  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

## 2022-11-04 NOTE — CASE MANAGEMENT
Patient:   JANNA ZUNIGA            MRN: [id number]            FIN: [id number]              Age:   82 years     Sex:  MALE     :  37   Associated Diagnoses:   None   Author:   AISHWARYA SARGENT     CRS Progress Note  Overnight: POD#1 s/p decompressive colonoscopy, transverse loop colostomy, umbilical hernia repair. Found to have pseudomembranes in left colon.  Subjective: Denies any pain. Feels bloating is improved. Tolerating CLD without nausea/vomiting. No fever/chills. +Gas in colostomy.     Physical Examination   VS/Measurements     Vitals between:   2019 11:39:32   TO   2019 11:39:32                   LAST RESULT MINIMUM MAXIMUM  Temperature 36.7 36 36.8  Heart Rate 91 78 120  Respiratory Rate 17 16 21  NISBP           109 109 157  NIDBP           89 63 89  NIMBP           97 77 110  A Line Systolic 176 176 178  A Line Diastolic 75 75 75  A Line Mean 109 109 109  SpO2                    98 96 100    I & O between:  2019 11:39 TO 2019 11:39  Med Dosing Weight:  105  kg   2019  24 Hour Intake:   3420.00  ( 32.57 mL/kg )  24 Hour Output:   883.00           24 Hour Urine/Stool Output:   0.0  24 Hour Balance:   2537.00           24 Hour Urine Output:   883.00  ( 0.35 mL/kg/hr )    Exam:   General: Alert, lying comfortably, in no acute distress   CV: Irregular rhythm  Resp: Normal work of breathing   GI: Abdomen soft, moderately distended, nontender. Colostomy pink and viable with gas and small amount of stool contents.  Extrem: WWP   Neuro: Moving all extremities equally. No focal deficits.      Review / Management   Laboratory results:     Labs between:  2019 11:39 to 2019 11:39  CBC:                 WBC  HgB  Hct  Plt  MCV  RDW   2019 10.4  (L) 11.1  (L) 34.3  242  95.5  12.7   DIFF:                 Seg  Neutroph//ABS  Lymph//ABS  Mono//ABS  EOS/ABS  2019 NOT APPLICABLE  80 // (H) 8.3  13 // 1.4 5 // 0.5 0 // (L) 0.0  BMP:                  Case Management Progress Note    Patient name Jonathan Navarro  Location S /S -92 MRN [id number]  : 1955 Date 2022       LOS (days): 2  Geometric Mean LOS (GMLOS) (days): 2 80  Days to GMLOS:0 6        OBJECTIVE:        Current admission status: Inpatient  [facility]erred Pharmacy[facility]   [id number] [place] [facility] [address] [facility]  [address] [facility] [address]  Phone: [phone] Fax: [address] [facility] [place] [address] [facility] [address] [address] [facility] [address] [address]  Phone: [phone] Fax: [phone]    PATIENT/FAMILY REPORTS NO PREFERRED PHARMACY  No address on file      [facility] ([facility]) - [address] [place],[facility]  [address]  Phone: [phone] Fax: [phone]    Primary Care Provider: Fermín Weinstein MD    Primary Insurance: MEDICARE  Secondary Insurance: BLUE CROSS    PROGRESS NOTE:    CM director called and spoke with patient's spouse, El Pinzon via phone to review concerns with post-acute options for rehab  Explained the current barriers with bed availability at the post-acutes  Discussed all possible options for post-acute rehab and El Pinzon agreed to referral to [facility] if they can accommodate patient currently as patient is weak  Patient was denied for [facility] and was Christopher's first choice for acute rehab   I Na  Cl  BUN  Glu   21-JUL-2019 138  (H) 110  14  (H) 159                              K  CO2  Cr  Ca                              4.2  22  0.89  (L) 7.8   Other Chem:             Mg  Phos  Triglycerides  GGTP  DirectBili                           2.1  2.8         POC GLU:                 Latest Result  Latest Date  Minimum  Min Date  Maximum  Max Date                             (H) 246  20-JUL-2019 (H) 246  20-JUL-2019 (H) 152  20-JUL-2019                 .    C diff testing 7/17/19: C diff toxin PCR positive, toxin EIA negative.  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 CT showing inflammatory changes at the splenic flexure, with concern for atonic bowel 2/2 ischemic change. Now POD#1 s/p decompressive colonoscopy, transverse loop colostomy, umbilical hernia repair. Evidence of active C. diff  colitis in the OR, with pseudomembranes in the left colon.  N: Pain and nausea control prn  CV: Cont home carvedilol. Hold Xarelto until POD#7 (7/26)  Resp: IS, encourage OOB  GI: Advance to FLD. LR 80cc/h.  : Home finasteride, tamsulosin. d/c Perez. Void check in 6h.  ID: ID consulted for active C. diff colitis, appreciate recs. Vancomycin PO and through distal loop of colostomy.  Endo: ISS  PPX: SCD. Lovenox.  Will discuss with attending.  Susan Messina MD       CRS PGY-1   Ext [phone]

## 2022-11-04 NOTE — CASE MANAGEMENT
Case Management Discharge Planning Note    Patient name Gwendolyn Isabel  Location S /S -43 MRN [id number]  : 1955 Date 2022       Current Admission Date: 2022  Current Admission Diagnosis:RSV (acute bronchiolitis due to respiratory syncytial virus)   Patient Active Problem List    Diagnosis Date Noted   • Abnormal urinalysis 2022   • RSV (acute bronchiolitis due to respiratory syncytial virus) 2022   • Left shoulder pain 2022   • Anemia 2022   • Debility 2021   • Acute bilateral thoracic back pain 2021   • Hypernatremia 2021   • Multiple sclerosis ([address] ) 2021   • Ambulatory dysfunction 2021   • Hypertension 2021   • Neurogenic bladder 2021   • Leg swelling 2021   • Acquired polyneuropathy       LOS (days): 2  Geometric Mean LOS (GMLOS) (days): 2 80  Days to GMLOS:0 6     OBJECTIVE:  Risk of Unplanned Readmission Score: 12 84         Current admission status: Inpatient   Preferred Pharmacy:   [id number] [address]  Phone: [phone] Fax: [address]  Phone: [phone] Fax: [phone]    PATIENT/FAMILY REPORTS NO PREFERRED PHARMACY  No address on file      [facility] ([facility]) - [address]  [address]  Phone: [phone] Fax: [phone]    Primary Care Provider: Maryanne Johnson MD    Primary Insurance: MEDICARE  Secondary Insurance: BLUE CROSS    DISCHARGE DETAILS:    Discharge planning discussed with[de-identified] Patient and spouse at bedside  Freedom of Choice: Yes  Comments - Freedom of Choice: CM provided pt with Provider list of accepting facilities and discussed at bedside    Only accepting facilities at this time are [facility], [facility], [address], and [facility]   refusing all facilities at this time, does not feel CM is being truthful about beds available and demanding to speak with CM Director   also requested nursing come and empty his wife's nixon bag-RN Cyndy Mullins informed  Spouse questioning pt's medical stability, as he stated that PMR informed him she would not be ready until Monday  CM informed spouse that CM will request PA-C Rolene Confer come to address pt's medical stability   stating that he will not send pt to any of the facilities available  CM attempted to inform spouse of other options including DC home w/ resumption of [facility], medicare appeal, or extending referral base, however  did not express interest, would prefer to speak with CM management, again expressing he does not feel CM is being truthful and proceeded to throw the Patient Choice List towards CM on the couch  CM informed CM Director-Dixie regarding the above  PA-C Jordan aware and proceeded to meet with the patient and spouse, confirmed they are understanding of medically stability  CM contacted family/caregiver?: Yes  Were Treatment Team discharge recommendations reviewed with patient/caregiver?: Yes  Did patient/caregiver verbalize understanding of patient care needs?: N/A- going to facility  Were patient/caregiver advised of the risks associated with not following Treatment Team discharge recommendations?: Yes    Contacts  Patient Contacts: Patient and spouse-Christopher  Relationship to Patient[de-identified] Family  Contact Method:  In Person  Reason/Outcome: Continuity of Care, Discharge Planning    Other Referral/Resources/Interventions Provided:  Interventions: Short Term Rehab    Treatment Team Recommendation: Acute Rehab  Discharge Destination Plan[de-identified] Short Term Rehab

## 2022-11-04 NOTE — ASSESSMENT & PLAN NOTE
· Typically ambulates with a walker at baseline  · Known neurogenic bladder with suprapubic catheter in place  · CT the head on admission negative  · Continue Dalfampridine - family needs to bring from home  · Follow up with outpatient neurology

## 2022-11-04 NOTE — CASE MANAGEMENT
Case Management Progress Note    Patient name Valdemar Garner  Location S /S -24 MRN [id number]  : 1955 Date 2022       LOS (days): 2  Geometric Mean LOS (GMLOS) (days): 2 80  Days to GMLOS:0 8        OBJECTIVE:        Current admission status: Inpatient  Preferred Pharmacy:   [id number] [place] [place] - [address] [address]  Phone: [phone] Fax: [address]  Phone: [phone] Fax: [phone]    PATIENT/FAMILY REPORTS NO PREFERRED PHARMACY  No address on file      [facility] ([facility]) - [address] [place]  Phone: [phone] Fax: [phone]    Primary Care Provider: Frandy Winston MD    Primary Insurance: MEDICARE  Secondary Insurance: BLUE CROSS    PROGRESS NOTE:    [facility] as pt is medically stable to transfer to IP rehab, [facility] is currently reviewing and will reach out with determination  [facility] does not have an isolation bed available at this time      CM TC [facility] Admissions-Josey (p: [phone]) and left VM regarding determination status

## 2022-11-04 NOTE — MALNUTRITION/BMI
This medical record reflects one or more clinical indicators suggestive of morbid obesity  360 Statement: related to energy intake exceeding enregy expenditure over time as evidenced by BMI > 40  Int: Current need for adequate nutrition  BMI Findings:  Adult BMI Classifications: Morbid Obesity 40-44 9        Body mass index is 41 91 kg/m²  See Nutrition note dated 11/4/2022 for additional details  Completed nutrition assessment is viewable in the nutrition documentation

## 2022-11-04 NOTE — DISCHARGE SUMMARY
[facility]  Discharge- Rima Mora 1955, 78 y o  female MRN: [id number]  Unit/Bed#: S -01 Encounter: [id number]  Primary Care Provider: Nba Winters MD   Date and time admitted to hospital: 11/2/2022  6:47 AM    * RSV (acute bronchiolitis due to respiratory syncytial virus)  Assessment & Plan  · Likely from sister who was also noted to be positive for RSV  · Patient with cough and congestion  · Supportive care with Mucinex, Tessalon, albuterol PRN  · Add Flonase and Claritin  · Chest x-ray without infiltrate  · Remains on room air    Ambulatory dysfunction  Assessment & Plan  · With profound weakness presently secondary to RSV infection  · PT/OT evaluated and recommended for rehab  · Consulted physical medicine and rehab team  · ARC candidate, they are presently reviewing    Multiple sclerosis ([facility] )  Assessment & Plan  · Typically ambulates with a walker at baseline  · Known neurogenic bladder with suprapubic catheter in place  · CT the head on admission negative  · Continue Dalfampridine - family needs to bring from home  · Follow up with outpatient neurology    Abnormal urinalysis  Assessment & Plan  · Patient has an abnormal urinalysis however this has a chronic suprapubic catheter and likely is chronically colonized  · At this time hold on antibiotics  · Urine culture was obtained  Will follow-up on the results of this however this time patient's symptoms appear more consistent with generalized weakness related to RSV as opposed to urinary tract infection  · Currently afebrile    Left shoulder pain  Assessment & Plan  · Acute musculoskeletal left shoulder pain after  tried to pick the patient up off floor  · No traumatic injuries identified on x-ray  · Bengay ordered  Anemia  Assessment & Plan  · Hemoglobin at baseline  · No evidence of bleeding    Hypertension  Assessment & Plan  · Blood pressure stable  · Continue Lasix and amlodipine    · Monitor blood pressure    Medical Problems             Resolved Problems  Date Reviewed: 11/3/2022   None               Discharging Physician / Practitioner: Art Ayala  PCP: Scotty Brizuela MD  Admission Date:   Admission Orders (From admission, onward)     Ordered        11/02/22 0924  INPATIENT ADMISSION  Once                      Discharge Date: 11/04/22    Consultations During Hospital Stay:  · None    Procedures Performed:   · none    Significant Findings / Test Results:   · RSV positive  · UA with large leukocytes, innumerable white blood cells moderate bacteria  Likely colonization, urine culture pending  Incidental Findings:   · none     Test Results Pending at Discharge (will require follow up):   · UC     Outpatient Tests Requested:  · none    Complications:  none    Reason for Admission: weakness, RSV    Hospital Course:   Rima Ocasio is a 78 y o  female patient who originally presented to the hospital on 11/2/2022 due to viral syndrome with generalized weakness  Patient has a history of MS with suprapubic catheter in place, had a sick contact in her sister few days prior to admission and tested positive for RSV  Presented with generalized weakness and inability to ambulate  Urine culture was sent off due to abnormal urinalysis however she had no complaints of UTI like symptoms  No fevers throughout her stay and she has been on room air  From an RSV standpoint she was given supportive care with stabilization of her symptoms and some improvement  PT and OT recommending rehab, to good beltran today  Please see above list of diagnoses and related plan for additional information  Condition at Discharge: stable    Discharge Day Visit / Exam:   Subjective:  Patient feels a little bit stronger today  No shortness of breath and is on room air    Vitals: Blood Pressure: (!) 119/49 (11/04/22 0829)  Pulse: 85 (11/04/22 0829)  Temperature: 99 °F (37 2 °C) (11/04/22 0829)  Temp Source: Oral (11/04/22 2523)  Respirations: 18 (11/04/22 0829)  Height: 4' 7" (139 7 cm) (11/02/22 0650)  Weight - Scale: 81 8 kg (180 lb 5 4 oz) (11/02/22 0650)  SpO2: 90 % (11/04/22 0829)  Exam:   Physical Exam  Vitals and nursing note reviewed  Constitutional:       General: She is not in acute distress  Appearance: Normal appearance  HENT:      Head: Normocephalic  Mouth/Throat:      Mouth: Mucous membranes are moist    Eyes:      General: No scleral icterus  Pupils: Pupils are equal, round, and reactive to light  Cardiovascular:      Rate and Rhythm: Normal rate and regular rhythm  Heart sounds: No murmur heard  Pulmonary:      Effort: Pulmonary effort is normal  No respiratory distress  Breath sounds: Normal breath sounds  No wheezing, rhonchi or rales  Abdominal:      General: Bowel sounds are normal  There is no distension  Palpations: Abdomen is soft  Tenderness: There is no abdominal tenderness  Musculoskeletal:         General: No swelling  Right lower leg: No edema  Left lower leg: No edema  Skin:     Capillary Refill: Capillary refill takes less than 2 seconds  Neurological:      General: No focal deficit present  Mental Status: She is alert and oriented to person, place, and time  Mental status is at baseline  Motor: Weakness present  Discussion with Family: Attempted to update  () via phone  Left voicemail  Discharge instructions/Information to patient and family:   See after visit summary for information provided to patient and family  Provisions for Follow-Up Care:  See after visit summary for information related to follow-up care and any pertinent home health orders  Disposition:   Acute Rehab at [facility]    Planned Readmission: no     Discharge Statement:  I spent 45 minutes discharging the patient  This time was spent on the day of discharge   I had direct contact with the patient on the day of discharge  Greater than 50% of the total time was spent examining patient, answering all patient questions, arranging and discussing plan of care with patient as well as directly providing post-discharge instructions  Additional time then spent on discharge activities  Discharge Medications:  See after visit summary for reconciled discharge medications provided to patient and/or family        **Please Note: This note may have been constructed using a voice recognition system**

## 2022-11-04 NOTE — CASE MANAGEMENT
Case Management Progress Note    Patient name Hiram Mora  Location S /S -79 MRN [id number]  : 1955 Date 2022       LOS (days): 2  Geometric Mean LOS (GMLOS) (days): 2 80  Days to GMLOS:0 6        OBJECTIVE:        Current admission status: Inpatient  Preferred Pharmacy[facility]   [id number] [place] [place] - [address] [facility]  [address] [facility] [address]  Phone: [phone] Fax: [address] [facility] [place] [address] [facility]  Phone: [phone] Fax: [phone]    PATIENT/FAMILY REPORTS NO PREFERRED PHARMACY  No address on file      [facility] ([facility]) - [address]  Phone: [phone] Fax: [phone]    Primary Care Provider: Kevin Chavira MD    Primary Insurance: MEDICARE  Secondary Insurance: BLUE CROSS    PROGRESS NOTE:    CM received return call from [address] [place] [address] confirming plan to place referral to [facility], f/u with [facility] inquiring of time of isolation if needed at all, and to confirm with [facility] that if a bed opens up on Monday, if pt would be able to transfer  CM placed referral to [facility] and informed [facility] whom states that she will take a look shortly and inform CM of determination  CM spoke with [facility] whom states that with RSV, it does not matter how long pt has been previously isolation, their facility would isolate patient for the entire rehab stay  Mel Freeman informed CM that there are no semi private, private, or isolation rooms available at this time  CM left VM for Izzy ([facility] liaison) leaving a detailed VM regarding the above

## 2022-11-04 NOTE — PLAN OF CARE
Problem: Potential for Falls  Goal: Patient will remain free of falls  Description: INTERVENTIONS:  - Educate patient/family on patient safety including physical limitations  - Instruct patient to call for assistance with activity   - Consult OT/PT to assist with strengthening/mobility   - Keep Call bell within reach  - Keep bed low and locked with side rails adjusted as appropriate  - Keep care items and personal belongings within reach  - Initiate and maintain comfort rounds  - Make Fall Risk Sign visible to staff  - Offer Toileting every 4 Hours, in advance of need  - Initiate/Maintain Bed alarm  - Obtain necessary fall risk management equipment: Alarms  - Apply yellow socks and bracelet for high fall risk patients  - Consider moving patient to room near nurses station  Outcome: Completed     Problem: Prexisting or High Potential for Compromised Skin Integrity  Goal: Skin integrity is maintained or improved  Description: INTERVENTIONS:  - Identify patients at risk for skin breakdown  - Assess and monitor skin integrity  - Assess and monitor nutrition and hydration status  - Monitor labs   - Assess for incontinence   - Turn and reposition patient  - Assist with mobility/ambulation  - Relieve pressure over bony prominences  - Avoid friction and shearing  - Provide appropriate hygiene as needed including keeping skin clean and dry  - Evaluate need for skin moisturizer/barrier cream  - Collaborate with interdisciplinary team   - Patient/family teaching  - Consider wound care consult   Outcome: Completed     Problem: MOBILITY - ADULT  Goal: Maintain or return to baseline ADL function  Description: INTERVENTIONS:  -  Assess patient's ability to carry out ADLs; assess patient's baseline for ADL function and identify physical deficits which impact ability to perform ADLs (bathing, care of mouth/teeth, toileting, grooming, dressing, etc )  - Assess/evaluate cause of self-care deficits   - Assess range of motion  - Assess patient's mobility; develop plan if impaired  - Assess patient's need for assistive devices and provide as appropriate  - Encourage maximum independence but intervene and supervise when necessary  - Involve family in performance of ADLs  - Assess for home care needs following discharge   - Consider OT consult to assist with ADL evaluation and planning for discharge  - Provide patient education as appropriate  Outcome: Completed  Goal: Maintains/Returns to pre admission functional level  Description: INTERVENTIONS:  - Perform BMAT or MOVE assessment daily    - Set and communicate daily mobility goal to care team and patient/family/caregiver  - Collaborate with rehabilitation services on mobility goals if consulted  - Perform Range of Motion 4 times a day  - Reposition patient every 4 hours    - Dangle patient 4 times a day  - Stand patient 4 times a day  - Ambulate patient 4 times a day  - Out of bed to chair 4 times a day   - Out of bed for meals 3 times a day  - Out of bed for toileting  - Record patient progress and toleration of activity level   Outcome: Completed     Problem: PAIN - ADULT  Goal: Verbalizes/displays adequate comfort level or baseline comfort level  Description: Interventions:  - Encourage patient to monitor pain and request assistance  - Assess pain using appropriate pain scale  - Administer analgesics based on type and severity of pain and evaluate response  - Implement non-pharmacological measures as appropriate and evaluate response  - Consider cultural and social influences on pain and pain management  - Notify physician/advanced practitioner if interventions unsuccessful or patient reports new pain  Outcome: Completed     Problem: INFECTION - ADULT  Goal: Absence or prevention of progression during hospitalization  Description: INTERVENTIONS:  - Assess and monitor for signs and symptoms of infection  - Monitor lab/diagnostic results  - Monitor all insertion sites, i e  indwelling lines, tubes, and drains  - Monitor endotracheal if appropriate and nasal secretions for changes in amount and color  - [place] appropriate cooling/warming therapies per order  - Administer medications as ordered  - Instruct and encourage patient and family to use good hand hygiene technique  - Identify and instruct in appropriate isolation precautions for identified infection/condition  Outcome: Completed     Problem: DISCHARGE PLANNING  Goal: Discharge to home or other facility with appropriate resources  Description: INTERVENTIONS:  - Identify barriers to discharge w/patient and caregiver  - Arrange for needed discharge resources and transportation as appropriate  - Identify discharge learning needs (meds, wound care, etc )  - Arrange for interpretive services to assist at discharge as needed  - Refer to Case Management Department for coordinating discharge planning if the patient needs post-hospital services based on physician/advanced practitioner order or complex needs related to functional status, cognitive ability, or social support system  Outcome: Completed     Problem: Knowledge Deficit  Goal: Patient/family/caregiver demonstrates understanding of disease process, treatment plan, medications, and discharge instructions  Description: Complete learning assessment and assess knowledge base  Interventions:  - Provide teaching at level of understanding  - Provide teaching via preferred learning methods  Outcome: Completed     Problem: Nutrition/Hydration-ADULT  Goal: Nutrient/Hydration intake appropriate for improving, restoring or maintaining nutritional needs  Description: Monitor and assess patient's nutrition/hydration status for malnutrition  Collaborate with interdisciplinary team and initiate plan and interventions as ordered  Monitor patient's weight and dietary intake as ordered or per policy  Utilize nutrition screening tool and intervene as necessary   Determine patient's food preferences and provide high-protein, high-caloric foods as appropriate       INTERVENTIONS:  - Monitor oral intake, urinary output, labs, and treatment plans  - Assess nutrition and hydration status and recommend course of action  - Evaluate amount of meals eaten  - Assist patient with eating if necessary   - Allow adequate time for meals  - Recommend/ encourage appropriate diets, oral nutritional supplements, and vitamin/mineral supplements  - Order, calculate, and assess calorie counts as needed  - Assess need for intravenous fluids  - Provide nutrition/hydration education as appropriate  - Include patient/family/caregiver in decisions related to nutrition  Outcome: Completed

## 2022-11-04 NOTE — CASE MANAGEMENT
Case Management Discharge Planning Note    Patient name Valdemar Aaron S /S -35 MRN [id number]  : 1955 Date 2022       Current Admission Date: 2022  Current Admission Diagnosis:RSV (acute bronchiolitis due to respiratory syncytial virus)   Patient Active Problem List    Diagnosis Date Noted   • Abnormal urinalysis 2022   • RSV (acute bronchiolitis due to respiratory syncytial virus) 2022   • Left shoulder pain 2022   • Anemia 2022   • Debility 2021   • Acute bilateral thoracic back pain 2021   • Hypernatremia 2021   • Multiple sclerosis ([address] ) 2021   • Ambulatory dysfunction 2021   • Hypertension 2021   • Neurogenic bladder 2021   • Leg swelling 2021   • Acquired polyneuropathy       LOS (days): 2  Geometric Mean LOS (GMLOS) (days): 2 80  Days to GMLOS:0 5     OBJECTIVE:  Risk of Unplanned Readmission Score: 12 84         Current admission status: Inpatient   Preferred Pharmacy:   [id number] [address] [address]  Phone: [phone] Fax: [address]  Phone: [phone] Fax: [phone]    PATIENT/FAMILY REPORTS NO PREFERRED PHARMACY  No address on file      [facility] ([facility]) - [address],[facility]  [address]  Phone: [phone] Fax: [phone]    Primary Care Provider: Frandy Winston MD    Primary Insurance: MEDICARE  Secondary Insurance: BLUE CROSS    DISCHARGE DETAILS:      [facility]  Dispatcher Contacted: Yes  Number/Name of Dispatcher: [employer]  Transported by [employer] and Unit #): [facility]  ETA of Transport (Date): 22  ETA of Transport (Time): 1630     IMM Given (Date):: 22     Additional Comments: CM confirmed transport for 430pm to room 302 at [facility]    Cm informed RNKen    Accepting Facility Name, [facility] : [address]  Receiving Facility/Agency Phone Number: [phone]  Facility/Agency Fax Number: [phone]

## 2022-11-04 NOTE — ASSESSMENT & PLAN NOTE
· Acute musculoskeletal left shoulder pain after  tried to pick the patient up off floor  · No traumatic injuries identified on x-ray  · Bengay ordered

## 2022-11-04 NOTE — PLAN OF CARE
Problem: Potential for Falls  Goal: Patient will remain free of falls  Description: INTERVENTIONS:  - Educate patient/family on patient safety including physical limitations  - Instruct patient to call for assistance with activity   - Consult OT/PT to assist with strengthening/mobility   - Keep Call bell within reach  - Keep bed low and locked with side rails adjusted as appropriate  - Keep care items and personal belongings within reach  - Initiate and maintain comfort rounds  - Make Fall Risk Sign visible to staff  - Offer Toileting every 4 Hours, in advance of need  - Initiate/Maintain Bed alarm  - Obtain necessary fall risk management equipment: Alarms  - Apply yellow socks and bracelet for high fall risk patients  - Consider moving patient to room near nurses station  Outcome: Progressing     Problem: Prexisting or High Potential for Compromised Skin Integrity  Goal: Skin integrity is maintained or improved  Description: INTERVENTIONS:  - Identify patients at risk for skin breakdown  - Assess and monitor skin integrity  - Assess and monitor nutrition and hydration status  - Monitor labs   - Assess for incontinence   - Turn and reposition patient  - Assist with mobility/ambulation  - Relieve pressure over bony prominences  - Avoid friction and shearing  - Provide appropriate hygiene as needed including keeping skin clean and dry  - Evaluate need for skin moisturizer/barrier cream  - Collaborate with interdisciplinary team   - Patient/family teaching  - Consider wound care consult   Outcome: Progressing     Problem: MOBILITY - ADULT  Goal: Maintain or return to baseline ADL function  Description: INTERVENTIONS:  -  Assess patient's ability to carry out ADLs; assess patient's baseline for ADL function and identify physical deficits which impact ability to perform ADLs (bathing, care of mouth/teeth, toileting, grooming, dressing, etc )  - Assess/evaluate cause of self-care deficits   - Assess range of motion  - Assess patient's mobility; develop plan if impaired  - Assess patient's need for assistive devices and provide as appropriate  - Encourage maximum independence but intervene and supervise when necessary  - Involve family in performance of ADLs  - Assess for home care needs following discharge   - Consider OT consult to assist with ADL evaluation and planning for discharge  - Provide patient education as appropriate  Outcome: Progressing  Goal: Maintains/Returns to pre admission functional level  Description: INTERVENTIONS:  - Perform BMAT or MOVE assessment daily    - Set and communicate daily mobility goal to care team and patient/family/caregiver  - Collaborate with rehabilitation services on mobility goals if consulted  - Perform Range of Motion 4 times a day  - Reposition patient every 4 hours    - Dangle patient 4 times a day  - Stand patient 4 times a day  - Ambulate patient 4 times a day  - Out of bed to chair 4 times a day   - Out of bed for meals 3 times a day  - Out of bed for toileting  - Record patient progress and toleration of activity level   Outcome: Progressing     Problem: PAIN - ADULT  Goal: Verbalizes/displays adequate comfort level or baseline comfort level  Description: Interventions:  - Encourage patient to monitor pain and request assistance  - Assess pain using appropriate pain scale  - Administer analgesics based on type and severity of pain and evaluate response  - Implement non-pharmacological measures as appropriate and evaluate response  - Consider cultural and social influences on pain and pain management  - Notify physician/advanced practitioner if interventions unsuccessful or patient reports new pain  Outcome: Progressing     Problem: INFECTION - ADULT  Goal: Absence or prevention of progression during hospitalization  Description: INTERVENTIONS:  - Assess and monitor for signs and symptoms of infection  - Monitor lab/diagnostic results  - Monitor all insertion sites, i e  indwelling lines, tubes, and drains  - Monitor endotracheal if appropriate and nasal secretions for changes in amount and color  - [place] appropriate cooling/warming therapies per order  - Administer medications as ordered  - Instruct and encourage patient and family to use good hand hygiene technique  - Identify and instruct in appropriate isolation precautions for identified infection/condition  Outcome: Progressing     Problem: DISCHARGE PLANNING  Goal: Discharge to home or other facility with appropriate resources  Description: INTERVENTION  - Identify barriers to discharge w/patient and caregiver  - Arrange for needed discharge resources and transportation as appropriate  - Identify discharge learning needs (meds, wound care, etc )  - Arrange for interpretive services to assist at discharge as needed  - Refer to Case Management Department for coordinating discharge planning if the patient needs post-hospital services based on physician/advanced practitioner order or complex needs related to functional status, cognitive ability, or social support system  Outcome: Progressing     Problem: Knowledge Deficit  Goal: Patient/family/caregiver demonstrates understanding of disease process, treatment plan, medications, and discharge instructions  Description: Complete learning assessment and assess knowledge base    Interventions:  - Provide teaching at level of understanding  - Provide teaching via preferred learning methods  Outcome: Progressing

## 2022-11-04 NOTE — ASSESSMENT & PLAN NOTE
· With profound weakness presently secondary to RSV infection    · PT/OT evaluated and recommended for rehab  · Consulted physical medicine and rehab team  · [facility] candidate, they are presently reviewing

## 2022-11-05 LAB — BACTERIA UR CULT: ABNORMAL

## 2022-11-07 LAB
ATRIAL RATE: 93 BPM
P AXIS: 64 DEGREES
PR INTERVAL: 206 MS
QRS AXIS: 12 DEGREES
QRSD INTERVAL: 66 MS
QT INTERVAL: 344 MS
QTC INTERVAL: 427 MS
T WAVE AXIS: 43 DEGREES
VENTRICULAR RATE: 93 BPM

## 2022-11-07 NOTE — ED PROVIDER NOTES
History  Chief Complaint   Patient presents with   • Fall     Pt slid oob this am  Has been weak with a cough since her sister visited who had a cold   was unable to get her up (hx MS), called neighbor for assistance  When they got her up her shoulder got yanked and now she is complaining of L shoulder pain  Pt took Covid test yesterday it was negative  78 yr femle with hx of ms--ab le to walk with asssiatne -- started late last  week with nasal congestion dry cough - with decreasing ability to walk -- today pt unable to get ouf of bed- no other comps       History provided by:  Patient and spouse   used: No    Fall  Associated symptoms: no headaches, no hearing loss and no seizures        Prior to Admission Medications   Prescriptions Last Dose Informant Patient Reported? Taking?    Dalfampridine ER 10 MG TB12  Self Yes No   Sig: Take 10 mg by mouth every 12 (twelve) hours   Furosemide (LASIX PO)  Self Yes No   Sig: Resume use as you were prior to hospitalization as instructed by Dr Silvia Alvarado, CHOLECALCIFEROL, PO  Self Yes No   Sig: Take 2,000 Units by mouth in the morning   amLODIPine (NORVASC) 2 5 mg tablet  Self Yes No   Sig: Take 2 5 mg by mouth every 12 (twelve) hours   baclofen 10 mg tablet  Self Yes No   Sig: Take 10 mg by mouth 3 (three) times a day   furosemide (LASIX) 40 mg tablet   Yes No   methenamine hippurate (HIPREX) 1 g tablet  Self No No   Sig: TAKE 1 TABLET BY MOUTH  TWICE DAILY   nabumetone (RELAFEN) 500 mg tablet   Yes No   tiZANidine (ZANAFLEX) 4 mg tablet   Yes No   zolpidem (AMBIEN) 10 mg tablet   Yes No   Sig: Take 10 mg by mouth daily at bedtime as needed      Facility-Administered Medications: None       Past Medical History:   Diagnosis Date   • Back pain 2016   • Chronic UTI    • Colon polyp    • Hypertension    • Incontinence    • MS (multiple sclerosis) ([facility])     Age 37   • Neurogenic bladder    • Spasticity        Past Surgical History: Procedure Laterality Date   • BACK SURGERY  2016    Lumbar fusion   • COLONOSCOPY     • ELBOW SURGERY Right 2008    ORIF   • IR SUPRAPUBIC CATHETER PLACEMENT  4/26/2022   • TN IMPLANT PERIPH/GASTRIC NEUROSTIM/ N/A 11/19/2021    Procedure: leadwire placement and pulse generator  placement of SNM at S3 Bilateral S3  nerve testing, fluroscopy - c-arm guidance and complex programming;  Surgeon: Dorie Ferreira MD;  Location: [facility];  Service: Gynecology       Family History   Problem Relation Age of Onset   • Cancer Mother    • Heart disease Father    • Cancer Sister      I have reviewed and agree with the history as documented  E-Cigarette/Vaping   • E-Cigarette Use Never User      E-Cigarette/Vaping Substances     Social History     Tobacco Use   • Smoking status: Never Smoker   • Smokeless tobacco: Never Used   Vaping Use   • Vaping Use: Never used   Substance Use Topics   • Alcohol use: Not Currently   • Drug use: Yes     Types: Marijuana     Comment: medical       Review of Systems   Constitutional: Positive for activity change, appetite change and fatigue  Negative for chills, diaphoresis, fever and unexpected weight change  HENT: Positive for congestion  Negative for dental problem, drooling, ear discharge, ear pain, facial swelling, hearing loss, mouth sores, nosebleeds, postnasal drip, rhinorrhea, sinus pressure, sinus pain, sneezing, sore throat, tinnitus, trouble swallowing and voice change  Eyes: Negative  Respiratory: Positive for cough  Negative for apnea, choking, chest tightness, shortness of breath, wheezing and stridor  Cardiovascular: Negative  Gastrointestinal: Negative  Endocrine: Negative  Genitourinary: Negative  Musculoskeletal: Negative  Skin: Negative  Allergic/Immunologic: Negative  Neurological: Positive for weakness   Negative for dizziness, tremors, seizures, syncope, facial asymmetry, speech difficulty, light-headedness, numbness and headaches  Hematological: Negative  Psychiatric/Behavioral: Negative  Physical Exam  Physical Exam  Vitals and nursing note reviewed  Constitutional:       General: She is not in acute distress  Appearance: Normal appearance  She is not ill-appearing, toxic-appearing or diaphoretic  Comments: avss-- pulse ox 94 % on ra- interpretation is low- normal- no intervention    HENT:      Head: Normocephalic and atraumatic  Right Ear: Tympanic membrane, ear canal and external ear normal  There is no impacted cerumen  Left Ear: Tympanic membrane, ear canal and external ear normal  There is no impacted cerumen  Nose: Congestion present  No rhinorrhea  Mouth/Throat:      Mouth: Mucous membranes are dry  Pharynx: No oropharyngeal exudate or posterior oropharyngeal erythema  Eyes:      General: No scleral icterus  Right eye: No discharge  Left eye: No discharge  Extraocular Movements: Extraocular movements intact  Conjunctiva/sclera: Conjunctivae normal       Pupils: Pupils are equal, round, and reactive to light  Comments: Mm pink   Neck:      Vascular: No carotid bruit  Cardiovascular:      Rate and Rhythm: Normal rate and regular rhythm  Pulses: Normal pulses  Heart sounds: Normal heart sounds  No murmur heard  No friction rub  No gallop  Pulmonary:      Effort: Pulmonary effort is normal  No respiratory distress  Breath sounds: Normal breath sounds  No stridor  No wheezing, rhonchi or rales  Chest:      Chest wall: No tenderness  Abdominal:      General: Bowel sounds are normal  There is no distension  Palpations: Abdomen is soft  There is no mass  Tenderness: There is no abdominal tenderness  There is no right CVA tenderness, left CVA tenderness, guarding or rebound  Hernia: No hernia is present        Comments: Soft nt/nd- no hsm- no cva tenderness- no peritoneal signs    Musculoskeletal:         General: No swelling, tenderness, deformity or signs of injury  Normal range of motion  Cervical back: Normal range of motion and neck supple  No rigidity or tenderness  Right lower leg: Edema present  Left lower leg: Edema present  Comments: Trace ble pretibal edema- nt- no asym/ erythema- equal bilateral radial/dp pulses   Lymphadenopathy:      Cervical: No cervical adenopathy  Skin:     General: Skin is warm  Capillary Refill: Capillary refill takes less than 2 seconds  Coloration: Skin is pale  Skin is not jaundiced  Findings: No bruising, erythema, lesion or rash  Neurological:      General: No focal deficit present  Mental Status: She is alert and oriented to person, place, and time  Cranial Nerves: No cranial nerve deficit  Sensory: No sensory deficit  Comments: 4/5 bue/ble weakness- non focal neuro exam    Psychiatric:         Mood and Affect: Mood normal          Behavior: Behavior normal          Thought Content:  Thought content normal          Judgment: Judgment normal          Vital Signs  ED Triage Vitals [11/02/22 0650]   Temperature Pulse Respirations Blood Pressure SpO2   98 °F (36 7 °C) 83 18 130/60 94 %      Temp Source Heart Rate Source Patient Position - Orthostatic VS BP Location FiO2 (%)   Oral Monitor Lying Right arm --      Pain Score       7           Vitals:    11/03/22 1512 11/03/22 2044 11/03/22 2045 11/04/22 0829   BP: 135/53 136/78 136/78 (!) 119/49   Pulse: 84 87  85   Patient Position - Orthostatic VS:    Lying         Visual Acuity      ED Medications  Medications   sodium chloride 0 9 % infusion (0 mL/hr Intravenous Stopped 11/3/22 1129)   lactated ringers bolus 500 mL (0 mL Intravenous Stopped 11/2/22 0836)   lidocaine (LIDODERM) 5 % patch 1 patch (1 patch Topical Patch Removed 11/3/22 0034)       Diagnostic Studies  Results Reviewed     Procedure Component Value Units Date/Time    Urine culture [[id number]]  (Abnormal) (Susceptibility) Collected: 11/02/22 1007    Lab Status: Final result Specimen: Urine, Clean Catch Updated: 11/05/22 1230     Urine Culture >100,000 cfu/ml Candida parapsilosis    Susceptibility     Candida parapsilosis (1)     Antibiotic Interpretation Microscan Method Status    ZID Performed  Yes DIANELYS Final                   Urine Microscopic [[id number]]  (Abnormal) Collected: 11/02/22 1007    Lab Status: Final result Specimen: Urine, Clean Catch Updated: 11/02/22 1040     RBC, UA 30-50 /hpf      WBC, UA Innumerable /hpf      Epithelial Cells None Seen /hpf      Bacteria, UA Moderate /hpf      MUCUS THREADS Occasional     Hyaline Casts, UA 0-3 /lpf      Budding Yeast Present    UA w Reflex to Microscopic w Reflex to Culture [[id number]]  (Abnormal) Collected: 11/02/22 1007    Lab Status: Final result Specimen: Urine, Clean Catch Updated: 11/02/22 1024     Color, UA Light Yellow     Clarity, UA Turbid     Specific [place], UA 1 012     pH, UA 6 0     Leukocytes, UA Large     Nitrite, UA Negative     Protein, UA Negative mg/dl      Glucose, UA Negative mg/dl      Ketones, UA Negative mg/dl      Urobilinogen, UA <2 0 mg/dl      Bilirubin, UA Negative     Occult Blood, UA Small    FLU/RSV/COVID - if FLU/RSV clinically relevant [[id number]]  (Abnormal) Collected: 11/02/22 0718    Lab Status: Final result Specimen: Nares from Nose Updated: 11/02/22 0801     SARS-CoV-2 Negative     INFLUENZA A PCR Negative     INFLUENZA B PCR Negative     RSV PCR Positive    Narrative:      FOR PEDIATRIC PATIENTS - copy/paste COVID Guidelines URL to browser: [email or web address]  [employer]    SARS-CoV-2 assay is a Nucleic Acid Amplification assay intended for the  qualitative detection of nucleic acid from SARS-CoV-2 in nasopharyngeal  swabs  Results are for the presumptive identification of SARS-CoV-2 RNA      Positive results are indicative of infection with SARS-CoV-2, the virus  causing COVID-19, but do not rule out bacterial infection or co-infection  with other viruses  Laboratories within the [place] and its  territories are required to report all positive results to the appropriate  public health authorities  Negative results do not preclude SARS-CoV-2  infection and should not be used as the sole basis for treatment or other  patient management decisions  Negative results must be combined with  clinical observations, patient history, and epidemiological information  This test has not been FDA cleared or approved  This test has been authorized by FDA under an Emergency Use Authorization  (EUA)  This test is only authorized for the duration of time the  declaration that circumstances exist justifying the authorization of the  emergency use of an in vitro diagnostic tests for detection of SARS-CoV-2  virus and/or diagnosis of COVID-19 infection under section 564(b)(1) of  the Act, 21 U  S C  372QEV-4(Q)(7), unless the authorization is terminated  or revoked sooner  The test has been validated but independent review by FDA  and CLIA is pending  Test performed using [employer] Gene[employer]pert: This RT-PCR assay targets N2,  a region unique to SARS-CoV-2  A conserved region in the E-gene was chosen  for pan-Sarbecovirus detection which includes SARS-CoV-2  According to CMS-2020-01-R, this platform meets the definition of high-throughput technology      Basic metabolic panel [[id number]] Collected: 11/02/22 0718    Lab Status: Final result Specimen: Blood from Arm, Right Updated: 11/02/22 0743     Sodium 144 mmol/L      Potassium 4 0 mmol/L      Chloride 105 mmol/L      CO2 32 mmol/L      ANION GAP 7 mmol/L      BUN 13 mg/dL      Creatinine 0 74 mg/dL      Glucose 82 mg/dL      Calcium 9 3 mg/dL      eGFR 84 ml/min/1 73sq m     Narrative:      [place] guidelines for Chronic Kidney Disease (CKD):   •  Stage 1 with normal or high GFR (GFR > 90 mL/min/1 73 square meters)  •  Stage 2 Mild CKD (GFR = 60-89 mL/min/1 73 square meters)  •  Stage 3A Moderate CKD (GFR = 45-59 mL/min/1 73 square meters)  •  Stage 3B Moderate CKD (GFR = 30-44 mL/min/1 73 square meters)  •  Stage 4 Severe CKD (GFR = 15-29 mL/min/1 73 square meters)  •  Stage 5 End Stage CKD (GFR <15 mL/min/1 73 square meters)  Note: GFR calculation is accurate only with a steady state creatinine    CBC and differential [[id number]]  (Abnormal) Collected: 11/02/22 0718    Lab Status: Final result Specimen: Blood from Arm, Right Updated: 11/02/22 0731     WBC 6 49 Thousand/uL      RBC 3 40 Million/uL      Hemoglobin 10 8 g/dL      Hematocrit 33 3 %      MCV 98 fL      MCH 31 8 pg      MCHC 32 4 g/dL      RDW 15 9 %      MPV 10 1 fL      Platelets 580 Thousands/uL      nRBC 0 /100 WBCs      Neutrophils Relative 84 %      Immat GRANS % 0 %      Lymphocytes Relative 7 %      Monocytes Relative 7 %      Eosinophils Relative 1 %      Basophils Relative 1 %      Neutrophils Absolute 5 51 Thousands/µL      Immature Grans Absolute 0 02 Thousand/uL      Lymphocytes Absolute 0 48 Thousands/µL      Monocytes Absolute 0 42 Thousand/µL      Eosinophils Absolute 0 03 Thousand/µL      Basophils Absolute 0 03 Thousands/µL                  CT head wo contrast   Final Result by Aurora Sands MD (11/02 6235)      No acute intracranial or orbital abnormality  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  The study was marked in [facility] for immediate notification  Workstation performed: [id number]         XR chest 2 views   Final Result by Karen Aggarwal MD (11/02 4785)      Question small pleural effusions on the lateral projection  Lungs clear  No abnormalities about the left shoulder  Workstation performed: [id number]         XR shoulder 2+ views LEFT   Final Result by Harvey Canavan, MD (11/02 8065)      No acute osseous abnormality        Workstation performed: [id number]                    Procedures  Procedures         ED Course  ED Course as of 11/06/22 1908   Wed Nov 02, 2022   0801 Left shoulder xray -- djd- no fx/ dislocation/separation    0802 Cxr portable- compared to previous 5/22- no sign changes- no infiltrate- /ptx/ pulm edema/ pleural effusion   0922 - er  md note-  findings reviewed with  and pt--  aware of pending admit                               SBIRT 20yo+    Flowsheet Row Most Recent Value   SBIRT (25 yo +)    In order to provide better care to our patients, we are screening all of our patients for alcohol and drug use  Would it be okay to ask you these screening questions? Yes Filed at: 11/02/2022 4903   Initial Alcohol Screen: US AUDIT-C     1  How often do you have a drink containing alcohol? 0 Filed at: 11/02/2022 0654   2  How many drinks containing alcohol do you have on a typical day you are drinking? 0 Filed at: 11/02/2022 0654   3a  Male UNDER 65: How often do you have five or more drinks on one occasion? 0 Filed at: 11/02/2022 0654   3b  FEMALE Any Age, or MALE 65+: How often do you have 4 or more drinks on one occassion? 0 Filed at: 11/02/2022 0654   Audit-C Score 0 Filed at: 11/02/2022 9357   STEFANI: How many times in the past year have you    Used an illegal drug or used a prescription medication for non-medical reasons?  Never Filed at: 11/02/2022 4168                    MDM    Disposition  Final diagnoses:   RSV (acute bronchiolitis due to respiratory syncytial virus)   MS (multiple sclerosis) ([facility] [address] )   FTT (failure to thrive) in adult   Injury of left shoulder, initial encounter     Time reflects when diagnosis was documented in both MDM as applicable and the Disposition within this note     Time User Action Codes Description Comment    11/2/2022  9:23 AM Kitty STOKES Add [J21 0] RSV (acute bronchiolitis due to respiratory syncytial virus)     11/2/2022  9:23 AM Jonnie Zuniga Add [G35] [facility]     11/2/2022  9:23 AM Leonardo STOKES Add [R62 7] FTT (failure to thrive) in adult     11/2/2022  9:37 AM Taylor Carlson Add [X28 38AE] Injury of left shoulder, initial encounter       ED Disposition     ED Disposition   Admit    Condition   Stable    Date/Time   Wed Nov 2, 2022 5673    Comment   Case was discussed with dr aparicio  and the patient's admission status was agreed to be Admission Status: inpatient status to the service of Dr Dayanara Hutchison MD Documentation    [address] Name, [address] by (Company and Unit #) [facility]      RN Documentation    [address] Name, [address] by [employer] and Unit #) [facility]      Follow-up Information     Follow up With Specialties Details Why Contact Info    Maryanne Johnson MD Internal Medicine Follow up  [address] [facility] [place] [facility] [address] [phone]            Discharge Medication List as of 11/4/2022  4:05 PM      START taking these medications    Details   acetaminophen (TYLENOL) 325 mg tablet Take 2 tablets (650 mg total) by mouth every 6 (six) hours as needed for mild pain, headaches or fever, Starting Fri 11/4/2022, No Print      albuterol (ProAir HFA) 90 mcg/act inhaler Inhale 2 puffs every 6 (six) hours as needed for wheezing, Starting Fri 11/4/2022, No Print      benzonatate (TESSALON) 200 MG capsule Take 1 capsule (200 mg total) by mouth 3 (three) times a day as needed for cough, Starting Fri 11/4/2022, No Print      fluticasone (FLONASE) 50 mcg/act nasal spray 1 spray into each nostril daily Do not start before November 5, 2022 , Starting Sat 11/5/2022, No Print      guaiFENesin (MUCINEX) 600 mg 12 hr tablet Take 1 tablet (600 mg total) by mouth every 12 (twelve) hours, Starting Fri 11/4/2022, No Print loratadine (CLARITIN) 10 mg tablet Take 1 tablet (10 mg total) by mouth daily Do not start before November 5, 2022 , Starting Sat 11/5/2022, No Print      menthol-methyl salicylate (BENGAY) 44-94 % cream Apply topically 3 (three) times a day, Starting Fri 11/4/2022, No Print         CONTINUE these medications which have CHANGED    Details   !! baclofen 10 mg tablet Take 1 tablet (10 mg total) by mouth in the morning Take in afternoon, Starting Fri 11/4/2022, No Print      !! baclofen 5 MG TABS Take 15 mg by mouth 2 (two) times a day Breakfast and bedtime, Starting Fri 11/4/2022, No Print       !! - Potential duplicate medications found  Please discuss with provider        CONTINUE these medications which have NOT CHANGED    Details   amLODIPine (NORVASC) 2 5 mg tablet Take 2 5 mg by mouth every 12 (twelve) hours, Historical Med      Dalfampridine ER 10 MG TB12 Take 10 mg by mouth every 12 (twelve) hours, Historical Med      furosemide (LASIX) 40 mg tablet Starting Tue 8/23/2022, Historical Med      methenamine hippurate (HIPREX) 1 g tablet TAKE 1 TABLET BY MOUTH  TWICE DAILY, Normal      nabumetone (RELAFEN) 500 mg tablet Starting Thu 9/1/2022, Historical Med      tiZANidine (ZANAFLEX) 4 mg tablet Starting Mon 8/29/2022, Historical Med      VITAMIN D, CHOLECALCIFEROL, PO Take 2,000 Units by mouth in the morning, Historical Med      zolpidem (AMBIEN) 10 mg tablet Take 10 mg by mouth daily at bedtime as needed, Starting Thu 9/8/2022, Historical Med             Outpatient Discharge Orders   Occupational Therapy Eval and Treat     Physical Therapy Eval And Treat       PDMP Review       Value Time User    PDMP Reviewed  Yes 6/26/2021  8:36 AM Matias Pace DO          ED Provider  Electronically Signed by           Marino King MD  11/06/22 1914

## 2022-12-15 NOTE — ASSESSMENT & PLAN NOTE
· Patient takes neurontin, baclofen and Dalfampridine   · Dalfampridine will need to be brought in from home-- patient and  aware  · PT and OT assessments appreciated [place] Counseling:  I discussed with the patient the risks of etanercept including but not limited to myelosuppression, immunosuppression, autoimmune hepatitis, demyelinating diseases, lymphoma, and infections.  The patient understands that monitoring is required including a PPD at baseline and must alert us or the primary physician if symptoms of infection or other concerning signs are noted.

## 2022-12-27 ENCOUNTER — PROCEDURE VISIT (OUTPATIENT)
Dept: UROLOGY | Facility: CLINIC | Age: 67
End: 2022-12-27

## 2022-12-27 DIAGNOSIS — N31.9 NEUROGENIC BLADDER: Primary | ICD-10-CM

## 2022-12-27 DIAGNOSIS — N39.0 RECURRENT UTI: ICD-10-CM

## 2022-12-27 RX ORDER — SULFAMETHOXAZOLE AND TRIMETHOPRIM 800; 160 MG/1; MG/1
1 TABLET ORAL EVERY 12 HOURS SCHEDULED
Qty: 10 TABLET | Refills: 0 | Status: SHIPPED | OUTPATIENT
Start: 2022-12-27 | End: 2023-01-01

## 2022-12-27 NOTE — PROGRESS NOTES
Referring Physician: Stanley Trujillo MD  A copy of this note was sent to the referring physician  Diagnoses and all orders for this visit:    Neurogenic bladder  -     sulfamethoxazole-trimethoprim (BACTRIM DS) 800-160 mg per tablet; Take 1 tablet by mouth every 12 (twelve) hours for 5 days  -     Urine culture; Future  -     Urine culture    Recurrent UTI  -     sulfamethoxazole-trimethoprim (BACTRIM DS) 800-160 mg per tablet; Take 1 tablet by mouth every 12 (twelve) hours for 5 days  -     Urine culture; Future  -     Urine culture            Assessment and plan:       1  Chronic indwelling suprapubic catheter  -Changed with some resistance noted within the tract at bedside today    2  Urinary tract infection, prior history of urosepsis    It was a pleasure to evaluate this patient and speak with her  today as an acute visit  Her suprapubic catheter was removed by nursing staff but was unable to be replaced  Some resistance was noted within the tract however I was able to place an 25 Slov[place] Bronson catheter and aspirate clear urine  A culture was obtained  10 cc were inflated in the balloon  Given the difficulty with exchange, the history of recurrent urinary tract infection and prior complicated UTI which required hospitalization, I have recommended empiric treatment  The patient and her  are very amenable to this  I reviewed her prior antimicrobial data and prescribed appropriate antibiotic x5 days  She will follow-up as scheduled for her next visit or sooner if needed  Rudy Mccabe MD      Chief Complaint     Acute visit unable to replace suprapubic catheter      History of Present Illness     Rima Guadalupe is a 78 y o  male with a history of neurogenic bladder secondary to multiple sclerosis managed with a chronic indwelling suprapubic catheter  He presented for planned exchange this morning however the catheter could not be replaced    Her  states her urine has been somewhat turbid and they are concerned for a brewing urinary tract infection  Detailed Urologic History     - please refer to HPI    Review of Systems     Review of Systems   Constitutional: Negative for activity change and fatigue  HENT: Negative for congestion  Eyes: Negative for visual disturbance  Respiratory: Negative for shortness of breath and wheezing  Cardiovascular: Negative for chest pain and leg swelling  Gastrointestinal: Negative for abdominal pain  Endocrine: Negative for polyuria  Genitourinary: Negative for dysuria, flank pain, hematuria and urgency  Musculoskeletal: Negative for back pain  Allergic/Immunologic: Negative for immunocompromised state  Neurological: Negative for dizziness and numbness  Psychiatric/Behavioral: Negative for dysphoric mood  All other systems reviewed and are negative  Allergies     No Known Allergies    Physical Exam       Physical Exam  Constitutional:       General: He is not in acute distress  Appearance: He is well-developed  HENT:      Head: Normocephalic and atraumatic  Cardiovascular:      Comments: Negative lower extremity edema  Pulmonary:      Effort: Pulmonary effort is normal       Breath sounds: Normal breath sounds  Abdominal:      Palpations: Abdomen is soft  Musculoskeletal:         General: Normal range of motion  Cervical back: Normal range of motion  Skin:     General: Skin is warm  Neurological:      Mental Status: He is alert and oriented to person, place, and time  Psychiatric:         Behavior: Behavior normal            Vital Signs  There were no vitals filed for this visit        Current Medications       Current Outpatient Medications:   •  sulfamethoxazole-trimethoprim (BACTRIM DS) 800-160 mg per tablet, Take 1 tablet by mouth every 12 (twelve) hours for 5 days, Disp: 10 tablet, Rfl: 0  •  acetaminophen (TYLENOL) 325 mg tablet, Take 2 tablets (650 mg total) by mouth every 6 (six) hours as needed for mild pain, headaches or fever, Disp: , Rfl: 0  •  albuterol (ProAir HFA) 90 mcg/act inhaler, Inhale 2 puffs every 6 (six) hours as needed for wheezing, Disp: 8 5 g, Rfl: 0  •  amLODIPine (NORVASC) 2 5 mg tablet, Take 2 5 mg by mouth every 12 (twelve) hours, Disp: , Rfl:   •  baclofen 10 mg tablet, Take 1 tablet (10 mg total) by mouth in the morning Take in afternoon, Disp: , Rfl: 0  •  baclofen 5 MG TABS, Take 15 mg by mouth 2 (two) times a day Breakfast and bedtime, Disp: , Rfl: 0  •  benzonatate (TESSALON) 200 MG capsule, Take 1 capsule (200 mg total) by mouth 3 (three) times a day as needed for cough, Disp: 20 capsule, Rfl: 0  •  Dalfampridine ER 10 MG TB12, Take 10 mg by mouth every 12 (twelve) hours, Disp: , Rfl:   •  fluticasone (FLONASE) 50 mcg/act nasal spray, 1 spray into each nostril daily Do not start before November 5, 2022 , Disp: , Rfl: 0  •  furosemide (LASIX) 40 mg tablet, , Disp: , Rfl:   •  guaiFENesin (MUCINEX) 600 mg 12 hr tablet, Take 1 tablet (600 mg total) by mouth every 12 (twelve) hours, Disp: , Rfl: 0  •  loratadine (CLARITIN) 10 mg tablet, Take 1 tablet (10 mg total) by mouth daily Do not start before November 5, 2022 , Disp: , Rfl: 0  •  menthol-methyl salicylate (BENGAY) 51-94 % cream, Apply topically 3 (three) times a day, Disp: , Rfl: 0  •  methenamine hippurate (HIPREX) 1 g tablet, TAKE 1 TABLET BY MOUTH  TWICE DAILY, Disp: 180 tablet, Rfl: 1  •  nabumetone (RELAFEN) 500 mg tablet, , Disp: , Rfl:   •  tiZANidine (ZANAFLEX) 4 mg tablet, , Disp: , Rfl:   •  VITAMIN D, CHOLECALCIFEROL, PO, Take 2,000 Units by mouth in the morning, Disp: , Rfl:   •  zolpidem (AMBIEN) 10 mg tablet, Take 10 mg by mouth daily at bedtime as needed, Disp: , Rfl:       Active Problems     Patient Active Problem List   Diagnosis   • Acquired polyneuropathy   • Multiple sclerosis (HCC)   • Ambulatory dysfunction   • Hypertension   • Neurogenic bladder   • Leg swelling   • Hypernatremia   • Acute bilateral thoracic back pain   • Debility   • Anemia   • RSV (acute bronchiolitis due to respiratory syncytial virus)   • Left shoulder pain   • Abnormal urinalysis         Past Medical History     Past Medical History:   Diagnosis Date   • Back pain 2016   • Chronic UTI    • Colon polyp    • Hypertension    • Incontinence    • MS (multiple sclerosis) ([facility])     Age 37   • Neurogenic bladder    • Spasticity          Surgical History     Past Surgical History:   Procedure Laterality Date   • BACK SURGERY  2016    Lumbar fusion   • COLONOSCOPY     • ELBOW SURGERY Right 2008    ORIF   • IR SUPRAPUBIC CATHETER PLACEMENT  4/26/2022   • MA IMPLANT PERIPH/GASTRIC NEUROSTIM/ N/A 11/19/2021    Procedure: leadwire placement and pulse generator  placement of SNM at S3 Bilateral S3  nerve testing, fluroscopy - c-arm guidance and complex programming;  Surgeon: Nyasia Car MD;  Location: [facility];  Service: Gynecology         Family History     Family History   Problem Relation Age of Onset   • Cancer Mother    • Heart disease Father    • Cancer Sister          Social History     Social History     Social History     Tobacco Use   Smoking Status Never   Smokeless Tobacco Never         Pertinent Lab Values     Lab Results   Component Value Date    CREATININE 0 74 11/02/2022       No results found for: PSA    @RESULTRCNT(1H])@      Pertinent Imaging      - n/a    Portions of the record may have been created with voice recognition software  Occasional wrong word or "sound a like" substitutions may have occurred due to the inherent limitations of voice recognition software  Read the chart carefully and recognize, using context, where substitutions have occurred

## 2022-12-27 NOTE — PROGRESS NOTES
Patient seen for typical SPT exchange  Has not been for exchange in office since October due to patient being in rehab   requests a urine culture be sent due to patient feeling increased weakness in the past several days   reports they changed SPT one time in rehab  Patient had 25 F latex free catheter in place  No reported latex allergy  Removed catheter without issue  On attempt to reinsert catheter, attempted twice  Would place get urine return and then when I would try to flush to ensure placement wouldnt be able to flush and then catheter would be forced back out  Was able to get urine sample on one of these tries  Dr Treva Nguyễn on site and was able to place catheter  Suggested upsizing SPT to 20 F on next change due to tightness

## 2022-12-30 ENCOUNTER — TELEPHONE (OUTPATIENT)
Dept: UROLOGY | Facility: CLINIC | Age: 67
End: 2022-12-30

## 2022-12-30 LAB
BACTERIA UR CULT: ABNORMAL
BACTERIA UR CULT: ABNORMAL

## 2023-01-01 ENCOUNTER — APPOINTMENT (INPATIENT)
Dept: RADIOLOGY | Facility: HOSPITAL | Age: 68
End: 2023-01-01

## 2023-01-01 ENCOUNTER — APPOINTMENT (INPATIENT)
Dept: NON INVASIVE DIAGNOSTICS | Facility: HOSPITAL | Age: 68
End: 2023-01-01

## 2023-01-01 ENCOUNTER — HOSPITAL ENCOUNTER (INPATIENT)
Facility: HOSPITAL | Age: 68
LOS: 5 days | End: 2023-03-03
Attending: EMERGENCY MEDICINE | Admitting: ANESTHESIOLOGY

## 2023-01-01 ENCOUNTER — APPOINTMENT (INPATIENT)
Dept: NEUROLOGY | Facility: HOSPITAL | Age: 68
End: 2023-01-01

## 2023-01-01 ENCOUNTER — HOSPITAL ENCOUNTER (OUTPATIENT)
Facility: HOSPITAL | Age: 68
End: 2023-03-08
Attending: STUDENT IN AN ORGANIZED HEALTH CARE EDUCATION/TRAINING PROGRAM | Admitting: STUDENT IN AN ORGANIZED HEALTH CARE EDUCATION/TRAINING PROGRAM

## 2023-01-01 ENCOUNTER — APPOINTMENT (EMERGENCY)
Dept: RADIOLOGY | Facility: HOSPITAL | Age: 68
End: 2023-01-01

## 2023-01-01 VITALS
DIASTOLIC BLOOD PRESSURE: 58 MMHG | SYSTOLIC BLOOD PRESSURE: 113 MMHG | HEART RATE: 90 BPM | OXYGEN SATURATION: 82 % | TEMPERATURE: 98.4 F | RESPIRATION RATE: 10 BRPM | WEIGHT: 176.81 LBS | BODY MASS INDEX: 40.92 KG/M2 | HEIGHT: 55 IN

## 2023-01-01 VITALS
TEMPERATURE: 102.5 F | WEIGHT: 176 LBS | DIASTOLIC BLOOD PRESSURE: 40 MMHG | HEART RATE: 103 BPM | RESPIRATION RATE: 6 BRPM | HEIGHT: 55 IN | BODY MASS INDEX: 40.73 KG/M2 | SYSTOLIC BLOOD PRESSURE: 69 MMHG | OXYGEN SATURATION: 60 %

## 2023-01-01 DIAGNOSIS — N39.0 UTI (URINARY TRACT INFECTION): ICD-10-CM

## 2023-01-01 DIAGNOSIS — N39.0 URINARY TRACT INFECTION WITHOUT HEMATURIA, SITE UNSPECIFIED: Primary | ICD-10-CM

## 2023-01-01 DIAGNOSIS — R56.9 SEIZURE (HCC): ICD-10-CM

## 2023-01-01 DIAGNOSIS — G35 MULTIPLE SCLEROSIS (HCC): ICD-10-CM

## 2023-01-01 DIAGNOSIS — I63.9 CVA (CEREBRAL VASCULAR ACCIDENT) (HCC): ICD-10-CM

## 2023-01-01 DIAGNOSIS — R41.82 CHANGE IN MENTAL STATUS: Primary | ICD-10-CM

## 2023-01-01 DIAGNOSIS — N39.0 RECURRENT UTI: Primary | ICD-10-CM

## 2023-01-01 DIAGNOSIS — J96.90 RESPIRATORY FAILURE (HCC): ICD-10-CM

## 2023-01-01 LAB
2HR DELTA HS TROPONIN: 0 NG/L
ALBUMIN SERPL BCP-MCNC: 2.9 G/DL (ref 3.5–5)
ALBUMIN SERPL BCP-MCNC: 3 G/DL (ref 3.5–5)
ALBUMIN SERPL BCP-MCNC: 3 G/DL (ref 3.5–5)
ALBUMIN SERPL BCP-MCNC: 3.3 G/DL (ref 3.5–5)
ALP SERPL-CCNC: 116 U/L (ref 34–104)
ALP SERPL-CCNC: 124 U/L (ref 34–104)
ALP SERPL-CCNC: 128 U/L (ref 34–104)
ALP SERPL-CCNC: 158 U/L (ref 34–104)
ALT SERPL W P-5'-P-CCNC: 108 U/L (ref 7–52)
ALT SERPL W P-5'-P-CCNC: 117 U/L (ref 7–52)
ALT SERPL W P-5'-P-CCNC: 119 U/L (ref 7–52)
ALT SERPL W P-5'-P-CCNC: 145 U/L (ref 7–52)
AMMONIA PLAS-SCNC: 25 UMOL/L (ref 18–72)
AMPHETAMINES SERPL QL SCN: NEGATIVE
ANION GAP SERPL CALCULATED.3IONS-SCNC: 18 MMOL/L (ref 4–13)
ANION GAP SERPL CALCULATED.3IONS-SCNC: 5 MMOL/L (ref 4–13)
ANION GAP SERPL CALCULATED.3IONS-SCNC: 5 MMOL/L (ref 4–13)
ANION GAP SERPL CALCULATED.3IONS-SCNC: 6 MMOL/L (ref 4–13)
ANION GAP SERPL CALCULATED.3IONS-SCNC: 6 MMOL/L (ref 4–13)
ANION GAP SERPL CALCULATED.3IONS-SCNC: 7 MMOL/L (ref 4–13)
AORTIC ROOT: 3.1 CM
APICAL FOUR CHAMBER EJECTION FRACTION: 66 %
APPEARANCE CSF: NORMAL
APTT PPP: 32 SECONDS (ref 23–37)
ARTERIAL PATENCY WRIST A: YES
ARTERIAL PATENCY WRIST A: YES
AST SERPL W P-5'-P-CCNC: 115 U/L (ref 13–39)
AST SERPL W P-5'-P-CCNC: 64 U/L (ref 13–39)
AST SERPL W P-5'-P-CCNC: 77 U/L (ref 13–39)
AST SERPL W P-5'-P-CCNC: 78 U/L (ref 13–39)
ATRIAL RATE: 97 BPM
BACTERIA BLD CULT: NORMAL
BACTERIA BLD CULT: NORMAL
BACTERIA CSF CULT: NO GROWTH
BACTERIA SPT RESP CULT: NORMAL
BACTERIA UR QL AUTO: NORMAL /HPF
BARBITURATES UR QL: NEGATIVE
BASE EXCESS BLDA CALC-SCNC: -0.3 MMOL/L
BASE EXCESS BLDA CALC-SCNC: -10 MMOL/L
BASOPHILS # BLD AUTO: 0.01 THOUSANDS/ÂΜL (ref 0–0.1)
BASOPHILS # BLD AUTO: 0.02 THOUSANDS/ÂΜL (ref 0–0.1)
BASOPHILS # BLD AUTO: 0.02 THOUSANDS/ÂΜL (ref 0–0.1)
BASOPHILS # BLD AUTO: 0.03 THOUSANDS/ÂΜL (ref 0–0.1)
BASOPHILS NFR BLD AUTO: 0 % (ref 0–1)
BASOPHILS NFR BLD AUTO: 1 % (ref 0–1)
BENZODIAZ UR QL: NEGATIVE
BILIRUB SERPL-MCNC: 0.19 MG/DL (ref 0.2–1)
BILIRUB SERPL-MCNC: 0.26 MG/DL (ref 0.2–1)
BILIRUB SERPL-MCNC: 0.3 MG/DL (ref 0.2–1)
BILIRUB SERPL-MCNC: 0.33 MG/DL (ref 0.2–1)
BILIRUB UR QL STRIP: NEGATIVE
BNP SERPL-MCNC: 61 PG/ML (ref 0–100)
BODY TEMPERATURE: 97.8 DEGREES FEHRENHEIT
BODY TEMPERATURE: 98.6 DEGREES FEHRENHEIT
BUN SERPL-MCNC: 17 MG/DL (ref 5–25)
BUN SERPL-MCNC: 18 MG/DL (ref 5–25)
BUN SERPL-MCNC: 21 MG/DL (ref 5–25)
BUN SERPL-MCNC: 21 MG/DL (ref 5–25)
C GATTII+NEOFOR DNA CSF QL NAA+NON-PROBE: NOT DETECTED
CALCIUM ALBUM COR SERPL-MCNC: 8.4 MG/DL (ref 8.3–10.1)
CALCIUM ALBUM COR SERPL-MCNC: 8.5 MG/DL (ref 8.3–10.1)
CALCIUM ALBUM COR SERPL-MCNC: 8.9 MG/DL (ref 8.3–10.1)
CALCIUM ALBUM COR SERPL-MCNC: 9.1 MG/DL (ref 8.3–10.1)
CALCIUM SERPL-MCNC: 7.6 MG/DL (ref 8.4–10.2)
CALCIUM SERPL-MCNC: 7.6 MG/DL (ref 8.4–10.2)
CALCIUM SERPL-MCNC: 7.8 MG/DL (ref 8.4–10.2)
CALCIUM SERPL-MCNC: 8.3 MG/DL (ref 8.4–10.2)
CALCIUM SERPL-MCNC: 8.3 MG/DL (ref 8.4–10.2)
CALCIUM SERPL-MCNC: 8.4 MG/DL (ref 8.4–10.2)
CARDIAC TROPONIN I PNL SERPL HS: 16 NG/L
CARDIAC TROPONIN I PNL SERPL HS: 16 NG/L
CHLORIDE SERPL-SCNC: 106 MMOL/L (ref 96–108)
CHLORIDE SERPL-SCNC: 106 MMOL/L (ref 96–108)
CHLORIDE SERPL-SCNC: 108 MMOL/L (ref 96–108)
CHLORIDE SERPL-SCNC: 109 MMOL/L (ref 96–108)
CHLORIDE SERPL-SCNC: 109 MMOL/L (ref 96–108)
CHLORIDE SERPL-SCNC: 110 MMOL/L (ref 96–108)
CHOLEST SERPL-MCNC: 197 MG/DL
CLARITY UR: CLEAR
CMV DNA CSF QL NAA+NON-PROBE: NOT DETECTED
CO2 SERPL-SCNC: 15 MMOL/L (ref 21–32)
CO2 SERPL-SCNC: 24 MMOL/L (ref 21–32)
CO2 SERPL-SCNC: 25 MMOL/L (ref 21–32)
CO2 SERPL-SCNC: 25 MMOL/L (ref 21–32)
CO2 SERPL-SCNC: 29 MMOL/L (ref 21–32)
CO2 SERPL-SCNC: 30 MMOL/L (ref 21–32)
COCAINE UR QL: NEGATIVE
COLOR UR: ABNORMAL
CREAT SERPL-MCNC: 0.81 MG/DL (ref 0.6–1.3)
CREAT SERPL-MCNC: 0.96 MG/DL (ref 0.6–1.3)
CREAT SERPL-MCNC: 0.97 MG/DL (ref 0.6–1.3)
CREAT SERPL-MCNC: 1.14 MG/DL (ref 0.6–1.3)
CREAT SERPL-MCNC: 1.2 MG/DL (ref 0.6–1.3)
CREAT SERPL-MCNC: 1.26 MG/DL (ref 0.6–1.3)
E COLI K1 DNA CSF QL NAA+NON-PROBE: NOT DETECTED
E WAVE DECELERATION TIME: 224 MS
EOSINOPHIL # BLD AUTO: 0.01 THOUSAND/ÂΜL (ref 0–0.61)
EOSINOPHIL # BLD AUTO: 0.01 THOUSAND/ÂΜL (ref 0–0.61)
EOSINOPHIL # BLD AUTO: 0.02 THOUSAND/ÂΜL (ref 0–0.61)
EOSINOPHIL # BLD AUTO: 0.03 THOUSAND/ÂΜL (ref 0–0.61)
EOSINOPHIL NFR BLD AUTO: 0 % (ref 0–6)
EOSINOPHIL NFR BLD AUTO: 1 % (ref 0–6)
ERYTHROCYTE [DISTWIDTH] IN BLOOD BY AUTOMATED COUNT: 16.3 % (ref 11.6–15.1)
ERYTHROCYTE [DISTWIDTH] IN BLOOD BY AUTOMATED COUNT: 16.5 % (ref 11.6–15.1)
ERYTHROCYTE [DISTWIDTH] IN BLOOD BY AUTOMATED COUNT: 17 % (ref 11.6–15.1)
ERYTHROCYTE [DISTWIDTH] IN BLOOD BY AUTOMATED COUNT: 17.2 % (ref 11.6–15.1)
ERYTHROCYTE [DISTWIDTH] IN BLOOD BY AUTOMATED COUNT: 17.3 % (ref 11.6–15.1)
EST. AVERAGE GLUCOSE BLD GHB EST-MCNC: 85 MG/DL
EV RNA CSF QL NAA+NON-PROBE: NOT DETECTED
FRACTIONAL SHORTENING: 42 % (ref 28–44)
GFR SERPL CREATININE-BSD FRML MDRD: 44 ML/MIN/1.73SQ M
GFR SERPL CREATININE-BSD FRML MDRD: 47 ML/MIN/1.73SQ M
GFR SERPL CREATININE-BSD FRML MDRD: 50 ML/MIN/1.73SQ M
GFR SERPL CREATININE-BSD FRML MDRD: 61 ML/MIN/1.73SQ M
GFR SERPL CREATININE-BSD FRML MDRD: 61 ML/MIN/1.73SQ M
GFR SERPL CREATININE-BSD FRML MDRD: 75 ML/MIN/1.73SQ M
GLUCOSE CSF-MCNC: 48 MG/DL (ref 40–70)
GLUCOSE SERPL-MCNC: 107 MG/DL (ref 65–140)
GLUCOSE SERPL-MCNC: 114 MG/DL (ref 65–140)
GLUCOSE SERPL-MCNC: 116 MG/DL (ref 65–140)
GLUCOSE SERPL-MCNC: 74 MG/DL (ref 65–140)
GLUCOSE SERPL-MCNC: 89 MG/DL (ref 65–140)
GLUCOSE SERPL-MCNC: 98 MG/DL (ref 65–140)
GLUCOSE UR STRIP-MCNC: NEGATIVE MG/DL
GP B STREP DNA CSF QL NAA+NON-PROBE: NOT DETECTED
GRAM STN SPEC: NORMAL
HAEM INFLU DNA CSF QL NAA+NON-PROBE: NOT DETECTED
HBA1C MFR BLD: 4.6 %
HCO3 BLDA-SCNC: 16.3 MMOL/L (ref 22–28)
HCO3 BLDA-SCNC: 24.2 MMOL/L (ref 22–28)
HCT VFR BLD AUTO: 26.8 % (ref 34.8–46.1)
HCT VFR BLD AUTO: 27.6 % (ref 34.8–46.1)
HCT VFR BLD AUTO: 28 % (ref 34.8–46.1)
HCT VFR BLD AUTO: 28.3 % (ref 34.8–46.1)
HCT VFR BLD AUTO: 33.4 % (ref 34.8–46.1)
HDLC SERPL-MCNC: 52 MG/DL
HGB BLD-MCNC: 10.6 G/DL (ref 11.5–15.4)
HGB BLD-MCNC: 8.6 G/DL (ref 11.5–15.4)
HGB BLD-MCNC: 8.7 G/DL (ref 11.5–15.4)
HGB BLD-MCNC: 9 G/DL (ref 11.5–15.4)
HGB BLD-MCNC: 9.3 G/DL (ref 11.5–15.4)
HGB UR QL STRIP.AUTO: ABNORMAL
HHV6 DNA CSF QL NAA+NON-PROBE: NOT DETECTED
HOROWITZ INDEX BLDA+IHG-RTO: 100 MM[HG]
HOROWITZ INDEX BLDA+IHG-RTO: 40 MM[HG]
HSV1 DNA CSF QL NAA+NON-PROBE: NOT DETECTED
HSV2 DNA CSF QL NAA+NON-PROBE: NOT DETECTED
IMM GRANULOCYTES # BLD AUTO: 0.02 THOUSAND/UL (ref 0–0.2)
IMM GRANULOCYTES # BLD AUTO: 0.1 THOUSAND/UL (ref 0–0.2)
IMM GRANULOCYTES NFR BLD AUTO: 0 % (ref 0–2)
IMM GRANULOCYTES NFR BLD AUTO: 2 % (ref 0–2)
INR PPP: 1.06 (ref 0.84–1.19)
INTERVENTRICULAR SEPTUM IN DIASTOLE (PARASTERNAL SHORT AXIS VIEW): 1 CM
INTERVENTRICULAR SEPTUM: 1 CM (ref 0.6–1.1)
KETONES UR STRIP-MCNC: NEGATIVE MG/DL
L MONOCYTOG DNA CSF QL NAA+NON-PROBE: NOT DETECTED
LAAS-AP2: 19.3 CM2
LAAS-AP4: 16.6 CM2
LACTATE SERPL-SCNC: 1.2 MMOL/L (ref 0.5–2)
LACTATE SERPL-SCNC: 3.4 MMOL/L (ref 0.5–2)
LDLC SERPL CALC-MCNC: 108 MG/DL (ref 0–100)
LEFT ATRIUM SIZE: 3.4 CM
LEFT INTERNAL DIMENSION IN SYSTOLE: 2.1 CM (ref 2.1–4)
LEFT VENTRICULAR INTERNAL DIMENSION IN DIASTOLE: 3.6 CM (ref 3.5–6)
LEFT VENTRICULAR POSTERIOR WALL IN END DIASTOLE: 1.1 CM
LEFT VENTRICULAR STROKE VOLUME: 42 ML
LEUKOCYTE ESTERASE UR QL STRIP: ABNORMAL
LVSV (TEICH): 42 ML
LYMPHOCYTES # BLD AUTO: 0.36 THOUSANDS/ÂΜL (ref 0.6–4.47)
LYMPHOCYTES # BLD AUTO: 0.44 THOUSANDS/ÂΜL (ref 0.6–4.47)
LYMPHOCYTES # BLD AUTO: 0.69 THOUSANDS/ÂΜL (ref 0.6–4.47)
LYMPHOCYTES # BLD AUTO: 0.78 THOUSANDS/ÂΜL (ref 0.6–4.47)
LYMPHOCYTES NFR BLD AUTO: 11 % (ref 14–44)
LYMPHOCYTES NFR BLD AUTO: 16 % (ref 14–44)
LYMPHOCYTES NFR BLD AUTO: 7 % (ref 14–44)
LYMPHOCYTES NFR BLD AUTO: 8 % (ref 14–44)
LYMPHOCYTES NFR CSF MANUAL: 100 %
MAGNESIUM SERPL-MCNC: 1.7 MG/DL (ref 1.9–2.7)
MAGNESIUM SERPL-MCNC: 1.9 MG/DL (ref 1.9–2.7)
MAGNESIUM SERPL-MCNC: 2 MG/DL (ref 1.9–2.7)
MAGNESIUM SERPL-MCNC: 2.3 MG/DL (ref 1.9–2.7)
MCH RBC QN AUTO: 32.1 PG (ref 26.8–34.3)
MCH RBC QN AUTO: 32.2 PG (ref 26.8–34.3)
MCH RBC QN AUTO: 32.5 PG (ref 26.8–34.3)
MCH RBC QN AUTO: 32.6 PG (ref 26.8–34.3)
MCH RBC QN AUTO: 32.7 PG (ref 26.8–34.3)
MCHC RBC AUTO-ENTMCNC: 31.1 G/DL (ref 31.4–37.4)
MCHC RBC AUTO-ENTMCNC: 31.7 G/DL (ref 31.4–37.4)
MCHC RBC AUTO-ENTMCNC: 32.1 G/DL (ref 31.4–37.4)
MCHC RBC AUTO-ENTMCNC: 32.6 G/DL (ref 31.4–37.4)
MCHC RBC AUTO-ENTMCNC: 32.9 G/DL (ref 31.4–37.4)
MCV RBC AUTO: 100 FL (ref 82–98)
MCV RBC AUTO: 101 FL (ref 82–98)
MCV RBC AUTO: 103 FL (ref 82–98)
MCV RBC AUTO: 103 FL (ref 82–98)
MCV RBC AUTO: 98 FL (ref 82–98)
METHADONE UR QL: NEGATIVE
MONOCYTES # BLD AUTO: 0.14 THOUSAND/ÂΜL (ref 0.17–1.22)
MONOCYTES # BLD AUTO: 0.14 THOUSAND/ÂΜL (ref 0.17–1.22)
MONOCYTES # BLD AUTO: 0.26 THOUSAND/ÂΜL (ref 0.17–1.22)
MONOCYTES # BLD AUTO: 0.4 THOUSAND/ÂΜL (ref 0.17–1.22)
MONOCYTES NFR BLD AUTO: 3 % (ref 4–12)
MONOCYTES NFR BLD AUTO: 3 % (ref 4–12)
MONOCYTES NFR BLD AUTO: 5 % (ref 4–12)
MONOCYTES NFR BLD AUTO: 6 % (ref 4–12)
MRSA NOSE QL CULT: NORMAL
MV E'TISSUE VEL-SEP: 8 CM/S
MV PEAK A VEL: 0.94 M/S
MV PEAK E VEL: 67 CM/S
MV STENOSIS PRESSURE HALF TIME: 65 MS
MV VALVE AREA P 1/2 METHOD: 3.38 CM2
N MEN DNA CSF QL NAA+NON-PROBE: NOT DETECTED
NEUTROPHILS # BLD AUTO: 3.92 THOUSANDS/ÂΜL (ref 1.85–7.62)
NEUTROPHILS # BLD AUTO: 4.85 THOUSANDS/ÂΜL (ref 1.85–7.62)
NEUTROPHILS # BLD AUTO: 5.04 THOUSANDS/ÂΜL (ref 1.85–7.62)
NEUTROPHILS # BLD AUTO: 5.33 THOUSANDS/ÂΜL (ref 1.85–7.62)
NEUTS SEG NFR BLD AUTO: 80 % (ref 43–75)
NEUTS SEG NFR BLD AUTO: 81 % (ref 43–75)
NEUTS SEG NFR BLD AUTO: 88 % (ref 43–75)
NEUTS SEG NFR BLD AUTO: 88 % (ref 43–75)
NITRITE UR QL STRIP: POSITIVE
NON-SQ EPI CELLS URNS QL MICRO: NORMAL /HPF
NRBC BLD AUTO-RTO: 0 /100 WBCS
O2 CT BLDA-SCNC: 14.7 ML/DL (ref 16–23)
O2 CT BLDA-SCNC: 14.9 ML/DL (ref 16–23)
OPIATES UR QL SCN: NEGATIVE
OXYCODONE+OXYMORPHONE UR QL SCN: NEGATIVE
OXYHGB MFR BLDA: 93.5 % (ref 94–97)
OXYHGB MFR BLDA: 95.7 % (ref 94–97)
P AXIS: 65 DEGREES
PARECHOVIRUS A RNA CSF QL NAA+NON-PROBE: NOT DETECTED
PATHOLOGY REVIEW: NO
PCO2 BLDA: 37.6 MM HG (ref 36–44)
PCO2 BLDA: 38.9 MM HG (ref 36–44)
PCO2 TEMP ADJ BLDA: 36.9 MM HG (ref 36–44)
PCO2 TEMP ADJ BLDA: 38.9 MM HG (ref 36–44)
PCP UR QL: NEGATIVE
PEEP RESPIRATORY: 6 CM[H2O]
PEEP RESPIRATORY: 6 CM[H2O]
PH BLD: 7.26 [PH] (ref 7.35–7.45)
PH BLD: 7.41 [PH] (ref 7.35–7.45)
PH BLDA: 7.26 [PH] (ref 7.35–7.45)
PH BLDA: 7.41 [PH] (ref 7.35–7.45)
PH UR STRIP.AUTO: 6 [PH]
PHOSPHATE SERPL-MCNC: 3.3 MG/DL (ref 2.3–4.1)
PHOSPHATE SERPL-MCNC: 4 MG/DL (ref 2.3–4.1)
PHOSPHATE SERPL-MCNC: 4.2 MG/DL (ref 2.3–4.1)
PHOSPHATE SERPL-MCNC: 4.8 MG/DL (ref 2.3–4.1)
PLATELET # BLD AUTO: 137 THOUSANDS/UL (ref 149–390)
PLATELET # BLD AUTO: 145 THOUSANDS/UL (ref 149–390)
PLATELET # BLD AUTO: 152 THOUSANDS/UL (ref 149–390)
PLATELET # BLD AUTO: 159 THOUSANDS/UL (ref 149–390)
PLATELET # BLD AUTO: 169 THOUSANDS/UL (ref 149–390)
PMV BLD AUTO: 10.3 FL (ref 8.9–12.7)
PMV BLD AUTO: 10.5 FL (ref 8.9–12.7)
PMV BLD AUTO: 10.6 FL (ref 8.9–12.7)
PMV BLD AUTO: 10.9 FL (ref 8.9–12.7)
PMV BLD AUTO: 11 FL (ref 8.9–12.7)
PO2 BLD: 475.7 MM HG (ref 75–129)
PO2 BLD: 94.9 MM HG (ref 75–129)
PO2 BLDA: 478.3 MM HG (ref 75–129)
PO2 BLDA: 94.9 MM HG (ref 75–129)
POTASSIUM SERPL-SCNC: 4.4 MMOL/L (ref 3.5–5.3)
POTASSIUM SERPL-SCNC: 4.5 MMOL/L (ref 3.5–5.3)
POTASSIUM SERPL-SCNC: 4.8 MMOL/L (ref 3.5–5.3)
POTASSIUM SERPL-SCNC: 5 MMOL/L (ref 3.5–5.3)
POTASSIUM SERPL-SCNC: 5 MMOL/L (ref 3.5–5.3)
POTASSIUM SERPL-SCNC: 5.1 MMOL/L (ref 3.5–5.3)
PR INTERVAL: 214 MS
PROCALCITONIN SERPL-MCNC: 0.05 NG/ML
PROCALCITONIN SERPL-MCNC: 0.05 NG/ML
PROT CSF-MCNC: 49 MG/DL (ref 15–45)
PROT SERPL-MCNC: 5.1 G/DL (ref 6.4–8.4)
PROT SERPL-MCNC: 5.2 G/DL (ref 6.4–8.4)
PROT SERPL-MCNC: 5.8 G/DL (ref 6.4–8.4)
PROT SERPL-MCNC: 5.9 G/DL (ref 6.4–8.4)
PROT UR STRIP-MCNC: NEGATIVE MG/DL
PROTHROMBIN TIME: 13.9 SECONDS (ref 11.6–14.5)
QRS AXIS: 5 DEGREES
QRSD INTERVAL: 82 MS
QT INTERVAL: 386 MS
QTC INTERVAL: 490 MS
RBC # BLD AUTO: 2.65 MILLION/UL (ref 3.81–5.12)
RBC # BLD AUTO: 2.71 MILLION/UL (ref 3.81–5.12)
RBC # BLD AUTO: 2.76 MILLION/UL (ref 3.81–5.12)
RBC # BLD AUTO: 2.89 MILLION/UL (ref 3.81–5.12)
RBC # BLD AUTO: 3.24 MILLION/UL (ref 3.81–5.12)
RBC # CSF MANUAL: 6 UL (ref 0–10)
RBC #/AREA URNS AUTO: NORMAL /HPF
RIGHT ATRIUM AREA SYSTOLE A4C: 10.3 CM2
RIGHT VENTRICLE ID DIMENSION: 3.1 CM
S PNEUM DNA CSF QL NAA+NON-PROBE: NOT DETECTED
SL CV LEFT ATRIUM LENGTH A2C: 5.2 CM
SL CV LV EF: 60
SL CV PED ECHO LEFT VENTRICLE DIASTOLIC VOLUME (MOD BIPLANE) 2D: 56 ML
SL CV PED ECHO LEFT VENTRICLE SYSTOLIC VOLUME (MOD BIPLANE) 2D: 14 ML
SODIUM SERPL-SCNC: 136 MMOL/L (ref 135–147)
SODIUM SERPL-SCNC: 139 MMOL/L (ref 135–147)
SODIUM SERPL-SCNC: 139 MMOL/L (ref 135–147)
SODIUM SERPL-SCNC: 140 MMOL/L (ref 135–147)
SODIUM SERPL-SCNC: 144 MMOL/L (ref 135–147)
SODIUM SERPL-SCNC: 145 MMOL/L (ref 135–147)
SP GR UR STRIP.AUTO: <=1.005 (ref 1–1.03)
SPECIMEN SOURCE: ABNORMAL
SPECIMEN SOURCE: ABNORMAL
T WAVE AXIS: 51 DEGREES
THC UR QL: NEGATIVE
TOTAL CELLS COUNTED BLD: NO
TOTAL CELLS COUNTED SPEC: 3
TR MAX PG: 25 MMHG
TR PEAK VELOCITY: 2.5 M/S
TRICUSPID ANNULAR PLANE SYSTOLIC EXCURSION: 1.8 CM
TRICUSPID VALVE PEAK REGURGITATION VELOCITY: 2.49 M/S
TRIGL SERPL-MCNC: 187 MG/DL
TUBE # CSF: 4
UROBILINOGEN UR QL STRIP.AUTO: 0.2 E.U./DL
VENT AC: 18
VENT AC: 18
VENT- AC: AC
VENT- AC: AC
VENTRICULAR RATE: 97 BPM
VT SETTING VENT: 300 ML
VT SETTING VENT: 450 ML
VZV DNA CSF QL NAA+NON-PROBE: NOT DETECTED
WBC # BLD AUTO: 4.9 THOUSAND/UL (ref 4.31–10.16)
WBC # BLD AUTO: 5.52 THOUSAND/UL (ref 4.31–10.16)
WBC # BLD AUTO: 5.69 THOUSAND/UL (ref 4.31–10.16)
WBC # BLD AUTO: 6.55 THOUSAND/UL (ref 4.31–10.16)
WBC # BLD AUTO: 6.81 THOUSAND/UL (ref 4.31–10.16)
WBC # CSF AUTO: 2 /UL (ref 0–5)
WBC #/AREA URNS AUTO: NORMAL /HPF

## 2023-01-01 PROCEDURE — 009U3ZX DRAINAGE OF SPINAL CANAL, PERCUTANEOUS APPROACH, DIAGNOSTIC: ICD-10-PCS | Performed by: EMERGENCY MEDICINE

## 2023-01-01 PROCEDURE — 5A1945Z RESPIRATORY VENTILATION, 24-96 CONSECUTIVE HOURS: ICD-10-PCS | Performed by: EMERGENCY MEDICINE

## 2023-01-01 RX ORDER — BISACODYL 10 MG
10 SUPPOSITORY, RECTAL RECTAL DAILY PRN
Status: DISCONTINUED | OUTPATIENT
Start: 2023-01-01 | End: 2023-01-01 | Stop reason: HOSPADM

## 2023-01-01 RX ORDER — MORPHINE SULFATE 4 MG/ML
4 INJECTION, SOLUTION INTRAMUSCULAR; INTRAVENOUS EVERY 4 HOURS PRN
Status: DISCONTINUED | OUTPATIENT
Start: 2023-01-01 | End: 2023-01-01

## 2023-01-01 RX ORDER — TIZANIDINE 2 MG/1
4 TABLET ORAL
Status: DISCONTINUED | OUTPATIENT
Start: 2023-01-01 | End: 2023-01-01

## 2023-01-01 RX ORDER — MORPHINE SULFATE 4 MG/ML
4 INJECTION, SOLUTION INTRAMUSCULAR; INTRAVENOUS EVERY 2 HOUR PRN
Status: DISCONTINUED | OUTPATIENT
Start: 2023-01-01 | End: 2023-01-01 | Stop reason: HOSPADM

## 2023-01-01 RX ORDER — ACETAMINOPHEN 160 MG/5ML
650 SUSPENSION, ORAL (FINAL DOSE FORM) ORAL EVERY 4 HOURS PRN
Status: DISCONTINUED | OUTPATIENT
Start: 2023-01-01 | End: 2023-01-01

## 2023-01-01 RX ORDER — CHLORHEXIDINE GLUCONATE 0.12 MG/ML
15 RINSE ORAL EVERY 12 HOURS SCHEDULED
Status: DISCONTINUED | OUTPATIENT
Start: 2023-01-01 | End: 2023-01-01

## 2023-01-01 RX ORDER — BACLOFEN 10 MG/1
15 TABLET ORAL EVERY 12 HOURS SCHEDULED
Status: DISCONTINUED | OUTPATIENT
Start: 2023-01-01 | End: 2023-01-01

## 2023-01-01 RX ORDER — FLUCONAZOLE 150 MG/1
150 TABLET ORAL ONCE
Qty: 1 TABLET | Refills: 0 | Status: SHIPPED | OUTPATIENT
Start: 2023-01-01 | End: 2023-01-01

## 2023-01-01 RX ORDER — MAGNESIUM SULFATE HEPTAHYDRATE 40 MG/ML
2 INJECTION, SOLUTION INTRAVENOUS ONCE
Status: COMPLETED | OUTPATIENT
Start: 2023-01-01 | End: 2023-01-01

## 2023-01-01 RX ORDER — CEFTRIAXONE 1 G/50ML
1000 INJECTION, SOLUTION INTRAVENOUS EVERY 24 HOURS
Status: DISCONTINUED | OUTPATIENT
Start: 2023-01-01 | End: 2023-01-01

## 2023-01-01 RX ORDER — LORAZEPAM 2 MG/ML
1 INJECTION INTRAMUSCULAR EVERY 4 HOURS
Status: DISCONTINUED | OUTPATIENT
Start: 2023-01-01 | End: 2023-01-01 | Stop reason: HOSPADM

## 2023-01-01 RX ORDER — GLYCOPYRROLATE 0.2 MG/ML
0.1 INJECTION INTRAMUSCULAR; INTRAVENOUS EVERY 4 HOURS PRN
Status: DISCONTINUED | OUTPATIENT
Start: 2023-01-01 | End: 2023-01-01 | Stop reason: HOSPADM

## 2023-01-01 RX ORDER — LORAZEPAM 2 MG/ML
1 INJECTION INTRAMUSCULAR
Status: DISCONTINUED | OUTPATIENT
Start: 2023-01-01 | End: 2023-01-01 | Stop reason: HOSPADM

## 2023-01-01 RX ORDER — ENOXAPARIN SODIUM 100 MG/ML
30 INJECTION SUBCUTANEOUS DAILY
Status: DISCONTINUED | OUTPATIENT
Start: 2023-01-01 | End: 2023-01-01

## 2023-01-01 RX ORDER — GABAPENTIN 250 MG/5ML
300 SOLUTION ORAL
Status: DISCONTINUED | OUTPATIENT
Start: 2023-01-01 | End: 2023-01-01

## 2023-01-01 RX ORDER — ASPIRIN 325 MG
325 TABLET ORAL DAILY
Status: DISCONTINUED | OUTPATIENT
Start: 2023-01-01 | End: 2023-01-01

## 2023-01-01 RX ORDER — FENTANYL CITRATE 50 UG/ML
25 INJECTION, SOLUTION INTRAMUSCULAR; INTRAVENOUS
Status: DISCONTINUED | OUTPATIENT
Start: 2023-01-01 | End: 2023-01-01

## 2023-01-01 RX ORDER — FENTANYL CITRATE 50 UG/ML
25 INJECTION, SOLUTION INTRAMUSCULAR; INTRAVENOUS ONCE
Status: COMPLETED | OUTPATIENT
Start: 2023-01-01 | End: 2023-01-01

## 2023-01-01 RX ORDER — BISACODYL 10 MG
10 SUPPOSITORY, RECTAL RECTAL ONCE
Status: COMPLETED | OUTPATIENT
Start: 2023-01-01 | End: 2023-01-01

## 2023-01-01 RX ORDER — CEFTRIAXONE 1 G/50ML
1000 INJECTION, SOLUTION INTRAVENOUS ONCE
Status: COMPLETED | OUTPATIENT
Start: 2023-01-01 | End: 2023-01-01

## 2023-01-01 RX ORDER — DALFAMPRIDINE 10 MG/1
10 TABLET, FILM COATED, EXTENDED RELEASE ORAL EVERY 12 HOURS
Status: DISCONTINUED | OUTPATIENT
Start: 2023-01-01 | End: 2023-01-01

## 2023-01-01 RX ORDER — LORAZEPAM 2 MG/ML
1 INJECTION INTRAMUSCULAR
Status: DISCONTINUED | OUTPATIENT
Start: 2023-01-01 | End: 2023-01-01

## 2023-01-01 RX ORDER — MORPHINE SULFATE 4 MG/ML
4 INJECTION, SOLUTION INTRAMUSCULAR; INTRAVENOUS ONCE
Status: COMPLETED | OUTPATIENT
Start: 2023-01-01 | End: 2023-01-01

## 2023-01-01 RX ORDER — PROPOFOL 10 MG/ML
50 INJECTION, EMULSION INTRAVENOUS ONCE
Status: COMPLETED | OUTPATIENT
Start: 2023-01-01 | End: 2023-01-01

## 2023-01-01 RX ORDER — SULFAMETHOXAZOLE AND TRIMETHOPRIM 800; 160 MG/1; MG/1
1 TABLET ORAL EVERY 12 HOURS SCHEDULED
Qty: 14 TABLET | Refills: 0 | Status: SHIPPED | OUTPATIENT
Start: 2023-01-01 | End: 2023-01-01

## 2023-01-01 RX ORDER — PROPOFOL 10 MG/ML
5-50 INJECTION, EMULSION INTRAVENOUS
Status: DISCONTINUED | OUTPATIENT
Start: 2023-01-01 | End: 2023-01-01

## 2023-01-01 RX ORDER — ACETAMINOPHEN 160 MG/5ML
650 SUSPENSION, ORAL (FINAL DOSE FORM) ORAL EVERY 6 HOURS PRN
Status: DISCONTINUED | OUTPATIENT
Start: 2023-01-01 | End: 2023-01-01

## 2023-01-01 RX ORDER — SODIUM CHLORIDE 9 MG/ML
20 INJECTION, SOLUTION INTRAVENOUS CONTINUOUS
Status: DISCONTINUED | OUTPATIENT
Start: 2023-01-01 | End: 2023-01-01 | Stop reason: HOSPADM

## 2023-01-01 RX ORDER — BACLOFEN 10 MG/1
10 TABLET ORAL DAILY
Status: DISCONTINUED | OUTPATIENT
Start: 2023-01-01 | End: 2023-01-01

## 2023-01-01 RX ORDER — AMOXICILLIN 250 MG
1 CAPSULE ORAL 2 TIMES DAILY
Status: DISCONTINUED | OUTPATIENT
Start: 2023-01-01 | End: 2023-01-01

## 2023-01-01 RX ORDER — SCOLOPAMINE TRANSDERMAL SYSTEM 1 MG/1
1 PATCH, EXTENDED RELEASE TRANSDERMAL
Status: DISCONTINUED | OUTPATIENT
Start: 2023-01-01 | End: 2023-01-01 | Stop reason: HOSPADM

## 2023-01-01 RX ORDER — SODIUM CHLORIDE, SODIUM GLUCONATE, SODIUM ACETATE, POTASSIUM CHLORIDE, MAGNESIUM CHLORIDE, SODIUM PHOSPHATE, DIBASIC, AND POTASSIUM PHOSPHATE .53; .5; .37; .037; .03; .012; .00082 G/100ML; G/100ML; G/100ML; G/100ML; G/100ML; G/100ML; G/100ML
500 INJECTION, SOLUTION INTRAVENOUS ONCE
Status: COMPLETED | OUTPATIENT
Start: 2023-01-01 | End: 2023-01-01

## 2023-01-01 RX ORDER — BACLOFEN 10 MG/1
15 TABLET ORAL 2 TIMES DAILY
Status: DISCONTINUED | OUTPATIENT
Start: 2023-01-01 | End: 2023-01-01

## 2023-01-01 RX ORDER — POLYETHYLENE GLYCOL 3350 17 G/17G
17 POWDER, FOR SOLUTION ORAL DAILY
Status: DISCONTINUED | OUTPATIENT
Start: 2023-01-01 | End: 2023-01-01

## 2023-01-01 RX ADMIN — Medication 5 MG/HR: at 23:14

## 2023-01-01 RX ADMIN — MORPHINE SULFATE 4 MG: 4 INJECTION INTRAVENOUS at 20:03

## 2023-01-01 RX ADMIN — CHLORHEXIDINE GLUCONATE 15 ML: 1.2 RINSE ORAL at 21:37

## 2023-01-01 RX ADMIN — TIZANIDINE 4 MG: 2 TABLET ORAL at 21:11

## 2023-01-01 RX ADMIN — CEFTRIAXONE 1000 MG: 1 INJECTION, SOLUTION INTRAVENOUS at 10:30

## 2023-01-01 RX ADMIN — MORPHINE SULFATE 2 MG: 2 INJECTION, SOLUTION INTRAMUSCULAR; INTRAVENOUS at 20:12

## 2023-01-01 RX ADMIN — LORAZEPAM 1 MG: 2 INJECTION INTRAMUSCULAR; INTRAVENOUS at 06:33

## 2023-01-01 RX ADMIN — PHENYLEPHRINE HYDROCHLORIDE 25 MCG/MIN: 10 INJECTION INTRAVENOUS at 15:11

## 2023-01-01 RX ADMIN — ACETAMINOPHEN 325 MG: 325 SUPPOSITORY RECTAL at 12:56

## 2023-01-01 RX ADMIN — GLYCOPYRROLATE 0.1 MG: 0.2 INJECTION, SOLUTION INTRAMUSCULAR; INTRAVENOUS at 05:02

## 2023-01-01 RX ADMIN — SENNOSIDES AND DOCUSATE SODIUM 1 TABLET: 50; 8.6 TABLET ORAL at 08:41

## 2023-01-01 RX ADMIN — LORAZEPAM 1 MG: 2 INJECTION INTRAMUSCULAR; INTRAVENOUS at 14:02

## 2023-01-01 RX ADMIN — MORPHINE SULFATE 2 MG: 2 INJECTION, SOLUTION INTRAMUSCULAR; INTRAVENOUS at 05:55

## 2023-01-01 RX ADMIN — SENNOSIDES AND DOCUSATE SODIUM 1 TABLET: 50; 8.6 TABLET ORAL at 08:20

## 2023-01-01 RX ADMIN — POLYETHYLENE GLYCOL 3350 17 G: 17 POWDER, FOR SOLUTION ORAL at 08:19

## 2023-01-01 RX ADMIN — BACLOFEN 15 MG: 10 TABLET ORAL at 08:42

## 2023-01-01 RX ADMIN — ACETAMINOPHEN 650 MG: 650 SUSPENSION ORAL at 01:57

## 2023-01-01 RX ADMIN — ASPIRIN 325 MG: 325 TABLET ORAL at 08:20

## 2023-01-01 RX ADMIN — Medication 20 MG: at 08:55

## 2023-01-01 RX ADMIN — LORAZEPAM 1 MG: 2 INJECTION INTRAMUSCULAR; INTRAVENOUS at 14:22

## 2023-01-01 RX ADMIN — CEFTRIAXONE 1000 MG: 1 INJECTION, SOLUTION INTRAVENOUS at 10:21

## 2023-01-01 RX ADMIN — MORPHINE SULFATE 2 MG: 2 INJECTION, SOLUTION INTRAMUSCULAR; INTRAVENOUS at 02:02

## 2023-01-01 RX ADMIN — BACLOFEN 10 MG: 10 TABLET ORAL at 16:38

## 2023-01-01 RX ADMIN — PIPERACILLIN AND TAZOBACTAM 3.38 G: 36; 4.5 INJECTION, POWDER, FOR SOLUTION INTRAVENOUS at 17:22

## 2023-01-01 RX ADMIN — SENNOSIDES AND DOCUSATE SODIUM 1 TABLET: 50; 8.6 TABLET ORAL at 18:34

## 2023-01-01 RX ADMIN — Medication 7 MG/HR: at 17:53

## 2023-01-01 RX ADMIN — ACETAMINOPHEN 325 MG: 325 SUPPOSITORY RECTAL at 15:57

## 2023-01-01 RX ADMIN — BACLOFEN 15 MG: 10 TABLET ORAL at 22:20

## 2023-01-01 RX ADMIN — GLYCOPYRROLATE 0.1 MG: 0.2 INJECTION, SOLUTION INTRAMUSCULAR; INTRAVENOUS at 19:22

## 2023-01-01 RX ADMIN — SODIUM CHLORIDE, SODIUM GLUCONATE, SODIUM ACETATE, POTASSIUM CHLORIDE, MAGNESIUM CHLORIDE, SODIUM PHOSPHATE, DIBASIC, AND POTASSIUM PHOSPHATE 500 ML: .53; .5; .37; .037; .03; .012; .00082 INJECTION, SOLUTION INTRAVENOUS at 00:11

## 2023-01-01 RX ADMIN — ENOXAPARIN SODIUM 30 MG: 30 INJECTION SUBCUTANEOUS at 08:49

## 2023-01-01 RX ADMIN — GLYCOPYRROLATE 0.1 MG: 0.2 INJECTION, SOLUTION INTRAMUSCULAR; INTRAVENOUS at 02:11

## 2023-01-01 RX ADMIN — SCOPALAMINE 1 PATCH: 1 PATCH, EXTENDED RELEASE TRANSDERMAL at 14:23

## 2023-01-01 RX ADMIN — CHLORHEXIDINE GLUCONATE 15 ML: 1.2 RINSE ORAL at 08:41

## 2023-01-01 RX ADMIN — LORAZEPAM 1 MG: 2 INJECTION INTRAMUSCULAR; INTRAVENOUS at 10:32

## 2023-01-01 RX ADMIN — BACLOFEN 15 MG: 10 TABLET ORAL at 08:34

## 2023-01-01 RX ADMIN — ENOXAPARIN SODIUM 30 MG: 30 INJECTION SUBCUTANEOUS at 08:20

## 2023-01-01 RX ADMIN — CHLORHEXIDINE GLUCONATE 15 ML: 1.2 RINSE ORAL at 22:20

## 2023-01-01 RX ADMIN — PHENYLEPHRINE HYDROCHLORIDE 35 MCG/MIN: 10 INJECTION INTRAVENOUS at 02:51

## 2023-01-01 RX ADMIN — GLYCOPYRROLATE 0.1 MG: 0.2 INJECTION, SOLUTION INTRAMUSCULAR; INTRAVENOUS at 10:29

## 2023-01-01 RX ADMIN — ACETAMINOPHEN 650 MG: 650 SUSPENSION ORAL at 15:35

## 2023-01-01 RX ADMIN — MORPHINE SULFATE 2 MG: 2 INJECTION, SOLUTION INTRAMUSCULAR; INTRAVENOUS at 03:28

## 2023-01-01 RX ADMIN — SENNOSIDES AND DOCUSATE SODIUM 1 TABLET: 50; 8.6 TABLET ORAL at 17:44

## 2023-01-01 RX ADMIN — SODIUM CHLORIDE 2000 ML: 0.9 INJECTION, SOLUTION INTRAVENOUS at 16:00

## 2023-01-01 RX ADMIN — SCOPALAMINE 1 PATCH: 1 PATCH, EXTENDED RELEASE TRANSDERMAL at 14:38

## 2023-01-01 RX ADMIN — PIPERACILLIN AND TAZOBACTAM 3.38 G: 36; 4.5 INJECTION, POWDER, FOR SOLUTION INTRAVENOUS at 10:55

## 2023-01-01 RX ADMIN — PIPERACILLIN AND TAZOBACTAM 3.38 G: 36; 4.5 INJECTION, POWDER, FOR SOLUTION INTRAVENOUS at 22:36

## 2023-01-01 RX ADMIN — GABAPENTIN 300 MG: 250 SOLUTION ORAL at 21:11

## 2023-01-01 RX ADMIN — CHLORHEXIDINE GLUCONATE 15 ML: 1.2 RINSE ORAL at 20:40

## 2023-01-01 RX ADMIN — ASPIRIN 325 MG: 325 TABLET ORAL at 08:41

## 2023-01-01 RX ADMIN — ACETAMINOPHEN 325 MG: 325 SUPPOSITORY RECTAL at 17:00

## 2023-01-01 RX ADMIN — PROPOFOL 50 MG: 10 INJECTION, EMULSION INTRAVENOUS at 16:26

## 2023-01-01 RX ADMIN — LORAZEPAM 1 MG: 2 INJECTION INTRAMUSCULAR; INTRAVENOUS at 05:57

## 2023-01-01 RX ADMIN — BISACODYL 10 MG: 10 SUPPOSITORY RECTAL at 20:49

## 2023-01-01 RX ADMIN — PROPOFOL 5 MCG/KG/MIN: 10 INJECTION, EMULSION INTRAVENOUS at 16:33

## 2023-01-01 RX ADMIN — SODIUM CHLORIDE 1000 ML: 0.9 INJECTION, SOLUTION INTRAVENOUS at 20:44

## 2023-01-01 RX ADMIN — CEFTRIAXONE 1000 MG: 1 INJECTION, SOLUTION INTRAVENOUS at 19:04

## 2023-01-01 RX ADMIN — PROPOFOL 20 MCG/KG/MIN: 10 INJECTION, EMULSION INTRAVENOUS at 22:19

## 2023-01-01 RX ADMIN — BACLOFEN 15 MG: 10 TABLET ORAL at 20:46

## 2023-01-01 RX ADMIN — TIZANIDINE 4 MG: 2 TABLET ORAL at 21:38

## 2023-01-01 RX ADMIN — IOHEXOL 85 ML: 350 INJECTION, SOLUTION INTRAVENOUS at 16:14

## 2023-01-01 RX ADMIN — BACLOFEN 15 MG: 10 TABLET ORAL at 21:36

## 2023-01-01 RX ADMIN — BACLOFEN 10 MG: 10 TABLET ORAL at 17:33

## 2023-01-01 RX ADMIN — CEFTRIAXONE 1000 MG: 1 INJECTION, SOLUTION INTRAVENOUS at 09:56

## 2023-01-01 RX ADMIN — MORPHINE SULFATE 2 MG: 2 INJECTION, SOLUTION INTRAMUSCULAR; INTRAVENOUS at 07:30

## 2023-01-01 RX ADMIN — FENTANYL CITRATE 25 MCG: 50 INJECTION, SOLUTION INTRAMUSCULAR; INTRAVENOUS at 10:21

## 2023-01-01 RX ADMIN — LORAZEPAM 1 MG: 2 INJECTION INTRAMUSCULAR; INTRAVENOUS at 21:41

## 2023-01-01 RX ADMIN — SODIUM CHLORIDE, SODIUM GLUCONATE, SODIUM ACETATE, POTASSIUM CHLORIDE, MAGNESIUM CHLORIDE, SODIUM PHOSPHATE, DIBASIC, AND POTASSIUM PHOSPHATE 500 ML: .53; .5; .37; .037; .03; .012; .00082 INJECTION, SOLUTION INTRAVENOUS at 12:42

## 2023-01-01 RX ADMIN — SENNOSIDES AND DOCUSATE SODIUM 1 TABLET: 50; 8.6 TABLET ORAL at 08:49

## 2023-01-01 RX ADMIN — TIZANIDINE 4 MG: 2 TABLET ORAL at 22:37

## 2023-01-01 RX ADMIN — ACETAMINOPHEN 650 MG: 650 SUSPENSION ORAL at 23:41

## 2023-01-01 RX ADMIN — POLYETHYLENE GLYCOL 3350 17 G: 17 POWDER, FOR SOLUTION ORAL at 08:41

## 2023-01-01 RX ADMIN — POLYETHYLENE GLYCOL 3350 17 G: 17 POWDER, FOR SOLUTION ORAL at 08:49

## 2023-01-01 RX ADMIN — MORPHINE SULFATE 4 MG: 4 INJECTION INTRAVENOUS at 17:41

## 2023-01-01 RX ADMIN — LORAZEPAM 1 MG: 2 INJECTION INTRAMUSCULAR; INTRAVENOUS at 09:21

## 2023-01-01 RX ADMIN — BACLOFEN 10 MG: 10 TABLET ORAL at 15:50

## 2023-01-01 RX ADMIN — ACETAMINOPHEN 325 MG: 325 SUPPOSITORY RECTAL at 08:53

## 2023-01-01 RX ADMIN — BACLOFEN 15 MG: 10 TABLET ORAL at 08:20

## 2023-01-01 RX ADMIN — ACETAMINOPHEN 325 MG: 325 SUPPOSITORY RECTAL at 19:57

## 2023-01-01 RX ADMIN — TIZANIDINE 4 MG: 2 TABLET ORAL at 22:21

## 2023-01-01 RX ADMIN — MORPHINE SULFATE 4 MG: 4 INJECTION INTRAVENOUS at 10:14

## 2023-01-01 RX ADMIN — PIPERACILLIN AND TAZOBACTAM 3.38 G: 36; 4.5 INJECTION, POWDER, FOR SOLUTION INTRAVENOUS at 05:44

## 2023-01-01 RX ADMIN — LORAZEPAM 1 MG: 2 INJECTION INTRAMUSCULAR; INTRAVENOUS at 03:21

## 2023-01-01 RX ADMIN — IOHEXOL 80 ML: 350 INJECTION, SOLUTION INTRAVENOUS at 16:17

## 2023-01-01 RX ADMIN — ACETAMINOPHEN 650 MG: 650 SUSPENSION ORAL at 12:11

## 2023-01-01 RX ADMIN — GABAPENTIN 300 MG: 250 SOLUTION ORAL at 21:38

## 2023-01-01 RX ADMIN — CHLORHEXIDINE GLUCONATE 15 ML: 1.2 RINSE ORAL at 08:49

## 2023-01-01 RX ADMIN — Medication 20 MG: at 12:40

## 2023-01-01 RX ADMIN — ACETAMINOPHEN 650 MG: 650 SUSPENSION ORAL at 10:05

## 2023-01-01 RX ADMIN — MORPHINE SULFATE 4 MG: 4 INJECTION INTRAVENOUS at 16:30

## 2023-01-01 RX ADMIN — PIPERACILLIN AND TAZOBACTAM 3.38 G: 36; 4.5 INJECTION, POWDER, FOR SOLUTION INTRAVENOUS at 05:28

## 2023-01-01 RX ADMIN — ASPIRIN 325 MG: 325 TABLET ORAL at 09:56

## 2023-01-01 RX ADMIN — BACLOFEN 15 MG: 10 TABLET ORAL at 20:39

## 2023-01-01 RX ADMIN — ENOXAPARIN SODIUM 30 MG: 30 INJECTION SUBCUTANEOUS at 08:41

## 2023-01-01 RX ADMIN — Medication 10 MG/HR: at 11:35

## 2023-01-01 RX ADMIN — PIPERACILLIN AND TAZOBACTAM 3.38 G: 36; 4.5 INJECTION, POWDER, FOR SOLUTION INTRAVENOUS at 23:40

## 2023-01-01 RX ADMIN — BACLOFEN 10 MG: 10 TABLET ORAL at 15:52

## 2023-01-01 RX ADMIN — Medication 20 MG: at 09:23

## 2023-01-01 RX ADMIN — BACLOFEN 15 MG: 10 TABLET ORAL at 08:49

## 2023-01-01 RX ADMIN — LEVETIRACETAM 750 MG: 100 INJECTION, SOLUTION INTRAVENOUS at 09:49

## 2023-01-01 RX ADMIN — LORAZEPAM 1 MG: 2 INJECTION INTRAMUSCULAR; INTRAVENOUS at 22:04

## 2023-01-01 RX ADMIN — Medication 8 MG/HR: at 13:54

## 2023-01-01 RX ADMIN — LORAZEPAM 1 MG: 2 INJECTION INTRAMUSCULAR; INTRAVENOUS at 02:05

## 2023-01-01 RX ADMIN — ENOXAPARIN SODIUM 30 MG: 30 INJECTION SUBCUTANEOUS at 08:33

## 2023-01-01 RX ADMIN — Medication 4 MG/HR: at 22:03

## 2023-01-01 RX ADMIN — ACETAMINOPHEN 325 MG: 325 SUPPOSITORY RECTAL at 05:58

## 2023-01-01 RX ADMIN — LORAZEPAM 1 MG: 2 INJECTION INTRAMUSCULAR; INTRAVENOUS at 17:51

## 2023-01-01 RX ADMIN — LORAZEPAM 1 MG: 2 INJECTION INTRAMUSCULAR; INTRAVENOUS at 17:53

## 2023-01-01 RX ADMIN — ACETAMINOPHEN 650 MG: 650 SUSPENSION ORAL at 15:57

## 2023-01-01 RX ADMIN — MAGNESIUM SULFATE HEPTAHYDRATE 2 G: 40 INJECTION, SOLUTION INTRAVENOUS at 07:44

## 2023-01-01 RX ADMIN — MORPHINE SULFATE 2 MG: 2 INJECTION, SOLUTION INTRAMUSCULAR; INTRAVENOUS at 21:28

## 2023-01-01 RX ADMIN — LEVETIRACETAM 750 MG: 100 INJECTION, SOLUTION INTRAVENOUS at 20:34

## 2023-01-01 RX ADMIN — CHLORHEXIDINE GLUCONATE 15 ML: 1.2 RINSE ORAL at 20:39

## 2023-01-01 RX ADMIN — GLYCOPYRROLATE 0.1 MG: 0.2 INJECTION, SOLUTION INTRAMUSCULAR; INTRAVENOUS at 05:41

## 2023-01-01 RX ADMIN — PROPOFOL 20 MCG/KG/MIN: 10 INJECTION, EMULSION INTRAVENOUS at 05:50

## 2023-01-01 RX ADMIN — POLYETHYLENE GLYCOL 3350 17 G: 17 POWDER, FOR SOLUTION ORAL at 20:33

## 2023-01-01 RX ADMIN — Medication 6 MG/HR: at 10:50

## 2023-01-01 RX ADMIN — MORPHINE SULFATE 2 MG: 2 INJECTION, SOLUTION INTRAMUSCULAR; INTRAVENOUS at 13:13

## 2023-01-01 RX ADMIN — GADOBUTROL 8 ML: 604.72 INJECTION INTRAVENOUS at 13:59

## 2023-01-01 RX ADMIN — GABAPENTIN 300 MG: 250 SOLUTION ORAL at 22:44

## 2023-01-01 RX ADMIN — CHLORHEXIDINE GLUCONATE 15 ML: 1.2 RINSE ORAL at 08:19

## 2023-01-01 RX ADMIN — SODIUM CHLORIDE 20 ML/HR: 0.9 INJECTION, SOLUTION INTRAVENOUS at 20:31

## 2023-01-01 RX ADMIN — LORAZEPAM 1 MG: 2 INJECTION INTRAMUSCULAR; INTRAVENOUS at 13:11

## 2023-01-01 RX ADMIN — PHENYLEPHRINE HYDROCHLORIDE 55 MCG/MIN: 10 INJECTION INTRAVENOUS at 05:09

## 2023-01-01 RX ADMIN — GLYCOPYRROLATE 0.1 MG: 0.2 INJECTION, SOLUTION INTRAMUSCULAR; INTRAVENOUS at 22:54

## 2023-01-01 RX ADMIN — SENNOSIDES AND DOCUSATE SODIUM 1 TABLET: 50; 8.6 TABLET ORAL at 20:47

## 2023-01-01 RX ADMIN — CHLORHEXIDINE GLUCONATE 15 ML: 1.2 RINSE ORAL at 08:33

## 2023-01-02 PROBLEM — J21.0 RSV (ACUTE BRONCHIOLITIS DUE TO RESPIRATORY SYNCYTIAL VIRUS): Status: RESOLVED | Noted: 2022-01-01 | Resolved: 2023-01-01

## 2023-01-06 ENCOUNTER — TELEPHONE (OUTPATIENT)
Dept: UROLOGY | Facility: CLINIC | Age: 68
End: 2023-01-06

## 2023-01-06 NOTE — TELEPHONE ENCOUNTER
Rima Mora  Patient Appointment Schedule Request Pool 55 minutes ago (9:56 AM)     peyton,  since dr Kristen Blanton prescribed sulfa for Uti infection,I have completed the medication  A few days later,I again started signs of an infection  My family y dr,dr Alvarado,put me on Nitrofurantoin mono mcr 100 mg,which seems to be helping  I want to make an appointment to see dr Kristen Blanton as soon as possible to discuss current issues      Please advise  My cell is [phone]  Thank you,  Rima Rosario    Is she okay to be scheduled with Dr Kristen Blanton next available?

## 2023-01-06 NOTE — TELEPHONE ENCOUNTER
Pt  called stated pt gets UTI as soon as she is off sulfa infection comes right back and pt starts to have hallucinations due to the infection     Pt call [phone] Wes Cockayne (

## 2023-01-09 ENCOUNTER — OFFICE VISIT (OUTPATIENT)
Dept: UROLOGY | Facility: CLINIC | Age: 68
End: 2023-01-09

## 2023-01-09 VITALS — DIASTOLIC BLOOD PRESSURE: 76 MMHG | OXYGEN SATURATION: 99 % | HEART RATE: 85 BPM | SYSTOLIC BLOOD PRESSURE: 118 MMHG

## 2023-01-09 DIAGNOSIS — N39.0 RECURRENT UTI: Primary | ICD-10-CM

## 2023-01-09 RX ORDER — GABAPENTIN 300 MG/1
CAPSULE ORAL
COMMUNITY

## 2023-01-09 RX ORDER — LOSARTAN POTASSIUM 50 MG/1
TABLET ORAL
COMMUNITY

## 2023-01-09 RX ORDER — NITROFURANTOIN MACROCRYSTALS 100 MG/1
100 CAPSULE ORAL 4 TIMES DAILY
COMMUNITY

## 2023-01-09 RX ORDER — METOPROLOL SUCCINATE 25 MG/1
TABLET, EXTENDED RELEASE ORAL
COMMUNITY

## 2023-01-09 RX ORDER — BUSPIRONE HYDROCHLORIDE 15 MG/1
TABLET ORAL
COMMUNITY

## 2023-01-09 RX ORDER — NITROFURANTOIN 25; 75 MG/1; MG/1
100 CAPSULE ORAL 2 TIMES DAILY
Qty: 14 CAPSULE | Refills: 0 | Status: SHIPPED | OUTPATIENT
Start: 2023-01-09 | End: 2023-01-09 | Stop reason: SDUPTHER

## 2023-01-09 RX ORDER — NITROFURANTOIN 25; 75 MG/1; MG/1
100 CAPSULE ORAL 2 TIMES DAILY
Qty: 14 CAPSULE | Refills: 4 | Status: SHIPPED | OUTPATIENT
Start: 2023-01-09 | End: 2023-01-16

## 2023-01-09 NOTE — PROGRESS NOTES
1  Recurrent UTI  nitrofurantoin (MACROBID) 100 mg capsule    DISCONTINUED: nitrofurantoin (MACROBID) 100 mg capsule            Assessment and plan:     1  Neurogenic bladder  2  urinary colonization  3  Recurrent urinary infections  - continue SPT changes Q 5-6 weeks  - continue hiprex  - hydration  - self start nitrofurantoin    [facility]      Chief Complaint     Neurogenic bladder    History of Present Illness     Rima Ncie Schuyler is a 78 y o  female presenting for f/u of neurogenic bladder  Patient has a history multiple sclerosis and ultimately has neurogenic bladder as a result  This was managed by clean intermittent catheterization for by her  (whom is a patient of ours)  There had been barriers to clean intermittent catheterization as he was having increasing difficulty lifting the patient up to perform the procedure as well as increasing bladder spasms  Patient had been Bronson catheter dependent over the past year  They transition to Bronson catheter due to concerns of recurrent infections  She did have 2 hospitalizations for urinary infection and report multiple antibiotics as well  Denies any previous history of pelvic surgery or radiation  Patient did previously have a sacral neuromodulator placed in November of 2021 with Dr Brandon Bruner however they feel that this was not beneficial for her urinary symptoms  It is MRI compatible however  Patient had a suprapubic tube placed at the end of April with Interventional Radiology  Hospitalist shortly thereafter with urinary infection and decline with her MS  Started on hiprex  Laboratory     Lab Results   Component Value Date    CREATININE 0 74 11/02/2022       Review of Systems     Review of Systems   Constitutional: Negative for activity change, appetite change, chills, diaphoresis, fatigue, fever and unexpected weight change  Respiratory: Negative for chest tightness and shortness of breath      Cardiovascular: Negative for chest pain, palpitations and leg swelling  Gastrointestinal: Negative for abdominal distention, abdominal pain, constipation, diarrhea, nausea and vomiting  Genitourinary: Negative for decreased urine volume, difficulty urinating, dysuria, enuresis, flank pain, frequency, genital sores, hematuria and urgency  Musculoskeletal: Negative for back pain, gait problem and myalgias  Skin: Negative for color change, pallor, rash and wound  Psychiatric/Behavioral: Negative for behavioral problems  The patient is not nervous/anxious  Allergies     No Known Allergies    Physical Exam     Physical Exam  Constitutional:       General: She is not in acute distress  Appearance: Normal appearance  She is not ill-appearing, toxic-appearing or diaphoretic  HENT:      Head: Normocephalic and atraumatic  Eyes:      General:         Right eye: No discharge  Left eye: No discharge  Conjunctiva/sclera: Conjunctivae normal    Genitourinary:     Comments: SPT in place draining clear yellow urine  Site c/d/i  Musculoskeletal:      Comments: Ambulates with wheelchair assistance   Skin:     General: Skin is warm and dry  Coloration: Skin is not jaundiced or pale  Neurological:      Mental Status: She is alert             Vital Signs     Vitals:    01/09/23 1322   BP: 118/76   BP Location: Left arm   Patient Position: Sitting   Pulse: 85   SpO2: 99%         Current Medications       Current Outpatient Medications:   •  acetaminophen (TYLENOL) 325 mg tablet, Take 2 tablets (650 mg total) by mouth every 6 (six) hours as needed for mild pain, headaches or fever, Disp: , Rfl: 0  •  albuterol (ProAir HFA) 90 mcg/act inhaler, Inhale 2 puffs every 6 (six) hours as needed for wheezing, Disp: 8 5 g, Rfl: 0  •  amLODIPine (NORVASC) 2 5 mg tablet, Take 2 5 mg by mouth every 12 (twelve) hours, Disp: , Rfl:   •  baclofen 10 mg tablet, Take 1 tablet (10 mg total) by mouth in the morning Take in afternoon, Disp: , Rfl: 0  •  baclofen 5 MG TABS, Take 15 mg by mouth 2 (two) times a day Breakfast and bedtime, Disp: , Rfl: 0  •  benzonatate (TESSALON) 200 MG capsule, Take 1 capsule (200 mg total) by mouth 3 (three) times a day as needed for cough, Disp: 20 capsule, Rfl: 0  •  busPIRone (BUSPAR) 15 mg tablet, buspirone 15 mg tablet  TAKE 1 TABLET EVERY 12 HOURS, Disp: , Rfl:   •  Dalfampridine ER 10 MG TB12, Take 10 mg by mouth every 12 (twelve) hours, Disp: , Rfl:   •  fluticasone (FLONASE) 50 mcg/act nasal spray, 1 spray into each nostril daily Do not start before November 5, 2022 , Disp: , Rfl: 0  •  furosemide (LASIX) 40 mg tablet, , Disp: , Rfl:   •  gabapentin (NEURONTIN) 300 mg capsule, gabapentin 300 mg capsule, Disp: , Rfl:   •  guaiFENesin (MUCINEX) 600 mg 12 hr tablet, Take 1 tablet (600 mg total) by mouth every 12 (twelve) hours, Disp: , Rfl: 0  •  loratadine (CLARITIN) 10 mg tablet, Take 1 tablet (10 mg total) by mouth daily Do not start before November 5, 2022 , Disp: , Rfl: 0  •  losartan (COZAAR) 50 mg tablet, losartan 50 mg tablet, Disp: , Rfl:   •  menthol-methyl salicylate (BENGAY) 10-84 % cream, Apply topically 3 (three) times a day, Disp: , Rfl: 0  •  methenamine hippurate (HIPREX) 1 g tablet, TAKE 1 TABLET BY MOUTH  TWICE DAILY, Disp: 180 tablet, Rfl: 1  •  metoprolol succinate (TOPROL-XL) 25 mg 24 hr tablet, metoprolol succinate ER 25 mg tablet,extended release 24 hr  TAKE 1 TABLET BY MOUTH EVERY DAY, Disp: , Rfl:   •  nabumetone (RELAFEN) 500 mg tablet, , Disp: , Rfl:   •  nitrofurantoin (MACROBID) 100 mg capsule, Take 1 capsule (100 mg total) by mouth 2 (two) times a day for 7 days, Disp: 14 capsule, Rfl: 4  •  nitrofurantoin (MACRODANTIN) 100 mg capsule, Take 100 mg by mouth 4 (four) times a day, Disp: , Rfl:   •  sertraline (ZOLOFT) 50 mg tablet, sertraline 50 mg tablet  TAKE 1 TABLET EVERY DAY, Disp: , Rfl:   •  tiZANidine (ZANAFLEX) 4 mg tablet, , Disp: , Rfl:   •  VITAMIN D, CHOLECALCIFEROL, PO, Take 2,000 Units by mouth in the morning, Disp: , Rfl:   •  zolpidem (AMBIEN) 10 mg tablet, Take 10 mg by mouth daily at bedtime as needed, Disp: , Rfl:       Active Problems     Patient Active Problem List   Diagnosis   • Acquired polyneuropathy   • Multiple sclerosis ([facility] )   • Ambulatory dysfunction   • Hypertension   • Neurogenic bladder   • Leg swelling   • Hypernatremia   • Acute bilateral thoracic back pain   • Debility   • Anemia   • Left shoulder pain   • Abnormal urinalysis         Past Medical History     Past Medical History:   Diagnosis Date   • Back pain 2016   • Chronic UTI    • Colon polyp    • Hypertension    • Incontinence    • MS (multiple sclerosis) ([facility] )     Age 37   • Neurogenic bladder    • Spasticity          Surgical History     Past Surgical History:   Procedure Laterality Date   • BACK SURGERY  2016    Lumbar fusion   • COLONOSCOPY     • ELBOW SURGERY Right 2008    ORIF   • IR SUPRAPUBIC CATHETER PLACEMENT  4/26/2022   • GA INSERTION/RPLCMT PERIPHERAL/GASTRIC NPGR N/A 11/19/2021    Procedure: leadwire placement and pulse generator  placement of SNM at S3 Bilateral S3  nerve testing, fluroscopy - c-arm guidance and complex programming;  Surgeon: Miah Jeronimo MD;  Location: [facility];  Service: Gynecology         Family History     Family History   Problem Relation Age of Onset   • Cancer Mother    • Heart disease Father    • Cancer Sister          Social History     Social History       Radiology

## 2023-02-06 ENCOUNTER — PROCEDURE VISIT (OUTPATIENT)
Dept: UROLOGY | Facility: CLINIC | Age: 68
End: 2023-02-06

## 2023-02-06 DIAGNOSIS — N39.0 RECURRENT UTI: Primary | ICD-10-CM

## 2023-02-06 NOTE — PROGRESS NOTES
2/6/2023    Rima Mora  1955  [id number]    Diagnosis  Chief Complaint    SPT exchange         Patient presents for SPT exchange managed by our office    Plan  Return in 6 wk for next SPT exchange  Will call patient with urine culture results when available    Procedure: Suprapubic Tube Change         Cystostomy tube change     Date/Time 2/6/2023 11:16 AM     Performed by  Jac Yañez RN     Authorized by Samuel Millan MD      Universal Protocol   Consent: Verbal consent obtained  Risks and benefits: risks, benefits and alternatives were discussed  Consent given by: patient  Patient understanding: patient states understanding of the procedure being performed  Patient identity confirmed: verbally with patient        Local anesthesia used: no     Anesthesia   Local anesthesia used: no     Sedation   Patient sedated: no        Specimen: yes    Culture: yes   Procedure Details   Procedure Notes: Attached to stat lock and drainage bag  Extra urine cups, stat locks, plug and bedside bags provided to patient  Will call when results are available from urine culture  Next appt was made for 3/20/23  AVS was declined at this time  Patient tolerance: patient tolerated the procedure well with no immediate complications               Current catheter removed without difficulty after deflation of an intact balloon  Site prepped with Hibiclens, new 18F  suprapubic spt change via aseptic technique without incident, 10 ml balloon inflated with sterile water  irrigated easily for clear return, mild spasm noted  Patient tolerated well  Attached to drainage bag  Patient's  states patient is very weak the past couple weeks, and requested urine culture be collected  Urine specimen was collected from new SPT  Patient and spouse aware it may take 48-72 hr to result  Will call with results       It was discussed regarding replacing the SPT with another 18 fr or upsizing to 20 fr   Patient and spouse would like to continue with 18 fr for now            Saray Jennings, RN,BSN

## 2023-02-09 LAB — BACTERIA UR CULT: ABNORMAL

## 2023-02-22 ENCOUNTER — TELEPHONE (OUTPATIENT)
Dept: UROLOGY | Facility: CLINIC | Age: 68
End: 2023-02-22

## 2023-02-22 ENCOUNTER — TELEPHONE (OUTPATIENT)
Dept: UROLOGY | Facility: MEDICAL CENTER | Age: 68
End: 2023-02-22

## 2023-02-22 DIAGNOSIS — N39.0 RECURRENT UTI: Primary | ICD-10-CM

## 2023-02-22 NOTE — TELEPHONE ENCOUNTER
Regarding: Infection  Contact: [phone]  ----- Message from Rand Daigle RN sent at 2/22/2023  8:26 AM EST -----  Would you like to treat the urine culture from 2/6/23?     ----- Message from Es[place], [address] [place] [address] to Ron Rain PA-C sent at 2/21/2023  5:33 PM -----   Jesika Dumont,  I believe I may still have a yeast infection  I am getting weaker, and my speech is getting slower  Please advise    Thanks,  Rima

## 2023-02-22 NOTE — TELEPHONE ENCOUNTER
Chart message was sent to provider this morning for recommendations   No reply yet will monitor for response

## 2023-02-22 NOTE — TELEPHONE ENCOUNTER
Patient seen by Devin Sanchez at [facility]    Patient's  Marky Naranjo called stating patient might still have a uti infection  Patient has a spt catheter  He stated she is feeling weaker and her speech is slower   He stated he sent a message on my chart   He has not heard anything today  Please give him a call to see how to proceed      Patient can be reached at [phone]

## 2023-02-23 ENCOUNTER — APPOINTMENT (OUTPATIENT)
Dept: LAB | Facility: CLINIC | Age: 68
End: 2023-02-23

## 2023-02-23 DIAGNOSIS — N39.0 RECURRENT UTI: ICD-10-CM

## 2023-02-24 NOTE — TELEPHONE ENCOUNTER
Called and spoke to patient's   Informed him that abx was sent to pharmacy  Will call if needs to be adjusted  Patient's  thankful for call

## 2023-02-25 LAB — BACTERIA UR CULT: ABNORMAL

## 2023-02-26 PROBLEM — J96.01 ACUTE RESPIRATORY FAILURE WITH HYPOXIA (HCC): Status: ACTIVE | Noted: 2023-02-26

## 2023-02-26 PROBLEM — R41.82 ALTERED MENTAL STATUS: Status: ACTIVE | Noted: 2023-02-26

## 2023-02-26 PROBLEM — F32.A DEPRESSION: Status: ACTIVE | Noted: 2023-02-26

## 2023-02-26 NOTE — ED PROVIDER NOTES
History  Chief Complaint   Patient presents with   • Respiratory Arrest     Pt arrives via EMS after respiratory arrest      Patient is a 78-year-old female with a history of MS, neurogenic bladder with a suprapubic catheter, hypertension, chronic UTI who presents to the emergency department by EMS after a possible respiratory arrest   Per patient's , they were in route to a [employer], when at 2:20 PM patient stated that she had lost vision in both eyes  He proceeded to the [facility] with the patient, where she met up with her children and she was noted to have nonsensical speech  Family proceeded to attempt to drive patient to the hospital   They state that she remained silent during the ride, however her eyes were open and she was looking out the window  Patient suddenly lost consciousness, slumped over, stiffened up and was foaming at the mouth  Patient's  states that he noticed that she had some twitching as well  Family noticed that patient was cyanotic, initiated CPR and called EMS  Patient was intubated in route -medic administered ketamine, rocuronium, Narcan and fentanyl  Patient hypotensive on initial evaluation  Patient  also states that she was recently diagnosed with a UTI and is currently on Bactrim  History provided by:  EMS personnel  History limited by:  Acuity of condition   used: No        Prior to Admission Medications   Prescriptions Last Dose Informant Patient Reported? Taking?    Dalfampridine ER 10 MG TB12 Unknown  Yes No   Sig: Take 10 mg by mouth every 12 (twelve) hours   VITAMIN D, CHOLECALCIFEROL, PO Unknown  Yes No   Sig: Take 2,000 Units by mouth in the morning   acetaminophen (TYLENOL) 325 mg tablet Unknown  No No   Sig: Take 2 tablets (650 mg total) by mouth every 6 (six) hours as needed for mild pain, headaches or fever   albuterol (ProAir HFA) 90 mcg/act inhaler Unknown  No No   Sig: Inhale 2 puffs every 6 (six) hours as needed for wheezing   amLODIPine (NORVASC) 2 5 mg tablet Unknown  Yes No   Sig: Take 2 5 mg by mouth every 12 (twelve) hours   baclofen 10 mg tablet Unknown  No No   Sig: Take 1 tablet (10 mg total) by mouth in the morning Take in afternoon   baclofen 5 MG TABS Unknown  No No   Sig: Take 15 mg by mouth 2 (two) times a day Breakfast and bedtime   benzonatate (TESSALON) 200 MG capsule Unknown  No No   Sig: Take 1 capsule (200 mg total) by mouth 3 (three) times a day as needed for cough   busPIRone (BUSPAR) 15 mg tablet Unknown  Yes No   Sig: buspirone 15 mg tablet   TAKE 1 TABLET EVERY 12 HOURS   fluticasone (FLONASE) 50 mcg/act nasal spray Unknown  No No   Si spray into each nostril daily Do not start before 2022    furosemide (LASIX) 40 mg tablet Unknown  Yes No   gabapentin (NEURONTIN) 300 mg capsule Unknown  Yes No   Sig: gabapentin 300 mg capsule   guaiFENesin (MUCINEX) 600 mg 12 hr tablet Unknown  No No   Sig: Take 1 tablet (600 mg total) by mouth every 12 (twelve) hours   loratadine (CLARITIN) 10 mg tablet Unknown  No No   Sig: Take 1 tablet (10 mg total) by mouth daily Do not start before 2022     losartan (COZAAR) 50 mg tablet Unknown  Yes No   Sig: losartan 50 mg tablet   menthol-methyl salicylate (BENGAY) 81-81 % cream Unknown  No No   Sig: Apply topically 3 (three) times a day   methenamine hippurate (HIPREX) 1 g tablet Unknown  No No   Sig: TAKE 1 TABLET BY MOUTH  TWICE DAILY   metoprolol succinate (TOPROL-XL) 25 mg 24 hr tablet Unknown  Yes No   Sig: metoprolol succinate ER 25 mg tablet,extended release 24 hr   TAKE 1 TABLET BY MOUTH EVERY DAY   nabumetone (RELAFEN) 500 mg tablet Unknown  Yes No   nitrofurantoin (MACRODANTIN) 100 mg capsule Unknown  Yes No   Sig: Take 100 mg by mouth 4 (four) times a day   sertraline (ZOLOFT) 50 mg tablet Unknown  Yes No   Sig: sertraline 50 mg tablet   TAKE 1 TABLET EVERY DAY   sulfamethoxazole-trimethoprim (BACTRIM DS) 800-160 mg per tablet Unknown  No No   Sig: Take 1 tablet by mouth every 12 (twelve) hours for 7 days   tiZANidine (ZANAFLEX) 4 mg tablet Unknown  Yes No   zolpidem (AMBIEN) 10 mg tablet Unknown  Yes No   Sig: Take 10 mg by mouth daily at bedtime as needed      Facility-Administered Medications: None       Past Medical History:   Diagnosis Date   • Back pain 2016   • Chronic UTI    • Colon polyp    • Hypertension    • Incontinence    • MS (multiple sclerosis) (HCC)     Age 37   • Neurogenic bladder    • Spasticity        Past Surgical History:   Procedure Laterality Date   • BACK SURGERY  2016    Lumbar fusion   • COLONOSCOPY     • ELBOW SURGERY Right 2008    ORIF   • IR SUPRAPUBIC CATHETER PLACEMENT  4/26/2022   • NC INSERTION/RPLCMT PERIPHERAL/GASTRIC NPGR N/A 11/19/2021    Procedure: leadwire placement and pulse generator  placement of SNM at S3 Bilateral S3  nerve testing, fluroscopy - c-arm guidance and complex programming;  Surgeon: Oj Weir MD;  Location: [facility];  Service: Gynecology       Family History   Problem Relation Age of Onset   • Cancer Mother    • Heart disease Father    • Cancer Sister      I have reviewed and agree with the history as documented  E-Cigarette/Vaping   • E-Cigarette Use Never User      E-Cigarette/Vaping Substances   • Nicotine No    • THC No    • CBD No    • Flavoring No    • Other No    • Unknown No      Social History     Tobacco Use   • Smoking status: Never   • Smokeless tobacco: Never   Vaping Use   • Vaping Use: Never used   Substance Use Topics   • Alcohol use: Not Currently   • Drug use: Yes     Types: Marijuana     Comment: medical       Review of Systems   Unable to perform ROS: Acuity of condition   Neurological: Positive for seizures and speech difficulty  Physical Exam  Physical Exam  Vitals and nursing note reviewed  Constitutional:       General: She is in acute distress  Appearance: She is well-developed and well-groomed  She is not diaphoretic  Interventions: She is sedated and intubated  HENT:      Head: Normocephalic and atraumatic  Eyes:      Conjunctiva/sclera: Conjunctivae normal       Pupils: Pupils are equal, round, and reactive to light  Comments: Pinpoint pupils bilaterally   Cardiovascular:      Rate and Rhythm: Normal rate and regular rhythm  Heart sounds: Normal heart sounds  No murmur heard  Pulmonary:      Effort: Pulmonary effort is normal  No respiratory distress  She is intubated  Abdominal:      General: Bowel sounds are normal  There is no distension  Palpations: Abdomen is soft  Tenderness: There is no abdominal tenderness  Comments: Suprapubic catheter intact   Musculoskeletal:         General: Normal range of motion  Cervical back: Normal range of motion and neck supple  Skin:     General: Skin is warm and dry  Coloration: Skin is not pale  Findings: No rash  Psychiatric:         Speech: She is noncommunicative           Vital Signs  ED Triage Vitals   Temperature Pulse Respirations Blood Pressure SpO2   02/26/23 1745 02/26/23 1550 02/26/23 1550 02/26/23 1550 02/26/23 1610   (!) 96 °F (35 6 °C) 76 16 96/54 99 %      Temp Source Heart Rate Source Patient Position - Orthostatic VS BP Location FiO2 (%)   02/26/23 2000 02/26/23 1550 02/26/23 1550 02/26/23 1550 --   Rectal Monitor Sitting Right arm       Pain Score       02/26/23 2123       No Pain           Vitals:    02/26/23 2015 02/26/23 2123 02/26/23 2200 02/26/23 2300   BP: 114/60 147/60 128/63 95/55   Pulse: 61 76 74 68   Patient Position - Orthostatic VS: Lying Lying           Visual Acuity  Visual Acuity    Flowsheet Row Most Recent Value   L Pupil Size (mm) 1   R Pupil Size (mm) 1   L Pupil Shape Round   R Pupil Shape Round          ED Medications  Medications   baclofen tablet 10 mg (has no administration in time range)   baclofen tablet 15 mg (15 mg Oral Given 2/26/23 2220)   Dalfampridine ER TB12 10 mg (10 mg Oral Not Given 2/26/23 2303)   gabapentin (NEURONTIN) oral solution 300 mg (has no administration in time range)   tiZANidine (ZANAFLEX) tablet 4 mg (4 mg Oral Given 2/26/23 2221)   chlorhexidine (PERIDEX) 0 12 % oral rinse 15 mL (15 mL Mouth/Throat Given 2/26/23 2220)   enoxaparin (LOVENOX) subcutaneous injection 30 mg (has no administration in time range)   propofol (DIPRIVAN) 1000 mg in 100 mL infusion (premix) (20 mcg/kg/min × 81 8 kg Intravenous New Bag 2/26/23 2219)   piperacillin-tazobactam (ZOSYN) 3 375 g in sodium chloride 0 9 % 100 mL IVPB (3 375 g Intravenous New Bag 2/26/23 2340)   influenza vaccine, high-dose (FLUZONE HIGH-DOSE) IM injection MELANIE 0 7 mL (has no administration in time range)   sodium chloride 0 9 % bolus 2,000 mL (0 mL Intravenous Stopped 2/26/23 1729)   iohexol (OMNIPAQUE) 350 MG/ML injection (SINGLE-DOSE) 85 mL (85 mL Intravenous Given 2/26/23 1614)   iohexol (OMNIPAQUE) 350 MG/ML injection (SINGLE-DOSE) 80 mL (80 mL Intravenous Given 2/26/23 1617)   propofol (DIPRIVAN) 200 MG/20ML bolus injection 50 mg (50 mg Intravenous Given 2/26/23 1626)   cefTRIAXone (ROCEPHIN) IVPB (premix in dextrose) 1,000 mg 50 mL (1,000 mg Intravenous New Bag 2/26/23 1904)   sodium chloride 0 9 % bolus 1,000 mL (1,000 mL Intravenous New Bag 2/26/23 2044)       Diagnostic Studies  Results Reviewed     Procedure Component Value Units Date/Time    Blood culture #1 [[id number]] Collected: 02/26/23 1718    Lab Status: Preliminary result Specimen: Blood from Arm, Left Updated: 02/27/23 0003     Blood Culture Received in Microbiology Lab  Culture in Progress  Blood culture #2 [[id number]] Collected: 02/26/23 1718    Lab Status: Preliminary result Specimen: Blood from Arm, Right Updated: 02/27/23 0003     Blood Culture Received in Microbiology Lab  Culture in Progress      Meningitis/Encephalitis (ME) Panel [[id number]]  (Normal) Collected: 02/26/23 8934    Lab Status: Final result Specimen: Cerebrospinal Fluid from Lumbar Puncture Updated: 02/26/23 2300     C  NEOFORMANS/GATTII Not Detected     CYTOMEGALOVIRUS Not Detected     ENTEROVIRUS Not Detected     E COLI K1 Not Detected     H INFLUENZAE Not Detected     H SIMPLEX 1 Not Detected     H SIMPLEX 2 Not Detected     HERPES VIRUS 6 Not Detected     PARECHOVIRUS Not Detected     L  MONOCYTOGENES Not Detected     N MENINGITIDIS Not Detected     S AGALACTIAE Not Detected     S  PNEUMONIAE Not Detected     V ZOSTER Not Detected    Rapid drug screen, urine [[id number]]  (Normal) Collected: 02/26/23 1712    Lab Status: Final result Specimen: Urine, Catheter Updated: 02/26/23 2108     Amph/Meth UR Negative     Barbiturate Ur Negative     Benzodiazepine Urine Negative     Cocaine Urine Negative     Methadone Urine Negative     Opiate Urine Negative     PCP Ur Negative     THC Urine Negative     Oxycodone Urine Negative    Narrative:      FOR MEDICAL PURPOSES ONLY  IF CONFIRMATION NEEDED PLEASE CONTACT THE LAB WITHIN 5 DAYS      Drug Screen Cutoff Levels:  AMPHETAMINE/METHAMPHETAMINES  1000 ng/mL  BARBITURATES     200 ng/mL  BENZODIAZEPINES     200 ng/mL  COCAINE      300 ng/mL  METHADONE      300 ng/mL  OPIATES      300 ng/mL  PHENCYCLIDINE     25 ng/mL  THC       50 ng/mL  OXYCODONE      100 ng/mL    CSF Diff [[id number]] Collected: 02/26/23 1837    Lab Status: Final result Specimen: Cerebrospinal Fluid from Lumbar Puncture Updated: 02/26/23 1947     Total Counted 3     Lymphs %  %      Pathology Review No    Narrative:      3 total cells counted    STAT Gram Stain [[id number]] Collected: 02/26/23 1831    Lab Status: Final result Specimen: Other Updated: 02/26/23 1938     Gram Stain Result No Polys or Bacteria seen      Rare Mononuclear Cells    Lactic acid 2 Hours [[id number]]  (Normal) Collected: 02/26/23 1854    Lab Status: Final result Specimen: Blood from Arm, Right Updated: 02/26/23 1927     LACTIC ACID 1 2 mmol/L     Narrative:      Result may be elevated if tourniquet was used during collection  RBC count,CSF [[id number]]  (Normal) Collected: 02/26/23 1837    Lab Status: Final result Specimen: Cerebrospinal Fluid from Lumbar Puncture Updated: 02/26/23 1917     RBC, CSF 6 uL     CSF white cell count with differential [[id number]] Collected: 02/26/23 1837    Lab Status: Final result Specimen: Cerebrospinal Fluid from Lumbar Puncture Updated: 02/26/23 1917     Appearance, CSF Clear, Colorless     Tube Number, CSF 4     WBC, CSF 2 /uL      Xanthochromia No    CSF Glucose [[id number]]  (Normal) Collected: 02/26/23 1837    Lab Status: Final result Specimen: Cerebrospinal Fluid from Lumbar Puncture Updated: 02/26/23 1916     Glucose, CSF 48 mg/dL     Total Protein, CSF [[id number]]  (Abnormal) Collected: 02/26/23 1837    Lab Status: Final result Specimen: Cerebrospinal Fluid from Lumbar Puncture Updated: 02/26/23 1916     Protein, CSF 49 mg/dL     CSF culture and Gram stain [[id number]] Collected: 02/26/23 1837    Lab Status: In process Specimen: Cerebrospinal Fluid from Lumbar Puncture Updated: 02/26/23 1848    Urine Microscopic [[id number]]  (Normal) Collected: 02/26/23 1712    Lab Status: Final result Specimen: Urine, Indwelling Bronson Catheter Updated: 02/26/23 1830     RBC, UA 2-4 /hpf      WBC, UA 2-4 /hpf      Epithelial Cells Occasional /hpf      Bacteria, UA Occasional /hpf     Lactic acid [[id number]]  (Abnormal) Collected: 02/26/23 1716    Lab Status: Final result Specimen: Blood from Arm, Right Updated: 02/26/23 1830     LACTIC ACID 3 4 mmol/L     Narrative:      Result may be elevated if tourniquet was used during collection      HS Troponin I 2hr [[id number]]  (Normal) Collected: 02/26/23 1716    Lab Status: Final result Specimen: Blood from Arm, Right Updated: 02/26/23 1754     hs TnI 2hr 16 ng/L      Delta 2hr hsTnI 0 ng/L     B-Type Natriuretic Peptide(BNP) [[id number]]  (Normal) Collected: 02/26/23 1716    Lab Status: Final result Specimen: Blood from Arm, Right Updated: 02/26/23 1753     BNP 61 pg/mL     Ammonia [[id number]]  (Normal) Collected: 02/26/23 1716    Lab Status: Final result Specimen: Blood from Arm, Right Updated: 02/26/23 1746     Ammonia 25 umol/L     UA (URINE) with reflex to Scope [[id number]]  (Abnormal) Collected: 02/26/23 1712    Lab Status: Final result Specimen: Urine, Indwelling Bronson Catheter Updated: 02/26/23 1724     Color, UA Light Yellow     Clarity, UA Clear     Specific Gravity, UA <=1 005     pH, UA 6 0     Leukocytes, UA Small     Nitrite, UA Positive     Protein, UA Negative mg/dl      Glucose, UA Negative mg/dl      Ketones, UA Negative mg/dl      Urobilinogen, UA 0 2 E U /dl      Bilirubin, UA Negative     Occult Blood, UA Small    Blood gas, arterial [[id number]]  (Abnormal) Collected: 02/26/23 1620    Lab Status: Final result Specimen: Blood, Arterial from Radial, Right Updated: 02/26/23 1629     pH, Arterial 7 256     PH ART TC 7 262     pCO2, Arterial 37 6 mm Hg      PCO2 (TC) Arterial 36 9 mm Hg      pO2, Arterial 478 3 mm Hg      PO2 (TC) Arterial 475 7 mm Hg      HCO3, Arterial 16 3 mmol/L      Base Excess, Arterial -10 0 mmol/L      O2 Content, Arterial 14 9 mL/dL      O2 HGB,Arterial  95 7 %      SOURCE Radial, Right     SAMAN TEST Yes     Temperature 97 8 Degrees Fehrenheit      Vent Type- AC AC     AC Rate 18     Tidal Volume 450 ml      Inspired Air (FIO2) 100     PEEP 6    HS Troponin 0hr (reflex protocol) [[id number]]  (Normal) Collected: 02/26/23 1555    Lab Status: Final result Specimen: Blood from Arm, Right Updated: 02/26/23 1626     hs TnI 0hr 16 ng/L     Comprehensive metabolic panel [[id number]]  (Abnormal) Collected: 02/26/23 1555    Lab Status: Final result Specimen: Blood from Arm, Right Updated: 02/26/23 1618     Sodium 139 mmol/L      Potassium 4 5 mmol/L      Chloride 106 mmol/L      CO2 15 mmol/L      ANION GAP 18 mmol/L      BUN 21 mg/dL      Creatinine 1 14 mg/dL      Glucose 89 mg/dL      Calcium 8 3 mg/dL      Corrected Calcium 8 9 mg/dL  U/L       U/L      Alkaline Phosphatase 128 U/L      Total Protein 5 8 g/dL      Albumin 3 3 g/dL      Total Bilirubin 0 19 mg/dL      eGFR 50 ml/min/1 73sq m     Narrative:      [place] guidelines for Chronic Kidney Disease (CKD):   •  Stage 1 with normal or high GFR (GFR > 90 mL/min/1 73 square meters)  •  Stage 2 Mild CKD (GFR = 60-89 mL/min/1 73 square meters)  •  Stage 3A Moderate CKD (GFR = 45-59 mL/min/1 73 square meters)  •  Stage 3B Moderate CKD (GFR = 30-44 mL/min/1 73 square meters)  •  Stage 4 Severe CKD (GFR = 15-29 mL/min/1 73 square meters)  •  Stage 5 End Stage CKD (GFR <15 mL/min/1 73 square meters)  Note: GFR calculation is accurate only with a steady state creatinine    Protime-INR [[id number]]  (Normal) Collected: 02/26/23 1555    Lab Status: Final result Specimen: Blood from Arm, Right Updated: 02/26/23 1613     Protime 13 9 seconds      INR 1 06    APTT [[id number]]  (Normal) Collected: 02/26/23 1555    Lab Status: Final result Specimen: Blood from Arm, Right Updated: 02/26/23 1613     PTT 32 seconds     CBC and differential [[id number]]  (Abnormal) Collected: 02/26/23 1555    Lab Status: Final result Specimen: Blood from Arm, Right Updated: 02/26/23 1602     WBC 6 55 Thousand/uL      RBC 3 24 Million/uL      Hemoglobin 10 6 g/dL      Hematocrit 33 4 %       fL      MCH 32 7 pg      MCHC 31 7 g/dL      RDW 16 5 %      MPV 10 6 fL      Platelets 383 Thousands/uL      nRBC 0 /100 WBCs      Neutrophils Relative 81 %      Immat GRANS % 2 %      Lymphocytes Relative 11 %      Monocytes Relative 6 %      Eosinophils Relative 0 %      Basophils Relative 0 %      Neutrophils Absolute 5 33 Thousands/µL      Immature Grans Absolute 0 10 Thousand/uL      Lymphocytes Absolute 0 69 Thousands/µL      Monocytes Absolute 0 40 Thousand/µL      Eosinophils Absolute 0 02 Thousand/µL      Basophils Absolute 0 01 Thousands/µL                  CT chest abdomen pelvis w contrast   Final Result by Cash Fitzgerald MD (02/26 1718)      No acute thoracic or intra-abdominal pelvic abnormality identified  Mild bibasilar opacities left greater than right favoring atelectasis  Ectasia of the ascending aorta, 40 mm  Stable  Suggest follow-up in one year  The study was marked in EPIC for significant notification  Workstation performed: [id number]         CTA head and neck with and without contrast   Final Result by Dhruv Tamayo MD (02/26 1622)         1  No evidence of acute infarct, intracranial hemorrhage or mass effect  2   No hemodynamically significant stenosis, dissection or occlusion of the carotid or vertebral arteries or major vessels of the [place]  Workstation performed: [id number]                    Procedures  ECG 12 Lead Documentation Only    Date/Time: 2/26/2023 5:10 PM  Performed by: Rudy John DO  Authorized by: Rudy John DO     Indications / Diagnosis:  Ms changes  ECG reviewed by me, the ED Provider: yes    Patient location:  ED  Previous ECG:     Previous ECG:  Unavailable    Comparison to cardiac monitor: Yes    Interpretation:     Interpretation: non-specific    Rate:     ECG rate:  97bpm    ECG rate assessment: normal    Rhythm:     Rhythm: sinus rhythm    Ectopy:     Ectopy: none    QRS:     QRS axis:  Normal  Conduction:     Conduction: abnormal      Abnormal conduction: 1st degree    ST segments:     ST segments:  Normal  T waves:     T waves: normal    Lumbar puncture    Date/Time: 2/26/2023 6:10 PM  Performed by: Rudy John DO  Authorized by: Rudy John DO   Universal Protocol:  Consent: Verbal consent obtained  Written consent obtained    Risks and benefits: risks, benefits and alternatives were discussed  Consent given by: spouse  Time out: Immediately prior to procedure a "time out" was called to verify the correct patient, procedure, equipment, support staff and site/side marked as required  Timeout called at: 2/26/2023 6:10 PM   Patient understanding: patient states understanding of the procedure being performed  Patient consent: the patient's understanding of the procedure matches consent given  Required items: required blood products, implants, devices, and special equipment available  Patient identity confirmed: arm band      Patient location:  ED  Pre-procedure details:     Preparation: Patient was prepped and draped in usual sterile fashion    Indications:     Indications: evaluation for altered mental status    Anesthesia (see MAR for exact dosages): Anesthesia method:  None  Procedure details:     Lumbar space:  L4-L5 interspace    Patient position:  R lateral decubitus    Equipment: Lumbar puncture kit used      Needle gauge:  20G x 3 5in    Needle type:  Spinal needle - Quincke tip    Ultrasound guidance: no      Number of attempts:  1    Fluid appearance:  Clear    Tubes of fluid:  4    Total volume (ml):  4  Post-procedure:     Puncture site:  Adhesive bandage applied    Patient tolerance of procedure:   Tolerated well, no immediate complications    Patient instructed to lie flat for one(1) hour post lumbar puncture : Yes    CriticalCare Time  Performed by: Eden De La Torre DO  Authorized by: Eden De La Torre DO     Critical care provider statement:     Critical care time (minutes):  65    Critical care time was exclusive of:  Separately billable procedures and treating other patients and teaching time    Critical care was necessary to treat or prevent imminent or life-threatening deterioration of the following conditions:  CNS failure or compromise    Critical care was time spent personally by me on the following activities:  Blood draw for specimens, obtaining history from patient or surrogate, development of treatment plan with patient or surrogate, discussions with consultants, evaluation of patient's response to treatment, examination of patient, interpretation of cardiac output measurements, ordering and performing treatments and interventions, ordering and review of laboratory studies, ordering and review of radiographic studies, re-evaluation of patient's condition and ventilator management    I assumed direction of critical care for this patient from another provider in my specialty: no               ED Course  ED Course as of 02/27/23 0100   Sun Feb 26, 2023   3954 I reviewed pt with Neuro, Dr Saab Rodriguez  He recommends an LP                                             Medical Decision Making  Patient is a 52-year-old female that arrived with EMS, intubated after possible respiratory arrest in the field  Differential diagnosis includes but is not limited to acute hemorrhagic stroke, embolic stroke, seizure, acute encephalopathy  Labs, CTA head and neck, chest abdomen pelvis ordered during initial evaluation  Labs reviewed  Lactic acid noted to be 3 4, likely secondary to hypotension, which was noted on arrival   Urinalysis reviewed  Patient has a chronic indwelling Bronson  Leukocytes and nitrites noted, concern for UTI versus colonization secondary to chronic indwelling Bronson  Will initiate course of Rocephin, pending urine cultures  Given patient's complaints of sudden onset of visual loss, aphasia and possible seizure activity, I reviewed patient with neurology on-call, Dr Saba Rodriguez  He recommends obtaining an LP prior to admission  He also recommends an MRI in the morning  Patient will be admitted to ICU, as she remains intubated  Propofol drip initiated for sedation, as patient had some focal movements  Change in mental status: acute illness or injury  Respiratory failure [facility]): acute illness or injury  Seizure [facility]): acute illness or injury  UTI (urinary tract infection): self-limited or minor problem  Amount and/or Complexity of Data Reviewed  Labs: ordered  Radiology: ordered        Risk  Prescription drug management  Decision regarding hospitalization  Disposition  Final diagnoses:   Change in mental status   [facility] )   Respiratory failure ([facility] )   UTI (urinary tract infection)     Time reflects when diagnosis was documented in both MDM as applicable and the Disposition within this note     Time User Action Codes Description Comment    2/26/2023  4:38 PM Spencer Polo Add [R41 82] Change in mental status     2/26/2023  5:56 PM Spencer Polo Add [R56 9] Seizure ([facility] )     2/26/2023  5:57 PM Spencer Polo Add [J96 90] Respiratory failure ([facility] )     2/26/2023  6:35 PM Spencer Polo Add [N39 0] UTI (urinary tract infection)       ED Disposition     ED Disposition   Admit    Condition   Stable    Date/Time   Sun Feb 26, 2023  5:56 PM    Comment   Case was discussed with ICU AP and the patient's admission status was agreed to be Admission Status: inpatient status to the service of Dr Garnell Favre   Follow-up Information    None         Current Discharge Medication List      CONTINUE these medications which have NOT CHANGED    Details   acetaminophen (TYLENOL) 325 mg tablet Take 2 tablets (650 mg total) by mouth every 6 (six) hours as needed for mild pain, headaches or fever  Refills: 0    Associated Diagnoses: RSV (acute bronchiolitis due to respiratory syncytial virus)      albuterol (ProAir HFA) 90 mcg/act inhaler Inhale 2 puffs every 6 (six) hours as needed for wheezing  Qty: 8 5 g, Refills: 0    Comments: Substitution to a formulary equivalent within the same pharmaceutical class is authorized  Associated Diagnoses: RSV (acute bronchiolitis due to respiratory syncytial virus)      amLODIPine (NORVASC) 2 5 mg tablet Take 2 5 mg by mouth every 12 (twelve) hours      !! baclofen 10 mg tablet Take 1 tablet (10 mg total) by mouth in the morning Take in afternoon  Refills: 0    Associated Diagnoses: MS (multiple sclerosis) ([facility] )      ! ! baclofen 5 MG TABS Take 15 mg by mouth 2 (two) times a day Breakfast and bedtime  Refills: 0    Associated Diagnoses: MS (multiple sclerosis) (HCC)      benzonatate (TESSALON) 200 MG capsule Take 1 capsule (200 mg total) by mouth 3 (three) times a day as needed for cough  Qty: 20 capsule, Refills: 0    Associated Diagnoses: RSV (acute bronchiolitis due to respiratory syncytial virus)      busPIRone (BUSPAR) 15 mg tablet buspirone 15 mg tablet   TAKE 1 TABLET EVERY 12 HOURS      Dalfampridine ER 10 MG TB12 Take 10 mg by mouth every 12 (twelve) hours      fluticasone (FLONASE) 50 mcg/act nasal spray 1 spray into each nostril daily Do not start before November 5, 2022  Refills: 0    Associated Diagnoses: RSV (acute bronchiolitis due to respiratory syncytial virus)      furosemide (LASIX) 40 mg tablet       gabapentin (NEURONTIN) 300 mg capsule gabapentin 300 mg capsule      guaiFENesin (MUCINEX) 600 mg 12 hr tablet Take 1 tablet (600 mg total) by mouth every 12 (twelve) hours  Refills: 0    Associated Diagnoses: RSV (acute bronchiolitis due to respiratory syncytial virus)      loratadine (CLARITIN) 10 mg tablet Take 1 tablet (10 mg total) by mouth daily Do not start before November 5, 2022    Refills: 0    Associated Diagnoses: RSV (acute bronchiolitis due to respiratory syncytial virus)      losartan (COZAAR) 50 mg tablet losartan 50 mg tablet      menthol-methyl salicylate (BENGAY) 32-04 % cream Apply topically 3 (three) times a day  Refills: 0    Associated Diagnoses: RSV (acute bronchiolitis due to respiratory syncytial virus)      methenamine hippurate (HIPREX) 1 g tablet TAKE 1 TABLET BY MOUTH  TWICE DAILY  Qty: 180 tablet, Refills: 1    Associated Diagnoses: Recurrent UTI      metoprolol succinate (TOPROL-XL) 25 mg 24 hr tablet metoprolol succinate ER 25 mg tablet,extended release 24 hr   TAKE 1 TABLET BY MOUTH EVERY DAY      nabumetone (RELAFEN) 500 mg tablet       nitrofurantoin (MACRODANTIN) 100 mg capsule Take 100 mg by mouth 4 (four) times a day sertraline (ZOLOFT) 50 mg tablet sertraline 50 mg tablet   TAKE 1 TABLET EVERY DAY      sulfamethoxazole-trimethoprim (BACTRIM DS) 800-160 mg per tablet Take 1 tablet by mouth every 12 (twelve) hours for 7 days  Qty: 14 tablet, Refills: 0    Associated Diagnoses: Urinary tract infection without hematuria, site unspecified      tiZANidine (ZANAFLEX) 4 mg tablet       VITAMIN D, CHOLECALCIFEROL, PO Take 2,000 Units by mouth in the morning      zolpidem (AMBIEN) 10 mg tablet Take 10 mg by mouth daily at bedtime as needed       !! - Potential duplicate medications found  Please discuss with provider  No discharge procedures on file      PDMP Review       Value Time User    PDMP Reviewed  Yes 6/26/2021  8:36 AM Gene Daniel DO          ED Provider  Electronically Signed by           Cristi Reyes DO  02/27/23 0100

## 2023-02-27 PROBLEM — I63.9 INFARCTION OF LEFT BASAL GANGLIA (HCC): Status: ACTIVE | Noted: 2023-01-01

## 2023-02-27 PROBLEM — J96.00 ACUTE RESPIRATORY FAILURE (HCC): Status: ACTIVE | Noted: 2023-01-01

## 2023-02-27 NOTE — TELEPHONE ENCOUNTER
Patient currently in ICU @ [facility] due to change in mental status, possible seizure, respiratory failure, UTI
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
## 2023-02-27 NOTE — ASSESSMENT & PLAN NOTE
· Patient was diagnosed with MS 26 years ago  · Follows with a neurologist from [facility]  · Continue baclofen, Neurontin, Zanaflex  · Per , received medication infusion on 0/32 without complications -states she has been on the same medication for MS for about 2 years without any issues

## 2023-02-27 NOTE — PROGRESS NOTES
Recommend Jevity 1 2 at goal rate of 35 mL/hr for a total volume of 840 mL  This will provide 1008 kcals (1267 kcals with current propofol), 47 gms protein and 678 mL free water  Recommend flushes of 100 mL q 4 hrs for a total fluid intake of 1278 mL
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
## 2023-02-27 NOTE — ASSESSMENT & PLAN NOTE
· Patient has a chronic suprapubic catheter from a neurogenic bladder in the setting of MS  · Per her , she gets urinary tract infections "frequently "  · Most recent UTI was diagnosed on 2/24 where patient was prescribed Bactrim p o  as an outpatient  ·  states she is on Bactrim "often" without any complications  · Per chart review, urine culture + Enterobacter cloacae  · Received Rocephin IV in the ER -continue  · Monitor I&O  · Currently hemodynamically stable

## 2023-02-27 NOTE — ASSESSMENT & PLAN NOTE
· Patient was diagnosed with MS 26 years ago  · Follows with a neurologist from [facility]  · Continue baclofen, Neurontin, Zanaflex  · Per , received medication infusion on 2/41 without complications -states she has been on the same medication for MS for about 2 years without any issues

## 2023-02-27 NOTE — ASSESSMENT & PLAN NOTE
· Patient has a chronic suprapubic catheter from a neurogenic bladder in the setting of MS  · Per her , she gets urinary tract infections "frequently "  · Most recent UTI was diagnosed on 2/24 where patient was prescribed Bactrim p o  as an outpatient  ·  states she is on Bactrim "often" without any complications  · Per chart review, urine culture + Enterobacter cloacae  · Received Rocephin IV in the ER -continue with zoysn   · Monitor I&O  · Currently hemodynamically stable

## 2023-02-27 NOTE — PROGRESS NOTES
[facility] 46  Progress Note - Rima Mora 1955, 78 y o  female MRN: [id number]  Unit/Bed#: ICU 02 Encounter: [id number]  Primary Care Provider: Julee Colon MD   Date and time admitted to hospital: 2/26/2023  3:44 PM    * Altered mental status  Assessment & Plan  · Presented to the ER on 2/26 after complaining of loss of vision in the passenger seat    Shortly after, the patient became lethargic, slumped over  · Please officer started CPR on the side of the road although unclear if patient truly had loss of pulses  · Intubated approximately 10 minutes after CPR by EMS who found pulses on arrival  · Continue mechanical ventilation - currently ACVC 18/300/50/6  · Initial neuro exam, patient was moving everything appropriately to painful stimuli only  · CTA negative  · CT chest/abdomen/pelvis negative  · ER physician spoke with on-call neurology who recommended LP -completed in ER  · Per report, neurology also recommended MRI and felt patient may have had a seizure although was not inclined to start keppra  · Continue to trend neuro exams - improving   · Consider EEG if patient has visible seizure-like activity    Acute respiratory failure ([facility] [address] )  Assessment & Plan  · Patient intubated on 2/26 by EMS in the field after becoming altered and lethargic as a passenger in a vehicle  · Ideal body weight 46 kg - continue AC/VC 18/300/50/6  · Keep low-dose propofol if needed for RASS goal 0   · CT chest/abdomen/pelvis negative  · Attempt to wean in the a m  if neuro exam is appropriate    UTI (urinary tract infection)  Assessment & Plan  · Patient has a chronic suprapubic catheter from a neurogenic bladder in the setting of MS  · Per her , she gets urinary tract infections "frequently "  · Most recent UTI was diagnosed on 2/24 where patient was prescribed Bactrim p o  as an outpatient  ·  states she is on Bactrim "often" without any complications  · Per chart review, urine culture + Enterobacter cloacae  · Received Rocephin IV in the ER -continue with zoysn   · Monitor I&O  · Currently hemodynamically stable    Multiple sclerosis ([address] )  Assessment & Plan  · Patient was diagnosed with MS 26 years ago  · Follows with a neurologist from [facility]  · Continue baclofen, Neurontin, Zanaflex  · Per , received medication infusion on  without complications -states she has been on the same medication for MS for about 2 years without any issues    Hypertension  Assessment & Plan  · Hold home dose Norvasc, Cozaar, Toprol  · Currently normotensive    Depression  Assessment & Plan  · Hold home dose Zoloft    Ambulatory dysfunction  Assessment & Plan  · Patient is wheelchair-bound at baseline in the setting of MS      ----------------------------------------------------------------------------------------  HPI/24hr events: No acute events overnight  Patient was admitted last evening after having an acute bilateral visual loss and then became listless and obtunded  LP performed in the ER and concern for possible seizure       Patient appropriate for transfer out of the ICU today?: No  Disposition: Continue Critical Care   Code Status: Level 1 - Full Code  ---------------------------------------------------------------------------------------  SUBJECTIVE  Intubated     Review of Systems   Unable to perform ROS: Intubated     Review of systems was unable to be performed secondary to intubation   ---------------------------------------------------------------------------------------  OBJECTIVE    Vitals   Vitals:    23 0100 23 0200 23 0300 23 0400   BP: 98/54 107/54 128/58 107/50   Pulse: 62 66 96 82   Resp: 18 18 20 18   Temp: (!) 96 1 °F (35 6 °C) (!) 97 2 °F (36 2 °C) 98 1 °F (36 7 °C) 98 4 °F (36 9 °C)   TempSrc:    Esophageal   SpO2: 99% 99% 98% 98%   Weight:       Height:         Temp (24hrs), Av 1 °F (35 1 °C), Min:90 4 °F (32 4 °C), Max:98 4 °F (36 9 °C)  Current: Temperature: 98 4 °F (36 9 °C)          Respiratory:  SpO2: SpO2: 98 %       Invasive/non-invasive ventilation settings   Respiratory    Lab Data (Last 4 hours)    None         O2/Vent Data (Last 4 hours)      02/27 0401           Vent Mode AC/VC       Resp Rate (BPM) (BPM) 18       Vt (mL) (mL) 268       FIO2 (%) (%) 50       PEEP (cmH2O) (cmH2O) 6       MV 5 6                   Physical Exam  Vitals and nursing note reviewed  Constitutional:       Appearance: She is ill-appearing and toxic-appearing  Comments: B/L wrist restraints in place    HENT:      Head: Normocephalic and atraumatic  Right Ear: Tympanic membrane, ear canal and external ear normal       Left Ear: Tympanic membrane, ear canal and external ear normal       Nose: Nose normal       Mouth/Throat:      Mouth: Mucous membranes are dry  Pharynx: Oropharynx is clear  Eyes:      Extraocular Movements: Extraocular movements intact  Comments: Bilateral pupils pinpoint    Cardiovascular:      Rate and Rhythm: Normal rate and regular rhythm  Pulses: Normal pulses  Heart sounds: Normal heart sounds  Pulmonary:      Comments: ETT on mechanical ventilation  B/L breath sounds diminished   Abdominal:      Palpations: Abdomen is soft  Comments: Bowel sounds hypoactive   OGT to LIS    Genitourinary:     Comments: Suprapubic catheter  Musculoskeletal:         General: Normal range of motion  Cervical back: Normal range of motion and neck supple  Skin:     General: Skin is warm and dry  Capillary Refill: Capillary refill takes less than 2 seconds  Neurological:      Comments:  Follows commands  Withdraws to painful stimuli in all 4 extremities     Psychiatric:      Comments: Intubated                Laboratory and Diagnostics:  Results from last 7 days   Lab Units 02/26/23  1555   WBC Thousand/uL 6 55   HEMOGLOBIN g/dL 10 6*   HEMATOCRIT % 33 4*   PLATELETS Thousands/uL 169   NEUTROS PCT % 81* MONOS PCT % 6     Results from last 7 days   Lab Units 02/26/23  2320 02/26/23  1555   SODIUM mmol/L 136 139   POTASSIUM mmol/L 4 4 4 5   CHLORIDE mmol/L 106 106   CO2 mmol/L 24 15*   ANION GAP mmol/L 6 18*   BUN mg/dL 17 21   CREATININE mg/dL 0 96 1 14   CALCIUM mg/dL 7 8* 8 3*   GLUCOSE RANDOM mg/dL 116 89   ALT U/L  --  145*   AST U/L  --  115*   ALK PHOS U/L  --  128*   ALBUMIN g/dL  --  3 3*   TOTAL BILIRUBIN mg/dL  --  0 19*          Results from last 7 days   Lab Units 02/26/23  1555   INR  1 06   PTT seconds 32          Results from last 7 days   Lab Units 02/26/23  1854 02/26/23  1716   LACTIC ACID mmol/L 1 2 3 4*     ABG:  Results from last 7 days   Lab Units 02/26/23  1620   PH ART  7 256*   PCO2 ART mm Hg 37 6   PO2 ART mm Hg 478 3*   HCO3 ART mmol/L 16 3*   BASE EXC ART mmol/L -10 0   ABG SOURCE  Radial, Right     VBG:  Results from last 7 days   Lab Units 02/26/23  1620   ABG SOURCE  Radial, Right           Micro  Results from last 7 days   Lab Units 02/26/23  1831 02/26/23  1718 02/23/23  1055   BLOOD CULTURE   --  Received in Microbiology Lab  Culture in Progress  Received in Microbiology Lab  Culture in Progress  --    GRAM STAIN RESULT  No Polys or Bacteria seen  Rare Mononuclear Cells  --   --    URINE CULTURE   --   --  >100,000 cfu/ml Enterobacter cloacae*       EKG: NSR alarms on   Imaging: I have personally reviewed pertinent reports  CTA head and neck with and without contrast    Result Date: 2/26/2023  Impression: 1  No evidence of acute infarct, intracranial hemorrhage or mass effect  2   No hemodynamically significant stenosis, dissection or occlusion of the carotid or vertebral arteries or major vessels of the [place]  Workstation performed: [id number]     CT chest abdomen pelvis w contrast    Result Date: 2/26/2023  Impression: No acute thoracic or intra-abdominal pelvic abnormality identified  Mild bibasilar opacities left greater than right favoring atelectasis  Ectasia of the ascending aorta, 40 mm  Stable  Suggest follow-up in one year  The study was marked in EPIC for significant notification  Workstation performed: [id number]     Intake and Output  I/O       02/25 0701 02/26 0700 02/26 0701 02/27 0700    IV Piggyback  2000    Total Intake(mL/kg)  2000 (24 9)    Urine (mL/kg/hr)  750    Total Output  750    Net  +1250                Height and Weights   Height: 4' 7" (139 7 cm)  IBW (Ideal Body Weight): 34 kg  Body mass index is 41 2 kg/m²  Weight (last 2 days)     Date/Time Weight    02/26/23 2123 80 4 (177 25)            Nutrition       Diet Orders   (From admission, onward)             Start     Ordered    02/26/23 2134  Diet NPO  Diet effective now        References:    Nutrtion Support Algorithm Enteral vs  Parenteral   Question Answer Comment   Diet Type NPO    RD to adjust diet per protocol?  Yes        02/26/23 2133                  Active Medications  Scheduled Meds:  Current Facility-Administered Medications   Medication Dose Route Frequency Provider Last Rate   • baclofen  10 mg Oral Daily COLIN Baptiste     • baclofen  15 mg Oral BID COLIN Baptiste     • chlorhexidine  15 mL Mouth/Throat Q12H [address] [place], [address]     • Dalfampridine ER  10 mg Oral Q12H [employer] [place], [address]     • enoxaparin  30 mg Subcutaneous Daily [place], [address]     • gabapentin  300 mg Oral HS COLIN Tavarez     • [START ON 3/1/2023] influenza vaccine  0 7 mL Intramuscular Once Porfirio Mckeon MD     • piperacillin-tazobactam  3 375 g Intravenous Q6H COLIN Baptiste 3 375 g (02/27/23 0528)   • propofol  5-50 mcg/kg/min Intravenous Titrated COLIN Baptiste 20 mcg/kg/min (02/26/23 2219)   • tiZANidine  4 mg Oral HS COLIN Baptiste       Continuous Infusions:  propofol, 5-50 mcg/kg/min, Last Rate: 20 mcg/kg/min (02/26/23 2219)      PRN Meds:        Invasive Devices Review  Invasive Devices     Peripheral Intravenous Line  Duration Peripheral IV 02/26/23 Dorsal (posterior); Left Forearm <1 day    Peripheral IV 02/26/23 Proximal;Right;Ventral (anterior) Forearm <1 day          Drain  Duration           Suprapubic Catheter 18 Fr  146 days    NG/OG/Enteral Tube Orogastric 16 Fr Right mouth <1 day          Airway  Duration           ETT  7 mm <1 day                ---------------------------------------------------------------------------------------  Care Time Delivered:   No Critical Care time spent       [facility]      Portions of the record may have been created with voice recognition software  Occasional wrong word or "sound a like" substitutions may have occurred due to the inherent limitations of voice recognition software    Read the chart carefully and recognize, using context, where substitutions have occurred

## 2023-02-27 NOTE — ASSESSMENT & PLAN NOTE
· Patient intubated on 2/26 by EMS in the field after becoming altered and lethargic as a passenger in a vehicle  · Ideal body weight 46 kg - continue AC/VC 18/300/50/6  · Keep low-dose propofol if needed for RASS goal 0   · CT chest/abdomen/pelvis negative  · Attempt to wean in the a m  if neuro exam is appropriate

## 2023-02-27 NOTE — ASSESSMENT & PLAN NOTE
· Presented to the ER on 2/26 after complaining of loss of vision in the passenger seat    Shortly after, the patient became lethargic, slumped over  · Please officer started CPR on the side of the road although unclear if patient truly had loss of pulses  · Intubated approximately 10 minutes after CPR by EMS who found pulses on arrival  · Continue mechanical ventilation - currently ACVC 18/300/50/6  · Initial neuro exam, patient was moving everything appropriately to painful stimuli only  · CTA negative  · CT chest/abdomen/pelvis negative  · ER physician spoke with on-call neurology who recommended LP -completed in ER  · Per report, neurology also recommended MRI and felt patient may have had a seizure although was not inclined to start keppra  · Continue to trend neuro exams - improving   · Consider EEG if patient has visible seizure-like activity

## 2023-02-27 NOTE — ASSESSMENT & PLAN NOTE
· Patient intubated on 2/26 by EMS in the field after becoming altered and lethargic as a passenger in a vehicle  · Ideal body weight 46 kg - continue AC/VC 18/300/50/6  · Keep low-dose propofol if needed  · CT chest/abdomen/pelvis negative  · Attempt to wean in the a m  if neuro exam is appropriate

## 2023-02-28 PROBLEM — R79.89 ELEVATED LFTS: Status: ACTIVE | Noted: 2023-01-01

## 2023-02-28 PROBLEM — E78.5 HYPERLIPIDEMIA: Status: ACTIVE | Noted: 2023-02-28

## 2023-02-28 NOTE — ASSESSMENT & PLAN NOTE
· CT on admission indicative of large fecal burden    Plan  · Bowel regimen: [place] lax daily, & [place] bid   · BM 2/28 in AM

## 2023-02-28 NOTE — ASSESSMENT & PLAN NOTE
· Patient has a chronic suprapubic catheter from a neurogenic bladder in the setting of MS  · Per her , she gets urinary tract infections "frequently "  · Most recent UTI was diagnosed on 2/24 where patient was prescribed Bactrim p o  as an outpatient  ·  states she is on Bactrim "often" without any complications  · Per chart review, urine culture + Enterobacter cloacae on 2/23  · Received Rocephin IV in the ER - 3 ds zosyn IV, now narrowed to IV Ceftriaxone   · Routine suprapubic catheter site care

## 2023-02-28 NOTE — ASSESSMENT & PLAN NOTE
Presented to the ER on 2/26 after complaining of loss of vision in the passenger seat  Shortly after, the patient became lethargic, slumped over   started CPR on the side of the road, although unclear if patient truly had loss of pulses  Intubated approximately 10 minutes after CPR by EMS who found pulses on arrival  Initial neuro exam, patient was moving everything appropriately to painful stimuli only  Plan  · Continue mechanical ventilation for airway protection  · CTA negative  · CT chest/abdomen/pelvis negative  · LP - CSF culture and gram stain negative, meningitis/encephalitis panel negative, & slight elevation CSF total protein at 49  · Neurology: r/o  possible seizure  patient's  reporting pt was frothing at he mouth, was rigid, and shaking during event  · Neuro rec's: EEG - negative for seizure activity & prophylaxis  Keppra 750 mg every 12 hours, now stopped  · MRI brain w w/o contrast 2/27: acute to subacute left occipital lobe infarct with no discrete enhancing lesion to suggest acute demyelination  positive for chronic left basal ganglia lacunar infarct     Neuro following, appreciate recs

## 2023-02-28 NOTE — ASSESSMENT & PLAN NOTE
H/o indwelling urinary catheter change to SPT on 4/26/22    [facility]   · Recently changed  2/6/23

## 2023-02-28 NOTE — ASSESSMENT & PLAN NOTE
· Patient has a chronic suprapubic catheter from a neurogenic bladder in the setting of MS  · Per her , she gets urinary tract infections "frequently "  · Most recent UTI was diagnosed on 2/24 where patient was prescribed Bactrim p o  as an outpatient  ·  states she is on Bactrim "often" without any complications  · Per chart review, urine culture + Enterobacter cloacae on 2/23  · Received Rocephin IV in the ER -continue with zoysn D3  · Routine suprapubic catheter site care

## 2023-02-28 NOTE — ASSESSMENT & PLAN NOTE
Patient intubated on 2/26 by EMS in the field after becoming altered and lethargic as a passenger in a vehicle  Plan  · Current vent settings: PS 8/6, switched to PS secondary to breath stacking/vent dysynchronct on AC/VC  · Titrate fio2 for O2 sat 92% or above   · CT chest 2/26 Mild bibasilar opacities left greater than right favoring atelectasis  · AM CXR pending   · Neuro status and secretions likely precludes extubation today    · Very cough/gag reflexes  · VAP bundle; chlorhexidine, PPI, HOB greater than 30 degrees

## 2023-02-28 NOTE — PROGRESS NOTES
Neurology Consult Follow Up      Tutu Padilla is a 78 y o  female  ICU 02/ICU 02    [id number]        Assessment/Recommendations:    1  Acute to subacute left occipital lobe infarct  2  Possible cardiac arrest   3   multiple sclerosis with spasticity  4  Possible seizure   5  Chronic UTI  6  Hypertension  7  Chronic left basal ganglion infarct     -Critical care monitoring  -Neurological assessments  -Aspiration precaution  -Continue aspirin 325 mg daily  -Patient with elevated LFTs, will hold off on Lipitor dosing  -EEG withmoderate diffuse cerebral dysfunction on non specific etiology, no epileptiform discharges or sharps appreciated  Pt with increased lethargy, therefore, no need to continue use of Keppra  Can be discontinued    -Baclofen regimen with 10 mg at 4 PM, 15 mg every 12 hours   -Patient should maintain systolic blood pressure greater than or equal to 130, may need to use vasopressors or IV fluids for this  Critical care team aware  -CTA of the head and neck completed-negative for LVO, dissection or occlusion  -MRI of the brain completed showing acute to subacute left occipital lobe infarct with no discrete enhancing lesions to suggest acute demyelination  Positive chronic left basal ganglia lacunar infarct   -Echocardiogram with shunt study completed,EF of 60% with mildly dilated lt atrium, normal rt atrium and no PFO or shunt noted    -Labs with A1c and lipid profile-cholesterol 197,  and A1c 4 6  -PT/OT/ST when patient medically stable  -Spoke with patient's neurologist Dr Catrachito Walden at [phone], updated with patient's status  Patient had been receiving 12 5 mg of methotrexate intrathecally for MS every 2 months, has tolerated this well and not suspected to be etiology of her current symptomology's  Baseline reported lower extremity function and weakness, patient essentially chair bound however is able to assist with transfers and possibly 1-2 steps     -IV antibiotics and fluids per the critical care team   -Ventilatory support and sedation per the critical care team      Patient is a 79-year-old female with chronic history of UTI and MS treats with baclofen, gabapentin and intrathecal triamcinolone through her neurologist   Patient had injection on 2/24/2023, generally tolerates this well  She was also diagnosed recently with UTI for which she started on Bactrim  Culture from 2/23/2023 showed Enterococcus  Cultures remain pending at this time resent upon admission  Patient was normal yesterday morning, was in route to [facility] to meet with her family  Patient reported loss of vision, seeing shadows only  Was at a [facility] had some issues with conversation and nonsensical talk  Was put in a car to be brought to the hospital when patient stopped speaking, slumped to her left and became unresponsive  Family found please officer who initiated CPR, EMS was called  Question if patient had pulse at that time, had pulse when EMS arrived  Patient had to be intubated in the field, was brought to the ER for further evaluation  Discussed with neurology MD on-call, recommendations for an LP to be done with admission for MRI  Patient remained intubated is in ICU for ongoing care  Patient also with frequent UTIs, most recently started on Bactrim for Enterobacter infection  According to family she has these frequently and has caused her increased weakness and requirements for rehabilitations  Patient awake able to answer yes/no questions yesterday on light sedation with propofol  Had little to no movement of the right upper extremity as well as the lower extremities  Patient had recently received intrathecal injection on 2/24/2023  Discussed with patient's neurologist Dr Francisco Collins through [facility], she receives 12 5 mg of methotrexate for her MS every 2 months  According to  she has been having this ongoing for approximately 2-1/2 years    Patient has not had any adverse response to this medication regimen  Per Dr Dio Aguillon patient baseline mobility is poor  She has 3-/5 iliopsoas, hamstrings 2/5 and normal quadricep strength  She has been able to stand and assist with transfers, she can take couple of steps however is very little mobility  Per Dr Dio Aguillon no knowledge of prior CVA  Critical care team request films from [facility] for further inspection from early February MRI  Patient started on IV Keppra last night for prophylactic seizure precaution  EEG with moderate cerebral dysfunction and slowing, no seizure activity seen  Pt has been increasingly fatigued and poorly arousable, will dc the Keppra at this time  Overnight patient dropped blood pressure with systolic of 74  Patient was off propofol and IV fluids were given  Patient will require higher blood pressures with systolic to be equal or greater to 130  May need vasopressors for this  Patient reports feeling very lethargic, did receive IV fluids  Her urine remains very dark,  has concerns with regards to this as she is normally lighter in color  Discussed with critical care team, patient may need more IV fluids however has generalized edema  May need vasopressor medications to increase blood pressure and function  Echocardiogram was done this a m , cardiology read is pending  MRI of the brain indicated acute to subacute left occipital lobe infarct with chronic left corona radiata infarct  We will review prior MRIs from 2/13/2023 for the presence of these  In meantime patient started on full dose aspirin 325 mg daily, statin was placed on hold due to elevated LFTs and continued acute illness  The remaining stroke pathway ordered and pending  May need to consider decreasing Keppra dosing due to increased lethargy         Rima Byrne will need follow up in in 6 weeks with multiple sclerosis attending  She will not require outpatient neurological testing      Chief Complaint:    Subjective:   Patient with increased lethargy today, difficult to arouse  Has weak corneal reflex, weak gag with grimace only  Patient able to open her eyes after attempts to suction, denies headache or dizziness  Acknowledges feeling very fatigued and tired  Past Medical History:   Diagnosis Date   • Back pain 2016   • Chronic UTI    • Colon polyp    • Hypertension    • Incontinence    • MS (multiple sclerosis) ([facility])     Age 37   • Neurogenic bladder    • Spasticity      Social History     Socioeconomic History   • Marital status: /Civil Union     Spouse name: Not on file   • Number of children: Not on file   • Years of education: Not on file   • Highest education level: Not on file   Occupational History   • Not on file   Tobacco Use   • Smoking status: Never   • Smokeless tobacco: Never   Vaping Use   • Vaping Use: Never used   Substance and Sexual Activity   • Alcohol use: Not Currently   • Drug use: Yes     Types: Marijuana     Comment: medical   • Sexual activity: Not on file   Other Topics Concern   • Not on file   Social History Narrative   • Not on file     Social Determinants of Health     Financial Resource Strain: Not on file   Food Insecurity: No Food Insecurity   • Worried About Running Out of Food in the Last Year: Never true   • Ran Out of Food in the Last Year: Never true   Transportation Needs: No Transportation Needs   • Lack of Transportation (Medical): No   • Lack of Transportation (Non-Medical):  No   Physical Activity: Not on file   Stress: Not on file   Social Connections: Not on file   Intimate Partner Violence: Not on file   Housing Stability: Unknown   • Unable to Pay for Housing in the Last Year: No   • Number of Places Lived in the Last Year: Not on file   • Unstable Housing in the Last Year: No     Family History   Problem Relation Age of Onset   • Cancer Mother    • Heart disease Father    • Cancer Sister        ROS:  Patient currently unable to participate in ROS       Objective:  /55   Pulse 81   Temp 99 1 °F (37 3 °C) (Esophageal)   Resp 18   Ht 4' 7" (1 397 m)   Wt 80 1 kg (176 lb 9 4 oz)   SpO2 98%   BMI 41 04 kg/m²     General: Lethargic, difficult to arouse  Mental status: Unable to assess  Attention: Unable to assess  Knowledge: Unable to assess  Language and Speech: Unable to assess, patient intubated  Cranial nerves:   CN II: Visual fields are unable to be assessed today  Fundoscopic exam is unable to be assessed today  Pupils are 2 mm and reactive to light  CN III, IV, VI: At primary gaze, patient unable to participate in EOM testing today   CN V: Corneal responses are intact, weak  CN VII: Face is symmetrical, with normal eye closure  CN VIII: Hearing is unable to be assessed  CN IX, X: Palate and phonation are unable to be assessed  CN XI: Head turning and shoulder shrug are unable to be assessed today   CN XII: Tongue is midline     -Gag however patient grimacing with discomfort    Motor: There is no pronator drift of out-stretched arms  Muscle bulk and tone are weak at baseline    Patient lethargic unable to perform or participate in motor exam today     Muscle exam  Arm Right Left Leg Right Left   Deltoid 0 0 Iliopsoas 0 0   Biceps 0 0 Quads 0 0   Triceps 0 0 Hamstrings 0 0   Wrist Extension 0 0 Ankle Dorsi Flexion 0 0   Wrist Flexion 0 0 Ankle Plantar Flexion 0 0       Sensory: Unable to assess   gait: Unable to assess  Coordination: Unable to assess     Reflexes    RJ BJ TJ KJ AJ Plantars Stock's   Right 2+ 2+  tr tr none Not present   Left 2+ 2+  tr tr none Not present      Heart: Regular rate and rhythm  Lung: Respirations prevent support   abd: soft, non-tender, non-distended   Skin: dry and intact  + Generalized edema    Labs:      Lab Results   Component Value Date    WBC 5 69 02/28/2023    HGB 9 0 (L) 02/28/2023    HCT 27 6 (L) 02/28/2023     (H) 02/28/2023     (L) 02/28/2023     Lab Results   Component Value Date HGBA1C 4 6 02/28/2023     Lab Results   Component Value Date     (H) 02/28/2023    AST 64 (H) 02/28/2023    ALKPHOS 124 (H) 02/28/2023     Lab Results   Component Value Date    CALCIUM 7 6 (L) 02/28/2023    K 4 8 02/28/2023    CO2 25 02/28/2023     (H) 02/28/2023    BUN 21 02/28/2023    CREATININE 1 26 02/28/2023         Review of reports and notes reveal:   Independent review of films/reports:  CTA head and neck with and without contrast    Result Date: 2/26/2023  1  No evidence of acute infarct, intracranial hemorrhage or mass effect  2   No hemodynamically significant stenosis, dissection or occlusion of the carotid or vertebral arteries or major vessels of the [place]  Workstation performed: [id number]     MRI brain w wo contrast    Result Date: 2/27/2023  1  Punctate subcentimeter focus of restricted diffusion within the left occipital lobe which can be seen with an acute to subacute infarct versus a demyelinating lesion  2  Periventricular, subcortical, and pericallosal white matter lesions consistent with the patient's history of demyelinating disease  There is no pathologic intra-axial enhancement to suggest acute demyelination  3  Acute on chronic sinus disease  Mild right mastoid fluid  The study was marked in [facility] for immediate notification  Workstation performed: [id number]     CT chest abdomen pelvis w contrast    Result Date: 2/26/2023  No acute thoracic or intra-abdominal pelvic abnormality identified  Mild bibasilar opacities left greater than right favoring atelectasis  Ectasia of the ascending aorta, 40 mm  Stable  Suggest follow-up in one year  The study was marked in EPIC for significant notification  Workstation performed: [id number]         Thank you for this consult  Total time of encounter:  40 min  More than 50% of the time was used in counseling and/or coordination of care  Extent of couseling and/or coordination of care with patient's family at bedside  Discussed current symptoms, point-of-care for last 24 hours, discussed diagnostic findings and treatment recommendations, discussed additional pending testing and treatment options with follow-up care recommendations    Discussed with the medical team and and neurology MD

## 2023-02-28 NOTE — ASSESSMENT & PLAN NOTE
Presented to the ER on 2/26 after complaining of loss of vision in the passenger seat  Shortly after, the patient became lethargic, slumped over   started CPR on the side of the road, although unclear if patient truly had loss of pulses  Intubated approximately 10 minutes after CPR by EMS who found pulses on arrival  Initial neuro exam, patient was moving everything appropriately to painful stimuli only  Plan  · Continue mechanical ventilation for airway protection  · CTA negative  · CT chest/abdomen/pelvis negative  · LP - CSF culture and gram stain negative, meningitis/encephalitis panel negative, & slight elevation CSF total protein at 49  · Neurology:  possible seizure  patient's  reporting pt was frothing at he mouth, was rigid, and shaking during event  · Neuro rec's: EEG and start prophylaxis  Keppra 750 mg every 12 hours  · MRI brain w w/o contrast 2/27: acute to subacute left occipital lobe infarct with no discrete enhancing lesion to suggest acute demyelination  positive for chronic left basal ganglia lacunar infarct     · Neuro rec's: No prior h/o CVA, initiate stroke pathway, obtain Echo with shunt study, & lipid panel & A1c

## 2023-02-28 NOTE — PROGRESS NOTES
[address]  Progress Note - Rima Mora 1955, 78 y o  female MRN: [id number]  Unit/Bed#: ICU 02 Encounter: [id number]  Primary Care Provider: Richie Woodward MD   Date and time admitted to hospital: 2/26/2023  3:44 PM    * Altered mental status  Assessment & Plan  Presented to the ER on 2/26 after complaining of loss of vision in the passenger seat  Shortly after, the patient became lethargic, slumped over   started CPR on the side of the road, although unclear if patient truly had loss of pulses  Intubated approximately 10 minutes after CPR by EMS who found pulses on arrival  Initial neuro exam, patient was moving everything appropriately to painful stimuli only  Plan  · Continue mechanical ventilation for airway protection  · CTA negative  · CT chest/abdomen/pelvis negative  · LP - CSF culture and gram stain negative, meningitis/encephalitis panel negative, & slight elevation CSF total protein at 49  · Neurology:  possible seizure  patient's  reporting pt was frothing at he mouth, was rigid, and shaking during event  · Neuro rec's: EEG and start prophylaxis  Keppra 750 mg every 12 hours  · MRI brain w w/o contrast 2/27: acute to subacute left occipital lobe infarct with no discrete enhancing lesion to suggest acute demyelination  positive for chronic left basal ganglia lacunar infarct  · Neuro rec's: No prior h/o CVA, initiate stroke pathway, obtain Echo with shunt study, & lipid panel & A1c      Infarction of left basal ganglia (HCC)  Assessment & Plan  · MRI brain w w/o contrast 2/27: acute to subacute left occipital lobe infarct with no discrete enhancing lesion to suggest acute demyelination  positive for chronic left basal ganglia lacunar infarct       See plan above     Acute respiratory failure with hypoxia [facility])  Assessment & Plan  Patient intubated on 2/26 by EMS in the field after becoming altered and lethargic as a passenger in a vehicle  Plan  · Current vent settings: AC/VC 18/300/40%/6  · Titrate fio2 for O2 sat 92% or above   · Keep low-dose propofol if needed for RASS goal 0   · CT chest/abdomen/pelvis negative  · Attempt to wean in the a m  if neuro exam is appropriate  · Failed SBT yesterday with apneic episodes  · VAP bundle; chlorhexidine, PPI, HOB greater than 30 degrees    UTI (urinary tract infection)  Assessment & Plan  · Patient has a chronic suprapubic catheter from a neurogenic bladder in the setting of MS  · Per her , she gets urinary tract infections "frequently "  · Most recent UTI was diagnosed on 2/24 where patient was prescribed Bactrim p o  as an outpatient  ·  states she is on Bactrim "often" without any complications  · Per chart review, urine culture + Enterobacter cloacae on 2/23  · Received Rocephin IV in the ER -continue with zoysn D3  · Routine suprapubic catheter site care    Neurogenic bladder  Assessment & Plan  H/o indwelling urinary catheter change to SPT on 4/26/22  [place] [address] [facility]   · Recently changed  2/6/23    Multiple sclerosis ([facility] )  Assessment & Plan  · Patient was diagnosed with MS 26 years ago  · Follows with a neurologist from [employer]  · Continue baclofen, Neurontin, Zanaflex  · Per , received medication infusion on 4/93 without complications -states she has been on the same medication for MS for about 2 years without any issues    Hypertension  Assessment & Plan  · Hold home dose Norvasc, Cozaar, Toprol      Depression  Assessment & Plan  · Hold home dose Zoloft    Ambulatory dysfunction  Assessment & Plan  · Patient is wheelchair-bound at baseline in the setting of MS    Constipation  Assessment & Plan  No bowel movement since admission  On exam, abdomen is distended and non tender  Ct scan c/a/p is indicative of large fecal burden     Plan  · Bowel regimen: dulcolax suppository x1, Shelby lax daily, & Senakot bid ----------------------------------------------------------------------------------------  HPI/24hr events: Patient on day 3 of mechanical ventilation  [address] 18/300/40/6  She is currently lethargic but arouses to name, able to nod appropriately to yes/no questions  MRI of the brain showing acute to subacute left occipital lobe infarct with no discrete enchanting lesion to suggest acute demyelination  Positive chronic left basal ganglia lacunar infarct  Initiated stroke pathway per neurology  Also recommended by neurology r/o possible seizure activity started keppra 750 mg IV q 12 hours and awaiting EEG  Patient hypotensive during the night 74/43 map requiring isolyte bolus 500 ml and holding propofol gtt  Pt blood pressures responding well to isolyte bolus and is map >65  Patient appropriate for transfer out of the ICU today?: No  Disposition: Continue Critical Care   Code Status: Level 1 - Full Code  ---------------------------------------------------------------------------------------  SUBJECTIVE  Intubated       Review of Systems   Unable to perform ROS: Intubated     Review of systems was reviewed and negative unless stated above in HPI/24-hour events   ---------------------------------------------------------------------------------------  OBJECTIVE    Vitals   Vitals:    23 0049 23 0100 23 0200 23 0300   BP:  95/54 (!) 100/49 96/54   Pulse:  65 69 85   Resp:  21 (!) 26 (!) 23   Temp:  98 6 °F (37 °C) 98 8 °F (37 1 °C) 98 8 °F (37 1 °C)   TempSrc:       SpO2: 98% 100% 98% 96%   Weight:       Height:         Temp (24hrs), Av 7 °F (36 5 °C), Min:96 1 °F (35 6 °C), Max:99 °F (37 2 °C)  Current: Temperature: 98 8 °F (37 1 °C)          Respiratory:  SpO2 Activity: SpO2 Activity: At Rest       Invasive/non-invasive ventilation settings   Respiratory    Lab Data (Last 4 hours)    None         O2/Vent Data (Last 4 hours)       0049           Vent Mode AC/VC       Resp Rate (BPM) (BPM) 18       Vt (mL) (mL) 300       FIO2 (%) (%) 40       PEEP (cmH2O) (cmH2O) 6       MV 6 7                   Physical Exam  Constitutional:       Appearance: She is ill-appearing  Interventions: She is sedated, intubated and restrained  HENT:      Head: Normocephalic and atraumatic  Mouth/Throat:      Comments: intubated  Eyes:      General: Lids are normal       Extraocular Movements: Extraocular movements intact  Pupils: Pupils are equal, round, and reactive to light  Right eye: Pupil is sluggish  Left eye: Pupil is sluggish  Cardiovascular:      Rate and Rhythm: Normal rate and regular rhythm  Pulses: Normal pulses  Radial pulses are 2+ on the right side and 2+ on the left side  Dorsalis pedis pulses are 2+ on the right side and 2+ on the left side  Heart sounds: Normal heart sounds, S1 normal and S2 normal    Pulmonary:      Effort: Pulmonary effort is normal  She is intubated  Comments: Course rhonchi bilateral upper lobes  Abdominal:      General: Bowel sounds are decreased  There is distension  Palpations: Abdomen is soft  Tenderness: There is no abdominal tenderness  Genitourinary:     Comments: Suprapubic catheter in place   Musculoskeletal:         General: Swelling present  Right upper arm: Swelling present  Left upper arm: Swelling present  Right hand: Swelling present  Decreased strength  Left hand: Swelling present  Decreased strength  Cervical back: Normal range of motion and neck supple  Right lower le+ Edema present  Left lower le+ Edema present  Skin:     General: Skin is warm and dry  Capillary Refill: Capillary refill takes less than 2 seconds  Coloration: Skin is pale  Neurological:      Mental Status: She is lethargic  GCS: GCS eye subscore is 3  GCS verbal subscore is 1  GCS motor subscore is 6  Motor: Weakness present        Comments: Able to nod yes and no and follows simple commands  Psychiatric:      Comments: Unable to assess pt is intubated/sedated        Laboratory and Diagnostics:  Results from last 7 days   Lab Units 02/27/23  0543 02/26/23  1555   WBC Thousand/uL 5 52 6 55   HEMOGLOBIN g/dL 9 3* 10 6*   HEMATOCRIT % 28 3* 33 4*   PLATELETS Thousands/uL 152 169   NEUTROS PCT % 88* 81*   MONOS PCT % 5 6     Results from last 7 days   Lab Units 02/27/23  0543 02/26/23  2320 02/26/23  1555   SODIUM mmol/L 139 136 139   POTASSIUM mmol/L 5 0 4 4 4 5   CHLORIDE mmol/L 109* 106 106   CO2 mmol/L 25 24 15*   ANION GAP mmol/L 5 6 18*   BUN mg/dL 17 17 21   CREATININE mg/dL 1 20 0 96 1 14   CALCIUM mg/dL 7 6* 7 8* 8 3*   GLUCOSE RANDOM mg/dL 74 116 89   ALT U/L 119*  --  145*   AST U/L 77*  --  115*   ALK PHOS U/L 116*  --  128*   ALBUMIN g/dL 3 0*  --  3 3*   TOTAL BILIRUBIN mg/dL 0 26  --  0 19*     Results from last 7 days   Lab Units 02/27/23  0543   MAGNESIUM mg/dL 1 7*   PHOSPHORUS mg/dL 3 3      Results from last 7 days   Lab Units 02/26/23  1555   INR  1 06   PTT seconds 32          Results from last 7 days   Lab Units 02/26/23  1854 02/26/23  1716   LACTIC ACID mmol/L 1 2 3 4*     ABG:  Results from last 7 days   Lab Units 02/26/23  1620   PH ART  7 256*   PCO2 ART mm Hg 37 6   PO2 ART mm Hg 478 3*   HCO3 ART mmol/L 16 3*   BASE EXC ART mmol/L -10 0   ABG SOURCE  Radial, Right     VBG:  Results from last 7 days   Lab Units 02/26/23  1620   ABG SOURCE  Radial, Right     Results from last 7 days   Lab Units 02/27/23  0543   PROCALCITONIN ng/ml 0 05       Micro  Results from last 7 days   Lab Units 02/26/23  1831 02/26/23  1718 02/23/23  1055   BLOOD CULTURE   --  No Growth at 24 hrs    No Growth at 24 hrs   --    GRAM STAIN RESULT  No Polys or Bacteria seen  Rare Mononuclear Cells  --   --    URINE CULTURE   --   --  >100,000 cfu/ml Enterobacter cloacae*     Imaging:   MRI brain w w/o contrast 2/27: acute to subacute left occipital lobe infarct with no discrete enhancing lesion to suggest acute demyelination  positive for chronic left basal ganglia lacunar infarct  Intake and Output  I/O       02/26 0701 02/27 0700 02/27 0701 02/28 0700    NG/GT  100    IV Piggyback 2000 250    Feedings  140    Total Intake(mL/kg) 2000 (24 9) 490 (6 1)    Urine (mL/kg/hr) 1150 350 (0 2)    Emesis/NG output  100    Total Output 1150 450    Net +850 +40                Height and Weights   Height: 4' 7" (139 7 cm)  IBW (Ideal Body Weight): 34 kg  Body mass index is 40 94 kg/m²  Weight (last 2 days)     Date/Time Weight    02/27/23 1300 79 9 (176 15)    02/26/23 2123 80 4 (177 25)            Nutrition       Diet Orders   (From admission, onward)             Start     Ordered    02/27/23 1324  Diet Enteral/Parenteral; Tube Feeding No Oral Diet; Jevity 1 2 Cliff; Continuous; 35; 100; Water; Every 4 hours  Diet effective now        References:    Nutrtion Support Algorithm Enteral vs  Parenteral   Question Answer Comment   Diet Type Enteral/Parenteral    Enteral/Parenteral Tube Feeding No Oral Diet    Tube Feeding Formula: Jevity 1 2 Cliff    Bolus/Cyclic/Continuous Continuous    Tube Feeding Goal Rate (mL/hr): 35    Tube Feeding flush (mL): 100    Water Flush type: Water    Water flush frequency: Every 4 hours    RD to adjust diet per protocol?  Yes        02/27/23 1324    02/27/23 0802  Room Service  Once        Question:  Type of Service  Answer:  Room Service - Appropriate with Assistance    02/27/23 0801                  Active Medications  Scheduled Meds:  Current Facility-Administered Medications   Medication Dose Route Frequency Provider Last Rate   • acetaminophen  650 mg Oral Y9O PRN Alice Cash PA-C     • aspirin  325 mg Per NG Tube Daily COLIN Smith     • baclofen  10 mg Oral Daily Alice Cash [place]     • baclofen  15 mg Oral C69T [address] [place], [place]     • chlorhexidine  15 mL Mouth/Throat Q12H [address] COLIN     • [facility]  30 [facility]     • fentanyl citrate (PF)  25 mcg Intravenous Q1H PRN COLIN Berg     • gabapentin  300 mg Oral HS [facility]     • [START ON 3/1/2023] influenza vaccine  0 7 mL Intramuscular Once Osito Hopkins MD     • levETIRAcetam  750 mg Intravenous Q12H [facility] Cas CrumCOLIN best 750 mg (02/27/23 2034)   • omeprazole (PRILOSEC) suspension 2 mg/mL  20 mg Per NG Tube Daily COLIN Clements     • piperacillin-tazobactam  3 375 g Intravenous Q6H [facility] 3 375 g (02/27/23 2236)   • polyethylene glycol  17 g Per NG Tube Daily COLIN Clements     • propofol  5-50 mcg/kg/min Intravenous Titrated [facility] Stopped (02/28/23 0008)   • senna-docusate sodium  1 tablet Per NG Tube BID COLIN Clements     • tiZANidine  4 mg Oral HS [facility]       Continuous Infusions:  propofol, 5-50 mcg/kg/min, Last Rate: Stopped (02/28/23 0008)      PRN Meds:   acetaminophen, 650 mg, Q4H PRN  fentanyl citrate (PF), 25 mcg, Q1H PRN        Invasive Devices Review  Invasive Devices     Peripheral Intravenous Line  Duration           Peripheral IV 02/26/23 Proximal;Right;Ventral (anterior) Forearm 1 day    Peripheral IV 02/28/23 Distal;Left;Ventral (anterior) Forearm <1 day          Drain  Duration           Suprapubic Catheter 18 Fr  147 days    NG/OG/Enteral Tube Orogastric 16 Fr Right mouth 1 day          Airway  Duration           ETT  7 mm 1 day                Rationale for remaining devices: Patient requiring hemodynamic monitoring currently intubated and sedated     ---------------------------------------------------------------------------------------  Advance Directive and Living Will:      Power of :    POLST:    ---------------------------------------------------------------------------------------  Care Time Delivered:   Upon my evaluation, this patient had a high probability of imminent or life-threatening deterioration due to altered mental status requring immediate intubation`, which required my direct attention, intervention, and personal management  I have personally provided 35 minutes (1209 to 1240) of critical care time, exclusive of procedures, teaching, family meetings, and any prior time recorded by providers other than myself  COLIN Bergman      Portions of the record may have been created with voice recognition software  Occasional wrong word or "sound a like" substitutions may have occurred due to the inherent limitations of voice recognition software    Read the chart carefully and recognize, using context, where substitutions have occurred

## 2023-02-28 NOTE — ASSESSMENT & PLAN NOTE
Patient intubated on 2/26 by EMS in the field after becoming altered and lethargic as a passenger in a vehicle  Plan  · Current vent settings: AC/VC 18/300/40%/6  · Titrate fio2 for O2 sat 92% or above   · Keep low-dose propofol if needed for RASS goal 0   · CT chest/abdomen/pelvis negative  · Attempt to wean in the a m  if neuro exam is appropriate  · Failed SBT yesterday with apneic episodes  · VAP bundle; chlorhexidine, PPI, HOB greater than 30 degrees

## 2023-02-28 NOTE — ASSESSMENT & PLAN NOTE
· Patient was diagnosed with MS 26 years ago  · Follows with a neurologist from [facility]  · Continue baclofen, Neurontin, Zanaflex  · Per , received medication infusion on 1/46 without complications -states she has been on the same medication for MS for about 2 years without any issues

## 2023-02-28 NOTE — ASSESSMENT & PLAN NOTE
· Patient was diagnosed with MS 26 years ago  · Follows with a neurologist from [facility]  · Continue baclofen, Neurontin, Zanaflex  · Per , received medication infusion on 4/70 without complications intrathecal methotrrexate -states she has been on the same medication for MS for about 2 years without any issues

## 2023-02-28 NOTE — ASSESSMENT & PLAN NOTE
No bowel movement since admission  On exam, abdomen is distended and non tender  Ct scan c/a/p is indicative of large fecal burden     Plan  · Bowel regimen: dulcolax suppository x1, Shelby lax daily, & Senakot bid

## 2023-02-28 NOTE — PROGRESS NOTES
Recommend increasing TF to Jevity 1 2 at goal rate of 45 mL/hr for a total volume of 1080 mL  This will provide 1296 kcals, 60 gms protein and 872 mL free water  Recommend flushes of 100 mL q 6 hrs for a total fluid intake of 1272 mL

## 2023-02-28 NOTE — RESPIRATORY THERAPY NOTE
Pt recd omn a pb 840 vent setting as per flow sheet all alarms on and function vent working properly  Vent check  Pt stable   Pt restrains are secure

## 2023-02-28 NOTE — ASSESSMENT & PLAN NOTE
· MRI brain w w/o contrast 2/27: acute to subacute left occipital lobe infarct with no discrete enhancing lesion to suggest acute demyelination  positive for chronic left basal ganglia lacunar infarct       See plan above

## 2023-02-28 NOTE — ASSESSMENT & PLAN NOTE
· MRI brain w w/o contrast 2/27: acute to subacute left occipital lobe infarct with no discrete enhancing lesion to suggest acute demyelination  positive for chronic left basal ganglia lacunar infarct  · Neuro recs:  No prior h/o CVA, initiate stroke pathway, f/u Echo with shunt study, & lipid panel & A1c  · Echo: Left ventricular ejection fraction is 60% with normal systolic function  Diastolic function is mildly abnormal consistent with grade 1  MV with mild annular calcification, mild regurgitation  TV with mild regurgitation,PA pressure 30 mmHg    · A1c: 4 6  · Lipid panel: cholesterol 197/tri 187/ HDL 52/ , per Neuro rec's hold off Lipitor dosing in setting of elevated LFTS   · Patient will require higher blood pressures with systolic to be equal or greater to 130, pressor started and Jude @ 65

## 2023-02-28 NOTE — CASE MANAGEMENT
Case Management Assessment & Discharge Planning Note    Patient name Tutu Padilla  Location ICU ICU  MRN [id number]  : 1955 Date 2023       Current Admission Date: 2023  Current Admission Diagnosis:Altered mental status   Patient Active Problem List    Diagnosis Date Noted   • Infarction of left basal ganglia ([facility] [address] ) 2023   • Altered mental status 2023   • Acute respiratory failure with hypoxia ([facility] [address] ) 2023   • Depression 2023   • Abnormal urinalysis 2022   • Left shoulder pain 2022   • Anemia 2022   • UTI (urinary tract infection) 2022   • Debility 2021   • Constipation 2021   • Acute bilateral thoracic back pain 2021   • Hypernatremia 2021   • Multiple sclerosis ([facility] [address] ) 2021   • Ambulatory dysfunction 2021   • Hypertension 2021   • Neurogenic bladder 2021   • Leg swelling 2021   • Acquired polyneuropathy       LOS (days): 2  Geometric Mean LOS (GMLOS) (days): 5 00  Days to GMLOS:3 1     OBJECTIVE:    Risk of Unplanned Readmission Score: 22 3         Current admission status: Inpatient       Preferred Pharmacy:   [id number] [place] - [facility] [address] [address]  Phone: [phone] Fax: 1416 [id number] [place] [address] 77 [address]  Phone: [phone] Fax: [phone]    PATIENT/FAMILY REPORTS NO PREFERRED PHARMACY  No address on file      [facility] ) - Wilver Duarte [address],[facility]  [address]  Phone: [phone] Fax: [phone]    Primary Care Provider: Gloria Cuellar MD    Primary Insurance: MEDICARE  Secondary Insurance: BLUE CROSS    ASSESSMENT:  [id number] [facility] Representative - Spouse   Primary Phone: [phone] (Mobile)  Home Phone: [phone]                         Readmission Root Cause  30 Day Readmission: No    Patient Information  Admitted from[de-identified] Home  Mental Status: Intubated  During Assessment patient was accompanied by: Son, Daughter  Assessment information provided by[de-identified] Daughter, Son  Primary Caregiver: Spouse  Caregiver's Name[de-identified] [employer]  Support Systems: Spouse/significant other  [place] of Residence: [address] [place] [address] do you live in?: [place] entry access options   Select all that apply : Ramp  Type of Current Residence: 2 story home  Upon entering residence, is there a bedroom on the main floor (no further steps)?: Yes  Upon entering residence, is there a bathroom on the main floor (no further steps)?: Yes  In the last 12 months, was there a time when you were not able to pay the mortgage or rent on time?: No  In the last 12 months, how many places have you lived?: 1  In the last 12 months, was there a time when you did not have a steady place to sleep or slept in a shelter (including now)?: No  Homeless/housing insecurity resource given?: N/A  Living Arrangements: Lives w/ Spouse/significant other  Is patient a ?: No    Activities of Daily Living Prior to Admission  Functional Status: Assistance  Completes ADLs independently?: No  Level of ADL dependence: Assistance  Ambulates independently?: Yes  Does patient use assisted devices?: Yes  Assisted Devices (DME) used: Rollator, Walker, Wheelchair  Does patient currently own DME?: Yes  What DME does the patient currently own?: Zelpha Marinas, Wheelchair  Does patient have a history of Outpatient Therapy (PT/OT)?: No  Does the patient have a history of Short-Term Rehab?: Yes  Does patient have a history of HHC?: Yes  Does patient currently have [address]?: Yes ([facility] for home OT)    [facility]  Type of Current Home Care Services: [address][de-identified] Other (please enter name in comment) [facility])  [facility] Provider[de-identified] PCP    Patient Information Continued  Income Source: Pension/half-way  Does patient have prescription coverage?: Yes  Within the past 12 months, you worried that your food would run out before you got the money to buy more : Never true  Within the past 12 months, the food you bought just didn't last and you didn't have money to get more : Never true  Food insecurity resource given?: N/A  Does patient receive dialysis treatments?: No  Does patient have a history of substance abuse?: No  Does patient have a history of Mental Health Diagnosis?: No         Means of Transportation  Means of Transport to Appts[de-identified] Family transport  In the past 12 months, has lack of transportation kept you from medical appointments or from getting medications?: No  In the past 12 months, has lack of transportation kept you from meetings, work, or from getting things needed for daily living?: No  Was application for public transport provided?: N/A        DISCHARGE DETAILS:    Discharge planning discussed with[de-identified] son and daughter at bedside  Freedom of Choice: Yes     CM contacted family/caregiver?: Yes  Were Treatment Team discharge recommendations reviewed with patient/caregiver?: Yes  Did patient/caregiver verbalize understanding of patient care needs?: Yes  Were patient/caregiver advised of the risks associated with not following Treatment Team discharge recommendations?: Yes    Contacts  Patient Contacts: son and daughter  Relationship to Patient[de-identified] Family  Contact Method: In Person  Reason/Outcome: Continuity of Care, Discharge Planning              Other Referral/Resources/Interventions Provided:  Referral Comments: Met with pt's daughter and son who were at bedside  They provided information for initial assessment as pt is currently intubated  Pt lives with her spouse Genesis Saenz in a 2-level home but has a 1st floor setup and a ramp to enter   Prior to admission, pt was ambulating with a RW for household distance and a wheelchair for long distance  Spouse assists with ADLs Pt was receiving home therapy through [facility] rehab  Hx of acute rehab at [facility] and recently [facility] last November  Pt's children reported that pt's spouse is pt's primary caretaker and assists pt with everything and also provides transport to medical appointments  They have concerns that with pt's current state that pt's spouse might need further help if in case pt's level of functioing worsens  Discussed [address] services covered through Medicare would only cover skilled [address] services and discuss Medicaid but children reported they believe pt's combined income might be high to be eligible  Provided information on [facility] to see if they are able to assist with anything  Daughter also requested for resources for outpt MH as she is concerned her dad might have caregiver burden and taking too much on  List of outpt MH resources provided  If rehab is recommended later then this can also further be discussed at that time  Case management will continue to follow for discharge planning needs

## 2023-03-01 NOTE — ASSESSMENT & PLAN NOTE
· Currently AST 77, , Alk phos 124  · CT c/a/p: negative for intra-abdominal pathology      · Possible related to intrathecal methotrexate infusions  · Continue to trend hepatic function panel

## 2023-03-01 NOTE — PHYSICAL THERAPY NOTE
PHYSICAL THERAPY     03/01/23 2158   Note Type   Note type Cancelled Session   Cancel Reasons Medical status  (intubated in ICU, will reattempt at a later time as medically appropriate )   Licensure   [place] License Number  Darryl Caballero PT [id number]

## 2023-03-01 NOTE — QUICK NOTE
Update given to  at the bedside  Was explained to him that after discussion with neurology, even though patient is tolerating pressure support wean, we will hold off extubating her today since she does have some secretions and there is concern that she will be able to protect her airway after extubation due to weakness  We will keep her in pressure support for now and reevaluate for possible extubation daily

## 2023-03-01 NOTE — PROGRESS NOTES
[address]  Progress Note - Rima Mora 1955, 78 y o  female MRN: [id number]  Unit/Bed#: ICU 02 Encounter: [id number]  Primary Care Provider: Lucy Lr MD   Date and time admitted to hospital: 2/26/2023  3:44 PM    * Altered mental status  Assessment & Plan  Presented to the ER on 2/26 after complaining of loss of vision in the passenger seat  Shortly after, the patient became lethargic, slumped over   started CPR on the side of the road, although unclear if patient truly had loss of pulses  Intubated approximately 10 minutes after CPR by EMS who found pulses on arrival  Initial neuro exam, patient was moving everything appropriately to painful stimuli only  Plan  · Continue mechanical ventilation for airway protection  · CTA negative  · CT chest/abdomen/pelvis negative  · LP - CSF culture and gram stain negative, meningitis/encephalitis panel negative, & slight elevation CSF total protein at 49  · Neurology: r/o  possible seizure  patient's  reporting pt was frothing at he mouth, was rigid, and shaking during event  · Neuro rec's: EEG - negative for seizure activity & prophylaxis  Keppra 750 mg every 12 hours, now stopped  · MRI brain w w/o contrast 2/27: acute to subacute left occipital lobe infarct with no discrete enhancing lesion to suggest acute demyelination  positive for chronic left basal ganglia lacunar infarct  Neuro following, appreciate recs    Infarction of left basal ganglia (HCC)  Assessment & Plan  · MRI brain w w/o contrast 2/27: acute to subacute left occipital lobe infarct with no discrete enhancing lesion to suggest acute demyelination  positive for chronic left basal ganglia lacunar infarct  · Neuro recs:  No prior h/o CVA, initiate stroke pathway, f/u Echo with shunt study, & lipid panel & A1c  · Echo: Left ventricular ejection fraction is 60% with normal systolic function    Diastolic function is mildly abnormal consistent with grade 1  MV with mild annular calcification, mild regurgitation  TV with mild regurgitation,PA pressure 30 mmHg  · A1c: 4 6  · Lipid panel: cholesterol 197/tri 187/ HDL 52/ , per Neuro rec's hold off Lipitor dosing in setting of elevated LFTS   · Patient will require higher blood pressures with systolic to be equal or greater to 130, pressor started and Jude @ 65        Acute respiratory failure with hypoxia [facility])  Assessment & Plan  Patient intubated on 2/26 by EMS in the field after becoming altered and lethargic as a passenger in a vehicle  Plan  · Current vent settings: PS 8/6, switched to PS secondary to breath stacking/vent dysynchronct on AC/VC  · Titrate fio2 for O2 sat 92% or above   · CT chest 2/26 Mild bibasilar opacities left greater than right favoring atelectasis  · AM CXR pending   · Neuro status and secretions likely precludes extubation today    · Very cough/gag reflexes  · VAP bundle; chlorhexidine, PPI, HOB greater than 30 degrees    UTI (urinary tract infection)  Assessment & Plan  · Patient has a chronic suprapubic catheter from a neurogenic bladder in the setting of MS  · Per her , she gets urinary tract infections "frequently "  · Most recent UTI was diagnosed on 2/24 where patient was prescribed Bactrim p o  as an outpatient  ·  states she is on Bactrim "often" without any complications  · Per chart review, urine culture + Enterobacter cloacae on 2/23  · Received Rocephin IV in the ER - 3 ds zosyn IV, now narrowed to IV Ceftriaxone   · Routine suprapubic catheter site care    Neurogenic bladder  Assessment & Plan  H/o indwelling urinary catheter change to SPT on 4/26/22    [facility]   · Recently changed  2/6/23    Multiple sclerosis ([facility] [address] )  Assessment & Plan  · Patient was diagnosed with MS 26 years ago  · Follows with a neurologist from [employer]  · Continue baclofen, Neurontin, Zanaflex  · Per , received medication infusion on 2/24 without complications intrathecal methotrrexate -states she has been on the same medication for MS for about 2 years without any issues    Hypertension  Assessment & Plan  · Hold home dose Norvasc, Cozaar, Toprol      Depression  Assessment & Plan  · Hold home dose Zoloft    Ambulatory dysfunction  Assessment & Plan  · Patient is wheelchair-bound at baseline in the setting of MS  · PT/OT consult    Constipation  Assessment & Plan  · CT on admission indicative of large fecal burden    Plan  · Bowel regimen: Shelby lax daily, & Senakot bid   · BM 2/28 in AM    Elevated LFTs  Assessment & Plan  · Currently AST 77, , Alk phos 124  · CT c/a/p: negative for intra-abdominal pathology  · Possible related to intrathecal methotrexate infusions  · Continue to trend hepatic function panel       Hyperlipidemia  Assessment & Plan   Lipid panel : cholesterol 197/tri 187/ HDL 52/   See plan above       ----------------------------------------------------------------------------------------  HPI/24hr events: Patient is currently on day 4 of mechanical ventilations  Unable to extubate yesterday due to increase lethargy with apenic events possible related to Keppra IV  EEG performed and is negative for seizure activity and Keppra stopped  Patient remains off sedation  Patient arouses to names, able to nod appropriately to yes/no questions, and profoundly weak in all extremities  Neuro recommendations in the presence of stroke keep higher blood pressures with systolic to be equal or greater to 130  Jude started &  currently @ 65 mcg  Echo performed yesterday, EF is 60% No PFO or shunt noted  Currently being treated for UTI, positive for Enterobacter cloace, and deescalated IV zosyn to IV ceftriaxone  Patient desynchronous with the vent last evening, and not pulling adequate volumes, switched to PS 8/6, with improvement  Large secretion burden with very weak gag and cough likely precludes extubation today   Patient appropriate for transfer out of the ICU today?: Patient meets criteria for referral to the ICU Follow-up Clinic; referral has been made  Disposition: Continue Critical Care   Code Status: Level 1 - Full Code  ---------------------------------------------------------------------------------------  SUBJECTIVE  Intubated     Review of Systems   Unable to perform ROS: Intubated     Review of systems was reviewed and negative unless stated above in HPI/24-hour events   ---------------------------------------------------------------------------------------  OBJECTIVE    Vitals   Vitals:    23 0000 23 0030 23 0035 23 0041   BP: 132/58 134/63     BP Location: Right arm Right arm     Pulse: 62 66     Resp: 14 13     Temp: 99 5 °F (37 5 °C) 100 4 °F (38 °C)     TempSrc: Esophageal Esophageal  Esophageal   SpO2: 100% 99% 99%    Weight:       Height:         Temp (24hrs), Av 3 °F (37 4 °C), Min:98 1 °F (36 7 °C), Max:100 4 °F (38 °C)  Current: Temperature: 100 4 °F (38 °C)          Respiratory:  SpO2 Activity: SpO2 Activity: At Rest       Invasive/non-invasive ventilation settings   Respiratory    Lab Data (Last 4 hours)    None         O2/Vent Data (Last 4 hours)       003           Vent Mode CPAP/PS Spont       FIO2 (%) (%) 40       PEEP (cmH2O) (cmH2O) 6       Pressure Support (cmH2O) (cmH20) 8       MV (Obs) 7 79       RSBI 38                   Physical Exam  Constitutional:       Appearance: She is ill-appearing  Interventions: She is intubated and restrained  HENT:      Head: Normocephalic and atraumatic  Mouth/Throat:      Mouth: Mucous membranes are moist    Eyes:      General: Lids are normal       Pupils: Pupils are equal, round, and reactive to light  Right eye: Pupil is sluggish  Left eye: Pupil is sluggish  Cardiovascular:      Rate and Rhythm: Normal rate and regular rhythm        Pulses:           Radial pulses are 2+ on the right side and 2+ on the left side  Dorsalis pedis pulses are 2+ on the right side and 2+ on the left side  Heart sounds: S1 normal and S2 normal    Pulmonary:      Effort: She is intubated  Breath sounds: Examination of the right-upper field reveals rhonchi  Examination of the left-upper field reveals rhonchi  Rhonchi present  Comments: Thick yellow ETT secretions  Very weak cough/gag   Abdominal:      General: Bowel sounds are normal  There is distension  Palpations: Abdomen is soft  Tenderness: There is no abdominal tenderness  Genitourinary:     Comments: Suprapubic catheter   Musculoskeletal:      Right upper arm: Swelling present  Left upper arm: Swelling present  Right hand: Swelling present  Decreased range of motion  Left hand: Swelling present  Decreased range of motion  Cervical back: Full passive range of motion without pain and normal range of motion  Right lower le+ Edema present  Left lower le+ Edema present  Skin:     General: Skin is warm and dry  Capillary Refill: Capillary refill takes less than 2 seconds  Coloration: Skin is pale  Neurological:      Mental Status: She is lethargic  GCS: GCS eye subscore is 4  GCS verbal subscore is 1  GCS motor subscore is 6  Sensory: Sensation is intact  Motor: Weakness present           Laboratory and Diagnostics:  Results from last 7 days   Lab Units 23  0554 23  0543 23  1555   WBC Thousand/uL 5 69 5 52 6 55   HEMOGLOBIN g/dL 9 0* 9 3* 10 6*   HEMATOCRIT % 27 6* 28 3* 33 4*   PLATELETS Thousands/uL 137* 152 169   NEUTROS PCT % 88* 88* 81*   MONOS PCT % 3* 5 6     Results from last 7 days   Lab Units 23  0554 23  0543 23  2320 23  1555   SODIUM mmol/L 140 139 136 139   POTASSIUM mmol/L 4 8 5 0 4 4 4 5   CHLORIDE mmol/L 109* 109* 106 106   CO2 mmol/L 25 25 24 15*   ANION GAP mmol/L 6 5 6 18*   BUN mg/dL 21 17 17 21   CREATININE mg/dL 1 26 1 20 0  96 1 14   CALCIUM mg/dL 7 6* 7 6* 7 8* 8 3*   GLUCOSE RANDOM mg/dL 114 74 116 89   ALT U/L 108* 119*  --  145*   AST U/L 64* 77*  --  115*   ALK PHOS U/L 124* 116*  --  128*   ALBUMIN g/dL 2 9* 3 0*  --  3 3*   TOTAL BILIRUBIN mg/dL 0 33 0 26  --  0 19*     Results from last 7 days   Lab Units 02/28/23  0554 02/27/23  0543   MAGNESIUM mg/dL 2 3 1 7*   PHOSPHORUS mg/dL 4 0 3 3      Results from last 7 days   Lab Units 02/26/23  1555   INR  1 06   PTT seconds 32          Results from last 7 days   Lab Units 02/26/23  1854 02/26/23  1716   LACTIC ACID mmol/L 1 2 3 4*     ABG:  Results from last 7 days   Lab Units 02/28/23  0906   PH ART  7 412   PCO2 ART mm Hg 38 9   PO2 ART mm Hg 94 9   HCO3 ART mmol/L 24 2   BASE EXC ART mmol/L -0 3   ABG SOURCE  Radial, Left     VBG:  Results from last 7 days   Lab Units 02/28/23  0906   ABG SOURCE  Radial, Left     Results from last 7 days   Lab Units 02/28/23  0554 02/27/23  0543   PROCALCITONIN ng/ml 0 05 0 05       Micro  Results from last 7 days   Lab Units 02/26/23  2140 02/26/23  1831 02/26/23  1718 02/23/23  1055   BLOOD CULTURE   --   --  No Growth at 48 hrs  No Growth at 48 hrs   --    GRAM STAIN RESULT   --  No Polys or Bacteria seen  Rare Mononuclear Cells  --   --    URINE CULTURE   --   --   --  >100,000 cfu/ml Enterobacter cloacae*   MRSA CULTURE ONLY  No Methicillin Resistant Staphlyococcus aureus (MRSA) isolated  --   --   --          · Imaging:Echo: Left ventricular ejection fraction is 60% with normal systolic function  Diastolic function is mildly abnormal consistent with grade 1  MV with mild annular calcification, mild regurgitation  TV with mild regurgitation,PA pressure 30 mmHg  I have personally reviewed pertinent reports        Intake and Output  I/O       02/27 0701 02/28 0700 02/28 0701 03/01 0700    I V  (mL/kg) 100 (1 2) 525 4 (6 6)    NG/ 330    IV Piggyback 250 150    Feedings 560 480    Total Intake(mL/kg) 1310 (16 4) 1485 4 (18 5) Urine (mL/kg/hr) 600 (0 3) 2275 (1 8)    Emesis/NG output 100     Stool 0     Total Output 700 2275    Net +610 -789  6          Unmeasured Stool Occurrence 1 x           Height and Weights   Height: 4' 7" (139 7 cm)  IBW (Ideal Body Weight): 34 kg  Body mass index is 41 04 kg/m²  Weight (last 2 days)     Date/Time Weight    02/28/23 1013 80 1 (176 59)    02/28/23 0600 80 1 (176 59)    02/27/23 1300 79 9 (176 15)            Nutrition       Diet Orders   (From admission, onward)             Start     Ordered    02/28/23 1056  Diet Enteral/Parenteral; Tube Feeding No Oral Diet; Jevity 1 2 Cliff; Continuous; 45; 100; Water; Every 4 hours  Diet effective now        References:    Nutrtion Support Algorithm Enteral vs  Parenteral   Question Answer Comment   Diet Type Enteral/Parenteral    Enteral/Parenteral Tube Feeding No Oral Diet    Tube Feeding Formula: Jevity 1 2 Cliff    Bolus/Cyclic/Continuous Continuous    Tube Feeding Goal Rate (mL/hr): 45    Tube Feeding flush (mL): 100    Water Flush type: Water    Water flush frequency: Every 4 hours    RD to adjust diet per protocol?  Yes        02/28/23 1055    02/27/23 0802  Room Service  Once        Question:  Type of Service  Answer:  Room Service - Appropriate with Assistance    02/27/23 0801                  Active Medications  Scheduled Meds:  Current Facility-Administered Medications   Medication Dose Route Frequency Provider Last Rate   • acetaminophen  650 mg Oral Q4H PRN COLIN Clements     • aspirin  325 mg Per NG Tube Daily COLIN Rossi     • baclofen  10 mg Oral Daily Gilmar Hawkins, [place]     • baclofen  15 mg Oral C20H [address] Zelpha Jeannie Dunlap PA-C     • cefTRIAXone  1,000 mg Intravenous A59T Gilmar Hawkins PA-C Stopped (02/28/23 1100)   • chlorhexidine  15 mL Mouth/Throat Q12H [address] COLIN Matta     • enoxaparin  30 mg Subcutaneous Daily Robert Hoffmann     • gabapentin  300 mg Oral HS Sohan Zimmerman [facility] • influenza vaccine  0 7 mL Intramuscular Once Marilu Spicer MD     • omeprazole (PRILOSEC) suspension 2 mg/mL  20 mg Per NG Tube Daily COLIN Clements     • phenylephine   mcg/min Intravenous Titrated Deborah Dunlap PA-C 65 mcg/min (02/28/23 2201)   • polyethylene glycol  17 g Per NG Tube Daily COLIN Clements     • senna-docusate sodium  1 tablet Per NG Tube BID COLIN Clements     • tiZANidine  4 mg Oral HS [employer], CRNP       Continuous Infusions:  phenylephine,  mcg/min, Last Rate: 65 mcg/min (02/28/23 2201)      PRN Meds:   acetaminophen, 650 mg, Q4H PRN        Invasive Devices Review  Invasive Devices     Peripheral Intravenous Line  Duration           Peripheral IV 02/28/23 Distal;Left;Ventral (anterior) Forearm <1 day    Peripheral IV 02/28/23 Right Forearm <1 day          Drain  Duration           Suprapubic Catheter 18 Fr  148 days    NG/OG/Enteral Tube Orogastric 16 Fr Right mouth 2 days          Airway  Duration           ETT  Oral 7 mm 3 days    ETT  7 mm 2 days                Rationale for remaining devices: pt remains intubated and requires hemodynamic monitoring  ---------------------------------------------------------------------------------------  Advance Directive and Living Will:      Power of :    POLST:    ---------------------------------------------------------------------------------------  Care Time Delivered:   Upon my evaluation, this patient had a high probability of imminent or life-threatening deterioration due to change of mental status, which required my direct attention, intervention, and personal management  I have personally provided 30 minutes (2000 to 2030) of critical care time, exclusive of procedures, teaching, family meetings, and any prior time recorded by providers other than myself  COLIN Matthew      Portions of the record may have been created with voice recognition software  Occasional wrong word or "sound a like" substitutions may have occurred due to the inherent limitations of voice recognition software    Read the chart carefully and recognize, using context, where substitutions have occurred

## 2023-03-01 NOTE — SPEECH THERAPY NOTE
Consult received  Pt remains intubated  Please re-consult when indicated  Thank you!   Rebecca Mathews [address] [phone]

## 2023-03-02 NOTE — QUICK NOTE
Multiple discussion had with myself, Dr Phyllis Tierney and the patients daughter regarding goals of care  Plan will be to extubate the patient later today when family is at the bedside and at that time she will be DNR/DNI

## 2023-03-02 NOTE — ASSESSMENT & PLAN NOTE
· Patient was diagnosed with MS 26 years ago  · Follows with a neurologist from [facility]  · Continue baclofen, Neurontin, Zanaflex  · Per , received medication infusion on 6/79 without complications intrathecal methotrrexate -states she has been on the same medication for MS for about 2 years without any issues

## 2023-03-02 NOTE — ASSESSMENT & PLAN NOTE
Presented to the ER on 2/26 after complaining of loss of vision in the passenger seat  Shortly after, the patient became lethargic, slumped over   started CPR on the side of the road, although unclear if patient truly had loss of pulses  Intubated approximately 10 minutes after CPR by EMS who found pulses on arrival  Initial neuro exam, patient was moving everything appropriately to painful stimuli only  Plan  · Continue mechanical ventilation for airway protection  · CTA negative  · CT chest/abdomen/pelvis negative  · LP - CSF culture and gram stain negative, meningitis/encephalitis panel negative, & slight elevation CSF total protein at 49  · Neurology: r/o  possible seizure  patient's  reporting pt was frothing at he mouth, was rigid, and shaking during event  · Neuro rec's: EEG - negative for seizure activity & prophylaxis  Keppra discontinued by neurology  · MRI brain w w/o contrast 2/27: acute to subacute left occipital lobe infarct with no discrete enhancing lesion to suggest acute demyelination  positive for chronic left basal ganglia lacunar infarct     Neuro following, appreciate recs

## 2023-03-02 NOTE — ASSESSMENT & PLAN NOTE
· H/o indwelling urinary catheter change to SPT on 4/26/22    [facility]   · Recently changed  2/6/23

## 2023-03-02 NOTE — PHYSICAL THERAPY NOTE
PHYSICAL THERAPY     03/02/23 1517   Note Type   Note type Cancelled Session   Cancel Reasons Medical status  (patient awaiting extubation with family present, PT deferred at this time and will re-attempt at a later time as medically appropriate )   Licensure   [place] Number  Jacquelyn Dailey PT [id number]

## 2023-03-02 NOTE — RESPIRATORY THERAPY NOTE
Pt recd on a  vent setting as per flow sheet all alarms on and functional vent working properly  Vent check pt stable

## 2023-03-02 NOTE — PROGRESS NOTES
[facility]  Progress Note - Rima Mora 1955, 78 y o  female MRN: [id number]  Unit/Bed#: ICU 02 Encounter: [id number]  Primary Care Provider: Bello Spicer MD   Date and time admitted to hospital: 2/26/2023  3:44 PM    * Altered mental status  Assessment & Plan  Presented to the ER on 2/26 after complaining of loss of vision in the passenger seat  Shortly after, the patient became lethargic, slumped over   started CPR on the side of the road, although unclear if patient truly had loss of pulses  Intubated approximately 10 minutes after CPR by EMS who found pulses on arrival  Initial neuro exam, patient was moving everything appropriately to painful stimuli only  Plan  · Continue mechanical ventilation for airway protection  · CTA negative  · CT chest/abdomen/pelvis negative  · LP - CSF culture and gram stain negative, meningitis/encephalitis panel negative, & slight elevation CSF total protein at 49  · Neurology: r/o  possible seizure  patient's  reporting pt was frothing at he mouth, was rigid, and shaking during event  · Neuro rec's: EEG - negative for seizure activity & prophylaxis  Keppra discontinued by neurology  · MRI brain w w/o contrast 2/27: acute to subacute left occipital lobe infarct with no discrete enhancing lesion to suggest acute demyelination  positive for chronic left basal ganglia lacunar infarct  Neuro following, appreciate recs    Infarction of left basal ganglia (HCC)  Assessment & Plan  · MRI brain w w/o contrast 2/27: acute to subacute left occipital lobe infarct with no discrete enhancing lesion to suggest acute demyelination  positive for chronic left basal ganglia lacunar infarct  · Neuro recs:  No prior h/o CVA, initiate stroke pathway, f/u Echo with shunt study, & lipid panel & A1c  · Echo: Left ventricular ejection fraction is 60% with normal systolic function  Diastolic function is mildly abnormal consistent with grade 1  MV with mild annular calcification, mild regurgitation  TV with mild regurgitation,PA pressure 30 mmHg    · A1c: 4 6  · Lipid panel: cholesterol 197/tri 187/ HDL 52/ , per Neuro rec's hold off Lipitor dosing in setting of elevated LFTS   · Patient will require higher blood pressures with systolic to be equal or greater to 130        UTI (urinary tract infection)  Assessment & Plan  · Patient has a chronic suprapubic catheter from a neurogenic bladder in the setting of MS  · Per her , she gets urinary tract infections "frequently "  · Most recent UTI was diagnosed on 2/24 where patient was prescribed Bactrim p o  as an outpatient  ·  states she is on Bactrim "often" without any complications  · Per chart review, urine culture + Enterobacter cloacae on 2/23  · Received Rocephin IV in the ER - 3 ds zosyn IV, now narrowed to IV Ceftriaxone -day 5 of antibiotics  · Routine suprapubic catheter site care    Multiple sclerosis ([facility] [address] )  Assessment & Plan  · Patient was diagnosed with MS 26 years ago  · Follows with a neurologist from [facility]  · Continue baclofen, Neurontin, Zanaflex  · Per , received medication infusion on 4/52 without complications intrathecal methotrrexate -states she has been on the same medication for MS for about 2 years without any issues    Hypertension  Assessment & Plan  · Hold home dose Norvasc, Cozaar, Toprol      Acute respiratory failure with hypoxia ([facility] [address] )  Assessment & Plan  Patient intubated on 2/26 by EMS in the field after becoming altered and lethargic as a passenger in a vehicle  Plan  · Current vent settings: PS 5/6, tolerating pressure support weans  · CT chest 2/26 Mild bibasilar opacities left greater than right favoring atelectasis  · Neuro status and secretions likely precludes extubation today    · Very cough/gag reflexes  · VAP bundle; chlorhexidine, PPI, HOB greater than 30 degrees    Ambulatory dysfunction  Assessment & Plan  · Patient is wheelchair-bound at baseline in the setting of MS  · PT/OT consult    Constipation  Assessment & Plan  · CT on admission indicative of large fecal burden  · Continue bowel regimen        Elevated LFTs  Assessment & Plan  · Currently AST 77, , Alk phos 124  · CT c/a/p: negative for intra-abdominal pathology  · Possible related to intrathecal methotrexate infusions  · Continue to trend hepatic function panel       Hyperlipidemia  Assessment & Plan  ·  Lipid panel : cholesterol 197/tri 187/ HDL 52/   · See plan above       Neurogenic bladder  Assessment & Plan  · H/o indwelling urinary catheter change to SPT on 22  [facility]   · Recently changed  23    Depression  Assessment & Plan  · Hold home dose Zoloft        ----------------------------------------------------------------------------------------  HPI/24hr events: No acute events overnight  Patient tolerated pressure support weans with settings 5/6  After discussion with neurology yesterday afternoon, there was concern to extubate the patient due to her significant weakness  We will continue to attempt to wean and extubate as able      Patient appropriate for transfer out of the ICU today?: No  Disposition: Continue Critical Care   Code Status: Level 1 - Full Code  ---------------------------------------------------------------------------------------  SUBJECTIVE  Intubated    Review of Systems   Unable to perform ROS: Intubated     Review of systems was unable to be performed secondary to Intubation  ---------------------------------------------------------------------------------------  OBJECTIVE    Vitals   Vitals:    23 0600 23 0630 23 0700 23 0705   BP: 146/67 142/63 139/63    Pulse: 79 72 63 64   Resp: 18 17 17 17   Temp: 99 9 °F (37 7 °C) 99 9 °F (37 7 °C) 99 9 °F (37 7 °C)    TempSrc:       SpO2: 94% 99% 99% 99%   Weight:       Height:         Temp (24hrs), Av °F (37 8 °C), Min:99 3 °F (37 4 °C), Max:100 8 °F (38 2 °C)  Current: Temperature: 99 9 °F (37 7 °C)          Respiratory:  SpO2: SpO2: 99 %       Invasive/non-invasive ventilation settings   Respiratory    Lab Data (Last 4 hours)    None         O2/Vent Data (Last 4 hours)      03/02 0705           Vent Mode CPAP/PS Spont       FIO2 (%) (%) 40       PEEP (cmH2O) (cmH2O) 6       Pressure Support (cmH2O) (cmH20) 5       MV (Obs) 5 97                   Physical Exam  Vitals and nursing note reviewed  Constitutional:       Appearance: She is ill-appearing and toxic-appearing  Comments: Bilateral wrist restraints in place   HENT:      Head: Normocephalic and atraumatic  Right Ear: Tympanic membrane, ear canal and external ear normal       Left Ear: Tympanic membrane, ear canal and external ear normal       Nose: Nose normal       Mouth/Throat:      Mouth: Mucous membranes are dry  Pharynx: Oropharynx is clear  Eyes:      Comments: Bilateral pupils sluggish but reactive   Cardiovascular:      Rate and Rhythm: Regular rhythm  Tachycardia present  Pulses: Normal pulses  Heart sounds: Normal heart sounds  Pulmonary:      Comments: ET tube mechanical ventilation  Bilateral breath sounds clear but diminished  Abdominal:      Comments: Tube feeds via OG tube   bowel sounds normoactive     Genitourinary:     Comments: Suprapubic catheter  Musculoskeletal:      Cervical back: Normal range of motion and neck supple  Comments: Bilateral upper extremity weakness 2/5  Bilateral lower extremity weakness 2/5  Patient does not withdraw to painful stimuli at this time   Skin:     General: Skin is warm and dry  Capillary Refill: Capillary refill takes less than 2 seconds     Neurological:      Comments: Patient will open her eyes to name  Unable to move upper or lower extremities at this time   Psychiatric:      Comments: Intubated               Laboratory and Diagnostics:  Results from last 7 days   Lab Units 03/02/23  0550 03/01/23  0526 02/28/23  0554 02/27/23  0543 02/26/23  1555   WBC Thousand/uL 6 81 4 90 5 69 5 52 6 55   HEMOGLOBIN g/dL 8 6* 8 7* 9 0* 9 3* 10 6*   HEMATOCRIT % 26 8* 28 0* 27 6* 28 3* 33 4*   PLATELETS Thousands/uL 159 145* 137* 152 169   NEUTROS PCT %  --  80* 88* 88* 81*   MONOS PCT %  --  3* 3* 5 6     Results from last 7 days   Lab Units 03/02/23  0550 03/01/23  0526 02/28/23  0554 02/27/23  0543 02/26/23  2320 02/26/23  1555   SODIUM mmol/L 145 144 140 139 136 139   POTASSIUM mmol/L 5 1 5 0 4 8 5 0 4 4 4 5   CHLORIDE mmol/L 108 110* 109* 109* 106 106   CO2 mmol/L 30 29 25 25 24 15*   ANION GAP mmol/L 7 5 6 5 6 18*   BUN mg/dL 18 17 21 17 17 21   CREATININE mg/dL 0 81 0 97 1 26 1 20 0 96 1 14   CALCIUM mg/dL 8 4 8 3* 7 6* 7 6* 7 8* 8 3*   GLUCOSE RANDOM mg/dL 98 107 114 74 116 89   ALT U/L  --  117* 108* 119*  --  145*   AST U/L  --  78* 64* 77*  --  115*   ALK PHOS U/L  --  158* 124* 116*  --  128*   ALBUMIN g/dL  --  3 0* 2 9* 3 0*  --  3 3*   TOTAL BILIRUBIN mg/dL  --  0 30 0 33 0 26  --  0 19*     Results from last 7 days   Lab Units 03/02/23  0550 03/01/23  0526 02/28/23  0554 02/27/23  0543   MAGNESIUM mg/dL 1 9 2 0 2 3 1 7*   PHOSPHORUS mg/dL 4 8* 4 2* 4 0 3 3      Results from last 7 days   Lab Units 02/26/23  1555   INR  1 06   PTT seconds 32          Results from last 7 days   Lab Units 02/26/23  1854 02/26/23  1716   LACTIC ACID mmol/L 1 2 3 4*     ABG:  Results from last 7 days   Lab Units 02/28/23  0906   PH ART  7 412   PCO2 ART mm Hg 38 9   PO2 ART mm Hg 94 9   HCO3 ART mmol/L 24 2   BASE EXC ART mmol/L -0 3   ABG SOURCE  Radial, Left     VBG:  Results from last 7 days   Lab Units 02/28/23  0906   ABG SOURCE  Radial, Left     Results from last 7 days   Lab Units 02/28/23  0554 02/27/23  0543   PROCALCITONIN ng/ml 0 05 0 05       Micro  Results from last 7 days   Lab Units 02/26/23  2140 02/26/23  1831 02/26/23  1718 02/23/23  1055   BLOOD CULTURE   --   --  No Growth at 72 hrs  No Growth at 72 hrs   --    GRAM STAIN RESULT   --  No Polys or Bacteria seen  Rare Mononuclear Cells  --   --    URINE CULTURE   --   --   --  >100,000 cfu/ml Enterobacter cloacae*   MRSA CULTURE ONLY  No Methicillin Resistant Staphlyococcus aureus (MRSA) isolated  --   --   --        EKG: Sinus tachycardia  Imaging: I have personally reviewed pertinent reports  CTA head and neck with and without contrast    Result Date: 2/26/2023  Impression: 1  No evidence of acute infarct, intracranial hemorrhage or mass effect  2   No hemodynamically significant stenosis, dissection or occlusion of the carotid or vertebral arteries or major vessels of the [place]  Workstation performed: [id number]     MRI brain w wo contrast    Result Date: 2/27/2023  Impression: 1  Punctate subcentimeter focus of restricted diffusion within the left occipital lobe which can be seen with an acute to subacute infarct versus a demyelinating lesion  2  Periventricular, subcortical, and pericallosal white matter lesions consistent with the patient's history of demyelinating disease  There is no pathologic intra-axial enhancement to suggest acute demyelination  3  Acute on chronic sinus disease  Mild right mastoid fluid  The study was marked in [facility] for immediate notification  Workstation performed: [id number]     CT chest abdomen pelvis w contrast    Result Date: 2/26/2023  Impression: No acute thoracic or intra-abdominal pelvic abnormality identified  Mild bibasilar opacities left greater than right favoring atelectasis  Ectasia of the ascending aorta, 40 mm  Stable  Suggest follow-up in one year  The study was marked in EPIC for significant notification   Workstation performed: [id number]     Intake and Output  I/O       02/28 0701  03/01 0700 03/01 0701  03/02 0700 03/02 0701  03/03 0700    I V  (mL/kg) 736 7 (9 2) 371 4 (4 6)     NG/ 410     IV Piggyback 150 50     Feedings 1141 731     Total Intake(mL/kg) 2757 7 (34 3) 1562 4 (19 4)     Urine (mL/kg/hr) 3150 (1 6) 3410 (1 8)     Emesis/NG output       Stool       Total Output 3150 3410     Net -392 4 -9365 6                  Height and Weights   Height: 4' 7" (139 7 cm)  IBW (Ideal Body Weight): 34 kg  Body mass index is 41 25 kg/m²  Weight (last 2 days)     Date/Time Weight    03/01/23 0500 80 5 (177 47)    02/28/23 1013 80 1 (176 59)    02/28/23 0600 80 1 (176 59)            Nutrition       Diet Orders   (From admission, onward)             Start     Ordered    02/28/23 1056  Diet Enteral/Parenteral; Tube Feeding No Oral Diet; Jevity 1 2 Cliff; Continuous; 45; 100; Water; Every 4 hours  Diet effective now        References:    Nutrtion Support Algorithm Enteral vs  Parenteral   Question Answer Comment   Diet Type Enteral/Parenteral    Enteral/Parenteral Tube Feeding No Oral Diet    Tube Feeding Formula: Jevity 1 2 Cliff    Bolus/Cyclic/Continuous Continuous    Tube Feeding Goal Rate (mL/hr): 45    Tube Feeding flush (mL): 100    Water Flush type: Water    Water flush frequency: Every 4 hours    RD to adjust diet per protocol?  Yes        02/28/23 1055    02/27/23 0802  Room Service  Once        Question:  Type of Service  Answer:  Room Service - Appropriate with Assistance    02/27/23 0801                  Active Medications  Scheduled Meds:  Current Facility-Administered Medications   Medication Dose Route Frequency Provider Last Rate   • acetaminophen  650 mg Oral Q6H PRN COLIN Clements     • aspirin  325 mg Per NG Tube Daily COLIN Kimball     • baclofen  10 mg Oral Daily [place], [place]     • baclofen  15 mg Oral S41F [facility] Rey Dunlap PA-C     • cefTRIAXone  1,000 mg Intravenous B30Q [place], [place] Stopped (03/01/23 1057)   • chlorhexidine  15 mL Mouth/Throat Q12H [facility] COLIN Matta     • enoxaparin  30 mg Subcutaneous Daily Eduarda Doe, [address]     • gabapentin  300 mg Oral HS COLIN Santiago     • influenza vaccine  0 7 mL Intramuscular Once Mirna Dong MD     • omeprazole (PRILOSEC) suspension 2 mg/mL  20 mg Per NG Tube Daily COLIN Clements     • phenylephine   mcg/min Intravenous Titrated Demetri Dunlap PA-C 45 mcg/min (03/02/23 0551)   • polyethylene glycol  17 g Per NG Tube Daily COLIN Clements     • senna-docusate sodium  1 tablet Per NG Tube BID COLIN Clements     • tiZANidine  4 mg Oral HS [employer], CRNP       Continuous Infusions:  phenylephine,  mcg/min, Last Rate: 45 mcg/min (03/02/23 0551)      PRN Meds:   acetaminophen, 650 mg, Q6H PRN        Invasive Devices Review  Invasive Devices     Peripheral Intravenous Line  Duration           Peripheral IV 02/28/23 Distal;Left;Ventral (anterior) Forearm 2 days    Peripheral IV 02/28/23 Right Forearm 1 day    Long-Dwell Peripheral IV (Midline) 03/01/23 Right Brachial <1 day          Drain  Duration           Suprapubic Catheter 18 Fr  149 days    NG/OG/Enteral Tube Orogastric 16 Fr Right mouth 3 days          Airway  Duration           ETT  Oral 7 mm 4 days    ETT  7 mm 3 days                ---------------------------------------------------------------------------------------  Care Time Delivered:   No Critical Care time spent       [employer], CRNP      Portions of the record may have been created with voice recognition software  Occasional wrong word or "sound a like" substitutions may have occurred due to the inherent limitations of voice recognition software    Read the chart carefully and recognize, using context, where substitutions have occurred

## 2023-03-02 NOTE — OCCUPATIONAL THERAPY NOTE
OT EVALUATION       03/02/23 1521   Note Type   Note type Cancelled Session   Cancel Reasons Medical status   Licensure   [place] License Number  Radha Sharma Florsakshi Matt [address] OTR/L [id number]

## 2023-03-02 NOTE — PROGRESS NOTES
Spiritual Care Progress Note    3/2/2023  Patient: Jena Dior : 1955  Admission Date & Time: 2023 1544  MRN: [id number] [facility]: [id number]     visited patient per family member referral  Leela Herrmann communicated primarily with family members at bedside - pt's spouse, family friend, daughter, son-in-law, and brother   facilitated storytelling about pt, explored spiritual needs, and provided empathetic presence  Pt's  expressed interest in blessing/sacrament from   Leela Herrmann referred this request to Fr Agnes Hernandez, ETA 1500 today  Spiritual care will remain available to support               Chaplaincy Interventions Utilized:   Empowerment: Facilitated group experience and Provided chaplaincy education    Exploration: Explored relational needs & resources, Explored spiritual needs & resources, and Facilitated story telling    Collaboration: Consulted with interdisciplinary team    Relationship Building: Cultivated a relationship of care and support, Facilitated reconciliation with lore community, and Listened empathically    Ritual: Provided prayer      Chaplaincy Outcomes Achieved:  Arranged for community clergy surrogate, Yamileth, Developed chaplaincy care plan, Reported decreased pain, and Spiritual resources utilized      Spiritual Coping Strategies Utilized:   Spiritual practices, Spiritual comfort, and Spiritual meaning     23 1400   Clinical Encounter Type   Visited With Patient and family together   Routine Visit Introduction   Crisis Visit Critical Care   Referral From Family   Referral To Other (Comment)  Himanshu Vasques)   [facility] Encounters   [facility] Needs Prayer   Sacramental Encounters   Sacrament of Sick-Anointing Family requested anointing

## 2023-03-02 NOTE — ASSESSMENT & PLAN NOTE
Patient intubated on 2/26 by EMS in the field after becoming altered and lethargic as a passenger in a vehicle  Plan  · Current vent settings: PS 5/6, tolerating pressure support weans  · CT chest 2/26 Mild bibasilar opacities left greater than right favoring atelectasis  · Neuro status and secretions likely precludes extubation today    · Very cough/gag reflexes  · VAP bundle; chlorhexidine, PPI, HOB greater than 30 degrees

## 2023-03-02 NOTE — ASSESSMENT & PLAN NOTE
· Patient has a chronic suprapubic catheter from a neurogenic bladder in the setting of MS  · Per her , she gets urinary tract infections "frequently "  · Most recent UTI was diagnosed on 2/24 where patient was prescribed Bactrim p o  as an outpatient  ·  states she is on Bactrim "often" without any complications  · Per chart review, urine culture + Enterobacter cloacae on 2/23  · Received Rocephin IV in the ER - 3 ds zosyn IV, now narrowed to IV Ceftriaxone -day 5 of antibiotics  · Routine suprapubic catheter site care

## 2023-03-02 NOTE — ASSESSMENT & PLAN NOTE
· MRI brain w w/o contrast 2/27: acute to subacute left occipital lobe infarct with no discrete enhancing lesion to suggest acute demyelination  positive for chronic left basal ganglia lacunar infarct  · Neuro recs:  No prior h/o CVA, initiate stroke pathway, f/u Echo with shunt study, & lipid panel & A1c  · Echo: Left ventricular ejection fraction is 60% with normal systolic function  Diastolic function is mildly abnormal consistent with grade 1  MV with mild annular calcification, mild regurgitation  TV with mild regurgitation,PA pressure 30 mmHg    · A1c: 4 6  · Lipid panel: cholesterol 197/tri 187/ HDL 52/ , per Neuro rec's hold off Lipitor dosing in setting of elevated LFTS   · Patient will require higher blood pressures with systolic to be equal or greater to 130

## 2023-03-03 PROBLEM — Z51.5 ENCOUNTER FOR HOSPICE CARE: Status: ACTIVE | Noted: 2023-01-01

## 2023-03-03 PROBLEM — Z51.5 END OF LIFE CARE: Status: ACTIVE | Noted: 2023-01-01

## 2023-03-03 NOTE — CASE MANAGEMENT
Case Management Progress Note    Patient name Summer Ivey  Location ICU 02/ICU 02 MRN [id number]  : 1955 Date 3/3/2023       LOS (days): 5  Geometric Mean LOS (GMLOS) (days): 5 00  Days to GMLOS:0 3        OBJECTIVE:        Current admission status: Inpatient  Preferred Pharmacy:   [facility][id number]- [place], [place] - [address]  [address] [address]  Phone: [phone] Fax: [phone] [facility], [address]  [address] [facility]  Phone: [phone] Fax: [phone]    PATIENT/FAMILY REPORTS NO PREFERRED PHARMACY  No address on file      [facility] ([facility] ) - [address]  Phone: [phone] Fax: [phone]    Primary Care Provider: Julee Colon MD    Primary Insurance: MEDICARE  Secondary Insurance: BLUE CROSS    PROGRESS NOTE:    CM made aware family requesting eval for [facility] hospice at this time  Patient currently on acute O2 and morphine drip  Referral opened to SHARYN via Zachary GARRETT to follow

## 2023-03-03 NOTE — [place] FOR[place]  (ALL ITEMS MUST BE COMPLETED)    1  TRANSFER FROM: [address] [facility]      TRANSFER TO: [address]  [facility] [address]    2  DATE OF TRANSFER: 3/3/2023                        TIME OF TRANSFER: 1330    3  PATIENT NAME: Isabel Alcocer,        YOB: 1955                             GENDER: female    4  LANGUAGE:   English    5  PHYSICIAN NAME:  Silvina Pantoja MD                   PHONE: [phone]  CODE STATUS:  DNR DNI        Out of Hospital DNR Attached: Yes    7  :                                      :  Extended Emergency Contact Information  Primary Emergency Contact: Christopher Mora  Address: [facility], [address] Phone: [phone]  Mobile Phone: [phone]  Relation: Dilcia Araujo [facility] Representative/Proxy:  Yes           Legal Guardian:  No             NAME OF:           HEALTH CARE REPRESENTATIVE/PROXY:                                         OR           LEGAL GUARDIAN, IF NOT :                                               PHONE:  (Day)           (Night)                        (Cell)    8  REASON FOR TRANSFER: (Must include brief medical history and recent changes in physical function or cognition ) HOSPICE            V/S: There were no vitals taken for this visit  PAIN: None    9  PRIMARY DIAGNOSIS: <principal problem not specified>      Secondary Diagnosis:         Pacemaker: No      Internal Defib: No          Mental Health Diagnosis (if Applicable):    10  RESTRAINTS: No     11  RESPIRATORY NEEDS: Oxygen Device nasal cannula, and Flow rate: 2    12  ISOLATION/PRECAUTION: ESBL and Siteurine    13  ALLERGY: Patient has no known allergies  14  SENSORY:       Vision Poor, Hearing Good  and Speech Aphasia    15  SKIN CONDITION: No Wounds    16  DIET: Special (describe)PLeasure foods    17  IV ACCESS: None    18  PERSONAL ITEMS SENT WITH PATIENT: None    19  ATTACHED DOCUMENTS: MUST ATTACH CURRENT MEDICATION INFORMATION OtherHospice    20  AT RISK ALERTS:None        HARM TO: N/A    21  WEIGHT BEARING STATUS:         Left Leg: None        Right Leg: None    22  MENTAL STATUS:Disoriented    23  FUNCTION:        Walk: Not Able        Transfer: Not Able        Toilet: Not Able        Feed: Not Able    24  IMMUNIZATIONS/SCREENING:     Immunization History   Administered Date(s) Administered   • COVID-19 MODERNA VACC 0 5 ML IM 03/22/2021, 04/19/2021   • Tdap 07/16/2019       25  BOWEL: Incontinent  and Date Last BM3/3    32  BLADDER: Bronson Catheter    27   SENDING FACILITY CONTACT: Ronen Israel RN         Title: [facility]        Unit: 2 S        Phone: [phone]          REC'G FACILITY CONTACT (if known):        Title:        Unit:         Phone:         FORM PREFILLED BY (if applicable)       Title:       Unit:        Phone:         FORM COMPLETED BY Vivek Corea RN      Title: [facility]      Phone: [phone]

## 2023-03-03 NOTE — ASSESSMENT & PLAN NOTE
· Presented to the ER on 2/26 after complaining of loss of vision in the passenger seat  Shortly after, the patient became lethargic, slumped over   started CPR on the side of the road, although unclear if patient truly had loss of pulses  Intubated approximately 10 minutes after CPR by EMS who found pulses on arrival  Initial neuro exam, patient was moving everything appropriately to painful stimuli only  · MRI showed acute to subacute left occipital lobe infarct with no discrete enhancing lesion to suggest acute demyelinization  · Patient continued to deteriorate neurologically and was unable to move upper or lower extremities    · Family did not want to keep the patient intubated, they wanted to extubate her to make her comfortable  · Patient remained on mechanical ventilation 3/2 she was extubated to comfort care

## 2023-03-03 NOTE — ASSESSMENT & PLAN NOTE
Patient evaluated hospice [facility] and deemed  inpatient hospice care    · Comfort care hospice protocol in place

## 2023-03-03 NOTE — [place] FOR[place] UNIV[place] FOR[place]  (ALL ITEMS MUST BE COMPLETED)    1  TRANSFER FROM: [address] [facility]      TRANSFER TO: [facility] hospice    2  DATE OF TRANSFER: 3/3/2023                        TIME OF TRANSFER: 1330    3  PATIENT NAME: Jaja Ponce,        YOB: 1955                             GENDER: female    4  LANGUAGE:   English    5  PHYSICIAN NAME:  No att  providers found                   PHONE: [phone]  CODE STATUS: Level 4 - [address] [facility] [place] DNR Attached: Yes    7  :                                      :  Extended Emergency Contact Information  Primary Emergency Contact: Christopher Mora  Address: [facility], [address] Phone: [phone]  Mobile Phone: [phone]  Relation: Dilcia Araujo [facility] Representative/Proxy:  Yes           Legal Guardian:  No             NAME OF:           HEALTH CARE REPRESENTATIVE/PROXY:                                         OR           LEGAL GUARDIAN, IF NOT :                                               PHONE:  (Day)           (Night)                        (Cell)    8  REASON FOR TRANSFER: (Must include brief medical history and recent changes in physical function or cognition ) hospice            V/S: /58   Pulse 90   Temp 98 4 °F (36 9 °C) (Temporal)   Resp (!) 10   Ht 4' 7" (1 397 m)   Wt 80 2 kg (176 lb 12 9 oz)   SpO2 (!) 82%   BMI 41 09 kg/m²           PAIN: Yes, Rating flacc, Site generalized and Treatment morphine    9  PRIMARY DIAGNOSIS: Altered mental status      Secondary Diagnosis:         Pacemaker: No      Internal Defib: No          Mental Health Diagnosis (if Applicable):    10  RESTRAINTS: No     11  RESPIRATORY NEEDS: Oxygen Device nc, and Flow rate: 2    12  ISOLATION/PRECAUTION: ESBL    13  ALLERGY: Patient has no known allergies      14  SENSORY: Vision Poor, Hearing Good  and Speech Aphasia    15  SKIN CONDITION: No Wounds    16  DIET: Special (describe)pleasure feeds    17  IV ACCESS: Saline Lock    18  PERSONAL ITEMS SENT WITH PATIENT: None    19  ATTACHED DOCUMENTS: MUST ATTACH CURRENT MEDICATION INFORMATION Otherhospice    20  AT RISK ALERTS:Falls        HARM TO: N/A    21  WEIGHT BEARING STATUS:         Left Leg: None        Right Leg: None    22  MENTAL STATUS:Disoriented    23  FUNCTION:        Walk: Not Able        Transfer: Not Able        Toilet: Not Able        Feed: Not Able    24  IMMUNIZATIONS/SCREENING:     Immunization History   Administered Date(s) Administered   • COVID-19 MODERNA VACC 0 5 ML IM 03/22/2021, 04/19/2021   • Tdap 07/16/2019       25  BOWEL: Incontinent  and Date Last BM3/3    32  BLADDER: Bronson Catheter    27   SENDING FACILITY CONTACT: Andrae Li                  Title: RN        Unit: [address]        Phone: [phone]          [address] (if known):        Title:        Unit:         Phone:         FORM PREFILLED BY (if applicable)       Title:       Unit:        Phone:         FORM COMPLETED BY Jarvis Goodwin RN      Title: RN      Phone: [phone]

## 2023-03-03 NOTE — PROGRESS NOTES
Pastoral Care Progress Note    3/3/2023  Patient: Cornelius Marroquin : 1955  Admission Date & Time: 2023 1544  MRN: [id number] CSN: [id number]       met with PT and   PT had just moved into hospice  Provided prayer for PT and family                 Chaplaincy Interventions Utilized:       Ritual: Provided prayer     23 1100   Clinical Encounter Type   Visited With Patient and family together   Routine Visit Follow-up   Crisis Visit Patient actively dying   Referral From [address] [place] [address]

## 2023-03-03 NOTE — PLAN OF CARE
Problem: Prexisting or High Potential for Compromised Skin Integrity  Goal: Skin integrity is maintained or improved  Description: INTERVENTIONS:  - Identify patients at risk for skin breakdown  - Assess and monitor skin integrity  - Assess and monitor nutrition and hydration status  - Monitor labs   - Assess for incontinence   - Turn and reposition patient  - Assist with mobility/ambulation  - Relieve pressure over bony prominences  - Avoid friction and shearing  - Provide appropriate hygiene as needed including keeping skin clean and dry  - Evaluate need for skin moisturizer/barrier cream  - Collaborate with interdisciplinary team   - Patient/family teaching  - Consider wound care consult   Outcome: Completed     Problem: MOBILITY - ADULT  Goal: Maintain or return to baseline ADL function  Description: INTERVENTIONS:  -  Assess patient's ability to carry out ADLs; assess patient's baseline for ADL function and identify physical deficits which impact ability to perform ADLs (bathing, care of mouth/teeth, toileting, grooming, dressing, etc )  - Assess/evaluate cause of self-care deficits   - Assess range of motion  - Assess patient's mobility; develop plan if impaired  - Assess patient's need for assistive devices and provide as appropriate  - Encourage maximum independence but intervene and supervise when necessary  - Involve family in performance of ADLs  - Assess for home care needs following discharge   - Consider OT consult to assist with ADL evaluation and planning for discharge  - Provide patient education as appropriate  Outcome: Completed  Goal: Maintains/Returns to pre admission functional level  Description: INTERVENTIONS:  - Perform BMAT or MOVE assessment daily    - Set and communicate daily mobility goal to care team and patient/family/caregiver  - Collaborate with rehabilitation services on mobility goals if consulted  - Perform Range of Motion 2 times a day    - Reposition patient every 2 hours   - Dangle patient 2 times a day  - Stand patient 2 times a day  - Ambulate patient 2 times a day  - Out of bed to chair 2 times a day   - Out of bed for meals 2 times a day  - Out of bed for toileting  - Record patient progress and toleration of activity level   Outcome: Completed     Problem: PAIN - ADULT  Goal: Verbalizes/displays adequate comfort level or baseline comfort level  Description: Interventions:  - Encourage patient to monitor pain and request assistance  - Assess pain using appropriate pain scale  - Administer analgesics based on type and severity of pain and evaluate response  - Implement non-pharmacological measures as appropriate and evaluate response  - Consider cultural and social influences on pain and pain management  - Notify physician/advanced practitioner if interventions unsuccessful or patient reports new pain  Outcome: Progressing     Problem: DISCHARGE PLANNING  Goal: Discharge to home or other facility with appropriate resources  Description: INTERVENTIONS:  - Identify barriers to discharge w/patient and caregiver  - Arrange for needed discharge resources and transportation as appropriate  - Identify discharge learning needs (meds, wound care, etc )  - Arrange for interpretive services to assist at discharge as needed  - Refer to Case Management Department for coordinating discharge planning if the patient needs post-hospital services based on physician/advanced practitioner order or complex needs related to functional status, cognitive ability, or social support system  Outcome: Progressing     Problem: Knowledge Deficit  Goal: Patient/family/caregiver demonstrates understanding of disease process, treatment plan, medications, and discharge instructions  Description: Complete learning assessment and assess knowledge base    Interventions:  - Provide teaching at level of understanding  - Provide teaching via preferred learning methods  Outcome: Progressing     Problem: Communication Deficit  Goal: Patient/caregiver/family will effectively communicate symptoms, needs and concerns  Outcome: Progressing     Problem: Dyspnea at end of life  Goal: Patient/caregiver/family will demonstrate understanding of an ability to manage respiratory symptoms at end of life  Outcome: Progressing     Problem: Imminent death  Goal: Collaborate with patient, family, caregiver and interdisciplinary team to minimize end of life symptoms  Outcome: Progressing

## 2023-03-03 NOTE — ASSESSMENT & PLAN NOTE
SEE #1. · MRI brain w w/o contrast 2/27: acute to subacute left occipital lobe infarct with no discrete enhancing lesion to suggest acute demyelination  positive for chronic left basal ganglia lacunar infarct  · Neuro recs:  No prior h/o CVA, initiate stroke pathway, f/u Echo with shunt study, & lipid panel & A1c  · Echo: Left ventricular ejection fraction is 60% with normal systolic function  Diastolic function is mildly abnormal consistent with grade 1  MV with mild annular calcification, mild regurgitation  TV with mild regurgitation,PA pressure 30 mmHg    · A1c: 4 6  · Lipid panel: cholesterol 197/tri 187/ HDL 52/ , per Neuro rec's hold off Lipitor dosing in setting of elevated LFTS   · Patient will require higher blood pressures with systolic to be equal or greater to 130

## 2023-03-03 NOTE — PLAN OF CARE
Care plan reviewed
Care plan reviewed
Detail Level: Detailed
INTERVENTIONS:  - Assess for changes in respiratory status  - Assess for changes in mentation and behavior  - Position to facilitate oxygenation and minimize respiratory effort  - Oxygen administered by appropriate delivery if ordered  - Initiate smoking cessation education as indicated  - Encourage broncho-pulmonary hygiene including cough, deep breathe, Incentive Spirometry  - Assess the need for suctioning and aspirate as needed  - Assess and instruct to report SOB or any respiratory difficulty  - Respiratory Therapy support as indicated
Initiated care plan
PLAN OF CARE REVIEWED
Plan of care reviewed
Problem: RESPIRATORY - ADULT  Goal: Achieves optimal ventilation and oxygenation  Description: INTERVENTIONS:  - Assess for changes in respiratory status  - Assess for changes in mentation and behavior  - Position to facilitate oxygenation and minimize respiratory effort  - Oxygen administered by appropriate delivery if ordered  - Initiate smoking cessation education as indicated  - Encourage broncho-pulmonary hygiene including cough, deep breathe, Incentive Spirometry  - Assess the need for suctioning and aspirate as needed  - Assess and instruct to report SOB or any respiratory difficulty  - Respiratory Therapy support as indicated  Outcome: Progressing
reviewed
Detail Level: Simple

## 2023-03-03 NOTE — ACP (ADVANCE CARE PLANNING)
Advanced Care Planning Note     Rima Mora 78 y o  female MRN: [id number]    Unit/Bed#: ICU 02 Encounter: [id number]    Violette Escalante is a 78 y o  female that presented 2/26 secondary to having an acute condition requiring critical care provider evaluation  The patient has chronic comorbidities, including but not limited to MS, HTN, HLD, depression, ambulatory dysfunction, and now is impacted with the severe acute conditions including respiratory failure with acute hypoxia, UTI, and left occipital lobe infarct  Due to the severity of the patient's acute condition and/or the magnitude of chronic conditions, there is need for advanced care planning at this time  Please see previous documentation in regards to the patient's current condition, assessment, and treatment plan  During the discussion with the patient and/or family members, it was established that all stake holders were agreeable and understood the rationale for advanced care planning to occur at this time  The following individuals were present & participated in the face to face conversation I had in regards to the patient's advanced directives & advanced care planning: patient's , daughter, son, daughter-in-law, and son-in-law  Please see my following comments for details of the conversation & decisions that were made:    Family requesting information regarding transition to comfort care  The patient herself states she is feeling happy and wants to be made comfortable and does not wish to pursue aggressive treatment measures moving forward  Comfort care procedures were explained in detail including removal of goal-oriented treatments, promotion of comfort guided therapies at the discretion of the patient, including mouth suctioning, O2 use, and administration of anxiolytics and narcotics to assist with anxiety and air hunger  All questions were answered to their satisfaction   She was transitioned to DNR 4 code status per the family's request       Total time spent, (25) minutes (1900 to 1935)      CODE STATUS: Level 4 - Comfort Care  POA:    POLST:      SIGNATURE: COLIN Sandoval  DATE: March 2, 2023  TIME: 7:20 PM

## 2023-03-03 NOTE — PROGRESS NOTES
----------------------------------------------------------------------------------------  HPI/24hr events: Patient was extubated yesterday with plans for DNR/DNI following extubation  After long discussion with family in the evening, the patient was transitioned to comfort care  She was started on a morphine drip and all goal-directed therapies were terminated  Family remained at the bedside overnight  Patient appropriate for transfer out of the ICU today?: Patient does not meet criteria for ICU Follow-up Clinic; referral has not been made  Order for hospice consult was placed  Disposition: Transfer to Hospice  Code Status: Level 4 - Comfort Care  ---------------------------------------------------------------------------------------  SUBJECTIVE  Patient is sleeping and appears comfortable  Family states she reported feeling happy and comfortable  Review of Systems   Unable to perform ROS: Other     Review of systems was unable to be performed secondary to lethargy  ---------------------------------------------------------------------------------------  OBJECTIVE    Vitals   Vitals:    23 0000   BP: 122/58 122/58 117/58 110/56   BP Location:       Pulse: 82 76 86 80   Resp: (!) 31 16 18 (!) 11   Temp:       TempSrc:       SpO2: 98% 100% 96% 98%   Weight:       Height:         Temp (24hrs), Av 2 °F (37 9 °C), Min:98 4 °F (36 9 °C), Max:100 8 °F (38 2 °C)  Current: Temperature: 99 °F (37 2 °C)          Respiratory:  SpO2: SpO2: 98 %, SpO2 Activity: SpO2 Activity: At Rest, SpO2 Device: O2 Device: Nasal cannula  Nasal Cannula O2 Flow Rate (L/min): 12 L/min    Invasive/non-invasive ventilation settings   Respiratory    Lab Data (Last 4 hours)    None         O2/Vent Data (Last 4 hours)    None                Physical Exam  Constitutional:       General: She is not in acute distress  Appearance: She is ill-appearing        Comments: Full physical exam deferred in favor of comfort  Not in acute distress  Breathing is comfortable  Morphine drip is running and family are present  HENT:      Head: Normocephalic  Cardiovascular:      Rate and Rhythm: Normal rate and regular rhythm  Pulmonary:      Comments: On 2L NC with no increased WOB   Family remain at bedside and continue to suction patient's mouth as per her request                Laboratory and Diagnostics:  Results from last 7 days   Lab Units 03/02/23  0550 03/01/23  0526 02/28/23  0554 02/27/23  0543 02/26/23  1555   WBC Thousand/uL 6 81 4 90 5 69 5 52 6 55   HEMOGLOBIN g/dL 8 6* 8 7* 9 0* 9 3* 10 6*   HEMATOCRIT % 26 8* 28 0* 27 6* 28 3* 33 4*   PLATELETS Thousands/uL 159 145* 137* 152 169   NEUTROS PCT %  --  80* 88* 88* 81*   MONOS PCT %  --  3* 3* 5 6     Results from last 7 days   Lab Units 03/02/23  0550 03/01/23  0526 02/28/23  0554 02/27/23  0543 02/26/23  2320 02/26/23  1555   SODIUM mmol/L 145 144 140 139 136 139   POTASSIUM mmol/L 5 1 5 0 4 8 5 0 4 4 4 5   CHLORIDE mmol/L 108 110* 109* 109* 106 106   CO2 mmol/L 30 29 25 25 24 15*   ANION GAP mmol/L 7 5 6 5 6 18*   BUN mg/dL 18 17 21 17 17 21   CREATININE mg/dL 0 81 0 97 1 26 1 20 0 96 1 14   CALCIUM mg/dL 8 4 8 3* 7 6* 7 6* 7 8* 8 3*   GLUCOSE RANDOM mg/dL 98 107 114 74 116 89   ALT U/L  --  117* 108* 119*  --  145*   AST U/L  --  78* 64* 77*  --  115*   ALK PHOS U/L  --  158* 124* 116*  --  128*   ALBUMIN g/dL  --  3 0* 2 9* 3 0*  --  3 3*   TOTAL BILIRUBIN mg/dL  --  0 30 0 33 0 26  --  0 19*     Results from last 7 days   Lab Units 03/02/23  0550 03/01/23  0526 02/28/23  0554 02/27/23  0543   MAGNESIUM mg/dL 1 9 2 0 2 3 1 7*   PHOSPHORUS mg/dL 4 8* 4 2* 4 0 3 3      Results from last 7 days   Lab Units 02/26/23  1555   INR  1 06   PTT seconds 32          Results from last 7 days   Lab Units 02/26/23  1854 02/26/23  1716   LACTIC ACID mmol/L 1 2 3 4*     ABG:  Results from last 7 days   Lab Units 02/28/23  0906   PH ART  7 412   PCO2 ART mm Hg 38 9   PO2 ART mm Hg 94 9   HCO3 ART mmol/L 24 2   BASE EXC ART mmol/L -0 3   ABG SOURCE  Radial, Left     VBG:  Results from last 7 days   Lab Units 02/28/23  0906   ABG SOURCE  Radial, Left     Results from last 7 days   Lab Units 02/28/23  0554 02/27/23  0543   PROCALCITONIN ng/ml 0 05 0 05       Micro  Results from last 7 days   Lab Units 03/01/23  1354 02/26/23  2140 02/26/23  1831 02/26/23  1718   BLOOD CULTURE   --   --   --  No Growth After 4 Days  No Growth After 4 Days  SPUTUM CULTURE  Culture too young- will reincubate  --   --   --    GRAM STAIN RESULT  1+ Polys  No bacteria seen  --  No Polys or Bacteria seen  Rare Mononuclear Cells  --    MRSA CULTURE ONLY   --  No Methicillin Resistant Staphlyococcus aureus (MRSA) isolated  --   --        EKG: NSR on monitor, rate 80s  Imaging: no new imaging    Intake and Output  I/O       03/01 0701 03/02 0700 03/02 0701 03/03 0700    I V  (mL/kg) 371 4 (4 6) 320 9 (4)    NG/ 40    IV Piggyback 50 45    Feedings 731 250    Total Intake(mL/kg) 1562 4 (19 4) 655 9 (8 2)    Urine (mL/kg/hr) 3410 (1 8) 1095 (0 6)    Total Output 3410 1095    Net -1847 6 -439 1          Unmeasured Stool Occurrence  1 x          Height and Weights   Height: 4' 7" (139 7 cm)  IBW (Ideal Body Weight): 34 kg  Body mass index is 41 09 kg/m²  Weight (last 2 days)     Date/Time Weight    03/02/23 1100 80 2 (176 81)    03/02/23 0800 80 2 (176 81)    03/01/23 0500 80 5 (177 47)            Nutrition       Diet Orders   (From admission, onward)             Start     Ordered    03/02/23 1917  Diet Regular; Pleasure Feed  Diet effective now        References:    Nutrtion Support Algorithm Enteral vs  Parenteral   Question Answer Comment   Diet Type Regular    Regular Pleasure Feed    RD to adjust diet per protocol?  No        03/02/23 1918                  Active Medications  Scheduled Meds:  Current Facility-Administered Medications   Medication Dose Route Frequency Provider Last Rate   • glycopyrrolate  0 1 mg Intravenous Q4H PRN Blair Reels, CRNP     • LORazepam  1 mg Intravenous Q10 Min PRN Blair Reels, CRNP     • morphine  4 mg/hr Intravenous Continuous Blair Reels, CRNP 4 mg/hr (03/02/23 2203)   • morphine injection  4 mg Intravenous Q2H PRN Blair Reels, CRNP     • sodium chloride  20 mL/hr Intravenous Continuous Blair Reels, CRNP 20 mL/hr (03/02/23 2031)     Continuous Infusions:  morphine, 4 mg/hr, Last Rate: 4 mg/hr (03/02/23 2203)  sodium chloride, 20 mL/hr, Last Rate: 20 mL/hr (03/02/23 2031)      PRN Meds:   glycopyrrolate, 0 1 mg, Q4H PRN  LORazepam, 1 mg, Q10 Min PRN  morphine injection, 4 mg, Q2H PRN        Invasive Devices Review  Invasive Devices     Peripheral Intravenous Line  Duration           Peripheral IV 02/28/23 Distal;Left;Ventral (anterior) Forearm 2 days    Peripheral IV 02/28/23 Right Forearm 2 days    Long-Dwell Peripheral IV (Midline) 03/01/23 Right Brachial 1 day          Drain  Duration           Suprapubic Catheter 18 Fr  150 days                Rationale for remaining devices: N/A- remaining devices for comfort of patient  ---------------------------------------------------------------------------------------  Advance Directive and Living Will:      Power of :    POLST:    ---------------------------------------------------------------------------------------  Care Time Delivered:   No Critical Care time spent       [employer], CRNP      Portions of the record may have been created with voice recognition software  Occasional wrong word or "sound a like" substitutions may have occurred due to the inherent limitations of voice recognition software    Read the chart carefully and recognize, using context, where substitutions have occurred

## 2023-03-03 NOTE — ASSESSMENT & PLAN NOTE
ICU rule out today, family decision to transfer to hospice, inpatient candidate initiated on comfort care prior to transition

## 2023-03-03 NOTE — ASSESSMENT & PLAN NOTE
• MRI brain w w/o contrast 2/27: acute to subacute left occipital lobe infarct with no discrete enhancing lesion to suggest acute demyelination  positive for chronic left basal ganglia lacunar infarct  • Neuro recs:  No prior h/o CVA, initiate stroke pathway, f/u Echo with shunt study, & lipid panel & A1c  • Echo: Left ventricular ejection fraction is 60% with normal systolic function  Diastolic function is mildly abnormal consistent with grade 1  MV with mild annular calcification, mild regurgitation  TV with mild regurgitation,PA pressure 30 mmHg    • Neurology was following  • Patient was extubated on 3/2 to comfort as per family request and she had no improvement in her upper extremity or lower extremity movement

## 2023-03-03 NOTE — ASSESSMENT & PLAN NOTE
[facility] L&D: [phone]   · Chronic suprapubic catheter  · Received 5 days of antibiotics  · Transitioned to comfort care on 3/2
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
## 2023-03-03 NOTE — H&P
[address]  H&P- Rima Mora 1955, 78 y o  female MRN: [id number]  Unit/Bed#: [facility] Encounter: [id number]  Primary Care Provider: Valentino Coffee, MD   Date and time admitted to hospital: 3/3/2023  1:36 PM    End of life care  Assessment & Plan  Patient evaluated hospice KAQ and deemed  inpatient hospice care    · Comfort care hospice protocol in place      VTE Ph  Total Time Spent on Date of Encounter in care of patient: 40 minutes This time was spent on one or more of the following: performing physical exam; counseling and coordination of care; obtaining or reviewing history; documenting in the medical record; reviewing/ordering tests, medications or procedures; communicating with other healthcare professionals and discussing with patient's family/caregivers  History of Present Illness:  Rima Montero is a 78 y o  female who presents with physician to inpatient hospice      Review of Systems:  Review of Systems   Unable to perform ROS: Patient nonverbal       Past Medical and Surgical History:   Past Medical History:   Diagnosis Date   • Back pain 2016   • Chronic UTI    • Colon polyp    • Hypertension    • Incontinence    • [facility]     Age 37   • Neurogenic bladder    • Spasticity        Past Surgical History:   Procedure Laterality Date   • BACK SURGERY  2016    Lumbar fusion   • COLONOSCOPY     • ELBOW SURGERY Right 2008    ORIF   • IR SUPRAPUBIC CATHETER PLACEMENT  4/26/2022   • MS INSERTION/RPLCMT PERIPHERAL/GASTRIC NPGR N/A 11/19/2021    Procedure: leadwire placement and pulse generator  placement of SNM at S3 Bilateral S3  nerve testing, fluroscopy - c-arm guidance and complex programming;  Surgeon: Emilee Dixon MD;  Location: [facility];  Service: Gynecology       Meds/Allergies:Social History:  Marital Status: /Civil Union   Occupation:     Physical Exam:     Vitals:   Blood Pressure: 110/60 (03/03/23 1416)  Pulse: 88 (03/03/23 1416)  Temperature: 98 8 °F (37 1 °C) (03/03/23 1416)  Temp Source: Axillary (03/03/23 1416)  Respirations: 12 (03/03/23 1416)  Height: 4' 7" (139 7 cm) (03/03/23 1416)  Weight - Scale: 79 8 kg (176 lb) (03/03/23 1416)  SpO2: (!) 87 % (03/03/23 1416)    Physical Exam  Constitutional:       Appearance: She is ill-appearing  Comments: Minimally responsive   Cardiovascular:      Pulses: Normal pulses  Heart sounds: Heart sounds are distant  No friction rub  Pulmonary:      Effort: No respiratory distress  Breath sounds: No stridor  Comments: Snoring respirations  Musculoskeletal:      Right lower leg: No edema  Left lower leg: No edema  Skin:     General: Skin is warm  Neurological:      Mental Status: She is unresponsive  Additional Data:       Lines/Drains:  Invasive Devices     Peripheral Intravenous Line  Duration           Peripheral IV 02/28/23 Distal;Left;Ventral (anterior) Forearm 3 days    Peripheral IV 02/28/23 Right Forearm 3 days    Long-Dwell Peripheral IV (Midline) 03/01/23 Right Brachial 2 days          Drain  Duration           Suprapubic Catheter 18 Fr  150 days                  ·     ** Please Note: This note has been constructed using a voice recognition system   **

## 2023-03-03 NOTE — ASSESSMENT & PLAN NOTE
• Patient was diagnosed with MS 26 years ago  • Follows with a neurologist from [facility]  • Continue baclofen, Neurontin, Zanaflex  • Per , received medication infusion on 3/34 without complications intrathecal methotrrexate -states she has been on the same medication for MS for about 2 years without any issues  · Since having her acute stroke, the patient was acutely weak on the ventilator  She was unable to move her lower extremities or her lower extremities    Family decided to extubate her to comfort care stating that she would not want to be on a ventilator long-term

## 2023-03-03 NOTE — QUICK NOTE
Patient admitted to ICU from the ER with complaints of loss of vision which then lead to altered mental status  The patient was minimally responsive with incomprehensible slurring of speech  The patient was intubated in the field by EMS  On my exam, the patient was found intubated on propofol  Sedation was held for complete neuro exam in the ER  ER physician had spoken with on call neurologist who recommended an LP which was completed prior to my arrival   Plan is to admit to ICU for further monitoring/work up and will obtain an MRI at some point per neurology request since there is a concern for seizure vs stroke        Intubated with a sedation hold, NIH score as follows:    1A - +1 arouses to minor stimulation  1B - +1 intubated, unable to answer questions  1C - +2 performs 0 tasks despite sedation being held  2 - 0 normal  3 - 0 unable to assess on the venitlator  4 - 0 normal symmetry  5A - + 3 no effort against gravity  5B - + 3 no effort against gravity  6A - + 3 no effort against gravity  6B - + 3 no effort against gravity  7 - 0 does not understand  8 - + 1 mild to moderate loss, can be sensed being touched  9 - + 3 coma/unresponsive in the setting of acute respiratory failure on a ventilator  10 - 0 intubated/unable to test  11 - 0 intubated/unable to test    NIH 20 in the setting of recent sedation, and intubation Abdomen soft, non-tender, no guarding.

## 2023-03-03 NOTE — DISCHARGE SUMMARY
[address]  Discharge- Rima Mora 1955, 78 y o  female MRN: [id number]  Unit/Bed#: [address] [place] [address] Encounter: [id number]  Primary Care Provider: Janelle Joy MD   Date and time admitted to hospital: 3/3/2023  1:36 PM    Altered mental status  Assessment & Plan  · Presented to the ER on 2/26 after complaining of loss of vision in the passenger seat  Shortly after, the patient became lethargic, slumped over   started CPR on the side of the road, although unclear if patient truly had loss of pulses  Intubated approximately 10 minutes after CPR by EMS who found pulses on arrival  Initial neuro exam, patient was moving everything appropriately to painful stimuli only  · MRI showed acute to subacute left occipital lobe infarct with no discrete enhancing lesion to suggest acute demyelinization  · Patient continued to deteriorate neurologically and was unable to move upper or lower extremities  · Family did not want to keep the patient intubated, they wanted to extubate her to make her comfortable  · Patient remained on mechanical ventilation 3/2 she was extubated to comfort care      Infarction of left basal ganglia [facility])  Assessment & Plan  • MRI brain w w/o contrast 2/27: acute to subacute left occipital lobe infarct with no discrete enhancing lesion to suggest acute demyelination  positive for chronic left basal ganglia lacunar infarct  • Neuro recs:  No prior h/o CVA, initiate stroke pathway, f/u Echo with shunt study, & lipid panel & A1c  • Echo: Left ventricular ejection fraction is 60% with normal systolic function  Diastolic function is mildly abnormal consistent with grade 1  MV with mild annular calcification, mild regurgitation  TV with mild regurgitation,PA pressure 30 mmHg    • Neurology was following  • Patient was extubated on 3/2 to comfort as per family request and she had no improvement in her upper extremity or lower extremity movement    UTI (urinary tract infection)  Assessment & Plan  · Chronic suprapubic catheter  · Received 5 days of antibiotics  · Transitioned to comfort care on 3/2    Multiple sclerosis [facility])  Assessment & Plan  • Patient was diagnosed with MS 26 years ago  • Follows with a neurologist from [facility]  • Continue baclofen, Neurontin, Zanaflex  • Per , received medication infusion on 6/28 without complications intrathecal methotrrexate -states she has been on the same medication for MS for about 2 years without any issues  · Since having her acute stroke, the patient was acutely weak on the ventilator  She was unable to move her lower extremities or her lower extremities  Family decided to extubate her to comfort care stating that she would not want to be on a ventilator long-term    End of life care  Assessment & Plan  · After history of MS, and an acute stroke, the patient was extubated to comfort care on 3/2        Discharge Diagnoses: Active Problems:    Altered mental status    Infarction of left basal ganglia (HCC)    UTI (urinary tract infection)    Multiple sclerosis (HCC)    End of life care  Resolved Problems:    * No resolved hospital problems  *      Consultations During Hospital Stay:  · Neurology     Procedures Performed:     · 2/26 LP done in the ER  · 3/2 extubated to comfort care       Complications:  Baseline MS complicated by acute stroke     Hospital Course:   Marvin Spring is a 78 y o  female who presents with a past medical history of MS, diagnosed 26 years ago, recurrent urinary tract infections secondary to suprapubic catheters from a neurogenic bladder, hypertension, and depression  Per her , the patient was a passenger in the vehicle that he was driving  They were in route to meet their family when the patient stated she had lost vision in both eyes    At that point, she was alert and oriented x4 however soon after, she became minimally responsive, and had incomprehensible slurring of speech  It was decided to take her to the emergency room, where she remained nonverbal until she suddenly became listless, slumped over, stiffened, and was foaming at the mouth  It was unclear whether she lost pulses or not  The  pulled over to the side of the road, noted that she was cyanotic, and flagged a  down who was able to initiate CPR again, even though it is unclear if the patient was pulseless or not  The  states he was not sure but they did CPR "just in case "  On EMS arrival, they noted pulses and intubated the patient in the field  She received ketamine, rocuronium, Narcan, and fentanyl by EMS     In the emergency room, the patient was received on mechanical ventilation  She was not following commands but was moving all extremities  She was hemodynamically stable  She received 3 L IV fluids, was started on propofol for sedation, and received 1 dose of IV Rocephin  Of note, the patient has recurrent UTIs and per the , she was diagnosed with a urinary tract infection on 2/24 where at that point she started Bactrim p o   states she has been on Bactrim numerous times in the past and has not had any difficulties  Distantly, he stated that the patient has been on the same MS medication infusion for about the last 2 years and she did receive her last dose on 2/24  She has been tolerating the medication without any difficulty and she had no symptoms afterwards  The  is unclear what the medication is, however he states that she follows with neurology at [facility]  Additionally, at baseline she is wheelchair-bound with her suprapubic catheter but is alert and oriented x4  She is able to eat and drink without any difficulty  The  states she has not been around any sick contacts  And has been acting normally up until she had her acute loss of vision today    He states even with her known urinary tract infection, she did not have any symptoms  Patient was admitted to the ICU and remained on mechanical ventilation  She was off of sedation and was found to have profound upper and lower extremity weakness  She was alert and able to nod yes or no but had minimal extremity movement  Initially neurology felt the patient had seizures but the MRI brain showed acute to subacute occipital lobe infarct  She tolerated pressure support weans but was weak and their was a concern that when she was extubated, she may need to be reintubated due to her weakness  After multiple goals of care conversations, the family wanted to give her a try to extubate and made her a DNR/DNI  She was extubated on 3/2 and was hypoxic with increased work of breathing soon after  A few hours after extubation, the patient was started on a morphine drip and transitioned to comfort care  This morning the patient was discharge from inpatient and transitioned to hospice  Condition at Discharge: Discharge from inpatient and transition to hospice       Disposition: Discharge from inpatient and transition to hospice      Discharge Statement:  I spent 30 minutes discharging the patient  This time was spent on the day of discharge  I had direct contact with the patient on the day of discharge  Greater than 50% of the total time was spent examining patient, answering all patient questions, arranging and discussing plan of care with patient as well as directly providing post-discharge instructions  Additional time then spent on discharge activities          [address] [place], [place]  3/3/2023  1:59 PM

## 2023-03-03 NOTE — DISCHARGE SUMMARY
[address]  Discharge- Rima Mora 1955, 78 y o  female MRN: [id number]  Unit/Bed#: [address] [place] [address] Encounter: [id number]  Primary Care Provider: Garcia Palma MD   Date and time admitted to hospital: 3/3/2023  1:36 PM    Altered mental status  Assessment & Plan  · Presented to the ER on 2/26 after complaining of loss of vision in the passenger seat  Shortly after, the patient became lethargic, slumped over   started CPR on the side of the road, although unclear if patient truly had loss of pulses  Intubated approximately 10 minutes after CPR by EMS who found pulses on arrival  Initial neuro exam, patient was moving everything appropriately to painful stimuli only  · MRI showed acute to subacute left occipital lobe infarct with no discrete enhancing lesion to suggest acute demyelinization  · Patient continued to deteriorate neurologically and was unable to move upper or lower extremities  · Family did not want to keep the patient intubated, they wanted to extubate her to make her comfortable  · Patient remained on mechanical ventilation 3/2 she was extubated to comfort care      Infarction of left basal ganglia [facility])  Assessment & Plan  • MRI brain w w/o contrast 2/27: acute to subacute left occipital lobe infarct with no discrete enhancing lesion to suggest acute demyelination  positive for chronic left basal ganglia lacunar infarct  • Neuro recs:  No prior h/o CVA, initiate stroke pathway, f/u Echo with shunt study, & lipid panel & A1c  • Echo: Left ventricular ejection fraction is 60% with normal systolic function  Diastolic function is mildly abnormal consistent with grade 1  MV with mild annular calcification, mild regurgitation  TV with mild regurgitation,PA pressure 30 mmHg    • Neurology was following  • Patient was extubated on 3/2 to comfort as per family request and she had no improvement in her upper extremity or lower extremity movement    UTI (urinary tract infection)  Assessment & Plan  · Chronic suprapubic catheter  · Received 5 days of antibiotics  · Transitioned to comfort care on 3/2    Multiple sclerosis [facility])  Assessment & Plan  • Patient was diagnosed with MS 26 years ago  • Follows with a neurologist from [facility]  • Continue baclofen, Neurontin, Zanaflex  • Per , received medication infusion on 7/95 without complications intrathecal methotrrexate -states she has been on the same medication for MS for about 2 years without any issues  · Since having her acute stroke, the patient was acutely weak on the ventilator  She was unable to move her lower extremities or her lower extremities  Family decided to extubate her to comfort care stating that she would not want to be on a ventilator long-term    End of life care  Assessment & Plan  · After history of MS, and an acute stroke, the patient was extubated to comfort care on 3/2        Discharge Diagnoses: Active Problems:    Altered mental status    Infarction of left basal ganglia (HCC)    UTI (urinary tract infection)    Multiple sclerosis (HCC)    End of life care  Resolved Problems:    * No resolved hospital problems  *      Consultations During Hospital Stay:  · Neurology     Procedures Performed:     · 2/26 LP done in the ER  · 3/2 extubated to comfort care       Complications:  Baseline MS complicated by acute stroke     Hospital Course:   Pop George is a 78 y o  female who presents with a past medical history of MS, diagnosed 26 years ago, recurrent urinary tract infections secondary to suprapubic catheters from a neurogenic bladder, hypertension, and depression  Per her , the patient was a passenger in the vehicle that he was driving  They were in route to meet their family when the patient stated she had lost vision in both eyes    At that point, she was alert and oriented x4 however soon after, she became minimally responsive, and had incomprehensible slurring of speech  It was decided to take her to the emergency room, where she remained nonverbal until she suddenly became listless, slumped over, stiffened, and was foaming at the mouth  It was unclear whether she lost pulses or not  The  pulled over to the side of the road, noted that she was cyanotic, and flagged a  down who was able to initiate CPR again, even though it is unclear if the patient was pulseless or not  The  states he was not sure but they did CPR "just in case "  On EMS arrival, they noted pulses and intubated the patient in the field  She received ketamine, rocuronium, Narcan, and fentanyl by EMS     In the emergency room, the patient was received on mechanical ventilation  She was not following commands but was moving all extremities  She was hemodynamically stable  She received 3 L IV fluids, was started on propofol for sedation, and received 1 dose of IV Rocephin  Of note, the patient has recurrent UTIs and per the , she was diagnosed with a urinary tract infection on 2/24 where at that point she started Bactrim p o   states she has been on Bactrim numerous times in the past and has not had any difficulties  Distantly, he stated that the patient has been on the same MS medication infusion for about the last 2 years and she did receive her last dose on 2/24  She has been tolerating the medication without any difficulty and she had no symptoms afterwards  The  is unclear what the medication is, however he states that she follows with neurology at [facility]  Additionally, at baseline she is wheelchair-bound with her suprapubic catheter but is alert and oriented x4  She is able to eat and drink without any difficulty  The  states she has not been around any sick contacts  And has been acting normally up until she had her acute loss of vision today    He states even with her known urinary tract infection, she did not have any symptoms  Patient was admitted to the ICU and remained on mechanical ventilation  She was off of sedation and was found to have profound upper and lower extremity weakness  She was alert and able to nod yes or no but had minimal extremity movement  Initially neurology felt the patient had seizures but the MRI brain showed acute to subacute occipital lobe infarct  She tolerated pressure support weans but was weak and their was a concern that when she was extubated, she may need to be reintubated due to her weakness  After multiple goals of care conversations, the family wanted to give her a try to extubate and made her a DNR/DNI  She was extubated on 3/2 and was hypoxic with increased work of breathing soon after  A few hours after extubation, the patient was started on a morphine drip and transitioned to comfort care  This morning the patient was discharge from inpatient and transitioned to hospice  Condition at Discharge: Discharge from inpatient and transition to hospice       Disposition: Discharge from inpatient and transition to hospice      Discharge Statement:  I spent 30 minutes discharging the patient  This time was spent on the day of discharge  I had direct contact with the patient on the day of discharge  Greater than 50% of the total time was spent examining patient, answering all patient questions, arranging and discussing plan of care with patient as well as directly providing post-discharge instructions  Additional time then spent on discharge activities          [address] [facility], [address] [place] [address]  3/3/2023  1:59 PM

## 2023-03-04 NOTE — PROGRESS NOTES
[address]  Progress Note - Rima Mora 1955, 78 y o  female MRN: [id number]  Unit/Bed#: [address] Encounter: [id number]  Primary Care Provider: Gloria Cuellar MD   Date and time admitted to hospital: 3/3/2023  1:36 PM    End of life care  Assessment & Plan  Patient evaluated hospice KAQ and IP hospice care    · Comfort care hospice protocol in place: Morphine, scopolamine patch, supportive O2, Ativan as needed  · Family support  · Spiritual guidance for family as needed      [address] 78 y o  female MRN: [id number]    Unit/Bed#: [address] [place] [address] Encounter: [id number]      Patient has one or more of, but not limited to the following:  Frequent evaluation by the physician and or nurse For family support  Criteria for transfer from [facility] level of care:  Re-established family and or caregiver support system  Family cannot provide skilled care in the home  Subjective: Patient seen and examined with family at bedside  and children  Patient more comfortable, O2 for support  Family very appreciative of [facility] care team support provided to patient  Responded to questions regarding course of care for patient  Physical Exam  Constitutional:       General: She is not in acute distress  Cardiovascular:      Pulses:           Radial pulses are 2+ on the right side and 2+ on the left side  Pulmonary:      Effort: No accessory muscle usage or retractions  Breath sounds: Transmitted upper airway sounds (Snoring respirations) present  No stridor  Skin:     General: Skin is warm  Neurological:      Mental Status: She is unresponsive  This daily assessment is discussed and approved by the physician, RN case manager, and/or charge nurse, and , and  and determines the continued appropriateness of the general inpatient level of care

## 2023-03-04 NOTE — PLAN OF CARE
Problem: PAIN - ADULT  Goal: Verbalizes/displays adequate comfort level or baseline comfort level  Description: Interventions:  - Encourage patient to monitor pain and request assistance  - Assess pain using appropriate pain scale  - Administer analgesics based on type and severity of pain and evaluate response  - Implement non-pharmacological measures as appropriate and evaluate response  - Consider cultural and social influences on pain and pain management  - Notify physician/advanced practitioner if interventions unsuccessful or patient reports new pain  Outcome: Progressing     Problem: DISCHARGE PLANNING  Goal: Discharge to home or other facility with appropriate resources  Description: INTERVENTIONS:  - Identify barriers to discharge w/patient and caregiver  - Arrange for needed discharge resources and transportation as appropriate  - Identify discharge learning needs (meds, wound care, etc )  - Arrange for interpretive services to assist at discharge as needed  - Refer to Case Management Department for coordinating discharge planning if the patient needs post-hospital services based on physician/advanced practitioner order or complex needs related to functional status, cognitive ability, or social support system  Outcome: Progressing     Problem: Communication Deficit  Goal: Patient/caregiver/family will effectively communicate symptoms, needs and concerns  Outcome: Progressing     Problem: Dyspnea at end of life  Goal: Patient/caregiver/family will demonstrate understanding of an ability to manage respiratory symptoms at end of life  Outcome: Progressing

## 2023-03-04 NOTE — ASSESSMENT & PLAN NOTE
Patient evaluated hospice KAQ and IP hospice care    · Comfort care hospice protocol in place: Morphine, scopolamine patch, supportive O2, Ativan as needed  · Family support  · Spiritual guidance for family as needed

## 2023-03-04 NOTE — PLAN OF CARE
Problem: PAIN - ADULT  Goal: Verbalizes/displays adequate comfort level or baseline comfort level  Description: Interventions:  - Encourage patient to monitor pain and request assistance  - Assess pain using appropriate pain scale  - Administer analgesics based on type and severity of pain and evaluate response  - Implement non-pharmacological measures as appropriate and evaluate response  - Consider cultural and social influences on pain and pain management  - Notify physician/advanced practitioner if interventions unsuccessful or patient reports new pain  3/4/2023 0244 by Deri Marry Fahr  Outcome: Progressing       Problem: DISCHARGE PLANNING  Goal: Discharge to home or other facility with appropriate resources  Description: INTERVENTIONS:  - Identify barriers to discharge w/patient and caregiver  - Arrange for needed discharge resources and transportation as appropriate  - Identify discharge learning needs (meds, wound care, etc )  - Refer to Case Management Department for coordinating discharge planning if the patient needs post-hospital services based on physician/advanced practitioner order or complex needs related to functional status, cognitive ability, or social support system  Outcome: Progressing     Problem: Knowledge Deficit  Goal: Patient/family/caregiver demonstrates understanding of disease process, treatment plan, medications, and discharge instructions  Description: Complete learning assessment and assess knowledge base    Interventions:  - Provide teaching at level of understanding  - Provide teaching via preferred learning methods  Outcome: Progressing

## 2023-03-05 NOTE — PROGRESS NOTES
[address]  67   Progress Note - Rima Mora 1955, 78 y o  female MRN: [id number]  Unit/Bed#: [address] [facility] Encounter: [id number]  Primary Care Provider: Ginny Smith MD   Date and time admitted to hospital: 3/3/2023  1:36 PM    End of life care  Assessment & Plan  Patient evaluated hospice KAQ and IP hospice care    · Comfort care hospice protocol in place: Morphine, scopolamine patch, supportive O2, Ativan as needed  · Family support  · Spiritual guidance for family as needed        Patient has one or more of, but not limited to the following:  Frequent evaluation by the physician and or nurse For family support  Criteria for transfer from [facility] level of care:  Re-established family and or caregiver support system  Family cannot provide skilled care in the home  Subjective: Patient seen and examined Family not present at this time, patient resting comfortably    Physical Exam  Constitutional:       General: She is not in acute distress  Appearance: She is not ill-appearing  Eyes:      Comments: Eyelid blinking   Pulmonary:      Effort: No respiratory distress  Breath sounds: No wheezing or rhonchi  Neurological:      Mental Status: She is unresponsive  This daily assessment is discussed and approved by the physician, RN case manager, and/or charge nurse, and , and  and determines the continued appropriateness of the general inpatient level of care

## 2023-03-05 NOTE — PLAN OF CARE
Problem: PAIN - ADULT  Goal: Verbalizes/displays adequate comfort level or baseline comfort level  Description: Interventions:  - Encourage patient to monitor pain and request assistance  - Assess pain using appropriate pain scale  - Administer analgesics based on type and severity of pain and evaluate response  - Implement non-pharmacological measures as appropriate and evaluate response  - Consider cultural and social influences on pain and pain management  - Notify physician/advanced practitioner if interventions unsuccessful or patient reports new pain  Outcome: Progressing       Problem: DISCHARGE PLANNING  Goal: Discharge to home or other facility with appropriate resources  Description: INTERVENTIONS:  - Identify barriers to discharge w/patient and caregiver  - Identify discharge learning needs   - Refer to Case Management Department for coordinating discharge planning if the patient needs post-hospital services based on physician/advanced practitioner order or complex needs related to functional status, cognitive ability, or social support system  3/4/2023 2154 by Charlean Honer Fahr  Outcome: Progressing           Problem: Knowledge Deficit  Goal: Patient/family/caregiver demonstrates understanding of disease process, treatment plan, medications, and discharge instructions  Description: Complete learning assessment and assess knowledge base    Interventions:  - Provide teaching at level of understanding  - Provide teaching via preferred learning methods  Outcome: Progressing

## 2023-03-05 NOTE — ASSESSMENT & PLAN NOTE
Patient evaluated hospice KAQ and IP hospice care    · Comfort care hospice protocol in place: Morphine, scopolamine patch, supportive O2, Ativan as needed  · Increase IV morphine drip, titrate up as needed  · Continue scheduled lorazepam  · Continue Tylenol, ice packs  · Family support  · Spiritual guidance for family as needed

## 2023-03-06 NOTE — PROGRESS NOTES
Pastoral Care Progress Note    3/6/2023  Patient: Jena Dior : 1955  Admission Date & Time: 3/3/2023 1336  MRN: [id number] [facility]: [id number]       visited                Chaplaincy Interventions Utilized:   Empowerment: Facilitated group experience and Normalized experience of patient/family    Exploration: Explored emotional needs & resources and Explored relational needs & resources      Relationship Building: Cultivated a relationship of care and support    Ritual: Provided prayer      Chaplaincy Outcomes Achieved:  Emotional resources utilized, Expressed gratitude, and Expressed intermediate hope          Spiritual Coping Strategies Utilized:   Connectedness, Spiritual practices, Spiritual comfort, Spiritual gratitude, and Spiritual meaning       23 1100   Clinical Encounter Type   Visited With Patient and family together   Routine Visit Follow-up   Crisis Visit Patient actively dying

## 2023-03-06 NOTE — PLAN OF CARE
Problem: Dyspnea at end of life  Goal: Patient/caregiver/family will demonstrate understanding of an ability to manage respiratory symptoms at end of life  Outcome: Progressing

## 2023-03-06 NOTE — PROGRESS NOTES
Pastoral Care Progress Note    3/6/2023  Patient: Reina Conteh : 1955  Admission Date & Time: 3/3/2023 1336  MRN: [id number] [facility]: [id number]       met with PT and family, , son and daughter   normalized the dying process and provided grief counseling   provided prayer  Spiritual care remains available                 Chaplaincy Interventions Utilized:   Empowerment: Facilitated group experience, Normalized experience of patient/family, and Provided grief counseling    Exploration: Facilitated story telling and Identified, evaluated & reinforced appropriate coping strategies    Relationship Building: Cultivated a relationship of care and support and Listened empathically    Ritual: Provided prayer    Chaplaincy Outcomes Achieved:  Expressed gratitude, Expressed intermediate hope, Identified meaningful connections, and Progressed toward acceptance      Spiritual Coping Strategies Utilized:   Connectedness, Spiritual practices, Spiritual comfort, and Spiritual meaning       23 1100   Clinical Encounter Type   Visited With Patient and family together   Routine Visit Follow-up   Crisis Visit Patient actively dying

## 2023-03-06 NOTE — PLAN OF CARE
Problem: PAIN - ADULT  Goal: Verbalizes/displays adequate comfort level or baseline comfort level  Description: Interventions:  - Encourage patient to monitor pain and request assistance  - Assess pain using appropriate pain scale  - Administer analgesics based on type and severity of pain and evaluate response  - Implement non-pharmacological measures as appropriate and evaluate response  - Consider cultural and social influences on pain and pain management  - Notify physician/advanced practitioner if interventions unsuccessful or patient reports new pain  Outcome: Progressing     Problem: Dyspnea at end of life  Goal: Patient/caregiver/family will demonstrate understanding of an ability to manage respiratory symptoms at end of life  Outcome: Progressing

## 2023-03-06 NOTE — PROGRESS NOTES
[address]  Progress Note - Rima Mora 1955, 78 y o  female MRN: [id number]  Unit/Bed#: [address] [facility] Encounter: [id number]  Primary Care Provider: Fanta Jimenez MD   Date and time admitted to hospital: 3/3/2023  1:36 PM    End of life care  Assessment & Plan    Patient evaluated hospice KAQ and IP hospice care  Hospice order placed on 3/6/2023 is for the hospice order on 3/3/23 clerical error thank you    · Comfort care hospice protocol in place: Morphine, scopolamine patch, supportive O2, Ativan as needed progress  · Family support  · Spiritual guidance for family as needed        Patient has one or more of, but not limited to the following:  Frequent evaluation by the physician and or nurse For family support  Criteria for transfer from [facility] level of care:  Re-established family and or caregiver support system  Family cannot provide skilled care in the home  Subjective: Patient seen and examined Family not present at this time, patient resting comfortably    Physical Exam  Pulmonary:      Breath sounds: Transmitted upper airway sounds present  Neurological:      Mental Status: She is unresponsive  This daily assessment is discussed and approved by the physician, RN case manager, and/or charge nurse, and , and  and determines the continued appropriateness of the general inpatient level of care

## 2023-03-07 NOTE — PLAN OF CARE
Problem: PAIN - ADULT  Goal: Verbalizes/displays adequate comfort level or baseline comfort level  Description: Interventions:  - Encourage patient to monitor pain and request assistance  - Assess pain using appropriate pain scale  - Administer analgesics based on type and severity of pain and evaluate response  - Implement non-pharmacological measures as appropriate and evaluate response  - Consider cultural and social influences on pain and pain management  - Notify physician/advanced practitioner if interventions unsuccessful or patient reports new pain  Outcome: Progressing     Problem: DISCHARGE PLANNING  Goal: Discharge to home or other facility with appropriate resources  Description: INTERVENTIONS:  - Identify barriers to discharge w/patient and caregiver  - Arrange for needed discharge resources and transportation as appropriate  - Identify discharge learning needs (meds, wound care, etc )  - Arrange for interpretive services to assist at discharge as needed  - Refer to Case Management Department for coordinating discharge planning if the patient needs post-hospital services based on physician/advanced practitioner order or complex needs related to functional status, cognitive ability, or social support system  Outcome: Progressing     Problem: Knowledge Deficit  Goal: Patient/family/caregiver demonstrates understanding of disease process, treatment plan, medications, and discharge instructions  Description: Complete learning assessment and assess knowledge base    Interventions:  - Provide teaching at level of understanding  - Provide teaching via preferred learning methods  Outcome: Progressing     Problem: Communication Deficit  Goal: Patient/caregiver/family will effectively communicate symptoms, needs and concerns  Outcome: Progressing     Problem: Dyspnea at end of life  Goal: Patient/caregiver/family will demonstrate understanding of an ability to manage respiratory symptoms at end of life  Outcome: Progressing     Problem: Imminent death  Goal: Collaborate with patient, family, caregiver and interdisciplinary team to minimize end of life symptoms  Outcome: Progressing

## 2023-03-07 NOTE — PLAN OF CARE
Problem: PAIN - ADULT  Goal: Verbalizes/displays adequate comfort level or baseline comfort level  Description: Interventions:  - Encourage patient to monitor pain and request assistance  - Assess pain using appropriate pain scale  - Administer analgesics based on type and severity of pain and evaluate response  - Implement non-pharmacological measures as appropriate and evaluate response  - Consider cultural and social influences on pain and pain management  - Notify physician/advanced practitioner if interventions unsuccessful or patient reports new pain  3/7/2023 1047 by Bisi Colorado RN  Outcome: Progressing  3/7/2023 1046 by Bisi Colorado RN  Outcome: Progressing     Problem: DISCHARGE PLANNING  Goal: Discharge to home or other facility with appropriate resources  Description: INTERVENTIONS:  - Identify barriers to discharge w/patient and caregiver  - Arrange for needed discharge resources and transportation as appropriate  - Identify discharge learning needs (meds, wound care, etc )  - Arrange for interpretive services to assist at discharge as needed  - Refer to Case Management Department for coordinating discharge planning if the patient needs post-hospital services based on physician/advanced practitioner order or complex needs related to functional status, cognitive ability, or social support system  3/7/2023 1047 by Bisi Colorado RN  Outcome: Progressing  3/7/2023 1046 by Bisi Colorado RN  Outcome: Progressing     Problem: Knowledge Deficit  Goal: Patient/family/caregiver demonstrates understanding of disease process, treatment plan, medications, and discharge instructions  Description: Complete learning assessment and assess knowledge base    Interventions:  - Provide teaching at level of understanding  - Provide teaching via preferred learning methods  3/7/2023 1047 by Bisi Colorado RN  Outcome: Progressing  3/7/2023 1046 by Bisi Colorado RN  Outcome: Progressing     Problem: Communication Deficit  Goal: Patient/caregiver/family will effectively communicate symptoms, needs and concerns  3/7/2023 1047 by Selam Barrientos RN  Outcome: Progressing  3/7/2023 1046 by Selam Barrientos RN  Outcome: Progressing     Problem: Dyspnea at end of life  Goal: Patient/caregiver/family will demonstrate understanding of an ability to manage respiratory symptoms at end of life  3/7/2023 1047 by Selam Barrientos RN  Outcome: Progressing  3/7/2023 1046 by Selam Barrientos RN  Outcome: Progressing     Problem: Imminent death  Goal: Collaborate with patient, family, caregiver and interdisciplinary team to minimize end of life symptoms  3/7/2023 1047 by Selam Barrientos RN  Outcome: Progressing  3/7/2023 1046 by Selam Barrientos RN  Outcome: Progressing

## 2023-03-07 NOTE — PROGRESS NOTES
[facility] Progress Note  Patient: Sweta Martinez 78 y o  female   MRN: [id number]  PCP: Todd King MD  Unit/Bed#: [address] [place] [address] Encounter: [id number]  Date Of Visit: 23    Problem List:    Principal Problem:    Encounter for hospice care  Active Problems:    End of life care      Assessment & Plan:    End of life care  Assessment & Plan  Patient evaluated hospice KAQ and IP hospice care    · Comfort care hospice protocol in place: Morphine, scopolamine patch, supportive O2, Ativan as needed  · Increase IV morphine drip, titrate up as needed  · Continue scheduled lorazepam  · Continue Tylenol, ice packs  · Family support  · Spiritual guidance for family as needed          VTE Pharmacologic Prophylaxis:   Not applicable    Patient Centered Rounds: I performed bedside rounds with nursing staff today  Discussions with Specialists or Other Care Team Provider: Hospice      Time Spent for Care: 35 minutes  More than 50% of total time spent on counseling and coordination of care as described above  Current Length of Stay: 0 day(s)  Current Patient Status: Hospice   Certification Statement: The patient will continue to require additional inpatient hospital stay due to Comfort care    Code Status:  Level 4    Subjective:   Comfortable, tachypnea and fever noted    Objective:     Vitals:   Temp (24hrs), Av 3 °F (39 1 °C), Min:101 6 °F (38 7 °C), Max:103 °F (39 4 °C)    Temp:  [101 6 °F (38 7 °C)-103 °F (39 4 °C)] 101 6 °F (38 7 °C)  HR:  [102-103] 102  Resp:  [10-17] 17  BP: (110-129)/(59-69) 110/59  SpO2:  [92 %] 92 %  Body mass index is 40 91 kg/m²  Input and Output Summary (last 24 hours): Intake/Output Summary (Last 24 hours) at 3/7/2023 1419  Last data filed at 3/7/2023 0740  Gross per 24 hour   Intake --   Output 1400 ml   Net -1400 ml       Physical Exam:   Physical Exam  Constitutional:       General: She is not in acute distress  HENT:      Head: Normocephalic     Cardiovascular: Rate and Rhythm: Normal rate  Pulmonary:      Effort: Pulmonary effort is normal  No respiratory distress  Breath sounds: Normal breath sounds  No wheezing or rales  Abdominal:      General: There is no distension  Palpations: Abdomen is soft  Tenderness: There is no abdominal tenderness  Skin:     Findings: No rash  Additional Data:     Labs:  Results from last 7 days   Lab Units 03/02/23  0550 03/01/23  0526   WBC Thousand/uL 6 81 4 90   HEMOGLOBIN g/dL 8 6* 8 7*   HEMATOCRIT % 26 8* 28 0*   PLATELETS Thousands/uL 159 145*   NEUTROS PCT %  --  80*   LYMPHS PCT %  --  16   MONOS PCT %  --  3*   EOS PCT %  --  0     Results from last 7 days   Lab Units 03/02/23  0550 03/01/23  0526   SODIUM mmol/L 145 144   POTASSIUM mmol/L 5 1 5 0   CHLORIDE mmol/L 108 110*   CO2 mmol/L 30 29   BUN mg/dL 18 17   CREATININE mg/dL 0 81 0 97   ANION GAP mmol/L 7 5   CALCIUM mg/dL 8 4 8 3*   ALBUMIN g/dL  --  3 0*   TOTAL BILIRUBIN mg/dL  --  0 30   ALK PHOS U/L  --  158*   ALT U/L  --  117*   AST U/L  --  78*   GLUCOSE RANDOM mg/dL 98 107                       Lines/Drains:  Invasive Devices     Peripheral Intravenous Line  Duration           Long-Dwell Peripheral IV (Midline) 03/01/23 Right Brachial 6 days          Drain  Duration           Suprapubic Catheter 18 Fr  154 days                      Imaging: No pertinent imaging reviewed      Recent Cultures (last 7 days):   Results from last 7 days   Lab Units 03/01/23  1354   SPUTUM CULTURE  1+ Growth of   GRAM STAIN RESULT  1+ Polys  No bacteria seen       Last 24 Hours Medication List:   Current Facility-Administered Medications   Medication Dose Route Frequency Provider Last Rate   • acetaminophen  325 mg Rectal Q4H PRN Juan Licea PA-C     • bisacodyl  10 mg Rectal Daily PRN Valentin Car MD     • glycopyrrolate  0 1 mg Intravenous Q4H PRN Valentin Car MD     • LORazepam  1 mg Intravenous Q4H Valentin Car MD     • morphine  8 mg/hr Intravenous Continuous Emilie Bledsoe MD 8 mg/hr (03/07/23 5326)   • morphine injection  2 mg Intravenous Q1H PRN Adenike Tse MD     • scopolamine  1 patch Transdermal Layton Freire MD          Today, Patient Was Seen By: Emilie Bledsoe MD    ** Please Note: "This note has been constructed using a voice recognition system  Therefore there may be syntax, spelling, and/or grammatical errors   Please call if you have any questions  "**

## 2023-03-08 NOTE — NURSING NOTE
Morning assessed patient is in coma with no reaction with stimulate  Temp 102 9 Hr 103 RR 20 Irregular BP 71/43, Spo2 60% with O2 2L NC  Increased oxygen to 4L NC and gave PRN Morphine 2mg IV  Spoke with --Christopher Mora by phone and update patient current condition  MD aware

## 2023-03-08 NOTE — DISCHARGE SUMMARY
Discharge Summary - Ericka Brock [facility] Internal Medicine    Patient Information: Rima Mora 78 y o  female MRN: [id number]  Unit/Bed#: [address] Encounter: [id number]    Discharging Physician / Practitioner: Raegan Leggett MD  PCP: Julee Colon MD  Admission Date: 3/3/2023  Discharge Date: 23    Reason for Admission: No chief complaint on file  Discharge Diagnoses:     Principal Problem:    Encounter for hospice care  Active Problems:    End of life care  Resolved Problems:    * No resolved hospital problems  *        End of life care  Assessment & Plan  Patient evaluated hospice KAQ and IP hospice care    · Comfort care hospice protocol in place: Morphine, scopolamine patch, supportive O2, Ativan as needed  · Increase IV morphine drip, titrate up as needed  · Continue scheduled lorazepam  · Continue Tylenol, ice packs  · Family support  · Spiritual guidance for family as needed        Consultations During Hospital Stay:  None    Procedures Performed:     · ***    Significant Findings:     · Refer to hospital course and above listed diagnosis related plan for details    Imaging while in hospital:    EEG awake or drowsy routine    Result Date: 2023  Narrative: Table formatting from the original result was not included  ELECTROENCEPHALOGRAM (EEG) Patient Name:  Summer Ivey  MRN: [id number] :  1955 File #: [id number] Age: 78 y o  Date performed: 2023        Report date: 2023      Study type: Routine EEG ICD 10 diagnosis: Encephalopathy, unspecified G93 40 Start time: 09:32 End time: 09:59 --------------------------------------------------------------------------- ---------------------------------------- Patient History: This recording was observed in a 78 y o  female to evaluate for seizure as a cause of encephalopathy   Medications include: gabapentin, Levetiracetam  --------------------------------------------------------------------------- ---------------------------------------- Description of Procedure: · 32 channel digital recording with electrodes placed according to the International 10-20 system with additional T1/T2 electrodes, EOG, EKG, and simultaneous video  The recording was technically satisfactory  --------------------------------------------------------------------------- ---------------------------------------- EEG Description: The recording was performed with the patient awake and drowsy  She was not able to answer orientation questions  During wakefulness, there was no identifiable posteriorly dominant rhythm  Instead, there were medium amplitude, poorly organized, mixed 4-5 cps theta with superimposed 2-3 cps delta activities  With drowsiness, background activities became lower amplitude, with emergence of more prominent diffuse 4-5 cps theta activities  Deeper sleep was not attained  --------------------------------------------------------------------------- ---------------------------------------- Activation Procedures: Hyperventilation was not performed  Stepped photic stimulation between 1-30 cps was performed and did not induce any changes  Other findings: The single lead ECG demonstrated normal sinus rhythm --------------------------------------------------------------------------- ---------------------------------------- EEG Interpretation: This Routine EEG recorded during wakefulness and drowsiness is abnormal  Background activities of predominantly mixed theta and delta activities suggests moderate diffuse cerebral dysfunction of nonspecific etiology  Darryle Mariscal, MD [facility]     CTA head and neck with and without contrast    Result Date: 2/26/2023  Narrative: CTA NECK AND BRAIN WITH AND WITHOUT CONTRAST INDICATION: Altered mental status and visual disturbance  COMPARISON:   None   TECHNIQUE:  Routine CT imaging of the Brain without contrast   Post contrast imaging was performed after administration of iodinated contrast through the neck and brain  Post contrast axial 0 625 mm images timed to opacify the arterial system  3D rendering was performed on an independent workstation  MIP reconstructions performed  Coronal reconstructions were performed of the noncontrast portion of the brain  Radiation dose length product (DLP) for this visit:  0210 mGy-cm   This examination, like all CT scans performed in the [facility], was performed utilizing techniques to minimize radiation dose exposure, including the use of iterative reconstruction and automated exposure control  IV Contrast:  85 mL of iohexol (OMNIPAQUE)  IMAGE QUALITY:   Diagnostic FINDINGS: NONCONTRAST BRAIN PARENCHYMA:  Chronic lacunar infarct in the left corona radiata  Periventricular and subcortical hypoattenuating foci consistent with microangiopathic disease  No acute intracranial hemorrhage or mass effect  VENTRICLES AND EXTRA-AXIAL SPACES:  No hydrocephalus or extra-axial collection  VISUALIZED ORBITS: Intact globes and orbits  PARANASAL SINUSES: Clear paranasal sinuses  Fluid in the nasopharynx and oropharynx secondary to intubation  CERVICAL VASCULATURE AORTIC ARCH AND GREAT VESSELS:  Three-vessel configuration aortic arch  No stenosis in the subclavian arteries  RIGHT VERTEBRAL ARTERY CERVICAL SEGMENT:  Normal origin  The vessel is normal in caliber throughout the neck  LEFT VERTEBRAL ARTERY CERVICAL SEGMENT:  Normal origin  The vessel is normal in caliber throughout the neck  RIGHT EXTRACRANIAL CAROTID SEGMENT:  Normal caliber common carotid artery  Normal bifurcation and cervical internal carotid artery  No stenosis or dissection  LEFT EXTRACRANIAL CAROTID SEGMENT:  Normal caliber common carotid artery  Normal bifurcation and cervical internal carotid artery  No stenosis or dissection  NASCET criteria was used to determine the degree of internal carotid artery diameter stenosis   INTRACRANIAL VASCULATURE INTERNAL CAROTID ARTERIES:  Mild calcified plaque in the carotid siphons without hemodynamically significant stenosis  ANTERIOR CIRCULATION:  Symmetric A1 segments and anterior cerebral arteries with normal enhancement  Normal anterior communicating artery  MIDDLE CEREBRAL ARTERY CIRCULATION:  M1 segment and middle cerebral artery branches demonstrate normal enhancement bilaterally  DISTAL VERTEBRAL ARTERIES:  Normal distal vertebral arteries  Posterior inferior cerebellar arteries are patent  BASILAR ARTERY:  Basilar artery is normal in caliber  Patent superior cerebellar arteries  POSTERIOR CEREBRAL ARTERIES: Bilateral posterior communicating arteries identified  No stenosis in the posterior cerebral arteries  VENOUS STRUCTURES:  Patent dural venous sinuses  NON VASCULAR ANATOMY BONY STRUCTURES:  No lytic or blastic lesion  SOFT TISSUES OF THE NECK:  No mass or lymphadenopathy  THORACIC INLET:  Clear lung apices  Impression: 1  No evidence of acute infarct, intracranial hemorrhage or mass effect  2   No hemodynamically significant stenosis, dissection or occlusion of the carotid or vertebral arteries or major vessels of the [place]  Workstation performed: [id number]     MRI brain wo contrast    Result Date: 2/28/2023  Narrative: 1 2 [id number]  5 [phone] [id number]  [id number]    MRI brain w wo contrast    Result Date: 2/27/2023  Narrative: MRI BRAIN WITH AND WITHOUT CONTRAST INDICATION: ALTERED MENTAL STATUS  ADDITIONAL HISTORY FROM [facility]: HISTORY OF RESPIRATORY ARREST  LOSS OF CONSCIOUSNESS  COMPARISON:  CTA head and neck 2/26/2023 TECHNIQUE: Multiplanar, multisequence imaging of the brain was performed before and after gadolinium administration  IV Contrast:  8 mL of Gadobutrol injection (SINGLE-DOSE)  IMAGE QUALITY:   Diagnostic   FINDINGS: BRAIN PARENCHYMA:  A 0 5 cm focus of restricted diffusion is noted within the left occipital lobe (series 3/image(s) 12) suspicious for an acute to subacute infarct versus a recent demyelinating lesion  Edge artifacts are noted along the lateral left temporal lobe on the DWI sequence  Hyperintense T2/FLAIR foci within the periventricular, subcortical, and pericallosal white matter consistent with the patient's history of demyelinating disease  Cerebral lesions demonstrate T1 hypointensity consistent with chronic demyelinating plaques  There is no discrete enhancing lesion to suggest acute demyelination  Chronic left basal ganglia lacunar infarct  There is no discrete mass, mass effect or midline shift  There is no intracranial hemorrhage  Normal posterior fossa  VENTRICLES:  Ventricles and extra-axial CSF spaces are prominent commensurate with the degree of volume loss  No hydrocephalus  No intraventricular hemorrhage  SELLA AND PITUITARY GLAND:  Normal  ORBITS:  Normal  PARANASAL SINUSES:  Pansinus mucosal thickening  Superimposed air-fluid levels are noted within the bilateral maxillary and sphenoid sinuses suggestive of acute on chronic sinusitis  Mild right mastoid fluid  The left mastoid air cells are clear  VASCULATURE:  Evaluation of the major intracranial vasculature demonstrates appropriate flow voids  CALVARIUM AND SKULL BASE:  Normal  EXTRACRANIAL SOFT TISSUES:  Normal      Impression: 1  Punctate subcentimeter focus of restricted diffusion within the left occipital lobe which can be seen with an acute to subacute infarct versus a demyelinating lesion  2  Periventricular, subcortical, and pericallosal white matter lesions consistent with the patient's history of demyelinating disease  There is no pathologic intra-axial enhancement to suggest acute demyelination  3  Acute on chronic sinus disease  Mild right mastoid fluid  The study was marked in [facility] for immediate notification  Workstation performed: [id number]     CT chest abdomen pelvis w contrast    Result Date: 2/26/2023  Narrative: CT CHEST, ABDOMEN AND PELVIS WITH IV CONTRAST INDICATION:   ms changes  COMPARISON:  June 20, 2021 TECHNIQUE: CT examination of the chest, abdomen and pelvis was performed  Axial, sagittal, and coronal 2D reformatted images were created from the source data and submitted for interpretation  Radiation dose length product (DLP) for this visit:  1120 mGy-cm   This examination, like all CT scans performed in the [facility], was performed utilizing techniques to minimize radiation dose exposure, including the use of iterative reconstruction and automated exposure control  IV Contrast:  80 mL of iohexol (OMNIPAQUE) Enteric Contrast: Enteric contrast was administered  FINDINGS: CHEST LUNGS:  Bibasilar dependent opacities and mild subsegmental consolidation, left greater than right  Appearance favors atelectasis  Correlate with clinical findings for evidence of superimposed infectious etiology  ET tube present  ET tube balloon appears hyperexpanded, distending the trachea diameter, consider reducing volume of balloon distention  PLEURA:  Unremarkable  HEART/GREAT VESSELS: Heart is unremarkable for patient's age  There is fusiform ectasia of the ascending thoracic aorta measuring up to 40 mm  Stable compared to 2021  pRecommendation is for follow-up low radiation dose chest CT in one year  MEDIASTINUM AND YADIRA:  Unremarkable  CHEST WALL AND LOWER NECK:  Unremarkable  ABDOMEN LIVER/BILIARY TREE:  Unremarkable  GALLBLADDER:  No calcified gallstones  No pericholecystic inflammatory change  SPLEEN:  Unremarkable  PANCREAS:  Unremarkable  ADRENAL GLANDS:  Unremarkable  KIDNEYS/URETERS:  Contrast excretion noted, secondary to earlier CTA  No hydronephrosis  STOMACH AND BOWEL:  Unremarkable  APPENDIX:  A normal appendix was visualized  ABDOMINOPELVIC CAVITY:  No ascites  No pneumoperitoneum  No lymphadenopathy  VESSELS:  Unremarkable for patient's age  PELVIS REPRODUCTIVE ORGANS:  Uterus appears unremarkable  There is no evidence of adnexal mass   URINARY BLADDER: Nondistended, indwelling suprapubic catheter present  Air present presumably iatrogenic  ABDOMINAL WALL/INGUINAL REGIONS:  Unremarkable  OSSEOUS STRUCTURES:  No acute fracture or destructive osseous lesion  Prior lumbar fusion hardware  Sacral stimulator lead noted on the right  Impression: No acute thoracic or intra-abdominal pelvic abnormality identified  Mild bibasilar opacities left greater than right favoring atelectasis  Ectasia of the ascending aorta, 40 mm  Stable  Suggest follow-up in one year  The study was marked in EPIC for significant notification  Workstation performed: [id number]     XR chest portable ICU    Result Date: 3/2/2023  Narrative: CHEST INDICATION:   resp failure  COMPARISON:  11/2/2022 EXAM PERFORMED/VIEWS:  XR CHEST PORTABLE ICU FINDINGS: Marked patient rotation  NG tube in place with distal tip beneath diaphragm but not seen due to positioning  Cardiomediastinal silhouette appears unremarkable  Medial bibasilar patchy densities  No pneumothorax or pleural effusion  Osseous structures appear within normal limits for patient age  Impression: Medial bibasilar infiltrates and or atelectasis  Workstation performed: [id number]     Echo complete w/ contrast if indicated    Result Date: 2/28/2023  Narrative: •  Left Ventricle: Left ventricular cavity size is normal  Wall thickness is mildly increased  There is mild concentric hypertrophy  The left ventricular ejection fraction is 60% by visual estimation  Systolic function is normal  Wall motion is normal  Diastolic function is mildly abnormal, consistent with grade I (abnormal) relaxation  •  Left Atrium: The atrium is mildly dilated  •  Atrial Septum: There is no atrial septal defect by saline contrast  No patent foramen ovale detected using saline contrast injection at rest  •  Mitral Valve: There is mild annular calcification  There is mild regurgitation  •  Tricuspid Valve: There is mild regurgitation    Pulmonary artery pressure around 30 mmHg  Incidental Findings:   · ***     Test Results Pending at Discharge (will require follow up):   · As per After Visit Summary     Outpatient Tests Requested:  · ***    Complications:  Refer to hospital course and above listed diagnosis related plan, if any    Hospital Course:     Tutu Padilla is a 78 y o  female patient who originally presented to the hospital on 3/3/2023 due to ***        Please see above list of diagnoses and related plan for additional information  Condition at Discharge: {condition:48677}     Discharge Day Visit / Exam:     Subjective:  ***    Vitals: Blood Pressure: (!) 69/40 (03/08/23 1126)  Pulse: 103 (03/08/23 0727)  Temperature: (!) 102 9 °F (39 4 °C) (03/08/23 0727)  Temp Source: Axillary (03/08/23 0727)  Respirations: 20 (03/08/23 0727)  Height: 4' 7" (139 7 cm) (03/03/23 1416)  Weight - Scale: 79 8 kg (176 lb) (03/03/23 1416)  SpO2: (!) 60 % (03/08/23 0727)  Exam:   Physical Exam    Discharge instructions/Information to patient and family:(Discharge Medications and Follow up):   See after visit summary for information provided to patient and family  Provisions for Follow-Up Care:  See after visit summary for information related to follow-up care and any pertinent home health orders  Disposition: {Discharge Disposition:75832}    Planned Readmission:  {Yes or No:41202}     Discharge Statement:  I spent *** minutes discharging the patient  This time was spent on the day of discharge  I had direct contact with the patient on the day of discharge  Greater than 50% of the total time was spent examining patient, answering all patient questions, arranging and discussing plan of care with patient as well as directly providing post-discharge instructions  Additional time then spent on discharge activities  Discharge Medications:  See after visit summary for reconciled discharge medications provided to patient and family        ** Please Note: "This note has been constructed using a voice recognition system  Therefore there may be syntax, spelling, and/or grammatical errors   Please call if you have any questions  "** orders  Disposition:     Planned Readmission:  No     Discharge Statement:  I spent 32 minutes discharging the patient  This time was spent on the day of discharge  I had direct contact with the patient on the day of discharge  Discharge Medications:  See after visit summary for reconciled discharge medications provided to patient and family  ** Please Note: "This note has been constructed using a voice recognition system  Therefore there may be syntax, spelling, and/or grammatical errors   Please call if you have any questions  "**

## 2023-03-08 NOTE — NURSING NOTE
At 1310, the patient's breathing became shallow and slow, 6 breaths/minute  Morphine 2 mg PRN IV was given without improvement  At 1400, the patient stopped breathing and no apical pulse was found  MD pronounced dead  Report to family and hospice nurse

## 2023-03-08 NOTE — NURSING NOTE
Patient  with continuous Morphine drip  This RN wasted 65ml of Morphine, which included all medication present in tubing and remaining medication in bag  Waste witnessed by [employer]

## 2023-03-08 NOTE — DEATH NOTE
INPATIENT DEATH NOTE  Rima Mora 78 y o  female MRN: [id number]  Unit/Bed#: [address] [place] [address] Encounter: [id number]    Date, Time and Cause of Death    Date of Death: 3/8/23  Time of Death:  1:40 PM              PHYSICAL EXAM:  Unresponsive to noxious stimuli, Spontaneous respirations absent, Breath sounds absent, Carotid pulse absent, Heart sounds absent, Pupillary light reflex absent and Corneal blink reflex absent        Autopsy Options:  Decision for post-mortem examination not yet made by next of kin

## 2023-03-14 NOTE — DISCHARGE SUMMARY
This version of the note has been redacted during the course of a chart correction case  If you need access to the original text of this version of the note, please contact the Health Information Management department at [phone]

## 2023-03-14 NOTE — DISCHARGE SUMMARY
This Chart Correction Task allows for you to run the "Note Reaction" Tool directly from the Chart Correction Task  OWNER: [employer]    1  Click on the Link for Run Tool or the [place] on the Task                                     2   Select the Patient (the chart correction patient will be the default, you only need to select it), then click Continue in the bottom right hand corner    3  Select the CSN associated with the note, then click Continue in the bottom right hand corner    4  Select the Note you want to redact, then click Continue in the bottom right hand corner    5  Read through and address all the required content such as smarttext and revision information, then click Continue in the bottom right hand corner    6  Review and Verify the changes are appropriate, then click Complete    7    You can then alva the Task complete by clicking Confirm Completion on the task     Reference the Note Redaction Tip Sheet if Necessary

## (undated) DEVICE — PLUMEPEN PRO 10FT

## (undated) DEVICE — ADHESIVE SKIN HIGH VISCOSITY EXOFIN 1ML

## (undated) DEVICE — SUT MONOCRYL 4-0 PS-2 27 IN Y426H

## (undated) DEVICE — SUT VICRYL 3-0 CT-1 27 IN J258H

## (undated) DEVICE — LEAD IMPLANT KIT: Brand: AXONICS

## (undated) DEVICE — 3M™ STERI-STRIP™ REINFORCED ADHESIVE SKIN CLOSURES, R1547, 1/2 IN X 4 IN (12 MM X 100 MM), 6 STRIPS/ENVELOPE: Brand: 3M™ STERI-STRIP™

## (undated) DEVICE — REMOTE CONTROL: Brand: AXONICS

## (undated) DEVICE — INTENDED FOR TISSUE SEPARATION, AND OTHER PROCEDURES THAT REQUIRE A SHARP SURGICAL BLADE TO PUNCTURE OR CUT.: Brand: BARD-PARKER SAFETY BLADES SIZE 15, STERILE

## (undated) DEVICE — NEEDLE 25G X 1 1/2

## (undated) DEVICE — MEDI-VAC YANK SUCT HNDL W/TPRD BULBOUS TIP: Brand: CARDINAL HEALTH

## (undated) DEVICE — BETHLEHEM UNIVERSAL MINOR GEN: Brand: CARDINAL HEALTH

## (undated) DEVICE — GLOVE SRG BIOGEL 7

## (undated) DEVICE — 3M™ IOBAN™ 2 ANTIMICROBIAL INCISE DRAPE 6650EZ: Brand: IOBAN™ 2

## (undated) DEVICE — CHARGING SYSTEM: Brand: AXONICS

## (undated) DEVICE — SUT SILK 2-0 SH 30 IN K833H

## (undated) DEVICE — GAUZE SPONGES,USP TYPE VII GAUZE, 12 PLY: Brand: CURITY

## (undated) DEVICE — SUT VICRYL 3-0 SH 27 IN J416H

## (undated) DEVICE — DRAPE C-ARM X-RAY

## (undated) DEVICE — CHLORAPREP HI-LITE 26ML ORANGE

## (undated) DEVICE — TUBING SUCTION 5MM X 12 FT

## (undated) DEVICE — DRAPE SHEET THREE QUARTER